# Patient Record
Sex: FEMALE | Race: WHITE | NOT HISPANIC OR LATINO | Employment: FULL TIME | URBAN - METROPOLITAN AREA
[De-identification: names, ages, dates, MRNs, and addresses within clinical notes are randomized per-mention and may not be internally consistent; named-entity substitution may affect disease eponyms.]

---

## 2018-03-14 ENCOUNTER — OFFICE VISIT (OUTPATIENT)
Dept: NEUROLOGY | Facility: CLINIC | Age: 48
End: 2018-03-14
Payer: COMMERCIAL

## 2018-03-14 VITALS — SYSTOLIC BLOOD PRESSURE: 128 MMHG | WEIGHT: 264 LBS | DIASTOLIC BLOOD PRESSURE: 90 MMHG | HEART RATE: 72 BPM

## 2018-03-14 DIAGNOSIS — G51.0 RIGHT-SIDED BELL'S PALSY: Primary | ICD-10-CM

## 2018-03-14 PROCEDURE — 99203 OFFICE O/P NEW LOW 30 MIN: CPT | Performed by: PSYCHIATRY & NEUROLOGY

## 2018-03-14 RX ORDER — VALACYCLOVIR HYDROCHLORIDE 1 G/1
1000 TABLET, FILM COATED ORAL DAILY
Refills: 0 | Status: ON HOLD | COMMUNITY
Start: 2018-01-06 | End: 2019-02-11 | Stop reason: SDUPTHER

## 2018-03-14 RX ORDER — PREDNISONE 20 MG/1
TABLET ORAL
Refills: 0 | Status: ON HOLD | COMMUNITY
Start: 2018-03-12 | End: 2018-03-22

## 2018-03-14 RX ORDER — ACYCLOVIR 400 MG/1
TABLET ORAL
Refills: 0 | Status: ON HOLD | COMMUNITY
Start: 2018-03-12 | End: 2019-02-11 | Stop reason: ALTCHOICE

## 2018-03-14 RX ORDER — BUTALBITAL, ACETAMINOPHEN AND CAFFEINE 50; 325; 40 MG/1; MG/1; MG/1
TABLET ORAL
Refills: 0 | COMMUNITY
Start: 2018-03-12

## 2018-03-14 NOTE — PROGRESS NOTES
Consultation - Neurology   Silke Mercado 49 y o  female MRN: [id number]  Unit/Bed#:  Encounter: [id number]      Physician Requesting Consult: No att  providers found  49year old left handed female patient was referred by Dr Nayla Donaldson for her history of Bell's palsy  HPI:  Patient is a 50-year-old right-handed very pleasant lady, who 8 days ago woke up with numbness of her tongue loss of taste on the right side and subsequently noticed during the course of the day weakness of the right side of the face and went to the emergency room where she had a CAT scan and blood work done and detected to have Bell's palsy and discharged home  She claims she had a Lyme titer done the results of which are not available  Patient has had persistent symptoms since then and was started on prednisone and acyclovir in the emergency room  Prior to the onset of the symptoms for the past 8 days she was experiencing dull bifrontal headaches but denies any viral syndrome  Patient's headaches have improved but occasionally still gets 1 and she denies any blurred vision diplopia perioral numbness vertigo or dizziness and denies any tinnitus and also denies any motor or sensory symptoms in the upper lower extremities or any speech or gait difficulty  No history of head trauma the numbness on her tongue is resolved with the facial weakness is persistent  She was therefore  referred by her primary physician for further evaluation  Review of Systems:  Review of Systems   Constitutional: Positive for appetite change  HENT: Positive for sore throat and trouble swallowing  Eyes: Negative  Respiratory: Negative  Cardiovascular: Negative  Gastrointestinal: Negative  Endocrine: Negative  Genitourinary: Negative  Musculoskeletal: Negative  Skin: Negative  Allergic/Immunologic: Positive for food allergies  MSG   Neurological: Positive for speech difficulty, numbness and headaches  Hematological: Negative  Psychiatric/Behavioral: The patient is nervous/anxious  Historical Information   Past Medical History:   Diagnosis Date    Bell palsy     Right facial numbness      Past Surgical History:   Procedure Laterality Date    BARIATRIC SURGERY      CHOLECYSTECTOMY      HYSTERECTOMY      KNEE CARTILAGE SURGERY      SHOULDER ARTHROSCOPY DISTAL CLAVICLE EXCISION AND OPEN ROTATOR CUFF REPAIR       Social History   History   Smoking Status    Never Smoker   Smokeless Tobacco    Never Used     History   Alcohol Use    Yes     Comment: social     History   Drug Use No       Family History:   Family History   Problem Relation Age of Onset    Heart attack Mother     Diabetes Mother     Heart attack Father     Stroke Father     Hypertension Brother        Allergies   Allergen Reactions    Penicillins        Meds:  All current active meds have been reviewed    Scheduled Meds:  PRN Meds:     Physical Exam:   Objective   Vitals:   Vitals:    03/14/18 1440   BP: 128/90   Pulse: 72   ,There is no height or weight on file to calculate BMI  General appearance: Cooperative in no acute distress  Head & neck head is atraumatic and normocephalic  Neck is supple with full range of motion  Cardiovascular: Carotid arteries-no carotid bruits  Neurologic:   Patient is alert awake oriented, high functions are intact, speech is fluent  No evidence of any aphasia or dysarthria  Cranial nerve examination reveals visual fields are full to threat, pupils equal and reactive, extraocular movements intact, fundi showed sharp disc margins, sensation in the V1 V2 V3 distribution is symmetric, patient has evidence of weakness of the right side of the face affecting the frontalis, orbicularis oculi, orbicularis auris, buccinator, tongue is deviated slightly to the right, and gag is adequate    Motor examination reveals normal tone and bulk, no evidence of any drift to the outstretched extremities, strength is 5/5 preserved bilaterally in both upper and lower extremities, deep tendon reflexes are intact, toes are downgoing  Sensory examination to pinprick light touch proprioception and vibration is preserved bilaterally, patient does not extinguish double simultaneous stimuli  Coordination no evidence of any finger-to-nose dysmetria  Gait is normal based Romberg sign is negative  Assessment:  Right-sided Bell's palsy most likely postviral etiology, CP angle mass lesion to be ruled out  Plan: At this time a lengthy discussion occurred with the patient regarding the nature of her Bell's palsy, she is advised to call us when she gets the Lyme titer results, MRI of the brain will be useful and physical therapy with facial nerve stimulation will also be useful  Patient was reassured that her symptoms should resolve and will return back to see me in 3-4 weeks  3/14/2018,2:49 PM    Dictation voice to text software has been used in the creation of this document

## 2018-03-15 ENCOUNTER — APPOINTMENT (EMERGENCY)
Dept: CT IMAGING | Facility: HOSPITAL | Age: 48
End: 2018-03-15
Payer: COMMERCIAL

## 2018-03-15 ENCOUNTER — HOSPITAL ENCOUNTER (EMERGENCY)
Facility: HOSPITAL | Age: 48
Discharge: HOME | End: 2018-03-16
Attending: EMERGENCY MEDICINE | Admitting: EMERGENCY MEDICINE
Payer: COMMERCIAL

## 2018-03-15 ENCOUNTER — TRANSCRIBE ORDERS (OUTPATIENT)
Dept: NEUROLOGY | Facility: CLINIC | Age: 48
End: 2018-03-15

## 2018-03-15 DIAGNOSIS — G51.0 BELL PALSY: Primary | ICD-10-CM

## 2018-03-15 DIAGNOSIS — K62.5 RECTAL BLEED: Primary | ICD-10-CM

## 2018-03-15 DIAGNOSIS — K92.1 MELENA: ICD-10-CM

## 2018-03-15 LAB
ALBUMIN SERPL BCP-MCNC: 3.3 G/DL (ref 3.5–5)
ALP SERPL-CCNC: 63 U/L (ref 46–116)
ALT SERPL W P-5'-P-CCNC: 24 U/L (ref 12–78)
ANION GAP SERPL CALCULATED.3IONS-SCNC: 9 MMOL/L (ref 4–13)
APTT PPP: 28 SECONDS (ref 23–35)
AST SERPL W P-5'-P-CCNC: 11 U/L (ref 5–45)
BASOPHILS # BLD AUTO: 0.02 THOUSANDS/ΜL (ref 0–0.1)
BASOPHILS NFR BLD AUTO: 0 % (ref 0–1)
BILIRUB SERPL-MCNC: 0.3 MG/DL (ref 0.2–1)
BUN SERPL-MCNC: 42 MG/DL (ref 5–25)
CALCIUM SERPL-MCNC: 8.8 MG/DL (ref 8.3–10.1)
CHLORIDE SERPL-SCNC: 104 MMOL/L (ref 100–108)
CO2 SERPL-SCNC: 25 MMOL/L (ref 21–32)
CREAT SERPL-MCNC: 0.77 MG/DL (ref 0.6–1.3)
EOSINOPHIL # BLD AUTO: 0 THOUSAND/ΜL (ref 0–0.61)
EOSINOPHIL NFR BLD AUTO: 0 % (ref 0–6)
ERYTHROCYTE [DISTWIDTH] IN BLOOD BY AUTOMATED COUNT: 14.5 % (ref 11.6–15.1)
GFR SERPL CREATININE-BSD FRML MDRD: 92 ML/MIN/1.73SQ M
GLUCOSE SERPL-MCNC: 128 MG/DL (ref 65–140)
HCT VFR BLD AUTO: 30.8 % (ref 34.8–46.1)
HGB BLD-MCNC: 10.3 G/DL (ref 11.5–15.4)
INR PPP: 1.14 (ref 0.86–1.16)
LIPASE SERPL-CCNC: 103 U/L (ref 73–393)
LYMPHOCYTES # BLD AUTO: 1.71 THOUSANDS/ΜL (ref 0.6–4.47)
LYMPHOCYTES NFR BLD AUTO: 14 % (ref 14–44)
MCH RBC QN AUTO: 28.5 PG (ref 26.8–34.3)
MCHC RBC AUTO-ENTMCNC: 33.4 G/DL (ref 31.4–37.4)
MCV RBC AUTO: 85 FL (ref 82–98)
MONOCYTES # BLD AUTO: 0.76 THOUSAND/ΜL (ref 0.17–1.22)
MONOCYTES NFR BLD AUTO: 6 % (ref 4–12)
NEUTROPHILS # BLD AUTO: 9.94 THOUSANDS/ΜL (ref 1.85–7.62)
NEUTS SEG NFR BLD AUTO: 79 % (ref 43–75)
NRBC BLD AUTO-RTO: 0 /100 WBCS
PLATELET # BLD AUTO: 258 THOUSANDS/UL (ref 149–390)
PMV BLD AUTO: 11.6 FL (ref 8.9–12.7)
POTASSIUM SERPL-SCNC: 4 MMOL/L (ref 3.5–5.3)
PROT SERPL-MCNC: 6.6 G/DL (ref 6.4–8.2)
PROTHROMBIN TIME: 14.8 SECONDS (ref 12.1–14.4)
RBC # BLD AUTO: 3.62 MILLION/UL (ref 3.81–5.12)
SODIUM SERPL-SCNC: 138 MMOL/L (ref 136–145)
WBC # BLD AUTO: 12.56 THOUSAND/UL (ref 4.31–10.16)

## 2018-03-15 PROCEDURE — 36415 COLL VENOUS BLD VENIPUNCTURE: CPT | Performed by: EMERGENCY MEDICINE

## 2018-03-15 PROCEDURE — 85730 THROMBOPLASTIN TIME PARTIAL: CPT | Performed by: EMERGENCY MEDICINE

## 2018-03-15 PROCEDURE — 96361 HYDRATE IV INFUSION ADD-ON: CPT

## 2018-03-15 PROCEDURE — C9113 INJ PANTOPRAZOLE SODIUM, VIA: HCPCS | Performed by: EMERGENCY MEDICINE

## 2018-03-15 PROCEDURE — 80053 COMPREHEN METABOLIC PANEL: CPT | Performed by: EMERGENCY MEDICINE

## 2018-03-15 PROCEDURE — 85025 COMPLETE CBC W/AUTO DIFF WBC: CPT | Performed by: EMERGENCY MEDICINE

## 2018-03-15 PROCEDURE — 96374 THER/PROPH/DIAG INJ IV PUSH: CPT

## 2018-03-15 PROCEDURE — 83690 ASSAY OF LIPASE: CPT | Performed by: EMERGENCY MEDICINE

## 2018-03-15 PROCEDURE — 74177 CT ABD & PELVIS W/CONTRAST: CPT

## 2018-03-15 PROCEDURE — 85610 PROTHROMBIN TIME: CPT | Performed by: EMERGENCY MEDICINE

## 2018-03-15 RX ORDER — PANTOPRAZOLE SODIUM 40 MG/1
40 INJECTION, POWDER, FOR SOLUTION INTRAVENOUS ONCE
Status: COMPLETED | OUTPATIENT
Start: 2018-03-15 | End: 2018-03-15

## 2018-03-15 RX ORDER — SODIUM CHLORIDE 9 MG/ML
125 INJECTION, SOLUTION INTRAVENOUS CONTINUOUS
Status: DISCONTINUED | OUTPATIENT
Start: 2018-03-15 | End: 2018-03-16 | Stop reason: HOSPADM

## 2018-03-15 RX ADMIN — SODIUM CHLORIDE 125 ML/HR: 0.9 INJECTION, SOLUTION INTRAVENOUS at 21:43

## 2018-03-15 RX ADMIN — PANTOPRAZOLE SODIUM 40 MG: 40 INJECTION, POWDER, FOR SOLUTION INTRAVENOUS at 21:42

## 2018-03-15 RX ADMIN — IOHEXOL 100 ML: 350 INJECTION, SOLUTION INTRAVENOUS at 22:33

## 2018-03-16 VITALS
DIASTOLIC BLOOD PRESSURE: 68 MMHG | WEIGHT: 256 LBS | HEART RATE: 94 BPM | RESPIRATION RATE: 20 BRPM | SYSTOLIC BLOOD PRESSURE: 120 MMHG | TEMPERATURE: 97.8 F | HEIGHT: 69 IN | OXYGEN SATURATION: 96 % | BODY MASS INDEX: 37.92 KG/M2

## 2018-03-16 PROCEDURE — 99285 EMERGENCY DEPT VISIT HI MDM: CPT

## 2018-03-16 RX ORDER — FERROUS SULFATE 325(65) MG
325 TABLET ORAL DAILY
Qty: 30 TABLET | Refills: 0 | Status: SHIPPED | OUTPATIENT
Start: 2018-03-16 | End: 2019-03-18 | Stop reason: HOSPADM

## 2018-03-16 RX ORDER — SUCRALFATE ORAL 1 G/10ML
1 SUSPENSION ORAL 4 TIMES DAILY
Qty: 420 ML | Refills: 0 | Status: SHIPPED | OUTPATIENT
Start: 2018-03-16 | End: 2018-04-12 | Stop reason: SDUPTHER

## 2018-03-16 RX ORDER — PANTOPRAZOLE SODIUM 20 MG/1
40 TABLET, DELAYED RELEASE ORAL 2 TIMES DAILY
Qty: 60 TABLET | Refills: 0 | Status: SHIPPED | OUTPATIENT
Start: 2018-03-16 | End: 2018-03-22

## 2018-03-16 NOTE — ED PROVIDER NOTES
History  Chief Complaint   Patient presents with    Rectal Bleeding     Patient c/o abdominal pain that started this morning  Patient states her stools this morning are black and tarry and have a foul smell  Patient she had states she bariatric surgery two years ago  54-year-old female coming in with complaint of dark red tar like stools for the past 1 day  Patient has history of having gastric bypass surgery done 2 years ago at [facility]   Starting a few days ago patient had facial droop and went to [facility] where she was diagnosed with Bell's palsy, she was started on acyclovir and prednisone and a Lyme test was sent  She has been taking the steroids for 2-3 days now  Starting today she had dark red bloody stools  She has not had any nausea or vomiting  She has no chest pain or shortness of breath  She has no weakness or fatigue  She has some epigastric pain that is like gnawing  She is not taking any other medications, including she is not taking any PPIs or and acid given her history of gastric bypass  Given her history of gastric bypass and her dark red melanotic stools that are starting after she started prednisone this would be consistent 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 in a person with history of gastric bypass  Will do a CT scan to evaluate for any complications of this including like gastric perforation  Will check a CBC for anemia  Patient does have history of chronic anemia which would be present with history of gastric bypass  Will give her a dose of IV Protonix and reassess  History provided by:  Patient  Rectal Bleeding - Minor   Quality:  Black and tarry  Amount:   Moderate  Duration:  1 day  Timing:  Constant  Chronicity:  New  Context: defecation    Similar prior episodes: no    Relieved by:  None tried  Worsened by:  Nothing  Ineffective treatments:  None tried  Associated symptoms: abdominal pain (epigastric pain, gnawing)    Associated symptoms: no dizziness, no epistaxis, no fever, no hematemesis, no light-headedness, no loss of consciousness, no recent illness and no vomiting    Risk factors: steroid use    Risk factors: no anticoagulant use and no hx of colorectal cancer    Risk factors comment:  H/o gastric bypass surgery      Prior to Admission Medications   Prescriptions Last Dose Informant Patient Reported? Taking?   acyclovir (ZOVIRAX) 400 MG tablet   Yes No   Sig: take 1 tablet by mouth five times a day for 10 days   butalbital-acetaminophen-caffeine (FIORICET,ESGIC) -40 mg per tablet   Yes No   Sig: take 1-2 tablets by mouth every 4 to 6 hours if needed maximum daily dose of 6   esterified estrogens (MENEST) 0 625 MG tablet  Self Yes No   Sig: Take 0 625 mg by mouth daily   predniSONE 20 mg tablet   Yes No   Sig: take 3 tablets by mouth daily for 3 days then 2 tablets daily for     (REFER TO PRESCRIPTION NOTES)  valACYclovir (VALTREX) 1,000 mg tablet   Yes No   Sig: Take 1,000 mg by mouth daily      Facility-Administered Medications: None       Past Medical History:   Diagnosis Date    Bell palsy     Right facial numbness        Past Surgical History:   Procedure Laterality Date    BARIATRIC SURGERY      CHOLECYSTECTOMY      HYSTERECTOMY      KNEE CARTILAGE SURGERY      SHOULDER ARTHROSCOPY DISTAL CLAVICLE EXCISION AND OPEN ROTATOR CUFF REPAIR         Family History   Problem Relation Age of Onset    Heart attack Mother     Diabetes Mother     Heart attack Father     Stroke Father     Hypertension Brother      I have reviewed and agree with the history as documented  Social History   Substance Use Topics    Smoking status: Never Smoker    Smokeless tobacco: Never Used    Alcohol use Yes      Comment: social        Review of Systems   Constitutional: Negative for fever  HENT: Negative for nosebleeds  Gastrointestinal: Positive for abdominal pain (epigastric pain, gnawing) and hematochezia   Negative for hematemesis and vomiting  Neurological: Negative for dizziness, loss of consciousness and light-headedness  All other systems reviewed and are negative  Physical Exam  ED Triage Vitals [03/15/18 1810]   Temperature Pulse Respirations Blood Pressure SpO2   97 8 °F (36 6 °C) 104 18 133/65 95 %      Temp src Heart Rate Source Patient Position - Orthostatic VS BP Location FiO2 (%)   -- Monitor Sitting Left arm --      Pain Score       6           Orthostatic Vital Signs  Vitals:    03/15/18 1810   BP: 133/65   Pulse: 104   Patient Position - Orthostatic VS: Sitting       Physical Exam   Constitutional: She is oriented to person, place, and time  She appears well-developed and well-nourished  No distress  HENT:   Head: Normocephalic and atraumatic  Nose: Nose normal    Mouth/Throat: Oropharynx is clear and moist    Eyes: Conjunctivae and EOM are normal  Pupils are equal, round, and reactive to light  Neck: Normal range of motion  Neck supple  Cardiovascular: Regular rhythm, normal heart sounds and intact distal pulses  Tachycardia present  Pulmonary/Chest: Effort normal and breath sounds normal  No respiratory distress  Abdominal: Soft  Bowel sounds are normal  She exhibits no distension  There is no tenderness  Genitourinary: Rectal exam shows guaiac positive stool  Rectal exam shows no external hemorrhoid, no fissure and no tenderness  Musculoskeletal: Normal range of motion  Neurological: She is alert and oriented to person, place, and time  Skin: Skin is warm and dry  No rash noted  She is not diaphoretic  No pallor  Psychiatric: She has a normal mood and affect  Nursing note and vitals reviewed        ED Medications  Medications   sodium chloride 0 9 % infusion (125 mL/hr Intravenous New Bag 3/15/18 2143)   pantoprazole (PROTONIX) injection 40 mg (40 mg Intravenous Given 3/15/18 2142)   iohexol (OMNIPAQUE) 350 MG/ML injection (MULTI-DOSE) 100 mL (100 mL Intravenous Given 3/15/18 2233) Diagnostic Studies  Results Reviewed     Procedure Component Value Units Date/Time    Comprehensive metabolic panel [[id number]]  (Abnormal) Collected:  03/15/18 2142    Lab Status:  Final result Specimen:  Blood from Arm, Right Updated:  03/15/18 2211     Sodium 138 mmol/L      Potassium 4 0 mmol/L      Chloride 104 mmol/L      CO2 25 mmol/L      Anion Gap 9 mmol/L      BUN 42 (H) mg/dL      Creatinine 0 77 mg/dL      Glucose 128 mg/dL      Calcium 8 8 mg/dL      AST 11 U/L      ALT 24 U/L      Alkaline Phosphatase 63 U/L      Total Protein 6 6 g/dL      Albumin 3 3 (L) g/dL      Total Bilirubin 0 30 mg/dL      eGFR 92 ml/min/1 73sq m     Narrative:         National Kidney Disease Education Program recommendations are as follows:  GFR calculation is accurate only with a steady state creatinine  Chronic Kidney disease less than 60 ml/min/1 73 sq  meters  Kidney failure less than 15 ml/min/1 73 sq  meters  Lipase [[id number]]  (Normal) Collected:  03/15/18 2142    Lab Status:  Final result Specimen:  Blood from Arm, Right Updated:  03/15/18 2211     Lipase 103 u/L     Protime-INR [[id number]]  (Abnormal) Collected:  03/15/18 2142    Lab Status:  Final result Specimen:  Blood from Arm, Right Updated:  03/15/18 2209     Protime 14 8 (H) seconds      INR 1 14    APTT [[id number]]  (Normal) Collected:  03/15/18 2142    Lab Status:  Final result Specimen:  Blood from Arm, Right Updated:  03/15/18 2209     PTT 28 seconds     Narrative:          Therapeutic Heparin Range = 60-90 seconds    CBC and differential [[id number]]  (Abnormal) Collected:  03/15/18 2142    Lab Status:  Final result Specimen:  Blood from Arm, Right Updated:  03/15/18 2157     WBC 12 56 (H) Thousand/uL      RBC 3 62 (L) Million/uL      Hemoglobin 10 3 (L) g/dL      Hematocrit 30 8 (L) %      MCV 85 fL      MCH 28 5 pg      MCHC 33 4 g/dL      RDW 14 5 %      MPV 11 6 fL      Platelets 662 Thousands/uL      nRBC 0 /100 WBCs      Neutrophils Relative 79 (H) %      Lymphocytes Relative 14 %      Monocytes Relative 6 %      Eosinophils Relative 0 %      Basophils Relative 0 %      Neutrophils Absolute 9 94 (H) Thousands/µL      Lymphocytes Absolute 1 71 Thousands/µL      Monocytes Absolute 0 76 Thousand/µL      Eosinophils Absolute 0 00 Thousand/µL      Basophils Absolute 0 02 Thousands/µL                  CT abdomen pelvis with contrast   Final Result by Dione Fregoso MD (03/15 2300)      1 6 cm hypodense lesion in the pancreatic neck which needs to be correlated with nonemergent outpatient contrast enhanced abdominal MRI/MRCP  Status post gastric bypass surgery  Fluid-filled mid abdominal and lower pelvis loops of small bowel with air-fluid levels could relate to enteritis  Workstation performed: [id number]                    Procedures  Procedures       Phone Contacts  ED Phone Contact    ED Course  ED Course as of Mar 16 0008   Thu Mar 15, 2018   233 Page placed to bariatric  MDM  Number of Diagnoses or Management Options  Melena: new and requires workup  Rectal bleed: new and requires workup     Amount and/or Complexity of Data Reviewed  Clinical lab tests: ordered and reviewed  Tests in the radiology section of CPT®: ordered and reviewed    Risk of Complications, Morbidity, and/or Mortality  Presenting problems: high    Patient Progress  Patient progress: improved (Discussed with the patient her labs and her CT scan results  Including discussed with her the lesion in her pancreas for which she needs follow-up MRI  Called and discussed with Dr Marvin Villa as well and could continue prednisone if need be but would start Protonix b i d  and Carafate  Could follow up with GI to get a scope  It is likely a presumed ulcerative bleed given her history of gastric bypass and not taking any medications  I discussed this with patient  Her hemoglobin is 10  She has an appointment with her family doctor on Saturday    Is not actively having any bleeding right now  Discussed with her worsening signs and symptoms to return for  May have some occasional still bloody stools until she gets treated with the PPI  Discussed where she feels lightheaded dizzy or has any worsening symptoms like abdominal pain to return emergency department  Discussed with her to call GI and bariatric surgery tomorrow for follow-up  Patient felt comfortable and improved with this plan )    CritCare Time    Disposition  Final diagnoses:   Rectal bleed   Melena     Time reflects when diagnosis was documented in both MDM as applicable and the Disposition within this note     Time User Action Codes Description Comment    3/16/2018 12:04 AM Delfino Sena Add [K62 5] Rectal bleed     3/16/2018 12:05 AM Emily PAUL Add [K92 1] Melena       ED Disposition     ED Disposition Condition Comment    Discharge  [address] discharge to home/self care      Condition at discharge: Good        Follow-up Information     Follow up With Specialties Details Why Jamie Phelps MD Emergency Medicine  Keep your Saturday appointment [address] [facility]      Barnie Gosselin, MD Gastroenterology Call in 1 day  [address] [facility], MD General Surgery, [address] [facility]  [phone]          Patient's Medications   Discharge Prescriptions    FERROUS SULFATE 325 (65 FE) MG TABLET    Take 1 tablet (325 mg total) by mouth daily       Start Date: 3/16/2018 End Date: --       Order Dose: 325 mg       Quantity: 30 tablet    Refills: 0    PANTOPRAZOLE (PROTONIX) 20 MG TABLET    Take 2 tablets (40 mg total) by mouth 2 (two) times a day for 30 days       Start Date: 3/16/2018 End Date: 4/15/2018       Order Dose: 40 mg       Quantity: 60 tablet    Refills: 0    SUCRALFATE (CARAFATE) 1 G/10 ML SUSPENSION    Take 10 mL (1 g total) by mouth 4 (four) times a day       Start Date: 3/16/2018 End Date: --       Order Dose: 1 g       Quantity: 420 mL    Refills: 0     No discharge procedures on file      ED Provider  Electronically Signed by           Cody Beebe MD  03/16/18 [address], MD  03/16/18 4921

## 2018-03-16 NOTE — DISCHARGE INSTRUCTIONS
Gastrointestinal Bleeding   WHAT YOU NEED TO KNOW:   Gastrointestinal (GI) bleeding may occur in any part of your digestive tract  This includes your esophagus, stomach, intestines, rectum, or anus  Bleeding may be mild to severe  Your bleeding may begin suddenly, or start slowly and last for a longer period of time  Bleeding that lasts for a longer period of time is called chronic GI bleeding  DISCHARGE INSTRUCTIONS:   Call 911 for any of the following:   · You have shortness of breath or trouble breathing  · You faint or lose consciousness  · You have chest pain  Return to the emergency department if:   · You feel dizzy or are too weak to stand  · Your heart is beating faster than usual      · You vomit blood, or your vomit looks like coffee grounds  · You have blood in your bowel movement  · You have abdominal pain or swelling  Contact your healthcare provider if:   · You have bowel movements that are tarry or black  · You have nausea or are vomiting  · You have heartburn  · You have questions or concerns about your condition or care  Activity:  Rest as directed  Ask when you can return to your usual activities, such as work  Slowly do more each day  Nutrition:  Ask if you need to be on a special diet  A special diet can help treat GI conditions and prevent problems such as GI bleeding  Eat small meals more often while your digestive system heals  Avoid or limit caffeine and spicy foods  Also avoid foods that cause heartburn, nausea, or diarrhea  Prevent GI bleeding:   · Manage GI conditions as directed  Examples of GI conditions include gastroesophageal reflux, peptic ulcer disease, and ulcerative colitis  Take all medicines for these conditions as directed  · Limit or do not take NSAIDs  Ask your healthcare provider if it is safe for you to take NSAIDs  NSAIDs can increase your risk for ulcers and GI bleeding  · Do not drink alcohol    Alcohol can cause ulcers and esophageal varices  Esophageal varices are swollen blood vessels in your esophagus  Over time the blood vessels become weak and may bleed  · Do not smoke  Nicotine and other chemicals in cigarettes and cigars can increase your risk for ulcers  Ask your healthcare provider for information if you currently smoke and need help to quit  E-cigarettes or smokeless tobacco still contain nicotine  Talk to your healthcare provider before you use these products  Follow up with your healthcare provider as directed: You may need to return for a colonoscopy, endoscopy, or other tests  These tests can make sure you do not have more bleeding  Write down your questions so you remember to ask them during your visits  © 2017 [facility] Information is for End User's use only and may not be sold, redistributed or otherwise used for commercial purposes  All illustrations and images included in CareNotes® are the copyrighted property of A D A M , Inc  or [employer]ma Cantu  The above information is an  only  It is not intended as medical advice for individual conditions or treatments  Talk to your doctor, nurse or pharmacist before following any medical regimen to see if it is safe and effective for you

## 2018-03-20 ENCOUNTER — OFFICE VISIT (OUTPATIENT)
Dept: GASTROENTEROLOGY | Facility: CLINIC | Age: 48
End: 2018-03-20
Payer: COMMERCIAL

## 2018-03-20 ENCOUNTER — LAB (OUTPATIENT)
Dept: LAB | Facility: CLINIC | Age: 48
End: 2018-03-20
Payer: COMMERCIAL

## 2018-03-20 VITALS
HEIGHT: 70 IN | WEIGHT: 262 LBS | BODY MASS INDEX: 37.51 KG/M2 | SYSTOLIC BLOOD PRESSURE: 142 MMHG | DIASTOLIC BLOOD PRESSURE: 80 MMHG | HEART RATE: 84 BPM

## 2018-03-20 DIAGNOSIS — K92.1 MELENA: ICD-10-CM

## 2018-03-20 DIAGNOSIS — D62 ACUTE BLOOD LOSS ANEMIA: ICD-10-CM

## 2018-03-20 DIAGNOSIS — K92.1 MELENA: Primary | ICD-10-CM

## 2018-03-20 PROBLEM — K92.2 UPPER GI BLEED: Status: ACTIVE | Noted: 2018-03-20

## 2018-03-20 LAB
BASOPHILS # BLD AUTO: 0.02 THOUSANDS/ΜL (ref 0–0.1)
BASOPHILS NFR BLD AUTO: 0 % (ref 0–1)
EOSINOPHIL # BLD AUTO: 0.21 THOUSAND/ΜL (ref 0–0.61)
EOSINOPHIL NFR BLD AUTO: 3 % (ref 0–6)
ERYTHROCYTE [DISTWIDTH] IN BLOOD BY AUTOMATED COUNT: 15.4 % (ref 11.6–15.1)
HCT VFR BLD AUTO: 29.4 % (ref 34.8–46.1)
HGB BLD-MCNC: 9.7 G/DL (ref 11.5–15.4)
LYMPHOCYTES # BLD AUTO: 1.84 THOUSANDS/ΜL (ref 0.6–4.47)
LYMPHOCYTES NFR BLD AUTO: 23 % (ref 14–44)
MCH RBC QN AUTO: 28.5 PG (ref 26.8–34.3)
MCHC RBC AUTO-ENTMCNC: 33 G/DL (ref 31.4–37.4)
MCV RBC AUTO: 87 FL (ref 82–98)
MONOCYTES # BLD AUTO: 0.5 THOUSAND/ΜL (ref 0.17–1.22)
MONOCYTES NFR BLD AUTO: 6 % (ref 4–12)
NEUTROPHILS # BLD AUTO: 5.29 THOUSANDS/ΜL (ref 1.85–7.62)
NEUTS SEG NFR BLD AUTO: 68 % (ref 43–75)
NRBC BLD AUTO-RTO: 0 /100 WBCS
PLATELET # BLD AUTO: 362 THOUSANDS/UL (ref 149–390)
PMV BLD AUTO: 10.6 FL (ref 8.9–12.7)
RBC # BLD AUTO: 3.4 MILLION/UL (ref 3.81–5.12)
WBC # BLD AUTO: 7.9 THOUSAND/UL (ref 4.31–10.16)

## 2018-03-20 PROCEDURE — 99243 OFF/OP CNSLTJ NEW/EST LOW 30: CPT | Performed by: INTERNAL MEDICINE

## 2018-03-20 PROCEDURE — 36415 COLL VENOUS BLD VENIPUNCTURE: CPT

## 2018-03-20 PROCEDURE — 85025 COMPLETE CBC W/AUTO DIFF WBC: CPT

## 2018-03-20 NOTE — LETTER
March 20, 2018     Ray Hoang MD  [address] [employer] [employer]    Patient: Bishop Hancock   YOB: 1970   Date of Visit: 3/20/2018       Dear Dr Alvin Coy: Thank you for referring Bishop Hancock to me for evaluation  Below are my notes for this consultation  If you have questions, please do not hesitate to call me  I look forward to following your patient along with you  Sincerely,        Victor Manuel Riley MD        CC: No Recipients  Graig SpurlingDilcia  3/20/2018  3:46 PM  Sign at close encounter  [facility]    Dear Dr Alvin Coy,    I had the pleasure of seeing your patient Bishop Hancock in the office today and I thank you for this kind referral        Chief Complaint: Gi bleeding       HPI:  Bishop Hancock is a 49 y o  female who presents here today for Hospital Follow up  She was recently treated for episode of Bell's palsy  A couple of days after beginning treatment, she noticed dark stools  and sought treatment once more at [address] [place] [address] ED  At that time was found to have bleeding ulcers on CT scan  Her hgb on 3/15 was 10 3  She was discharged home from the ED with Carafate and Pantoprazole with follow up information for GI to have EGD  Since her ED visit five days ago, she  has continued to experience dark and bloody stools  She has been having 1 BM per day  She has abdominal discomfort as well as non exertional chest discomfort  She has had fairly constant shortness of breath since onset of her symptoms  Review of Systems:   Constitutional: No fever or chills, feels well, no tiredness, no recent weight gain or weight loss  HENT: No complaints of earache, no hearing loss, no nosebleeds, no nasal discharge, no sore throat, no hoarseness  Eyes: No complaints of eye pain, no red eyes, no discharge from eyes, no itchy eyes    Cardiovascular: No complaints of slow heart rate, no fast heart rate, no palpitations, no leg claudication, no lower extremity edema  Non exertional chest pain  Respiratory: No complaints of wheezing, no cough, no SOB on exertion, no orthopnea  Dyspnea   Gastrointestinal: As noted in HPI  Genitourinary: No complaints of dysuria, no incontinence, no hesitancy, no nocturia  Musculoskeletal: No complaints of arthralgia, no myalgias, no joint swelling or stiffness, no limb pain or swelling  Neurological: No complaints of headache, no confusion, no convulsions, no numbness or tingling, no dizziness or fainting, no limb weakness, no difficulty walking  Skin: No complaints of skin rash or skin lesions, no itching, no skin wound, no dry skin  Hematological/Lymphatic: No complaints of swollen glands, does not bleed easy  Allergic/Immunologic: No immunocompromised state  Endocrine:  No complaints of polyuria, no polydipsia  Psychiatric/Behavioral: is not suicidal, no sleep disturbances, no anxiety or depression, no change in personality, no emotional problems  Historical Information   Past Medical History:   Diagnosis Date    Bell palsy     Right facial numbness      Past Surgical History:   Procedure Laterality Date    BARIATRIC SURGERY      CHOLECYSTECTOMY      HYSTERECTOMY      KNEE CARTILAGE SURGERY      SHOULDER ARTHROSCOPY DISTAL CLAVICLE EXCISION AND OPEN ROTATOR CUFF REPAIR       Social History   History   Alcohol Use    Yes     Comment: social     History   Drug Use No     History   Smoking Status    Never Smoker   Smokeless Tobacco    Never Used     Family History   Problem Relation Age of Onset    Heart attack Mother     Diabetes Mother     Heart attack Father     Stroke Father     Hypertension Brother          Current Medications: has a current medication list which includes the following prescription(s): acyclovir, butalbital-acetaminophen-caffeine, esterified estrogens, ferrous sulfate, pantoprazole, sucralfate, valacyclovir, and prednisone         Physical Exam: Constitutional  General Appearance: No acute distress, well appearing and well nourished  Head  Normocephalic  Eyes  Conjunctivae and lids: No swelling, erythema, or discharge  Pupils and irises: Equal, round and reactive to light  Ears, Nose, Mouth, and Throat  External inspection of ears and nose: Normal  Nasal mucosa, septum and turbinates: Normal without edema or erythema/   Oropharynx: Normal with no erythema, edema, exudate or lesions  Neck  Normal range of motion  Neck supple  Cardiovascular  Auscultation of the heart: Normal rate and rhythm, normal S1 and S2 without murmurs  Examination of the extremities for edema and/or varicosities: Normal  Pulmonary/Chest  Respiratory effort: No increased work of breathing or signs of respiratory distress  Auscultation of lungs: Clear to auscultation, equal breath sounds bilaterally, no wheezes, rales, no rhonchi  Abdomen  Abdomen: Non-tender, no masses  Liver and spleen: No hepatomegaly or splenomegaly  Musculoskeletal  Gait and station: normal   Digits and Nails: normal without clubbing or cyanosis  Inspection/palpation of joints, bones, and muscles: Normal  Neurological  No nystagmus or asterixis  Skin  Skin and subcutaneous tissue: Normal without rashes or lesions  Lymphatic  Palpation of the lymph nodes in neck: No lymphadenopathy  Psychiatric  Orientation to person, place and time: Normal   Mood and affect: Normal          Labs:   Results Reviewed     None          X-Rays & Procedures:   No orders to display         ______________________________________________________________________      Assessment & Plan:      Diagnoses and all orders for this visit:    Melena    Acute blood loss anemia        Plan for above problems  is to obtain repeat Hgb  Continue on pantoprazole and Carafate  Will schedule her for EGD       I advised her to seek immediate treatment if she develops worsening of chest pain or dyspnea, worsening of blood in her stools, development of hematemesis, and any other concerning s/s  With warmest regards,    Ava Santiago MD, [facility]           Attestation:   By signing my name below, I, Aj Andrade, attest that this documentation has been prepared under the direction and in the presence of Ava Santiago MD  Electronically Signed: Dilcia Arechiga  03/20/18       I, Ava Santiago, personally performed the services described in this documentation  All medical record entries made by the scribe were at my direction and in my presence  I have reviewed the chart and discharge instructions and agree that the record reflects my personal performance and is accurate and complete   Ava Santiago MD  03/20/18

## 2018-03-20 NOTE — PROGRESS NOTES
[facility]    Dear Dr Bryson Pelletier,    I had the pleasure of seeing your patient Constanza Joseph in the office today and I thank you for this kind referral        Chief Complaint: Gi bleeding       HPI:  Constanza Joseph is a 49 y o  female who presents here today for Hospital Follow up  She was recently treated for episode of Bell's palsy  A couple of days after beginning treatment, she noticed dark stools  and sought treatment once more at [facility] ED  At that time was found to have bleeding ulcers on CT scan  Her hgb on 3/15 was 10 3  She was discharged home from the ED with Carafate and Pantoprazole with follow up information for GI to have EGD  Since her ED visit five days ago, she  has continued to experience dark and bloody stools  She has been having 1 BM per day  She has abdominal discomfort as well as non exertional chest discomfort  She has had fairly constant shortness of breath since onset of her symptoms  Review of Systems:   Constitutional: No fever or chills, feels well, no tiredness, no recent weight gain or weight loss  HENT: No complaints of earache, no hearing loss, no nosebleeds, no nasal discharge, no sore throat, no hoarseness  Eyes: No complaints of eye pain, no red eyes, no discharge from eyes, no itchy eyes  Cardiovascular: No complaints of slow heart rate, no fast heart rate, no palpitations, no leg claudication, no lower extremity edema  Non exertional chest pain  Respiratory: No complaints of wheezing, no cough, no SOB on exertion, no orthopnea  Dyspnea   Gastrointestinal: As noted in HPI  Genitourinary: No complaints of dysuria, no incontinence, no hesitancy, no nocturia  Musculoskeletal: No complaints of arthralgia, no myalgias, no joint swelling or stiffness, no limb pain or swelling  Neurological: No complaints of headache, no confusion, no convulsions, no numbness or tingling, no dizziness or fainting, no limb weakness, no difficulty walking  Skin: No complaints of skin rash or skin lesions, no itching, no skin wound, no dry skin  Hematological/Lymphatic: No complaints of swollen glands, does not bleed easy  Allergic/Immunologic: No immunocompromised state  Endocrine:  No complaints of polyuria, no polydipsia  Psychiatric/Behavioral: is not suicidal, no sleep disturbances, no anxiety or depression, no change in personality, no emotional problems  Historical Information   Past Medical History:   Diagnosis Date    Bell palsy     Right facial numbness      Past Surgical History:   Procedure Laterality Date    BARIATRIC SURGERY      CHOLECYSTECTOMY      HYSTERECTOMY      KNEE CARTILAGE SURGERY      SHOULDER ARTHROSCOPY DISTAL CLAVICLE EXCISION AND OPEN ROTATOR CUFF REPAIR       Social History   History   Alcohol Use    Yes     Comment: social     History   Drug Use No     History   Smoking Status    Never Smoker   Smokeless Tobacco    Never Used     Family History   Problem Relation Age of Onset    Heart attack Mother     Diabetes Mother     Heart attack Father     Stroke Father     Hypertension Brother          Current Medications: has a current medication list which includes the following prescription(s): acyclovir, butalbital-acetaminophen-caffeine, esterified estrogens, ferrous sulfate, pantoprazole, sucralfate, valacyclovir, and prednisone  Physical Exam:   Constitutional  General Appearance: No acute distress, well appearing and well nourished  Head  Normocephalic  Eyes  Conjunctivae and lids: No swelling, erythema, or discharge  Pupils and irises: Equal, round and reactive to light  Ears, Nose, Mouth, and Throat  External inspection of ears and nose: Normal  Nasal mucosa, septum and turbinates: Normal without edema or erythema/   Oropharynx: Normal with no erythema, edema, exudate or lesions  Neck  Normal range of motion  Neck supple     Cardiovascular  Auscultation of the heart: Normal rate and rhythm, normal S1 and S2 without murmurs  Examination of the extremities for edema and/or varicosities: Normal  Pulmonary/Chest  Respiratory effort: No increased work of breathing or signs of respiratory distress  Auscultation of lungs: Clear to auscultation, equal breath sounds bilaterally, no wheezes, rales, no rhonchi  Abdomen  Abdomen: Non-tender, no masses  Liver and spleen: No hepatomegaly or splenomegaly  Musculoskeletal  Gait and station: normal   Digits and Nails: normal without clubbing or cyanosis  Inspection/palpation of joints, bones, and muscles: Normal  Neurological  No nystagmus or asterixis  Left facial droop  Skin  Skin and subcutaneous tissue: Normal without rashes or lesions  Lymphatic  Palpation of the lymph nodes in neck: No lymphadenopathy  Psychiatric  Orientation to person, place and time: Normal   Mood and affect: Normal          Labs:   Results Reviewed     None          X-Rays & Procedures:   No orders to display         ______________________________________________________________________      Assessment & Plan:      Diagnoses and all orders for this visit:    Melena    Acute blood loss anemia        Plan for above problems  is to obtain repeat Hgb  Continue on pantoprazole and Carafate  Will schedule her for EGD  I advised her to seek immediate treatment if she develops worsening of chest pain or dyspnea, worsening of blood in her stools, development of hematemesis, and any other concerning s/s  With warmest regards,    Anthony Mota MD, [facility]           Attestation:   By signing my name below, IFlorencio, attest that this documentation has been prepared under the direction and in the presence of Anthony Mota MD  Electronically Signed: Dilcia Contreras  03/20/18       Anthony WHIPPLE, personally performed the services described in this documentation   All medical record entries made by the changibbarney were at my direction and in my presence  I have reviewed the chart and discharge instructions and agree that the record reflects my personal performance and is accurate and complete   West Lu MD  03/20/18

## 2018-03-21 ENCOUNTER — ANESTHESIA EVENT (OUTPATIENT)
Dept: PERIOP | Facility: HOSPITAL | Age: 48
End: 2018-03-21
Payer: COMMERCIAL

## 2018-03-22 ENCOUNTER — HOSPITAL ENCOUNTER (OUTPATIENT)
Facility: HOSPITAL | Age: 48
Setting detail: OUTPATIENT SURGERY
Discharge: HOME | End: 2018-03-22
Attending: INTERNAL MEDICINE | Admitting: INTERNAL MEDICINE
Payer: COMMERCIAL

## 2018-03-22 ENCOUNTER — ANESTHESIA (OUTPATIENT)
Dept: PERIOP | Facility: HOSPITAL | Age: 48
End: 2018-03-22
Payer: COMMERCIAL

## 2018-03-22 ENCOUNTER — TELEPHONE (OUTPATIENT)
Dept: GASTROENTEROLOGY | Facility: CLINIC | Age: 48
End: 2018-03-22

## 2018-03-22 VITALS
DIASTOLIC BLOOD PRESSURE: 72 MMHG | OXYGEN SATURATION: 97 % | HEART RATE: 61 BPM | WEIGHT: 261.47 LBS | RESPIRATION RATE: 15 BRPM | SYSTOLIC BLOOD PRESSURE: 138 MMHG | BODY MASS INDEX: 37.43 KG/M2 | HEIGHT: 70 IN | TEMPERATURE: 98.6 F

## 2018-03-22 DIAGNOSIS — K92.2 UPPER GI BLEED: ICD-10-CM

## 2018-03-22 DIAGNOSIS — K29.71 GASTROINTESTINAL HEMORRHAGE ASSOCIATED WITH GASTRITIS, UNSPECIFIED GASTRITIS TYPE: Primary | ICD-10-CM

## 2018-03-22 PROCEDURE — 88342 IMHCHEM/IMCYTCHM 1ST ANTB: CPT | Performed by: PATHOLOGY

## 2018-03-22 PROCEDURE — 88313 SPECIAL STAINS GROUP 2: CPT | Performed by: PATHOLOGY

## 2018-03-22 PROCEDURE — 88305 TISSUE EXAM BY PATHOLOGIST: CPT | Performed by: PATHOLOGY

## 2018-03-22 PROCEDURE — 45380 COLONOSCOPY AND BIOPSY: CPT | Performed by: INTERNAL MEDICINE

## 2018-03-22 RX ORDER — SODIUM CHLORIDE, SODIUM LACTATE, POTASSIUM CHLORIDE, CALCIUM CHLORIDE 600; 310; 30; 20 MG/100ML; MG/100ML; MG/100ML; MG/100ML
125 INJECTION, SOLUTION INTRAVENOUS CONTINUOUS
Status: DISCONTINUED | OUTPATIENT
Start: 2018-03-22 | End: 2018-03-22 | Stop reason: HOSPADM

## 2018-03-22 RX ORDER — PROPOFOL 10 MG/ML
INJECTION, EMULSION INTRAVENOUS AS NEEDED
Status: DISCONTINUED | OUTPATIENT
Start: 2018-03-22 | End: 2018-03-22 | Stop reason: SURG

## 2018-03-22 RX ORDER — LIDOCAINE HYDROCHLORIDE 10 MG/ML
INJECTION, SOLUTION INFILTRATION; PERINEURAL AS NEEDED
Status: DISCONTINUED | OUTPATIENT
Start: 2018-03-22 | End: 2018-03-22 | Stop reason: SURG

## 2018-03-22 RX ORDER — PANTOPRAZOLE SODIUM 40 MG/1
40 TABLET, DELAYED RELEASE ORAL DAILY
Qty: 30 TABLET | Refills: 5 | Status: SHIPPED | OUTPATIENT
Start: 2018-03-22 | End: 2019-01-24 | Stop reason: SDUPTHER

## 2018-03-22 RX ADMIN — LIDOCAINE HYDROCHLORIDE 50 MG: 10 INJECTION, SOLUTION INFILTRATION; PERINEURAL at 09:42

## 2018-03-22 RX ADMIN — PROPOFOL 100 MG: 10 INJECTION, EMULSION INTRAVENOUS at 09:42

## 2018-03-22 RX ADMIN — PROPOFOL 50 MG: 10 INJECTION, EMULSION INTRAVENOUS at 09:47

## 2018-03-22 RX ADMIN — SODIUM CHLORIDE, SODIUM LACTATE, POTASSIUM CHLORIDE, AND CALCIUM CHLORIDE 125 ML/HR: .6; .31; .03; .02 INJECTION, SOLUTION INTRAVENOUS at 09:17

## 2018-03-22 RX ADMIN — PROPOFOL 40 MG: 10 INJECTION, EMULSION INTRAVENOUS at 09:49

## 2018-03-22 RX ADMIN — PROPOFOL 50 MG: 10 INJECTION, EMULSION INTRAVENOUS at 09:43

## 2018-03-22 NOTE — ANESTHESIA PREPROCEDURE EVALUATION
Review of Systems/Medical History  Patient summary reviewed        Cardiovascular  Negative cardio ROS    Pulmonary  Negative pulmonary ROS        GI/Hepatic    Bariatric surgery,        Negative  ROS        Endo/Other    Obesity    GYN  Negative gynecology ROS          Hematology  Negative hematology ROS      Musculoskeletal  Negative musculoskeletal ROS        Neurology    Neuromuscular disease ,    Psychology   Negative psychology ROS              Physical Exam    Airway    Mallampati score: II  TM Distance: >3 FB  Neck ROM: full     Dental       Cardiovascular  Comment: Negative ROS, Cardiovascular exam normal    Pulmonary  Pulmonary exam normal     Other Findings        Anesthesia Plan  ASA Score- 3     Anesthesia Type- IV sedation with anesthesia with ASA Monitors  Additional Monitors:   Airway Plan:         Plan Factors-    Induction- intravenous  Postoperative Plan-     Informed Consent- Anesthetic plan and risks discussed with patient  I personally reviewed this patient with the CRNA  Discussed and agreed on the Anesthesia Plan with the CRNA  Brielle Valadez

## 2018-03-22 NOTE — ANESTHESIA POSTPROCEDURE EVALUATION
Post-Op Assessment Note      CV Status:  Stable    Mental Status:  Lethargic    Hydration Status:  Stable and euvolemic    PONV Controlled:  None    Airway Patency:  Patent    Post Op Vitals Reviewed: Yes          Staff: CRNA       Comments: vss          BP   130/62   Temp      Pulse 84   Resp 16   SpO2 98

## 2018-03-22 NOTE — DISCHARGE INSTRUCTIONS
My impression:  Anastomotic ulceration status post biopsy     RECOMMENDATIONS:  Continue current medical management with proton pump inhibitors and Carafate  Follow up biopsy results in 1 week         Upper Endoscopy   WHAT YOU NEED TO KNOW:   An upper endoscopy is also called an upper gastrointestinal (GI) endoscopy, or an esophagogastroduodenoscopy (EGD)  You may feel bloated, gassy, or have some abdominal discomfort after your procedure  Your throat may be sore for 24 to 36 hours  You may burp or pass gas from air that is still inside your body  DISCHARGE INSTRUCTIONS:   Call 911 for any of the following:   · You have sudden chest pain or trouble breathing  Seek care immediately if:   · You feel dizzy or faint  · You have trouble swallowing  · Your bowel movements are very dark or black  · Your abdomen is hard and firm and you have severe pain  · You vomit blood  Contact your healthcare provider if:   · You feel full or bloated and cannot burp or pass gas  · You have not had a bowel movement for 3 days after your procedure  · You have neck pain  · You have a fever or chills  · You have nausea or are vomiting  · You have a rash or hives  · You have questions or concerns about your endoscopy  Relieve a sore throat:  Suck on throat lozenges or crushed ice  Gargle with a small amount of warm salt water  Mix 1 teaspoon of salt and 1 cup of warm water to make salt water  Relieve gas and discomfort from bloating:  Lie on your right side with a heating pad on your abdomen  Take short walks to help pass gas  Eat small meals until bloating is relieved  Rest after your procedure: You have been given medicine to relax you  Do not  drive or make important decisions until the day after your procedure  Return to your normal activity as directed  You can usually return to work the day after your procedure    Follow up with your healthcare provider as directed:  Write down your questions so you remember to ask them during your visits  © 2017 [address] [employer] is for End User's use only and may not be sold, redistributed or otherwise used for commercial purposes  All illustrations and images included in CareNotes® are the copyrighted property of A [employer] M [employer]  or [employer]  The above information is an  only  It is not intended as medical advice for individual conditions or treatments  Talk to your doctor, nurse or pharmacist before following any medical regimen to see if it is safe and effective for you

## 2018-03-23 PROBLEM — K25.4 GASTROINTESTINAL HEMORRHAGE ASSOCIATED WITH GASTRIC ULCER: Status: ACTIVE | Noted: 2018-03-23

## 2018-03-27 ENCOUNTER — ANESTHESIA EVENT (OUTPATIENT)
Dept: PERIOP | Facility: HOSPITAL | Age: 48
End: 2018-03-27
Payer: COMMERCIAL

## 2018-03-28 ENCOUNTER — HOSPITAL ENCOUNTER (OUTPATIENT)
Facility: HOSPITAL | Age: 48
Setting detail: OUTPATIENT SURGERY
Discharge: HOME | End: 2018-03-28
Attending: INTERNAL MEDICINE | Admitting: INTERNAL MEDICINE
Payer: COMMERCIAL

## 2018-03-28 ENCOUNTER — ANESTHESIA (OUTPATIENT)
Dept: PERIOP | Facility: HOSPITAL | Age: 48
End: 2018-03-28
Payer: COMMERCIAL

## 2018-03-28 VITALS
HEART RATE: 69 BPM | SYSTOLIC BLOOD PRESSURE: 122 MMHG | TEMPERATURE: 97.8 F | RESPIRATION RATE: 17 BRPM | OXYGEN SATURATION: 100 % | WEIGHT: 261.25 LBS | BODY MASS INDEX: 38.69 KG/M2 | HEIGHT: 69 IN | DIASTOLIC BLOOD PRESSURE: 58 MMHG

## 2018-03-28 PROCEDURE — 45378 DIAGNOSTIC COLONOSCOPY: CPT | Performed by: INTERNAL MEDICINE

## 2018-03-28 RX ORDER — DIPHENOXYLATE HYDROCHLORIDE AND ATROPINE SULFATE 2.5; .025 MG/1; MG/1
1 TABLET ORAL DAILY
COMMUNITY
End: 2019-03-18 | Stop reason: HOSPADM

## 2018-03-28 RX ORDER — BIOTIN 10000 MCG
CAPSULE ORAL
COMMUNITY
End: 2019-02-13 | Stop reason: HOSPADM

## 2018-03-28 RX ORDER — PROPOFOL 10 MG/ML
INJECTION, EMULSION INTRAVENOUS AS NEEDED
Status: DISCONTINUED | OUTPATIENT
Start: 2018-03-28 | End: 2018-03-28 | Stop reason: SURG

## 2018-03-28 RX ORDER — SODIUM CHLORIDE, SODIUM LACTATE, POTASSIUM CHLORIDE, CALCIUM CHLORIDE 600; 310; 30; 20 MG/100ML; MG/100ML; MG/100ML; MG/100ML
125 INJECTION, SOLUTION INTRAVENOUS CONTINUOUS
Status: DISCONTINUED | OUTPATIENT
Start: 2018-03-28 | End: 2018-03-28 | Stop reason: HOSPADM

## 2018-03-28 RX ORDER — CHLORAL HYDRATE 500 MG
1000 CAPSULE ORAL DAILY
COMMUNITY

## 2018-03-28 RX ADMIN — SODIUM CHLORIDE, SODIUM LACTATE, POTASSIUM CHLORIDE, AND CALCIUM CHLORIDE 125 ML/HR: .6; .31; .03; .02 INJECTION, SOLUTION INTRAVENOUS at 08:29

## 2018-03-28 RX ADMIN — SODIUM CHLORIDE, SODIUM LACTATE, POTASSIUM CHLORIDE, AND CALCIUM CHLORIDE: .6; .31; .03; .02 INJECTION, SOLUTION INTRAVENOUS at 09:25

## 2018-03-28 RX ADMIN — PROPOFOL 140 MG: 10 INJECTION, EMULSION INTRAVENOUS at 09:29

## 2018-03-28 NOTE — ANESTHESIA POSTPROCEDURE EVALUATION
Post-Op Assessment Note      CV Status:  Stable    Mental Status:  Alert and awake    Hydration Status:  Euvolemic    PONV Controlled:  Controlled    Airway Patency:  Patent    Post Op Vitals Reviewed: Yes          Staff: CRNA           BP   120/54   Temp      Pulse  54   Resp  16   SpO2   99%

## 2018-03-28 NOTE — OP NOTE
**** GI/ENDOSCOPY REPORT ****     PATIENT NAME: HENRY HODGE ------ VISIT ID:  Patient ID: [id number]   YOB: 1970     INTRODUCTION: Colonoscopy - A 49 female patient presents for an outpatient   Colon[facility] at [facility]  PREVIOUS COLONOSCOPY: This colonoscopy is being performed for diagnostic   indication     INDICATIONS: Recent bleeding  Melena  Low hematocrit anemia  CONSENT:  The benefits, risks, and alternatives to the procedure were   discussed and informed consent was obtained from the patient  PREPARATION: EKG, pulse, pulse oximetry and blood pressure were monitored   throughout the procedure  The patient was identified by myself both   verbally and by visual inspection of ID band  Airway Assessment   Classification: Airway class 2 - Visualization of the soft palate, fauces   and uvula  ASA Classification: Class 2 - Patient has mild to moderate   systemic disturbance that may or may not be related to the disorder   requiring surgery  MEDICATIONS: Anesthesia-check records     PROCEDURE:  The endoscope was passed with ease through the anus under   direct visualization and advanced to the sigmoid colon  The scope was   withdrawn and the mucosa was carefully examined  The quality of the   preparation was poor  Cecal Intubation Time: 2 minutes(s) Scope Withdrawal   Time: 0 minutes(s)     RECTAL EXAM: Normal rectal exam      FINDINGS:  A large quantity of complete solid stool was found in the   rectum, rectosigmoid junction, and sigmoid colon  As a result, an   inadequate view of the area was obtained  COMPLICATIONS: There were no complications  IMPRESSIONS: Stool found in the rectum, rectosigmoid junction, and sigmoid   colon  RECOMMENDATIONS: Colonoscopy recommended in 8 weeks  The recommended   follow-up interval was changed due to preparation for exam was   inadequate/incomplete  2 day mag citrate prep     ESTIMATED BLOOD LOSS: None  PATHOLOGY SPECIMENS: No     PROCEDURE CODES: Colonoscopy, reduced billable services (after starting)     ICD-9 Codes: 578 9 Hemorrhage of gastrointestinal tract, unspecified 578 1   Blood in stool     ICD-10 Codes: K92 2 Gastrointestinal hemorrhage, unspecified K92 1 Melena   D64 9 Anemia, unspecified     PERFORMED BY: BEVERLY Mosley Banner  on 03/28/2018  Version 1, electronically signed by BEVERLY Grover , D O  on   03/28/2018 at 09:36

## 2018-03-28 NOTE — ANESTHESIA PREPROCEDURE EVALUATION
Review of Systems/Medical History  Patient summary reviewed        Cardiovascular  Negative cardio ROS    Pulmonary  Negative pulmonary ROS        GI/Hepatic    Bariatric surgery,        Negative  ROS        Endo/Other    Obesity    GYN  Negative gynecology ROS          Hematology  Negative hematology ROS      Musculoskeletal  Negative musculoskeletal ROS        Neurology    Neuromuscular disease ,    Psychology   Negative psychology ROS              Physical Exam    Airway    Mallampati score: II  TM Distance: >3 FB  Neck ROM: full     Dental       Cardiovascular  Comment: Negative ROS, Cardiovascular exam normal    Pulmonary  Pulmonary exam normal     Other Findings        Anesthesia Plan  ASA Score- 3     Anesthesia Type- IV sedation with anesthesia with ASA Monitors  Additional Monitors:   Airway Plan:         Plan Factors-    Induction- intravenous  Postoperative Plan-     Informed Consent- Anesthetic plan and risks discussed with patient  I personally reviewed this patient with the CRNA  Discussed and agreed on the Anesthesia Plan with the CRNA  Ian Phelps

## 2018-04-12 DIAGNOSIS — K92.1 MELENA: ICD-10-CM

## 2018-04-12 RX ORDER — SUCRALFATE ORAL 1 G/10ML
1 SUSPENSION ORAL 4 TIMES DAILY
Qty: 420 ML | Refills: 2 | Status: SHIPPED | OUTPATIENT
Start: 2018-04-12 | End: 2019-02-01 | Stop reason: SDUPTHER

## 2018-04-13 ENCOUNTER — TELEPHONE (OUTPATIENT)
Dept: GASTROENTEROLOGY | Facility: CLINIC | Age: 48
End: 2018-04-13

## 2018-04-13 ENCOUNTER — TRANSCRIBE ORDERS (OUTPATIENT)
Dept: GASTROENTEROLOGY | Facility: CLINIC | Age: 48
End: 2018-04-13

## 2018-04-13 DIAGNOSIS — D64.9 ANEMIA, UNSPECIFIED TYPE: Primary | ICD-10-CM

## 2018-04-16 ENCOUNTER — LAB (OUTPATIENT)
Dept: LAB | Facility: CLINIC | Age: 48
End: 2018-04-16
Payer: COMMERCIAL

## 2018-04-16 ENCOUNTER — TELEPHONE (OUTPATIENT)
Dept: OTHER | Facility: HOSPITAL | Age: 48
End: 2018-04-16

## 2018-04-16 DIAGNOSIS — D64.9 ANEMIA, UNSPECIFIED TYPE: ICD-10-CM

## 2018-04-16 LAB
ERYTHROCYTE [DISTWIDTH] IN BLOOD BY AUTOMATED COUNT: 14.5 % (ref 11.6–15.1)
HCT VFR BLD AUTO: 32 % (ref 34.8–46.1)
HGB BLD-MCNC: 10.1 G/DL (ref 11.5–15.4)
MCH RBC QN AUTO: 25.8 PG (ref 26.8–34.3)
MCHC RBC AUTO-ENTMCNC: 31.6 G/DL (ref 31.4–37.4)
MCV RBC AUTO: 82 FL (ref 82–98)
PLATELET # BLD AUTO: 347 THOUSANDS/UL (ref 149–390)
PMV BLD AUTO: 11.3 FL (ref 8.9–12.7)
RBC # BLD AUTO: 3.92 MILLION/UL (ref 3.81–5.12)
WBC # BLD AUTO: 5.94 THOUSAND/UL (ref 4.31–10.16)

## 2018-04-16 PROCEDURE — 36415 COLL VENOUS BLD VENIPUNCTURE: CPT

## 2018-04-16 PROCEDURE — 85027 COMPLETE CBC AUTOMATED: CPT

## 2018-04-20 ENCOUNTER — TELEPHONE (OUTPATIENT)
Dept: NEUROLOGY | Facility: CLINIC | Age: 48
End: 2018-04-20

## 2018-04-20 NOTE — TELEPHONE ENCOUNTER
Pt called for results of MRI completed at [facility]  I do not see that we received this yet, I called [facility] to [facility] and they will refax this to [phone]  Pt states that she would like to know what was going on as she still has headaches and pulling sensation of the ears  Please advise

## 2018-04-23 NOTE — TELEPHONE ENCOUNTER
Pt calls back to check on MRI results; I did inform pt that Dr Junito Justin did not receive the report yet; pt states that [facility] did fax the report on Wednesday 4/18 after 3pm; I did [facility] [place] w/central fax, I do not see this report on H/I drives

## 2018-04-23 NOTE — TELEPHONE ENCOUNTER
Patient called back to check on MRI results  Have not received report yet  Spoke to Adal Oliver at [address] [facility] [address], she is refaxing to central fax  LMOM w/central fax to look for incoming fax  Updated patient on status

## 2018-04-25 ENCOUNTER — TELEPHONE (OUTPATIENT)
Dept: NEUROLOGY | Facility: CLINIC | Age: 48
End: 2018-04-25

## 2018-04-25 NOTE — TELEPHONE ENCOUNTER
FYI  Pt called back and upset regarding delay w/ fax  I offered to call [facility] again to check if report has located  Pt then stated she will be coming to office to drop of disc today and disconnected call  I LMOM for [facility] to locate doc

## 2018-04-25 NOTE — TELEPHONE ENCOUNTER
Pt stop by the office requesting Her MRI test results  Patient also gave us the CD of MRI  to look at   Please call patient

## 2018-05-18 ENCOUNTER — TRANSCRIBE ORDERS (OUTPATIENT)
Dept: LAB | Facility: CLINIC | Age: 48
End: 2018-05-18

## 2018-05-18 ENCOUNTER — APPOINTMENT (OUTPATIENT)
Dept: LAB | Facility: CLINIC | Age: 48
End: 2018-05-18
Payer: COMMERCIAL

## 2018-05-18 DIAGNOSIS — D64.9 ANEMIA, UNSPECIFIED TYPE: ICD-10-CM

## 2018-05-18 DIAGNOSIS — D64.9 ANEMIA, UNSPECIFIED TYPE: Primary | ICD-10-CM

## 2018-05-18 LAB
BASOPHILS # BLD AUTO: 0.03 THOUSANDS/ΜL (ref 0–0.1)
BASOPHILS NFR BLD AUTO: 1 % (ref 0–1)
EOSINOPHIL # BLD AUTO: 0.17 THOUSAND/ΜL (ref 0–0.61)
EOSINOPHIL NFR BLD AUTO: 3 % (ref 0–6)
ERYTHROCYTE [DISTWIDTH] IN BLOOD BY AUTOMATED COUNT: 15.1 % (ref 11.6–15.1)
FERRITIN SERPL-MCNC: 3 NG/ML (ref 8–388)
HCT VFR BLD AUTO: 33.4 % (ref 34.8–46.1)
HGB BLD-MCNC: 10.2 G/DL (ref 11.5–15.4)
IRON SERPL-MCNC: 20 UG/DL (ref 50–170)
LYMPHOCYTES # BLD AUTO: 1.48 THOUSANDS/ΜL (ref 0.6–4.47)
LYMPHOCYTES NFR BLD AUTO: 27 % (ref 14–44)
MCH RBC QN AUTO: 23.4 PG (ref 26.8–34.3)
MCHC RBC AUTO-ENTMCNC: 30.5 G/DL (ref 31.4–37.4)
MCV RBC AUTO: 77 FL (ref 82–98)
MONOCYTES # BLD AUTO: 0.37 THOUSAND/ΜL (ref 0.17–1.22)
MONOCYTES NFR BLD AUTO: 7 % (ref 4–12)
NEUTROPHILS # BLD AUTO: 3.4 THOUSANDS/ΜL (ref 1.85–7.62)
NEUTS SEG NFR BLD AUTO: 62 % (ref 43–75)
NRBC BLD AUTO-RTO: 0 /100 WBCS
PLATELET # BLD AUTO: 327 THOUSANDS/UL (ref 149–390)
PMV BLD AUTO: 11.3 FL (ref 8.9–12.7)
RBC # BLD AUTO: 4.35 MILLION/UL (ref 3.81–5.12)
VIT B12 SERPL-MCNC: 4344 PG/ML (ref 100–900)
WBC # BLD AUTO: 5.45 THOUSAND/UL (ref 4.31–10.16)

## 2018-05-18 PROCEDURE — 85025 COMPLETE CBC W/AUTO DIFF WBC: CPT

## 2018-05-18 PROCEDURE — 36415 COLL VENOUS BLD VENIPUNCTURE: CPT

## 2018-05-18 PROCEDURE — 82728 ASSAY OF FERRITIN: CPT

## 2018-05-18 PROCEDURE — 83540 ASSAY OF IRON: CPT

## 2018-05-18 PROCEDURE — 82607 VITAMIN B-12: CPT

## 2018-05-31 ENCOUNTER — TELEPHONE (OUTPATIENT)
Dept: NEUROLOGY | Facility: CLINIC | Age: 48
End: 2018-05-31

## 2018-05-31 NOTE — TELEPHONE ENCOUNTER
Patient stopped in requesting her MRI of the brain disc  Do you have this disc? Patient has an ENT appointment at 1:00 and will stop in before her appointment

## 2019-01-04 ENCOUNTER — TELEPHONE (OUTPATIENT)
Dept: GASTROENTEROLOGY | Facility: HOSPITAL | Age: 49
End: 2019-01-04

## 2019-01-14 ENCOUNTER — TRANSCRIBE ORDERS (OUTPATIENT)
Dept: LAB | Facility: CLINIC | Age: 49
End: 2019-01-14

## 2019-01-14 ENCOUNTER — APPOINTMENT (OUTPATIENT)
Dept: LAB | Facility: CLINIC | Age: 49
End: 2019-01-14
Payer: COMMERCIAL

## 2019-01-14 DIAGNOSIS — D64.9 ANEMIA, UNSPECIFIED TYPE: Primary | ICD-10-CM

## 2019-01-14 DIAGNOSIS — D64.9 ANEMIA, UNSPECIFIED TYPE: ICD-10-CM

## 2019-01-14 LAB
BASOPHILS # BLD AUTO: 0.05 THOUSANDS/ΜL (ref 0–0.1)
BASOPHILS NFR BLD AUTO: 1 % (ref 0–1)
EOSINOPHIL # BLD AUTO: 0.18 THOUSAND/ΜL (ref 0–0.61)
EOSINOPHIL NFR BLD AUTO: 3 % (ref 0–6)
ERYTHROCYTE [DISTWIDTH] IN BLOOD BY AUTOMATED COUNT: 15.5 % (ref 11.6–15.1)
HCT VFR BLD AUTO: 43.3 % (ref 34.8–46.1)
HGB BLD-MCNC: 13.6 G/DL (ref 11.5–15.4)
IMM GRANULOCYTES # BLD AUTO: 0.02 THOUSAND/UL (ref 0–0.2)
IMM GRANULOCYTES NFR BLD AUTO: 0 % (ref 0–2)
LYMPHOCYTES # BLD AUTO: 1.44 THOUSANDS/ΜL (ref 0.6–4.47)
LYMPHOCYTES NFR BLD AUTO: 20 % (ref 14–44)
MCH RBC QN AUTO: 27.1 PG (ref 26.8–34.3)
MCHC RBC AUTO-ENTMCNC: 31.4 G/DL (ref 31.4–37.4)
MCV RBC AUTO: 86 FL (ref 82–98)
MONOCYTES # BLD AUTO: 0.53 THOUSAND/ΜL (ref 0.17–1.22)
MONOCYTES NFR BLD AUTO: 7 % (ref 4–12)
NEUTROPHILS # BLD AUTO: 5.04 THOUSANDS/ΜL (ref 1.85–7.62)
NEUTS SEG NFR BLD AUTO: 69 % (ref 43–75)
NRBC BLD AUTO-RTO: 0 /100 WBCS
PLATELET # BLD AUTO: 292 THOUSANDS/UL (ref 149–390)
PMV BLD AUTO: 12.1 FL (ref 8.9–12.7)
RBC # BLD AUTO: 5.01 MILLION/UL (ref 3.81–5.12)
WBC # BLD AUTO: 7.26 THOUSAND/UL (ref 4.31–10.16)

## 2019-01-14 PROCEDURE — 36415 COLL VENOUS BLD VENIPUNCTURE: CPT

## 2019-01-14 PROCEDURE — 85025 COMPLETE CBC W/AUTO DIFF WBC: CPT

## 2019-01-24 ENCOUNTER — OFFICE VISIT (OUTPATIENT)
Dept: GASTROENTEROLOGY | Facility: CLINIC | Age: 49
End: 2019-01-24
Payer: COMMERCIAL

## 2019-01-24 ENCOUNTER — APPOINTMENT (OUTPATIENT)
Dept: LAB | Facility: CLINIC | Age: 49
End: 2019-01-24
Payer: COMMERCIAL

## 2019-01-24 VITALS
HEART RATE: 116 BPM | WEIGHT: 260 LBS | DIASTOLIC BLOOD PRESSURE: 80 MMHG | SYSTOLIC BLOOD PRESSURE: 122 MMHG | HEIGHT: 69 IN | BODY MASS INDEX: 38.51 KG/M2

## 2019-01-24 DIAGNOSIS — R10.13 EPIGASTRIC PAIN: Primary | ICD-10-CM

## 2019-01-24 DIAGNOSIS — R93.3 ABNORMAL CT SCAN, COLON: ICD-10-CM

## 2019-01-24 DIAGNOSIS — K29.71 GASTROINTESTINAL HEMORRHAGE ASSOCIATED WITH GASTRITIS, UNSPECIFIED GASTRITIS TYPE: ICD-10-CM

## 2019-01-24 DIAGNOSIS — K86.2 PANCREATIC CYST: ICD-10-CM

## 2019-01-24 DIAGNOSIS — R10.13 EPIGASTRIC PAIN: ICD-10-CM

## 2019-01-24 DIAGNOSIS — Z87.11 HISTORY OF GASTRIC ULCER: ICD-10-CM

## 2019-01-24 DIAGNOSIS — R11.14 BILIOUS VOMITING WITH NAUSEA: ICD-10-CM

## 2019-01-24 PROBLEM — K92.2 GASTROINTESTINAL HEMORRHAGE: Status: ACTIVE | Noted: 2019-01-24

## 2019-01-24 PROBLEM — R93.5 ABNORMAL CT OF THE ABDOMEN: Status: ACTIVE | Noted: 2019-01-24

## 2019-01-24 LAB
ANION GAP SERPL CALCULATED.3IONS-SCNC: 8 MMOL/L (ref 4–13)
BUN SERPL-MCNC: 17 MG/DL (ref 5–25)
CALCIUM SERPL-MCNC: 9.7 MG/DL (ref 8.3–10.1)
CHLORIDE SERPL-SCNC: 98 MMOL/L (ref 100–108)
CO2 SERPL-SCNC: 30 MMOL/L (ref 21–32)
CREAT SERPL-MCNC: 0.92 MG/DL (ref 0.6–1.3)
GFR SERPL CREATININE-BSD FRML MDRD: 73 ML/MIN/1.73SQ M
GLUCOSE SERPL-MCNC: 94 MG/DL (ref 65–140)
POTASSIUM SERPL-SCNC: 3.7 MMOL/L (ref 3.5–5.3)
SODIUM SERPL-SCNC: 136 MMOL/L (ref 136–145)

## 2019-01-24 PROCEDURE — 99214 OFFICE O/P EST MOD 30 MIN: CPT | Performed by: NURSE PRACTITIONER

## 2019-01-24 PROCEDURE — 36415 COLL VENOUS BLD VENIPUNCTURE: CPT

## 2019-01-24 PROCEDURE — 80048 BASIC METABOLIC PNL TOTAL CA: CPT

## 2019-01-24 RX ORDER — ONDANSETRON 4 MG/1
4 TABLET, FILM COATED ORAL EVERY 8 HOURS PRN
Qty: 20 TABLET | Refills: 0 | Status: SHIPPED | OUTPATIENT
Start: 2019-01-24

## 2019-01-24 RX ORDER — SUCRALFATE ORAL 1 G/10ML
1 SUSPENSION ORAL 4 TIMES DAILY
Qty: 420 ML | Refills: 0 | Status: SHIPPED | OUTPATIENT
Start: 2019-01-24 | End: 2019-02-22 | Stop reason: HOSPADM

## 2019-01-24 RX ORDER — PANTOPRAZOLE SODIUM 40 MG/1
40 TABLET, DELAYED RELEASE ORAL DAILY
Qty: 90 TABLET | Refills: 3 | Status: SHIPPED | OUTPATIENT
Start: 2019-01-24 | End: 2019-03-18 | Stop reason: HOSPADM

## 2019-01-24 NOTE — PROGRESS NOTES
Assessment/Plan:    ER follow up:  Epigastric pain in patient with history of lety en y- endoscopy in 2018 revealed ulcerations seen at anastomosis site  Patient completed her course of Carafate will pantoprazole a was feeling better until about three weeks ago when she started to develop epigastric pain that has become more frequent and severe  She was seen in the emergency room not given medications for this but had UTI and Macrobid was given  Her UGI symptoms have worsened  CT scan in the emergency room showed a pancreatic cyst and the thickened colon  In 2018 patient had incomplete colonoscopy so we will redo  She has lost 14 lb due to the inability eat  She has been having loose to liquid bowel movements 4 times a day that is green with bilateral lower quadrant cramping  She denies melena or hematochezia    CT scan-Cystic lesion in pancreatic head measuring 2 1 cm, previously measured 1 3 cm in 2016  In 3/2018 pancreatic cyst seen again and measured 1 6 cm-  No ductal dilation with any  She has never had an MRI/MRCP    Plan:  Carafate and pantoprazole  Fecal occult blood test and Clostridium difficile  Zofran for nausea  Endoscopy and colonoscopy  MRI/MRCP for pancreatic cyst  Follow-up 2-4 weeks after MRI MRCP and scopes- may need referral  Patient in quite a bit of discomfort so we will discuss after scopes  Diagnoses and all orders for this visit:    Epigastric pain  -     sucralfate (CARAFATE) 1 g/10 mL suspension; Take 10 mL (1 g total) by mouth 4 (four) times a day  -     MRI abdomen w wo contrast and mrcp; Future  -     Occult Blood, Fecal Immunochemical; Future  -     Clostridium difficile toxin by PCR; Future  -     Basic metabolic panel; Future  -     ondansetron (ZOFRAN) 4 mg tablet; Take 1 tablet (4 mg total) by mouth every 8 (eight) hours as needed for nausea or vomiting    History of gastric ulcer  -     sucralfate (CARAFATE) 1 g/10 mL suspension;  Take 10 mL (1 g total) by mouth 4 (four) times a day  -     MRI abdomen w wo contrast and mrcp; Future  -     Occult Blood, Fecal Immunochemical; Future  -     Clostridium difficile toxin by PCR; Future  -     Basic metabolic panel; Future  -     ondansetron (ZOFRAN) 4 mg tablet; Take 1 tablet (4 mg total) by mouth every 8 (eight) hours as needed for nausea or vomiting    Pancreatic cyst  -     MRI abdomen w wo contrast and mrcp; Future  -     Basic metabolic panel; Future    Abnormal CT scan, colon  -     sucralfate (CARAFATE) 1 g/10 mL suspension; Take 10 mL (1 g total) by mouth 4 (four) times a day  -     MRI abdomen w wo contrast and mrcp; Future  -     Occult Blood, Fecal Immunochemical; Future  -     Clostridium difficile toxin by PCR; Future  -     Basic metabolic panel; Future  -     ondansetron (ZOFRAN) 4 mg tablet; Take 1 tablet (4 mg total) by mouth every 8 (eight) hours as needed for nausea or vomiting    Bilious vomiting with nausea  -     sucralfate (CARAFATE) 1 g/10 mL suspension; Take 10 mL (1 g total) by mouth 4 (four) times a day  -     MRI abdomen w wo contrast and mrcp; Future  -     Occult Blood, Fecal Immunochemical; Future  -     Clostridium difficile toxin by PCR; Future  -     Basic metabolic panel; Future  -     ondansetron (ZOFRAN) 4 mg tablet; Take 1 tablet (4 mg total) by mouth every 8 (eight) hours as needed for nausea or vomiting    Gastrointestinal hemorrhage associated with gastritis, unspecified gastritis type  -     sucralfate (CARAFATE) 1 g/10 mL suspension; Take 10 mL (1 g total) by mouth 4 (four) times a day  -     pantoprazole (PROTONIX) 40 mg tablet; Take 1 tablet (40 mg total) by mouth daily  -     MRI abdomen w wo contrast and mrcp; Future  -     Occult Blood, Fecal Immunochemical; Future  -     Clostridium difficile toxin by PCR; Future  -     Basic metabolic panel; Future  -     ondansetron (ZOFRAN) 4 mg tablet;  Take 1 tablet (4 mg total) by mouth every 8 (eight) hours as needed for nausea or vomiting            Subjective:      Patient ID: Ariella Alamo is a 51 y o  female  ER follow up:  Epigastric pain in patient with history of lety en y- endoscopy in 2018 revealed ulcerations seen at anastomosis site  Patient completed her course of Carafate will pantoprazole a was feeling better until about three weeks ago when she started to develop epigastric pain that has become more frequent and severe  She was seen in the emergency room not given medications for this but had UTI and Macrobid was given  Her UGI symptoms have worsened  CT scan in the emergency room showed a pancreatic cyst and the thickened colon  In 2018 patient had incomplete colonoscopy so we will redo  She has lost 14 lb due to the inability eat  She has been having loose to liquid bowel movements 4 times a day that is green with bilateral lower quadrant cramping  She denies melena or hematochezia    CT scan-Cystic lesion in pancreatic head measuring 2 1 cm, previously measured 1 3 cm in 2016  In 3/2018 pancreatic cyst seen again and measured 1 6 cm-  No ductal dilation with any  She has never had an MRI/MRCP        Abdominal Pain   Associated symptoms include diarrhea, dysuria, nausea and vomiting  Pertinent negatives include no constipation  The following portions of the patient's history were reviewed and updated as appropriate: She  has a past medical history of Bell palsy; Melena; and Right facial numbness  She   Patient Active Problem List    Diagnosis Date Noted    History of gastric ulcer 01/24/2019    Epigastric pain 01/24/2019    Pancreatic cyst 01/24/2019    Abnormal CT scan, colon 01/24/2019    Bilious vomiting with nausea 01/24/2019    Gastrointestinal hemorrhage associated with gastric ulcer 03/23/2018    Upper GI bleed 03/20/2018     She  has a past surgical history that includes Hysterectomy; Bariatric Surgery; Cholecystectomy; Knee cartilage surgery;  Shoulder arthroscopy distal clavicle excision and open rotator cuff repair; Hernia repair; Reduction mammaplasty (Bilateral); pr esophagogastroduodenoscopy transoral diagnostic (N/A, 3/22/2018); Abdominoplasty; and pr colonoscopy flx dx w/collj spec when pfrmd (N/A, 3/28/2018)  Her family history includes Diabetes in her mother; Heart attack in her father and mother; Hypertension in her brother; Stroke in her father  She  reports that she has never smoked  She has never used smokeless tobacco  She reports that she drinks alcohol  She reports that she does not use drugs    Current Outpatient Prescriptions   Medication Sig Dispense Refill    acyclovir (ZOVIRAX) 400 MG tablet take 1 tablet by mouth five times a day for 10 days  0    Biotin 10 MG CAPS Take by mouth      butalbital-acetaminophen-caffeine (FIORICET,ESGIC) -40 mg per tablet take 1-2 tablets by mouth every 4 to 6 hours if needed maximum daily dose of 6  0    Cholecalciferol (VITAMIN D3) 05949 units CAPS Take 1 capsule by mouth once a week  0    Cyanocobalamin (VITAMIN B 12 PO) Take by mouth      doxepin (SINEquan) 10 mg capsule Take 10 mg by mouth daily at bedtime  0    esterified estrogens (MENEST) 0 625 MG tablet Take 0 625 mg by mouth daily      ferrous sulfate 325 (65 Fe) mg tablet Take 1 tablet (325 mg total) by mouth daily 30 tablet 0    hydrochlorothiazide (HYDRODIURIL) 12 5 mg tablet Take 12 5 mg by mouth daily  0    HYDROcodone-acetaminophen (NORCO) 5-325 mg per tablet Take 1 tablet by mouth every 4 to 6 hours if needed for pain  0    multivitamin (THERAGRAN) TABS Take 1 tablet by mouth daily      Omega-3 Fatty Acids (FISH OIL) 1,000 mg Take 1,000 mg by mouth daily      pantoprazole (PROTONIX) 40 mg tablet Take 1 tablet (40 mg total) by mouth daily 90 tablet 3    scopolamine (TRANSDERM-SCOP) 1 5 mg/3 days TD 72 hr patch apply 1 patch BEHIND EAR WHILE ON CRUISE every 3 days as directed  0    sucralfate (CARAFATE) 1 g/10 mL suspension Take 10 mL (1 g total) by mouth 4 (four) times a day 420 mL 2    valACYclovir (VALTREX) 1,000 mg tablet Take 1,000 mg by mouth daily  0    ondansetron (ZOFRAN) 4 mg tablet Take 1 tablet (4 mg total) by mouth every 8 (eight) hours as needed for nausea or vomiting 20 tablet 0    sucralfate (CARAFATE) 1 g/10 mL suspension Take 10 mL (1 g total) by mouth 4 (four) times a day 420 mL 0     No current facility-administered medications for this visit        Current Outpatient Prescriptions on File Prior to Visit   Medication Sig    acyclovir (ZOVIRAX) 400 MG tablet take 1 tablet by mouth five times a day for 10 days    Biotin 10 MG CAPS Take by mouth    butalbital-acetaminophen-caffeine (FIORICET,ESGIC) -40 mg per tablet take 1-2 tablets by mouth every 4 to 6 hours if needed maximum daily dose of 6    Cholecalciferol (VITAMIN D3) 31447 units CAPS Take 1 capsule by mouth once a week    Cyanocobalamin (VITAMIN B 12 PO) Take by mouth    doxepin (SINEquan) 10 mg capsule Take 10 mg by mouth daily at bedtime    esterified estrogens (MENEST) 0 625 MG tablet Take 0 625 mg by mouth daily    ferrous sulfate 325 (65 Fe) mg tablet Take 1 tablet (325 mg total) by mouth daily    hydrochlorothiazide (HYDRODIURIL) 12 5 mg tablet Take 12 5 mg by mouth daily    HYDROcodone-acetaminophen (NORCO) 5-325 mg per tablet Take 1 tablet by mouth every 4 to 6 hours if needed for pain    multivitamin (THERAGRAN) TABS Take 1 tablet by mouth daily    Omega-3 Fatty Acids (FISH OIL) 1,000 mg Take 1,000 mg by mouth daily    scopolamine (TRANSDERM-SCOP) 1 5 mg/3 days TD 72 hr patch apply 1 patch BEHIND EAR WHILE ON CRUISE every 3 days as directed    sucralfate (CARAFATE) 1 g/10 mL suspension Take 10 mL (1 g total) by mouth 4 (four) times a day    valACYclovir (VALTREX) 1,000 mg tablet Take 1,000 mg by mouth daily    [DISCONTINUED] pantoprazole (PROTONIX) 40 mg tablet Take 1 tablet (40 mg total) by mouth daily     No current facility-administered medications on file prior to visit  She is allergic to other and penicillins       Review of Systems   Constitutional: Negative for fatigue  HENT: Negative  Respiratory: Negative  Cardiovascular: Negative  Gastrointestinal: Positive for abdominal distention, abdominal pain, diarrhea, nausea and vomiting  Negative for anal bleeding, blood in stool, constipation and rectal pain  Genitourinary: Positive for dysuria  Skin: Negative  Psychiatric/Behavioral: Negative  Objective:      /80   Pulse (!) 116   Ht 5' 9" (1 753 m)   Wt 118 kg (260 lb)   BMI 38 40 kg/m²          Physical Exam   Constitutional: She appears well-developed and well-nourished  Cardiovascular: Normal rate and regular rhythm  Pulmonary/Chest: Effort normal and breath sounds normal    Abdominal: Soft  Bowel sounds are normal  There is tenderness

## 2019-01-25 ENCOUNTER — TELEPHONE (OUTPATIENT)
Dept: GASTROENTEROLOGY | Facility: CLINIC | Age: 49
End: 2019-01-25

## 2019-01-25 ENCOUNTER — APPOINTMENT (OUTPATIENT)
Dept: LAB | Facility: CLINIC | Age: 49
End: 2019-01-25
Payer: COMMERCIAL

## 2019-01-25 DIAGNOSIS — K29.71 GASTROINTESTINAL HEMORRHAGE ASSOCIATED WITH GASTRITIS, UNSPECIFIED GASTRITIS TYPE: ICD-10-CM

## 2019-01-25 DIAGNOSIS — R93.3 ABNORMAL CT SCAN, COLON: ICD-10-CM

## 2019-01-25 DIAGNOSIS — Z87.11 HISTORY OF GASTRIC ULCER: ICD-10-CM

## 2019-01-25 DIAGNOSIS — R10.13 EPIGASTRIC PAIN: ICD-10-CM

## 2019-01-25 DIAGNOSIS — R11.14 BILIOUS VOMITING WITH NAUSEA: ICD-10-CM

## 2019-01-25 PROCEDURE — 87493 C DIFF AMPLIFIED PROBE: CPT

## 2019-01-26 LAB — C DIFF TOX GENS STL QL NAA+PROBE: NORMAL

## 2019-01-29 ENCOUNTER — HOSPITAL ENCOUNTER (OUTPATIENT)
Dept: MRI IMAGING | Facility: CLINIC | Age: 49
Discharge: HOME | End: 2019-01-29
Payer: COMMERCIAL

## 2019-01-29 DIAGNOSIS — K29.71 GASTROINTESTINAL HEMORRHAGE ASSOCIATED WITH GASTRITIS, UNSPECIFIED GASTRITIS TYPE: ICD-10-CM

## 2019-01-29 DIAGNOSIS — R10.13 EPIGASTRIC PAIN: ICD-10-CM

## 2019-01-29 DIAGNOSIS — K86.2 PANCREATIC CYST: ICD-10-CM

## 2019-01-29 DIAGNOSIS — R11.14 BILIOUS VOMITING WITH NAUSEA: ICD-10-CM

## 2019-01-29 DIAGNOSIS — Z87.11 HISTORY OF GASTRIC ULCER: ICD-10-CM

## 2019-01-29 DIAGNOSIS — R93.3 ABNORMAL CT SCAN, COLON: ICD-10-CM

## 2019-01-29 PROCEDURE — 74183 MRI ABD W/O CNTR FLWD CNTR: CPT

## 2019-01-29 PROCEDURE — 76377 3D RENDER W/INTRP POSTPROCES: CPT

## 2019-01-29 PROCEDURE — A9585 GADOBUTROL INJECTION: HCPCS | Performed by: NURSE PRACTITIONER

## 2019-01-29 RX ADMIN — GADOBUTROL 12 ML: 604.72 INJECTION INTRAVENOUS at 09:00

## 2019-01-30 ENCOUNTER — ANESTHESIA EVENT (OUTPATIENT)
Dept: PERIOP | Facility: HOSPITAL | Age: 49
End: 2019-01-30
Payer: COMMERCIAL

## 2019-01-30 ENCOUNTER — TELEPHONE (OUTPATIENT)
Dept: GASTROENTEROLOGY | Facility: CLINIC | Age: 49
End: 2019-01-30

## 2019-01-30 DIAGNOSIS — K86.2 PANCREATIC CYST: Primary | ICD-10-CM

## 2019-01-30 NOTE — PROGRESS NOTES
Please place a referral to Dr Jeremy Estrada for evaluation of pancreatic cyst    In notes- place- please look at MRI at the seroma    Plese call pt on Friday with Dr Shamir Springer phone number so she can schedule  For scopes tomorrow

## 2019-01-30 NOTE — TELEPHONE ENCOUNTER
----- Message from [employer], [address] sent at 1/30/2019  3:47 PM EST -----  Please place a referral to Dr Fabian Alvarenga for evaluation of pancreatic cyst    In notes- place- please look at MRI at the seroma    Plese call pt on Friday with Dr Nestor Hunter phone number so she can schedule  For scopes tomorrow

## 2019-01-31 ENCOUNTER — TELEPHONE (OUTPATIENT)
Dept: GASTROENTEROLOGY | Facility: CLINIC | Age: 49
End: 2019-01-31

## 2019-01-31 ENCOUNTER — ANESTHESIA (OUTPATIENT)
Dept: PERIOP | Facility: HOSPITAL | Age: 49
End: 2019-01-31
Payer: COMMERCIAL

## 2019-01-31 ENCOUNTER — HOSPITAL ENCOUNTER (OUTPATIENT)
Facility: HOSPITAL | Age: 49
Setting detail: OUTPATIENT SURGERY
Discharge: HOME | End: 2019-01-31
Attending: INTERNAL MEDICINE | Admitting: INTERNAL MEDICINE
Payer: COMMERCIAL

## 2019-01-31 VITALS
RESPIRATION RATE: 16 BRPM | HEART RATE: 69 BPM | SYSTOLIC BLOOD PRESSURE: 127 MMHG | TEMPERATURE: 97.5 F | BODY MASS INDEX: 38.14 KG/M2 | DIASTOLIC BLOOD PRESSURE: 61 MMHG | HEIGHT: 69 IN | WEIGHT: 257.5 LBS | OXYGEN SATURATION: 96 %

## 2019-01-31 DIAGNOSIS — R11.14 BILIOUS VOMITING WITH NAUSEA: ICD-10-CM

## 2019-01-31 DIAGNOSIS — R10.9 ABDOMINAL PAIN, UNSPECIFIED ABDOMINAL LOCATION: Primary | ICD-10-CM

## 2019-01-31 DIAGNOSIS — K92.1 MELENA: ICD-10-CM

## 2019-01-31 DIAGNOSIS — R10.13 EPIGASTRIC PAIN: ICD-10-CM

## 2019-01-31 DIAGNOSIS — K29.61 GASTROINTESTINAL HEMORRHAGE ASSOCIATED WITH OTHER GASTRITIS: ICD-10-CM

## 2019-01-31 DIAGNOSIS — R93.5 ABNORMAL CT OF THE ABDOMEN: ICD-10-CM

## 2019-01-31 PROCEDURE — 88305 TISSUE EXAM BY PATHOLOGIST: CPT | Performed by: PATHOLOGY

## 2019-01-31 PROCEDURE — 45378 DIAGNOSTIC COLONOSCOPY: CPT | Performed by: INTERNAL MEDICINE

## 2019-01-31 PROCEDURE — 43239 EGD BIOPSY SINGLE/MULTIPLE: CPT | Performed by: INTERNAL MEDICINE

## 2019-01-31 RX ORDER — SODIUM CHLORIDE, SODIUM LACTATE, POTASSIUM CHLORIDE, CALCIUM CHLORIDE 600; 310; 30; 20 MG/100ML; MG/100ML; MG/100ML; MG/100ML
125 INJECTION, SOLUTION INTRAVENOUS CONTINUOUS
Status: DISCONTINUED | OUTPATIENT
Start: 2019-01-31 | End: 2019-01-31 | Stop reason: HOSPADM

## 2019-01-31 RX ORDER — PROPOFOL 10 MG/ML
INJECTION, EMULSION INTRAVENOUS AS NEEDED
Status: DISCONTINUED | OUTPATIENT
Start: 2019-01-31 | End: 2019-01-31 | Stop reason: SURG

## 2019-01-31 RX ORDER — SODIUM CHLORIDE, SODIUM LACTATE, POTASSIUM CHLORIDE, CALCIUM CHLORIDE 600; 310; 30; 20 MG/100ML; MG/100ML; MG/100ML; MG/100ML
INJECTION, SOLUTION INTRAVENOUS CONTINUOUS PRN
Status: DISCONTINUED | OUTPATIENT
Start: 2019-01-31 | End: 2019-01-31 | Stop reason: SURG

## 2019-01-31 RX ORDER — PROPOFOL 10 MG/ML
INJECTION, EMULSION INTRAVENOUS CONTINUOUS PRN
Status: DISCONTINUED | OUTPATIENT
Start: 2019-01-31 | End: 2019-01-31

## 2019-01-31 RX ORDER — LIDOCAINE HYDROCHLORIDE 10 MG/ML
INJECTION, SOLUTION INFILTRATION; PERINEURAL AS NEEDED
Status: DISCONTINUED | OUTPATIENT
Start: 2019-01-31 | End: 2019-01-31 | Stop reason: SURG

## 2019-01-31 RX ADMIN — PROPOFOL 20 MG: 10 INJECTION, EMULSION INTRAVENOUS at 10:04

## 2019-01-31 RX ADMIN — PROPOFOL 20 MG: 10 INJECTION, EMULSION INTRAVENOUS at 10:01

## 2019-01-31 RX ADMIN — SODIUM CHLORIDE, SODIUM LACTATE, POTASSIUM CHLORIDE, AND CALCIUM CHLORIDE: .6; .31; .03; .02 INJECTION, SOLUTION INTRAVENOUS at 09:43

## 2019-01-31 RX ADMIN — PROPOFOL 30 MG: 10 INJECTION, EMULSION INTRAVENOUS at 10:02

## 2019-01-31 RX ADMIN — PROPOFOL 50 MG: 10 INJECTION, EMULSION INTRAVENOUS at 09:56

## 2019-01-31 RX ADMIN — LIDOCAINE HYDROCHLORIDE 50 MG: 10 INJECTION, SOLUTION INFILTRATION; PERINEURAL at 09:51

## 2019-01-31 RX ADMIN — PROPOFOL 50 MG: 10 INJECTION, EMULSION INTRAVENOUS at 09:53

## 2019-01-31 RX ADMIN — PROPOFOL 150 MG: 10 INJECTION, EMULSION INTRAVENOUS at 09:51

## 2019-01-31 RX ADMIN — PROPOFOL 30 MG: 10 INJECTION, EMULSION INTRAVENOUS at 09:59

## 2019-01-31 RX ADMIN — PROPOFOL 50 MG: 10 INJECTION, EMULSION INTRAVENOUS at 09:52

## 2019-01-31 NOTE — DISCHARGE INSTR - AVS FIRST PAGE
Postoperative Note  PATIENT NAME: Leon Ralph  : 1970  MRN: [id number]  [facility]    Surgery Date: 2019    POST-OP DIAGNOSIS: See the impression below    SEDATION: Monitored anesthesia care, check anesthesia records    PHYSICAL EXAM:  Vitals:    19 0927   BP: 134/76   Pulse: 76   Resp: 18   Temp: 98 3 °F (36 8 °C)   SpO2: 99%     Body mass index is 38 03 kg/m²  General: NAD  Heart: S1 & S2 normal, RRR  Lungs: CTA, No rales or rhonchi  Abdomen: Soft, nontender, nondistended, good bowel sounds    CONSENT:  Informed consent was obtained for the procedure, including sedation after explaining the risks and benefits of the procedure  Risks including but not limited to bleeding, perforation, infection, aspiration were discussed in detail  Also explained about less than 100%$ sensitivity with the exam and other alternatives  DESCRIPTION:   Procedure:  Esophagogastroejunoscopy with biopsy    Indications:  69-year-old female with midepigastric pain, history of Randy-en-Y gastric bypass    ASA 2    Estimated blood loss: Insignificant    Premedicated with mac anesthesia    Patient is identified by me  She is examined prior to the procedure and found to be in stable condition  She is attached to cardiac monitor and pulse oximeter and placed in left lateral position  After adequate intravenous sedation the Olympus video gastroscope was inserted under direct vision into the esophagus  Esophageal mucosa is normal throughout its length with a normal Z-line at 36 cm  The gastric remnant is entered  The mucosa is essentially unremarkable  The anastomosis appears normal with a single visible staple  It is widely patent  Afferent and efferent loops are normal as is the crest   Retroversion reveals a normal cardia and fundus  Biopsies from the anastomosis with excellent hemostasis  She tolerated the procedure well and was stable throughout      My impression:  Normal postoperative anatomy, status post biopsy    RECOMMENDATIONS:  Continue current medical regimen  Follow up biopsy results in 1 week    COMPLICATIONS:  None; patient tolerated the procedure well  DISPOSITION: PACU  CONDITION: Stable    Gely Perez MD  2019,9:53 AM        Portions of the record may have been created with voice recognition software   Occasional wrong word or "sound a like" substitutions may have occurred due to the inherent limitations of voice recognition software   Read the chart carefully and recognize, using context, where substitutions have occurred  Postoperative Note  PATIENT NAME: Perla Vicente  : 1970  MRN: [id number]  [facility]    Surgery Date: 2019    POST-OP DIAGNOSIS: See the impression below    SEDATION: Monitored anesthesia care, check anesthesia records    PHYSICAL EXAM:  Vitals:    19 0927   BP: 134/76   Pulse: 76   Resp: 18   Temp: 98 3 °F (36 8 °C)   SpO2: 99%     Body mass index is 38 03 kg/m²  General: NAD  Heart: S1 & S2 normal, RRR  Lungs: CTA, No rales or rhonchi  Abdomen: Soft, nontender, nondistended, good bowel sounds    CONSENT:  Informed consent was obtained for the procedure, including sedation after explaining the risks and benefits of the procedure  Risks including but not limited to bleeding, perforation, infection, aspiration were discussed in detail  Also explained about less than 100%$ sensitivity with the exam and other alternatives  DESCRIPTION:   Procedure:  Fiberoptic colonoscopy and terminal ileoscopy    Indications:  50-year-old female with an abnormal CT of the colon showing thickening, upper abdominal pain  Colon 1 year ago showed poor preparation  ASA 2    Estimated blood loss:  None    Premedicated with mac anesthesia    Patient is identified by me  She is examined prior to the procedure found to be in stable condition  She is attached to the cardiac monitor and pulse oximeter and placed in left lateral position    After adequate intravenous sedation the Olympus video colonoscope was inserted and passed the cecum  The ileocecal valve and the appendiceal orifice are identified  There appears to be some very dark thick liquid which might either be dark green or black adherent to the cecal wall  The terminal ileum was cannulated and examined for approximately 5 cm and is unremarkable  The scope was then slowly withdrawn with good circumferential visualization of the mucosa  Preparation is adequate  The colon appears completely unremarkable  Retroversion is normal   She tolerated the procedure well and was stable throughout  My impression:  Normal colonoscopy and terminal ileoscopy with possible melena  RECOMMENDATIONS:  Small-bowel follow-through  Follow-up colonoscopy in 10 years  Yearly fit test with primary care physician  Every 3 year colo guard test with primary care physician    COMPLICATIONS:  None; patient tolerated the procedure well  DISPOSITION: PACU  CONDITION: Stable    Gely Perez MD  1/31/2019,10:08 AM        Portions of the record may have been created with voice recognition software   Occasional wrong word or "sound a like" substitutions may have occurred due to the inherent limitations of voice recognition software   Read the chart carefully and recognize, using context, where substitutions have occurred

## 2019-01-31 NOTE — OP NOTE
Postoperative Note  PATIENT NAME: Gray George  : 1970  MRN: [id number]  [facility]    Surgery Date: 2019    POST-OP DIAGNOSIS: See the impression below    SEDATION: Monitored anesthesia care, check anesthesia records    PHYSICAL EXAM:  Vitals:    19 0927   BP: 134/76   Pulse: 76   Resp: 18   Temp: 98 3 °F (36 8 °C)   SpO2: 99%     Body mass index is 38 03 kg/m²  General: NAD  Heart: S1 & S2 normal, RRR  Lungs: CTA, No rales or rhonchi  Abdomen: Soft, nontender, nondistended, good bowel sounds    CONSENT:  Informed consent was obtained for the procedure, including sedation after explaining the risks and benefits of the procedure  Risks including but not limited to bleeding, perforation, infection, aspiration were discussed in detail  Also explained about less than 100%$ sensitivity with the exam and other alternatives  DESCRIPTION:   Procedure:  Fiberoptic colonoscopy and terminal ileoscopy    Indications:  41-year-old female with an abnormal CT of the colon showing thickening, upper abdominal pain  Colon 1 year ago showed poor preparation  ASA 2    Estimated blood loss:  None    Premedicated with mac anesthesia    Patient is identified by me  She is examined prior to the procedure found to be in stable condition  She is attached to the cardiac monitor and pulse oximeter and placed in left lateral position  After adequate intravenous sedation the Olympus video colonoscope was inserted and passed the cecum  The ileocecal valve and the appendiceal orifice are identified  There appears to be some very dark thick liquid which might either be dark green or black adherent to the cecal wall  The terminal ileum was cannulated and examined for approximately 5 cm and is unremarkable  The scope was then slowly withdrawn with good circumferential visualization of the mucosa  Preparation is adequate  The colon appears completely unremarkable    Retroversion is normal   She tolerated the procedure well and was stable throughout  My impression:  Normal colonoscopy and terminal ileoscopy with possible melena  RECOMMENDATIONS:  Small-bowel follow-through  Follow-up colonoscopy in 10 years  Yearly fit test with primary care physician  Every 3 year colo guard test with primary care physician    COMPLICATIONS:  None; patient tolerated the procedure well  DISPOSITION: PACU  CONDITION: Stable    West Lu MD  1/31/2019,10:08 AM        Portions of the record may have been created with voice recognition software   Occasional wrong word or "sound a like" substitutions may have occurred due to the inherent limitations of voice recognition software   Read the chart carefully and recognize, using context, where substitutions have occurred

## 2019-01-31 NOTE — ANESTHESIA PREPROCEDURE EVALUATION
Review of Systems/Medical History  Patient summary reviewed    No history of anesthetic complications     Cardiovascular  Negative cardio ROS    Pulmonary  Negative pulmonary ROS        GI/Hepatic    Bariatric surgery,        Negative  ROS        Endo/Other    Obesity    GYN  Negative gynecology ROS          Hematology  Negative hematology ROS      Musculoskeletal  Negative musculoskeletal ROS        Neurology    Neuromuscular disease (Bell's Palsy) ,    Psychology   Negative psychology ROS              Physical Exam    Airway    Mallampati score: II  TM Distance: >3 FB  Neck ROM: full     Dental       Cardiovascular  Comment: Negative ROS,     Pulmonary      Other Findings        Anesthesia Plan  ASA Score- 2     Anesthesia Type- IV sedation with anesthesia with ASA Monitors  Additional Monitors:   Airway Plan:         Plan Factors-    Induction- intravenous  Postoperative Plan-     Informed Consent- Anesthetic plan and risks discussed with patient  I personally reviewed this patient with the CRNA  Discussed and agreed on the Anesthesia Plan with the CRNA  Melina Mcgarry

## 2019-01-31 NOTE — DISCHARGE INSTRUCTIONS
Colonoscopy   WHAT YOU NEED TO KNOW:   A colonoscopy is a procedure to examine the inside of your colon (intestine) with a scope  Polyps or tissue growths may have been removed during your colonoscopy  It is normal to feel bloated and to have some abdominal discomfort  You should be passing gas  If you have hemorrhoids or you had polyps removed, you may have a small amount of bleeding  DISCHARGE INSTRUCTIONS:   Seek care immediately if:   · You have a large amount of bright red blood in your bowel movements  · Your abdomen is hard and firm and you have severe pain  · You have sudden trouble breathing  Contact your healthcare provider if:   · You develop a rash or hives  · You have a fever within 24 hours of your procedure       · You have not had a bowel movement for 3 days after your procedure  · You have questions or concerns about your condition or care  Activity:   · Do not lift, strain, or run  for 3 days after your procedure  · Rest after your procedure  You have been given medicine to relax you  Do not  drive or make important decisions until the day after your procedure  Return to your normal activity as directed  · Relieve gas and discomfort from bloating  by lying on your right side with a heating pad on your abdomen  You may need to take short walks to help the gas move out  Eat small meals until bloating is relieved  If you had polyps removed: For 7 days after your procedure:  · Do not  take aspirin  · Do not  go on long car rides  Follow up with your healthcare provider as directed:  Write down your questions so you remember to ask them during your visits  © 2017 [employer] is for End User's use only and may not be sold, redistributed or otherwise used for commercial purposes  All illustrations and images included in CareNotes® are the copyrighted property of [employer]  or [employer]    The above information is an  only  It is not intended as medical advice for individual conditions or treatments  Talk to your doctor, nurse or pharmacist before following any medical regimen to see if it is safe and effective for you

## 2019-01-31 NOTE — OP NOTE
Postoperative Note  PATIENT NAME: Caryle Gray  : 1970  MRN: [id number]  [facility]    Surgery Date: 2019    POST-OP DIAGNOSIS: See the impression below    SEDATION: Monitored anesthesia care, check anesthesia records    PHYSICAL EXAM:  Vitals:    19 0927   BP: 134/76   Pulse: 76   Resp: 18   Temp: 98 3 °F (36 8 °C)   SpO2: 99%     Body mass index is 38 03 kg/m²  General: NAD  Heart: S1 & S2 normal, RRR  Lungs: CTA, No rales or rhonchi  Abdomen: Soft, nontender, nondistended, good bowel sounds    CONSENT:  Informed consent was obtained for the procedure, including sedation after explaining the risks and benefits of the procedure  Risks including but not limited to bleeding, perforation, infection, aspiration were discussed in detail  Also explained about less than 100%$ sensitivity with the exam and other alternatives  DESCRIPTION:   Procedure:  Esophagogastroejunoscopy with biopsy    Indications:  50-year-old female with midepigastric pain, history of Randy-en-Y gastric bypass    ASA 2    Estimated blood loss: Insignificant    Premedicated with mac anesthesia    Patient is identified by me  She is examined prior to the procedure and found to be in stable condition  She is attached to cardiac monitor and pulse oximeter and placed in left lateral position  After adequate intravenous sedation the Olympus video gastroscope was inserted under direct vision into the esophagus  Esophageal mucosa is normal throughout its length with a normal Z-line at 36 cm  The gastric remnant is entered  The mucosa is essentially unremarkable  The anastomosis appears normal with a single visible staple  It is widely patent  Afferent and efferent loops are normal as is the crest   Retroversion reveals a normal cardia and fundus  Biopsies from the anastomosis with excellent hemostasis  She tolerated the procedure well and was stable throughout      My impression:  Normal postoperative anatomy, status post biopsy    RECOMMENDATIONS:  Continue current medical regimen  Follow up biopsy results in 1 week    COMPLICATIONS:  None; patient tolerated the procedure well  DISPOSITION: PACU  CONDITION: Stable    Doroteo Childress MD  1/31/2019,9:53 AM        Portions of the record may have been created with voice recognition software   Occasional wrong word or "sound a like" substitutions may have occurred due to the inherent limitations of voice recognition software   Read the chart carefully and recognize, using context, where substitutions have occurred

## 2019-01-31 NOTE — ANESTHESIA POSTPROCEDURE EVALUATION
Post-Op Assessment Note      CV Status:  Stable    Mental Status:  Alert and awake    Hydration Status:  Euvolemic    PONV Controlled:  Controlled    Airway Patency:  Patent    Post Op Vitals Reviewed: Yes          Staff: CRNA           BP   154/73   Temp      Pulse  75   Resp   13   SpO2   99

## 2019-02-01 ENCOUNTER — CONSULT (OUTPATIENT)
Dept: SURGICAL ONCOLOGY | Facility: CLINIC | Age: 49
End: 2019-02-01
Payer: COMMERCIAL

## 2019-02-01 ENCOUNTER — HOSPITAL ENCOUNTER (OUTPATIENT)
Dept: RADIOLOGY | Facility: HOSPITAL | Age: 49
Discharge: HOME | End: 2019-02-01
Attending: INTERNAL MEDICINE
Payer: COMMERCIAL

## 2019-02-01 VITALS
HEART RATE: 100 BPM | DIASTOLIC BLOOD PRESSURE: 82 MMHG | RESPIRATION RATE: 18 BRPM | BODY MASS INDEX: 38.51 KG/M2 | SYSTOLIC BLOOD PRESSURE: 146 MMHG | WEIGHT: 260 LBS | TEMPERATURE: 98.1 F | HEIGHT: 69 IN

## 2019-02-01 DIAGNOSIS — K92.1 MELENA: ICD-10-CM

## 2019-02-01 DIAGNOSIS — R10.9 ABDOMINAL PAIN, UNSPECIFIED ABDOMINAL LOCATION: ICD-10-CM

## 2019-02-01 DIAGNOSIS — K86.2 PANCREATIC CYST: Primary | ICD-10-CM

## 2019-02-01 DIAGNOSIS — R10.12 ABDOMINAL PAIN, LUQ: ICD-10-CM

## 2019-02-01 PROCEDURE — 99245 OFF/OP CONSLTJ NEW/EST HI 55: CPT | Performed by: SURGERY

## 2019-02-01 PROCEDURE — 74249 HB CONTRST X-RAY UPPR GI TRACT (SMALL INTESTINE FOLLOW-THROUGH): CPT

## 2019-02-01 RX ORDER — VENLAFAXINE 37.5 MG/1
37.5 TABLET ORAL 2 TIMES DAILY
COMMUNITY

## 2019-02-01 NOTE — LETTER
February 1, 2019     Reyna Ramirez MD  [address]    Patient: Poncho Avendaño   YOB: 1970   Date of Visit: 2/1/2019       Dear Dr Chel Mccabe:    Thank you for referring Poncho Avendaño to me for evaluation  Below are my notes for this consultation  If you have questions, please do not hesitate to call me  I look forward to following your patient along with you  Sincerely,        Nadine Bryson MD        CC: MD Casi Myers MD Sharyl Cass, MD  2/1/2019  2:48 PM  Sign at close encounter               Surgical Oncology Consult       [address] [facility] ONCOLOGY [facility]  [address]  [address] [address] [phone]    Poncho Avendaño  1970  [id number]  [address] [facility] SURGICAL ONCOLOGY [facility]  [address]  [place] [address]  [phone]    Diagnoses and all orders for this visit:    Pancreatic cyst  -     BUN; Future  -     Creatinine, serum; Future  -     MRI abdomen w wo contrast and mrcp; Future  -     Ambulatory referral to Bariatric Surgery; Future    Abdominal pain, LUQ    Other orders  -     venlafaxine (EFFEXOR) 37 5 mg tablet; Take 37 5 mg by mouth 2 (two) times a day  -     Melatonin 5 MG CAPS; Take 5 mg by mouth        Chief Complaint   Patient presents with    Abdominal Pain     Pt here for consult for panc cyst and abd pain  S/p bypass 5 yrs ago by Dr Linsey Estevez  Pt reports worsening abd pain for last 5 weeks  Pt reports multiple episodes of green diarrhea and vomiting over past week  Denies fever or chills  Return in about 6 months (around 8/1/2019) for Office Visit, Imaging - See orders  No history exists  History of Present Illness:  58-year-old female with a history of a Randy-en-Y gastric bypass 5 years ago  He she had endoscopy in 2018 that revealed ulcerations at the anastomotic site    She initially in December of 2018 presented with some vague epigastric abdominal discomfort  Over the last week she started to have significant left upper quadrant abdominal discomfort with nausea and vomiting as well as some diarrhea  This pain became more frequent and severe and she went to the emergency room  She had a CT that revealed a pancreatic cyst   Follow-up MRI on January 29, 2019 revealed 1 6 cm cystic mass in the pancreatic head  There were no worrisome or suspicious features  This was felt to be consistent with a side branch IPM N  There was a large cystic collection along the margin of the excluded stomach in the left upper abdomen measuring 10 6 cm  I personally reviewed the films  She comes in now to discuss further therapy  Her main complaint is left-sided abdominal pain  Her appetite has only been fair  She had an upper GI earlier today with which I reviewed that shows a larger than usual gastric remnant  The proximal jejunum was displaced from the cystic fluid collection  This cyst was seen on a CT in March of 2018  Review of Systems  Complete ROS Surg Onc:   Constitutional: The patient denies new or recent history of general fatigue, no recent weight loss, no change in appetite  Eyes: No complaints of visual problems, no scleral icterus  ENT: no complaints of ear pain, no hoarseness, no difficulty swallowing,  no tinnitus and no new masses in head, oral cavity, or neck  Cardiovascular: No complaints of chest pain, no palpitations, no ankle edema  Respiratory: No complaints of shortness of breath, no cough  Gastrointestinal: No complaints of jaundice, no bloody stools, no pale stools  She has nausea, vomiting and some diarrhea  Genitourinary: No complaints of dysuria, no hematuria, no nocturia, no frequent urination, no urethral discharge  Musculoskeletal: No complaints of weakness, paralysis, joint stiffness or arthralgias    Integumentary: No complaints of rash, no new lesions  Neurological: No complaints of convulsions, no seizures, no dizziness  Hematologic/Lymphatic: No complaints of easy bruising  Endocrine:  No hot or cold intolerance  No polydipsia, polyphagia, or polyuria  Allergy/immunology:  No environmental allergies  No food allergies  Not immunocompromised  Skin:  No pallor or rash  No wound  Patient Active Problem List   Diagnosis    Upper GI bleed    Gastrointestinal hemorrhage associated with gastric ulcer    History of gastric ulcer    Epigastric pain    Pancreatic cyst    Abnormal CT scan, colon    Bilious vomiting with nausea    Gastrointestinal hemorrhage    Abnormal CT of the abdomen    Abdominal pain, LUQ     Past Medical History:   Diagnosis Date    Bell palsy     GERD (gastroesophageal reflux disease)     Melena     Right facial numbness      Past Surgical History:   Procedure Laterality Date    ABDOMINOPLASTY      BARIATRIC SURGERY      CHOLECYSTECTOMY      HERNIA REPAIR      HYSTERECTOMY      KNEE CARTILAGE SURGERY      ME COLONOSCOPY FLX DX W/COLLJ SPEC WHEN PFRMD N/A 3/28/2018    Procedure: COLONOSCOPY;  Surgeon: West Lu MD;  Location: [facility]; Service: Gastroenterology    ME COLONOSCOPY FLX DX W/COLLJ [facility] WHEN PFRMD N/A 1/31/2019    Procedure: COLONOSCOPY;  Surgeon: West Lu MD;  [facility]; Service: Gastroenterology    ME ESOPHAGOGASTRODUODENOSCOPY TRANSORAL DIAGNOSTIC N/A 3/22/2018    Procedure: ESOPHAGOGASTRODUODENOSCOPY (EGD); Surgeon: West Lu MD;  [facility]; Service: Gastroenterology    ME ESOPHAGOGASTRODUODENOSCOPY TRANSORAL DIAGNOSTIC N/A 1/31/2019    Procedure: ESOPHAGOGASTRODUODENOSCOPY (EGD); Surgeon: West Lu MD;  [facility];   Service: Gastroenterology    REDUCTION MAMMAPLASTY Bilateral     SHOULDER ARTHROSCOPY DISTAL CLAVICLE EXCISION AND OPEN ROTATOR CUFF REPAIR       Family History   Problem Relation Age of Onset    Heart attack Mother     Diabetes Mother     Heart attack Father     Stroke Father     Hypertension Brother     Leukemia Paternal Aunt      Social History     Social History    Marital status: /Civil Union     Spouse name: N/A    Number of children: N/A    Years of education: N/A     Occupational History    Not on file       Social History Main Topics    Smoking status: Never Smoker    Smokeless tobacco: Never Used    Alcohol use Yes      Comment: rare    Drug use: No    Sexual activity: Not on file     Other Topics Concern    Not on file     Social History Narrative    No narrative on file       Current Outpatient Prescriptions:     Biotin 10 MG CAPS, Take by mouth, Disp: , Rfl:     butalbital-acetaminophen-caffeine (FIORICET,ESGIC) -40 mg per tablet, take 1-2 tablets by mouth every 4 to 6 hours if needed maximum daily dose of 6, Disp: , Rfl: 0    Cyanocobalamin (VITAMIN B 12 PO), Take by mouth, Disp: , Rfl:     ferrous sulfate 325 (65 Fe) mg tablet, Take 1 tablet (325 mg total) by mouth daily, Disp: 30 tablet, Rfl: 0    hydrochlorothiazide (HYDRODIURIL) 12 5 mg tablet, Take 12 5 mg by mouth daily, Disp: , Rfl: 0    Melatonin 5 MG CAPS, Take 5 mg by mouth, Disp: , Rfl:     sucralfate (CARAFATE) 1 g/10 mL suspension, Take 10 mL (1 g total) by mouth 4 (four) times a day, Disp: 420 mL, Rfl: 0    venlafaxine (EFFEXOR) 37 5 mg tablet, Take 37 5 mg by mouth 2 (two) times a day, Disp: , Rfl:     acyclovir (ZOVIRAX) 400 MG tablet, take 1 tablet by mouth five times a day for 10 days, Disp: , Rfl: 0    Cholecalciferol (VITAMIN D3) 58852 units CAPS, Take 1 capsule by mouth once a week, Disp: , Rfl: 0    doxepin (SINEquan) 10 mg capsule, Take 10 mg by mouth daily at bedtime, Disp: , Rfl: 0    esterified estrogens (MENEST) 0 625 MG tablet, Take 0 625 mg by mouth daily, Disp: , Rfl:     HYDROcodone-acetaminophen (NORCO) 5-325 mg per tablet, Take 1 tablet by mouth every 4 to 6 hours if needed for pain, Disp: , Rfl: 0    multivitamin (THERAGRAN) TABS, Take 1 tablet by mouth daily, Disp: , Rfl:     Omega-3 Fatty Acids (FISH OIL) 1,000 mg, Take 1,000 mg by mouth daily, Disp: , Rfl:     ondansetron (ZOFRAN) 4 mg tablet, Take 1 tablet (4 mg total) by mouth every 8 (eight) hours as needed for nausea or vomiting (Patient not taking: Reported on 2/1/2019 ), Disp: 20 tablet, Rfl: 0    pantoprazole (PROTONIX) 40 mg tablet, Take 1 tablet (40 mg total) by mouth daily (Patient not taking: Reported on 2/1/2019 ), Disp: 90 tablet, Rfl: 3    scopolamine (TRANSDERM-SCOP) 1 5 mg/3 days TD 72 hr patch, apply 1 patch BEHIND EAR WHILE ON CRUISE every 3 days as directed, Disp: , Rfl: 0    valACYclovir (VALTREX) 1,000 mg tablet, Take 1,000 mg by mouth daily, Disp: , Rfl: 0  No current facility-administered medications for this visit  Allergies   Allergen Reactions    Other Headache     MSG    Penicillins Rash     Vitals:    02/01/19 1405   BP: 146/82   Pulse: 100   Resp: 18   Temp: 98 1 °F (36 7 °C)       Physical Exam   Constitutional: General appearance: The Patient is well-developed and well-nourished who appears the stated age in no acute distress  Patient is pleasant and talkative  HEENT:  Normocephalic  Sclerae are anicteric  Mucous membranes are moist  Neck is supple without adenopathy  No JVD  Chest: The lungs are clear to auscultation  Cardiac: Heart is regular rate  Abdomen: Abdomen is soft, tender in the left upper quadrant and epigastric region, non-distended and without masses  Extremities: There is no clubbing or cyanosis  There is no edema  Symmetric  Neuro: Grossly nonfocal  Gait is normal      Lymphatic: No evidence of cervical adenopathy bilaterally  No evidence of axillary adenopathy bilaterally  No evidence of inguinal adenopathy bilaterally  Skin: Warm, anicteric      Psych:  Patient is pleasant and talkative  Breasts:      Pathology:      Labs:      Imaging  Mri Abdomen W Wo Contrast And Mrcp    Result Date: 1/30/2019  Narrative: MRI OF THE ABDOMEN (PANCREAS) WITH AND WITHOUT CONTRAST WITH MRCP INDICATION:   R10 13: Epigastric pain Z87 19: Personal history of other diseases of the digestive system K86 2: Cyst of pancreas R93 3: Abnormal findings on diagnostic imaging of other parts of digestive tract R11 14: Bilious vomiting K29 71: Gastritis, unspecified, with bleeding  Lower abdominal pain and vomiting  Pancreatic cyst identified in the pancreatic neck on recent CT  COMPARISON: CT performed March 15, 2018  TECHNIQUE:  The following pulse sequences were obtained on a 1 5 T scanner:  Coronal and axial T2 with TE of 90 and 180 respectively, axial T2 with fat saturation, axial FIESTA fat-sat, axial  T1-weighted in-and-out-of phase, axial DWI/ADC, pre-contrast axial T1 with fat saturation, post-contrast dynamic axial T1 with fat saturation at 20, 70, and 180 seconds, followed by coronal T1 with fat saturation and 7-minute delayed axial T1 with fat saturation  3D MRCP images were obtained with radial thick slabs and projections  3D rendering was performed from the acquisition scanner  IV Contrast:  12 mL of Gadobutrol injection (SINGLE-DOSE) FINDINGS: PANCREAS:  General: Normal in morphology and signal intensity  Lesions: There is a lobulated 16 mm cystic mass in the pancreatic head without associated solid enhancement, polypoid nodularity, or associated pancreatic ductal enlargement  The lesion is most consistent with 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  This corresponds to the lesion identified on prior CT  Duct: Main pancreatic duct and side-branches are normal in appearance  BILARY DUCTS:  No intra-hepatic biliary ductal dilatation  Common bile duct is normal in caliber  No evidence of bile duct stricture or choledocholithiasis   Cystic lesion in the pancreatic head as described above   No abnormal pancreatic ductal enlargement  LIVER:  Normal   GALLBLADDER:  Normal  ADRENAL GLANDS:  Normal  SPLEEN: Normal  KIDNEYS:  Normal  ABDOMINAL CAVITY:  There is a reniform cystic collection in the subdiaphragmatic space along the margin of the excluded portion of the stomach in the left upper abdomen measuring 10 6 cm in greatest dimension demonstrating no associated solid enhancement  and demonstrating a thin rind of peripheral enhancement  The finding is most consistent with a large seroma or lymphocele  No lymphadenopathy or ascites is noted  BOWEL:  There are surgical changes of prior Randy-en-Y gastric bypass surgery  No bowel obstruction  OSSEOUS STRUCTURES:  No osseous destruction  VASCULAR STRUCTURES:  Visualized vasculature is normal  ABDOMINAL WALL:  Normal  LOWER CHEST:  Unremarkable MRI appearance  Impression: Cyst in the pancreatic head, 16 mm without associated solid soft tissue enhancement or pancreatic ductal enlargement  Initially, annual follow-up for 5 years is recommended, with next contrast enhanced MRI and MRCP of the abdomen to be performed in one year  Large left subhepatic seroma  Workstation performed: [id number]     Fl Upper Gi / Small Bowel With Air    Result Date: 2/1/2019  Narrative: UPPER GI AND SMALL BOWEL FOLLOW THROUGH SINGLE CONTRAST INDICATION:   R10 9: Unspecified abdominal pain K92 1: Melena  Chest pain and discomfort  Episodes of nausea, vomiting and diarrhea  Previous Randy-en-Y gastric bypass approximately 6 years ago COMPARISON:   CT 3/15/2018 IMAGES:  50 FLUOROSCOPY TIME:   4 2 mft TECHNIQUE:  view the abdomen demonstrates surgical clips in the right upper quadrant and suture material in the left abdomen  Asymmetric transitional vertebra noted at lumbosacral junction  Upper GI was performed utilizing barium orally to the patient  Subsequently,  small bowel follow through was performed including spot images of the terminal ileum   FINDINGS: The esophagus is normal in caliber  Esophageal motility is normal and emptying of contrast from the esophagus is prompt  There is no mucosal mass, ulceration or fold thickening identified  Patient is status post Randy-en-Y gastric bypass surgery  Somewhat larger than usually seen gastric remnant identified, without outlet obstruction  Ian Phelps Postsurgical deformity of the lumen identified  No obvious ulceration  Note is made that the anastomosed jejunum is displaced toward the right  This is felt to be related to the large cystic fluid collection present in the left upper quadrant on prior cross-sectional imaging studies  Intermittent gastroesophageal reflux did occur during the course of the examination Small bowel follow-through demonstrates a transit time of approximately 200 minutes  There is no small bowel distention, inflammatory change or mucosal thickening  Spot views of the terminal ileum demonstrated no specific abnormality        Impression: 1  Status post Randy-en-Y gastric bypass  Gastric remnant somewhat larger than is usually seen with postsurgical deformity noted  No gastric outlet obstruction 2  No obvious ulceration  Note made of unremarkable endoscopy report, from yesterday 3  Proximal jejunum is displaced toward the right, presumably from the large cystic fluid collection in the left upper quadrant seen on prior cross-sectional imaging studies 4  Small bowel transit time was approximately 200 minutes  No evidence of small bowel inflammatory change or dilatation is seen  No intraluminal filling defects are seen in the small bowel 5  Intermittent gastroesophageal reflux was seen Workstation performed: [id number]     I reviewed the above laboratory and imaging data  Discussion/Summary:  50-year-old female status post Randy-en-Y gastric bypass with a left upper quadrant fluid collection  She is tender over this mass  I have discussed this with bariatric surgery    I have recommended that she follow up with them as she may be symptomatic from this and it may need to be drained  In regard to the pancreatic cystic lesion  This is likely a side branch IPMN  On my review this does not appear to be main duct IPMN duct is prominent, but is still less than 5 mm in size  I have recommended short-term observation  I discussed with side branch IPMN, the risk of malignancy in her lifetime is 10-20%  With main duct IPMN this risk is 70-90%  We will plan on repeating the MRI in 6 months and I will see her again at that time for another clinical exam   She is agreeable to the above plan  All of her questions were answered

## 2019-02-01 NOTE — TELEPHONE ENCOUNTER
Please tell  Her the mri is not used to see the appendix but the CT showed it very well and it was normal

## 2019-02-06 ENCOUNTER — OFFICE VISIT (OUTPATIENT)
Dept: BARIATRICS | Facility: CLINIC | Age: 49
End: 2019-02-06
Payer: COMMERCIAL

## 2019-02-06 ENCOUNTER — TELEPHONE (OUTPATIENT)
Dept: BARIATRICS | Facility: CLINIC | Age: 49
End: 2019-02-06

## 2019-02-06 VITALS
BODY MASS INDEX: 39.74 KG/M2 | DIASTOLIC BLOOD PRESSURE: 30 MMHG | SYSTOLIC BLOOD PRESSURE: 128 MMHG | HEIGHT: 68 IN | WEIGHT: 262.2 LBS | TEMPERATURE: 97.9 F | HEART RATE: 77 BPM

## 2019-02-06 DIAGNOSIS — K86.2 PANCREATIC CYST: ICD-10-CM

## 2019-02-06 DIAGNOSIS — K91.89 AFFERENT LOOP SYNDROME: Primary | ICD-10-CM

## 2019-02-06 DIAGNOSIS — J98.6 DIAPHRAGMATIC CYST: ICD-10-CM

## 2019-02-06 PROBLEM — K92.2 ACUTE GASTROINTESTINAL HEMORRHAGE: Status: ACTIVE | Noted: 2019-02-06

## 2019-02-06 PROCEDURE — 99205 OFFICE O/P NEW HI 60 MIN: CPT | Performed by: SURGERY

## 2019-02-06 RX ORDER — CELECOXIB 200 MG/1
200 CAPSULE ORAL ONCE
Status: CANCELLED | OUTPATIENT
Start: 2019-02-06 | End: 2019-02-06

## 2019-02-06 RX ORDER — ACETAMINOPHEN 325 MG/1
975 TABLET ORAL ONCE
Status: CANCELLED | OUTPATIENT
Start: 2019-02-06 | End: 2019-02-06

## 2019-02-06 RX ORDER — SCOLOPAMINE TRANSDERMAL SYSTEM 1 MG/1
1 PATCH, EXTENDED RELEASE TRANSDERMAL ONCE
Status: CANCELLED | OUTPATIENT
Start: 2019-02-06 | End: 2019-02-06

## 2019-02-06 RX ORDER — LEVOFLOXACIN 5 MG/ML
750 INJECTION, SOLUTION INTRAVENOUS ONCE
Status: CANCELLED | OUTPATIENT
Start: 2019-02-06 | End: 2019-02-06

## 2019-02-06 RX ORDER — HEPARIN SODIUM 5000 [USP'U]/ML
5000 INJECTION, SOLUTION INTRAVENOUS; SUBCUTANEOUS
Status: CANCELLED | OUTPATIENT
Start: 2019-02-06 | End: 2019-02-07

## 2019-02-06 RX ORDER — GABAPENTIN 100 MG/1
300 CAPSULE ORAL ONCE
Status: CANCELLED | OUTPATIENT
Start: 2019-02-06 | End: 2019-02-06

## 2019-02-06 NOTE — H&P
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) causing epigastric abdominal pain, nausea, bloating and potentially diarrhea  P: Laparoscopic possible open small bowel resection, EGD    Diaphragmatic cyst  A: large left subdiaphragmatic cyst s/p LRYGB  P: laparoscopic possible open drainage of subdiaphragmatic cyst     Pancreatic cyst  A: Pancreatic side-branch IPMN  P: Continue with surgical oncology follow up       History of Present Illness    Patient is a very pleasant 48/F presenting with epigastric fullness/pain since [place] time  She now complains of pain and diarrhea  In the past two weeks she has had 5 episodes of this pain which she states is worse than the pain she had when she had a marginal ulcer in the past  She states she feels full faster after meals and has epigastric discomfort associated with meals  She also notes increased bloating/gas and can only get relief if she lays down/shifts positions  She denies a halitosis but states she has a bitter taste in her mouth  She denies ETOH/Tobaccoo  Intermittent reflux but no dysphagia  She occasionally takes NSAIDs  She is s/p Lap Band 2008 which was removed 6 months later for slippage  She weighed 350 prior to the procedure and lost 100 pounds  She regained to 295 and subsequently had a LRYGB in 2010 and lost approximately 70 pounds but has regained  She had a history of a bleeding marginal ulcer March 2018 secondary to taking a medication for Bell's palsy  Repeat endoscopy by Dr Dia Grandchild 1/31/19 demonstrated resolution of the ulcer  She was [facility] at [facility] two weeks ago for this epigastric pain and was referred to GI   MRI obtained by GI demonstrated a 1 6cm sidebranch IPMB and a large 10 cm subdiaphragmatic cyst along the margin of the gastric remnant  UGI performed by GI demonstrated displacement of the lety limb secondary to this large cyst  A note was made of an irregular gastric remnant but after review of all of her studies this is actually the blind end of her lety limb which demonstrates preferential filling, accounting for her symptoms  I personally reviewed all of her radiographic studies  Review of Systems:   Review of systems completed and is negative except noted above  Atypical chest pain (routine follow up with cardiology from : stress test, ECHO and holter monitor normal)   Intermittent SOB    Social History: No ETOH, drugs or tobacco    Family History: Not pertinent     Medications/allergies: Medications and allergy list reviewed     Blood pressure (!) 128/30, pulse 77, temperature 97 9 °F (36 6 °C), temperature source Tympanic, height 5' 7 5" (1 715 m), weight 119 kg (262 lb 3 2 oz), not currently breastfeeding  ,Body mass index is 40 46 kg/m²  Physical Exam: BP (!) 128/30 (BP Location: Right arm, Patient Position: Sitting, Cuff Size: Large)   Pulse 77   Temp 97 9 °F (36 6 °C) (Tympanic)   Ht 5' 7 5" (1 715 m)   Wt 119 kg (262 lb 3 2 oz)   BMI 40 46 kg/m²     General Appearance:    no distress,    Head:    Normocephalic,  atraumatic   Eyes:    PERRL, EOM's intact               Neck:   Supple   Back:     no CVA tenderness   Lungs:     Clear to auscultation bilaterally, respirations unlabored   Chest Wall:    No tenderness or deformity    Heart:    Regular rate and rhythm, S1 and S2 normal       Abdomen:     Soft, LUQ tender, fullness in LUQ/epigastrium            Extremities:    no cyanosis or edema               Neurologic:   CNII-XII  Grossly intact                 Lab, Imaging and other studies:I have personally reviewed pertinent lab results

## 2019-02-06 NOTE — ASSESSMENT & PLAN NOTE
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) causing epigastric abdominal pain, nausea, bloating and potentially diarrhea  P: Laparoscopic possible open small bowel resection, EGD    Addendum: After extensive discussion with radiologist he is unsure if what is visualized is truly a candy cane  The anatomy remains abnormal appearing on imaging  I called the patient and spoke to her at detail regarding this  I told her that I will drain the cyst and evaluate her anatomy at the time of surgery for any other abnormalities such as internal hernia or adhesions causing kinking of the pouch and/or anastomosis  If afferent loop is excessive I will still resect this  The patient understands well and wishes to proceed

## 2019-02-06 NOTE — ASSESSMENT & PLAN NOTE
A: large left subdiaphragmatic cyst s/p LRYGB  P: laparoscopic possible open drainage of subdiaphragmatic cyst

## 2019-02-06 NOTE — PROGRESS NOTES
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) causing epigastric abdominal pain, nausea, bloating and potentially diarrhea  P: Laparoscopic possible open small bowel resection, EGD    Diaphragmatic cyst  A: large left subdiaphragmatic cyst s/p LRYGB  P: laparoscopic possible open drainage of subdiaphragmatic cyst     Pancreatic cyst  A: Pancreatic side-branch IPMN  P: Continue with surgical oncology follow up       History of Present Illness    Patient is a very pleasant 48/F presenting with epigastric fullness/pain since [place] time  She now complains of pain and diarrhea  In the past two weeks she has had 5 episodes of this pain which she states is worse than the pain she had when she had a marginal ulcer in the past  She states she feels full faster after meals and has epigastric discomfort associated with meals  She also notes increased bloating/gas and can only get relief if she lays down/shifts positions  She denies a halitosis but states she has a bitter taste in her mouth  She denies ETOH/Tobaccoo  Intermittent reflux but no dysphagia  She occasionally takes NSAIDs  She is s/p Lap Band 2008 which was removed 6 months later for slippage  She weighed 350 prior to the procedure and lost 100 pounds  She regained to 295 and subsequently had a LRYGB in 2010 and lost approximately 70 pounds but has regained  She had a history of a bleeding marginal ulcer March 2018 secondary to taking a medication for Bell's palsy  Repeat endoscopy by Dr Fred De La Paz 1/31/19 demonstrated resolution of the ulcer  She was seen at [facility] two weeks ago for this epigastric pain and was referred to GI   MRI obtained by GI demonstrated a 1 6cm sidebranch IPMB and a large 10 cm subdiaphragmatic cyst along the margin of the gastric remnant  UGI performed by GI demonstrated displacement of the lety limb secondary to this large cyst  A note was made of an irregular gastric remnant but after review of all of her studies this is actually the blind end of her lety limb which demonstrates preferential filling, accounting for her symptoms  I personally reviewed all of her radiographic studies  Review of Systems:   Review of systems completed and is negative except noted above  Atypical chest pain (routine follow up with cardiology from : stress test, ECHO and holter monitor normal)   Intermittent SOB    Social History: No ETOH, drugs or tobacco    Family History: Not pertinent     Medications/allergies: Medications and allergy list reviewed     Blood pressure (!) 128/30, pulse 77, temperature 97 9 °F (36 6 °C), temperature source Tympanic, height 5' 7 5" (1 715 m), weight 119 kg (262 lb 3 2 oz), not currently breastfeeding  ,Body mass index is 40 46 kg/m²  Physical Exam: BP (!) 128/30 (BP Location: Right arm, Patient Position: Sitting, Cuff Size: Large)   Pulse 77   Temp 97 9 °F (36 6 °C) (Tympanic)   Ht 5' 7 5" (1 715 m)   Wt 119 kg (262 lb 3 2 oz)   BMI 40 46 kg/m²     General Appearance:    no distress,    Head:    Normocephalic,  atraumatic   Eyes:    PERRL, EOM's intact               Neck:   Supple   Back:     no CVA tenderness   Lungs:     Clear to auscultation bilaterally, respirations unlabored   Chest Wall:    No tenderness or deformity    Heart:    Regular rate and rhythm, S1 and S2 normal       Abdomen:     Soft, LUQ tender, fullness in LUQ/epigastrium            Extremities:    no cyanosis or edema               Neurologic:   CNII-XII  Grossly intact                 Lab, Imaging and other studies:I have personally reviewed pertinent lab results

## 2019-02-06 NOTE — TELEPHONE ENCOUNTER
Spoke with Neisha PAUL  at [address] [place] [address] and [employer] who started pending case # [id number] for CPT 78336 for date of surgery 2/11/2019  I was then transferred to [facility]'s dedicated authorization team   I had to leave a detailed message

## 2019-02-07 NOTE — TELEPHONE ENCOUNTER
Auth # [id number] rec'd via call to Marcio Spann at [phone] this morning at 9:42 am; Their fax # is [phone]

## 2019-02-07 NOTE — PRE-PROCEDURE INSTRUCTIONS
Pre-Surgery Instructions:   Medication Instructions    butalbital-acetaminophen-caffeine (FIORICET,ESGIC) -40 mg per tablet Patient was instructed by Physician and understands   doxepin (SINEquan) 10 mg capsule Patient was instructed by Physician and understands   ferrous sulfate 325 (65 Fe) mg tablet Patient was instructed by Physician and understands   hydrochlorothiazide (HYDRODIURIL) 12 5 mg tablet Patient was instructed by Physician and understands   multivitamin [facility]) TABS Patient was instructed by Physician and understands   Omega-3 Fatty Acids (FISH OIL) 1,000 mg Patient was instructed by Physician and understands   pantoprazole (PROTONIX) 40 mg tablet Patient was instructed by Physician and understands   scopolamine (TRANSDERM-SCOP) 1 5 mg/3 days TD 72 hr patch Patient was instructed by Physician and understands   sucralfate (CARAFATE) 1 g/10 mL suspension Patient was instructed by Physician and understands

## 2019-02-10 ENCOUNTER — ANESTHESIA EVENT (OUTPATIENT)
Dept: PERIOP | Facility: HOSPITAL | Age: 49
DRG: 330 | End: 2019-02-10
Payer: COMMERCIAL

## 2019-02-11 ENCOUNTER — HOSPITAL ENCOUNTER (INPATIENT)
Facility: HOSPITAL | Age: 49
LOS: 2 days | Discharge: HOME | DRG: 330 | End: 2019-02-13
Attending: SURGERY | Admitting: SURGERY
Payer: COMMERCIAL

## 2019-02-11 ENCOUNTER — ANESTHESIA (OUTPATIENT)
Dept: PERIOP | Facility: HOSPITAL | Age: 49
DRG: 330 | End: 2019-02-11
Payer: COMMERCIAL

## 2019-02-11 DIAGNOSIS — K92.2 ACUTE GASTROINTESTINAL HEMORRHAGE: ICD-10-CM

## 2019-02-11 DIAGNOSIS — J98.6: ICD-10-CM

## 2019-02-11 DIAGNOSIS — K86.2 CYST OF PANCREAS: ICD-10-CM

## 2019-02-11 DIAGNOSIS — K91.89 AFFERENT LOOP SYNDROME: ICD-10-CM

## 2019-02-11 DIAGNOSIS — R10.13 EPIGASTRIC PAIN: Primary | ICD-10-CM

## 2019-02-11 LAB
ABO GROUP BLD: NORMAL
ANION GAP SERPL CALCULATED.3IONS-SCNC: 10 MMOL/L (ref 4–13)
BLD GP AB SCN SERPL QL: NEGATIVE
BUN SERPL-MCNC: 15 MG/DL (ref 5–25)
CALCIUM SERPL-MCNC: 9.8 MG/DL (ref 8.3–10.1)
CHLORIDE SERPL-SCNC: 101 MMOL/L (ref 100–108)
CO2 SERPL-SCNC: 28 MMOL/L (ref 21–32)
CREAT SERPL-MCNC: 0.79 MG/DL (ref 0.6–1.3)
ERYTHROCYTE [DISTWIDTH] IN BLOOD BY AUTOMATED COUNT: 14.4 % (ref 11.6–15.1)
GFR SERPL CREATININE-BSD FRML MDRD: 88 ML/MIN/1.73SQ M
GLUCOSE P FAST SERPL-MCNC: 99 MG/DL (ref 65–99)
GLUCOSE SERPL-MCNC: 99 MG/DL (ref 65–140)
HCT VFR BLD AUTO: 44.4 % (ref 34.8–46.1)
HGB BLD-MCNC: 14.4 G/DL (ref 11.5–15.4)
MCH RBC QN AUTO: 27.3 PG (ref 26.8–34.3)
MCHC RBC AUTO-ENTMCNC: 32.4 G/DL (ref 31.4–37.4)
MCV RBC AUTO: 84 FL (ref 82–98)
PLATELET # BLD AUTO: 299 THOUSANDS/UL (ref 149–390)
PMV BLD AUTO: 11.4 FL (ref 8.9–12.7)
POTASSIUM SERPL-SCNC: 3.6 MMOL/L (ref 3.5–5.3)
RBC # BLD AUTO: 5.28 MILLION/UL (ref 3.81–5.12)
RH BLD: POSITIVE
SODIUM SERPL-SCNC: 139 MMOL/L (ref 136–145)
SPECIMEN EXPIRATION DATE: NORMAL
WBC # BLD AUTO: 6.39 THOUSAND/UL (ref 4.31–10.16)

## 2019-02-11 PROCEDURE — 87070 CULTURE OTHR SPECIMN AEROBIC: CPT | Performed by: SURGERY

## 2019-02-11 PROCEDURE — 86901 BLOOD TYPING SEROLOGIC RH(D): CPT | Performed by: ANESTHESIOLOGY

## 2019-02-11 PROCEDURE — 86900 BLOOD TYPING SEROLOGIC ABO: CPT | Performed by: ANESTHESIOLOGY

## 2019-02-11 PROCEDURE — 0DJ08ZZ INSPECTION OF UPPER INTESTINAL TRACT, VIA NATURAL OR ARTIFICIAL OPENING ENDOSCOPIC: ICD-10-PCS | Performed by: SURGERY

## 2019-02-11 PROCEDURE — 87205 SMEAR GRAM STAIN: CPT | Performed by: SURGERY

## 2019-02-11 PROCEDURE — 44120 REMOVAL OF SMALL INTESTINE: CPT | Performed by: SURGERY

## 2019-02-11 PROCEDURE — 83690 ASSAY OF LIPASE: CPT | Performed by: SURGERY

## 2019-02-11 PROCEDURE — 85027 COMPLETE CBC AUTOMATED: CPT | Performed by: SURGERY

## 2019-02-11 PROCEDURE — 80048 BASIC METABOLIC PNL TOTAL CA: CPT | Performed by: SURGERY

## 2019-02-11 PROCEDURE — 87206 SMEAR FLUORESCENT/ACID STAI: CPT | Performed by: SURGERY

## 2019-02-11 PROCEDURE — 82378 CARCINOEMBRYONIC ANTIGEN: CPT | Performed by: SURGERY

## 2019-02-11 PROCEDURE — 86301 IMMUNOASSAY TUMOR CA 19-9: CPT | Performed by: SURGERY

## 2019-02-11 PROCEDURE — 87075 CULTR BACTERIA EXCEPT BLOOD: CPT | Performed by: SURGERY

## 2019-02-11 PROCEDURE — 0DN84ZZ RELEASE SMALL INTESTINE, PERCUTANEOUS ENDOSCOPIC APPROACH: ICD-10-PCS | Performed by: SURGERY

## 2019-02-11 PROCEDURE — 88307 TISSUE EXAM BY PATHOLOGIST: CPT | Performed by: PATHOLOGY

## 2019-02-11 PROCEDURE — 87116 MYCOBACTERIA CULTURE: CPT | Performed by: SURGERY

## 2019-02-11 PROCEDURE — C9113 INJ PANTOPRAZOLE SODIUM, VIA: HCPCS | Performed by: SURGERY

## 2019-02-11 PROCEDURE — 86850 RBC ANTIBODY SCREEN: CPT | Performed by: ANESTHESIOLOGY

## 2019-02-11 PROCEDURE — 0F9G4ZZ DRAINAGE OF PANCREAS, PERCUTANEOUS ENDOSCOPIC APPROACH: ICD-10-PCS | Performed by: SURGERY

## 2019-02-11 PROCEDURE — 0DB84ZZ EXCISION OF SMALL INTESTINE, PERCUTANEOUS ENDOSCOPIC APPROACH: ICD-10-PCS | Performed by: SURGERY

## 2019-02-11 DEVICE — SEAMGUARD STPL REINF ENDO GIA ULTRA UNIV 60 PURPLE: Type: IMPLANTABLE DEVICE | Site: ABDOMEN | Status: FUNCTIONAL

## 2019-02-11 RX ORDER — LANOLIN ALCOHOL/MO/W.PET/CERES
3 CREAM (GRAM) TOPICAL
Status: DISCONTINUED | OUTPATIENT
Start: 2019-02-11 | End: 2019-02-13 | Stop reason: HOSPADM

## 2019-02-11 RX ORDER — METOCLOPRAMIDE HYDROCHLORIDE 5 MG/ML
10 INJECTION INTRAMUSCULAR; INTRAVENOUS EVERY 6 HOURS PRN
Status: DISCONTINUED | OUTPATIENT
Start: 2019-02-11 | End: 2019-02-13 | Stop reason: HOSPADM

## 2019-02-11 RX ORDER — SODIUM CHLORIDE 9 MG/ML
INJECTION, SOLUTION INTRAVENOUS CONTINUOUS PRN
Status: DISCONTINUED | OUTPATIENT
Start: 2019-02-11 | End: 2019-02-11 | Stop reason: SURG

## 2019-02-11 RX ORDER — SODIUM CHLORIDE, SODIUM LACTATE, POTASSIUM CHLORIDE, CALCIUM CHLORIDE 600; 310; 30; 20 MG/100ML; MG/100ML; MG/100ML; MG/100ML
75 INJECTION, SOLUTION INTRAVENOUS CONTINUOUS
Status: DISCONTINUED | OUTPATIENT
Start: 2019-02-11 | End: 2019-02-12

## 2019-02-11 RX ORDER — FENTANYL CITRATE/PF 50 MCG/ML
25 SYRINGE (ML) INJECTION
Status: DISCONTINUED | OUTPATIENT
Start: 2019-02-11 | End: 2019-02-11 | Stop reason: HOSPADM

## 2019-02-11 RX ORDER — HEPARIN SODIUM 5000 [USP'U]/ML
5000 INJECTION, SOLUTION INTRAVENOUS; SUBCUTANEOUS
Status: COMPLETED | OUTPATIENT
Start: 2019-02-11 | End: 2019-02-11

## 2019-02-11 RX ORDER — BUPIVACAINE HYDROCHLORIDE 5 MG/ML
INJECTION, SOLUTION PERINEURAL AS NEEDED
Status: DISCONTINUED | OUTPATIENT
Start: 2019-02-11 | End: 2019-02-11 | Stop reason: HOSPADM

## 2019-02-11 RX ORDER — HEPARIN SODIUM 5000 [USP'U]/ML
5000 INJECTION, SOLUTION INTRAVENOUS; SUBCUTANEOUS EVERY 8 HOURS SCHEDULED
Status: DISCONTINUED | OUTPATIENT
Start: 2019-02-12 | End: 2019-02-13 | Stop reason: HOSPADM

## 2019-02-11 RX ORDER — DEXAMETHASONE SODIUM PHOSPHATE 4 MG/ML
INJECTION, SOLUTION INTRA-ARTICULAR; INTRALESIONAL; INTRAMUSCULAR; INTRAVENOUS; SOFT TISSUE AS NEEDED
Status: DISCONTINUED | OUTPATIENT
Start: 2019-02-11 | End: 2019-02-11 | Stop reason: SURG

## 2019-02-11 RX ORDER — SODIUM CHLORIDE, SODIUM LACTATE, POTASSIUM CHLORIDE, CALCIUM CHLORIDE 600; 310; 30; 20 MG/100ML; MG/100ML; MG/100ML; MG/100ML
125 INJECTION, SOLUTION INTRAVENOUS CONTINUOUS
Status: DISCONTINUED | OUTPATIENT
Start: 2019-02-11 | End: 2019-02-12

## 2019-02-11 RX ORDER — ONDANSETRON 2 MG/ML
4 INJECTION INTRAMUSCULAR; INTRAVENOUS ONCE AS NEEDED
Status: DISCONTINUED | OUTPATIENT
Start: 2019-02-11 | End: 2019-02-11 | Stop reason: HOSPADM

## 2019-02-11 RX ORDER — SUCRALFATE ORAL 1 G/10ML
1000 SUSPENSION ORAL
Status: DISCONTINUED | OUTPATIENT
Start: 2019-02-11 | End: 2019-02-13 | Stop reason: HOSPADM

## 2019-02-11 RX ORDER — ROCURONIUM BROMIDE 10 MG/ML
INJECTION, SOLUTION INTRAVENOUS AS NEEDED
Status: DISCONTINUED | OUTPATIENT
Start: 2019-02-11 | End: 2019-02-11 | Stop reason: SURG

## 2019-02-11 RX ORDER — FENTANYL CITRATE 50 UG/ML
INJECTION, SOLUTION INTRAMUSCULAR; INTRAVENOUS AS NEEDED
Status: DISCONTINUED | OUTPATIENT
Start: 2019-02-11 | End: 2019-02-11 | Stop reason: SURG

## 2019-02-11 RX ORDER — MIDAZOLAM HYDROCHLORIDE 1 MG/ML
INJECTION INTRAMUSCULAR; INTRAVENOUS AS NEEDED
Status: DISCONTINUED | OUTPATIENT
Start: 2019-02-11 | End: 2019-02-11 | Stop reason: SURG

## 2019-02-11 RX ORDER — ONDANSETRON 2 MG/ML
4 INJECTION INTRAMUSCULAR; INTRAVENOUS EVERY 4 HOURS PRN
Status: DISCONTINUED | OUTPATIENT
Start: 2019-02-11 | End: 2019-02-13 | Stop reason: HOSPADM

## 2019-02-11 RX ORDER — PANTOPRAZOLE SODIUM 40 MG/1
40 INJECTION, POWDER, FOR SOLUTION INTRAVENOUS
Status: DISCONTINUED | OUTPATIENT
Start: 2019-02-11 | End: 2019-02-13 | Stop reason: HOSPADM

## 2019-02-11 RX ORDER — KETOROLAC TROMETHAMINE 30 MG/ML
30 INJECTION, SOLUTION INTRAMUSCULAR; INTRAVENOUS EVERY 6 HOURS SCHEDULED
Status: COMPLETED | OUTPATIENT
Start: 2019-02-11 | End: 2019-02-12

## 2019-02-11 RX ORDER — GLYCOPYRROLATE 0.2 MG/ML
INJECTION INTRAMUSCULAR; INTRAVENOUS AS NEEDED
Status: DISCONTINUED | OUTPATIENT
Start: 2019-02-11 | End: 2019-02-11 | Stop reason: SURG

## 2019-02-11 RX ORDER — SIMETHICONE 80 MG
80 TABLET,CHEWABLE ORAL EVERY 12 HOURS
Status: DISCONTINUED | OUTPATIENT
Start: 2019-02-11 | End: 2019-02-13 | Stop reason: HOSPADM

## 2019-02-11 RX ORDER — NEOSTIGMINE METHYLSULFATE 1 MG/ML
INJECTION INTRAVENOUS AS NEEDED
Status: DISCONTINUED | OUTPATIENT
Start: 2019-02-11 | End: 2019-02-11 | Stop reason: SURG

## 2019-02-11 RX ORDER — HYDROMORPHONE HCL/PF 1 MG/ML
1 SYRINGE (ML) INJECTION EVERY 4 HOURS PRN
Status: DISCONTINUED | OUTPATIENT
Start: 2019-02-11 | End: 2019-02-13 | Stop reason: HOSPADM

## 2019-02-11 RX ORDER — PROPOFOL 10 MG/ML
INJECTION, EMULSION INTRAVENOUS AS NEEDED
Status: DISCONTINUED | OUTPATIENT
Start: 2019-02-11 | End: 2019-02-11 | Stop reason: SURG

## 2019-02-11 RX ORDER — MAGNESIUM HYDROXIDE 1200 MG/15ML
LIQUID ORAL AS NEEDED
Status: DISCONTINUED | OUTPATIENT
Start: 2019-02-11 | End: 2019-02-11 | Stop reason: HOSPADM

## 2019-02-11 RX ORDER — HYDROMORPHONE HCL/PF 1 MG/ML
SYRINGE (ML) INJECTION AS NEEDED
Status: DISCONTINUED | OUTPATIENT
Start: 2019-02-11 | End: 2019-02-11 | Stop reason: SURG

## 2019-02-11 RX ORDER — ACETAMINOPHEN 325 MG/1
975 TABLET ORAL ONCE
Status: COMPLETED | OUTPATIENT
Start: 2019-02-11 | End: 2019-02-11

## 2019-02-11 RX ORDER — PROMETHAZINE HYDROCHLORIDE 25 MG/ML
25 INJECTION, SOLUTION INTRAMUSCULAR; INTRAVENOUS EVERY 6 HOURS PRN
Status: DISCONTINUED | OUTPATIENT
Start: 2019-02-11 | End: 2019-02-13 | Stop reason: HOSPADM

## 2019-02-11 RX ORDER — LEVOFLOXACIN 5 MG/ML
750 INJECTION, SOLUTION INTRAVENOUS ONCE
Status: COMPLETED | OUTPATIENT
Start: 2019-02-11 | End: 2019-02-11

## 2019-02-11 RX ORDER — SCOLOPAMINE TRANSDERMAL SYSTEM 1 MG/1
1 PATCH, EXTENDED RELEASE TRANSDERMAL ONCE
Status: DISCONTINUED | OUTPATIENT
Start: 2019-02-11 | End: 2019-02-13 | Stop reason: HOSPADM

## 2019-02-11 RX ORDER — GABAPENTIN 300 MG/1
300 CAPSULE ORAL ONCE
Status: COMPLETED | OUTPATIENT
Start: 2019-02-11 | End: 2019-02-11

## 2019-02-11 RX ORDER — SODIUM CHLORIDE, SODIUM LACTATE, POTASSIUM CHLORIDE, CALCIUM CHLORIDE 600; 310; 30; 20 MG/100ML; MG/100ML; MG/100ML; MG/100ML
INJECTION, SOLUTION INTRAVENOUS CONTINUOUS PRN
Status: DISCONTINUED | OUTPATIENT
Start: 2019-02-11 | End: 2019-02-11 | Stop reason: SURG

## 2019-02-11 RX ORDER — LABETALOL HYDROCHLORIDE 5 MG/ML
INJECTION, SOLUTION INTRAVENOUS AS NEEDED
Status: DISCONTINUED | OUTPATIENT
Start: 2019-02-11 | End: 2019-02-11 | Stop reason: SURG

## 2019-02-11 RX ORDER — ACETAMINOPHEN 325 MG/1
975 TABLET ORAL EVERY 6 HOURS SCHEDULED
Status: DISCONTINUED | OUTPATIENT
Start: 2019-02-11 | End: 2019-02-13 | Stop reason: HOSPADM

## 2019-02-11 RX ORDER — HYDROMORPHONE HCL/PF 1 MG/ML
0.5 SYRINGE (ML) INJECTION
Status: DISCONTINUED | OUTPATIENT
Start: 2019-02-11 | End: 2019-02-11 | Stop reason: HOSPADM

## 2019-02-11 RX ORDER — DOXEPIN HYDROCHLORIDE 10 MG/1
10 CAPSULE ORAL
Status: DISCONTINUED | OUTPATIENT
Start: 2019-02-11 | End: 2019-02-13 | Stop reason: HOSPADM

## 2019-02-11 RX ORDER — TRIAMCINOLONE ACETONIDE 40 MG/ML
INJECTION, SUSPENSION INTRA-ARTICULAR; INTRAMUSCULAR AS NEEDED
Status: DISCONTINUED | OUTPATIENT
Start: 2019-02-11 | End: 2019-02-11 | Stop reason: HOSPADM

## 2019-02-11 RX ORDER — PROMETHAZINE HYDROCHLORIDE 25 MG/ML
12.5 INJECTION, SOLUTION INTRAMUSCULAR; INTRAVENOUS ONCE AS NEEDED
Status: DISCONTINUED | OUTPATIENT
Start: 2019-02-11 | End: 2019-02-11 | Stop reason: HOSPADM

## 2019-02-11 RX ORDER — CELECOXIB 200 MG/1
200 CAPSULE ORAL ONCE
Status: COMPLETED | OUTPATIENT
Start: 2019-02-11 | End: 2019-02-11

## 2019-02-11 RX ORDER — ONDANSETRON 2 MG/ML
INJECTION INTRAMUSCULAR; INTRAVENOUS AS NEEDED
Status: DISCONTINUED | OUTPATIENT
Start: 2019-02-11 | End: 2019-02-11 | Stop reason: SURG

## 2019-02-11 RX ADMIN — SODIUM CHLORIDE, SODIUM LACTATE, POTASSIUM CHLORIDE, AND CALCIUM CHLORIDE 125 ML/HR: .6; .31; .03; .02 INJECTION, SOLUTION INTRAVENOUS at 21:07

## 2019-02-11 RX ADMIN — ACETAMINOPHEN 975 MG: 325 TABLET, FILM COATED ORAL at 23:07

## 2019-02-11 RX ADMIN — SCOPALAMINE 1 PATCH: 1 PATCH, EXTENDED RELEASE TRANSDERMAL at 12:48

## 2019-02-11 RX ADMIN — LABETALOL HYDROCHLORIDE 5 MG: 5 INJECTION, SOLUTION INTRAVENOUS at 16:27

## 2019-02-11 RX ADMIN — SIMETHICONE CHEW TAB 80 MG 80 MG: 80 TABLET ORAL at 23:08

## 2019-02-11 RX ADMIN — GLYCOPYRROLATE 0.6 MG: 0.2 INJECTION, SOLUTION INTRAMUSCULAR; INTRAVENOUS at 18:00

## 2019-02-11 RX ADMIN — PROPOFOL 200 MG: 10 INJECTION, EMULSION INTRAVENOUS at 15:56

## 2019-02-11 RX ADMIN — LEVOFLOXACIN 750 MG: 5 INJECTION, SOLUTION INTRAVENOUS at 15:11

## 2019-02-11 RX ADMIN — HEPARIN SODIUM 5000 UNITS: 5000 INJECTION, SOLUTION INTRAVENOUS; SUBCUTANEOUS at 12:51

## 2019-02-11 RX ADMIN — MELATONIN 3 MG: 3 TAB ORAL at 23:28

## 2019-02-11 RX ADMIN — ACETAMINOPHEN 975 MG: 325 TABLET, FILM COATED ORAL at 12:46

## 2019-02-11 RX ADMIN — KETOROLAC TROMETHAMINE 30 MG: 30 INJECTION, SOLUTION INTRAMUSCULAR at 21:04

## 2019-02-11 RX ADMIN — PANTOPRAZOLE SODIUM 40 MG: 40 INJECTION, POWDER, FOR SOLUTION INTRAVENOUS at 21:04

## 2019-02-11 RX ADMIN — SODIUM CHLORIDE, SODIUM LACTATE, POTASSIUM CHLORIDE, AND CALCIUM CHLORIDE: .6; .31; .03; .02 INJECTION, SOLUTION INTRAVENOUS at 17:45

## 2019-02-11 RX ADMIN — NEOSTIGMINE METHYLSULFATE 3 MG: 1 INJECTION, SOLUTION INTRAVENOUS at 18:00

## 2019-02-11 RX ADMIN — GABAPENTIN 300 MG: 300 CAPSULE ORAL at 12:46

## 2019-02-11 RX ADMIN — ROCURONIUM BROMIDE 50 MG: 10 INJECTION INTRAVENOUS at 15:56

## 2019-02-11 RX ADMIN — CELECOXIB 200 MG: 200 CAPSULE ORAL at 12:46

## 2019-02-11 RX ADMIN — FENTANYL CITRATE 100 MCG: 50 INJECTION INTRAMUSCULAR; INTRAVENOUS at 15:56

## 2019-02-11 RX ADMIN — HYDROMORPHONE HYDROCHLORIDE 0.4 MG: 1 INJECTION, SOLUTION INTRAMUSCULAR; INTRAVENOUS; SUBCUTANEOUS at 16:15

## 2019-02-11 RX ADMIN — SODIUM CHLORIDE: 0.9 INJECTION, SOLUTION INTRAVENOUS at 15:59

## 2019-02-11 RX ADMIN — ONDANSETRON 4 MG: 2 INJECTION INTRAMUSCULAR; INTRAVENOUS at 17:50

## 2019-02-11 RX ADMIN — MIDAZOLAM HYDROCHLORIDE 2 MG: 1 INJECTION, SOLUTION INTRAMUSCULAR; INTRAVENOUS at 15:54

## 2019-02-11 RX ADMIN — SODIUM CHLORIDE, SODIUM LACTATE, POTASSIUM CHLORIDE, AND CALCIUM CHLORIDE: .6; .31; .03; .02 INJECTION, SOLUTION INTRAVENOUS at 15:25

## 2019-02-11 RX ADMIN — SUCRALFATE 1000 MG: 1 SUSPENSION ORAL at 23:09

## 2019-02-11 RX ADMIN — DEXAMETHASONE SODIUM PHOSPHATE 4 MG: 4 INJECTION, SOLUTION INTRAMUSCULAR; INTRAVENOUS at 15:59

## 2019-02-11 RX ADMIN — METRONIDAZOLE 500 MG: 500 INJECTION, SOLUTION INTRAVENOUS at 15:54

## 2019-02-11 NOTE — H&P
Patient seen and examined by me  H&P updated  Original H&P on paper in chart      Americo Cortes MD  2/11/2019  12:35 PM

## 2019-02-11 NOTE — OP NOTE
OPERATIVE REPORT  PATIENT NAME: Shivani Bhandari    :  1970  MRN: [id number]  Pt Location: [facility] OR ROOM 04    SURGERY DATE: 2019    Surgeon(s) and Role:     * Beth Bess MD - Primary     * Sofi Hudson MD - Assisting     * Toi Denver, PA-C - Assisting    Preop Diagnosis:  Acute gastrointestinal hemorrhage [K92 2]  Afferent loop syndrome [K91 89]  Acute primary diaphragmitis [J98 6]      Post-Op Diagnosis Codes:     * Acute gastrointestinal hemorrhage [K92 2]     * Afferent loop syndrome [K91 89]     * Acute primary diaphragmitis [J98 6]     *     Procedure(s) (LRB):  LAPAROSCOPIC LYSIS OF ADHESIONS; RESECTION SMALL BOWEL; DECOMPRESSION OF GASTRIC REMNANT; EGD (N/A)    Specimen(s):  ID Type Source Tests Collected by Time Destination   1 : Cyst Fluid Body Fluid Cyst NON-GYNECOLOGIC CYTOLOGY, ANAEROBIC CULTURE AND GRAM STAIN, BODY FLUID CULTURE AND GRAM STAIN, CANCER ANTIGEN 19-9, AFB CULTURE WITH STAIN, LIPASE,FLUID, CEA,FLUID Beth Bess MD 2019 1627    2 : Portion of Small Bowel Tissue Small Bowel, NOS TISSUE EXAM Beth Bess MD 2019 1751        Estimated Blood Loss:   Minimal    Drains:  * No LDAs found *    Anesthesia Type:   General    Operative Indications:    Afferent loop syndrome [K91 89]        Operative Findings:  Small bowel twist just distal to the take off of the gastrojejunostomy secondary to adhesions; large gastric pouch; afferent limb 6 cm with ulcers     Complications:   None    Procedure and Technique:  The patient was identified by name, armband, and conversation  The patient was then brought to the operative theatre  After successful induction of general anesthesia the patient was prepped and draped in the usual sterile fashion  A timeout was performed and all we in agreement     A verres needle was used to insufflate the abdomen to 18 mmHg and optiview technique used to gain entrance into the abdomen in the left lower quadrant with a 12mm 0degree laparoscope  No injuries seen  A 12 mm port was placed at umbilicus, 2 five mm ports placed in the right and left upper quadrant and a 5 mm epigastric liver retractor was place  Adhesions were lysed from the gastrojejunostomy to the liver sharply with scissor and AEM cautery  Adhesions were also lysed between the afferent limb and the omentum and gastric remnant  EGD was then performed which demonstrated a large gastric pouch, an afferent limb with ulcers and twisting  We then lysed interloop small bowel adhesions just distal to the gastrojejunostomy to straighten out the kinking  The endoscope was then advanced into the lety limb and the mesentery of the afferent limb was divided with the harmonic scalpel  The redundant afferent limb was then divided with the [employer] powered stapler purple load with seamguard  Repeat endoscopy demonstrated a more stream line pouch/lety limb  Our attention was then turned to the supposed cyst in the left upper quadrant  This was punctured and drained  This appeared to be thick walled and actually appeared to be the remnant so instead of leaving this open this was sutured in a running fashion with 3-0 PDS  The specimen was then placed in a endocatch bag and retried from the abdomen  The 12mm sites were then closed with transfascial 0-Vicryl sutures  Ports were examined for bleeding as we exited the abdomen and the skin was then closed with monocryl and dermabond  All instrument, sponge and needle counts were correct     I was present for the entire procedure, A qualified resident physician was not available and an assistant was required during the procedure for retraction tissue handling,dissection and suturing    Patient Disposition:  PACU     SIGNATURE: Kim Breen MD  DATE: February 11, 2019  TIME: 6:08 PM

## 2019-02-11 NOTE — ANESTHESIA POSTPROCEDURE EVALUATION
Post-Op Assessment Note    CV Status:  Stable  Pain Score: 0    Pain management: adequate     Mental Status:  Alert and awake   Hydration Status:  Stable   PONV Controlled:  None   Airway Patency:  Patent and adequate   Post Op Vitals Reviewed: Yes      Staff: Anesthesiologist, CRNA           BP   139/72   Temp   97 0   Pulse 64   Resp   18   SpO2   100%

## 2019-02-11 NOTE — ANESTHESIA PREPROCEDURE EVALUATION
Review of Systems/Medical History  Patient summary reviewed  Chart reviewed      Cardiovascular  Negative cardio ROS    Pulmonary  Negative pulmonary ROS        GI/Hepatic    GERD well controlled,        Negative  ROS        Endo/Other    Obesity  morbid obesity   GYN       Hematology  Negative hematology ROS      Musculoskeletal  Negative musculoskeletal ROS        Neurology  Negative neurology ROS      Psychology   Negative psychology ROS              Physical Exam    Airway    Mallampati score: II  TM Distance: >3 FB  Neck ROM: full     Dental   No notable dental hx     Cardiovascular  Comment: Negative ROS, Rhythm: regular, Rate: normal, Cardiovascular exam normal    Pulmonary  Pulmonary exam normal Breath sounds clear to auscultation,     Other Findings        Anesthesia Plan  ASA Score- 2     Anesthesia Type- general with ASA Monitors  Additional Monitors:   Airway Plan: ETT  Plan Factors-    Induction- intravenous  Postoperative Plan- Plan for postoperative opioid use  Informed Consent- Anesthetic plan and risks discussed with daughter and patient  I personally reviewed this patient with the CRNA  Discussed and agreed on the Anesthesia Plan with the CINDI Jurado

## 2019-02-12 ENCOUNTER — APPOINTMENT (INPATIENT)
Dept: RADIOLOGY | Facility: HOSPITAL | Age: 49
DRG: 330 | End: 2019-02-12
Payer: COMMERCIAL

## 2019-02-12 LAB
ANION GAP SERPL CALCULATED.3IONS-SCNC: 8 MMOL/L (ref 4–13)
BUN SERPL-MCNC: 10 MG/DL (ref 5–25)
CALCIUM SERPL-MCNC: 9.9 MG/DL (ref 8.3–10.1)
CANCER AG19-9 SERPL-ACNC: NORMAL U/ML
CEA FLD-MCNC: 1.8 NG/ML
CHLORIDE SERPL-SCNC: 101 MMOL/L (ref 100–108)
CO2 SERPL-SCNC: 30 MMOL/L (ref 21–32)
CREAT SERPL-MCNC: 1 MG/DL (ref 0.6–1.3)
ERYTHROCYTE [DISTWIDTH] IN BLOOD BY AUTOMATED COUNT: 14.1 % (ref 11.6–15.1)
GFR SERPL CREATININE-BSD FRML MDRD: 66 ML/MIN/1.73SQ M
GLUCOSE SERPL-MCNC: 142 MG/DL (ref 65–140)
HCT VFR BLD AUTO: 43.5 % (ref 34.8–46.1)
HGB BLD-MCNC: 14.1 G/DL (ref 11.5–15.4)
LIPASE FLD-CCNC: 123 U/L
MAGNESIUM SERPL-MCNC: 2.1 MG/DL (ref 1.6–2.6)
MCH RBC QN AUTO: 27.2 PG (ref 26.8–34.3)
MCHC RBC AUTO-ENTMCNC: 32.4 G/DL (ref 31.4–37.4)
MCV RBC AUTO: 84 FL (ref 82–98)
PLATELET # BLD AUTO: 281 THOUSANDS/UL (ref 149–390)
PMV BLD AUTO: 11.6 FL (ref 8.9–12.7)
POTASSIUM SERPL-SCNC: 4.6 MMOL/L (ref 3.5–5.3)
RBC # BLD AUTO: 5.18 MILLION/UL (ref 3.81–5.12)
SODIUM SERPL-SCNC: 139 MMOL/L (ref 136–145)
WBC # BLD AUTO: 9.04 THOUSAND/UL (ref 4.31–10.16)

## 2019-02-12 PROCEDURE — C9113 INJ PANTOPRAZOLE SODIUM, VIA: HCPCS | Performed by: SURGERY

## 2019-02-12 PROCEDURE — 99024 POSTOP FOLLOW-UP VISIT: CPT | Performed by: SURGERY

## 2019-02-12 PROCEDURE — 83735 ASSAY OF MAGNESIUM: CPT | Performed by: SURGERY

## 2019-02-12 PROCEDURE — 74240 X-RAY XM UPR GI TRC 1CNTRST: CPT

## 2019-02-12 PROCEDURE — 80048 BASIC METABOLIC PNL TOTAL CA: CPT | Performed by: SURGERY

## 2019-02-12 PROCEDURE — 85027 COMPLETE CBC AUTOMATED: CPT | Performed by: SURGERY

## 2019-02-12 RX ADMIN — ACETAMINOPHEN 975 MG: 325 TABLET, FILM COATED ORAL at 06:19

## 2019-02-12 RX ADMIN — KETOROLAC TROMETHAMINE 30 MG: 30 INJECTION, SOLUTION INTRAMUSCULAR at 06:18

## 2019-02-12 RX ADMIN — PANTOPRAZOLE SODIUM 40 MG: 40 INJECTION, POWDER, FOR SOLUTION INTRAVENOUS at 08:33

## 2019-02-12 RX ADMIN — SIMETHICONE CHEW TAB 80 MG 80 MG: 80 TABLET ORAL at 19:45

## 2019-02-12 RX ADMIN — SODIUM CHLORIDE, SODIUM LACTATE, POTASSIUM CHLORIDE, AND CALCIUM CHLORIDE 125 ML/HR: .6; .31; .03; .02 INJECTION, SOLUTION INTRAVENOUS at 00:39

## 2019-02-12 RX ADMIN — MELATONIN 3 MG: 3 TAB ORAL at 19:45

## 2019-02-12 RX ADMIN — SUCRALFATE 1000 MG: 1 SUSPENSION ORAL at 11:02

## 2019-02-12 RX ADMIN — HEPARIN SODIUM 5000 UNITS: 5000 INJECTION, SOLUTION INTRAVENOUS; SUBCUTANEOUS at 19:47

## 2019-02-12 RX ADMIN — HEPARIN SODIUM 5000 UNITS: 5000 INJECTION, SOLUTION INTRAVENOUS; SUBCUTANEOUS at 06:19

## 2019-02-12 RX ADMIN — SUCRALFATE 1000 MG: 1 SUSPENSION ORAL at 19:46

## 2019-02-12 RX ADMIN — ACETAMINOPHEN 975 MG: 325 TABLET, FILM COATED ORAL at 11:02

## 2019-02-12 RX ADMIN — SIMETHICONE CHEW TAB 80 MG 80 MG: 80 TABLET ORAL at 08:33

## 2019-02-12 RX ADMIN — SUCRALFATE 1000 MG: 1 SUSPENSION ORAL at 06:18

## 2019-02-12 RX ADMIN — ACETAMINOPHEN 975 MG: 325 TABLET, FILM COATED ORAL at 19:44

## 2019-02-12 RX ADMIN — KETOROLAC TROMETHAMINE 30 MG: 30 INJECTION, SOLUTION INTRAMUSCULAR at 00:39

## 2019-02-12 RX ADMIN — DOXEPIN HYDROCHLORIDE 10 MG: 10 CAPSULE ORAL at 19:47

## 2019-02-12 RX ADMIN — KETOROLAC TROMETHAMINE 30 MG: 30 INJECTION, SOLUTION INTRAMUSCULAR at 11:03

## 2019-02-12 RX ADMIN — HEPARIN SODIUM 5000 UNITS: 5000 INJECTION, SOLUTION INTRAVENOUS; SUBCUTANEOUS at 14:46

## 2019-02-12 RX ADMIN — IOHEXOL 150 ML: 240 INJECTION, SOLUTION INTRATHECAL; INTRAVASCULAR; INTRAVENOUS; ORAL at 10:20

## 2019-02-12 RX ADMIN — SODIUM CHLORIDE, SODIUM LACTATE, POTASSIUM CHLORIDE, AND CALCIUM CHLORIDE 125 ML/HR: .6; .31; .03; .02 INJECTION, SOLUTION INTRAVENOUS at 08:34

## 2019-02-12 RX ADMIN — SUCRALFATE 1000 MG: 1 SUSPENSION ORAL at 16:57

## 2019-02-12 NOTE — UTILIZATION REVIEW
Notification of Inpatient Admission/Inpatient Authorization Request  This is a Notification of Inpatient Admission/Request for Inpatient Authorization for our facility [place]  Be advised that this patient was admitted to our facility under Inpatient Status  Please contact the Utilization Review Department where the patient is receiving care services for additional admission information  Place of Service Code: 24   Place of Service Name: Inpatient Hospital  Presentation Date & Time: 2/11/2019 11:57 AM  Inpatient Admission Date & Time: 2/11/19 2012  Discharge Date & Time: No discharge date for patient encounter  Discharge Disposition (if discharged): Home/Self Care  Attending Physician: Lisette Ellison Md [[id number]]  Admission Orders (From admission, onward)    Ordered        02/11/19 2012  Inpatient Admission  Once     Order ID Start Status   [id number] 02/11/19 2013 Completed                Facility: [facility]   Utilization Review Department  Phone: [phone]; Fax [phone]  [email or web address]  ATTENTION: Please call with any questions or concerns to [phone]  and carefully listen to the prompts so that you are directed to the right person  Send all requests for admission clinical reviews, approved or denied determinations and any other requests to fax [phone]   All voicemails are confidential

## 2019-02-12 NOTE — PROGRESS NOTES
Progress Note - Bariatric Surgery   Perla Vicente 51 y o  female MRN: [id number]  Unit/Bed#: -01 Encounter: [id number]      Subjective/Objective     Subjective: 49yo F with hx  Of RYGB s/p lap GAURANG, resection of small bowel, and decompression of gastric remnant POD#1  Tolerating liquid diet without nausea or vomiting, pain adequately controlled on oral pain medication, ambulating without assistance, voiding well, using incentive spirometer  Awaiting UGI scheduled for this morning  Denies fevers, chills, sweats, SOB, CP, calf pain  Objective:    /68 (BP Location: Left arm)   Pulse 75   Temp 98 4 °F (36 9 °C) (Oral)   Resp 18   Ht 5' 9" (1 753 m)   Wt 119 kg (262 lb 5 6 oz)   SpO2 97%   BMI 38 74 kg/m²       Intake/Output Summary (Last 24 hours) at 2/12/2019 0906  Last data filed at 2/12/2019 0847  Gross per 24 hour   Intake 3685 83 ml   Output 2475 ml   Net 1210 83 ml       Invasive Devices     Peripheral Intravenous Line            Peripheral IV 02/11/19 Right Antecubital less than 1 day    Peripheral IV 02/11/19 Right Wrist less than 1 day                ROS: 10-point system completed  All negative except see HPI  Physical Exam    General Appearance:    Alert, cooperative, no distress, appears stated age   Head:    Normocephalic, without obvious abnormality, atraumatic   Lungs:     Clear to auscultation bilaterally, respirations unlabored   Heart:    Regular rate and rhythm, S1 and S2 normal, no murmur, rub    or gallop   Abdomen:     Soft, appropriate tenderness, bowel sounds active all four quadrants, no masses, no organomegaly, non distended, incisions clean, dry, and intact   Extremities:   Extremities normal, atraumatic, no cyanosis or edema   Neurologic:   CNII-XII intact  Normal strength and sensation               Lab, Imaging and other studies:  I have personally reviewed pertinent lab results    , CBC:   Lab Results   Component Value Date    WBC 9 04 02/12/2019    HGB 14 1 02/12/2019    HCT 43 5 02/12/2019    MCV 84 02/12/2019     02/12/2019    MCH 27 2 02/12/2019    MCHC 32 4 02/12/2019    RDW 14 1 02/12/2019    MPV 11 6 02/12/2019   , CMP:   Lab Results   Component Value Date    SODIUM 139 02/12/2019    K 4 6 02/12/2019     02/12/2019    CO2 30 02/12/2019    BUN 10 02/12/2019    CREATININE 1 00 02/12/2019    CALCIUM 9 9 02/12/2019    EGFR 66 02/12/2019        VTE Mechanical Prophylaxis: sequential compression device, heparin    Assessment/Plan  1)  49yo F with hx  Of RYGB s/p lap GAURANG, resection of small bowel, and decompression of gastric remnant POD#1  UGI scheduled for this morning  If UGI looks good, we will D/C her fluids and resume bariatric clear liquid diet  Encouraged incentive spirometry, frequent ambulation        Plan of care was discussed with patient and patient's nurse  Care plan discussed with Dr Loren Puckett PA-C  2/12/2019  9:07 AM

## 2019-02-12 NOTE — UTILIZATION REVIEW
Initial Clinical Review    Age/Sex: 51 y o  female  Surgery Date: 2/11  Procedure: LAPAROSCOPIC LYSIS OF ADHESIONS; RESECTION SMALL BOWEL; DECOMPRESSION OF GASTRIC REMNANT; EGD (N/A)  Fozia Guzman #[id number] FOR IP CPT 81994    Anesthesia: general   Admission Orders: Date/Time/Statement: 2/11/19 @ 2012   Orders Placed This Encounter   Procedures    Inpatient Admission     Standing Status:   Standing     Number of Occurrences:   1     Order Specific Question:   Admitting Physician     Answer:   Celena Carranza     Order Specific Question:   Level of Care     Answer:   Med Surg [16]     Order Specific Question:   Estimated length of stay     Answer:   Inpatient Only Surgery     Vital Signs: /68 (BP Location: Left arm)   Pulse 75   Temp 98 4 °F (36 9 °C) (Oral)   Resp 18   Ht 5' 9" (1 753 m)   Wt 119 kg (262 lb 5 6 oz)   SpO2 97%   BMI 38 74 kg/m²   Diet:        Diet Orders   (From admission, onward)            Start     Ordered    02/11/19 2013  Diet Bariatric; Bariatric Clear Liquid  Diet effective now     Question Answer Comment   Diet Type Bariatric    Bariatric Bariatric Clear Liquid    RD to adjust diet per protocol?  Yes        02/11/19 2012        Mobility: amb pt   DVT Prophylaxis: SCD  Pain Control:   Pain Medications             butalbital-acetaminophen-caffeine (FIORICET,ESGIC) -40 mg per tablet take 1-2 tablets by mouth every 4 to 6 hours if needed maximum daily dose of 6    doxepin (SINEquan) 10 mg capsule Take 10 mg by mouth daily at bedtime    venlafaxine (EFFEXOR) 37 5 mg tablet Take 37 5 mg by mouth 2 (two) times a day        Daily weight   I&O   Enc deep breathing and coughing   2/12 mg, cbc , bmp

## 2019-02-13 VITALS
WEIGHT: 261.02 LBS | BODY MASS INDEX: 38.66 KG/M2 | DIASTOLIC BLOOD PRESSURE: 65 MMHG | RESPIRATION RATE: 20 BRPM | HEART RATE: 78 BPM | OXYGEN SATURATION: 96 % | HEIGHT: 69 IN | SYSTOLIC BLOOD PRESSURE: 137 MMHG | TEMPERATURE: 97.9 F

## 2019-02-13 PROCEDURE — 99024 POSTOP FOLLOW-UP VISIT: CPT | Performed by: SURGERY

## 2019-02-13 RX ORDER — OXYCODONE HYDROCHLORIDE AND ACETAMINOPHEN 5; 325 MG/1; MG/1
1 TABLET ORAL EVERY 4 HOURS PRN
Qty: 15 TABLET | Refills: 0 | Status: SHIPPED | OUTPATIENT
Start: 2019-02-13 | End: 2019-02-22 | Stop reason: HOSPADM

## 2019-02-13 RX ADMIN — ACETAMINOPHEN 975 MG: 325 TABLET, FILM COATED ORAL at 05:28

## 2019-02-13 RX ADMIN — SIMETHICONE CHEW TAB 80 MG 80 MG: 80 TABLET ORAL at 09:30

## 2019-02-13 RX ADMIN — HEPARIN SODIUM 5000 UNITS: 5000 INJECTION, SOLUTION INTRAVENOUS; SUBCUTANEOUS at 05:28

## 2019-02-13 RX ADMIN — SUCRALFATE 1000 MG: 1 SUSPENSION ORAL at 06:00

## 2019-02-13 NOTE — PROGRESS NOTES
Progress Note - Bariatric Surgery   Waqar Soriano 51 y o  female MRN: [id number]  Unit/Bed#: -01 Encounter: [id number]    Assessment:  48/F POD 2 s/p exploratory laparoscopy, lysis of adhesions, small bowel resection, drainage/decompression of gastric remnant with closure     UGI demonstrates prompt emptying from esophagus to lety limb and pre operative epigastric pain/discomfort has resolved for now    Plan:  Continue diet; will advance at home   OOB/ambulate   Pain control   DVT ppx   D/C home today    Subjective/Objective     Subjective: denies pain; tolerating diet     Objective:     Blood pressure 137/65, pulse 78, temperature 97 9 °F (36 6 °C), temperature source Oral, resp  rate 20, height 5' 9" (1 753 m), weight 118 kg (261 lb 0 4 oz), SpO2 96 %, not currently breastfeeding  ,Body mass index is 38 55 kg/m²        Intake/Output Summary (Last 24 hours) at 2/13/2019 0900  Last data filed at 2/13/2019 2099  Gross per 24 hour   Intake 350 ml   Output 1550 ml   Net -1200 ml       Invasive Devices     Peripheral Intravenous Line            Peripheral IV 02/11/19 Right Antecubital 1 day    Peripheral IV 02/11/19 Right Wrist 1 day                Physical Exam:    No distress  RRR  Breathing non labored   Abd soft, NT/ND; wounds C/D/I      Lab, Imaging and other studies:UGI 2/12 - prompt emptying into lety limb; mild reflux   VTE Pharmacologic Prophylaxis: Heparin  VTE Mechanical Prophylaxis: sequential compression device

## 2019-02-13 NOTE — DISCHARGE INSTRUCTIONS
Bariatric/Weight Loss Surgery  Hospital Discharge Instructions  1  ACTIVITY:  a  Progress as feels comfortable - a good rule is:  if you are doing something and it begins to hurt, stop doing the activity  Walk at least 3 times per day at home  b  Samira Barfield may walk stairs if you do so slowly  c  You may shower 48 hours after surgery  d  Use your incentive spirometer 10 times per hour while awake for 1 week  e  No driving until you are medically cleared by Dr Jake Bermudez at your first post operative appointment  Never drive while taking narcotic pain medication, such as Percocet, Darvocet, Oxycodone, Tylenol #3, and Tylenol with Codeine  Follow your pharmacists orders  2  DIET  a  Stay on a clear liquid diet for 7 days after your surgery date, sipping slowly  Refer to your manual for examples of choices  Remember to keep your liquids sugar free or low calorie  You may have protein drinks  Make sure to drink 48 to 64 ounces per day of fluids  b  Samira Barfield may start on full liquids on week 2  Do not start purees until you meet with your surgeon and are directed to do so  3  MEDICATIONS:  a  Start vitamins and minerals when you get home  b  Pain and Anti-acid Medication as per prescription  c  Other medications as indicated on the Physician Patient Discharge Instructions form given to you at the time of discharge  d  Make sure that you are splitting your pill or tablet medications in halves or fourths or even crushing them before you take them  Capsules should be opened and mixed with water or jello  You need to do this for at least 2 to 4 weeks after surgery  Eventually you will be able to take your medications the regular way as they were prescribed  Ask your nurse prior to discharge about your medications  4  INCISION CARE  a  You may shower and get incisions wet 2 days after surgery  Avoid picking at the surgical glue over your incisions  This glue will slough off over the next few weeks    b  If you have a drain, empty the drain as the nurses instructed  5  FOLLOW-UP APPOINTMENT should be made for one week after discharge  Call surgeons office at [phone] to schedule an appointment      6  CALL YOUR DOCTOR FOR:  pain not controlled by pain medications, a temperature greater than 101 5° F, any increase or change in drainage or redness from any incision, any vomiting or inability to keep liquids down, shortness of breath, shoulder pain, or bleeding

## 2019-02-13 NOTE — PLAN OF CARE
Problem: PAIN - ADULT  Goal: Verbalizes/displays adequate comfort level or baseline comfort level  Description  Interventions:  - Encourage patient to monitor pain and request assistance  - Assess pain using appropriate pain scale  - Administer analgesics based on type and severity of pain and evaluate response  - Implement non-pharmacological measures as appropriate and evaluate response  - Consider cultural and social influences on pain and pain management  - Notify physician/advanced practitioner if interventions unsuccessful or patient reports new pain  Outcome: Progressing     Problem: SAFETY ADULT  Goal: Patient will remain free of falls  Description  INTERVENTIONS:  - Assess patient frequently for physical needs  -  Identify cognitive and physical deficits and behaviors that affect risk of falls    -  [place] fall precautions as indicated by assessment   - Educate patient/family on patient safety including physical limitations  - Instruct patient to call for assistance with activity based on assessment  - Modify environment to reduce risk of injury  - Consider OT/PT consult to assist with strengthening/mobility  Outcome: Progressing  Goal: Maintain or return to baseline ADL function  Description  INTERVENTIONS:  -  Assess patient's ability to carry out ADLs; assess patient's baseline for ADL function and identify physical deficits which impact ability to perform ADLs (bathing, care of mouth/teeth, toileting, grooming, dressing, etc )  - Assess/evaluate cause of self-care deficits   - Assess range of motion  - Assess patient's mobility; develop plan if impaired  - Assess patient's need for assistive devices and provide as appropriate  - Encourage maximum independence but intervene and supervise when necessary  ¯ Involve family in performance of ADLs  ¯ Assess for home care needs following discharge   ¯ Request OT consult to assist with ADL evaluation and planning for discharge  ¯ Provide patient education as appropriate  Outcome: Progressing  Goal: Maintain or return mobility status to optimal level  Description  INTERVENTIONS:  - Assess patient's baseline mobility status (ambulation, transfers, stairs, etc )    - Identify cognitive and physical deficits and behaviors that affect mobility  - Identify mobility aids required to assist with transfers and/or ambulation (gait belt, sit-to-stand, lift, walker, cane, etc )  - [place] fall precautions as indicated by assessment  - Record patient progress and toleration of activity level on Mobility SBAR; progress patient to next Phase/Stage  - Instruct patient to call for assistance with activity based on assessment  - Request Rehabilitation consult to assist with strengthening/weightbearing, etc   Outcome: Progressing     Problem: DISCHARGE PLANNING  Goal: Discharge to home or other facility with appropriate resources  Description  INTERVENTIONS:  - Identify barriers to discharge w/patient and caregiver  - Arrange for needed discharge resources and transportation as appropriate  - Identify discharge learning needs (meds, wound care, etc )  - Arrange for interpretive services to assist at discharge as needed  - Refer to Case Management Department for coordinating discharge planning if the patient needs post-hospital services based on physician/advanced practitioner order or complex needs related to functional status, cognitive ability, or social support system  Outcome: Progressing     Problem: GASTROINTESTINAL - ADULT  Goal: Minimal or absence of nausea and/or vomiting  Description  INTERVENTIONS:  - Administer IV fluids as ordered to ensure adequate hydration  - Maintain NPO status until nausea and vomiting are resolved  - Nasogastric tube as ordered  - Administer ordered antiemetic medications as needed  - Provide nonpharmacologic comfort measures as appropriate  - Advance diet as tolerated, if ordered  - Nutrition services referral to assist patient with adequate nutrition and appropriate food choices  Outcome: Progressing  Goal: Maintains or returns to baseline bowel function  Description  INTERVENTIONS:  - Assess bowel function  - Encourage oral fluids to ensure adequate hydration  - Administer IV fluids as ordered to ensure adequate hydration  - Administer ordered medications as needed  - Encourage mobilization and activity  - Nutrition services referral to assist patient with appropriate food choices  Outcome: Progressing  Goal: Maintains adequate nutritional intake  Description  INTERVENTIONS:  - Monitor percentage of each meal consumed  - Identify factors contributing to decreased intake, treat as appropriate  - Assist with meals as needed  - Monitor I&O, WT and lab values  - Obtain nutrition services referral as needed  Outcome: Progressing  Goal: Establish and maintain optimal ostomy function  Description  INTERVENTIONS:  - Assess bowel function  - Encourage oral fluids to ensure adequate hydration  - Administer IV fluids as ordered to ensure adequate hydration  - Administer ordered medications as needed  - Encourage mobilization and activity  - Nutrition services referral to assist patient with appropriate food choices  - Assess stoma site  Outcome: Progressing     Problem: Potential for Falls  Goal: Patient will remain free of falls  Description  INTERVENTIONS:  - Assess patient frequently for physical needs  -  Identify cognitive and physical deficits and behaviors that affect risk of falls    -  [place] fall precautions as indicated by assessment   - Educate patient/family on patient safety including physical limitations  - Instruct patient to call for assistance with activity based on assessment  - Modify environment to reduce risk of injury  - Consider OT/PT consult to assist with strengthening/mobility  Outcome: Progressing

## 2019-02-13 NOTE — NURSING NOTE
Both iv removed  avs reviewed  Post brda education given  No question or complaint at discharge  Confirmed with team meds sent to personal pharmacy  Appropriate for safe discharge

## 2019-02-13 NOTE — UTILIZATION REVIEW
Notification of Discharge  This is a Notification of Discharge from our facility [address]  Please be advised that this patient has been discharge from our facility  Below you will find the admission and discharge date and time including the patients disposition  PRESENTATION DATE: 2/11/2019 11:57 AM  IP ADMISSION DATE: 2/11/19 2012  DISCHARGE DATE: 2/13/2019  9:51 AM  DISPOSITION: [address] Utilization Review Department  Phone: [phone]; Fax [phone]  [email or web address]  ATTENTION: Please call with any questions or concerns to [phone]  and carefully listen to the prompts so that you are directed to the right person  Send all requests for admission clinical reviews, approved or denied determinations and any other requests to fax [phone]   All voicemails are confidential

## 2019-02-13 NOTE — DISCHARGE SUMMARY
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]     * Acute primary diaphragmitis [J98 6]     * Cyst of pancreas [K86 2]    Procedures Performed:  Procedure(s):  LAPAROSCOPIC LYSIS OF ADHESIONS; RESECTION SMALL BOWEL; DECOMPRESSION OF GASTRIC REMNANT; EGD    Surgeon: Pete Goyal MD    See H & P for full details of admission and Operative Note for full details of operations performed  Patient was seen and examined prior to discharge  Provisions for Follow-Up Care:  See After Visit Summary/Discharge Instructions for information related to follow-up care and home orders  Disposition: Home, in stable condition  Planned Readmission: No    Discharge Medications:  See After Visit Summary/Discharge Instructions for reconciled discharge medications provided to patient and family  Post Operative instructions: Reviewed with patient and/or family  This text is generated with voice recognition software  There may be translation, syntax,  or grammatical errors  If you have any questions, please contact the dictating provider       Signature:   Yadiel Zapata PA-C  Date: 2/13/2019 Time: 8:44 AM

## 2019-02-13 NOTE — PLAN OF CARE
Problem: PAIN - ADULT  Goal: Verbalizes/displays adequate comfort level or baseline comfort level  Description  Interventions:  - Encourage patient to monitor pain and request assistance  - Assess pain using appropriate pain scale  - Administer analgesics based on type and severity of pain and evaluate response  - Implement non-pharmacological measures as appropriate and evaluate response  - Consider cultural and social influences on pain and pain management  - Notify physician/advanced practitioner if interventions unsuccessful or patient reports new pain  Outcome: Completed     Problem: SAFETY ADULT  Goal: Patient will remain free of falls  Description  INTERVENTIONS:  - Assess patient frequently for physical needs  -  Identify cognitive and physical deficits and behaviors that affect risk of falls    -  [place] fall precautions as indicated by assessment   - Educate patient/family on patient safety including physical limitations  - Instruct patient to call for assistance with activity based on assessment  - Modify environment to reduce risk of injury  - Consider OT/PT consult to assist with strengthening/mobility  Outcome: Completed  Goal: Maintain or return to baseline ADL function  Description  INTERVENTIONS:  -  Assess patient's ability to carry out ADLs; assess patient's baseline for ADL function and identify physical deficits which impact ability to perform ADLs (bathing, care of mouth/teeth, toileting, grooming, dressing, etc )  - Assess/evaluate cause of self-care deficits   - Assess range of motion  - Assess patient's mobility; develop plan if impaired  - Assess patient's need for assistive devices and provide as appropriate  - Encourage maximum independence but intervene and supervise when necessary  ¯ Involve family in performance of ADLs  ¯ Assess for home care needs following discharge   ¯ Request OT consult to assist with ADL evaluation and planning for discharge  ¯ Provide patient education as appropriate  Outcome: Completed  Goal: Maintain or return mobility status to optimal level  Description  INTERVENTIONS:  - Assess patient's baseline mobility status (ambulation, transfers, stairs, etc )    - Identify cognitive and physical deficits and behaviors that affect mobility  - Identify mobility aids required to assist with transfers and/or ambulation (gait belt, sit-to-stand, lift, walker, cane, etc )  - [place] fall precautions as indicated by assessment  - Record patient progress and toleration of activity level on Mobility SBAR; progress patient to next Phase/Stage  - Instruct patient to call for assistance with activity based on assessment  - Request Rehabilitation consult to assist with strengthening/weightbearing, etc   Outcome: Completed     Problem: DISCHARGE PLANNING  Goal: Discharge to home or other facility with appropriate resources  Description  INTERVENTIONS:  - Identify barriers to discharge w/patient and caregiver  - Arrange for needed discharge resources and transportation as appropriate  - Identify discharge learning needs (meds, wound care, etc )  - Arrange for interpretive services to assist at discharge as needed  - Refer to Case Management Department for coordinating discharge planning if the patient needs post-hospital services based on physician/advanced practitioner order or complex needs related to functional status, cognitive ability, or social support system  Outcome: Completed     Problem: GASTROINTESTINAL - ADULT  Goal: Minimal or absence of nausea and/or vomiting  Description  INTERVENTIONS:  - Administer IV fluids as ordered to ensure adequate hydration  - Maintain NPO status until nausea and vomiting are resolved  - Nasogastric tube as ordered  - Administer ordered antiemetic medications as needed  - Provide nonpharmacologic comfort measures as appropriate  - Advance diet as tolerated, if ordered  - Nutrition services referral to assist patient with adequate nutrition and appropriate food choices  Outcome: Completed  Goal: Maintains or returns to baseline bowel function  Description  INTERVENTIONS:  - Assess bowel function  - Encourage oral fluids to ensure adequate hydration  - Administer IV fluids as ordered to ensure adequate hydration  - Administer ordered medications as needed  - Encourage mobilization and activity  - Nutrition services referral to assist patient with appropriate food choices  Outcome: Completed  Goal: Maintains adequate nutritional intake  Description  INTERVENTIONS:  - Monitor percentage of each meal consumed  - Identify factors contributing to decreased intake, treat as appropriate  - Assist with meals as needed  - Monitor I&O, WT and lab values  - Obtain nutrition services referral as needed  Outcome: Completed  Goal: Establish and maintain optimal ostomy function  Description  INTERVENTIONS:  - Assess bowel function  - Encourage oral fluids to ensure adequate hydration  - Administer IV fluids as ordered to ensure adequate hydration  - Administer ordered medications as needed  - Encourage mobilization and activity  - Nutrition services referral to assist patient with appropriate food choices  - Assess stoma site  Outcome: Completed     Problem: Potential for Falls  Goal: Patient will remain free of falls  Description  INTERVENTIONS:  - Assess patient frequently for physical needs  -  Identify cognitive and physical deficits and behaviors that affect risk of falls    -  [place] fall precautions as indicated by assessment   - Educate patient/family on patient safety including physical limitations  - Instruct patient to call for assistance with activity based on assessment  - Modify environment to reduce risk of injury  - Consider OT/PT consult to assist with strengthening/mobility  Outcome: Completed

## 2019-02-14 ENCOUNTER — TELEPHONE (OUTPATIENT)
Dept: BARIATRICS | Facility: CLINIC | Age: 49
End: 2019-02-14

## 2019-02-14 LAB
BACTERIA SPEC ANAEROBE CULT: NO GROWTH
BACTERIA SPEC BFLD CULT: NO GROWTH
GRAM STN SPEC: NORMAL
GRAM STN SPEC: NORMAL

## 2019-02-14 NOTE — TELEPHONE ENCOUNTER
Spoke with pt to follow up after hospital d/c  She is doing well  Minimal pain, ambulating well and tolerating diet  She will call with concerns if any, before her follow up appt with Dr Denis Milder

## 2019-02-15 ENCOUNTER — OFFICE VISIT (OUTPATIENT)
Dept: DERMATOLOGY | Facility: CLINIC | Age: 49
End: 2019-02-15
Payer: COMMERCIAL

## 2019-02-15 DIAGNOSIS — Z13.89 SCREENING FOR SKIN CONDITION: ICD-10-CM

## 2019-02-15 DIAGNOSIS — L82.1 SEBORRHEIC KERATOSIS: Primary | ICD-10-CM

## 2019-02-15 PROCEDURE — 99203 OFFICE O/P NEW LOW 30 MIN: CPT | Performed by: DERMATOLOGY

## 2019-02-15 NOTE — PROGRESS NOTES
[facility]  [address]  [facility] [address]  [phone]  [phone]     MRN: [id number] : 1970  Encounter: [id number]  Patient Information: Susan Alexander  Chief complaint:  Facial lesions    History of present illness:  50-year-old female presents for concerns regarding growths on her face that been present for some time seem to be getting larger and now seem to have more substance to them  Past Medical History:   Diagnosis Date    Bell palsy     Gastric ulcer     GERD (gastroesophageal reflux disease)     History of transfusion     Melena     Pancreatic cyst     Right facial numbness     Upper GI bleed      Past Surgical History:   Procedure Laterality Date    ABDOMINOPLASTY      BARIATRIC SURGERY      BOWEL RESECTION LAPAROSCOPIC N/A 2019    Procedure: LAPAROSCOPIC LYSIS OF ADHESIONS; RESECTION SMALL BOWEL; DECOMPRESSION OF GASTRIC REMNANT; EGD;  Surgeon: Kim Breen MD;  Location: [facility];  Service: General    CHOLECYSTECTOMY      HERNIA REPAIR      HYSTERECTOMY      KNEE CARTILAGE SURGERY      NH COLONOSCOPY FLX DX W/COLLJ SPEC WHEN PFRMD N/A 3/28/2018    Procedure: COLONOSCOPY;  Surgeon: Eliana Mars MD;  Location: [facility]; Service: Gastroenterology    NH COLONOSCOPY FLX DX W/COLLJ [facility] WHEN PFRMD N/A 2019    Procedure: COLONOSCOPY;  Surgeon: Eliana Mars MD;  [facility]: [facility]; Service: Gastroenterology    NH ESOPHAGOGASTRODUODENOSCOPY TRANSORAL DIAGNOSTIC N/A 3/22/2018    Procedure: ESOPHAGOGASTRODUODENOSCOPY (EGD); Surgeon: Eliana Mars MD;  [facility]; Service: Gastroenterology    NH ESOPHAGOGASTRODUODENOSCOPY TRANSORAL DIAGNOSTIC N/A 2019    Procedure: ESOPHAGOGASTRODUODENOSCOPY (EGD); Surgeon: Eliana Mars MD;  [facility];   Service: Gastroenterology    REDUCTION MAMMAPLASTY Bilateral     SHOULDER ARTHROSCOPY DISTAL CLAVICLE EXCISION AND OPEN ROTATOR CUFF REPAIR       Social History   Social History     Substance and Sexual Activity   Alcohol Use Never    Frequency: Never    Binge frequency: Never     Social History     Substance and Sexual Activity   Drug Use No     Social History     Tobacco Use   Smoking Status Never Smoker   Smokeless Tobacco Never Used     Family History   Problem Relation Age of Onset    Heart attack Mother     Diabetes Mother     Heart attack Father     Stroke Father     Hypertension Brother     Leukemia Paternal Aunt      Meds/Allergies   Allergies   Allergen Reactions    Other Headache     MSG    Penicillins Rash       Meds:  Prior to Admission medications    Medication Sig Start Date End Date Taking?  Authorizing Provider   butalbital-acetaminophen-caffeine (FIORICET,ESGIC) -40 mg per tablet take 1-2 tablets by mouth every 4 to 6 hours if needed maximum daily dose of 6 3/12/18  Yes Historical Provider, MD   doxepin (SINEquan) 10 mg capsule Take 10 mg by mouth daily at bedtime 4/26/18  Yes Historical Provider, MD   esterified estrogens (MENEST) 0 625 MG tablet Take 0 625 mg by mouth daily   Yes Historical Provider, MD   ferrous sulfate 325 (65 Fe) mg tablet Take 1 tablet (325 mg total) by mouth daily 3/16/18  Yes Princess Eleno MD   hydrochlorothiazide (HYDRODIURIL) 12 5 mg tablet Take 12 5 mg by mouth daily 3/26/18  Yes Historical Provider, MD   Melatonin 5 MG CAPS Take 5 mg by mouth   Yes Historical Provider, MD   multivitamin (THERAGRAN) TABS Take 1 tablet by mouth daily   Yes Historical Provider, MD   Omega-3 Fatty Acids (FISH OIL) 1,000 mg Take 1,000 mg by mouth daily   Yes Historical Provider, MD   ondansetron (ZOFRAN) 4 mg tablet Take 1 tablet (4 mg total) by mouth every 8 (eight) hours as needed for nausea or vomiting 1/24/19  Yes GREGORY De La Cruz   oxyCODONE-acetaminophen (PERCOCET) 5-325 mg per tablet Take 1 tablet by mouth every 4 (four) hours as needed for severe pain for up to 10 daysMax Daily Amount: 6 tablets 2/13/19 2/23/19 Yes Toi Denver, PA-C   pantoprazole (PROTONIX) 40 mg tablet Take 1 tablet (40 mg total) by mouth daily 1/24/19  Yes GREGORY Trejo   sucralfate (CARAFATE) 1 g/10 mL suspension Take 10 mL (1 g total) by mouth 4 (four) times a day 1/24/19  Yes GREGORY Trejo   venlafaxine (EFFEXOR) 37 5 mg tablet Take 37 5 mg by mouth 2 (two) times a day   Yes Historical Provider, MD       Subjective:     Review of Systems:    General: negative for - chills, fatigue, fever,  weight gain or weight loss  Psychological: negative for - anxiety, behavioral disorder, concentration difficulties, decreased libido, depression, irritability, memory difficulties, mood swings, sleep disturbances or suicidal ideation  ENT: negative for - hearing difficulties , nasal congestion, nasal discharge, oral lesions, sinus pain, sneezing, sore throat  Allergy and Immunology: negative for - hives, insect bite sensitivity,  Hematological and Lymphatic: negative for - bleeding problems, blood clots,bruising, swollen lymph nodes  Endocrine: negative for - hair pattern changes, hot flashes, malaise/lethargy, mood swings, palpitations, polydipsia/polyuria, skin changes, temperature intolerance or unexpected weight change  Respiratory: negative for - cough, hemoptysis, orthopnea, shortness of breath, or wheezing  Cardiovascular: negative for - chest pain, dyspnea on exertion, edema,  Gastrointestinal: negative for - abdominal pain, nausea/vomiting  Genito-Urinary: negative for - dysuria, incontinence, irregular/heavy menses or urinary frequency/urgency  Musculoskeletal: negative for - gait disturbance, joint pain, joint stiffness, joint swelling, muscle pain, muscular weakness  Dermatological:  As in HPI  Neurological: negative for confusion, dizziness, headaches, impaired coordination/balance, memory loss, numbness/tingling, seizures, speech problems, tremors or weakness       Objective:    There were no vitals taken for this visit  Physical Exam:    General Appearance:    Alert, cooperative, no distress   Head:    Normocephalic, without obvious abnormality, atraumatic   Lymphatics:     Abdomen:    Skin:   A full skin exam was performed including scalp, head scalp, eyes, ears, nose, lips, neck, chest, axilla, abdomen, back, buttocks, bilateral upper extremities, bilateral lower extremities, hands, feet, fingers, toes, fingernails, and toenails normal keratotic papules with greasy stuck on appearance nothing else remarkable on exam     Assessment:     1  Seborrheic keratosis     2  Screening for skin condition           Plan:   Seborrheic Keratosis  Patient reasurred these are normal growths we acquire with age no treatment needed  Screening for Dermatologic Disorders: Nothing else of concern noted on complete exam follow up in 1 year       Cris Tolentino MD  2/15/2019,11:58 AM    Portions of the record may have been created with voice recognition software   Occasional wrong word or "sound a like" substitutions may have occurred due to the inherent limitations of voice recognition software   Read the chart carefully and recognize, using context, where substitutions have occurred

## 2019-02-18 ENCOUNTER — HOSPITAL ENCOUNTER (INPATIENT)
Facility: HOSPITAL | Age: 49
LOS: 4 days | Discharge: HOME | DRG: 372 | End: 2019-02-22
Attending: SURGERY | Admitting: SURGERY
Payer: COMMERCIAL

## 2019-02-18 ENCOUNTER — APPOINTMENT (EMERGENCY)
Dept: CT IMAGING | Facility: HOSPITAL | Age: 49
DRG: 372 | End: 2019-02-18
Payer: COMMERCIAL

## 2019-02-18 DIAGNOSIS — K52.9 ENTEROCOLITIS: Primary | ICD-10-CM

## 2019-02-18 PROBLEM — R10.84 GENERALIZED ABDOMINAL PAIN: Status: ACTIVE | Noted: 2019-02-18

## 2019-02-18 LAB
ALBUMIN SERPL BCP-MCNC: 3.3 G/DL (ref 3.5–5)
ALP SERPL-CCNC: 75 U/L (ref 46–116)
ALT SERPL W P-5'-P-CCNC: 23 U/L (ref 12–78)
ANION GAP SERPL CALCULATED.3IONS-SCNC: 12 MMOL/L (ref 4–13)
AST SERPL W P-5'-P-CCNC: 27 U/L (ref 5–45)
BASOPHILS # BLD AUTO: 0.04 THOUSANDS/ΜL (ref 0–0.1)
BASOPHILS NFR BLD AUTO: 0 % (ref 0–1)
BILIRUB SERPL-MCNC: 0.5 MG/DL (ref 0.2–1)
BUN SERPL-MCNC: 25 MG/DL (ref 5–25)
CALCIUM SERPL-MCNC: 8.9 MG/DL (ref 8.3–10.1)
CHLORIDE SERPL-SCNC: 105 MMOL/L (ref 100–108)
CO2 SERPL-SCNC: 22 MMOL/L (ref 21–32)
CREAT SERPL-MCNC: 0.83 MG/DL (ref 0.6–1.3)
EOSINOPHIL # BLD AUTO: 0.01 THOUSAND/ΜL (ref 0–0.61)
EOSINOPHIL NFR BLD AUTO: 0 % (ref 0–6)
ERYTHROCYTE [DISTWIDTH] IN BLOOD BY AUTOMATED COUNT: 14.7 % (ref 11.6–15.1)
GFR SERPL CREATININE-BSD FRML MDRD: 83 ML/MIN/1.73SQ M
GLUCOSE SERPL-MCNC: 129 MG/DL (ref 65–140)
HCT VFR BLD AUTO: 49 % (ref 34.8–46.1)
HGB BLD-MCNC: 15.8 G/DL (ref 11.5–15.4)
IMM GRANULOCYTES # BLD AUTO: 0.35 THOUSAND/UL (ref 0–0.2)
IMM GRANULOCYTES NFR BLD AUTO: 1 % (ref 0–2)
LYMPHOCYTES # BLD AUTO: 0.74 THOUSANDS/ΜL (ref 0.6–4.47)
LYMPHOCYTES NFR BLD AUTO: 3 % (ref 14–44)
MCH RBC QN AUTO: 27.7 PG (ref 26.8–34.3)
MCHC RBC AUTO-ENTMCNC: 32.2 G/DL (ref 31.4–37.4)
MCV RBC AUTO: 86 FL (ref 82–98)
MONOCYTES # BLD AUTO: 1.85 THOUSAND/ΜL (ref 0.17–1.22)
MONOCYTES NFR BLD AUTO: 7 % (ref 4–12)
NEUTROPHILS # BLD AUTO: 21.99 THOUSANDS/ΜL (ref 1.85–7.62)
NEUTS SEG NFR BLD AUTO: 89 % (ref 43–75)
NRBC BLD AUTO-RTO: 0 /100 WBCS
PLATELET # BLD AUTO: 347 THOUSANDS/UL (ref 149–390)
PMV BLD AUTO: 11.3 FL (ref 8.9–12.7)
POTASSIUM SERPL-SCNC: 4.4 MMOL/L (ref 3.5–5.3)
PROT SERPL-MCNC: 7.2 G/DL (ref 6.4–8.2)
RBC # BLD AUTO: 5.7 MILLION/UL (ref 3.81–5.12)
SODIUM SERPL-SCNC: 139 MMOL/L (ref 136–145)
WBC # BLD AUTO: 24.98 THOUSAND/UL (ref 4.31–10.16)

## 2019-02-18 PROCEDURE — 96367 TX/PROPH/DG ADDL SEQ IV INF: CPT

## 2019-02-18 PROCEDURE — 96375 TX/PRO/DX INJ NEW DRUG ADDON: CPT

## 2019-02-18 PROCEDURE — C9113 INJ PANTOPRAZOLE SODIUM, VIA: HCPCS | Performed by: SURGERY

## 2019-02-18 PROCEDURE — 85025 COMPLETE CBC W/AUTO DIFF WBC: CPT | Performed by: SURGERY

## 2019-02-18 PROCEDURE — 87040 BLOOD CULTURE FOR BACTERIA: CPT | Performed by: SURGERY

## 2019-02-18 PROCEDURE — 99024 POSTOP FOLLOW-UP VISIT: CPT | Performed by: SURGERY

## 2019-02-18 PROCEDURE — 74177 CT ABD & PELVIS W/CONTRAST: CPT

## 2019-02-18 PROCEDURE — 93005 ELECTROCARDIOGRAM TRACING: CPT

## 2019-02-18 PROCEDURE — 36415 COLL VENOUS BLD VENIPUNCTURE: CPT | Performed by: SURGERY

## 2019-02-18 PROCEDURE — 96365 THER/PROPH/DIAG IV INF INIT: CPT

## 2019-02-18 PROCEDURE — 99285 EMERGENCY DEPT VISIT HI MDM: CPT

## 2019-02-18 PROCEDURE — 80053 COMPREHEN METABOLIC PANEL: CPT | Performed by: SURGERY

## 2019-02-18 RX ORDER — KETOROLAC TROMETHAMINE 30 MG/ML
30 INJECTION, SOLUTION INTRAMUSCULAR; INTRAVENOUS EVERY 6 HOURS SCHEDULED
Status: COMPLETED | OUTPATIENT
Start: 2019-02-19 | End: 2019-02-20

## 2019-02-18 RX ORDER — ONDANSETRON 2 MG/ML
4 INJECTION INTRAMUSCULAR; INTRAVENOUS EVERY 4 HOURS PRN
Status: DISCONTINUED | OUTPATIENT
Start: 2019-02-18 | End: 2019-02-22 | Stop reason: HOSPADM

## 2019-02-18 RX ORDER — ONDANSETRON 2 MG/ML
4 INJECTION INTRAMUSCULAR; INTRAVENOUS ONCE
Status: COMPLETED | OUTPATIENT
Start: 2019-02-18 | End: 2019-02-18

## 2019-02-18 RX ORDER — PANTOPRAZOLE SODIUM 40 MG/1
40 INJECTION, POWDER, FOR SOLUTION INTRAVENOUS EVERY 12 HOURS SCHEDULED
Status: DISCONTINUED | OUTPATIENT
Start: 2019-02-18 | End: 2019-02-22 | Stop reason: HOSPADM

## 2019-02-18 RX ORDER — LEVOFLOXACIN 5 MG/ML
500 INJECTION, SOLUTION INTRAVENOUS EVERY 24 HOURS
Status: DISCONTINUED | OUTPATIENT
Start: 2019-02-18 | End: 2019-02-21

## 2019-02-18 RX ORDER — CYCLOBENZAPRINE HCL 10 MG
10 TABLET ORAL 3 TIMES DAILY PRN
Status: ON HOLD | COMMUNITY
End: 2019-02-22 | Stop reason: SDUPTHER

## 2019-02-18 RX ORDER — MAGNESIUM CARB/ALUMINUM HYDROX 105-160MG
296 TABLET,CHEWABLE ORAL ONCE
Status: COMPLETED | OUTPATIENT
Start: 2019-02-18 | End: 2019-02-18

## 2019-02-18 RX ORDER — SODIUM CHLORIDE, SODIUM LACTATE, POTASSIUM CHLORIDE, CALCIUM CHLORIDE 600; 310; 30; 20 MG/100ML; MG/100ML; MG/100ML; MG/100ML
50 INJECTION, SOLUTION INTRAVENOUS CONTINUOUS
Status: DISCONTINUED | OUTPATIENT
Start: 2019-02-18 | End: 2019-02-22 | Stop reason: HOSPADM

## 2019-02-18 RX ORDER — HEPARIN SODIUM 5000 [USP'U]/ML
5000 INJECTION, SOLUTION INTRAVENOUS; SUBCUTANEOUS EVERY 8 HOURS SCHEDULED
Status: DISCONTINUED | OUTPATIENT
Start: 2019-02-18 | End: 2019-02-22 | Stop reason: HOSPADM

## 2019-02-18 RX ORDER — KETOROLAC TROMETHAMINE 30 MG/ML
15 INJECTION, SOLUTION INTRAMUSCULAR; INTRAVENOUS ONCE
Status: COMPLETED | OUTPATIENT
Start: 2019-02-18 | End: 2019-02-18

## 2019-02-18 RX ORDER — HYDROMORPHONE HCL/PF 1 MG/ML
1 SYRINGE (ML) INJECTION EVERY 4 HOURS PRN
Status: DISCONTINUED | OUTPATIENT
Start: 2019-02-18 | End: 2019-02-21

## 2019-02-18 RX ORDER — HYDROMORPHONE HCL/PF 1 MG/ML
0.5 SYRINGE (ML) INJECTION EVERY 4 HOURS PRN
Status: DISCONTINUED | OUTPATIENT
Start: 2019-02-18 | End: 2019-02-21

## 2019-02-18 RX ORDER — HYDROMORPHONE HCL/PF 1 MG/ML
1 SYRINGE (ML) INJECTION ONCE
Status: COMPLETED | OUTPATIENT
Start: 2019-02-18 | End: 2019-02-18

## 2019-02-18 RX ADMIN — IOHEXOL 50 ML: 240 INJECTION, SOLUTION INTRATHECAL; INTRAVASCULAR; INTRAVENOUS; ORAL at 22:34

## 2019-02-18 RX ADMIN — IODIXANOL 100 ML: 320 INJECTION, SOLUTION INTRAVASCULAR at 22:34

## 2019-02-18 RX ADMIN — PANTOPRAZOLE SODIUM 40 MG: 40 INJECTION, POWDER, FOR SOLUTION INTRAVENOUS at 22:08

## 2019-02-18 RX ADMIN — KETOROLAC TROMETHAMINE 15 MG: 30 INJECTION, SOLUTION INTRAMUSCULAR at 21:29

## 2019-02-18 RX ADMIN — LEVOFLOXACIN 500 MG: 5 INJECTION, SOLUTION INTRAVENOUS at 21:43

## 2019-02-18 RX ADMIN — SODIUM CHLORIDE 2000 ML: 0.9 INJECTION, SOLUTION INTRAVENOUS at 21:00

## 2019-02-18 RX ADMIN — METRONIDAZOLE 500 MG: 500 INJECTION, SOLUTION INTRAVENOUS at 21:04

## 2019-02-18 RX ADMIN — HYDROMORPHONE HYDROCHLORIDE 1 MG: 1 INJECTION, SOLUTION INTRAMUSCULAR; INTRAVENOUS; SUBCUTANEOUS at 21:28

## 2019-02-18 RX ADMIN — MAGNESIUM CITRATE 296 ML: 1.75 LIQUID ORAL at 22:55

## 2019-02-18 RX ADMIN — ONDANSETRON 4 MG: 2 INJECTION INTRAMUSCULAR; INTRAVENOUS at 21:47

## 2019-02-19 LAB
ANION GAP SERPL CALCULATED.3IONS-SCNC: 11 MMOL/L (ref 4–13)
BUN SERPL-MCNC: 19 MG/DL (ref 5–25)
CALCIUM SERPL-MCNC: 8.4 MG/DL (ref 8.3–10.1)
CHLORIDE SERPL-SCNC: 107 MMOL/L (ref 100–108)
CO2 SERPL-SCNC: 20 MMOL/L (ref 21–32)
CREAT SERPL-MCNC: 0.72 MG/DL (ref 0.6–1.3)
ERYTHROCYTE [DISTWIDTH] IN BLOOD BY AUTOMATED COUNT: 14.8 % (ref 11.6–15.1)
GFR SERPL CREATININE-BSD FRML MDRD: 99 ML/MIN/1.73SQ M
GLUCOSE SERPL-MCNC: 107 MG/DL (ref 65–140)
HCT VFR BLD AUTO: 43.3 % (ref 34.8–46.1)
HGB BLD-MCNC: 14 G/DL (ref 11.5–15.4)
MCH RBC QN AUTO: 27.5 PG (ref 26.8–34.3)
MCHC RBC AUTO-ENTMCNC: 32.3 G/DL (ref 31.4–37.4)
MCV RBC AUTO: 85 FL (ref 82–98)
PLATELET # BLD AUTO: 291 THOUSANDS/UL (ref 149–390)
PMV BLD AUTO: 11.2 FL (ref 8.9–12.7)
POTASSIUM SERPL-SCNC: 4.2 MMOL/L (ref 3.5–5.3)
RBC # BLD AUTO: 5.09 MILLION/UL (ref 3.81–5.12)
SODIUM SERPL-SCNC: 138 MMOL/L (ref 136–145)
WBC # BLD AUTO: 13.96 THOUSAND/UL (ref 4.31–10.16)

## 2019-02-19 PROCEDURE — 99024 POSTOP FOLLOW-UP VISIT: CPT | Performed by: SURGERY

## 2019-02-19 PROCEDURE — C9113 INJ PANTOPRAZOLE SODIUM, VIA: HCPCS | Performed by: SURGERY

## 2019-02-19 PROCEDURE — 85027 COMPLETE CBC AUTOMATED: CPT | Performed by: SURGERY

## 2019-02-19 PROCEDURE — 80048 BASIC METABOLIC PNL TOTAL CA: CPT | Performed by: SURGERY

## 2019-02-19 RX ORDER — MINERAL OIL 100 G/100G
1 OIL RECTAL ONCE
Status: COMPLETED | OUTPATIENT
Start: 2019-02-19 | End: 2019-02-19

## 2019-02-19 RX ORDER — SIMETHICONE 80 MG
80 TABLET,CHEWABLE ORAL EVERY 12 HOURS
Status: DISCONTINUED | OUTPATIENT
Start: 2019-02-19 | End: 2019-02-22 | Stop reason: HOSPADM

## 2019-02-19 RX ORDER — MAGNESIUM CARB/ALUMINUM HYDROX 105-160MG
296 TABLET,CHEWABLE ORAL ONCE
Status: COMPLETED | OUTPATIENT
Start: 2019-02-19 | End: 2019-02-19

## 2019-02-19 RX ORDER — SIMETHICONE 80 MG
80 TABLET,CHEWABLE ORAL EVERY 6 HOURS PRN
Status: DISCONTINUED | OUTPATIENT
Start: 2019-02-19 | End: 2019-02-19

## 2019-02-19 RX ADMIN — SODIUM CHLORIDE, SODIUM LACTATE, POTASSIUM CHLORIDE, AND CALCIUM CHLORIDE 125 ML/HR: .6; .31; .03; .02 INJECTION, SOLUTION INTRAVENOUS at 08:32

## 2019-02-19 RX ADMIN — SIMETHICONE CHEW TAB 80 MG 80 MG: 80 TABLET ORAL at 16:47

## 2019-02-19 RX ADMIN — LEVOFLOXACIN 500 MG: 5 INJECTION, SOLUTION INTRAVENOUS at 21:04

## 2019-02-19 RX ADMIN — HYDROMORPHONE HYDROCHLORIDE 0.5 MG: 1 INJECTION, SOLUTION INTRAMUSCULAR; INTRAVENOUS; SUBCUTANEOUS at 10:49

## 2019-02-19 RX ADMIN — METRONIDAZOLE 500 MG: 500 INJECTION, SOLUTION INTRAVENOUS at 13:31

## 2019-02-19 RX ADMIN — HYDROMORPHONE HYDROCHLORIDE 0.5 MG: 1 INJECTION, SOLUTION INTRAMUSCULAR; INTRAVENOUS; SUBCUTANEOUS at 06:52

## 2019-02-19 RX ADMIN — SODIUM CHLORIDE, SODIUM LACTATE, POTASSIUM CHLORIDE, AND CALCIUM CHLORIDE 125 ML/HR: .6; .31; .03; .02 INJECTION, SOLUTION INTRAVENOUS at 00:38

## 2019-02-19 RX ADMIN — PANTOPRAZOLE SODIUM 40 MG: 40 INJECTION, POWDER, FOR SOLUTION INTRAVENOUS at 21:03

## 2019-02-19 RX ADMIN — KETOROLAC TROMETHAMINE 30 MG: 30 INJECTION, SOLUTION INTRAMUSCULAR at 19:10

## 2019-02-19 RX ADMIN — SODIUM CHLORIDE, SODIUM LACTATE, POTASSIUM CHLORIDE, AND CALCIUM CHLORIDE 125 ML/HR: .6; .31; .03; .02 INJECTION, SOLUTION INTRAVENOUS at 18:24

## 2019-02-19 RX ADMIN — KETOROLAC TROMETHAMINE 30 MG: 30 INJECTION, SOLUTION INTRAMUSCULAR at 05:45

## 2019-02-19 RX ADMIN — MAGESIUM CITRATE 296 ML: 1.75 LIQUID ORAL at 10:48

## 2019-02-19 RX ADMIN — HEPARIN SODIUM 5000 UNITS: 5000 INJECTION, SOLUTION INTRAVENOUS; SUBCUTANEOUS at 00:21

## 2019-02-19 RX ADMIN — METRONIDAZOLE 500 MG: 500 INJECTION, SOLUTION INTRAVENOUS at 05:45

## 2019-02-19 RX ADMIN — HEPARIN SODIUM 5000 UNITS: 5000 INJECTION, SOLUTION INTRAVENOUS; SUBCUTANEOUS at 21:03

## 2019-02-19 RX ADMIN — PANTOPRAZOLE SODIUM 40 MG: 40 INJECTION, POWDER, FOR SOLUTION INTRAVENOUS at 08:30

## 2019-02-19 RX ADMIN — HYDROMORPHONE HYDROCHLORIDE 0.5 MG: 1 INJECTION, SOLUTION INTRAMUSCULAR; INTRAVENOUS; SUBCUTANEOUS at 22:10

## 2019-02-19 RX ADMIN — BISACODYL 10 MG: 5 TABLET, COATED ORAL at 08:30

## 2019-02-19 RX ADMIN — HEPARIN SODIUM 5000 UNITS: 5000 INJECTION, SOLUTION INTRAVENOUS; SUBCUTANEOUS at 13:30

## 2019-02-19 RX ADMIN — HEPARIN SODIUM 5000 UNITS: 5000 INJECTION, SOLUTION INTRAVENOUS; SUBCUTANEOUS at 05:45

## 2019-02-19 RX ADMIN — SIMETHICONE 80 MG: 80 TABLET, CHEWABLE ORAL at 04:06

## 2019-02-19 RX ADMIN — METRONIDAZOLE 500 MG: 500 INJECTION, SOLUTION INTRAVENOUS at 21:04

## 2019-02-19 RX ADMIN — KETOROLAC TROMETHAMINE 30 MG: 30 INJECTION, SOLUTION INTRAMUSCULAR at 00:21

## 2019-02-19 RX ADMIN — MINERAL OIL 1 ENEMA: 100 ENEMA RECTAL at 10:49

## 2019-02-19 RX ADMIN — KETOROLAC TROMETHAMINE 30 MG: 30 INJECTION, SOLUTION INTRAMUSCULAR at 13:22

## 2019-02-19 NOTE — UTILIZATION REVIEW
Notification of Inpatient Admission/Inpatient Authorization Request  This is a Notification of Inpatient Admission/Request for Inpatient Authorization for our facility [address]  Be advised that this patient was admitted to our facility under Inpatient Status  Please contact the Utilization Review Department where the patient is receiving care services for additional admission information  Place of Service Code: 24   Place of Service Name: Inpatient Hospital  Presentation Date & Time: 2/18/2019  8:43 PM  Inpatient Admission Date & Time: 2/18/19 2219  Discharge Date & Time: No discharge date for patient encounter  Discharge Disposition (if discharged): Home/Self Care  Attending Physician: Julian Mckeon Md [[id number]]  Admission Orders (From admission, onward)    Ordered        02/18/19 2219  Inpatient Admission  Once     Order ID Start Status   [id number] 02/18/19 2220 Completed                Facility: [facility]   Utilization Review Department  Phone: [phone]; Fax [phone]  [email or web address]  ATTENTION: Please call with any questions or concerns to [phone]  and carefully listen to the prompts so that you are directed to the right person  Send all requests for admission clinical reviews, approved or denied determinations and any other requests to fax [phone]   All voicemails are confidential

## 2019-02-19 NOTE — PLAN OF CARE
Problem: Potential for Falls  Goal: Patient will remain free of falls  Description  INTERVENTIONS:  - Assess patient frequently for physical needs  -  Identify cognitive and physical deficits and behaviors that affect risk of falls    -  [place] fall precautions as indicated by assessment   - Educate patient/family on patient safety including physical limitations  - Instruct patient to call for assistance with activity based on assessment  - Modify environment to reduce risk of injury  - Consider OT/PT consult to assist with strengthening/mobility  Outcome: Progressing     Problem: PAIN - ADULT  Goal: Verbalizes/displays adequate comfort level or baseline comfort level  Description  Interventions:  - Encourage patient to monitor pain and request assistance  - Assess pain using appropriate pain scale  - Administer analgesics based on type and severity of pain and evaluate response  - Implement non-pharmacological measures as appropriate and evaluate response  - Consider cultural and social influences on pain and pain management  - Notify physician/advanced practitioner if interventions unsuccessful or patient reports new pain  Outcome: Progressing     Problem: INFECTION - ADULT  Goal: Absence or prevention of progression during hospitalization  Description  INTERVENTIONS:  - Assess and monitor for signs and symptoms of infection  - Monitor lab/diagnostic results  - Monitor all insertion sites, i e  indwelling lines, tubes, and drains  - Monitor endotracheal (as able) and nasal secretions for changes in amount and color  - [place] appropriate cooling/warming therapies per order  - Administer medications as ordered  - Instruct and encourage patient and family to use good hand hygiene technique  - Identify and instruct in appropriate isolation precautions for identified infection/condition  Outcome: Progressing  Goal: Absence of fever/infection during neutropenic period  Description  INTERVENTIONS:  - Monitor WBC  - Implement neutropenic guidelines  Outcome: Progressing     Problem: SAFETY ADULT  Goal: Maintain or return to baseline ADL function  Description  INTERVENTIONS:  -  Assess patient's ability to carry out ADLs; assess patient's baseline for ADL function and identify physical deficits which impact ability to perform ADLs (bathing, care of mouth/teeth, toileting, grooming, dressing, etc )  - Assess/evaluate cause of self-care deficits   - Assess range of motion  - Assess patient's mobility; develop plan if impaired  - Assess patient's need for assistive devices and provide as appropriate  - Encourage maximum independence but intervene and supervise when necessary  ¯ Involve family in performance of ADLs  ¯ Assess for home care needs following discharge   ¯ Request OT consult to assist with ADL evaluation and planning for discharge  ¯ Provide patient education as appropriate  Outcome: Progressing  Goal: Maintain or return mobility status to optimal level  Description  INTERVENTIONS:  - Assess patient's baseline mobility status (ambulation, transfers, stairs, etc )    - Identify cognitive and physical deficits and behaviors that affect mobility  - Identify mobility aids required to assist with transfers and/or ambulation (gait belt, sit-to-stand, lift, walker, cane, etc )  - [place] fall precautions as indicated by assessment  - Record patient progress and toleration of activity level on Mobility SBAR; progress patient to next Phase/Stage  - Instruct patient to call for assistance with activity based on assessment  - Request Rehabilitation consult to assist with strengthening/weightbearing, etc   Outcome: Progressing     Problem: DISCHARGE PLANNING  Goal: Discharge to home or other facility with appropriate resources  Description  INTERVENTIONS:  - Identify barriers to discharge w/patient and caregiver  - Arrange for needed discharge resources and transportation as appropriate  - Identify discharge learning needs (meds, wound care, etc )  - Arrange for interpretive services to assist at discharge as needed  - Refer to Case Management Department for coordinating discharge planning if the patient needs post-hospital services based on physician/advanced practitioner order or complex needs related to functional status, cognitive ability, or social support system  Outcome: Progressing     Problem: Knowledge Deficit  Goal: Patient/family/caregiver demonstrates understanding of disease process, treatment plan, medications, and discharge instructions  Description  Complete learning assessment and assess knowledge base    Interventions:  - Provide teaching at level of understanding  - Provide teaching via preferred learning methods  Outcome: Progressing

## 2019-02-19 NOTE — PLAN OF CARE
Problem: Potential for Falls  Goal: Patient will remain free of falls  Description  INTERVENTIONS:  - Assess patient frequently for physical needs  -  Identify cognitive and physical deficits and behaviors that affect risk of falls    -  [place] fall precautions as indicated by assessment   - Educate patient/family on patient safety including physical limitations  - Instruct patient to call for assistance with activity based on assessment  - Modify environment to reduce risk of injury  - Consider OT/PT consult to assist with strengthening/mobility  Outcome: Progressing

## 2019-02-19 NOTE — PROGRESS NOTES
Progress Note - Bariatric Surgery   Barrett Goyal 51 y o  female MRN: [id number]  Unit/Bed#: -01 Encounter: [id number]      Subjective/Objective     Subjective:   52 yo F POD#7 s/p Ex  Lap, GAURANG, SBR, drainage and decompression of gastric remnant with sudden onset of abdominal pain yesterday afternoon  Feeling much better this morning; pain now very mild  Has been passing a lot of gas but no BM's despite magnesium citrate  Urinating without issues but feels abdominal fullness in lower pelvis when micturating  Denies fevers, chills, sweats, SOB, CP, calf pain, nausea or vomiting, burning with urination, increased frequency or urgency  Objective:    /72 (BP Location: Left arm)   Pulse 98   Temp 98 3 °F (36 8 °C) (Oral)   Resp 18   Ht 5' 9" (1 753 m)   Wt (!) 137 kg (301 lb 13 oz)   SpO2 99%   BMI 44 57 kg/m²       Intake/Output Summary (Last 24 hours) at 2/19/2019 0819  Last data filed at 2/19/2019 0214  Gross per 24 hour   Intake 2000 ml   Output 825 ml   Net 1175 ml       Invasive Devices     Peripheral Intravenous Line            Peripheral IV 02/19/19 Right Forearm less than 1 day                ROS: 10-point system completed  All negative except see HPI  Physical Exam    General Appearance:    Alert, cooperative, no distress, appears stated age   Head:    Normocephalic, without obvious abnormality, atraumatic   Lungs:     Clear to auscultation bilaterally, respirations unlabored   Heart:    Regular rate and rhythm, S1 and S2 normal, no murmur, rub    or gallop   Abdomen:     Soft, mildly diffuse tenderness, no guarding, bowel sounds active all four quadrants, mildly distended, incisions clean, dry, and intact   Extremities:   Extremities normal, atraumatic, no cyanosis or edema   Neurologic:   CNII-XII intact  Normal strength and sensation               Lab, Imaging and other studies:    Abdominal CT 02/18/19:   IMPRESSION:     There is a small to moderate amount of ascites/free fluid, as described above  Given the clinical and laboratory history provided, peritonitis should be excluded  Please see discussion      A few loops of small bowel in the left upper quadrant demonstrate mild bowel wall thickening  This could be due to adjacent ascites/free fluid versus enteritis  Clinical correlation and follow-up is suggested  There is no evidence of bowel   obstruction      There is an approximately 1 7 x 1 1 cm cystic area in the region of the pancreatic head  Follow-up MRI with MRCP in January 2020 is recommended  Please see discussion      There is a small left pleural effusion and there is trace pleural fluid on the right  There is mild bibasilar atelectasis at the lung bases  I have personally reviewed pertinent lab results  , CBC:   Lab Results   Component Value Date    WBC 13 96 (H) 02/19/2019    HGB 14 0 02/19/2019    HCT 43 3 02/19/2019    MCV 85 02/19/2019     02/19/2019    MCH 27 5 02/19/2019    MCHC 32 3 02/19/2019    RDW 14 8 02/19/2019    MPV 11 2 02/19/2019    NRBC 0 02/18/2019   , CMP:   Lab Results   Component Value Date    SODIUM 138 02/19/2019    K 4 2 02/19/2019     02/19/2019    CO2 20 (L) 02/19/2019    BUN 19 02/19/2019    CREATININE 0 72 02/19/2019    CALCIUM 8 4 02/19/2019    AST 27 02/18/2019    ALT 23 02/18/2019    ALKPHOS 75 02/18/2019    EGFR 99 02/19/2019        VTE Mechanical Prophylaxis: sequential compression device, heparin    Assessment/Plan  50 yo F POD#7 s/p Ex  Lap, GAURANG, SBR, drainage and decompression of gastric remnant with sudden onset of abdominal pain yesterday afternoon  Pain is now improved but she still feels bloated and constipated  Passing gas but no BM's yet despite magnesium citrate  Will check UA if this was not completed at [facility] despite patient denial of urinary urgency or burning with urination  Will focus on resolution of constipation - enema and dulcolax this morning and will continue to monitor      -NPO   -IVF   -IV abx  -IV pain control  -Zofran, protonix   -Mineral oil enema and 10mg Dulcolax PO     Case discussed with Dr Kenan Jc PA-C  2/19/2019  8:37 AM

## 2019-02-19 NOTE — PROGRESS NOTES
Spoke with radiologist  Ascites present  No free extravasation of contrast  No free air  Patient feels better  Will observe over night  Continue IV abx, IVF, PPI, pain control     Repeat labs am

## 2019-02-19 NOTE — H&P
H&P Exam - Bariatric Surgery   Caryle Gray 51 y o  female MRN: [id number]  Unit/Bed#: ED 07 Encounter: [id number]    Assessment/Plan     Assessment:  48/F POD 7 s/p exploratory laparoscopy, GAURANG, SBR, drainage/decompression/closure of gastric remnant presents with sudden onset diffuse abdominal pain     Plan:  NPO  IVF  IV abx   Pain control  CT abd/pel   Further plan to follow pending study results     History of Present Illness     HPI:  Caryle Gray is a 51 y o  female who presents with diffuse abdominal pain POD 7 s/p exploratory laparoscopy, GAURANG, SBR, drainage/decompression/closure of gastric remnant  She states she has been tolerating liquid and solid food post surgery  She states the only food she was not able to tolerate was corn  She has been ambulate and micturating without difficulty  Her daughter and  feel that she has been too active with [place] and picking up/carrying grandkids  She states she had sudden onset pain at 1530 today  Pain was in entire abdomen and radiated to her chest  She states the pain was worse than what she had prior to surgery  She states she feels a fullness in her abdomen and any movement causes sharp pain  She denies fevers   states she was sweating earlier in the day  She is passing gas and last BM was this morning  She is currently nauseated and states she has been dry heaving  Review of Systems is negative except as noted above       Historical Information   Past Medical History:   Diagnosis Date    Bell palsy     Gastric ulcer     GERD (gastroesophageal reflux disease)     History of transfusion     Melena     Pancreatic cyst     Right facial numbness     Upper GI bleed      Past Surgical History:   Procedure Laterality Date    ABDOMINOPLASTY      BARIATRIC SURGERY      BOWEL RESECTION LAPAROSCOPIC N/A 2/11/2019    Procedure: LAPAROSCOPIC LYSIS OF ADHESIONS; RESECTION SMALL BOWEL; DECOMPRESSION OF GASTRIC REMNANT; EGD;  Surgeon: Shilpa Tillman Mary Zambrano MD;  Location: [facility];  Service: General    CHOLECYSTECTOMY      HERNIA REPAIR      HYSTERECTOMY      KNEE CARTILAGE SURGERY      MS COLONOSCOPY FLX DX W/COLLJ SPEC WHEN PFRMD N/A 3/28/2018    Procedure: COLONOSCOPY;  Surgeon: Ava Santiago MD;  Location: [facility]; Service: Gastroenterology    MS COLONOSCOPY FLX DX W/COLLJ [facility] WHEN PFRMD N/A 1/31/2019    Procedure: COLONOSCOPY;  Surgeon: Ava Santiago MD;  Location: [facility]; Service: Gastroenterology    MS ESOPHAGOGASTRODUODENOSCOPY TRANSORAL DIAGNOSTIC N/A 3/22/2018    Procedure: ESOPHAGOGASTRODUODENOSCOPY (EGD); Surgeon: Ava Santiago MD;  [facility]; Service: Gastroenterology    MS ESOPHAGOGASTRODUODENOSCOPY TRANSORAL DIAGNOSTIC N/A 1/31/2019    Procedure: ESOPHAGOGASTRODUODENOSCOPY (EGD); Surgeon: Ava Santiago MD;  Location: [facility];   Service: Gastroenterology    REDUCTION MAMMAPLASTY Bilateral     SHOULDER ARTHROSCOPY DISTAL CLAVICLE EXCISION AND OPEN ROTATOR CUFF REPAIR       Social History   Social History     Substance and Sexual Activity   Alcohol Use Never    Frequency: Never    Binge frequency: Never     Social History     Substance and Sexual Activity   Drug Use No     Social History     Tobacco Use   Smoking Status Never Smoker   Smokeless Tobacco Never Used     Family History: non-contributory    Meds/Allergies   all medications and allergies reviewed  Allergies   Allergen Reactions    Other Headache     MSG    Penicillins Rash       Objective   First Vitals:   Blood Pressure: 163/90 (02/18/19 2046)  Pulse: 98 (02/18/19 2046)  Temperature: 97 7 °F (36 5 °C) (02/18/19 2046)  Temp Source: Oral (02/18/19 2046)  Respirations: 18 (02/18/19 2046)  Height: 5' 9" (175 3 cm) (02/18/19 2046)  Weight - Scale: (!) 137 kg (301 lb 13 oz) (02/18/19 2046)  SpO2: 99 % (02/18/19 2046)    Current Vitals:   Blood Pressure: 163/90 (02/18/19 2046)  Pulse: 98 (02/18/19 2046)  Temperature: 97 7 °F (36 5 °C) (02/18/19 2046)  Temp Source: Oral (02/18/19 2046)  Respirations: 18 (02/18/19 2046)  Height: 5' 9" (175 3 cm) (02/18/19 2046)  Weight - Scale: (!) 137 kg (301 lb 13 oz) (02/18/19 2046)  SpO2: 99 % (02/18/19 2046)    No intake or output data in the 24 hours ending 02/18/19 2150    Physical Exam   Constitutional: She is oriented to person, place, and time  She appears well-nourished  Eyes: EOM are normal    Neck: Neck supple  Cardiovascular: Regular rhythm  Sinus tachy     Pulmonary/Chest: Effort normal and breath sounds normal    Abdominal: Soft  She exhibits no distension  There is tenderness (diffusely tender; wounds C/D/I)  There is guarding  There is no rebound  Musculoskeletal: Normal range of motion  Neurological: She is alert and oriented to person, place, and time  Skin: Skin is warm and dry  Psychiatric: She has a normal mood and affect  Her behavior is normal    Vitals reviewed  Lab Results:   CBC:   Lab Results   Component Value Date    WBC 24 98 (H) 02/18/2019    HGB 15 8 (H) 02/18/2019    HCT 49 0 (H) 02/18/2019    MCV 86 02/18/2019     02/18/2019    MCH 27 7 02/18/2019    MCHC 32 2 02/18/2019    RDW 14 7 02/18/2019    MPV 11 3 02/18/2019    NRBC 0 02/18/2019   , CMP:   Lab Results   Component Value Date    SODIUM 139 02/18/2019    K 4 4 02/18/2019     02/18/2019    CO2 22 02/18/2019    BUN 25 02/18/2019    CREATININE 0 83 02/18/2019    CALCIUM 8 9 02/18/2019    AST 27 02/18/2019    ALT 23 02/18/2019    ALKPHOS 75 02/18/2019    EGFR 83 02/18/2019     Imaging: OSH CT scan: acute inflammatory changes in the way of edema of fat thoughout the gastric bypass surgical site with associated wall thickening of the isolated stomach  Wall thickening is contiguous with proximal to mid small bowel wall thickening also associated with fluid and edema indicative of enteritis   Moderate to severe wall thickening of the left colon which contains a large amount of contrast from previous study  Mild colitis  Proteinaceous fluid in the pelvis/paracolic gutters  No free air  No abscess  EKG, Pathology, and Other Studies: I have personally reviewed pertinent reports  Code Status: No Order      Counseling / Coordination of Care  Total floor / unit time spent today 45 minutes  Greater than 50% of total time was spent with the patient and / or family counseling and / or coordination of care

## 2019-02-19 NOTE — UTILIZATION REVIEW
Initial Clinical Review    Admission: Date/Time/Statement: 2/18/19 @ 2219   Orders Placed This Encounter   Procedures    Inpatient Admission     Standing Status:   Standing     Number of Occurrences:   1     Order Specific Question:   Admitting Physician     Answer:   Henri Wolff     Order Specific Question:   Level of Care     Answer:   Med Surg [16]     Order Specific Question:   Estimated length of stay     Answer:   Inpatient Only Surgery     ED: Date/Time/Mode of Arrival:   ED Arrival Information     Expected Arrival Acuity Means of Arrival Escorted By Service Admission Type    2/18/2019 2/18/2019 20:43 Urgent Ambulance Family Member Bariatrics Urgent    Arrival Complaint    abdominal pain        Chief Complaint:   Chief Complaint   Patient presents with    Abdominal Pain     pt arrived by ems from [facility] er for abd pain, had gastric resection done on 2/11/2019 @[facility] and has had generalized abd pain in all quads that radiates to the chest and left shoulder       History of Illness: 50 yo female to ED w/ diffuse abd pain POD 7 s/p exp laparoscopy , GAURANG , SBR , drainage /decompression / closure of gastric remnant   + nausea and dry heaving     PE : abd tenderness , guarding , wounds CDI     ED Vital Signs:   ED Triage Vitals   Temperature Pulse Respirations Blood Pressure SpO2   02/18/19 2046 02/18/19 2046 02/18/19 2046 02/18/19 2046 02/18/19 2046   97 7 °F (36 5 °C) 98 18 163/90 99 %      Temp Source Heart Rate Source Patient Position - Orthostatic VS BP Location FiO2 (%)   02/18/19 2046 02/18/19 2046 02/18/19 2320 02/18/19 2046 --   Oral Monitor Lying Right arm       Pain Score       02/18/19 2046       Worst Possible Pain        Wt Readings from Last 1 Encounters:   02/18/19 (!) 137 kg (301 lb 13 oz)     Vital Signs (abnormal):  wnl   Pertinent Labs/Diagnostic Test Results:  Wbc  24 98  H&H   15 8  49 0   Alb 3 3  CT abd- There is a small to moderate amount of ascites/free fluid, as described above  Loanne Counts the clinical and laboratory history provided, peritonitis should be excluded   A few loops of small bowel in the left upper quadrant demonstrate mild bowel wall thickening   This could be due to adjacent ascites/free fluid versus enteritis   Clinical correlation and follow-up is suggested  Barbara Brock is no evidence of bowel   obstruction  There is an approximately 1 7 x 1 1 cm cystic area in the region of the pancreatic head   Follow-up MRI with MRCP in January 2020 is recommended   Please see discussion  There is a small left pleural effusion and there is trace pleural fluid on the right   There is mild bibasilar atelectasis at the lung bases    ED Treatment:   Medication Administration from 02/18/2019 2023 to 02/18/2019 2320       Date/Time Order Dose Route Action Action by Comments     02/18/2019 2143 levofloxacin (LEVAQUIN) IVPB (premix) 500 mg 500 mg Intravenous New Bag Jude Kumari RN      02/18/2019 2134 metroNIDAZOLE (FLAGYL) IVPB (premix) 500 mg 0 mg Intravenous Stopped Jude Kumari RN      02/18/2019 2104 metroNIDAZOLE (FLAGYL) IVPB (premix) 500 mg 500 mg Intravenous New Bag Jude Kumari, [facility]      02/18/2019 2100 sodium chloride 0 9 % bolus 2,000 mL 2,000 mL Intravenous New Bag Jude Kumari, [facility]      02/18/2019 2129 ketorolac (TORADOL) injection 15 mg 15 mg Intravenous Given Jude Kumari RN      02/18/2019 2128 HYDROmorphone (DILAUDID) injection 1 mg 1 mg Intravenous Given Jude Kumari RN      02/18/2019 2147 ondansetron (ZOFRAN) injection 4 mg 4 mg Intravenous Given Jude Kumari RN      02/18/2019 2208 pantoprazole (PROTONIX) injection 40 mg 40 mg Intravenous Given Jude Kumari RN      02/18/2019 2255 magnesium citrate (CITROMA) oral solution 296 mL 296 mL Oral Given Jude Kumari RN      02/18/2019 2234 iodixanol (VISIPAQUE) 320 MG/ML injection 100 mL 100 mL Intravenous Given Marella Handsome      02/18/2019 2234 iohexol (OMNIPAQUE) 240 MG/ML solution 50 mL 50 mL Oral Given Jovita Durham         Past Medical/Surgical History:    Active Ambulatory Problems     Diagnosis Date Noted    Upper GI bleed 03/20/2018    Gastrointestinal hemorrhage associated with gastric ulcer 03/23/2018    History of gastric ulcer 01/24/2019    Epigastric pain 01/24/2019    Pancreatic cyst 01/24/2019    Abnormal CT scan, colon 01/24/2019    Bilious vomiting with nausea 01/24/2019    Gastrointestinal hemorrhage 01/24/2019    Abnormal CT of the abdomen 01/24/2019    Abdominal pain, LUQ 02/01/2019    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    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    Acute gastrointestinal hemorrhage 02/06/2019    Acute primary diaphragmitis 02/06/2019    Cyst of pancreas 02/06/2019     Past Medical History:   Diagnosis Date    Bell palsy     Gastric ulcer     GERD (gastroesophageal reflux disease)     History of transfusion     Melena     Pancreatic cyst     Right facial numbness     Upper GI bleed      Admitting Diagnosis: Abdominal pain [R10 9]  Age/Sex: 51 y o  female  Assessment/Plan: Assessment:  48/F POD 7 s/p exploratory laparoscopy, GAURANG, SBR, drainage/decompression/closure of gastric remnant presents with sudden onset diffuse abdominal pain    Plan:  NPO  IVF  IV abx   Pain control  CT abd/pel   Further plan to follow pending study results     Admission Orders:  Scheduled Meds:   Current Facility-Administered Medications:  heparin (porcine) 5,000 Units Subcutaneous Q8H [address] I    HYDROmorphone 0 5 mg Intravenous Q4H PRN x2    HYDROmorphone 1 mg Intravenous Q4H PRN     ketorolac 30 mg Intravenous Q6H [address]     lactated ringers 125 mL/hr Intravenous Continuous  Last Rate: 125 mL/hr (02/19/19 0832)   levofloxacin 500 mg Intravenous Q24H  Last Rate: 500 mg (02/18/19 0543)   metroNIDAZOLE 500 mg Intravenous Q8H  Last Rate: 500 mg (02/19/19 3600)   ondansetron 4 mg Intravenous Q4H PRN     pantoprazole 40 mg Intravenous Q12H EDA     simethicone 80 mg Oral Q6H PRN       NPO   SCD  amb pt   SCD  2/19 cbc , bmp   co2  20  Wbc  13 96

## 2019-02-20 LAB
ANION GAP SERPL CALCULATED.3IONS-SCNC: 7 MMOL/L (ref 4–13)
ATRIAL RATE: 98 BPM
BACTERIA UR QL AUTO: ABNORMAL /HPF
BILIRUB UR QL STRIP: NEGATIVE
BUN SERPL-MCNC: 19 MG/DL (ref 5–25)
CALCIUM SERPL-MCNC: 8.6 MG/DL (ref 8.3–10.1)
CHLORIDE SERPL-SCNC: 105 MMOL/L (ref 100–108)
CLARITY UR: CLEAR
CO2 SERPL-SCNC: 26 MMOL/L (ref 21–32)
COLOR UR: YELLOW
CREAT SERPL-MCNC: 0.82 MG/DL (ref 0.6–1.3)
ERYTHROCYTE [DISTWIDTH] IN BLOOD BY AUTOMATED COUNT: 15 % (ref 11.6–15.1)
GFR SERPL CREATININE-BSD FRML MDRD: 84 ML/MIN/1.73SQ M
GLUCOSE SERPL-MCNC: 103 MG/DL (ref 65–140)
GLUCOSE UR STRIP-MCNC: NEGATIVE MG/DL
HCT VFR BLD AUTO: 37.9 % (ref 34.8–46.1)
HGB BLD-MCNC: 12.5 G/DL (ref 11.5–15.4)
HGB UR QL STRIP.AUTO: ABNORMAL
KETONES UR STRIP-MCNC: ABNORMAL MG/DL
LEUKOCYTE ESTERASE UR QL STRIP: NEGATIVE
MAGNESIUM SERPL-MCNC: 2.3 MG/DL (ref 1.6–2.6)
MCH RBC QN AUTO: 28.2 PG (ref 26.8–34.3)
MCHC RBC AUTO-ENTMCNC: 33 G/DL (ref 31.4–37.4)
MCV RBC AUTO: 85 FL (ref 82–98)
MUCOUS THREADS UR QL AUTO: ABNORMAL
NITRITE UR QL STRIP: NEGATIVE
NON-SQ EPI CELLS URNS QL MICRO: ABNORMAL /HPF
P AXIS: 49 DEGREES
PH UR STRIP.AUTO: 5.5 [PH] (ref 4.5–8)
PLATELET # BLD AUTO: 246 THOUSANDS/UL (ref 149–390)
PMV BLD AUTO: 11.1 FL (ref 8.9–12.7)
POTASSIUM SERPL-SCNC: 4.1 MMOL/L (ref 3.5–5.3)
PR INTERVAL: 144 MS
PROT UR STRIP-MCNC: NEGATIVE MG/DL
QRS AXIS: 39 DEGREES
QRSD INTERVAL: 80 MS
QT INTERVAL: 328 MS
QTC INTERVAL: 418 MS
RBC # BLD AUTO: 4.44 MILLION/UL (ref 3.81–5.12)
RBC #/AREA URNS AUTO: ABNORMAL /HPF
SODIUM SERPL-SCNC: 138 MMOL/L (ref 136–145)
SP GR UR STRIP.AUTO: 1.02 (ref 1–1.03)
T WAVE AXIS: 27 DEGREES
UROBILINOGEN UR QL STRIP.AUTO: 0.2 E.U./DL
VENTRICULAR RATE: 98 BPM
WBC # BLD AUTO: 13.09 THOUSAND/UL (ref 4.31–10.16)
WBC #/AREA URNS AUTO: ABNORMAL /HPF

## 2019-02-20 PROCEDURE — 80048 BASIC METABOLIC PNL TOTAL CA: CPT | Performed by: SURGERY

## 2019-02-20 PROCEDURE — 81001 URINALYSIS AUTO W/SCOPE: CPT | Performed by: SURGERY

## 2019-02-20 PROCEDURE — 85027 COMPLETE CBC AUTOMATED: CPT | Performed by: SURGERY

## 2019-02-20 PROCEDURE — 83735 ASSAY OF MAGNESIUM: CPT | Performed by: SURGERY

## 2019-02-20 PROCEDURE — C9113 INJ PANTOPRAZOLE SODIUM, VIA: HCPCS | Performed by: SURGERY

## 2019-02-20 PROCEDURE — 93010 ELECTROCARDIOGRAM REPORT: CPT | Performed by: INTERNAL MEDICINE

## 2019-02-20 PROCEDURE — 99024 POSTOP FOLLOW-UP VISIT: CPT | Performed by: SURGERY

## 2019-02-20 RX ORDER — ACETAMINOPHEN 325 MG/1
975 TABLET ORAL EVERY 6 HOURS SCHEDULED
Status: DISCONTINUED | OUTPATIENT
Start: 2019-02-20 | End: 2019-02-22 | Stop reason: HOSPADM

## 2019-02-20 RX ADMIN — HYDROMORPHONE HYDROCHLORIDE 0.5 MG: 1 INJECTION, SOLUTION INTRAMUSCULAR; INTRAVENOUS; SUBCUTANEOUS at 12:24

## 2019-02-20 RX ADMIN — SIMETHICONE CHEW TAB 80 MG 80 MG: 80 TABLET ORAL at 17:56

## 2019-02-20 RX ADMIN — SIMETHICONE CHEW TAB 80 MG 80 MG: 80 TABLET ORAL at 04:42

## 2019-02-20 RX ADMIN — METRONIDAZOLE 500 MG: 500 INJECTION, SOLUTION INTRAVENOUS at 12:30

## 2019-02-20 RX ADMIN — METRONIDAZOLE 500 MG: 500 INJECTION, SOLUTION INTRAVENOUS at 05:01

## 2019-02-20 RX ADMIN — SODIUM CHLORIDE, SODIUM LACTATE, POTASSIUM CHLORIDE, AND CALCIUM CHLORIDE 125 ML/HR: .6; .31; .03; .02 INJECTION, SOLUTION INTRAVENOUS at 04:47

## 2019-02-20 RX ADMIN — ACETAMINOPHEN 975 MG: 325 TABLET, FILM COATED ORAL at 12:29

## 2019-02-20 RX ADMIN — KETOROLAC TROMETHAMINE 30 MG: 30 INJECTION, SOLUTION INTRAMUSCULAR at 00:11

## 2019-02-20 RX ADMIN — HYDROMORPHONE HYDROCHLORIDE 0.5 MG: 1 INJECTION, SOLUTION INTRAMUSCULAR; INTRAVENOUS; SUBCUTANEOUS at 19:35

## 2019-02-20 RX ADMIN — LEVOFLOXACIN 500 MG: 5 INJECTION, SOLUTION INTRAVENOUS at 22:05

## 2019-02-20 RX ADMIN — HEPARIN SODIUM 5000 UNITS: 5000 INJECTION, SOLUTION INTRAVENOUS; SUBCUTANEOUS at 22:04

## 2019-02-20 RX ADMIN — HEPARIN SODIUM 5000 UNITS: 5000 INJECTION, SOLUTION INTRAVENOUS; SUBCUTANEOUS at 06:08

## 2019-02-20 RX ADMIN — SODIUM CHLORIDE, SODIUM LACTATE, POTASSIUM CHLORIDE, AND CALCIUM CHLORIDE 125 ML/HR: .6; .31; .03; .02 INJECTION, SOLUTION INTRAVENOUS at 14:55

## 2019-02-20 RX ADMIN — HEPARIN SODIUM 5000 UNITS: 5000 INJECTION, SOLUTION INTRAVENOUS; SUBCUTANEOUS at 14:45

## 2019-02-20 RX ADMIN — PANTOPRAZOLE SODIUM 40 MG: 40 INJECTION, POWDER, FOR SOLUTION INTRAVENOUS at 21:00

## 2019-02-20 RX ADMIN — ACETAMINOPHEN 975 MG: 325 TABLET, FILM COATED ORAL at 17:56

## 2019-02-20 RX ADMIN — KETOROLAC TROMETHAMINE 30 MG: 30 INJECTION, SOLUTION INTRAMUSCULAR at 06:08

## 2019-02-20 RX ADMIN — PANTOPRAZOLE SODIUM 40 MG: 40 INJECTION, POWDER, FOR SOLUTION INTRAVENOUS at 08:52

## 2019-02-20 RX ADMIN — HYDROMORPHONE HYDROCHLORIDE 0.5 MG: 1 INJECTION, SOLUTION INTRAMUSCULAR; INTRAVENOUS; SUBCUTANEOUS at 04:56

## 2019-02-20 RX ADMIN — METRONIDAZOLE 500 MG: 500 INJECTION, SOLUTION INTRAVENOUS at 21:00

## 2019-02-20 NOTE — PROGRESS NOTES
Progress Note - Bariatric Surgery   Perla Vicente 51 y o  female MRN: [id number]  Unit/Bed#: -01 Encounter: [id number]    Assessment:  48/F POD 9 s/p exploratory laparoscopy, GAURANG, SBR, decompression of gastric remnant  Now with abd pain  CT scan with inflammation in proximal small bowel and colon  Pain has improved with supportive measures/IV abx  Complaining now of dark urine and epigastric pressure/gas  Plan:  Clear liquids/protein supplement   UA/CNS/micro  Cont IVF  Cont IV abx   Pain control   OOB/ambulate   DVT ppx     Subjective/Objective     Subjective: Feels better but complains of epigastric fullness/gas and dark urine     Objective:     Blood pressure 137/73, pulse 83, temperature 97 6 °F (36 4 °C), temperature source Oral, resp  rate 16, height 5' 9" (1 753 m), weight (!) 137 kg (301 lb 13 oz), SpO2 96 %, not currently breastfeeding  ,Body mass index is 44 57 kg/m²        Intake/Output Summary (Last 24 hours) at 2/20/2019 0833  Last data filed at 2/20/2019 0453  Gross per 24 hour   Intake 950 ml   Output 700 ml   Net 250 ml       Invasive Devices     Peripheral Intravenous Line            Peripheral IV 02/19/19 Right Forearm 1 day                Physical Exam:     No distress   RRR  Breathing non labored   Mild diffuse tenderness abd; incisions c/d/i without erythema     Lab, Imaging and other studies:  CBC:   Lab Results   Component Value Date    WBC 13 09 (H) 02/20/2019    HGB 12 5 02/20/2019    HCT 37 9 02/20/2019    MCV 85 02/20/2019     02/20/2019    MCH 28 2 02/20/2019    MCHC 33 0 02/20/2019    RDW 15 0 02/20/2019    MPV 11 1 02/20/2019   , CMP:   Lab Results   Component Value Date    SODIUM 138 02/20/2019    K 4 1 02/20/2019     02/20/2019    CO2 26 02/20/2019    BUN 19 02/20/2019    CREATININE 0 82 02/20/2019    CALCIUM 8 6 02/20/2019    EGFR 84 02/20/2019     VTE Pharmacologic Prophylaxis: Heparin  VTE Mechanical Prophylaxis: sequential compression device

## 2019-02-20 NOTE — PLAN OF CARE
Problem: Potential for Falls  Goal: Patient will remain free of falls  Description  INTERVENTIONS:  - Assess patient frequently for physical needs  -  Identify cognitive and physical deficits and behaviors that affect risk of falls    -  [place] fall precautions as indicated by assessment   - Educate patient/family on patient safety including physical limitations  - Instruct patient to call for assistance with activity based on assessment  - Modify environment to reduce risk of injury  - Consider OT/PT consult to assist with strengthening/mobility  Outcome: Progressing

## 2019-02-20 NOTE — PLAN OF CARE
Problem: Potential for Falls  Goal: Patient will remain free of falls  Description  INTERVENTIONS:  - Assess patient frequently for physical needs  -  Identify cognitive and physical deficits and behaviors that affect risk of falls    -  [place] fall precautions as indicated by assessment   - Educate patient/family on patient safety including physical limitations  - Instruct patient to call for assistance with activity based on assessment  - Modify environment to reduce risk of injury  - Consider OT/PT consult to assist with strengthening/mobility  Outcome: Progressing     Problem: PAIN - ADULT  Goal: Verbalizes/displays adequate comfort level or baseline comfort level  Description  Interventions:  - Encourage patient to monitor pain and request assistance  - Assess pain using appropriate pain scale  - Administer analgesics based on type and severity of pain and evaluate response  - Implement non-pharmacological measures as appropriate and evaluate response  - Consider cultural and social influences on pain and pain management  - Notify physician/advanced practitioner if interventions unsuccessful or patient reports new pain  Outcome: Progressing     Problem: INFECTION - ADULT  Goal: Absence or prevention of progression during hospitalization  Description  INTERVENTIONS:  - Assess and monitor for signs and symptoms of infection  - Monitor lab/diagnostic results  - Monitor all insertion sites, i e  indwelling lines, tubes, and drains  - Monitor endotracheal (as able) and nasal secretions for changes in amount and color  - [place] appropriate cooling/warming therapies per order  - Administer medications as ordered  - Instruct and encourage patient and family to use good hand hygiene technique  - Identify and instruct in appropriate isolation precautions for identified infection/condition  Outcome: Progressing  Goal: Absence of fever/infection during neutropenic period  Description  INTERVENTIONS:  - Monitor WBC  - Implement neutropenic guidelines  Outcome: Progressing     Problem: SAFETY ADULT  Goal: Maintain or return to baseline ADL function  Description  INTERVENTIONS:  -  Assess patient's ability to carry out ADLs; assess patient's baseline for ADL function and identify physical deficits which impact ability to perform ADLs (bathing, care of mouth/teeth, toileting, grooming, dressing, etc )  - Assess/evaluate cause of self-care deficits   - Assess range of motion  - Assess patient's mobility; develop plan if impaired  - Assess patient's need for assistive devices and provide as appropriate  - Encourage maximum independence but intervene and supervise when necessary  ¯ Involve family in performance of ADLs  ¯ Assess for home care needs following discharge   ¯ Request OT consult to assist with ADL evaluation and planning for discharge  ¯ Provide patient education as appropriate  Outcome: Progressing  Goal: Maintain or return mobility status to optimal level  Description  INTERVENTIONS:  - Assess patient's baseline mobility status (ambulation, transfers, stairs, etc )    - Identify cognitive and physical deficits and behaviors that affect mobility  - Identify mobility aids required to assist with transfers and/or ambulation (gait belt, sit-to-stand, lift, walker, cane, etc )  - [place] fall precautions as indicated by assessment  - Record patient progress and toleration of activity level on Mobility SBAR; progress patient to next Phase/Stage  - Instruct patient to call for assistance with activity based on assessment  - Request Rehabilitation consult to assist with strengthening/weightbearing, etc   Outcome: Progressing     Problem: DISCHARGE PLANNING  Goal: Discharge to home or other facility with appropriate resources  Description  INTERVENTIONS:  - Identify barriers to discharge w/patient and caregiver  - Arrange for needed discharge resources and transportation as appropriate  - Identify discharge learning needs (meds, wound care, etc )  - Arrange for interpretive services to assist at discharge as needed  - Refer to Case Management Department for coordinating discharge planning if the patient needs post-hospital services based on physician/advanced practitioner order or complex needs related to functional status, cognitive ability, or social support system  Outcome: Progressing     Problem: Knowledge Deficit  Goal: Patient/family/caregiver demonstrates understanding of disease process, treatment plan, medications, and discharge instructions  Description  Complete learning assessment and assess knowledge base  Interventions:  - Provide teaching at level of understanding  - Provide teaching via preferred learning methods  Outcome: Progressing     Problem: Nutrition/Hydration-ADULT  Goal: Nutrient/Hydration intake appropriate for improving, restoring or maintaining nutritional needs  Description  Monitor and assess patient's nutrition/hydration status for malnutrition (ex- brittle hair, bruises, dry skin, pale skin and conjunctiva, muscle wasting, smooth red tongue, and disorientation)  Collaborate with interdisciplinary team and initiate plan and interventions as ordered  Monitor patient's weight and dietary intake as ordered or per policy  Utilize nutrition screening tool and intervene per policy  Determine patient's food preferences and provide high-protein, foods as appropriate       INTERVENTIONS:  - Monitor oral intake, urinary output, labs, and treatment plans  - Assess nutrition and hydration status and recommend course of action  - Evaluate amount of meals eaten  - Assist patient with eating if necessary   - Allow adequate time for meals  - Recommend/ encourage appropriate diets, oral nutritional supplements, and vitamin/mineral supplements  - Order, calculate, and assess calorie counts as needed  - Recommend, monitor, and adjust tube feedings and TPN/PPN based on assessed needs  - Assess need for intravenous fluids  - Provide nutrition/hydration education as appropriate  - Include patient/family/caregiver in decisions related to nutrition   Outcome: Progressing

## 2019-02-21 LAB
ANION GAP SERPL CALCULATED.3IONS-SCNC: 7 MMOL/L (ref 4–13)
BUN SERPL-MCNC: 10 MG/DL (ref 5–25)
CALCIUM SERPL-MCNC: 9.3 MG/DL (ref 8.3–10.1)
CHLORIDE SERPL-SCNC: 106 MMOL/L (ref 100–108)
CO2 SERPL-SCNC: 28 MMOL/L (ref 21–32)
CREAT SERPL-MCNC: 0.77 MG/DL (ref 0.6–1.3)
ERYTHROCYTE [DISTWIDTH] IN BLOOD BY AUTOMATED COUNT: 14.7 % (ref 11.6–15.1)
GFR SERPL CREATININE-BSD FRML MDRD: 91 ML/MIN/1.73SQ M
GLUCOSE SERPL-MCNC: 112 MG/DL (ref 65–140)
HCT VFR BLD AUTO: 37.3 % (ref 34.8–46.1)
HGB BLD-MCNC: 11.9 G/DL (ref 11.5–15.4)
MCH RBC QN AUTO: 27.3 PG (ref 26.8–34.3)
MCHC RBC AUTO-ENTMCNC: 31.9 G/DL (ref 31.4–37.4)
MCV RBC AUTO: 86 FL (ref 82–98)
PLATELET # BLD AUTO: 267 THOUSANDS/UL (ref 149–390)
PMV BLD AUTO: 11.1 FL (ref 8.9–12.7)
POTASSIUM SERPL-SCNC: 4.6 MMOL/L (ref 3.5–5.3)
RBC # BLD AUTO: 4.36 MILLION/UL (ref 3.81–5.12)
SODIUM SERPL-SCNC: 141 MMOL/L (ref 136–145)
WBC # BLD AUTO: 11.6 THOUSAND/UL (ref 4.31–10.16)

## 2019-02-21 PROCEDURE — C9113 INJ PANTOPRAZOLE SODIUM, VIA: HCPCS | Performed by: SURGERY

## 2019-02-21 PROCEDURE — 85027 COMPLETE CBC AUTOMATED: CPT | Performed by: SURGERY

## 2019-02-21 PROCEDURE — 99024 POSTOP FOLLOW-UP VISIT: CPT | Performed by: SURGERY

## 2019-02-21 PROCEDURE — 80048 BASIC METABOLIC PNL TOTAL CA: CPT | Performed by: SURGERY

## 2019-02-21 RX ORDER — OXYCODONE HYDROCHLORIDE 10 MG/1
10 TABLET ORAL EVERY 4 HOURS PRN
Status: DISCONTINUED | OUTPATIENT
Start: 2019-02-21 | End: 2019-02-22 | Stop reason: HOSPADM

## 2019-02-21 RX ORDER — OXYCODONE HYDROCHLORIDE 5 MG/1
5 TABLET ORAL EVERY 4 HOURS PRN
Status: DISCONTINUED | OUTPATIENT
Start: 2019-02-21 | End: 2019-02-22 | Stop reason: HOSPADM

## 2019-02-21 RX ADMIN — HEPARIN SODIUM 5000 UNITS: 5000 INJECTION, SOLUTION INTRAVENOUS; SUBCUTANEOUS at 05:05

## 2019-02-21 RX ADMIN — ACETAMINOPHEN 975 MG: 325 TABLET, FILM COATED ORAL at 05:05

## 2019-02-21 RX ADMIN — SODIUM CHLORIDE, SODIUM LACTATE, POTASSIUM CHLORIDE, AND CALCIUM CHLORIDE 125 ML/HR: .6; .31; .03; .02 INJECTION, SOLUTION INTRAVENOUS at 00:50

## 2019-02-21 RX ADMIN — HYOSCYAMINE SULFATE 0.12 MG: 0.12 TABLET, ORALLY DISINTEGRATING ORAL at 17:31

## 2019-02-21 RX ADMIN — PANTOPRAZOLE SODIUM 40 MG: 40 INJECTION, POWDER, FOR SOLUTION INTRAVENOUS at 21:25

## 2019-02-21 RX ADMIN — HYOSCYAMINE SULFATE 0.12 MG: 0.12 TABLET, ORALLY DISINTEGRATING ORAL at 09:11

## 2019-02-21 RX ADMIN — SIMETHICONE CHEW TAB 80 MG 80 MG: 80 TABLET ORAL at 17:31

## 2019-02-21 RX ADMIN — OXYCODONE HYDROCHLORIDE 10 MG: 10 TABLET ORAL at 21:25

## 2019-02-21 RX ADMIN — HYDROMORPHONE HYDROCHLORIDE 0.5 MG: 1 INJECTION, SOLUTION INTRAMUSCULAR; INTRAVENOUS; SUBCUTANEOUS at 01:22

## 2019-02-21 RX ADMIN — HEPARIN SODIUM 5000 UNITS: 5000 INJECTION, SOLUTION INTRAVENOUS; SUBCUTANEOUS at 13:53

## 2019-02-21 RX ADMIN — OXYCODONE HYDROCHLORIDE 5 MG: 5 TABLET ORAL at 19:26

## 2019-02-21 RX ADMIN — OXYCODONE HYDROCHLORIDE 5 MG: 5 TABLET ORAL at 10:29

## 2019-02-21 RX ADMIN — HYOSCYAMINE SULFATE 0.12 MG: 0.12 TABLET, ORALLY DISINTEGRATING ORAL at 13:53

## 2019-02-21 RX ADMIN — PANTOPRAZOLE SODIUM 40 MG: 40 INJECTION, POWDER, FOR SOLUTION INTRAVENOUS at 09:10

## 2019-02-21 RX ADMIN — HYOSCYAMINE SULFATE 0.12 MG: 0.12 TABLET, ORALLY DISINTEGRATING ORAL at 21:25

## 2019-02-21 RX ADMIN — ACETAMINOPHEN 975 MG: 325 TABLET, FILM COATED ORAL at 00:23

## 2019-02-21 RX ADMIN — HEPARIN SODIUM 5000 UNITS: 5000 INJECTION, SOLUTION INTRAVENOUS; SUBCUTANEOUS at 21:25

## 2019-02-21 RX ADMIN — METRONIDAZOLE 500 MG: 500 INJECTION, SOLUTION INTRAVENOUS at 04:55

## 2019-02-21 RX ADMIN — SODIUM CHLORIDE, SODIUM LACTATE, POTASSIUM CHLORIDE, AND CALCIUM CHLORIDE 50 ML/HR: .6; .31; .03; .02 INJECTION, SOLUTION INTRAVENOUS at 10:09

## 2019-02-21 RX ADMIN — SIMETHICONE CHEW TAB 80 MG 80 MG: 80 TABLET ORAL at 04:31

## 2019-02-21 RX ADMIN — ACETAMINOPHEN 975 MG: 325 TABLET, FILM COATED ORAL at 13:53

## 2019-02-21 RX ADMIN — ACETAMINOPHEN 975 MG: 325 TABLET, FILM COATED ORAL at 19:26

## 2019-02-21 NOTE — PLAN OF CARE
Problem: Potential for Falls  Goal: Patient will remain free of falls  Description  INTERVENTIONS:  - Assess patient frequently for physical needs  -  Identify cognitive and physical deficits and behaviors that affect risk of falls    -  [place] fall precautions as indicated by assessment   - Educate patient/family on patient safety including physical limitations  - Instruct patient to call for assistance with activity based on assessment  - Modify environment to reduce risk of injury  - Consider OT/PT consult to assist with strengthening/mobility  Outcome: Progressing

## 2019-02-21 NOTE — PROGRESS NOTES
Progress Note - Bariatric Surgery   Efren Pickens 51 y o  female MRN: [id number]  Unit/Bed#: -01 Encounter: [id number]    Assessment:  48/F POD 10 s/p exploratory laparoscopy, GAURANG, SBR, decompression of gastric remnant  Now with abd pain  CT scan with inflammation in proximal small bowel and colon  Pain has improved with supportive measures/IV abx  Dark urine resolved   Epigastric pressure still present but improved    Plan:  Clear liquids/protein supplement   Decrease IV  D/C IV abx   Start levsin   Pain control   OOB/ambulate   DVT ppx     Subjective/Objective     Subjective: Continues to feel better but complains of epigastric gas/pressure with liquids     Objective:     Blood pressure 142/74, pulse 78, temperature 97 6 °F (36 4 °C), temperature source Oral, resp  rate 18, height 5' 9" (1 753 m), weight (!) 137 kg (301 lb 13 oz), SpO2 97 %, not currently breastfeeding  ,Body mass index is 44 57 kg/m²        Intake/Output Summary (Last 24 hours) at 2/21/2019 8280  Last data filed at 2/21/2019 0735  Gross per 24 hour   Intake 3804 17 ml   Output 3225 ml   Net 579 17 ml       Invasive Devices     Peripheral Intravenous Line            Peripheral IV 02/19/19 Right Forearm 2 days                Physical Exam:     No distress   RRR  Breathing non labored   Mild diffuse tenderness abd; incisions c/d/i without erythema     Lab, Imaging and other studies:  CBC:   Lab Results   Component Value Date    WBC 11 60 (H) 02/21/2019    HGB 11 9 02/21/2019    HCT 37 3 02/21/2019    MCV 86 02/21/2019     02/21/2019    MCH 27 3 02/21/2019    MCHC 31 9 02/21/2019    RDW 14 7 02/21/2019    MPV 11 1 02/21/2019   , CMP:   Lab Results   Component Value Date    SODIUM 141 02/21/2019    K 4 6 02/21/2019     02/21/2019    CO2 28 02/21/2019    BUN 10 02/21/2019    CREATININE 0 77 02/21/2019    CALCIUM 9 3 02/21/2019    EGFR 91 02/21/2019     VTE Pharmacologic Prophylaxis: Heparin  VTE Mechanical Prophylaxis: sequential compression device

## 2019-02-22 VITALS
TEMPERATURE: 97.6 F | BODY MASS INDEX: 43.4 KG/M2 | RESPIRATION RATE: 18 BRPM | OXYGEN SATURATION: 96 % | SYSTOLIC BLOOD PRESSURE: 155 MMHG | DIASTOLIC BLOOD PRESSURE: 77 MMHG | WEIGHT: 293 LBS | HEART RATE: 79 BPM | HEIGHT: 69 IN

## 2019-02-22 DIAGNOSIS — R10.84 GENERALIZED ABDOMINAL PAIN: Primary | ICD-10-CM

## 2019-02-22 LAB
ANION GAP SERPL CALCULATED.3IONS-SCNC: 7 MMOL/L (ref 4–13)
BUN SERPL-MCNC: 10 MG/DL (ref 5–25)
CALCIUM SERPL-MCNC: 9.2 MG/DL (ref 8.3–10.1)
CHLORIDE SERPL-SCNC: 104 MMOL/L (ref 100–108)
CO2 SERPL-SCNC: 29 MMOL/L (ref 21–32)
CREAT SERPL-MCNC: 0.77 MG/DL (ref 0.6–1.3)
ERYTHROCYTE [DISTWIDTH] IN BLOOD BY AUTOMATED COUNT: 14.9 % (ref 11.6–15.1)
GFR SERPL CREATININE-BSD FRML MDRD: 91 ML/MIN/1.73SQ M
GLUCOSE SERPL-MCNC: 105 MG/DL (ref 65–140)
HCT VFR BLD AUTO: 35.8 % (ref 34.8–46.1)
HGB BLD-MCNC: 11.5 G/DL (ref 11.5–15.4)
MCH RBC QN AUTO: 27.3 PG (ref 26.8–34.3)
MCHC RBC AUTO-ENTMCNC: 32.1 G/DL (ref 31.4–37.4)
MCV RBC AUTO: 85 FL (ref 82–98)
PLATELET # BLD AUTO: 293 THOUSANDS/UL (ref 149–390)
PMV BLD AUTO: 11.4 FL (ref 8.9–12.7)
POTASSIUM SERPL-SCNC: 3.7 MMOL/L (ref 3.5–5.3)
RBC # BLD AUTO: 4.22 MILLION/UL (ref 3.81–5.12)
SODIUM SERPL-SCNC: 140 MMOL/L (ref 136–145)
WBC # BLD AUTO: 7.37 THOUSAND/UL (ref 4.31–10.16)

## 2019-02-22 PROCEDURE — 85027 COMPLETE CBC AUTOMATED: CPT | Performed by: SURGERY

## 2019-02-22 PROCEDURE — 80048 BASIC METABOLIC PNL TOTAL CA: CPT | Performed by: SURGERY

## 2019-02-22 PROCEDURE — 99024 POSTOP FOLLOW-UP VISIT: CPT | Performed by: SURGERY

## 2019-02-22 RX ORDER — DICYCLOMINE HCL 20 MG
20 TABLET ORAL EVERY 6 HOURS
Qty: 12 TABLET | Refills: 0 | Status: SHIPPED | OUTPATIENT
Start: 2019-02-22 | End: 2019-03-18 | Stop reason: HOSPADM

## 2019-02-22 RX ORDER — CYCLOBENZAPRINE HCL 10 MG
10 TABLET ORAL 3 TIMES DAILY PRN
Qty: 30 TABLET | Refills: 0 | Status: SHIPPED | OUTPATIENT
Start: 2019-02-22

## 2019-02-22 RX ORDER — OXYCODONE HYDROCHLORIDE 5 MG/1
5 TABLET ORAL EVERY 4 HOURS PRN
Qty: 30 TABLET | Refills: 0 | Status: SHIPPED | OUTPATIENT
Start: 2019-02-22 | End: 2019-03-04

## 2019-02-22 RX ORDER — SIMETHICONE 80 MG
80 TABLET,CHEWABLE ORAL EVERY 12 HOURS
Qty: 30 TABLET | Refills: 0 | Status: SHIPPED | OUTPATIENT
Start: 2019-02-22

## 2019-02-22 RX ADMIN — SIMETHICONE CHEW TAB 80 MG 80 MG: 80 TABLET ORAL at 04:28

## 2019-02-22 RX ADMIN — HYOSCYAMINE SULFATE 0.12 MG: 0.12 TABLET, ORALLY DISINTEGRATING ORAL at 06:26

## 2019-02-22 RX ADMIN — ACETAMINOPHEN 975 MG: 325 TABLET, FILM COATED ORAL at 06:26

## 2019-02-22 RX ADMIN — ACETAMINOPHEN 975 MG: 325 TABLET, FILM COATED ORAL at 00:18

## 2019-02-22 RX ADMIN — SODIUM CHLORIDE, SODIUM LACTATE, POTASSIUM CHLORIDE, AND CALCIUM CHLORIDE 50 ML/HR: .6; .31; .03; .02 INJECTION, SOLUTION INTRAVENOUS at 04:28

## 2019-02-22 NOTE — PROGRESS NOTES
Patient called to report her pharmacy is unable to fill Levsin Rx until Monday  Rx for Bentyl for 3 days  Case discussed with Dr Verona Tim

## 2019-02-22 NOTE — PROGRESS NOTES
Progress Note - Bariatric Surgery   Lazaro Peres 51 y o  female MRN: [id number]  Unit/Bed#: -01 Encounter: [id number]    Assessment:  51 morbidly obese female (Class 3) POD 11 s/p exploratory laparoscopy, GAURANG, SBR, decompression of gastric remnant readmission secondary to enterocolitis, dehydration  Markedly improved  Plan:  Clear liquids/protein supplement for 1 week  Saline lock  D/C IV abx   Cont levsin   Pain control   OOB/ambulate   DVT ppx   Cont further mgt of obesity to continue as outpatient    Subjective/Objective     Subjective: epigastric pressure/gas greatly improved with levsin    Objective:     Blood pressure 133/73, pulse 67, temperature 97 7 °F (36 5 °C), temperature source Oral, resp  rate 17, height 5' 9" (1 753 m), weight (!) 137 kg (301 lb 13 oz), SpO2 98 %, not currently breastfeeding  ,Body mass index is 44 57 kg/m²        Intake/Output Summary (Last 24 hours) at 2/22/2019 0616  Last data filed at 2/22/2019 0428  Gross per 24 hour   Intake 2557 92 ml   Output 3850 ml   Net -1292 08 ml       Invasive Devices     Peripheral Intravenous Line            Peripheral IV 02/19/19 Right Forearm 2 days                Physical Exam:     No distress   RRR  Breathing non labored   Minimal diffuse tenderness; wounds C/D/I    Lab, Imaging and other studies:  CBC:   Lab Results   Component Value Date    WBC 7 37 02/22/2019    HGB 11 5 02/22/2019    HCT 35 8 02/22/2019    MCV 85 02/22/2019     02/22/2019    MCH 27 3 02/22/2019    MCHC 32 1 02/22/2019    RDW 14 9 02/22/2019    MPV 11 4 02/22/2019   , CMP:   Lab Results   Component Value Date    SODIUM 140 02/22/2019    K 3 7 02/22/2019     02/22/2019    CO2 29 02/22/2019    BUN 10 02/22/2019    CREATININE 0 77 02/22/2019    CALCIUM 9 2 02/22/2019    EGFR 91 02/22/2019     VTE Pharmacologic Prophylaxis: Heparin  VTE Mechanical Prophylaxis: sequential compression device

## 2019-02-22 NOTE — PLAN OF CARE
Problem: Potential for Falls  Goal: Patient will remain free of falls  Description  INTERVENTIONS:  - Assess patient frequently for physical needs  -  Identify cognitive and physical deficits and behaviors that affect risk of falls    -  [place] fall precautions as indicated by assessment   - Educate patient/family on patient safety including physical limitations  - Instruct patient to call for assistance with activity based on assessment  - Modify environment to reduce risk of injury  - Consider OT/PT consult to assist with strengthening/mobility  Outcome: Progressing     Problem: PAIN - ADULT  Goal: Verbalizes/displays adequate comfort level or baseline comfort level  Description  Interventions:  - Encourage patient to monitor pain and request assistance  - Assess pain using appropriate pain scale  - Administer analgesics based on type and severity of pain and evaluate response  - Implement non-pharmacological measures as appropriate and evaluate response  - Consider cultural and social influences on pain and pain management  - Notify physician/advanced practitioner if interventions unsuccessful or patient reports new pain  Outcome: Progressing     Problem: INFECTION - ADULT  Goal: Absence or prevention of progression during hospitalization  Description  INTERVENTIONS:  - Assess and monitor for signs and symptoms of infection  - Monitor lab/diagnostic results  - Monitor all insertion sites, i e  indwelling lines, tubes, and drains  - Monitor endotracheal (as able) and nasal secretions for changes in amount and color  - [place] appropriate cooling/warming therapies per order  - Administer medications as ordered  - Instruct and encourage patient and family to use good hand hygiene technique  - Identify and instruct in appropriate isolation precautions for identified infection/condition  Outcome: Progressing  Goal: Absence of fever/infection during neutropenic period  Description  INTERVENTIONS:  - Monitor WBC  - Implement neutropenic guidelines  Outcome: Progressing     Problem: SAFETY ADULT  Goal: Maintain or return to baseline ADL function  Description  INTERVENTIONS:  -  Assess patient's ability to carry out ADLs; assess patient's baseline for ADL function and identify physical deficits which impact ability to perform ADLs (bathing, care of mouth/teeth, toileting, grooming, dressing, etc )  - Assess/evaluate cause of self-care deficits   - Assess range of motion  - Assess patient's mobility; develop plan if impaired  - Assess patient's need for assistive devices and provide as appropriate  - Encourage maximum independence but intervene and supervise when necessary  ¯ Involve family in performance of ADLs  ¯ Assess for home care needs following discharge   ¯ Request OT consult to assist with ADL evaluation and planning for discharge  ¯ Provide patient education as appropriate  Outcome: Progressing  Goal: Maintain or return mobility status to optimal level  Description  INTERVENTIONS:  - Assess patient's baseline mobility status (ambulation, transfers, stairs, etc )    - Identify cognitive and physical deficits and behaviors that affect mobility  - Identify mobility aids required to assist with transfers and/or ambulation (gait belt, sit-to-stand, lift, walker, cane, etc )  - [place] fall precautions as indicated by assessment  - Record patient progress and toleration of activity level on Mobility SBAR; progress patient to next Phase/Stage  - Instruct patient to call for assistance with activity based on assessment  - Request Rehabilitation consult to assist with strengthening/weightbearing, etc   Outcome: Progressing     Problem: DISCHARGE PLANNING  Goal: Discharge to home or other facility with appropriate resources  Description  INTERVENTIONS:  - Identify barriers to discharge w/patient and caregiver  - Arrange for needed discharge resources and transportation as appropriate  - Identify discharge learning needs (meds, wound care, etc )  - Arrange for interpretive services to assist at discharge as needed  - Refer to Case Management Department for coordinating discharge planning if the patient needs post-hospital services based on physician/advanced practitioner order or complex needs related to functional status, cognitive ability, or social support system  Outcome: Progressing     Problem: Knowledge Deficit  Goal: Patient/family/caregiver demonstrates understanding of disease process, treatment plan, medications, and discharge instructions  Description  Complete learning assessment and assess knowledge base  Interventions:  - Provide teaching at level of understanding  - Provide teaching via preferred learning methods  Outcome: Progressing     Problem: Nutrition/Hydration-ADULT  Goal: Nutrient/Hydration intake appropriate for improving, restoring or maintaining nutritional needs  Description  Monitor and assess patient's nutrition/hydration status for malnutrition (ex- brittle hair, bruises, dry skin, pale skin and conjunctiva, muscle wasting, smooth red tongue, and disorientation)  Collaborate with interdisciplinary team and initiate plan and interventions as ordered  Monitor patient's weight and dietary intake as ordered or per policy  Utilize nutrition screening tool and intervene per policy  Determine patient's food preferences and provide high-protein, foods as appropriate       INTERVENTIONS:  - Monitor oral intake, urinary output, labs, and treatment plans  - Assess nutrition and hydration status and recommend course of action  - Evaluate amount of meals eaten  - Assist patient with eating if necessary   - Allow adequate time for meals  - Recommend/ encourage appropriate diets, oral nutritional supplements, and vitamin/mineral supplements  - Order, calculate, and assess calorie counts as needed  - Recommend, monitor, and adjust tube feedings and TPN/PPN based on assessed needs  - Assess need for intravenous fluids  - Provide nutrition/hydration education as appropriate  - Include patient/family/caregiver in decisions related to nutrition   Outcome: Progressing

## 2019-02-22 NOTE — TELEPHONE ENCOUNTER
Pt called and rescheduled her post-op appointment with the   Meanwhile, she tried to get her Levsin RX filled at [facility], which is the only pharmacy her insurance will cover  They called around to several area [facility] and none have this medication  They ordered it for her and should have it by Monday  I spoke with LISA Barnett who called in another similar acting medication for the patient to use until this medication becomes available Monday  Patient was satisfied and will await word from [facility]

## 2019-02-22 NOTE — DISCHARGE INSTRUCTIONS
Bariatric/Weight Loss Surgery  Hospital Discharge Instructions  1  ACTIVITY:  a  Progress as feels comfortable - a good rule is:  if you are doing something and it begins to hurt, stop doing the activity  Walk at least 3 times per day at home  b  Use your incentive spirometer 10 times per hour while awake for 2 weeks  c  No driving if you are still taking certain prescription pain medication  Examples of such medication are Percocet, Darvocet, Oxycodone, Tylenol #3, and Tylenol with Codeine  Follow your pharmacists orders  AND no driving until cleared in office by your surgeon      2  DIET  a  Clear liquid diet with protein supplements  3  MEDICATIONS:  a  Start vitamins and minerals when you get home  b  Pain and Anti-acid Medication as per prescription  c  Other medications as indicated on the Physician Patient Discharge Instructions form given to you at the time of discharge  4  INCISION CARE  a  You may shower and get incisions  5  FOLLOW-UP APPOINTMENT should be made for one week after discharge  Call surgeons office at [phone] to schedule an appointment  6  CALL YOUR DOCTOR FOR:  pain not controlled by pain medications, a temperature greater than 101 5° F, any increase or change in drainage or redness from any incision, any vomiting or inability to keep liquids down, shortness of breath, shoulder pain, or bleeding    ********Letter and Information for Patient's Primary Health Care Provider************      Dear Javier Burdick Provider,       Your patient had bariatric surgery on this admission to [address] [place] [address]  Due to the restrictive and/or malabsorptive nature of their procedure our surgeons recommend routine lab studies  Your patients surgeon will provide orders initially and ask that they continue to follow-up with us for life even if they see you  As time moves on some patients prefer to follow-up only with their family doctor   We ask that you discuss this regular work-up with them when they make an appointment to see you  *The lab studies recommended to best identify deficiencies are: CBC, CMP, Lipid Profile, Fe, TIBC, %Sat, Vitamin D, Folate, B12, Whole Blood Thiamine, Vitamin A, PTH, Zinc and Ferritin  We recommend HgbA1C for diabetics  *These studies should be done minimally at 6 months and a year post-operatively and then yearly thereafter  *Recommended Daily Supplements: Please see the attached Vitamin Sheet    If your patient has any dietary or psycho-social concerns, they can follow-up with our Team Dietitian and Licensed Clinical   They can reach these team members by calling the [address] [place] [address] [facility]  We also encourage them to follow up at our regularly scheduled support groups or join our [facility] at [phone] Patient Forum  For your convenience we have also included a list of medicines that should be avoided after weight loss surgery and a list of the vitamin and minerals they should be taking for the rest of their lives  The patients are provided this information as well  The [facility] Bariatric Team would like to thank you for the opportunity to assist in the care of your patient  Feel free to contact us with any questions by calling the [address] [place] [address] Weight Management office at [phone]    Sincerely,         [address] [place] [address] [facility] Team      ****************Additional Information for Providers and Patients*****************   Vitamins After Randy en Y Gastric Bypass or Sleeve Gastrectomy Surgery    Due to the decreased absorption of nutrients and the decreased amount of food eaten it is difficult to obtain all the nutrients needed consuming food  We recommend a bariatric formulated vitamin for the rest of your life    If you wish to use an over the counter vitamins please understand you may not get all the recommended daily requirements  Use the following guidelines for over the counter vitamins  Multivitamins    We recommend 2 chewable multivitamins with iron (do not take gummy chewable as they do not contain thiamine or iron)     You can continue with the chewable or take any well formulated, high potency multivitamin containing 22 nutrients including zinc and copper   If you decide to take a bariatric vitamin the number of vitamins that you need to take will vary  Refer to the chart provided at team meeting    Calcium - Calcium is absorbed in the part of the small bowel that is bypassed in gastric bypass patients  In addition, as you lose weight, you are more at risk for loss of bone density leading to osteoporosis   The best form of calcium is Calcium Citrate  This form of calcium is better absorbed after your surgery    Recommended daily dose is 1500 mg  Take 500 mg in (3) divided doses  You can only absorb about 500 mg at a time   We recommend 2000 IU of vitamin D3 per day, in addition to what is in your calcium supplement  If you were instructed to take a higher dose based on a deficiency, then continue to take the higher vitamin D dose  Iron - Iron is absorbed in the part of the small bowel that is bypassed   You will need to take extra iron in addition to what is already in the multivitamin if you are a menstruating woman (25-45 mg of additional elemental iron) or have been diagnosed with an iron deficiency   We recommend iron in the form of Ferrous Fumarate with Vitamin C    Follow the instructions on the package or bottle unless your physician has given other dosage amounts  B12 (Cyanocobalamin) may also be decreased   Additional Vitamin B12 is recommended if you are not taking a bariatric vitamin   Take 350 to 500 micrograms (mcg) per day of B12 (Cyanocobalamin) in a sublingual form (for under the tongue)      Note:  Calcium interferes with the absorption of iron, so it is recommended that you take the calcium at least 2 hours apart from iron  The tannins in tea also interfere with the absorption of iron   Note: Anti-ulcer medications interfere with the absorption of calcium iron and B12  Space your anti-ulcer medication 2 hours apart from your vitamins  * Based on the recommendations of the ASMBS and the National Osteoporosis foundation    Non-steroidal anti-inflammatory drugs or medications containing them  You should take caution or avoid these medications as they could harm your pouch or sleeve  **This is a sample list and is not all inclusive  Please read labels carefully  **      Non Steroidal anti-inflammatory drugs  Advil (ibuprofen)  Aleve (naproxen)  Anaprox (naproxen)  Ansaid (flurbiprofen)  Azolid (phenylbutazone)  Bextra (valdecoxib)  Butazolidin (phenylbutazone)  Celebrex (celecoxib)  Clinoril (sulindac)  Dolobid (diflunisal)  Excedrin IB (ibuprofen)  Feldene (piroxicam)  Ibuprin (ibuprofen)  Indocin (indomethacin)  Lodine (etodolac)  Meclomen (meclofenamate)  Midol IB (ibuprofen)  Motrin IB (ibuprofen)  Nalfon (fenoprofen)  Naprosyn (naproxen)  Nuprin (ibuprofen)  Orudis (ketoprofen)  Oruvail (ketoprofen)  Pamprin - IB (ibuprofen)  Ponstel (mefenamic acid)  Rexolate (sodium thiosalicylate)  Tandearil (oxyphenbutazone)  Tolectin (tolmetin)  Voltaren (diclofenac)      Barbiturate  Fiorinal (butalbital/aspirin/caffeine)    Salicylates  Amigesic (salsalate)  Anacin (aspirin)  Arthropan (choline salicylate)  Ascriptin (buffered aspirin)  Aspirin (aspirin)  Aspirtab (aspirin)  Bufferin (buffered aspirin)  Disalcid (salsalate)  Ecotrin (aspirin)  Uracel (sodium salicylate)    Analgesics  Equagesic (meprobamate/aspirin)  Micrainin (meprobamate/aspirin)  Percodan (oxycodone/aspirin)    OTC  Pepto-Bismol®  Alma-Livingston®  Excedrin®    For Gastric Bypass Patients  Extended Release Medications  Sustained Release Medications  Time Released Medications

## 2019-02-22 NOTE — DISCHARGE SUMMARY
Discharge Summary - Taylor Canales 51 y o  female MRN: [id number]    Unit/Bed#: -01 Encounter: [id number]      Pre-Operative Diagnosis: Enterocolitis     Post-Operative Diagnosis: Enterocolitis     Procedures Performed:  None    Surgeon: Dr Chris Mitchell for full details of admission  Patient is a 79-year-old morbidly obese female postop day 11 status post exploratory laparoscopy, lysis of adhesions, small-bowel resection, drainage/decompression/closure of gastric remnant  She was discharged from the hospital postop day 1 and she was doing well up until postop day 7 when she had sudden onset abdominal pain  She was evaluated at [facility] where she was found to have enterocolitis (bacterial vs viral) and ascites with a significant leukocytosis  She was transferred to [facility] where she was seen and evaluated  IV fluids and IV antibiotics were initiated  She progressed well during hospitalization  Her symptoms resolved  Her leukocytosis resolved  Her pain is not controlled with oral analgesics  Her epigastric cramping discomfort/bloating is controlled with levsin  She is ambulating and maturing without difficulty  She now desires to be discharged home  Patient was seen and examined prior to discharge  Provisions for Follow-Up Care:  See After Visit Summary for information related to follow-up care and home orders  Disposition: Home, in stable condition  Planned Readmission: No    Discharge Medications:  See After Visit Summary for reconciled discharge medications provided to patient and family  Post Operative instructions: Reviewed with patient and/or family  This text is generated with voice recognition software  There may be translation, syntax,  or grammatical errors  If you have any questions, please contact the dictating provider       Signature:   Jordan Bennett MD  Date: 2/22/2019 Time: 6:25 AM

## 2019-02-23 LAB — BACTERIA BLD CULT: NORMAL

## 2019-02-24 LAB — BACTERIA BLD CULT: NORMAL

## 2019-02-25 ENCOUNTER — APPOINTMENT (INPATIENT)
Dept: CT IMAGING | Facility: HOSPITAL | Age: 49
DRG: 920 | End: 2019-02-25
Payer: COMMERCIAL

## 2019-02-25 ENCOUNTER — HOSPITAL ENCOUNTER (INPATIENT)
Facility: HOSPITAL | Age: 49
LOS: 2 days | Discharge: HOME | DRG: 920 | End: 2019-02-27
Attending: EMERGENCY MEDICINE | Admitting: SURGERY
Payer: COMMERCIAL

## 2019-02-25 ENCOUNTER — APPOINTMENT (EMERGENCY)
Dept: RADIOLOGY | Facility: HOSPITAL | Age: 49
DRG: 920 | End: 2019-02-25
Payer: COMMERCIAL

## 2019-02-25 ENCOUNTER — APPOINTMENT (EMERGENCY)
Dept: CT IMAGING | Facility: HOSPITAL | Age: 49
DRG: 920 | End: 2019-02-25
Payer: COMMERCIAL

## 2019-02-25 ENCOUNTER — TELEPHONE (OUTPATIENT)
Dept: BARIATRICS | Facility: CLINIC | Age: 49
End: 2019-02-25

## 2019-02-25 DIAGNOSIS — J90 PLEURAL EFFUSION: Primary | ICD-10-CM

## 2019-02-25 DIAGNOSIS — R10.84 GENERALIZED ABDOMINAL PAIN: ICD-10-CM

## 2019-02-25 DIAGNOSIS — D73.89: ICD-10-CM

## 2019-02-25 LAB
ALBUMIN SERPL BCP-MCNC: 3.3 G/DL (ref 3.5–5)
ALP SERPL-CCNC: 84 U/L (ref 46–116)
ALT SERPL W P-5'-P-CCNC: 32 U/L (ref 12–78)
ANION GAP SERPL CALCULATED.3IONS-SCNC: 14 MMOL/L (ref 4–13)
APTT PPP: 30 SECONDS (ref 26–38)
AST SERPL W P-5'-P-CCNC: 19 U/L (ref 5–45)
BASOPHILS # BLD AUTO: 0.04 THOUSANDS/ΜL (ref 0–0.1)
BASOPHILS NFR BLD AUTO: 0 % (ref 0–1)
BILIRUB SERPL-MCNC: 0.6 MG/DL (ref 0.2–1)
BUN SERPL-MCNC: 17 MG/DL (ref 5–25)
CALCIUM SERPL-MCNC: 9.8 MG/DL (ref 8.3–10.1)
CHLORIDE SERPL-SCNC: 100 MMOL/L (ref 100–108)
CO2 SERPL-SCNC: 24 MMOL/L (ref 21–32)
CREAT SERPL-MCNC: 0.78 MG/DL (ref 0.6–1.3)
EOSINOPHIL # BLD AUTO: 0.11 THOUSAND/ΜL (ref 0–0.61)
EOSINOPHIL NFR BLD AUTO: 1 % (ref 0–6)
ERYTHROCYTE [DISTWIDTH] IN BLOOD BY AUTOMATED COUNT: 14.8 % (ref 11.6–15.1)
GFR SERPL CREATININE-BSD FRML MDRD: 90 ML/MIN/1.73SQ M
GLUCOSE SERPL-MCNC: 109 MG/DL (ref 65–140)
HCT VFR BLD AUTO: 44.4 % (ref 34.8–46.1)
HGB BLD-MCNC: 14.9 G/DL (ref 11.5–15.4)
IMM GRANULOCYTES # BLD AUTO: 0.16 THOUSAND/UL (ref 0–0.2)
IMM GRANULOCYTES NFR BLD AUTO: 1 % (ref 0–2)
INR PPP: 1.04 (ref 0.86–1.17)
LACTATE SERPL-SCNC: 1.4 MMOL/L (ref 0.5–2)
LYMPHOCYTES # BLD AUTO: 1.36 THOUSANDS/ΜL (ref 0.6–4.47)
LYMPHOCYTES NFR BLD AUTO: 8 % (ref 14–44)
MCH RBC QN AUTO: 27.6 PG (ref 26.8–34.3)
MCHC RBC AUTO-ENTMCNC: 33.6 G/DL (ref 31.4–37.4)
MCV RBC AUTO: 82 FL (ref 82–98)
MONOCYTES # BLD AUTO: 1.19 THOUSAND/ΜL (ref 0.17–1.22)
MONOCYTES NFR BLD AUTO: 7 % (ref 4–12)
NEUTROPHILS # BLD AUTO: 15.14 THOUSANDS/ΜL (ref 1.85–7.62)
NEUTS SEG NFR BLD AUTO: 83 % (ref 43–75)
NRBC BLD AUTO-RTO: 0 /100 WBCS
PLATELET # BLD AUTO: 361 THOUSANDS/UL (ref 149–390)
PMV BLD AUTO: 10.6 FL (ref 8.9–12.7)
POTASSIUM SERPL-SCNC: 3.3 MMOL/L (ref 3.5–5.3)
PROT SERPL-MCNC: 8 G/DL (ref 6.4–8.2)
PROTHROMBIN TIME: 13.5 SECONDS (ref 11.8–14.2)
RBC # BLD AUTO: 5.4 MILLION/UL (ref 3.81–5.12)
SODIUM SERPL-SCNC: 138 MMOL/L (ref 136–145)
TROPONIN I SERPL-MCNC: <0.02 NG/ML
WBC # BLD AUTO: 18 THOUSAND/UL (ref 4.31–10.16)

## 2019-02-25 PROCEDURE — 88112 CYTOPATH CELL ENHANCE TECH: CPT | Performed by: PATHOLOGY

## 2019-02-25 PROCEDURE — 85610 PROTHROMBIN TIME: CPT

## 2019-02-25 PROCEDURE — 83690 ASSAY OF LIPASE: CPT | Performed by: SURGERY

## 2019-02-25 PROCEDURE — 99153 MOD SED SAME PHYS/QHP EA: CPT

## 2019-02-25 PROCEDURE — 87070 CULTURE OTHR SPECIMN AEROBIC: CPT | Performed by: RADIOLOGY

## 2019-02-25 PROCEDURE — 87205 SMEAR GRAM STAIN: CPT | Performed by: RADIOLOGY

## 2019-02-25 PROCEDURE — 74177 CT ABD & PELVIS W/CONTRAST: CPT

## 2019-02-25 PROCEDURE — 96375 TX/PRO/DX INJ NEW DRUG ADDON: CPT

## 2019-02-25 PROCEDURE — 0W9G30Z DRAINAGE OF PERITONEAL CAVITY WITH DRAINAGE DEVICE, PERCUTANEOUS APPROACH: ICD-10-PCS | Performed by: RADIOLOGY

## 2019-02-25 PROCEDURE — 71046 X-RAY EXAM CHEST 2 VIEWS: CPT

## 2019-02-25 PROCEDURE — 36415 COLL VENOUS BLD VENIPUNCTURE: CPT

## 2019-02-25 PROCEDURE — 85730 THROMBOPLASTIN TIME PARTIAL: CPT

## 2019-02-25 PROCEDURE — 99152 MOD SED SAME PHYS/QHP 5/>YRS: CPT | Performed by: RADIOLOGY

## 2019-02-25 PROCEDURE — 49406 IMAGE CATH FLUID PERI/RETRO: CPT

## 2019-02-25 PROCEDURE — 49406 IMAGE CATH FLUID PERI/RETRO: CPT | Performed by: RADIOLOGY

## 2019-02-25 PROCEDURE — 99285 EMERGENCY DEPT VISIT HI MDM: CPT

## 2019-02-25 PROCEDURE — 85025 COMPLETE CBC W/AUTO DIFF WBC: CPT | Performed by: EMERGENCY MEDICINE

## 2019-02-25 PROCEDURE — 87040 BLOOD CULTURE FOR BACTERIA: CPT

## 2019-02-25 PROCEDURE — 99152 MOD SED SAME PHYS/QHP 5/>YRS: CPT

## 2019-02-25 PROCEDURE — 84484 ASSAY OF TROPONIN QUANT: CPT | Performed by: EMERGENCY MEDICINE

## 2019-02-25 PROCEDURE — 71275 CT ANGIOGRAPHY CHEST: CPT

## 2019-02-25 PROCEDURE — 96374 THER/PROPH/DIAG INJ IV PUSH: CPT

## 2019-02-25 PROCEDURE — C1729 CATH, DRAINAGE: HCPCS

## 2019-02-25 PROCEDURE — 93005 ELECTROCARDIOGRAM TRACING: CPT

## 2019-02-25 PROCEDURE — C1769 GUIDE WIRE: HCPCS

## 2019-02-25 PROCEDURE — 83605 ASSAY OF LACTIC ACID: CPT

## 2019-02-25 PROCEDURE — 80053 COMPREHEN METABOLIC PANEL: CPT | Performed by: EMERGENCY MEDICINE

## 2019-02-25 RX ORDER — LIDOCAINE HYDROCHLORIDE 10 MG/ML
INJECTION, SOLUTION INFILTRATION; PERINEURAL CODE/TRAUMA/SEDATION MEDICATION
Status: COMPLETED | OUTPATIENT
Start: 2019-02-25 | End: 2019-02-25

## 2019-02-25 RX ORDER — MIDAZOLAM HYDROCHLORIDE 1 MG/ML
INJECTION INTRAMUSCULAR; INTRAVENOUS CODE/TRAUMA/SEDATION MEDICATION
Status: COMPLETED | OUTPATIENT
Start: 2019-02-25 | End: 2019-02-25

## 2019-02-25 RX ORDER — KETOROLAC TROMETHAMINE 30 MG/ML
30 INJECTION, SOLUTION INTRAMUSCULAR; INTRAVENOUS EVERY 6 HOURS SCHEDULED
Status: DISCONTINUED | OUTPATIENT
Start: 2019-02-25 | End: 2019-02-25

## 2019-02-25 RX ORDER — OXYCODONE HCL 5 MG/5 ML
5 SOLUTION, ORAL ORAL EVERY 4 HOURS PRN
Status: DISCONTINUED | OUTPATIENT
Start: 2019-02-25 | End: 2019-02-27 | Stop reason: HOSPADM

## 2019-02-25 RX ORDER — OXYCODONE HCL 5 MG/5 ML
10 SOLUTION, ORAL ORAL EVERY 4 HOURS PRN
Status: DISCONTINUED | OUTPATIENT
Start: 2019-02-25 | End: 2019-02-27 | Stop reason: HOSPADM

## 2019-02-25 RX ORDER — SODIUM CHLORIDE, SODIUM LACTATE, POTASSIUM CHLORIDE, CALCIUM CHLORIDE 600; 310; 30; 20 MG/100ML; MG/100ML; MG/100ML; MG/100ML
125 INJECTION, SOLUTION INTRAVENOUS CONTINUOUS
Status: DISCONTINUED | OUTPATIENT
Start: 2019-02-25 | End: 2019-02-27 | Stop reason: HOSPADM

## 2019-02-25 RX ORDER — DIPHENHYDRAMINE HYDROCHLORIDE 50 MG/ML
INJECTION INTRAMUSCULAR; INTRAVENOUS CODE/TRAUMA/SEDATION MEDICATION
Status: COMPLETED | OUTPATIENT
Start: 2019-02-25 | End: 2019-02-25

## 2019-02-25 RX ORDER — LEVOFLOXACIN 5 MG/ML
750 INJECTION, SOLUTION INTRAVENOUS EVERY 24 HOURS
Status: DISCONTINUED | OUTPATIENT
Start: 2019-02-25 | End: 2019-02-25

## 2019-02-25 RX ORDER — FENTANYL CITRATE 50 UG/ML
INJECTION, SOLUTION INTRAMUSCULAR; INTRAVENOUS CODE/TRAUMA/SEDATION MEDICATION
Status: COMPLETED | OUTPATIENT
Start: 2019-02-25 | End: 2019-02-25

## 2019-02-25 RX ORDER — HYDROMORPHONE HCL/PF 1 MG/ML
1 SYRINGE (ML) INJECTION ONCE
Status: COMPLETED | OUTPATIENT
Start: 2019-02-25 | End: 2019-02-25

## 2019-02-25 RX ORDER — KETOROLAC TROMETHAMINE 30 MG/ML
30 INJECTION, SOLUTION INTRAMUSCULAR; INTRAVENOUS EVERY 6 HOURS SCHEDULED
Status: COMPLETED | OUTPATIENT
Start: 2019-02-25 | End: 2019-02-27

## 2019-02-25 RX ORDER — LEVOFLOXACIN 5 MG/ML
750 INJECTION, SOLUTION INTRAVENOUS EVERY 24 HOURS
Status: DISCONTINUED | OUTPATIENT
Start: 2019-02-26 | End: 2019-02-25

## 2019-02-25 RX ORDER — POTASSIUM CHLORIDE 14.9 MG/ML
20 INJECTION INTRAVENOUS
Status: DISCONTINUED | OUTPATIENT
Start: 2019-02-25 | End: 2019-02-26

## 2019-02-25 RX ORDER — POTASSIUM CHLORIDE 20MEQ/15ML
40 LIQUID (ML) ORAL ONCE
Status: COMPLETED | OUTPATIENT
Start: 2019-02-25 | End: 2019-02-25

## 2019-02-25 RX ORDER — LEVOFLOXACIN 5 MG/ML
750 INJECTION, SOLUTION INTRAVENOUS EVERY 24 HOURS
Status: DISCONTINUED | OUTPATIENT
Start: 2019-02-25 | End: 2019-02-27 | Stop reason: HOSPADM

## 2019-02-25 RX ORDER — ACETAMINOPHEN 325 MG/1
975 TABLET ORAL EVERY 6 HOURS SCHEDULED
Status: DISCONTINUED | OUTPATIENT
Start: 2019-02-25 | End: 2019-02-27 | Stop reason: HOSPADM

## 2019-02-25 RX ADMIN — METRONIDAZOLE 500 MG: 500 INJECTION, SOLUTION INTRAVENOUS at 15:22

## 2019-02-25 RX ADMIN — ACETAMINOPHEN 975 MG: 325 TABLET, FILM COATED ORAL at 20:09

## 2019-02-25 RX ADMIN — SODIUM CHLORIDE 1000 ML: 0.9 INJECTION, SOLUTION INTRAVENOUS at 17:41

## 2019-02-25 RX ADMIN — DIPHENHYDRAMINE HYDROCHLORIDE 25 MG: 50 INJECTION, SOLUTION INTRAMUSCULAR; INTRAVENOUS at 16:56

## 2019-02-25 RX ADMIN — HYDROMORPHONE HYDROCHLORIDE 1 MG: 1 INJECTION, SOLUTION INTRAMUSCULAR; INTRAVENOUS; SUBCUTANEOUS at 20:08

## 2019-02-25 RX ADMIN — FENTANYL CITRATE 50 MCG: 50 INJECTION, SOLUTION INTRAMUSCULAR; INTRAVENOUS at 16:50

## 2019-02-25 RX ADMIN — DIPHENHYDRAMINE HYDROCHLORIDE 25 MG: 50 INJECTION, SOLUTION INTRAMUSCULAR; INTRAVENOUS at 16:51

## 2019-02-25 RX ADMIN — POTASSIUM CHLORIDE 40 MEQ: 20 SOLUTION ORAL at 22:13

## 2019-02-25 RX ADMIN — SODIUM CHLORIDE 1000 ML: 0.9 INJECTION, SOLUTION INTRAVENOUS at 15:20

## 2019-02-25 RX ADMIN — POTASSIUM CHLORIDE 20 MEQ: 200 INJECTION, SOLUTION INTRAVENOUS at 22:13

## 2019-02-25 RX ADMIN — SODIUM CHLORIDE, SODIUM LACTATE, POTASSIUM CHLORIDE, AND CALCIUM CHLORIDE 125 ML/HR: .6; .31; .03; .02 INJECTION, SOLUTION INTRAVENOUS at 20:11

## 2019-02-25 RX ADMIN — MIDAZOLAM HYDROCHLORIDE 1 MG: 1 INJECTION, SOLUTION INTRAMUSCULAR; INTRAVENOUS at 16:50

## 2019-02-25 RX ADMIN — IOHEXOL 100 ML: 350 INJECTION, SOLUTION INTRAVENOUS at 13:47

## 2019-02-25 RX ADMIN — KETOROLAC TROMETHAMINE 30 MG: 30 INJECTION, SOLUTION INTRAMUSCULAR at 15:16

## 2019-02-25 RX ADMIN — FENTANYL CITRATE 50 MCG: 50 INJECTION, SOLUTION INTRAMUSCULAR; INTRAVENOUS at 16:39

## 2019-02-25 RX ADMIN — POTASSIUM CHLORIDE 20 MEQ: 200 INJECTION, SOLUTION INTRAVENOUS at 17:53

## 2019-02-25 RX ADMIN — LIDOCAINE HYDROCHLORIDE 5 ML: 10 INJECTION, SOLUTION INFILTRATION; PERINEURAL at 17:00

## 2019-02-25 RX ADMIN — MIDAZOLAM HYDROCHLORIDE 1 MG: 1 INJECTION, SOLUTION INTRAMUSCULAR; INTRAVENOUS at 16:39

## 2019-02-25 RX ADMIN — LEVOFLOXACIN 750 MG: 5 INJECTION, SOLUTION INTRAVENOUS at 20:09

## 2019-02-25 RX ADMIN — HYDROMORPHONE HYDROCHLORIDE 1 MG: 1 INJECTION, SOLUTION INTRAMUSCULAR; INTRAVENOUS; SUBCUTANEOUS at 13:25

## 2019-02-25 RX ADMIN — FENTANYL CITRATE 25 MCG: 50 INJECTION, SOLUTION INTRAMUSCULAR; INTRAVENOUS at 16:59

## 2019-02-25 NOTE — ED PROVIDER NOTES
History  Chief Complaint   Patient presents with    Abdominal Pain     pt states to have been discharged from hospital last wednesday for abdominal surgery  pt c/o abdominal pain that is radiating towards left     Chest Pain     pt states to have new onset of chest pain that started this morning, also c/o SOB  50-year-old female patient presents here with a chief complaint of left-sided flank pain upper abdomen pain  She has had this pain postsurgically but she reports that the flank component of it is new  She also has a generalized chest pain sense of shortness of breath and generalized abdominal pain  The symptoms have been exacerbating for the last 2 days  No reported fevers  No reported nausea or vomiting  Prior to Admission Medications   Prescriptions Last Dose Informant Patient Reported? Taking?    Biotin 1000 MCG tablet   Yes No   Sig: Take 1,000 mcg by mouth   Melatonin 5 MG CAPS   Yes No   Sig: Take 5 mg by mouth   Omega-3 Fatty Acids (FISH OIL) 1,000 mg  Self Yes No   Sig: Take 1,000 mg by mouth daily   butalbital-acetaminophen-caffeine (FIORICET,ESGIC) -40 mg per tablet  Self Yes No   Sig: take 1-2 tablets by mouth every 4 to 6 hours if needed maximum daily dose of 6   cyclobenzaprine (FLEXERIL) 10 mg tablet   No No   Sig: Take 1 tablet (10 mg total) by mouth 3 (three) times a day as needed for muscle spasms   dicyclomine (BENTYL) 20 mg tablet   No No   Sig: Take 1 tablet (20 mg total) by mouth every 6 (six) hours   doxepin (SINEquan) 10 mg capsule  Self Yes No   Sig: Take 10 mg by mouth daily at bedtime   esterified estrogens (MENEST) 0 625 MG tablet  Self Yes No   Sig: Take 0 625 mg by mouth daily   estradiol (VIVELLE-DOT) 0 075 MG/24HR   Yes No   Sig: Place 1 patch on the skin   ferrous sulfate 325 (65 Fe) mg tablet  Self No No   Sig: Take 1 tablet (325 mg total) by mouth daily   hydrochlorothiazide (HYDRODIURIL) 12 5 mg tablet  Self Yes No   Sig: Take 12 5 mg by mouth daily hyoscyamine (LEVSIN/SL) 0 125 mg SL tablet   No No   Sig: Take 1 tablet (0 125 mg total) by mouth 4 (four) times a day (before meals and at bedtime) for 30 days   multivitamin (THERAGRAN) TABS  Self Yes No   Sig: Take 1 tablet by mouth daily   ondansetron (ZOFRAN) 4 mg tablet   No No   Sig: Take 1 tablet (4 mg total) by mouth every 8 (eight) hours as needed for nausea or vomiting   Patient not taking: Reported on 2/18/2019   oxyCODONE (ROXICODONE) 5 mg immediate release tablet   No No   Sig: Take 1 tablet (5 mg total) by mouth every 4 (four) hours as needed for severe pain for up to 10 daysMax Daily Amount: 30 mg   pantoprazole (PROTONIX) 40 mg tablet   No No   Sig: Take 1 tablet (40 mg total) by mouth daily   simethicone (MYLICON) 80 mg chewable tablet   No No   Sig: Chew 1 tablet (80 mg total) every 12 (twelve) hours   venlafaxine (EFFEXOR) 37 5 mg tablet   Yes No   Sig: Take 37 5 mg by mouth 2 (two) times a day      Facility-Administered Medications: None       Past Medical History:   Diagnosis Date    Bell palsy     Gastric ulcer     GERD (gastroesophageal reflux disease)     History of transfusion     Melena     Pancreatic cyst     Right facial numbness     Upper GI bleed        Past Surgical History:   Procedure Laterality Date    ABDOMINOPLASTY      BARIATRIC SURGERY      BOWEL RESECTION LAPAROSCOPIC N/A 2/11/2019    Procedure: LAPAROSCOPIC LYSIS OF ADHESIONS; RESECTION SMALL BOWEL; DECOMPRESSION OF GASTRIC REMNANT; EGD;  Surgeon: Leona Wagner MD;  Location: [facility];  Service: General    CHOLECYSTECTOMY      HERNIA REPAIR      HYSTERECTOMY      KNEE CARTILAGE SURGERY      IN COLONOSCOPY FLX DX W/COLLJ SPEC WHEN PFRMD N/A 3/28/2018    Procedure: COLONOSCOPY;  Surgeon: Mario Kaminski MD;  [facility];   Service: Gastroenterology    IN COLONOSCOPY FLX DX W/COLLJ [facility] WHEN PFRMD N/A 1/31/2019    Procedure: COLONOSCOPY;  Surgeon: Mario Kaminski MD;  [facility]: [facility]; Service: Gastroenterology    MI ESOPHAGOGASTRODUODENOSCOPY TRANSORAL DIAGNOSTIC N/A 3/22/2018    Procedure: ESOPHAGOGASTRODUODENOSCOPY (EGD); Surgeon: Juliet Reynolds MD;  [facility]; Service: Gastroenterology    MI ESOPHAGOGASTRODUODENOSCOPY TRANSORAL DIAGNOSTIC N/A 1/31/2019    Procedure: ESOPHAGOGASTRODUODENOSCOPY (EGD); Surgeon: Juliet Reynolds MD;  [facility]; Service: Gastroenterology    REDUCTION MAMMAPLASTY Bilateral     SHOULDER ARTHROSCOPY DISTAL CLAVICLE EXCISION AND OPEN ROTATOR CUFF REPAIR         Family History   Problem Relation Age of Onset    Heart attack Mother     Diabetes Mother     Heart attack Father     Stroke Father     Hypertension Brother     Leukemia Paternal Aunt      I have reviewed and agree with the history as documented  Social History     Tobacco Use    Smoking status: Never Smoker    Smokeless tobacco: Never Used   Substance Use Topics    Alcohol use: Not Currently     Frequency: Never     Binge frequency: Less than monthly    Drug use: No        Review of Systems   Constitutional: Negative for activity change, fatigue and fever  HENT: Negative for congestion, ear pain, rhinorrhea and sore throat  Eyes: Negative  Respiratory: Positive for shortness of breath  Negative for cough and wheezing  Cardiovascular: Positive for chest pain  Gastrointestinal: Positive for abdominal pain  Negative for diarrhea, nausea and vomiting  Endocrine: Negative  Genitourinary: Negative for difficulty urinating, dyspareunia, dysuria, flank pain, frequency, menstrual problem, pelvic pain, urgency, vaginal bleeding, vaginal discharge and vaginal pain  Musculoskeletal: Negative for arthralgias and myalgias  Skin: Negative for color change and pallor  Neurological: Negative for dizziness, speech difficulty, weakness and headaches  Hematological: Negative for adenopathy  Psychiatric/Behavioral: Negative for confusion         Physical Exam  Physical Exam   Constitutional: She is oriented to person, place, and time  She appears well-developed and well-nourished  She is cooperative  Non-toxic appearance  She does not have a sickly appearance  She does not appear ill  No distress  HENT:   Head: Normocephalic and atraumatic  Right Ear: Tympanic membrane and external ear normal    Left Ear: Tympanic membrane and external ear normal    Nose: No rhinorrhea, sinus tenderness or nasal deformity  No epistaxis  Right sinus exhibits no maxillary sinus tenderness and no frontal sinus tenderness  Left sinus exhibits no maxillary sinus tenderness and no frontal sinus tenderness  Mouth/Throat: Oropharynx is clear and moist and mucous membranes are normal  Normal dentition  Eyes: Pupils are equal, round, and reactive to light  EOM are normal    Neck: Normal range of motion  Neck supple  Cardiovascular: Normal rate, regular rhythm and normal heart sounds  No murmur heard  Pulmonary/Chest: Effort normal and breath sounds normal  No accessory muscle usage  No respiratory distress  She has no wheezes  She has no rales  She exhibits no tenderness  Abdominal: Soft  She exhibits no distension  There is generalized tenderness and tenderness in the left upper quadrant  There is guarding and CVA tenderness (left)  There is no rigidity and no rebound  Musculoskeletal: Normal range of motion  She exhibits no edema or tenderness  Lymphadenopathy:     She has no cervical adenopathy  Neurological: She is alert and oriented to person, place, and time  She exhibits normal muscle tone  Skin: Skin is warm and dry  No rash noted  No erythema  Psychiatric: She has a normal mood and affect  Nursing note and vitals reviewed        Vital Signs  ED Triage Vitals   Temperature Pulse Respirations Blood Pressure SpO2   02/25/19 1149 02/25/19 1147 02/25/19 1147 02/25/19 1147 02/25/19 1147   (!) 97 4 °F (36 3 °C) (!) 106 18 (!) 174/79 99 %      Temp Source Heart Rate Source Patient Position - Orthostatic VS BP Location FiO2 (%)   02/25/19 1149 02/25/19 1147 02/25/19 1147 02/25/19 1147 --   Axillary Monitor Lying Right arm       Pain Score       02/25/19 1147       Worst Possible Pain           Vitals:    02/25/19 1147 02/25/19 1329   BP: (!) 174/79 141/78   Pulse: (!) 106 95   Patient Position - Orthostatic VS: Lying Lying       Visual Acuity      ED Medications  Medications   metroNIDAZOLE (FLAGYL) IVPB (premix) 500 mg (500 mg Intravenous New Bag 2/25/19 1522)   levofloxacin (LEVAQUIN) IVPB (premix) 750 mg (has no administration in time range)   lactated ringers infusion (has no administration in time range)   ketorolac (TORADOL) injection 30 mg (30 mg Intravenous Given 2/25/19 1516)   sodium chloride 0 9 % bolus 1,000 mL (has no administration in time range)   HYDROmorphone (DILAUDID) injection 1 mg (1 mg Intravenous Given 2/25/19 1325)   iohexol (OMNIPAQUE) 350 MG/ML injection (MULTI-DOSE) 100 mL (100 mL Intravenous Given 2/25/19 1347)       Diagnostic Studies  Results Reviewed     Procedure Component Value Units Date/Time    Comprehensive metabolic panel [[id number]]  (Abnormal) Collected:  02/25/19 1309    Lab Status:  Final result Specimen:  Blood from Hand, Right Updated:  02/25/19 1334     Sodium 138 mmol/L      Potassium 3 3 mmol/L      Chloride 100 mmol/L      CO2 24 mmol/L      ANION GAP 14 mmol/L      BUN 17 mg/dL      Creatinine 0 78 mg/dL      Glucose 109 mg/dL      Calcium 9 8 mg/dL      AST 19 U/L      ALT 32 U/L      Alkaline Phosphatase 84 U/L      Total Protein 8 0 g/dL      Albumin 3 3 g/dL      Total Bilirubin 0 60 mg/dL      eGFR 90 ml/min/1 73sq m     Narrative:       National Kidney Disease Education Program recommendations are as follows:  GFR calculation is accurate only with a steady state creatinine  Chronic Kidney disease less than 60 ml/min/1 73 sq  meters  Kidney failure less than 15 ml/min/1 73 sq  meters      APTT [[id number]]  (Normal) Collected:  02/25/19 1204    Lab Status:  Final result Specimen:  Blood from Arm, Right Updated:  02/25/19 1316     PTT 30 seconds     Protime-INR [[id number]]  (Normal) Collected:  02/25/19 1204    Lab Status:  Final result Specimen:  Blood from Arm, Right Updated:  02/25/19 1316     Protime 13 5 seconds      INR 1 04    Lactic Acid x2 [[id number]]  (Normal) Collected:  02/25/19 1246    Lab Status:  Final result Specimen:  Blood from Arm, Left Updated:  02/25/19 1313     LACTIC ACID 1 4 mmol/L     Narrative:       Result may be elevated if tourniquet was used during collection  Blood culture #2 [[id number]] Collected:  02/25/19 1309    Lab Status: In process Specimen:  Blood from Hand, Right Updated:  02/25/19 1313    Blood culture #1 [[id number]] Collected:  02/25/19 1246    Lab Status:   In process Specimen:  Blood from Arm, Left Updated:  02/25/19 1250    Lactic Acid x2 [[id number]]     Lab Status:  No result Specimen:  Blood     UA w Reflex to Microscopic w Reflex to Culture [[id number]]     Lab Status:  No result Specimen:  Urine     Troponin I [[id number]]  (Normal) Collected:  02/25/19 1204    Lab Status:  Final result Specimen:  Blood from Arm, Right Updated:  02/25/19 1232     Troponin I <0 02 ng/mL     CBC and differential [[id number]]  (Abnormal) Collected:  02/25/19 1204    Lab Status:  Final result Specimen:  Blood from Arm, Right Updated:  02/25/19 1214     WBC 18 00 Thousand/uL      RBC 5 40 Million/uL      Hemoglobin 14 9 g/dL      Hematocrit 44 4 %      MCV 82 fL      MCH 27 6 pg      MCHC 33 6 g/dL      RDW 14 8 %      MPV 10 6 fL      Platelets 595 Thousands/uL      nRBC 0 /100 WBCs      Neutrophils Relative 83 %      Immat GRANS % 1 %      Lymphocytes Relative 8 %      Monocytes Relative 7 %      Eosinophils Relative 1 %      Basophils Relative 0 %      Neutrophils Absolute 15 14 Thousands/µL      Immature Grans Absolute 0 16 Thousand/uL      Lymphocytes Absolute 1 36 Thousands/µL      Monocytes Absolute 1 19 Thousand/µL      Eosinophils Absolute 0 11 Thousand/µL      Basophils Absolute 0 04 Thousands/µL                  PE Study with CT Abdomen and Pelvis with contrast   Final Result by  (02/25 1546)   Addendum 1 of 1 by Marie Noonan MD (02/25 1500)   ADDENDUM:   Regarding the left upper quadrant fluid, given its focal location and    potential developing thin rim of reactive tissue, for example image    605/90, a developing subphrenic abscess should be excluded  Results of    this interpretation of the examination were    conveyed to the surgical team by my colleague on 2/25/2019 at 3:00 PM         Final      X-ray chest 2 views   Final Result by [address], DO (02/25 1421)      Left basilar opacity suggesting effusion and posterior lower lobe opacity              Workstation performed: [id number]         CT guided perc drainage catheter placeme    (Results Pending)              Procedures  Procedures       Phone Contacts  ED Phone Contact    ED Course                         Wells' Criteria for PE      Most Recent Value   Wells' Criteria for PE   Clinical signs and symptoms of DVT  0 Filed at: 02/25/2019 1322   PE is primary diagnosis or equally likely  3 Filed at: 02/25/2019 1322   HR >100  1 5 Filed at: 02/25/2019 1322   Immobilization at least 3 days or Surgery in the previous 4 weeks  1 5 Filed at: 02/25/2019 1322   Previous, objectively diagnosed PE or DVT  0 Filed at: 02/25/2019 1322   Hemoptysis  0 Filed at: 02/25/2019 1322   Malignancy with treatment within 6 months or palliative  0 Filed at: 02/25/2019 1322   Wells' Criteria Total  6 Filed at: 02/25/2019 1322            MDM  Number of Diagnoses or Management Options  Generalized abdominal pain: new and requires workup  Pleural effusion: new and requires workup     Amount and/or Complexity of Data Reviewed  Clinical lab tests: reviewed and ordered  Tests in the radiology section of CPT®: reviewed and ordered  Review and summarize past medical records: yes  Discuss the patient with other providers: yes (Bariatrics- Jarad)  Independent visualization of images, tracings, or specimens: yes    Patient Progress  Patient progress: stable      Disposition  Final diagnoses:   Pleural effusion   Generalized abdominal pain     Time reflects when diagnosis was documented in both MDM as applicable and the Disposition within this note     Time User Action Codes Description Comment    2/25/2019  3:14 PM Joelle Partida Add [J90] Pleural effusion     2/25/2019  3:14 PM Joelle Partida Add [R10 84] Generalized abdominal pain       ED Disposition     ED Disposition Condition Date/Time Comment    Admit Stable Mon Feb 25, 2019  3:14 PM Case was discussed with Oswaldo Bush and the patient's admission status was agreed to be Admission Status: inpatient status to the service of Dr Oswaldo Bush   Follow-up Information    None         Patient's Medications   Discharge Prescriptions    No medications on file     No discharge procedures on file      ED Provider  Electronically Signed by           GREGORY Nolan  02/25/19 9792

## 2019-02-25 NOTE — UTILIZATION REVIEW
Notification of Discharge  This is a Notification of Discharge from our facility [address]  Please be advised that this patient has been discharge from our facility  Below you will find the admission and discharge date and time including the patients disposition  PRESENTATION DATE: 2/18/2019  8:43 PM  IP ADMISSION DATE: 2/18/19 2219  DISCHARGE DATE: 2/22/2019  8:53 AM  DISPOSITION: [facility] Utilization Review Department  Phone: [phone]; Fax [phone]  [email or web address]  ATTENTION: Please call with any questions or concerns to [phone]  and carefully listen to the prompts so that you are directed to the right person  Send all requests for admission clinical reviews, approved or denied determinations and any other requests to fax [phone]   All voicemails are confidential

## 2019-02-25 NOTE — BRIEF OP NOTE (RAD/CATH)
10 Nigerian maricruz splenic drainage catheter placement:  Procedure Note    PATIENT NAME: Arabella Smith  : 1970  MRN: [id number]     Pre-op Diagnosis:   1  Pleural effusion    2  Generalized abdominal pain      Post-op Diagnosis:   1  Pleural effusion    2  Generalized abdominal pain        Surgeon:   Jay Blake MD  Assistants:     No qualified resident was available, Resident is only observing    Estimated Blood Loss: minimal   Findings:     10 Nigerian APD placed with CT guidance into left abdominal/perisplenic collection  Brown serous fluid aspirated, 150ml approximate volume       Specimens: 727 mL     Complications:  none    Anesthesia: Conscious sedation and Sukhjinder Burgos MD     Date: 2019  Time: 5:10 PM

## 2019-02-25 NOTE — TELEPHONE ENCOUNTER
Lucille called and stated that she was having cold sweats ,weak and her stomach hurt  She was on her way back to the hospital

## 2019-02-25 NOTE — H&P
H&P - Bariatric Surgery   Sandra Crowder 51 y o  female MRN: [id number]  Unit/Bed#: ED 24 Encounter: [id number]    Assessment/Plan     Assessment:  49yo F s/p RYGB 2010 and s/p ex  lap GAURANG, resection of small bowel, and decompression of gastric remnant on 02/11/19 with persistent abdominal pain, weakness, SOB, chills, persistent elevated WBC  CT shows "increased intraperitoneal fluid identified in the left upper quadrant surrounding the spleen " Possible developing pneumonia  Plan:  -Admit to bariatric surgery   -2L bolus and IVF  -IV pain control  -Zofran   -IV Protonix   -IV flagyl and levaquin  -Blood cultures and trend WBC  -Replete potassium   -IR consult for drain placement for perisplenic intraperotoneal fluid    History of Present Illness     HPI:  Sandra Crowder is a 51 y o  female Body mass index is 37 28 kg/m²  s/p RYGB 2010 and s/p lap GAURANG, resection of small bowel, and decompression of gastric remnant on 02/11/19 with recent readmission on 02/18/19 POD#11 for persistent abdominal pain and constipation  She was D/C'd on 02/22/19 and felt much better until she began developing fatigue, chills, and sweats on Saturday night  She then developed diffuse abdominal pain that radiates to her lower back, as well as SOB and CP on Sunday morning  She denies vomiting but is starting to feel nausea now  Consults    Review of Systems   Constitutional: Positive for chills, diaphoresis and fatigue  Negative for fever and unexpected weight change  HENT: Negative for trouble swallowing  Respiratory: Positive for chest tightness and shortness of breath  Negative for cough  Cardiovascular: Negative for chest pain and palpitations  Gastrointestinal: Positive for abdominal pain and nausea  Negative for constipation, diarrhea and vomiting  Neurological: Positive for dizziness, weakness and light-headedness     Psychiatric/Behavioral:        Denies anxiety and depression       Historical Information   Past Medical History:   Diagnosis Date    Bell palsy     Gastric ulcer     GERD (gastroesophageal reflux disease)     History of transfusion     Melena     Pancreatic cyst     Right facial numbness     Upper GI bleed      Past Surgical History:   Procedure Laterality Date    ABDOMINOPLASTY      BARIATRIC SURGERY      BOWEL RESECTION LAPAROSCOPIC N/A 2/11/2019    Procedure: LAPAROSCOPIC LYSIS OF ADHESIONS; RESECTION SMALL BOWEL; DECOMPRESSION OF GASTRIC REMNANT; EGD;  Surgeon: Levy Mauro MD;  Location: [facility];  Service: General    CHOLECYSTECTOMY      HERNIA REPAIR      HYSTERECTOMY      KNEE CARTILAGE SURGERY      DE COLONOSCOPY FLX DX W/COLLJ SPEC WHEN PFRMD N/A 3/28/2018    Procedure: COLONOSCOPY;  Surgeon: Dede Whyte MD;  Location: [facility]; Service: Gastroenterology    DE COLONOSCOPY FLX DX W/COLLJ [facility] WHEN PFRMD N/A 1/31/2019    Procedure: COLONOSCOPY;  Surgeon: Dede Whyte MD;  [facility]; Service: Gastroenterology    DE ESOPHAGOGASTRODUODENOSCOPY TRANSORAL DIAGNOSTIC N/A 3/22/2018    Procedure: ESOPHAGOGASTRODUODENOSCOPY (EGD); Surgeon: Dede Whyte MD;  [facility]; Service: Gastroenterology    DE ESOPHAGOGASTRODUODENOSCOPY TRANSORAL DIAGNOSTIC N/A 1/31/2019    Procedure: ESOPHAGOGASTRODUODENOSCOPY (EGD); Surgeon: Dede Whyte MD;  [facility];   Service: Gastroenterology    REDUCTION MAMMAPLASTY Bilateral     SHOULDER ARTHROSCOPY DISTAL CLAVICLE EXCISION AND OPEN ROTATOR CUFF REPAIR       Social History   Social History     Substance and Sexual Activity   Alcohol Use Not Currently    Frequency: Never    Binge frequency: Less than monthly     Social History     Substance and Sexual Activity   Drug Use No     Social History     Tobacco Use   Smoking Status Never Smoker   Smokeless Tobacco Never Used     Family History: non-contributory    Meds/Allergies   all current active meds have been reviewed and current meds:   No current facility-administered medications for this encounter  Allergies   Allergen Reactions    Other Headache     MSG    Penicillins Rash       Objective   First Vitals:   Blood Pressure: (!) 174/79 (02/25/19 1147)  Pulse: (!) 106 (02/25/19 1147)  Temperature: (!) 97 4 °F (36 3 °C) (02/25/19 1149)  Temp Source: Axillary (02/25/19 1149)  Respirations: 18 (02/25/19 1147)  Height: 5' 9" (175 3 cm) (02/25/19 1147)  Weight - Scale: 115 kg (252 lb 6 8 oz) (02/25/19 1147)  SpO2: 99 % (02/25/19 1147)    Current Vitals:   Blood Pressure: 141/78 (02/25/19 1329)  Pulse: 95 (02/25/19 1329)  Temperature: (!) 97 4 °F (36 3 °C) (02/25/19 1149)  Temp Source: Axillary (02/25/19 1149)  Respirations: 18 (02/25/19 1329)  Height: 5' 9" (175 3 cm) (02/25/19 1147)  Weight - Scale: 115 kg (252 lb 6 8 oz) (02/25/19 1147)  SpO2: 97 % (02/25/19 1329)    No intake or output data in the 24 hours ending 02/25/19 1427    Invasive Devices          None          Physical Exam   Constitutional: She is oriented to person, place, and time  She appears well-developed and well-nourished  She appears distressed  HENT:   Head: Normocephalic and atraumatic  Eyes: Pupils are equal, round, and reactive to light  No scleral icterus  Cardiovascular: Normal rate, regular rhythm and normal heart sounds  Pulmonary/Chest: Effort normal and breath sounds normal  No respiratory distress  She exhibits no tenderness  Taking shallow breathes   Abdominal: Soft  She exhibits no distension  There is tenderness  There is guarding  There is no rebound  No hernia  No incisional hernias appreciated  Well healing lap incisions  Multiple ecchymotic sites around abdomen  Diffusely tender  Hypoactive BS  Musculoskeletal: She exhibits no edema  Neurological: She is alert and oriented to person, place, and time  Skin: Skin is warm and dry  Psychiatric: She has a normal mood and affect  Nursing note and vitals reviewed        Lab Results:   I have personally reviewed pertinent lab results  , CBC:   Lab Results   Component Value Date    WBC 18 00 (H) 02/25/2019    HGB 14 9 02/25/2019    HCT 44 4 02/25/2019    MCV 82 02/25/2019     02/25/2019    MCH 27 6 02/25/2019    MCHC 33 6 02/25/2019    RDW 14 8 02/25/2019    MPV 10 6 02/25/2019    NRBC 0 02/25/2019   , CMP:   Lab Results   Component Value Date    SODIUM 138 02/25/2019    K 3 3 (L) 02/25/2019     02/25/2019    CO2 24 02/25/2019    BUN 17 02/25/2019    CREATININE 0 78 02/25/2019    CALCIUM 9 8 02/25/2019    AST 19 02/25/2019    ALT 32 02/25/2019    ALKPHOS 84 02/25/2019    EGFR 90 02/25/2019   , Coagulation:   Lab Results   Component Value Date    INR 1 04 02/25/2019     Imaging:   PE Study with CT Abdomen and Pelvis (02/25/19): Impression:       1  Small to moderate left-sided pleural effusion with adjacent left basilar compressive atelectasis  2  No evidence of pulmonary embolism  3  Increased intraperitoneal fluid identified in the left upper quadrant surrounding the spleen   No extraluminal air   Diminished ascites elsewhere in the abdomen  4  Postoperative changes identified   No evidence of bowel obstruction  5  Pancreatic neck/head cyst unchanged from 2018  6   Incidental left thyroid nodules identified on this CT  According to guidelines published in the February 2015 white paper on incidental thyroid nodules in the Journal of the [employer] of [employer]iology [place]), because the nodule(s) are less than   1 5 cm in size and without suspicious feature, no further evaluation is recommended  Chest X-Ray (02/25/19): Impression:       Left basilar opacity suggesting effusion and posterior lower lobe opacity  EKG, Pathology, and Other Studies: I have personally reviewed pertinent reports  Counseling / Coordination of Care  Total floor / unit time spent today 45 minutes    Greater than 50% of total time was spent with the patient and / or family counseling and / or coordination of care

## 2019-02-26 LAB
ANION GAP SERPL CALCULATED.3IONS-SCNC: 10 MMOL/L (ref 4–13)
ATRIAL RATE: 101 BPM
BACTERIA UR QL AUTO: ABNORMAL /HPF
BILIRUB UR QL STRIP: ABNORMAL
BUN SERPL-MCNC: 19 MG/DL (ref 5–25)
CALCIUM SERPL-MCNC: 9.3 MG/DL (ref 8.3–10.1)
CHLORIDE SERPL-SCNC: 104 MMOL/L (ref 100–108)
CLARITY UR: CLEAR
CO2 SERPL-SCNC: 24 MMOL/L (ref 21–32)
COLOR UR: YELLOW
CREAT SERPL-MCNC: 0.75 MG/DL (ref 0.6–1.3)
ERYTHROCYTE [DISTWIDTH] IN BLOOD BY AUTOMATED COUNT: 15.1 % (ref 11.6–15.1)
GFR SERPL CREATININE-BSD FRML MDRD: 94 ML/MIN/1.73SQ M
GLUCOSE SERPL-MCNC: 118 MG/DL (ref 65–140)
GLUCOSE UR STRIP-MCNC: NEGATIVE MG/DL
HCT VFR BLD AUTO: 40.4 % (ref 34.8–46.1)
HGB BLD-MCNC: 13 G/DL (ref 11.5–15.4)
HGB UR QL STRIP.AUTO: ABNORMAL
KETONES UR STRIP-MCNC: ABNORMAL MG/DL
LEUKOCYTE ESTERASE UR QL STRIP: NEGATIVE
LIPASE FLD-CCNC: 248 U/L
LIPASE FLD-CCNC: 83 U/L
MAGNESIUM SERPL-MCNC: 1.9 MG/DL (ref 1.6–2.6)
MCH RBC QN AUTO: 27.7 PG (ref 26.8–34.3)
MCHC RBC AUTO-ENTMCNC: 32.2 G/DL (ref 31.4–37.4)
MCV RBC AUTO: 86 FL (ref 82–98)
MUCOUS THREADS UR QL AUTO: ABNORMAL
NITRITE UR QL STRIP: POSITIVE
NON-SQ EPI CELLS URNS QL MICRO: ABNORMAL /HPF
P AXIS: 56 DEGREES
PH UR STRIP.AUTO: 6 [PH] (ref 4.5–8)
PLATELET # BLD AUTO: 296 THOUSANDS/UL (ref 149–390)
PMV BLD AUTO: 10.4 FL (ref 8.9–12.7)
POTASSIUM SERPL-SCNC: 4.1 MMOL/L (ref 3.5–5.3)
PR INTERVAL: 134 MS
PROT UR STRIP-MCNC: ABNORMAL MG/DL
QRS AXIS: 55 DEGREES
QRSD INTERVAL: 84 MS
QT INTERVAL: 334 MS
QTC INTERVAL: 433 MS
RBC # BLD AUTO: 4.7 MILLION/UL (ref 3.81–5.12)
RBC #/AREA URNS AUTO: ABNORMAL /HPF
SODIUM SERPL-SCNC: 138 MMOL/L (ref 136–145)
SP GR UR STRIP.AUTO: >=1.03 (ref 1–1.03)
T WAVE AXIS: 11 DEGREES
UROBILINOGEN UR QL STRIP.AUTO: 0.2 E.U./DL
VENTRICULAR RATE: 101 BPM
WBC # BLD AUTO: 12.23 THOUSAND/UL (ref 4.31–10.16)
WBC #/AREA URNS AUTO: ABNORMAL /HPF

## 2019-02-26 PROCEDURE — 85027 COMPLETE CBC AUTOMATED: CPT | Performed by: SURGERY

## 2019-02-26 PROCEDURE — 81001 URINALYSIS AUTO W/SCOPE: CPT

## 2019-02-26 PROCEDURE — 99024 POSTOP FOLLOW-UP VISIT: CPT | Performed by: SURGERY

## 2019-02-26 PROCEDURE — 83735 ASSAY OF MAGNESIUM: CPT | Performed by: SURGERY

## 2019-02-26 PROCEDURE — 80048 BASIC METABOLIC PNL TOTAL CA: CPT | Performed by: SURGERY

## 2019-02-26 PROCEDURE — 83690 ASSAY OF LIPASE: CPT | Performed by: PHYSICIAN ASSISTANT

## 2019-02-26 PROCEDURE — 93010 ELECTROCARDIOGRAM REPORT: CPT | Performed by: INTERNAL MEDICINE

## 2019-02-26 RX ADMIN — OXYCODONE HYDROCHLORIDE 10 MG: 5 SOLUTION ORAL at 01:58

## 2019-02-26 RX ADMIN — SODIUM CHLORIDE, SODIUM LACTATE, POTASSIUM CHLORIDE, AND CALCIUM CHLORIDE 125 ML/HR: .6; .31; .03; .02 INJECTION, SOLUTION INTRAVENOUS at 23:28

## 2019-02-26 RX ADMIN — ACETAMINOPHEN 975 MG: 325 TABLET, FILM COATED ORAL at 00:29

## 2019-02-26 RX ADMIN — METRONIDAZOLE 500 MG: 500 INJECTION, SOLUTION INTRAVENOUS at 17:57

## 2019-02-26 RX ADMIN — SODIUM CHLORIDE, SODIUM LACTATE, POTASSIUM CHLORIDE, AND CALCIUM CHLORIDE 125 ML/HR: .6; .31; .03; .02 INJECTION, SOLUTION INTRAVENOUS at 06:14

## 2019-02-26 RX ADMIN — KETOROLAC TROMETHAMINE 30 MG: 30 INJECTION, SOLUTION INTRAMUSCULAR at 06:13

## 2019-02-26 RX ADMIN — LEVOFLOXACIN 750 MG: 5 INJECTION, SOLUTION INTRAVENOUS at 19:37

## 2019-02-26 RX ADMIN — METRONIDAZOLE 500 MG: 500 INJECTION, SOLUTION INTRAVENOUS at 01:35

## 2019-02-26 RX ADMIN — ACETAMINOPHEN 975 MG: 325 TABLET, FILM COATED ORAL at 11:42

## 2019-02-26 RX ADMIN — KETOROLAC TROMETHAMINE 30 MG: 30 INJECTION, SOLUTION INTRAMUSCULAR at 00:28

## 2019-02-26 RX ADMIN — ACETAMINOPHEN 975 MG: 325 TABLET, FILM COATED ORAL at 23:31

## 2019-02-26 RX ADMIN — ACETAMINOPHEN 975 MG: 325 TABLET, FILM COATED ORAL at 06:13

## 2019-02-26 RX ADMIN — KETOROLAC TROMETHAMINE 30 MG: 30 INJECTION, SOLUTION INTRAMUSCULAR at 11:42

## 2019-02-26 RX ADMIN — ENOXAPARIN SODIUM 40 MG: 40 INJECTION SUBCUTANEOUS at 09:39

## 2019-02-26 RX ADMIN — KETOROLAC TROMETHAMINE 30 MG: 30 INJECTION, SOLUTION INTRAMUSCULAR at 23:30

## 2019-02-26 RX ADMIN — KETOROLAC TROMETHAMINE 30 MG: 30 INJECTION, SOLUTION INTRAMUSCULAR at 17:57

## 2019-02-26 RX ADMIN — SODIUM CHLORIDE, SODIUM LACTATE, POTASSIUM CHLORIDE, AND CALCIUM CHLORIDE 125 ML/HR: .6; .31; .03; .02 INJECTION, SOLUTION INTRAVENOUS at 11:37

## 2019-02-26 RX ADMIN — METRONIDAZOLE 500 MG: 500 INJECTION, SOLUTION INTRAVENOUS at 09:39

## 2019-02-26 RX ADMIN — OXYCODONE HYDROCHLORIDE 5 MG: 5 SOLUTION ORAL at 19:40

## 2019-02-26 NOTE — PLAN OF CARE
Problem: Potential for Falls  Goal: Patient will remain free of falls  Description  INTERVENTIONS:  - Assess patient frequently for physical needs  -  Identify cognitive and physical deficits and behaviors that affect risk of falls    -  [place] fall precautions as indicated by assessment   - Educate patient/family on patient safety including physical limitations  - Instruct patient to call for assistance with activity based on assessment  - Modify environment to reduce risk of injury  - Consider OT/PT consult to assist with strengthening/mobility  Outcome: Progressing     Problem: PAIN - ADULT  Goal: Verbalizes/displays adequate comfort level or baseline comfort level  Description  Interventions:  - Encourage patient to monitor pain and request assistance  - Assess pain using appropriate pain scale  - Administer analgesics based on type and severity of pain and evaluate response  - Implement non-pharmacological measures as appropriate and evaluate response  - Consider cultural and social influences on pain and pain management  - Notify physician/advanced practitioner if interventions unsuccessful or patient reports new pain  Outcome: Progressing     Problem: INFECTION - ADULT  Goal: Absence or prevention of progression during hospitalization  Description  INTERVENTIONS:  - Assess and monitor for signs and symptoms of infection  - Monitor lab/diagnostic results  - Monitor all insertion sites, i e  indwelling lines, tubes, and drains  - Monitor endotracheal (as able) and nasal secretions for changes in amount and color  - [place] appropriate cooling/warming therapies per order  - Administer medications as ordered  - Instruct and encourage patient and family to use good hand hygiene technique  - Identify and instruct in appropriate isolation precautions for identified infection/condition  Outcome: Progressing  Goal: Absence of fever/infection during neutropenic period  Description  INTERVENTIONS:  - Monitor WBC  - Implement neutropenic guidelines  Outcome: Progressing     Problem: SAFETY ADULT  Goal: Maintain or return to baseline ADL function  Description  INTERVENTIONS:  -  Assess patient's ability to carry out ADLs; assess patient's baseline for ADL function and identify physical deficits which impact ability to perform ADLs (bathing, care of mouth/teeth, toileting, grooming, dressing, etc )  - Assess/evaluate cause of self-care deficits   - Assess range of motion  - Assess patient's mobility; develop plan if impaired  - Assess patient's need for assistive devices and provide as appropriate  - Encourage maximum independence but intervene and supervise when necessary  ¯ Involve family in performance of ADLs  ¯ Assess for home care needs following discharge   ¯ Request OT consult to assist with ADL evaluation and planning for discharge  ¯ Provide patient education as appropriate  Outcome: Progressing  Goal: Maintain or return mobility status to optimal level  Description  INTERVENTIONS:  - Assess patient's baseline mobility status (ambulation, transfers, stairs, etc )    - Identify cognitive and physical deficits and behaviors that affect mobility  - Identify mobility aids required to assist with transfers and/or ambulation (gait belt, sit-to-stand, lift, walker, cane, etc )  - [place] fall precautions as indicated by assessment  - Record patient progress and toleration of activity level on Mobility SBAR; progress patient to next Phase/Stage  - Instruct patient to call for assistance with activity based on assessment  - Request Rehabilitation consult to assist with strengthening/weightbearing, etc   Outcome: Progressing     Problem: DISCHARGE PLANNING  Goal: Discharge to home or other facility with appropriate resources  Description  INTERVENTIONS:  - Identify barriers to discharge w/patient and caregiver  - Arrange for needed discharge resources and transportation as appropriate  - Identify discharge learning needs (meds, wound care, etc )  - Arrange for interpretive services to assist at discharge as needed  - Refer to Case Management Department for coordinating discharge planning if the patient needs post-hospital services based on physician/advanced practitioner order or complex needs related to functional status, cognitive ability, or social support system  Outcome: Progressing     Problem: Knowledge Deficit  Goal: Patient/family/caregiver demonstrates understanding of disease process, treatment plan, medications, and discharge instructions  Description  Complete learning assessment and assess knowledge base    Interventions:  - Provide teaching at level of understanding  - Provide teaching via preferred learning methods  Outcome: Progressing

## 2019-02-26 NOTE — PLAN OF CARE
Problem: Potential for Falls  Goal: Patient will remain free of falls  Description  INTERVENTIONS:  - Assess patient frequently for physical needs  -  Identify cognitive and physical deficits and behaviors that affect risk of falls    -  [place] fall precautions as indicated by assessment   - Educate patient/family on patient safety including physical limitations  - Instruct patient to call for assistance with activity based on assessment  - Modify environment to reduce risk of injury  - Consider OT/PT consult to assist with strengthening/mobility  Outcome: Progressing     Problem: PAIN - ADULT  Goal: Verbalizes/displays adequate comfort level or baseline comfort level  Description  Interventions:  - Encourage patient to monitor pain and request assistance  - Assess pain using appropriate pain scale  - Administer analgesics based on type and severity of pain and evaluate response  - Implement non-pharmacological measures as appropriate and evaluate response  - Consider cultural and social influences on pain and pain management  - Notify physician/advanced practitioner if interventions unsuccessful or patient reports new pain  Outcome: Progressing     Problem: INFECTION - ADULT  Goal: Absence or prevention of progression during hospitalization  Description  INTERVENTIONS:  - Assess and monitor for signs and symptoms of infection  - Monitor lab/diagnostic results  - Monitor all insertion sites, i e  indwelling lines, tubes, and drains  - Monitor endotracheal (as able) and nasal secretions for changes in amount and color  - [place] appropriate cooling/warming therapies per order  - Administer medications as ordered  - Instruct and encourage patient and family to use good hand hygiene technique  - Identify and instruct in appropriate isolation precautions for identified infection/condition  Outcome: Progressing  Goal: Absence of fever/infection during neutropenic period  Description  INTERVENTIONS:  - Monitor WBC  - Implement neutropenic guidelines  Outcome: Progressing     Problem: SAFETY ADULT  Goal: Maintain or return to baseline ADL function  Description  INTERVENTIONS:  -  Assess patient's ability to carry out ADLs; assess patient's baseline for ADL function and identify physical deficits which impact ability to perform ADLs (bathing, care of mouth/teeth, toileting, grooming, dressing, etc )  - Assess/evaluate cause of self-care deficits   - Assess range of motion  - Assess patient's mobility; develop plan if impaired  - Assess patient's need for assistive devices and provide as appropriate  - Encourage maximum independence but intervene and supervise when necessary  ¯ Involve family in performance of ADLs  ¯ Assess for home care needs following discharge   ¯ Request OT consult to assist with ADL evaluation and planning for discharge  ¯ Provide patient education as appropriate  Outcome: Progressing  Goal: Maintain or return mobility status to optimal level  Description  INTERVENTIONS:  - Assess patient's baseline mobility status (ambulation, transfers, stairs, etc )    - Identify cognitive and physical deficits and behaviors that affect mobility  - Identify mobility aids required to assist with transfers and/or ambulation (gait belt, sit-to-stand, lift, walker, cane, etc )  - [place] fall precautions as indicated by assessment  - Record patient progress and toleration of activity level on Mobility SBAR; progress patient to next Phase/Stage  - Instruct patient to call for assistance with activity based on assessment  - Request Rehabilitation consult to assist with strengthening/weightbearing, etc   Outcome: Progressing     Problem: DISCHARGE PLANNING  Goal: Discharge to home or other facility with appropriate resources  Description  INTERVENTIONS:  - Identify barriers to discharge w/patient and caregiver  - Arrange for needed discharge resources and transportation as appropriate  - Identify discharge learning needs (meds, wound care, etc )  - Arrange for interpretive services to assist at discharge as needed  - Refer to Case Management Department for coordinating discharge planning if the patient needs post-hospital services based on physician/advanced practitioner order or complex needs related to functional status, cognitive ability, or social support system  Outcome: Progressing     Problem: Knowledge Deficit  Goal: Patient/family/caregiver demonstrates understanding of disease process, treatment plan, medications, and discharge instructions  Description  Complete learning assessment and assess knowledge base    Interventions:  - Provide teaching at level of understanding  - Provide teaching via preferred learning methods  Outcome: Progressing

## 2019-02-26 NOTE — UTILIZATION REVIEW
Notification of Inpatient Admission/Inpatient Authorization Request  This is a Notification of Inpatient Admission/Request for Inpatient Authorization for our facility [address]  Be advised that this patient was admitted to our facility under Inpatient Status  Please contact the Utilization Review Department where the patient is receiving care services for additional admission information  Place of Service Code: 24   Place of Service Name: Inpatient Hospital  Presentation Date & Time: 2/25/2019 11:41 AM  Inpatient Admission Date & Time: 2/25/19 1525  Discharge Date & Time: No discharge date for patient encounter  Discharge Disposition (if discharged): Home/Self Care  Attending Physician: Fareed Perez Md [[id number]]  Admission Orders (From admission, onward)    Ordered        02/25/19 1525  Inpatient Admission  Once     Order ID Start Status   [id number] 02/25/19 1525 Completed                [facility]ility: [facility]   Utilization Review Department  Phone: [phone]; Fax [phone]  [email or web address]  ATTENTION: Please call with any questions or concerns to [phone]  and carefully listen to the prompts so that you are directed to the right person  Send all requests for admission clinical reviews, approved or denied determinations and any other requests to fax [phone]   All voicemails are confidential

## 2019-02-26 NOTE — PROGRESS NOTES
Progress Note - Bariatric Surgery   Lazaro Peres 51 y o  female MRN: [id number]  Unit/Bed#: -01 Encounter: [id number]      Subjective/Objective     Subjective:   50 yo F s/p RYGB 2010 and s/p ex  Lap, GAURANG, resection of small bowel, and decompression of gastric remnant on 02/11/19 and s/p CT guided drainage of left abdominal/perisplenic collection yesterday  Reports abdominal pain resolved - mild soreness  Fearful to take deep breathes, but SOB and CP resolved  Urinating well, but reports urine slightly dark  DARREN drain with minimal output  Objective:    /83 (BP Location: Right arm)   Pulse 78   Temp 98 2 °F (36 8 °C) (Oral)   Resp 18   Ht 5' 9" (1 753 m)   Wt 115 kg (253 lb 8 5 oz)   SpO2 96%   BMI 37 44 kg/m²       Intake/Output Summary (Last 24 hours) at 2/26/2019 0850  Last data filed at 2/26/2019 8310  Gross per 24 hour   Intake 1266 25 ml   Output 440 ml   Net 826 25 ml       Invasive Devices     Peripheral Intravenous Line            Peripheral IV Left Antecubital -- days          Drain            Closed/Suction Drain Left LUQ Bulb 10 2 Fr  less than 1 day                ROS: 10-point system completed  All negative except see HPI  Physical Exam    General Appearance:    Alert, cooperative, no distress, appears stated age   Head:    Normocephalic, without obvious abnormality, atraumatic   Lungs:     Clear to auscultation bilaterally, respirations unlabored   Heart:    Regular rate and rhythm, S1 and S2 normal, no murmur, rub    or gallop   Abdomen:     Soft, appropriate tenderness, BS, left sided subcostal DARREN drain with scant serous fluid  Multiple ecchymotic sites around abdomen, incision sites C/D/I, no signs of infection   Extremities:   Extremities normal, atraumatic, no cyanosis or edema   Neurologic:   CNII-XII intact  Normal strength and sensation               Lab, Imaging and other studies:  I have personally reviewed pertinent lab results    , CBC:   Lab Results   Component Value Date    WBC 12 23 (H) 02/26/2019    HGB 13 0 02/26/2019    HCT 40 4 02/26/2019    MCV 86 02/26/2019     02/26/2019    MCH 27 7 02/26/2019    MCHC 32 2 02/26/2019    RDW 15 1 02/26/2019    MPV 10 4 02/26/2019    NRBC 0 02/25/2019   , CMP:   Lab Results   Component Value Date    SODIUM 138 02/26/2019    K 4 1 02/26/2019     02/26/2019    CO2 24 02/26/2019    BUN 19 02/26/2019    CREATININE 0 75 02/26/2019    CALCIUM 9 3 02/26/2019    AST 19 02/25/2019    ALT 32 02/25/2019    ALKPHOS 84 02/25/2019    EGFR 94 02/26/2019        VTE Mechanical Prophylaxis: sequential compression device, lovenox    Assessment/Plan  50 yo F s/p RYGB 2010 and s/p ex  Lap, GAURANG, resection of small bowel, and decompression of gastric remnant on 02/11/19 and s/p CT guided drainage of left abdominal/perisplenic collection yesterday  Abdominal pain resolved  WBC trending down  Potassium normalized  Intraabdominal fluid cultures negative  Blood cultures pending      Plan:   -Continue with incentive spirometry and encourage deep breathing  -Monitor DARREN drain  -Clear liquid diet and protein supplement  -IV fluids  -PO pain control and IV Toradol   -Continue with IV antibiotics and trend WBC, blood cultures pending  -Will repeat lipase on perisplenic fluid  -If she continues to progress well will likely plan on D/C tomorrow    Rani Lanza PA-C  2/26/2019  9:04 AM

## 2019-02-26 NOTE — PLAN OF CARE
Problem: Potential for Falls  Goal: Patient will remain free of falls  Description  INTERVENTIONS:  - Assess patient frequently for physical needs  -  Identify cognitive and physical deficits and behaviors that affect risk of falls    -  [place] fall precautions as indicated by assessment   - Educate patient/family on patient safety including physical limitations  - Instruct patient to call for assistance with activity based on assessment  - Modify environment to reduce risk of injury  - Consider OT/PT consult to assist with strengthening/mobility  Outcome: Progressing     Problem: PAIN - ADULT  Goal: Verbalizes/displays adequate comfort level or baseline comfort level  Description  Interventions:  - Encourage patient to monitor pain and request assistance  - Assess pain using appropriate pain scale  - Administer analgesics based on type and severity of pain and evaluate response  - Implement non-pharmacological measures as appropriate and evaluate response  - Consider cultural and social influences on pain and pain management  - Notify physician/advanced practitioner if interventions unsuccessful or patient reports new pain  Outcome: Progressing     Problem: INFECTION - ADULT  Goal: Absence or prevention of progression during hospitalization  Description  INTERVENTIONS:  - Assess and monitor for signs and symptoms of infection  - Monitor lab/diagnostic results  - Monitor all insertion sites, i e  indwelling lines, tubes, and drains  - Monitor endotracheal (as able) and nasal secretions for changes in amount and color  - [place] appropriate cooling/warming therapies per order  - Administer medications as ordered  - Instruct and encourage patient and family to use good hand hygiene technique  - Identify and instruct in appropriate isolation precautions for identified infection/condition  Outcome: Progressing  Goal: Absence of fever/infection during neutropenic period  Description  INTERVENTIONS:  - Monitor WBC  - Implement neutropenic guidelines  Outcome: Progressing     Problem: SAFETY ADULT  Goal: Maintain or return to baseline ADL function  Description  INTERVENTIONS:  -  Assess patient's ability to carry out ADLs; assess patient's baseline for ADL function and identify physical deficits which impact ability to perform ADLs (bathing, care of mouth/teeth, toileting, grooming, dressing, etc )  - Assess/evaluate cause of self-care deficits   - Assess range of motion  - Assess patient's mobility; develop plan if impaired  - Assess patient's need for assistive devices and provide as appropriate  - Encourage maximum independence but intervene and supervise when necessary  ¯ Involve family in performance of ADLs  ¯ Assess for home care needs following discharge   ¯ Request OT consult to assist with ADL evaluation and planning for discharge  ¯ Provide patient education as appropriate  Outcome: Progressing  Goal: Maintain or return mobility status to optimal level  Description  INTERVENTIONS:  - Assess patient's baseline mobility status (ambulation, transfers, stairs, etc )    - Identify cognitive and physical deficits and behaviors that affect mobility  - Identify mobility aids required to assist with transfers and/or ambulation (gait belt, sit-to-stand, lift, walker, cane, etc )  - [place] fall precautions as indicated by assessment  - Record patient progress and toleration of activity level on Mobility SBAR; progress patient to next Phase/Stage  - Instruct patient to call for assistance with activity based on assessment  - Request Rehabilitation consult to assist with strengthening/weightbearing, etc   Outcome: Progressing     Problem: DISCHARGE PLANNING  Goal: Discharge to home or other facility with appropriate resources  Description  INTERVENTIONS:  - Identify barriers to discharge w/patient and caregiver  - Arrange for needed discharge resources and transportation as appropriate  - Identify discharge learning needs (meds, wound care, etc )  - Arrange for interpretive services to assist at discharge as needed  - Refer to Case Management Department for coordinating discharge planning if the patient needs post-hospital services based on physician/advanced practitioner order or complex needs related to functional status, cognitive ability, or social support system  Outcome: Progressing     Problem: Knowledge Deficit  Goal: Patient/family/caregiver demonstrates understanding of disease process, treatment plan, medications, and discharge instructions  Description  Complete learning assessment and assess knowledge base    Interventions:  - Provide teaching at level of understanding  - Provide teaching via preferred learning methods  Outcome: Progressing

## 2019-02-26 NOTE — UTILIZATION REVIEW
Initial Clinical Review    Admission: Date/Time/Statement: 2/25/19 @ 1525   Orders Placed This Encounter   Procedures    Inpatient Admission     Standing Status:   Standing     Number of Occurrences:   1     Order Specific Question:   Admitting Physician     Answer:   Medhat Gunter     Order Specific Question:   Level of Care     Answer:   Med Surg [16]     Order Specific Question:   Estimated length of stay     Answer:   More than 2 Midnights     Order Specific Question:   Certification     Answer:   I certify that inpatient services are medically necessary for this patient for a duration of greater than two midnights  See H&P and MD Progress Notes for additional information about the patient's course of treatment  ED: Date/Time/Mode of Arrival:   ED Arrival Information     Expected Arrival Acuity Means of Arrival Escorted By Service Admission Type    - 2/25/2019 11:38 Emergent Wheelchair Self Bariatrics Emergency    Arrival Complaint    chest pain         Chief Complaint:   Chief Complaint   Patient presents with    Abdominal Pain     pt states to have been discharged from hospital last wednesday for abdominal surgery  pt c/o abdominal pain that is radiating towards left     Chest Pain     pt states to have new onset of chest pain that started this morning, also c/o SOB  History of Illness: 50 yo female to ED s/p lap GAURANG, resection of small bowel, and decompression of gastric remnant on 02/11/19 with recent readmission on 02/18/19 POD#11 for persistent abdominal pain and constipation  Was DC on 2/22 then began to develop fatigue, chills and sweats on Saturday night        PE : abd tenderness and guarding , distressed     ED Vital Signs:   ED Triage Vitals   Temperature Pulse Respirations Blood Pressure SpO2   02/25/19 1149 02/25/19 1147 02/25/19 1147 02/25/19 1147 02/25/19 1147   (!) 97 4 °F (36 3 °C) (!) 106 18 (!) 174/79 99 %      Temp Source Heart Rate Source Patient Position - Orthostatic VS BP Location FiO2 (%)   02/25/19 1149 02/25/19 1147 02/25/19 1147 02/25/19 1147 --   Axillary Monitor Lying Right arm       Pain Score       02/25/19 1147       Worst Possible Pain        Wt Readings from Last 1 Encounters:   02/25/19 115 kg (253 lb 8 5 oz)     Vital Signs (abnormal): wnl   Pertinent Labs/Diagnostic Test Results: K   3 3, an gap 14, alb  3 3, wbc 18 00  CT guided perc drainage catheter placement -    Impression: Successful placement of a 10 [place]ien all-purpose drainage catheter into the left upper quadrant perisplenic collection  150 cc of brown fluid was aspirated and a specimen sent to the laboratory as described  CT abd-    1  Small to moderate left-sided pleural effusion with adjacent left basilar compressive atelectasis  2  No evidence of pulmonary embolism  3  Increased intraperitoneal fluid identified in the left upper quadrant surrounding the spleen   No extraluminal air   Diminished ascites elsewhere in the abdomen  4  Postoperative changes identified   No evidence of bowel obstruction  5  Pancreatic neck/head cyst unchanged from 2018  6   Incidental left thyroid nodules identified on this CT      Regarding the left upper quadrant fluid, given its focal location and potential developing thin rim of reactive tissue, for example image 605/90, a developing subphrenic abscess should be excluded  CXR -    eft basilar opacity suggesting effusion and posterior lower lobe opacity          ED Treatment:   Medication Administration from 02/25/2019 1138 to 02/25/2019 1732       Date/Time Order Dose Route Action Action by Comments     02/25/2019 1325 HYDROmorphone (DILAUDID) injection 1 mg 1 mg Intravenous Given Devin Briggs RN      02/25/2019 1347 iohexol (OMNIPAQUE) 350 MG/ML injection (MULTI-DOSE) 100 mL 100 mL Intravenous Given Thi Marieman      02/25/2019 1522 metroNIDAZOLE (FLAGYL) IVPB (premix) 500 mg 500 mg Intravenous New Bag Devin Briggs RN      02/25/2019 1616 sodium chloride 0 9 % bolus 1,000 mL 0 mL Intravenous Stopped Alok Esposito RN      02/25/2019 1520 sodium chloride 0 9 % bolus 1,000 mL 1,000 mL Intravenous New Bag Alok Esposito RN verbal stat     02/25/2019 1516 ketorolac (TORADOL) injection 30 mg 30 mg Intravenous Given Alok Esposito RN      02/25/2019 1650 midazolam (VERSED) injection 1 mg Intravenous Given Husam Zaman RN      02/25/2019 1639 midazolam (VERSED) injection 1 mg Intravenous Given Husam Zaman RN      02/25/2019 1659 fentanyl citrate (PF) 100 MCG/2ML 25 mcg Intravenous Given Lela Paz RN      02/25/2019 1650 fentanyl citrate (PF) 100 MCG/2ML 50 mcg Intravenous Given Husam Zaman RN      02/25/2019 1639 fentanyl citrate (PF) 100 MCG/2ML 50 mcg Intravenous Given Husam Zaman RN      02/25/2019 1656 diphenhydrAMINE (BENADRYL) injection 25 mg Intravenous Given Husam Zaman RN      02/25/2019 1651 diphenhydrAMINE (BENADRYL) injection 25 mg Intravenous Given Husam Zaman RN      02/25/2019 1700 lidocaine (XYLOCAINE) 1 % injection 5 mL Infiltration Given Marlee Radford MD Left side of abd        Past Medical/Surgical History:    Active Ambulatory Problems     Diagnosis Date Noted    Upper GI bleed 03/20/2018    Gastrointestinal hemorrhage associated with gastric ulcer 03/23/2018    History of gastric ulcer 01/24/2019    Epigastric pain 01/24/2019    Pancreatic cyst 01/24/2019    Abnormal CT scan, colon 01/24/2019    Bilious vomiting with nausea 01/24/2019    Gastrointestinal hemorrhage 01/24/2019    Abnormal CT of the abdomen 01/24/2019    Abdominal pain, LUQ 02/01/2019    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    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    Acute gastrointestinal hemorrhage 02/06/2019    Acute primary diaphragmitis 02/06/2019    Cyst of pancreas 02/06/2019    Generalized abdominal pain 02/18/2019     Past Medical History:   Diagnosis Date    Bell palsy     Gastric ulcer     GERD (gastroesophageal reflux disease)     History of transfusion     Melena     Pancreatic cyst     Right facial numbness     Upper GI bleed      Admitting Diagnosis: Chest pain [R07 9]  Abdominal pain [R10 9]  Pleural effusion [J90]  Generalized abdominal pain [R10 84]  Age/Sex: 51 y o  female  Assessment/Plan: 52 yo female admitted with persistent abdominal pain, weakness, SOB, chills, persistent elevated WBC  CT shows "increased intraperitoneal fluid identified in the left upper quadrant surrounding the spleen " Possible developing pneumonia  Will admit to bariatric sx give 2L bolus IVF , iv pain control , zofran , iv protonix , iv flagyl and levaquin , f/u bld cx and trend wbc, replete K , IR consult for drain placement for perisplenic intraperitoneal fluid  Admission Orders:  Scheduled Meds:   Current Facility-Administered Medications:  acetaminophen 975 mg Oral Q6H [facility]     enoxaparin 40 mg Subcutaneous Q24H EDA     ketorolac 30 mg Intravenous Q6H [facility]     lactated ringers 125 mL/hr Intravenous Continuous  Last Rate: 125 mL/hr (02/26/19 1482)   levofloxacin 750 mg Intravenous Q24H  Last Rate: 750 mg (02/25/19 2009)   metroNIDAZOLE 500 mg Intravenous Q8H  Last Rate: 500 mg (02/26/19 0135)   oxyCODONE 10 mg Oral Q4H PRN     oxyCODONE 5 mg Oral Q4H PRN       Tele   I&O   Change tube dressing QD prn   Bedrest   Flush tube w/ 10 ml NS qd  Clear liq diet   2/26  Bmp, cbc , mg   CT guided per drainage catheter 2/25  Impression: Successful placement of a 10 Urdu all-purpose drainage catheter into the left upper quadrant perisplenic collection   150 cc of brown fluid was aspirated and a specimen sent to the laboratory as described

## 2019-02-27 VITALS
TEMPERATURE: 98.1 F | DIASTOLIC BLOOD PRESSURE: 77 MMHG | HEIGHT: 69 IN | WEIGHT: 253.53 LBS | OXYGEN SATURATION: 98 % | RESPIRATION RATE: 18 BRPM | SYSTOLIC BLOOD PRESSURE: 152 MMHG | HEART RATE: 78 BPM | BODY MASS INDEX: 37.55 KG/M2

## 2019-02-27 LAB
ANION GAP SERPL CALCULATED.3IONS-SCNC: 10 MMOL/L (ref 4–13)
BASOPHILS # BLD AUTO: 0.03 THOUSANDS/ΜL (ref 0–0.1)
BASOPHILS NFR BLD AUTO: 0 % (ref 0–1)
BUN SERPL-MCNC: 11 MG/DL (ref 5–25)
CALCIUM SERPL-MCNC: 9.3 MG/DL (ref 8.3–10.1)
CHLORIDE SERPL-SCNC: 104 MMOL/L (ref 100–108)
CO2 SERPL-SCNC: 24 MMOL/L (ref 21–32)
CREAT SERPL-MCNC: 0.71 MG/DL (ref 0.6–1.3)
EOSINOPHIL # BLD AUTO: 0.09 THOUSAND/ΜL (ref 0–0.61)
EOSINOPHIL NFR BLD AUTO: 1 % (ref 0–6)
ERYTHROCYTE [DISTWIDTH] IN BLOOD BY AUTOMATED COUNT: 15.1 % (ref 11.6–15.1)
GFR SERPL CREATININE-BSD FRML MDRD: 100 ML/MIN/1.73SQ M
GLUCOSE SERPL-MCNC: 108 MG/DL (ref 65–140)
HCT VFR BLD AUTO: 36.2 % (ref 34.8–46.1)
HGB BLD-MCNC: 11.6 G/DL (ref 11.5–15.4)
IMM GRANULOCYTES # BLD AUTO: 0.09 THOUSAND/UL (ref 0–0.2)
IMM GRANULOCYTES NFR BLD AUTO: 1 % (ref 0–2)
LYMPHOCYTES # BLD AUTO: 0.82 THOUSANDS/ΜL (ref 0.6–4.47)
LYMPHOCYTES NFR BLD AUTO: 8 % (ref 14–44)
MCH RBC QN AUTO: 27.6 PG (ref 26.8–34.3)
MCHC RBC AUTO-ENTMCNC: 32 G/DL (ref 31.4–37.4)
MCV RBC AUTO: 86 FL (ref 82–98)
MONOCYTES # BLD AUTO: 0.85 THOUSAND/ΜL (ref 0.17–1.22)
MONOCYTES NFR BLD AUTO: 8 % (ref 4–12)
NEUTROPHILS # BLD AUTO: 8.47 THOUSANDS/ΜL (ref 1.85–7.62)
NEUTS SEG NFR BLD AUTO: 82 % (ref 43–75)
NRBC BLD AUTO-RTO: 0 /100 WBCS
PLATELET # BLD AUTO: 285 THOUSANDS/UL (ref 149–390)
PMV BLD AUTO: 10.5 FL (ref 8.9–12.7)
POTASSIUM SERPL-SCNC: 3.9 MMOL/L (ref 3.5–5.3)
RBC # BLD AUTO: 4.21 MILLION/UL (ref 3.81–5.12)
SODIUM SERPL-SCNC: 138 MMOL/L (ref 136–145)
WBC # BLD AUTO: 10.35 THOUSAND/UL (ref 4.31–10.16)

## 2019-02-27 PROCEDURE — 80048 BASIC METABOLIC PNL TOTAL CA: CPT | Performed by: PHYSICIAN ASSISTANT

## 2019-02-27 PROCEDURE — 99024 POSTOP FOLLOW-UP VISIT: CPT | Performed by: SURGERY

## 2019-02-27 PROCEDURE — 85025 COMPLETE CBC W/AUTO DIFF WBC: CPT | Performed by: PHYSICIAN ASSISTANT

## 2019-02-27 RX ORDER — LEVOFLOXACIN 750 MG/1
750 TABLET ORAL EVERY 24 HOURS
Qty: 4 TABLET | Refills: 0 | Status: SHIPPED | OUTPATIENT
Start: 2019-02-27 | End: 2019-03-03

## 2019-02-27 RX ORDER — METRONIDAZOLE 500 MG/1
500 TABLET ORAL EVERY 8 HOURS SCHEDULED
Qty: 12 TABLET | Refills: 0 | Status: SHIPPED | OUTPATIENT
Start: 2019-02-27 | End: 2019-03-03

## 2019-02-27 RX ADMIN — OXYCODONE HYDROCHLORIDE 10 MG: 5 SOLUTION ORAL at 09:57

## 2019-02-27 RX ADMIN — ACETAMINOPHEN 975 MG: 325 TABLET, FILM COATED ORAL at 14:01

## 2019-02-27 RX ADMIN — KETOROLAC TROMETHAMINE 30 MG: 30 INJECTION, SOLUTION INTRAMUSCULAR at 05:20

## 2019-02-27 RX ADMIN — METRONIDAZOLE 500 MG: 500 INJECTION, SOLUTION INTRAVENOUS at 00:30

## 2019-02-27 RX ADMIN — METRONIDAZOLE 500 MG: 500 INJECTION, SOLUTION INTRAVENOUS at 10:03

## 2019-02-27 RX ADMIN — ACETAMINOPHEN 975 MG: 325 TABLET, FILM COATED ORAL at 05:20

## 2019-02-27 RX ADMIN — ENOXAPARIN SODIUM 40 MG: 40 INJECTION SUBCUTANEOUS at 09:58

## 2019-02-27 RX ADMIN — HYOSCYAMINE SULFATE 0.12 MG: 0.12 TABLET, ORALLY DISINTEGRATING ORAL at 14:01

## 2019-02-27 RX ADMIN — KETOROLAC TROMETHAMINE 30 MG: 30 INJECTION, SOLUTION INTRAMUSCULAR at 14:01

## 2019-02-27 RX ADMIN — HYOSCYAMINE SULFATE 0.12 MG: 0.12 TABLET, ORALLY DISINTEGRATING ORAL at 09:58

## 2019-02-27 NOTE — PLAN OF CARE
Problem: Potential for Falls  Goal: Patient will remain free of falls  Description  INTERVENTIONS:  - Assess patient frequently for physical needs  -  Identify cognitive and physical deficits and behaviors that affect risk of falls    -  [place] fall precautions as indicated by assessment   - Educate patient/family on patient safety including physical limitations  - Instruct patient to call for assistance with activity based on assessment  - Modify environment to reduce risk of injury  - Consider OT/PT consult to assist with strengthening/mobility  Outcome: Adequate for Discharge

## 2019-02-27 NOTE — DISCHARGE SUMMARY
Discharge Summary - Naomy De Paz 51 y o  female MRN: [id number]    Unit/Bed#: -01 Encounter: [id number]      Pre-Operative Diagnosis:  Perisplenic collection    Post-Operative Diagnosis: Same    Procedures Performed:  Percutaneous drainage perisplenic collection    Interventional Radiologist:  Dr Christie Israel for full details of admission and Operative Note for full details of operations performed  Patient was seen and examined prior to discharge  Patient was seen in the hospital, was evaluated and found to have a perisplenic collection with reactive atelectasis and pleural effusion  This was likely the result from contamination at the original procedure  This collection was drained by interventional radiology and was found to be culture negative  Initial the color was brown and then became serous which most likely represented old blood/contamination at the time of the original procedure  Since drainage her pain is greatly improved, shortness of breath resolved and she is tolerating a diet  She now  desires to be discharged home  Though cultures were negative, will continued antibiotics for 4 days as having been on antibiotics previously may have influenced the result  Will continue to treat Class two obesity in the office with nutritional counseling, lifestyle/dietary modifications  Provisions for Follow-Up Care:  See After Visit Summary for information related to follow-up care and home orders  Disposition: Home, in stable condition  Planned Readmission: No    Discharge Medications:  See After Visit Summary for reconciled discharge medications provided to patient and family  Post Operative instructions: Reviewed with patient and/or family  This text is generated with voice recognition software  There may be translation, syntax,  or grammatical errors  If you have any questions, please contact the dictating provider       Signature:   Levy Mauro MD  Date: 2/27/2019 Time: 7:24 AM

## 2019-02-27 NOTE — PROGRESS NOTES
Progress Note - Bariatric Surgery   Kedar Shepherd 51 y o  female MRN: [id number]  Unit/Bed#: -01 Encounter: [id number]    Assessment:  51year old morbidly obese female s/p exploratory laparoscopy, GAURANG, SBR, gastrotomy with drainage/decompression/closure represented with perisplenic fluid collection as a result from contamination from original procedure  Cultures were negative but symptoms resolved after drainage by interventional radiology and IV abx  Plan:  [place] soft diet  Saline lock  OOB/ambulate   IS   Pain control  Levsin  Home today    Subjective/Objective     Subjective: feels better; complains of pain at drain site     Objective: Looks well    Blood pressure 141/68, pulse 81, temperature 98 2 °F (36 8 °C), temperature source Oral, resp  rate 18, height 5' 9" (1 753 m), weight 115 kg (253 lb 8 5 oz), SpO2 96 %, not currently breastfeeding  ,Body mass index is 37 44 kg/m²        Intake/Output Summary (Last 24 hours) at 2/27/2019 0711  Last data filed at 2/27/2019 0516  Gross per 24 hour   Intake 870 ml   Output 1690 ml   Net -820 ml       Invasive Devices     Peripheral Intravenous Line            Peripheral IV 02/26/19 Right Antecubital less than 1 day          Drain            Closed/Suction Drain Left LUQ Bulb 10 2 Fr  1 day                Physical Exam:     No distress   RRR  Breathing non labored   Abd soft, minimal diffuse tenderness, ND, IR drain serous     Lab, Imaging and other studies:  CBC:   Lab Results   Component Value Date    WBC 10 35 (H) 02/27/2019    HGB 11 6 02/27/2019    HCT 36 2 02/27/2019    MCV 86 02/27/2019     02/27/2019    MCH 27 6 02/27/2019    MCHC 32 0 02/27/2019    RDW 15 1 02/27/2019    MPV 10 5 02/27/2019    NRBC 0 02/27/2019   , CMP:   Lab Results   Component Value Date    SODIUM 138 02/27/2019    K 3 9 02/27/2019     02/27/2019    CO2 24 02/27/2019    BUN 11 02/27/2019    CREATININE 0 71 02/27/2019    CALCIUM 9 3 02/27/2019    EGFR 100 02/27/2019     VTE Pharmacologic Prophylaxis: Enoxaparin (Lovenox)  VTE Mechanical Prophylaxis: sequential compression device

## 2019-02-27 NOTE — UTILIZATION REVIEW
Continued Stay Review    Date: 2/22  Vital Signs: Blood pressure 133/73, pulse 67, temperature 97 7 °F (36 5 °C), temperature source Oral, resp  rate 17, height 5' 9" (1 753 m), weight (!) 137 kg (301 lb 13 oz), SpO2 98 %, not currently breastfeeding  ,Body mass index is 44 57 kg/m²  Assessment/Plan: 51 morbidly obese female (Class 3) POD 11 s/p exploratory laparoscopy, GAURANG, SBR, decompression of gastric remnant readmission secondary to enterocolitis, dehydration  Markedly improved     Plan:  Clear liquids/protein supplement for 1 week  Saline lock  D/C IV abx   Cont levsin   Pain control   OOB/ambulate   DVT ppx   Cont further mgt of obesity to continue as outpatient    Medications:   Scheduled Meds:   heparin (porcine) 5,000 Units Subcutaneous Q8H [address] I     HYDROmorphone 0 5 mg Intravenous Q4H PRN x2     HYDROmorphone 1 mg Intravenous Q4H PRN       ketorolac 30 mg Intravenous Q6H [address]       lactated ringers 125 mL/hr Intravenous Continuous   Last Rate: 125 mL/hr (02/19/19 0942)   levofloxacin 500 mg Intravenous Q24H   Last Rate: 500 mg (02/18/19 5883)   metroNIDAZOLE 500 mg Intravenous Q8H   Last Rate: 500 mg (02/19/19 0801)   ondansetron 4 mg Intravenous Q4H PRN       pantoprazole 40 mg Intravenous Q12H EDA       simethicone 80 mg Oral Q6H PRN          Pertinent Labs/Diagnostic Results: none   Age/Sex: 51 y o  female   Discharge Plan:  DC to home  2/22      Continued Stay Review    Date: 2/21  Vital Signs: Blood pressure 142/74, pulse 78, temperature 97 6 °F (36 4 °C), temperature source Oral, resp  rate 18, height 5' 9" (1 753 m), weight (!) 137 kg (301 lb 13 oz), SpO2 97 %, not currently breastfeeding  ,Body mass index is 44 57 kg/m²      Assessment/Plan: 48/F POD 10 s/p exploratory laparoscopy, GAURANG, SBR, decompression of gastric remnant  Now with abd pain  CT scan with inflammation in proximal small bowel and colon  Pain has improved with supportive measures/IV abx     Dark urine resolved   Epigastric pressure still present but improved   Plan:  Clear liquids/protein supplement   Decrease IV  D/C IV abx   Start levsin   Pain control   OOB/ambulate   DVT ppx     Medications:   Scheduled Meds:   heparin (porcine) 5,000 Units Subcutaneous Q8H [address] I     HYDROmorphone 0 5 mg Intravenous Q4H PRN x2     HYDROmorphone 1 mg Intravenous Q4H PRN       ketorolac 30 mg Intravenous Q6H [address]       lactated ringers 125 mL/hr Intravenous Continuous   Last Rate: 125 mL/hr (02/19/19 0832)   levofloxacin 500 mg Intravenous Q24H   Last Rate: 500 mg (02/18/19 2143)   metroNIDAZOLE 500 mg Intravenous Q8H   Last Rate: 500 mg (02/19/19 0545)   ondansetron 4 mg Intravenous Q4H PRN       pantoprazole 40 mg Intravenous Q12H EDA       simethicone 80 mg Oral Q6H PRN          Pertinent Labs/Diagnostic Results:  Wbc  11 60  Age/Sex: 51 y o  female   Discharge Plan: TBD       Continued Stay Review    Date: 2/20  Vital Signs:Blood pressure 137/73, pulse 83, temperature 97 6 °F (36 4 °C), temperature source Oral, resp  rate 16, height 5' 9" (1 753 m), weight (!) 137 kg (301 lb 13 oz), SpO2 96 %, not currently breastfeeding  ,Body mass index is 44 57 kg/m²  Assessment/Plan: 48/F POD 9 s/p exploratory laparoscopy, GAURANG, SBR, decompression of gastric remnant  Now with abd pain  CT scan with inflammation in proximal small bowel and colon  Pain has improved with supportive measures/IV abx   Complaining now of dark urine and epigastric pressure/gas    Plan:  Clear liquids/protein supplement   UA/CNS/micro  Cont IVF  Cont IV abx   Pain control   OOB/ambulate   DVT ppx     Medications:   Scheduled Meds:   heparin (porcine) 5,000 Units Subcutaneous Q8H [address] I     HYDROmorphone 0 5 mg Intravenous Q4H PRN x2     HYDROmorphone 1 mg Intravenous Q4H PRN       ketorolac 30 mg Intravenous Q6H [address]       lactated ringers 125 mL/hr Intravenous Continuous   Last Rate: 125 mL/hr (02/19/19 0832)   levofloxacin 500 mg Intravenous Q24H   Last Rate: 500 mg (02/18/19 1992) metroNIDAZOLE 500 mg Intravenous Q8H   Last Rate: 500 mg (02/19/19 0545)   ondansetron 4 mg Intravenous Q4H PRN       pantoprazole 40 mg Intravenous Q12H EDA       simethicone 80 mg Oral Q6H PRN          Pertinent Labs/Diagnostic Results: wbc  13 09  Age/Sex: 51 y o  female   Discharge Plan: TBD     Continued Stay Review    Date: 2/19  Vital Signs: /72 (BP Location: Left arm)   Pulse 98   Temp 98 3 °F (36 8 °C) (Oral)   Resp 18   Ht 5' 9" (1 753 m)   Wt (!) 137 kg (301 lb 13 oz)   SpO2 99%   BMI 44 57 kg/m²     Assessment/Plan: 52 yo F POD#7 s/p Ex  Lap, GAURANG, SBR, drainage and decompression of gastric remnant with sudden onset of abdominal pain yesterday afternoon  Pain is now improved but she still feels bloated and constipated  Passing gas but no BM's yet despite magnesium citrate  Will check UA if this was not completed at [facility] despite patient denial of urinary urgency or burning with urination   Will focus on resolution of constipation - enema and dulcolax this morning and will continue to monitor      -NPO   -IVF   -IV abx  -IV pain control  -Zofran, protonix   -Mineral oil enema and 10mg Dulcolax PO     Medications:   Scheduled Meds:   heparin (porcine) 5,000 Units Subcutaneous Q8H [facility]      HYDROmorphone 0 5 mg Intravenous Q4H PRN      HYDROmorphone 1 mg Intravenous Q4H PRN       ketorolac 30 mg Intravenous Q6H [facility]       lactated ringers 125 mL/hr Intravenous Continuous   Last Rate: 125 mL/hr (02/19/19 0832)   levofloxacin 500 mg Intravenous Q24H   Last Rate: 500 mg (02/18/19 2143)   metroNIDAZOLE 500 mg Intravenous Q8H   Last Rate: 500 mg (02/19/19 0545)   ondansetron 4 mg Intravenous Q4H PRN       pantoprazole 40 mg Intravenous Q12H EDA       simethicone 80 mg Oral Q6H PRN          Pertinent Labs/Diagnostic Results: wbc 13 96, co2   20  Age/Sex: 51 y o  female   Discharge Plan: TBD

## 2019-02-27 NOTE — NURSING NOTE
Iv removed  avs reviewed  Flushing of DARREN drain reviewed and supplies given  Tape, dressing, flushes, alcohol wipes  Belongings packed  Pt appropriate and safe for discharge  DARREN emptied prior to leaving unit

## 2019-02-27 NOTE — PLAN OF CARE
Problem: Potential for Falls  Goal: Patient will remain free of falls  Description  INTERVENTIONS:  - Assess patient frequently for physical needs  -  Identify cognitive and physical deficits and behaviors that affect risk of falls    -  [place] fall precautions as indicated by assessment   - Educate patient/family on patient safety including physical limitations  - Instruct patient to call for assistance with activity based on assessment  - Modify environment to reduce risk of injury  - Consider OT/PT consult to assist with strengthening/mobility  Outcome: Progressing     Problem: PAIN - ADULT  Goal: Verbalizes/displays adequate comfort level or baseline comfort level  Description  Interventions:  - Encourage patient to monitor pain and request assistance  - Assess pain using appropriate pain scale  - Administer analgesics based on type and severity of pain and evaluate response  - Implement non-pharmacological measures as appropriate and evaluate response  - Consider cultural and social influences on pain and pain management  - Notify physician/advanced practitioner if interventions unsuccessful or patient reports new pain  Outcome: Progressing     Problem: INFECTION - ADULT  Goal: Absence or prevention of progression during hospitalization  Description  INTERVENTIONS:  - Assess and monitor for signs and symptoms of infection  - Monitor lab/diagnostic results  - Monitor all insertion sites, i e  indwelling lines, tubes, and drains  - Monitor endotracheal (as able) and nasal secretions for changes in amount and color  - [place] appropriate cooling/warming therapies per order  - Administer medications as ordered  - Instruct and encourage patient and family to use good hand hygiene technique  - Identify and instruct in appropriate isolation precautions for identified infection/condition  Outcome: Progressing  Goal: Absence of fever/infection during neutropenic period  Description  INTERVENTIONS:  - Monitor WBC  - Implement neutropenic guidelines  Outcome: Progressing     Problem: SAFETY ADULT  Goal: Maintain or return to baseline ADL function  Description  INTERVENTIONS:  -  Assess patient's ability to carry out ADLs; assess patient's baseline for ADL function and identify physical deficits which impact ability to perform ADLs (bathing, care of mouth/teeth, toileting, grooming, dressing, etc )  - Assess/evaluate cause of self-care deficits   - Assess range of motion  - Assess patient's mobility; develop plan if impaired  - Assess patient's need for assistive devices and provide as appropriate  - Encourage maximum independence but intervene and supervise when necessary  ¯ Involve family in performance of ADLs  ¯ Assess for home care needs following discharge   ¯ Request OT consult to assist with ADL evaluation and planning for discharge  ¯ Provide patient education as appropriate  Outcome: Progressing  Goal: Maintain or return mobility status to optimal level  Description  INTERVENTIONS:  - Assess patient's baseline mobility status (ambulation, transfers, stairs, etc )    - Identify cognitive and physical deficits and behaviors that affect mobility  - Identify mobility aids required to assist with transfers and/or ambulation (gait belt, sit-to-stand, lift, walker, cane, etc )  - [place] fall precautions as indicated by assessment  - Record patient progress and toleration of activity level on Mobility SBAR; progress patient to next Phase/Stage  - Instruct patient to call for assistance with activity based on assessment  - Request Rehabilitation consult to assist with strengthening/weightbearing, etc   Outcome: Progressing     Problem: DISCHARGE PLANNING  Goal: Discharge to home or other facility with appropriate resources  Description  INTERVENTIONS:  - Identify barriers to discharge w/patient and caregiver  - Arrange for needed discharge resources and transportation as appropriate  - Identify discharge learning needs (meds, wound care, etc )  - Arrange for interpretive services to assist at discharge as needed  - Refer to Case Management Department for coordinating discharge planning if the patient needs post-hospital services based on physician/advanced practitioner order or complex needs related to functional status, cognitive ability, or social support system  Outcome: Progressing     Problem: Knowledge Deficit  Goal: Patient/family/caregiver demonstrates understanding of disease process, treatment plan, medications, and discharge instructions  Description  Complete learning assessment and assess knowledge base    Interventions:  - Provide teaching at level of understanding  - Provide teaching via preferred learning methods  Outcome: Progressing

## 2019-02-27 NOTE — PLAN OF CARE
Problem: Potential for Falls  Goal: Patient will remain free of falls  Description  INTERVENTIONS:  - Assess patient frequently for physical needs  -  Identify cognitive and physical deficits and behaviors that affect risk of falls    -  [place] fall precautions as indicated by assessment   - Educate patient/family on patient safety including physical limitations  - Instruct patient to call for assistance with activity based on assessment  - Modify environment to reduce risk of injury  - Consider OT/PT consult to assist with strengthening/mobility  2/27/2019 1447 by Colby Mock RN  Outcome: Completed  2/27/2019 1016 by Colby Mock RN  Outcome: Adequate for Discharge

## 2019-02-28 ENCOUNTER — TELEPHONE (OUTPATIENT)
Dept: BARIATRICS | Facility: CLINIC | Age: 49
End: 2019-02-28

## 2019-02-28 LAB
BACTERIA SPEC BFLD CULT: NO GROWTH
GRAM STN SPEC: NORMAL
GRAM STN SPEC: NORMAL

## 2019-02-28 NOTE — UTILIZATION REVIEW
Notification of Discharge  This is a Notification of Discharge from our facility [address]  Please be advised that this patient has been discharge from our facility  Below you will find the admission and discharge date and time including the patients disposition  PRESENTATION DATE: 2/25/2019 11:41 AM  IP ADMISSION DATE: 2/25/19 1525  DISCHARGE DATE: 2/27/2019  2:51 PM  DISPOSITION: [address] Utilization Review Department  Phone: [phone]; Fax [phone]  [email or web address]  ATTENTION: Please call with any questions or concerns to [phone]  and carefully listen to the prompts so that you are directed to the right person  Send all requests for admission clinical reviews, approved or denied determinations and any other requests to fax [phone]   All voicemails are confidential

## 2019-02-28 NOTE — TELEPHONE ENCOUNTER
Spoke with patient to check on her after discharge  She's doing well  Used pain med in the night and regular tylenol today  Tolerating diet and sufficient amount of fluid intake  Will call with concerns before her follow up in office if needed

## 2019-03-02 ENCOUNTER — HOSPITAL ENCOUNTER (EMERGENCY)
Facility: HOSPITAL | Age: 49
Discharge: HOME | End: 2019-03-02
Attending: EMERGENCY MEDICINE | Admitting: EMERGENCY MEDICINE
Payer: COMMERCIAL

## 2019-03-02 ENCOUNTER — APPOINTMENT (EMERGENCY)
Dept: CT IMAGING | Facility: HOSPITAL | Age: 49
End: 2019-03-02
Payer: COMMERCIAL

## 2019-03-02 VITALS
RESPIRATION RATE: 20 BRPM | HEART RATE: 98 BPM | OXYGEN SATURATION: 100 % | TEMPERATURE: 98.1 F | SYSTOLIC BLOOD PRESSURE: 144 MMHG | DIASTOLIC BLOOD PRESSURE: 96 MMHG

## 2019-03-02 DIAGNOSIS — J90 PLEURAL EFFUSION: ICD-10-CM

## 2019-03-02 DIAGNOSIS — R06.00 DYSPNEA: Primary | ICD-10-CM

## 2019-03-02 LAB
ALBUMIN SERPL BCP-MCNC: 2.9 G/DL (ref 3.5–5)
ALP SERPL-CCNC: 71 U/L (ref 46–116)
ALT SERPL W P-5'-P-CCNC: 21 U/L (ref 12–78)
ANION GAP SERPL CALCULATED.3IONS-SCNC: 9 MMOL/L (ref 4–13)
APTT PPP: 37 SECONDS (ref 26–38)
AST SERPL W P-5'-P-CCNC: 15 U/L (ref 5–45)
ATRIAL RATE: 98 BPM
BACTERIA BLD CULT: NORMAL
BACTERIA BLD CULT: NORMAL
BASOPHILS # BLD AUTO: 0.03 THOUSANDS/ΜL (ref 0–0.1)
BASOPHILS NFR BLD AUTO: 0 % (ref 0–1)
BILIRUB DIRECT SERPL-MCNC: 0.07 MG/DL (ref 0–0.2)
BILIRUB SERPL-MCNC: 0.2 MG/DL (ref 0.2–1)
BUN SERPL-MCNC: 18 MG/DL (ref 5–25)
CALCIUM SERPL-MCNC: 9.7 MG/DL (ref 8.3–10.1)
CHLORIDE SERPL-SCNC: 103 MMOL/L (ref 100–108)
CO2 SERPL-SCNC: 26 MMOL/L (ref 21–32)
CREAT SERPL-MCNC: 0.75 MG/DL (ref 0.6–1.3)
EOSINOPHIL # BLD AUTO: 0.09 THOUSAND/ΜL (ref 0–0.61)
EOSINOPHIL NFR BLD AUTO: 1 % (ref 0–6)
ERYTHROCYTE [DISTWIDTH] IN BLOOD BY AUTOMATED COUNT: 14.8 % (ref 11.6–15.1)
GFR SERPL CREATININE-BSD FRML MDRD: 94 ML/MIN/1.73SQ M
GLUCOSE SERPL-MCNC: 117 MG/DL (ref 65–140)
HCT VFR BLD AUTO: 40 % (ref 34.8–46.1)
HGB BLD-MCNC: 12.9 G/DL (ref 11.5–15.4)
IMM GRANULOCYTES # BLD AUTO: 0.03 THOUSAND/UL (ref 0–0.2)
IMM GRANULOCYTES NFR BLD AUTO: 0 % (ref 0–2)
INR PPP: 1.1 (ref 0.86–1.17)
LACTATE SERPL-SCNC: 0.8 MMOL/L (ref 0.5–2)
LIPASE SERPL-CCNC: 294 U/L (ref 73–393)
LYMPHOCYTES # BLD AUTO: 1.33 THOUSANDS/ΜL (ref 0.6–4.47)
LYMPHOCYTES NFR BLD AUTO: 14 % (ref 14–44)
MCH RBC QN AUTO: 27.6 PG (ref 26.8–34.3)
MCHC RBC AUTO-ENTMCNC: 32.3 G/DL (ref 31.4–37.4)
MCV RBC AUTO: 86 FL (ref 82–98)
MONOCYTES # BLD AUTO: 0.6 THOUSAND/ΜL (ref 0.17–1.22)
MONOCYTES NFR BLD AUTO: 7 % (ref 4–12)
NEUTROPHILS # BLD AUTO: 7.16 THOUSANDS/ΜL (ref 1.85–7.62)
NEUTS SEG NFR BLD AUTO: 78 % (ref 43–75)
NRBC BLD AUTO-RTO: 0 /100 WBCS
P AXIS: 57 DEGREES
PLATELET # BLD AUTO: 363 THOUSANDS/UL (ref 149–390)
PMV BLD AUTO: 10.2 FL (ref 8.9–12.7)
POTASSIUM SERPL-SCNC: 3.9 MMOL/L (ref 3.5–5.3)
PR INTERVAL: 138 MS
PROT SERPL-MCNC: 7.6 G/DL (ref 6.4–8.2)
PROTHROMBIN TIME: 14.1 SECONDS (ref 11.8–14.2)
QRS AXIS: 40 DEGREES
QRSD INTERVAL: 80 MS
QT INTERVAL: 334 MS
QTC INTERVAL: 426 MS
RBC # BLD AUTO: 4.68 MILLION/UL (ref 3.81–5.12)
SODIUM SERPL-SCNC: 138 MMOL/L (ref 136–145)
T WAVE AXIS: 38 DEGREES
TROPONIN I SERPL-MCNC: <0.02 NG/ML
VENTRICULAR RATE: 98 BPM
WBC # BLD AUTO: 9.24 THOUSAND/UL (ref 4.31–10.16)

## 2019-03-02 PROCEDURE — 83605 ASSAY OF LACTIC ACID: CPT | Performed by: EMERGENCY MEDICINE

## 2019-03-02 PROCEDURE — 93005 ELECTROCARDIOGRAM TRACING: CPT

## 2019-03-02 PROCEDURE — 99285 EMERGENCY DEPT VISIT HI MDM: CPT

## 2019-03-02 PROCEDURE — 96374 THER/PROPH/DIAG INJ IV PUSH: CPT

## 2019-03-02 PROCEDURE — 96361 HYDRATE IV INFUSION ADD-ON: CPT

## 2019-03-02 PROCEDURE — 71275 CT ANGIOGRAPHY CHEST: CPT

## 2019-03-02 PROCEDURE — 93010 ELECTROCARDIOGRAM REPORT: CPT | Performed by: INTERNAL MEDICINE

## 2019-03-02 PROCEDURE — 85610 PROTHROMBIN TIME: CPT | Performed by: EMERGENCY MEDICINE

## 2019-03-02 PROCEDURE — 83690 ASSAY OF LIPASE: CPT | Performed by: EMERGENCY MEDICINE

## 2019-03-02 PROCEDURE — 85025 COMPLETE CBC W/AUTO DIFF WBC: CPT | Performed by: EMERGENCY MEDICINE

## 2019-03-02 PROCEDURE — 74177 CT ABD & PELVIS W/CONTRAST: CPT

## 2019-03-02 PROCEDURE — 84484 ASSAY OF TROPONIN QUANT: CPT | Performed by: EMERGENCY MEDICINE

## 2019-03-02 PROCEDURE — 36415 COLL VENOUS BLD VENIPUNCTURE: CPT | Performed by: EMERGENCY MEDICINE

## 2019-03-02 PROCEDURE — 85730 THROMBOPLASTIN TIME PARTIAL: CPT | Performed by: EMERGENCY MEDICINE

## 2019-03-02 PROCEDURE — 96375 TX/PRO/DX INJ NEW DRUG ADDON: CPT

## 2019-03-02 PROCEDURE — 80048 BASIC METABOLIC PNL TOTAL CA: CPT | Performed by: EMERGENCY MEDICINE

## 2019-03-02 PROCEDURE — 80076 HEPATIC FUNCTION PANEL: CPT | Performed by: EMERGENCY MEDICINE

## 2019-03-02 RX ORDER — ONDANSETRON 2 MG/ML
4 INJECTION INTRAMUSCULAR; INTRAVENOUS ONCE
Status: COMPLETED | OUTPATIENT
Start: 2019-03-02 | End: 2019-03-02

## 2019-03-02 RX ORDER — MORPHINE SULFATE 10 MG/ML
6 INJECTION, SOLUTION INTRAMUSCULAR; INTRAVENOUS ONCE
Status: COMPLETED | OUTPATIENT
Start: 2019-03-02 | End: 2019-03-02

## 2019-03-02 RX ADMIN — SODIUM CHLORIDE 1000 ML: 0.9 INJECTION, SOLUTION INTRAVENOUS at 19:12

## 2019-03-02 RX ADMIN — IOHEXOL 100 ML: 350 INJECTION, SOLUTION INTRAVENOUS at 19:59

## 2019-03-02 RX ADMIN — ONDANSETRON 4 MG: 2 INJECTION INTRAMUSCULAR; INTRAVENOUS at 19:37

## 2019-03-02 RX ADMIN — MORPHINE SULFATE 6 MG: 10 INJECTION INTRAVENOUS at 19:38

## 2019-03-02 NOTE — ED PROVIDER NOTES
History  Chief Complaint   Patient presents with    Shortness of Breath     pt presents with sob that started last night, pt had sx on Feb 11th and had drain placed for twisted intestines     Comes to ED with several days of sensation of inability to catch her breath and mild dyspnea which is worse w lying flat and w exertion and is not associated w cough, fever or chest pain  sxs in context of recent surgery as well as perisplenic fluid collection s/p placement of LUQ drain  Pt reports that drain output has decreased in last 1-2 days  She denies abd pain and distension  She denies vomiting  She denies a h/o VTE and CHF  She has no leg pain or swelling but is recently post op  Prior to Admission Medications   Prescriptions Last Dose Informant Patient Reported? Taking?    Biotin 1000 MCG tablet   Yes No   Sig: Take 1,000 mcg by mouth   Melatonin 5 MG CAPS   Yes No   Sig: Take 5 mg by mouth   Omega-3 Fatty Acids (FISH OIL) 1,000 mg  Self Yes No   Sig: Take 1,000 mg by mouth daily   butalbital-acetaminophen-caffeine (FIORICET,ESGIC) -40 mg per tablet  Self Yes No   Sig: take 1-2 tablets by mouth every 4 to 6 hours if needed maximum daily dose of 6   cyclobenzaprine (FLEXERIL) 10 mg tablet   No No   Sig: Take 1 tablet (10 mg total) by mouth 3 (three) times a day as needed for muscle spasms   dicyclomine (BENTYL) 20 mg tablet   No No   Sig: Take 1 tablet (20 mg total) by mouth every 6 (six) hours   doxepin (SINEquan) 10 mg capsule  Self Yes No   Sig: Take 10 mg by mouth daily at bedtime   esterified estrogens (MENEST) 0 625 MG tablet  Self Yes No   Sig: Take 0 625 mg by mouth daily   estradiol (VIVELLE-DOT) 0 075 MG/24HR   Yes No   Sig: Place 1 patch on the skin   ferrous sulfate 325 (65 Fe) mg tablet  Self No No   Sig: Take 1 tablet (325 mg total) by mouth daily   hydrochlorothiazide (HYDRODIURIL) 12 5 mg tablet  Self Yes No   Sig: Take 12 5 mg by mouth daily   hyoscyamine (LEVSIN/SL) 0 125 mg SL tablet No No   Sig: Take 1 tablet (0 125 mg total) by mouth 4 (four) times a day (before meals and at bedtime) for 30 days   levofloxacin (LEVAQUIN) 750 mg tablet   No No   Sig: Take 1 tablet (750 mg total) by mouth every 24 hours for 4 days   metroNIDAZOLE (FLAGYL) 500 mg tablet   No No   Sig: Take 1 tablet (500 mg total) by mouth every 8 (eight) hours for 4 days   multivitamin (THERAGRAN) TABS  Self Yes No   Sig: Take 1 tablet by mouth daily   ondansetron (ZOFRAN) 4 mg tablet   No No   Sig: Take 1 tablet (4 mg total) by mouth every 8 (eight) hours as needed for nausea or vomiting   Patient not taking: Reported on 2/18/2019   oxyCODONE (ROXICODONE) 5 mg immediate release tablet   No No   Sig: Take 1 tablet (5 mg total) by mouth every 4 (four) hours as needed for severe pain for up to 10 daysMax Daily Amount: 30 mg   pantoprazole (PROTONIX) 40 mg tablet   No No   Sig: Take 1 tablet (40 mg total) by mouth daily   simethicone (MYLICON) 80 mg chewable tablet   No No   Sig: Chew 1 tablet (80 mg total) every 12 (twelve) hours   venlafaxine (EFFEXOR) 37 5 mg tablet   Yes No   Sig: Take 37 5 mg by mouth 2 (two) times a day      Facility-Administered Medications: None       Past Medical History:   Diagnosis Date    Bell palsy     Gastric ulcer     GERD (gastroesophageal reflux disease)     History of transfusion     Melena     Pancreatic cyst     Right facial numbness     Upper GI bleed        Past Surgical History:   Procedure Laterality Date    ABDOMINOPLASTY      BARIATRIC SURGERY      BOWEL RESECTION LAPAROSCOPIC N/A 2/11/2019    Procedure: LAPAROSCOPIC LYSIS OF ADHESIONS; RESECTION SMALL BOWEL; DECOMPRESSION OF GASTRIC REMNANT; EGD;  Surgeon: Darroll Mcardle, MD;  Location: [facility];  Service: General    CHOLECYSTECTOMY      CT GUIDED PERC DRAINAGE CATHETER PLACEMENT  2/25/2019    HERNIA REPAIR      HYSTERECTOMY      KNEE CARTILAGE SURGERY      TX COLONOSCOPY FLX DX W/COLLJ SPEC WHEN PFRMD N/A 3/28/2018 Procedure: COLONOSCOPY;  Surgeon: Zhanna Childs MD;  Location: [facility]; Service: Gastroenterology    TX COLONOSCOPY FLX DX W/COLLJ [facility] WHEN PFRMD N/A 1/31/2019    Procedure: COLONOSCOPY;  Surgeon: Zhanna Childs MD;  [facility]; Service: Gastroenterology    TX ESOPHAGOGASTRODUODENOSCOPY TRANSORAL DIAGNOSTIC N/A 3/22/2018    Procedure: ESOPHAGOGASTRODUODENOSCOPY (EGD); Surgeon: Zhanna Childs MD;  [facility]; Service: Gastroenterology    TX ESOPHAGOGASTRODUODENOSCOPY TRANSORAL DIAGNOSTIC N/A 1/31/2019    Procedure: ESOPHAGOGASTRODUODENOSCOPY (EGD); Surgeon: Zhanna Childs MD;  [facility]; Service: Gastroenterology    REDUCTION MAMMAPLASTY Bilateral     SHOULDER ARTHROSCOPY DISTAL CLAVICLE EXCISION AND OPEN ROTATOR CUFF REPAIR         Family History   Problem Relation Age of Onset    Heart attack Mother     Diabetes Mother     Heart attack Father     Stroke Father     Hypertension Brother     Leukemia Paternal Aunt      I have reviewed and agree with the history as documented      Social History     Tobacco Use    Smoking status: Never Smoker    Smokeless tobacco: Never Used   Substance Use Topics    Alcohol use: Not Currently     Frequency: Never     Binge frequency: Less than monthly    Drug use: No        Review of Systems    Physical Exam  Physical Exam    Vital Signs  ED Triage Vitals   Temperature Pulse Respirations Blood Pressure SpO2   03/02/19 1831 03/02/19 1831 03/02/19 1831 03/02/19 1833 03/02/19 1831   98 1 °F (36 7 °C) (!) 120 20 144/96 100 %      Temp Source Heart Rate Source Patient Position - Orthostatic VS BP Location FiO2 (%)   03/02/19 1831 03/02/19 1831 03/02/19 1833 03/02/19 1833 --   Oral Monitor Sitting Right arm       Pain Score       03/02/19 1938       6           Vitals:    03/02/19 1831 03/02/19 1833 03/02/19 2200   BP:  144/96 144/96   Pulse: (!) 120  98   Patient Position - Orthostatic VS:  Sitting Sitting       Visual Acuity      ED Medications  Medications   sodium chloride 0 9 % bolus 1,000 mL (0 mL Intravenous Stopped 3/2/19 2100)   morphine (PF) 10 mg/mL injection 6 mg (6 mg Intravenous Given 3/2/19 1938)   ondansetron (ZOFRAN) injection 4 mg (4 mg Intravenous Given 3/2/19 1937)   iohexol (OMNIPAQUE) 350 MG/ML injection (MULTI-DOSE) 100 mL (100 mL Intravenous Given 3/2/19 1959)       Diagnostic Studies  Results Reviewed     Procedure Component Value Units Date/Time    Lactic acid, plasma [[id number]]  (Normal) Collected:  03/02/19 1910    Lab Status:  Final result Specimen:  Blood from Arm, Right Updated:  03/02/19 1941     LACTIC ACID 0 8 mmol/L     Narrative:       Result may be elevated if tourniquet was used during collection  Basic metabolic panel [[id number]] Collected:  03/02/19 1905    Lab Status:  Final result Specimen:  Blood from Arm, Right Updated:  03/02/19 1933     Sodium 138 mmol/L      Potassium 3 9 mmol/L      Chloride 103 mmol/L      CO2 26 mmol/L      ANION GAP 9 mmol/L      BUN 18 mg/dL      Creatinine 0 75 mg/dL      Glucose 117 mg/dL      Calcium 9 7 mg/dL      eGFR 94 ml/min/1 73sq m     Narrative:       National Kidney Disease Education Program recommendations are as follows:  GFR calculation is accurate only with a steady state creatinine  Chronic Kidney disease less than 60 ml/min/1 73 sq  meters  Kidney failure less than 15 ml/min/1 73 sq  meters      Hepatic function panel [[id number]]  (Abnormal) Collected:  03/02/19 1905    Lab Status:  Final result Specimen:  Blood from Arm, Right Updated:  03/02/19 1933     Total Bilirubin 0 20 mg/dL      Bilirubin, Direct 0 07 mg/dL      Alkaline Phosphatase 71 U/L      AST 15 U/L      ALT 21 U/L      Total Protein 7 6 g/dL      Albumin 2 9 g/dL     Lipase [[id number]]  (Normal) Collected:  03/02/19 1905    Lab Status:  Final result Specimen:  Blood from Arm, Right Updated:  03/02/19 1933     Lipase 294 u/L     Troponin I [[id number]]  (Normal) Collected: 03/02/19 1905    Lab Status:  Final result Specimen:  Blood from Arm, Right Updated:  03/02/19 1931     Troponin I <0 02 ng/mL     Protime-INR [[id number]]  (Normal) Collected:  03/02/19 1905    Lab Status:  Final result Specimen:  Blood from Arm, Right Updated:  03/02/19 1925     Protime 14 1 seconds      INR 1 10    APTT [[id number]]  (Normal) Collected:  03/02/19 1905    Lab Status:  Final result Specimen:  Blood from Arm, Right Updated:  03/02/19 1925     PTT 37 seconds     CBC and differential [[id number]]  (Abnormal) Collected:  03/02/19 1905    Lab Status:  Final result Specimen:  Blood from Arm, Right Updated:  03/02/19 1915     WBC 9 24 Thousand/uL      RBC 4 68 Million/uL      Hemoglobin 12 9 g/dL      Hematocrit 40 0 %      MCV 86 fL      MCH 27 6 pg      MCHC 32 3 g/dL      RDW 14 8 %      MPV 10 2 fL      Platelets 663 Thousands/uL      nRBC 0 /100 WBCs      Neutrophils Relative 78 %      Immat GRANS % 0 %      Lymphocytes Relative 14 %      Monocytes Relative 7 %      Eosinophils Relative 1 %      Basophils Relative 0 %      Neutrophils Absolute 7 16 Thousands/µL      Immature Grans Absolute 0 03 Thousand/uL      Lymphocytes Absolute 1 33 Thousands/µL      Monocytes Absolute 0 60 Thousand/µL      Eosinophils Absolute 0 09 Thousand/µL      Basophils Absolute 0 03 Thousands/µL                  CTA chest ct abdomen pelvis w contrast   Final Result by iLna Arcos MD (03/02 2014)      No evidence of pulmonary embolus  Moderate left pleural effusion and dependent lower lobe consolidation likely resolving atelectasis  No acute intra-abdominal abnormality  Interval placement of perisplenic drainage catheter with interval resolution of perisplenic fluid noted                       Workstation performed: [id number]                    Procedures  Procedures       Phone Contacts  ED Phone Contact    ED Course                               MDM  Number of Diagnoses or Management Options  Dyspnea:   Pleural effusion:   Diagnosis management comments: Dyspneic pt with [place] PE score of 4 5  CT PE with re-demonstrated L pleural effusion but no PE    LUQ fluid collection decreased in size  Labs and ekg and rest of ED evaluation reassuring  Pt seems appropriate for d/c home at this time  Amount and/or Complexity of Data Reviewed  Clinical lab tests: ordered and reviewed  Tests in the radiology section of CPT®: ordered and reviewed  Tests in the medicine section of CPT®: ordered and reviewed  Discussion of test results with the performing providers: yes  Obtain history from someone other than the patient: yes  Review and summarize past medical records: yes  Independent visualization of images, tracings, or specimens: yes        Disposition  Final diagnoses:   Dyspnea   Pleural effusion     Time reflects when diagnosis was documented in both MDM as applicable and the Disposition within this note     Time User Action Codes Description Comment    3/2/2019  9:02 PM Eder Anchors Add [R06 00] Dyspnea     3/2/2019  9:02 PM Eder Anchors Add [J90] Pleural effusion       ED Disposition     ED Disposition Condition Date/Time Comment    Discharge Stable Sat Mar 2, 2019  9:03 PM Timothy Smaller discharge to home/self care              Follow-up Information    None         Discharge Medication List as of 3/2/2019  9:03 PM      CONTINUE these medications which have NOT CHANGED    Details   Biotin 1000 MCG tablet Take 1,000 mcg by mouth, Historical Med      butalbital-acetaminophen-caffeine (FIORICET,ESGIC) -40 mg per tablet take 1-2 tablets by mouth every 4 to 6 hours if needed maximum daily dose of 6, Historical Med      cyclobenzaprine (FLEXERIL) 10 mg tablet Take 1 tablet (10 mg total) by mouth 3 (three) times a day as needed for muscle spasms, Starting Fri 2/22/2019, Normal      dicyclomine (BENTYL) 20 mg tablet Take 1 tablet (20 mg total) by mouth every 6 (six) hours, Starting Fri 2/22/2019, Normal      doxepin (SINEquan) 10 mg capsule Take 10 mg by mouth daily at bedtime, Starting Thu 4/26/2018, Historical Med      esterified estrogens (MENEST) 0 625 MG tablet Take 0 625 mg by mouth daily, Historical Med      estradiol (VIVELLE-DOT) 0 075 MG/24HR Place 1 patch on the skin, Starting Thu 8/9/2018, Until Fri 8/9/2019, Historical Med      ferrous sulfate 325 (65 Fe) mg tablet Take 1 tablet (325 mg total) by mouth daily, Starting Fri 3/16/2018, Print      hydrochlorothiazide (HYDRODIURIL) 12 5 mg tablet Take 12 5 mg by mouth daily, Starting Mon 3/26/2018, Historical Med      hyoscyamine (LEVSIN/SL) 0 125 mg SL tablet Take 1 tablet (0 125 mg total) by mouth 4 (four) times a day (before meals and at bedtime) for 30 days, Starting Fri 2/22/2019, Until Sun 3/24/2019, Normal      levofloxacin (LEVAQUIN) 750 mg tablet Take 1 tablet (750 mg total) by mouth every 24 hours for 4 days, Starting Wed 2/27/2019, Until Sun 3/3/2019, Normal      Melatonin 5 MG CAPS Take 5 mg by mouth, Historical Med      metroNIDAZOLE (FLAGYL) 500 mg tablet Take 1 tablet (500 mg total) by mouth every 8 (eight) hours for 4 days, Starting Wed 2/27/2019, Until Sun 3/3/2019, Normal      multivitamin (THERAGRAN) TABS Take 1 tablet by mouth daily, Historical Med      Omega-3 Fatty Acids (FISH OIL) 1,000 mg Take 1,000 mg by mouth daily, Historical Med      ondansetron (ZOFRAN) 4 mg tablet Take 1 tablet (4 mg total) by mouth every 8 (eight) hours as needed for nausea or vomiting, Starting Thu 1/24/2019, Normal      oxyCODONE (ROXICODONE) 5 mg immediate release tablet Take 1 tablet (5 mg total) by mouth every 4 (four) hours as needed for severe pain for up to 10 daysMax Daily Amount: 30 mg, Starting Fri 2/22/2019, Until Mon 3/4/2019, Normal      pantoprazole (PROTONIX) 40 mg tablet Take 1 tablet (40 mg total) by mouth daily, Starting Thu 1/24/2019, Normal      simethicone (MYLICON) 80 mg chewable tablet Chew 1 tablet (80 mg total) every 12 (twelve) hours, Starting Fri 2/22/2019, Normal      venlafaxine (EFFEXOR) 37 5 mg tablet Take 37 5 mg by mouth 2 (two) times a day, Historical Med           No discharge procedures on file      ED Provider  Electronically Signed by           Alisa Denise MD  03/03/19 7074

## 2019-03-03 NOTE — ED NOTES
Patient spoke w Dr Lana Osorio and was instructed to flush tube for jpeg twice a day until her next office visit in 6 days   I provided the patient with saline flushes     Jennifer Dunbar RN  03/02/19 0527

## 2019-03-03 NOTE — DISCHARGE INSTRUCTIONS
Call your family doctor Monday morning and ask them to see you this week for further evaluation of your pleural effusion or come back to the ER immediately if your symptoms worsen or change or if you have any other concerns

## 2019-03-04 DIAGNOSIS — K91.2 POSTSURGICAL MALABSORPTION: Primary | ICD-10-CM

## 2019-03-04 RX ORDER — CYANOCOBALAMIN (VITAMIN B-12) 500 MCG
1000 TABLET ORAL DAILY
Qty: 240 TABLET | Refills: 0 | Status: SHIPPED | OUTPATIENT
Start: 2019-03-04 | End: 2022-06-23

## 2019-03-04 RX ORDER — MULTIVITAMIN
1 TABLET ORAL 2 TIMES DAILY
Qty: 60 TABLET | Refills: 3 | Status: SHIPPED | OUTPATIENT
Start: 2019-03-04

## 2019-03-04 RX ORDER — LANOLIN ALCOHOL/MO/W.PET/CERES
2 CREAM (GRAM) TOPICAL 2 TIMES DAILY
Qty: 60 TABLET | Refills: 6 | Status: SHIPPED | OUTPATIENT
Start: 2019-03-04

## 2019-03-05 ENCOUNTER — TELEPHONE (OUTPATIENT)
Dept: BARIATRICS | Facility: CLINIC | Age: 49
End: 2019-03-05

## 2019-03-06 ENCOUNTER — ANESTHESIA (EMERGENCY)
Dept: PERIOP | Facility: HOSPITAL | Age: 49
DRG: 908 | End: 2019-03-06
Payer: COMMERCIAL

## 2019-03-06 ENCOUNTER — ANESTHESIA EVENT (EMERGENCY)
Dept: PERIOP | Facility: HOSPITAL | Age: 49
DRG: 908 | End: 2019-03-06
Payer: COMMERCIAL

## 2019-03-06 ENCOUNTER — HOSPITAL ENCOUNTER (INPATIENT)
Facility: HOSPITAL | Age: 49
LOS: 12 days | Discharge: HOME | DRG: 908 | End: 2019-03-18
Attending: SURGERY | Admitting: SURGERY
Payer: COMMERCIAL

## 2019-03-06 DIAGNOSIS — R00.0 TACHYCARDIA: ICD-10-CM

## 2019-03-06 DIAGNOSIS — K31.1 GASTRIC OUTFLOW OBSTRUCTION: Primary | ICD-10-CM

## 2019-03-06 LAB
ABO GROUP BLD: NORMAL
ANION GAP SERPL CALCULATED.3IONS-SCNC: 10 MMOL/L (ref 4–13)
BLD GP AB SCN SERPL QL: NEGATIVE
BUN SERPL-MCNC: 25 MG/DL (ref 5–25)
CALCIUM SERPL-MCNC: 9.7 MG/DL (ref 8.3–10.1)
CHLORIDE SERPL-SCNC: 102 MMOL/L (ref 100–108)
CO2 SERPL-SCNC: 26 MMOL/L (ref 21–32)
CREAT SERPL-MCNC: 0.81 MG/DL (ref 0.6–1.3)
ERYTHROCYTE [DISTWIDTH] IN BLOOD BY AUTOMATED COUNT: 14.8 % (ref 11.6–15.1)
GFR SERPL CREATININE-BSD FRML MDRD: 86 ML/MIN/1.73SQ M
GLUCOSE SERPL-MCNC: 117 MG/DL (ref 65–140)
HCT VFR BLD AUTO: 40.9 % (ref 34.8–46.1)
HGB BLD-MCNC: 13.1 G/DL (ref 11.5–15.4)
MCH RBC QN AUTO: 27.3 PG (ref 26.8–34.3)
MCHC RBC AUTO-ENTMCNC: 32 G/DL (ref 31.4–37.4)
MCV RBC AUTO: 85 FL (ref 82–98)
PLATELET # BLD AUTO: 447 THOUSANDS/UL (ref 149–390)
PMV BLD AUTO: 10.1 FL (ref 8.9–12.7)
POTASSIUM SERPL-SCNC: 4.1 MMOL/L (ref 3.5–5.3)
RBC # BLD AUTO: 4.79 MILLION/UL (ref 3.81–5.12)
RH BLD: POSITIVE
SODIUM SERPL-SCNC: 138 MMOL/L (ref 136–145)
SPECIMEN EXPIRATION DATE: NORMAL
WBC # BLD AUTO: 8.29 THOUSAND/UL (ref 4.31–10.16)

## 2019-03-06 PROCEDURE — 86850 RBC ANTIBODY SCREEN: CPT | Performed by: SURGERY

## 2019-03-06 PROCEDURE — 86901 BLOOD TYPING SEROLOGIC RH(D): CPT | Performed by: SURGERY

## 2019-03-06 PROCEDURE — 0DB60ZZ EXCISION OF STOMACH, OPEN APPROACH: ICD-10-PCS | Performed by: SURGERY

## 2019-03-06 PROCEDURE — 99024 POSTOP FOLLOW-UP VISIT: CPT | Performed by: SURGERY

## 2019-03-06 PROCEDURE — 0DTL0ZZ RESECTION OF TRANSVERSE COLON, OPEN APPROACH: ICD-10-PCS | Performed by: SURGERY

## 2019-03-06 PROCEDURE — 85027 COMPLETE CBC AUTOMATED: CPT | Performed by: SURGERY

## 2019-03-06 PROCEDURE — 43631 REMOVAL OF STOMACH PARTIAL: CPT | Performed by: SURGERY

## 2019-03-06 PROCEDURE — 0DNL0ZZ RELEASE TRANSVERSE COLON, OPEN APPROACH: ICD-10-PCS | Performed by: SURGERY

## 2019-03-06 PROCEDURE — 99284 EMERGENCY DEPT VISIT MOD MDM: CPT

## 2019-03-06 PROCEDURE — 44140 PARTIAL REMOVAL OF COLON: CPT | Performed by: SURGERY

## 2019-03-06 PROCEDURE — 86900 BLOOD TYPING SEROLOGIC ABO: CPT | Performed by: SURGERY

## 2019-03-06 PROCEDURE — C9290 INJ, BUPIVACAINE LIPOSOME: HCPCS | Performed by: SURGERY

## 2019-03-06 PROCEDURE — 36415 COLL VENOUS BLD VENIPUNCTURE: CPT | Performed by: SURGERY

## 2019-03-06 PROCEDURE — 80048 BASIC METABOLIC PNL TOTAL CA: CPT | Performed by: SURGERY

## 2019-03-06 DEVICE — SEAMGUARD STPL REINF ENDO GIA ULTRA UNV 60 BLACK: Type: IMPLANTABLE DEVICE | Site: STOMACH | Status: FUNCTIONAL

## 2019-03-06 RX ORDER — ROCURONIUM BROMIDE 10 MG/ML
INJECTION, SOLUTION INTRAVENOUS AS NEEDED
Status: DISCONTINUED | OUTPATIENT
Start: 2019-03-06 | End: 2019-03-07 | Stop reason: SURG

## 2019-03-06 RX ORDER — SODIUM CHLORIDE, SODIUM LACTATE, POTASSIUM CHLORIDE, CALCIUM CHLORIDE 600; 310; 30; 20 MG/100ML; MG/100ML; MG/100ML; MG/100ML
75 INJECTION, SOLUTION INTRAVENOUS CONTINUOUS
Status: DISCONTINUED | OUTPATIENT
Start: 2019-03-06 | End: 2019-03-08

## 2019-03-06 RX ORDER — MAGNESIUM HYDROXIDE 1200 MG/15ML
LIQUID ORAL AS NEEDED
Status: DISCONTINUED | OUTPATIENT
Start: 2019-03-06 | End: 2019-03-07 | Stop reason: HOSPADM

## 2019-03-06 RX ORDER — HYDROMORPHONE HYDROCHLORIDE 2 MG/ML
INJECTION, SOLUTION INTRAMUSCULAR; INTRAVENOUS; SUBCUTANEOUS AS NEEDED
Status: DISCONTINUED | OUTPATIENT
Start: 2019-03-06 | End: 2019-03-07 | Stop reason: SURG

## 2019-03-06 RX ORDER — LABETALOL 20 MG/4 ML (5 MG/ML) INTRAVENOUS SYRINGE
AS NEEDED
Status: DISCONTINUED | OUTPATIENT
Start: 2019-03-06 | End: 2019-03-07 | Stop reason: SURG

## 2019-03-06 RX ORDER — FENTANYL CITRATE 50 UG/ML
INJECTION, SOLUTION INTRAMUSCULAR; INTRAVENOUS AS NEEDED
Status: DISCONTINUED | OUTPATIENT
Start: 2019-03-06 | End: 2019-03-07 | Stop reason: SURG

## 2019-03-06 RX ORDER — DEXMEDETOMIDINE HYDROCHLORIDE 100 UG/ML
INJECTION, SOLUTION INTRAVENOUS AS NEEDED
Status: DISCONTINUED | OUTPATIENT
Start: 2019-03-06 | End: 2019-03-07 | Stop reason: SURG

## 2019-03-06 RX ORDER — SUCCINYLCHOLINE/SOD CL,ISO/PF 100 MG/5ML
SYRINGE (ML) INTRAVENOUS AS NEEDED
Status: DISCONTINUED | OUTPATIENT
Start: 2019-03-06 | End: 2019-03-07 | Stop reason: SURG

## 2019-03-06 RX ORDER — LEVOFLOXACIN 5 MG/ML
750 INJECTION, SOLUTION INTRAVENOUS EVERY 24 HOURS
Status: DISCONTINUED | OUTPATIENT
Start: 2019-03-06 | End: 2019-03-08

## 2019-03-06 RX ORDER — HYDROMORPHONE HCL/PF 1 MG/ML
1 SYRINGE (ML) INJECTION EVERY 4 HOURS PRN
Status: DISCONTINUED | OUTPATIENT
Start: 2019-03-06 | End: 2019-03-07

## 2019-03-06 RX ORDER — PROPOFOL 10 MG/ML
INJECTION, EMULSION INTRAVENOUS AS NEEDED
Status: DISCONTINUED | OUTPATIENT
Start: 2019-03-06 | End: 2019-03-07 | Stop reason: SURG

## 2019-03-06 RX ORDER — MIDAZOLAM HYDROCHLORIDE 1 MG/ML
INJECTION INTRAMUSCULAR; INTRAVENOUS AS NEEDED
Status: DISCONTINUED | OUTPATIENT
Start: 2019-03-06 | End: 2019-03-07 | Stop reason: SURG

## 2019-03-06 RX ORDER — BUPIVACAINE HYDROCHLORIDE 5 MG/ML
INJECTION, SOLUTION PERINEURAL AS NEEDED
Status: DISCONTINUED | OUTPATIENT
Start: 2019-03-06 | End: 2019-03-07 | Stop reason: HOSPADM

## 2019-03-06 RX ORDER — HEPARIN SODIUM 5000 [USP'U]/ML
5000 INJECTION, SOLUTION INTRAVENOUS; SUBCUTANEOUS EVERY 8 HOURS SCHEDULED
Status: DISCONTINUED | OUTPATIENT
Start: 2019-03-06 | End: 2019-03-10

## 2019-03-06 RX ADMIN — DEXMEDETOMIDINE HCL 8 MCG: 100 INJECTION INTRAVENOUS at 21:55

## 2019-03-06 RX ADMIN — ROCURONIUM BROMIDE 20 MG: 10 INJECTION INTRAVENOUS at 22:24

## 2019-03-06 RX ADMIN — DEXMEDETOMIDINE HCL 8 MCG: 100 INJECTION INTRAVENOUS at 22:05

## 2019-03-06 RX ADMIN — HYDROMORPHONE HYDROCHLORIDE 1 MG: 1 INJECTION, SOLUTION INTRAMUSCULAR; INTRAVENOUS; SUBCUTANEOUS at 20:37

## 2019-03-06 RX ADMIN — DEXMEDETOMIDINE HCL 8 MCG: 100 INJECTION INTRAVENOUS at 22:08

## 2019-03-06 RX ADMIN — LABETALOL 20 MG/4 ML (5 MG/ML) INTRAVENOUS SYRINGE 10 MG: at 22:22

## 2019-03-06 RX ADMIN — DEXMEDETOMIDINE HCL 8 MCG: 100 INJECTION INTRAVENOUS at 22:17

## 2019-03-06 RX ADMIN — FENTANYL CITRATE 100 MCG: 50 INJECTION, SOLUTION INTRAMUSCULAR; INTRAVENOUS at 21:38

## 2019-03-06 RX ADMIN — MIDAZOLAM HYDROCHLORIDE 2 MG: 1 INJECTION, SOLUTION INTRAMUSCULAR; INTRAVENOUS at 21:32

## 2019-03-06 RX ADMIN — SODIUM CHLORIDE, SODIUM LACTATE, POTASSIUM CHLORIDE, AND CALCIUM CHLORIDE: .6; .31; .03; .02 INJECTION, SOLUTION INTRAVENOUS at 21:32

## 2019-03-06 RX ADMIN — ROCURONIUM BROMIDE 30 MG: 10 INJECTION INTRAVENOUS at 21:45

## 2019-03-06 RX ADMIN — SODIUM CHLORIDE, SODIUM LACTATE, POTASSIUM CHLORIDE, AND CALCIUM CHLORIDE 2000 ML: .6; .31; .03; .02 INJECTION, SOLUTION INTRAVENOUS at 20:25

## 2019-03-06 RX ADMIN — HYDROMORPHONE HYDROCHLORIDE 0.5 MG: 2 INJECTION, SOLUTION INTRAMUSCULAR; INTRAVENOUS; SUBCUTANEOUS at 22:00

## 2019-03-06 RX ADMIN — HEPARIN SODIUM 5000 UNITS: 5000 INJECTION INTRAVENOUS; SUBCUTANEOUS at 22:00

## 2019-03-06 RX ADMIN — DEXMEDETOMIDINE HCL 8 MCG: 100 INJECTION INTRAVENOUS at 22:00

## 2019-03-06 RX ADMIN — LEVOFLOXACIN: 5 INJECTION, SOLUTION INTRAVENOUS at 21:22

## 2019-03-06 RX ADMIN — METRONIDAZOLE 500 MG: 500 INJECTION, SOLUTION INTRAVENOUS at 21:22

## 2019-03-06 RX ADMIN — HYDROMORPHONE HYDROCHLORIDE 0.5 MG: 2 INJECTION, SOLUTION INTRAMUSCULAR; INTRAVENOUS; SUBCUTANEOUS at 23:23

## 2019-03-06 RX ADMIN — PROPOFOL 200 MG: 10 INJECTION, EMULSION INTRAVENOUS at 21:38

## 2019-03-06 RX ADMIN — HYDROMORPHONE HYDROCHLORIDE 0.5 MG: 2 INJECTION, SOLUTION INTRAMUSCULAR; INTRAVENOUS; SUBCUTANEOUS at 22:06

## 2019-03-06 RX ADMIN — DEXAMETHASONE SODIUM PHOSPHATE 4 MG: 10 INJECTION INTRAMUSCULAR; INTRAVENOUS at 21:43

## 2019-03-06 RX ADMIN — Medication 200 MG: at 21:38

## 2019-03-06 NOTE — LETTER
[address] [facility] [facility]  [facility] [address]  Dept: [phone]    March 18, 2019     Patient: Leon Ralph   YOB: 1970   Date of Visit: 3/6/2019       To Whom it May Concern:    Leon Ralph has been under my professional care since 2/6/18  She was seen in the hospital from 3/6/2019   to 03/18/19  She may not return to work until further notice       If you have any questions or concerns, please don't hesitate to call           Sincerely,          Americo Cortes MD

## 2019-03-07 PROBLEM — R10.9 ABDOMINAL PAIN: Status: ACTIVE | Noted: 2019-02-18

## 2019-03-07 PROBLEM — I10 ESSENTIAL HYPERTENSION: Status: ACTIVE | Noted: 2019-03-07

## 2019-03-07 PROBLEM — R00.0 TACHYCARDIA: Status: ACTIVE | Noted: 2019-03-07

## 2019-03-07 LAB
ALBUMIN SERPL BCP-MCNC: 2.3 G/DL (ref 3.5–5)
ALP SERPL-CCNC: 51 U/L (ref 46–116)
ALT SERPL W P-5'-P-CCNC: 12 U/L (ref 12–78)
ANION GAP SERPL CALCULATED.3IONS-SCNC: 11 MMOL/L (ref 4–13)
AST SERPL W P-5'-P-CCNC: 15 U/L (ref 5–45)
BASOPHILS # BLD AUTO: 0.02 THOUSANDS/ΜL (ref 0–0.1)
BASOPHILS NFR BLD AUTO: 0 % (ref 0–1)
BILIRUB SERPL-MCNC: 0.5 MG/DL (ref 0.2–1)
BUN SERPL-MCNC: 20 MG/DL (ref 5–25)
CALCIUM SERPL-MCNC: 8.7 MG/DL (ref 8.3–10.1)
CHLORIDE SERPL-SCNC: 100 MMOL/L (ref 100–108)
CO2 SERPL-SCNC: 24 MMOL/L (ref 21–32)
CREAT SERPL-MCNC: 0.81 MG/DL (ref 0.6–1.3)
CRP SERPL QL: 47.5 MG/L
EOSINOPHIL # BLD AUTO: 0 THOUSAND/ΜL (ref 0–0.61)
EOSINOPHIL NFR BLD AUTO: 0 % (ref 0–6)
ERYTHROCYTE [DISTWIDTH] IN BLOOD BY AUTOMATED COUNT: 14.9 % (ref 11.6–15.1)
GFR SERPL CREATININE-BSD FRML MDRD: 86 ML/MIN/1.73SQ M
GLUCOSE P FAST SERPL-MCNC: 213 MG/DL (ref 65–99)
GLUCOSE SERPL-MCNC: 140 MG/DL (ref 65–140)
GLUCOSE SERPL-MCNC: 213 MG/DL (ref 65–140)
HCT VFR BLD AUTO: 41.5 % (ref 34.8–46.1)
HGB BLD-MCNC: 13.3 G/DL (ref 11.5–15.4)
IMM GRANULOCYTES # BLD AUTO: 0.03 THOUSAND/UL (ref 0–0.2)
IMM GRANULOCYTES NFR BLD AUTO: 0 % (ref 0–2)
LACTATE SERPL-SCNC: 1.9 MMOL/L (ref 0.5–2)
LACTATE SERPL-SCNC: 2.2 MMOL/L (ref 0.5–2)
LYMPHOCYTES # BLD AUTO: 0.4 THOUSANDS/ΜL (ref 0.6–4.47)
LYMPHOCYTES NFR BLD AUTO: 5 % (ref 14–44)
MCH RBC QN AUTO: 27.7 PG (ref 26.8–34.3)
MCHC RBC AUTO-ENTMCNC: 32 G/DL (ref 31.4–37.4)
MCV RBC AUTO: 86 FL (ref 82–98)
MONOCYTES # BLD AUTO: 0.55 THOUSAND/ΜL (ref 0.17–1.22)
MONOCYTES NFR BLD AUTO: 7 % (ref 4–12)
NEUTROPHILS # BLD AUTO: 7.51 THOUSANDS/ΜL (ref 1.85–7.62)
NEUTS SEG NFR BLD AUTO: 88 % (ref 43–75)
NRBC BLD AUTO-RTO: 0 /100 WBCS
PLATELET # BLD AUTO: 413 THOUSANDS/UL (ref 149–390)
PMV BLD AUTO: 10.2 FL (ref 8.9–12.7)
POTASSIUM SERPL-SCNC: 4.3 MMOL/L (ref 3.5–5.3)
PROT SERPL-MCNC: 6.1 G/DL (ref 6.4–8.2)
RBC # BLD AUTO: 4.81 MILLION/UL (ref 3.81–5.12)
SODIUM SERPL-SCNC: 135 MMOL/L (ref 136–145)
WBC # BLD AUTO: 8.51 THOUSAND/UL (ref 4.31–10.16)

## 2019-03-07 PROCEDURE — 88307 TISSUE EXAM BY PATHOLOGIST: CPT | Performed by: PATHOLOGY

## 2019-03-07 PROCEDURE — 99024 POSTOP FOLLOW-UP VISIT: CPT | Performed by: SURGERY

## 2019-03-07 PROCEDURE — 94002 VENT MGMT INPAT INIT DAY: CPT

## 2019-03-07 PROCEDURE — 86140 C-REACTIVE PROTEIN: CPT | Performed by: SURGERY

## 2019-03-07 PROCEDURE — 99232 SBSQ HOSP IP/OBS MODERATE 35: CPT | Performed by: ANESTHESIOLOGY

## 2019-03-07 PROCEDURE — C9113 INJ PANTOPRAZOLE SODIUM, VIA: HCPCS | Performed by: SURGERY

## 2019-03-07 PROCEDURE — 85025 COMPLETE CBC W/AUTO DIFF WBC: CPT | Performed by: PHYSICIAN ASSISTANT

## 2019-03-07 PROCEDURE — 82948 REAGENT STRIP/BLOOD GLUCOSE: CPT

## 2019-03-07 PROCEDURE — 80053 COMPREHEN METABOLIC PANEL: CPT | Performed by: PHYSICIAN ASSISTANT

## 2019-03-07 PROCEDURE — 94760 N-INVAS EAR/PLS OXIMETRY 1: CPT

## 2019-03-07 PROCEDURE — 83605 ASSAY OF LACTIC ACID: CPT | Performed by: PHYSICIAN ASSISTANT

## 2019-03-07 DEVICE — SEAMGUARD STPL REINF ENDO GIA ULTRA UNIV 60 PURPLE: Type: IMPLANTABLE DEVICE | Site: STOMACH | Status: FUNCTIONAL

## 2019-03-07 RX ORDER — CHLORHEXIDINE GLUCONATE 0.12 MG/ML
15 RINSE ORAL EVERY 12 HOURS SCHEDULED
Status: DISCONTINUED | OUTPATIENT
Start: 2019-03-07 | End: 2019-03-07 | Stop reason: SDUPTHER

## 2019-03-07 RX ORDER — LORAZEPAM 2 MG/ML
0.5 INJECTION INTRAMUSCULAR EVERY 6 HOURS PRN
Status: DISCONTINUED | OUTPATIENT
Start: 2019-03-07 | End: 2019-03-18 | Stop reason: HOSPADM

## 2019-03-07 RX ORDER — ONDANSETRON 2 MG/ML
4 INJECTION INTRAMUSCULAR; INTRAVENOUS EVERY 4 HOURS PRN
Status: DISCONTINUED | OUTPATIENT
Start: 2019-03-07 | End: 2019-03-18 | Stop reason: HOSPADM

## 2019-03-07 RX ORDER — HYDRALAZINE HYDROCHLORIDE 20 MG/ML
10 INJECTION INTRAMUSCULAR; INTRAVENOUS ONCE
Status: COMPLETED | OUTPATIENT
Start: 2019-03-07 | End: 2019-03-07

## 2019-03-07 RX ORDER — HYDROMORPHONE HCL/PF 1 MG/ML
0.5 SYRINGE (ML) INJECTION ONCE
Status: COMPLETED | OUTPATIENT
Start: 2019-03-07 | End: 2019-03-07

## 2019-03-07 RX ORDER — THIAMINE MONONITRATE (VIT B1) 100 MG
100 TABLET ORAL DAILY
Status: DISCONTINUED | OUTPATIENT
Start: 2019-03-07 | End: 2019-03-13

## 2019-03-07 RX ORDER — HEPARIN SODIUM 5000 [USP'U]/ML
5000 INJECTION, SOLUTION INTRAVENOUS; SUBCUTANEOUS EVERY 8 HOURS SCHEDULED
Status: DISCONTINUED | OUTPATIENT
Start: 2019-03-07 | End: 2019-03-07 | Stop reason: SDUPTHER

## 2019-03-07 RX ORDER — GLYCOPYRROLATE 0.2 MG/ML
INJECTION INTRAMUSCULAR; INTRAVENOUS AS NEEDED
Status: DISCONTINUED | OUTPATIENT
Start: 2019-03-07 | End: 2019-03-07 | Stop reason: SURG

## 2019-03-07 RX ORDER — SIMETHICONE 80 MG
80 TABLET,CHEWABLE ORAL EVERY 6 HOURS PRN
Status: DISCONTINUED | OUTPATIENT
Start: 2019-03-07 | End: 2019-03-18 | Stop reason: HOSPADM

## 2019-03-07 RX ORDER — CHLORHEXIDINE GLUCONATE 0.12 MG/ML
15 RINSE ORAL EVERY 12 HOURS SCHEDULED
Status: DISCONTINUED | OUTPATIENT
Start: 2019-03-07 | End: 2019-03-07

## 2019-03-07 RX ORDER — HYDROMORPHONE HCL/PF 1 MG/ML
1 SYRINGE (ML) INJECTION
Status: DISCONTINUED | OUTPATIENT
Start: 2019-03-07 | End: 2019-03-07

## 2019-03-07 RX ORDER — ONDANSETRON 2 MG/ML
INJECTION INTRAMUSCULAR; INTRAVENOUS AS NEEDED
Status: DISCONTINUED | OUTPATIENT
Start: 2019-03-07 | End: 2019-03-07 | Stop reason: SURG

## 2019-03-07 RX ORDER — KETAMINE HCL IN NACL, ISO-OSM 100MG/10ML
SYRINGE (ML) INJECTION AS NEEDED
Status: DISCONTINUED | OUTPATIENT
Start: 2019-03-07 | End: 2019-03-07 | Stop reason: SURG

## 2019-03-07 RX ORDER — PROMETHAZINE HYDROCHLORIDE 25 MG/ML
25 INJECTION, SOLUTION INTRAMUSCULAR; INTRAVENOUS EVERY 6 HOURS PRN
Status: DISCONTINUED | OUTPATIENT
Start: 2019-03-07 | End: 2019-03-18 | Stop reason: HOSPADM

## 2019-03-07 RX ORDER — HYDROMORPHONE HCL/PF 1 MG/ML
1 SYRINGE (ML) INJECTION EVERY 2 HOUR PRN
Status: DISCONTINUED | OUTPATIENT
Start: 2019-03-07 | End: 2019-03-08

## 2019-03-07 RX ORDER — NEOSTIGMINE METHYLSULFATE 1 MG/ML
INJECTION INTRAVENOUS AS NEEDED
Status: DISCONTINUED | OUTPATIENT
Start: 2019-03-07 | End: 2019-03-07 | Stop reason: SURG

## 2019-03-07 RX ORDER — ASCORBIC ACID, VITAMIN A PALMITATE, CHOLECALCIFEROL, THIAMINE HYDROCHLORIDE, RIBOFLAVIN-5 PHOSPHATE SODIUM, PYRIDOXINE HYDROCHLORIDE, NIACINAMIDE, DEXPANTHENOL, ALPHA-TOCOPHEROL ACETATE, VITAMIN K1, FOLIC ACID, BIOTIN, CYANOCOBALAMIN 200; 3300; 200; 6; 3.6; 6; 40; 15; 10; 150; 600; 60; 5 MG/10ML; [IU]/10ML; [IU]/10ML; MG/10ML; MG/10ML; MG/10ML; MG/10ML; MG/10ML; [IU]/10ML; UG/10ML; UG/10ML; UG/10ML; UG/10ML
10 INJECTION, SOLUTION INTRAVENOUS DAILY
Status: DISCONTINUED | OUTPATIENT
Start: 2019-03-07 | End: 2019-03-07

## 2019-03-07 RX ORDER — METOPROLOL TARTRATE 5 MG/5ML
2.5 INJECTION INTRAVENOUS ONCE
Status: COMPLETED | OUTPATIENT
Start: 2019-03-07 | End: 2019-03-07

## 2019-03-07 RX ORDER — FENTANYL CITRATE 50 UG/ML
50 INJECTION, SOLUTION INTRAMUSCULAR; INTRAVENOUS EVERY 2 HOUR PRN
Status: DISCONTINUED | OUTPATIENT
Start: 2019-03-07 | End: 2019-03-08

## 2019-03-07 RX ORDER — PANTOPRAZOLE SODIUM 40 MG/1
40 INJECTION, POWDER, FOR SOLUTION INTRAVENOUS EVERY 12 HOURS SCHEDULED
Status: DISCONTINUED | OUTPATIENT
Start: 2019-03-07 | End: 2019-03-15

## 2019-03-07 RX ADMIN — Medication 30 MG: at 03:04

## 2019-03-07 RX ADMIN — PROPOFOL 50 MG: 10 INJECTION, EMULSION INTRAVENOUS at 05:11

## 2019-03-07 RX ADMIN — PANTOPRAZOLE SODIUM 40 MG: 40 INJECTION, POWDER, FOR SOLUTION INTRAVENOUS at 20:45

## 2019-03-07 RX ADMIN — ASCORBIC ACID, VITAMIN A PALMITATE, CHOLECALCIFEROL, THIAMINE HYDROCHLORIDE, RIBOFLAVIN-5 PHOSPHATE SODIUM, PYRIDOXINE HYDROCHLORIDE, NIACINAMIDE, DEXPANTHENOL, ALPHA-TOCOPHEROL ACETATE, VITAMIN K1, FOLIC ACID, BIOTIN, CYANOCOBALAMIN: 200; 3300; 200; 6; 3.6; 6; 40; 15; 10; 150; 600; 60; 5 INJECTION, SOLUTION INTRAVENOUS at 08:33

## 2019-03-07 RX ADMIN — NEOSTIGMINE METHYLSULFATE 4 MG: 1 INJECTION, SOLUTION INTRAVENOUS at 05:40

## 2019-03-07 RX ADMIN — Medication 20 MG: at 02:48

## 2019-03-07 RX ADMIN — SODIUM CHLORIDE, SODIUM LACTATE, POTASSIUM CHLORIDE, AND CALCIUM CHLORIDE: .6; .31; .03; .02 INJECTION, SOLUTION INTRAVENOUS at 04:34

## 2019-03-07 RX ADMIN — HYDROMORPHONE HYDROCHLORIDE 1 MG: 1 INJECTION, SOLUTION INTRAMUSCULAR; INTRAVENOUS; SUBCUTANEOUS at 17:46

## 2019-03-07 RX ADMIN — GLYCOPYRROLATE 0.8 MG: 0.2 INJECTION, SOLUTION INTRAMUSCULAR; INTRAVENOUS at 05:40

## 2019-03-07 RX ADMIN — FENTANYL CITRATE 50 MCG: 50 INJECTION INTRAMUSCULAR; INTRAVENOUS at 16:10

## 2019-03-07 RX ADMIN — ROCURONIUM BROMIDE 20 MG: 10 INJECTION INTRAVENOUS at 00:48

## 2019-03-07 RX ADMIN — HEPARIN SODIUM 5000 UNITS: 5000 INJECTION INTRAVENOUS; SUBCUTANEOUS at 21:50

## 2019-03-07 RX ADMIN — SODIUM CHLORIDE, SODIUM LACTATE, POTASSIUM CHLORIDE, AND CALCIUM CHLORIDE 125 ML/HR: .6; .31; .03; .02 INJECTION, SOLUTION INTRAVENOUS at 11:01

## 2019-03-07 RX ADMIN — HYDROMORPHONE HYDROCHLORIDE 1 MG: 1 INJECTION, SOLUTION INTRAMUSCULAR; INTRAVENOUS; SUBCUTANEOUS at 13:30

## 2019-03-07 RX ADMIN — METRONIDAZOLE 500 MG: 500 INJECTION, SOLUTION INTRAVENOUS at 03:22

## 2019-03-07 RX ADMIN — ONDANSETRON 4 MG: 2 INJECTION INTRAMUSCULAR; INTRAVENOUS at 17:35

## 2019-03-07 RX ADMIN — ROCURONIUM BROMIDE 10 MG: 10 INJECTION INTRAVENOUS at 03:15

## 2019-03-07 RX ADMIN — METRONIDAZOLE 500 MG: 500 INJECTION, SOLUTION INTRAVENOUS at 12:00

## 2019-03-07 RX ADMIN — METOPROLOL TARTRATE 2.5 MG: 5 INJECTION, SOLUTION INTRAVENOUS at 21:50

## 2019-03-07 RX ADMIN — ROCURONIUM BROMIDE 20 MG: 10 INJECTION INTRAVENOUS at 04:04

## 2019-03-07 RX ADMIN — HYDROMORPHONE HYDROCHLORIDE 0.4 MG: 2 INJECTION, SOLUTION INTRAMUSCULAR; INTRAVENOUS; SUBCUTANEOUS at 05:09

## 2019-03-07 RX ADMIN — SODIUM CHLORIDE, SODIUM LACTATE, POTASSIUM CHLORIDE, AND CALCIUM CHLORIDE 1000 ML: .6; .31; .03; .02 INJECTION, SOLUTION INTRAVENOUS at 17:36

## 2019-03-07 RX ADMIN — LEVOFLOXACIN 750 MG: 5 INJECTION, SOLUTION INTRAVENOUS at 20:30

## 2019-03-07 RX ADMIN — HYDRALAZINE HYDROCHLORIDE 10 MG: 20 INJECTION INTRAMUSCULAR; INTRAVENOUS at 20:42

## 2019-03-07 RX ADMIN — HYDROMORPHONE HYDROCHLORIDE 0.5 MG: 1 INJECTION, SOLUTION INTRAMUSCULAR; INTRAVENOUS; SUBCUTANEOUS at 11:00

## 2019-03-07 RX ADMIN — PANTOPRAZOLE SODIUM 40 MG: 40 INJECTION, POWDER, FOR SOLUTION INTRAVENOUS at 08:32

## 2019-03-07 RX ADMIN — HYDROMORPHONE HYDROCHLORIDE 1 MG: 1 INJECTION, SOLUTION INTRAMUSCULAR; INTRAVENOUS; SUBCUTANEOUS at 20:01

## 2019-03-07 RX ADMIN — ROCURONIUM BROMIDE 20 MG: 10 INJECTION INTRAVENOUS at 03:44

## 2019-03-07 RX ADMIN — SIMETHICONE CHEW TAB 80 MG 80 MG: 80 TABLET ORAL at 20:42

## 2019-03-07 RX ADMIN — HYDROMORPHONE HYDROCHLORIDE 0.5 MG: 2 INJECTION, SOLUTION INTRAMUSCULAR; INTRAVENOUS; SUBCUTANEOUS at 04:34

## 2019-03-07 RX ADMIN — HYDROMORPHONE HYDROCHLORIDE 0.25 MG: 2 INJECTION, SOLUTION INTRAMUSCULAR; INTRAVENOUS; SUBCUTANEOUS at 00:52

## 2019-03-07 RX ADMIN — HYDROMORPHONE HYDROCHLORIDE 0.5 MG: 2 INJECTION, SOLUTION INTRAMUSCULAR; INTRAVENOUS; SUBCUTANEOUS at 04:09

## 2019-03-07 RX ADMIN — CHLORHEXIDINE GLUCONATE 15 ML: 1.2 RINSE ORAL at 08:32

## 2019-03-07 RX ADMIN — DEXMEDETOMIDINE HCL 8 MCG: 100 INJECTION INTRAVENOUS at 03:33

## 2019-03-07 RX ADMIN — ROCURONIUM BROMIDE 10 MG: 10 INJECTION INTRAVENOUS at 02:41

## 2019-03-07 RX ADMIN — HEPARIN SODIUM 5000 UNITS: 5000 INJECTION INTRAVENOUS; SUBCUTANEOUS at 14:22

## 2019-03-07 RX ADMIN — FENTANYL CITRATE 50 MCG: 50 INJECTION INTRAMUSCULAR; INTRAVENOUS at 19:28

## 2019-03-07 RX ADMIN — HYDROMORPHONE HYDROCHLORIDE 0.25 MG: 2 INJECTION, SOLUTION INTRAMUSCULAR; INTRAVENOUS; SUBCUTANEOUS at 00:30

## 2019-03-07 RX ADMIN — ROCURONIUM BROMIDE 20 MG: 10 INJECTION INTRAVENOUS at 04:14

## 2019-03-07 RX ADMIN — SIMETHICONE CHEW TAB 80 MG 80 MG: 80 TABLET ORAL at 14:34

## 2019-03-07 RX ADMIN — LABETALOL 20 MG/4 ML (5 MG/ML) INTRAVENOUS SYRINGE 10 MG: at 01:20

## 2019-03-07 RX ADMIN — METRONIDAZOLE 500 MG: 500 INJECTION, SOLUTION INTRAVENOUS at 19:36

## 2019-03-07 RX ADMIN — HYDROMORPHONE HYDROCHLORIDE 0.6 MG: 2 INJECTION, SOLUTION INTRAMUSCULAR; INTRAVENOUS; SUBCUTANEOUS at 05:29

## 2019-03-07 RX ADMIN — DEXMEDETOMIDINE HCL 8 MCG: 100 INJECTION INTRAVENOUS at 04:12

## 2019-03-07 RX ADMIN — SODIUM CHLORIDE, SODIUM LACTATE, POTASSIUM CHLORIDE, AND CALCIUM CHLORIDE 500 ML: .6; .31; .03; .02 INJECTION, SOLUTION INTRAVENOUS at 13:44

## 2019-03-07 RX ADMIN — HYDROMORPHONE HYDROCHLORIDE 1 MG: 1 INJECTION, SOLUTION INTRAMUSCULAR; INTRAVENOUS; SUBCUTANEOUS at 23:42

## 2019-03-07 RX ADMIN — HYDROMORPHONE HYDROCHLORIDE 1 MG: 1 INJECTION, SOLUTION INTRAMUSCULAR; INTRAVENOUS; SUBCUTANEOUS at 08:33

## 2019-03-07 RX ADMIN — DEXMEDETOMIDINE HCL 8 MCG: 100 INJECTION INTRAVENOUS at 03:29

## 2019-03-07 RX ADMIN — ONDANSETRON 4 MG: 2 INJECTION INTRAMUSCULAR; INTRAVENOUS at 05:40

## 2019-03-07 RX ADMIN — ROCURONIUM BROMIDE 10 MG: 10 INJECTION INTRAVENOUS at 02:33

## 2019-03-07 RX ADMIN — ROCURONIUM BROMIDE 10 MG: 10 INJECTION INTRAVENOUS at 03:04

## 2019-03-07 RX ADMIN — THIAMINE HCL TAB 100 MG 100 MG: 100 TAB at 08:32

## 2019-03-07 RX ADMIN — FENTANYL CITRATE 50 MCG: 50 INJECTION INTRAMUSCULAR; INTRAVENOUS at 22:20

## 2019-03-07 NOTE — ANESTHESIA PREPROCEDURE EVALUATION
Review of Systems/Medical History  Patient summary reviewed  Chart reviewed  No history of anesthetic complications     Cardiovascular  EKG reviewed, Exercise tolerance (METS): >4,     Pulmonary  Not a smoker , No recent URI ,   Comment: Left sided pleural effusion with resolving atelectasis on CT scan chest 3/2/19     GI/Hepatic    GERD , Bariatric surgery,   Comment: Complex surgical history involving gastric banding 2008, conversion to gastric bypass shortly after and subsequent development of pain, found to have distended gastric remnant 2/2 obstruction  Tube placed by IR for decompression but now with malfunction of tube and gastric contents leaking into peritoneal space  Ate soup at ~14:00     Negative  ROS        Endo/Other    Obesity    GYN    Hysterectomy,        Hematology  Negative hematology ROS      Musculoskeletal  Negative musculoskeletal ROS        Neurology  Negative neurology ROS      Psychology   Negative psychology ROS              Physical Exam    Airway    Mallampati score: II  TM Distance: >3 FB  Neck ROM: full     Dental   No notable dental hx     Cardiovascular  Rhythm: regular,     Pulmonary  Pulmonary exam normal Breath sounds clear to auscultation,     Other Findings        Anesthesia Plan  ASA Score- 3 Emergent    Anesthesia Type- general with ASA Monitors  Additional Monitors:   Airway Plan: ETT  Comment: Discussed possibility of arterial line as well as remote possibility of requiring post-procedure mechanical ventilation with ICU admission  Patient provided an opportunity to ask questions, which were answered        Plan Factors-    Induction- intravenous and rapid sequence induction  Postoperative Plan- Plan for postoperative opioid use  Planned trial extubation    Informed Consent- Anesthetic plan and risks discussed with patient  I personally reviewed this patient with the CRNA  Discussed and agreed on the Anesthesia Plan with the CRNA             Lab Results Component Value Date    WBC 8 29 03/06/2019    HGB 13 1 03/06/2019    HCT 40 9 03/06/2019    MCV 85 03/06/2019     (H) 03/06/2019     Lab Results   Component Value Date    CALCIUM 9 7 03/06/2019    K 4 1 03/06/2019    CO2 26 03/06/2019     03/06/2019    BUN 25 03/06/2019    CREATININE 0 81 03/06/2019     Lab Results   Component Value Date    ALT 21 03/02/2019    AST 15 03/02/2019    ALKPHOS 71 03/02/2019     Lab Results   Component Value Date    INR 1 10 03/02/2019    INR 1 04 02/25/2019    INR 1 14 03/15/2018    PROTIME 14 1 03/02/2019    PROTIME 13 5 02/25/2019    PROTIME 14 8 (H) 03/15/2018     No results found for: HGBA1C

## 2019-03-07 NOTE — SOCIAL WORK
CM met with pt, daughters-Calista and Surekha, and sister in law-Jennifer  Pt lives with her  Gaston Moyer in a 2 story house with 3 TORI-w railing and 6 steps-no railing to reach the main living area  Pt is able to navigate steps and is independent with ADL's  She uses a cane prn to ambulate  She has used OP/PT through [address]  for knee issues  She has never used [facility] services  Denies substance abuse, tobacco abuse, or mental health issues  Dr Nena Alvarez is her PCP  She uses [employer] on [address] and has no problem with co-pays  She does not have a POA or Advanced Directive and does not want info at this time  She works and drives  Her daughters will transport home when pt is medically cleared  CM discussed discharge needs including [facility] services, but daughters are CNAs and feel that they can provide any needed assistance  CM will continue to follow through hospitalization  CM reviewed discharge planning process including the following: identifying help at home, patient preference for discharge planning needs, pharmacy preference, and availability of treatment team to discuss questions or concerns patient and/or family may have regarding understanding medications and recognizing signs and symptoms once discharged  CM also encouraged patient to follow up with all recommended appointments after discharge  Patient advised of importance for patient and family to participate in managing patients medical well being  CM name and role reviewed  Discharge Checklist reviewed and CM will continue to monitor for progress toward discharge goals in nursing and provider rounds

## 2019-03-07 NOTE — PROGRESS NOTES
48/F POD 0 s/p diagnostic laparoscopy, partial remnant gastrectomy, transverse/colon resection with mobilization of splenic flexure, conversion to exploratory laparotomy for further mobilization and completion of anastomosis     Patient complains of epigastric pain/pressure   Abd soft, appropriately tender, drain X2 serous    Will continue supportive care in ICU  Appreciate critical care support

## 2019-03-07 NOTE — UTILIZATION REVIEW
Initial Clinical Review    Admission: Date/Time/Statement: inpatient  3/6/19 @1916    Critical care   Taken urgently to the OR, admitted to ICU on vent overnight  Orders Placed This Encounter   Procedures    Inpatient Admission     Standing Status:   Standing     Number of Occurrences:   1     Order Specific Question:   Admitting Physician     Answer:   Jerry Mccarty     Order Specific Question:   Level of Care     Answer:   Med Surg [16]     Order Specific Question:   Estimated length of stay     Answer:   Inpatient Only Surgery     ED: Date/Time/Mode of Arrival:   ED Arrival Information     Expected Arrival [address] of Arrival Escorted By Service Admission Type    3/6/2019  3/6/2019 19:40 Emergent Walk-In Family Member Bariatrics Emergency    Arrival Complaint    abdominal pain        Chief Complaint:   Chief Complaint   Patient presents with    Back Pain     patient reports DARREN tube is blocked     Assessment/Plan: 50 yo female from home to ED as private exam for bariatric surgeon, admitted as INPATIENT to critical care due gastric outflow obstruction  Likely due to gastric remnant obstruction causing gastric remnant blowout, leading to ongoing leakage and nonfunctional drain  Had exploratory laparoscopy, lysis of adhesions, small bowel resection and gastrotomy with decompression/drainage/closure 2/11/19    admitted x 2 after surgery due to diffuse abd pain/ascites which resolved, and splenic collection requiring drain  Now presents to ED due to brown drainage and suspected blocked drain, with stabbing LUQ back pain, 10/10  +LUQ/L CVA tenderness  Taken to OR 3/6 @2154:  Procedure(s) (LRB):LAPAROSCOPY DIAGNOSTIC   RESECTION GASTRIC PARTIAL/GASTRECTOMY PARTIAL LAPAROSCOPIC decided to convert to an open exploratory laparotomy to complete the colocolostomy     INTRAOPERATIVE EGD    TRANSVERSE/LEFT COLON RESECTION, TAKE DOWN OF SPLENIC FLEXURE    EBL 400ml  General anesthesia  Operative Findings:Dense adhesions and inflammatory reaction from recent surgical procedure     Transferred to ICU intubated postop to allow sedation to wear off   Switched to CPAP 3/7 @0613  Extubated 3/7 @0839    ED Vital Signs:   ED Triage Vitals   Temperature Pulse Respirations Blood Pressure SpO2   03/06/19 1947 03/06/19 1947 03/06/19 1947 03/06/19 1947 03/06/19 1947   97 5 °F (36 4 °C) 103 14 124/78 96 %      Temp Source Heart Rate Source Patient Position - Orthostatic VS BP Location FiO2 (%)   03/06/19 1947 03/06/19 1947 03/07/19 1245 03/07/19 1245 --   Oral Monitor Lying Right arm       Pain Score       03/06/19 1947       Worst Possible Pain        Wt Readings from Last 1 Encounters:   03/06/19 109 kg (240 lb)     Vital Signs (abnormal):   Date/Time  Temp Pulse  Resp  BP  MAP (mmHg)  SpO2  O2 Device    03/07/19 1245  97 8 °F (36 6 °C) 124  19  150/72  102  95 %  Nasal cannula    03/07/19 0700  -- 100  12  143/76  103  97 %  --    03/07/19 0645  -- 100  12  138/74  99  97 %  --    03/07/19 0630  -- 100  13  140/76  102  96 %  --    03/07/19 0625  -- 101  12  129/76  96  97 %  --    03/07/19 0601  -- 112  14  --  --  98 %  --    03/07/19 0556  -- --  --  --  --  98 %  --    03/07/19 0555  99 °F (37 2 °C) 129  17  138/75  --  97 %  --         Pertinent Labs/Diagnostic Test Results:   3/7: na 135   Gluc 213   t prot 6 1   Alb 2 3   Lactic acid 2 2    Wbc 8 51  hgb 13 3   hct 41 5      crp 47 5  ED Treatment:   Medication Administration from 03/06/2019 1841 to 03/06/2019 2118       Date/Time Order Dose Route Action     03/06/2019 2118 metroNIDAZOLE (FLAGYL) IVPB (premix) 500 mg   Intravenous      03/06/2019 2118 levofloxacin (LEVAQUIN) IVPB (premix) 750 mg   Intravenous      03/06/2019 2118 heparin (porcine) subcutaneous injection 5,000 Units   Subcutaneous      03/06/2019 2025 lactated ringers bolus 2,000 mL 2,000 mL Intravenous New Bag     03/06/2019 2118 HYDROmorphone (DILAUDID) injection 1 mg   Intravenous 03/06/2019 2037 HYDROmorphone (DILAUDID) injection 1 mg 1 mg Intravenous Given     03/06/2019 2118 bupivacaine liposomal (EXPAREL) 1 3 % injection 0 1 mL   Injection         Past Medical/Surgical History:    Active Ambulatory Problems     Diagnosis Date Noted    Upper GI bleed 03/20/2018    Gastrointestinal hemorrhage associated with gastric ulcer 03/23/2018    History of gastric ulcer 01/24/2019    Epigastric pain 01/24/2019    Pancreatic cyst 01/24/2019    Abnormal CT scan, colon 01/24/2019    Bilious vomiting with nausea 01/24/2019    Gastrointestinal hemorrhage 01/24/2019    Abnormal CT of the abdomen 01/24/2019    Abdominal pain, LUQ 02/01/2019    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    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    Acute gastrointestinal hemorrhage 02/06/2019    Acute primary diaphragmitis 02/06/2019    Cyst of pancreas 02/06/2019    Abdominal pain 02/18/2019    Perisplenitis 02/25/2019       Past Medical History:   Diagnosis Date    Bell palsy     Gastric ulcer     GERD (gastroesophageal reflux disease)     History of transfusion     Melena     Pancreatic cyst     Right facial numbness     Upper GI bleed      Admitting Diagnosis: Back pain [M54 9]  Gastric outflow obstruction [K31 1]  Age/Sex: 51 y o  female  Admission Orders:  Scheduled Meds:     Current Facility-Administered Medications:  bupivacaine liposomal 1 3 mg Injection Once   chlorhexidine 15 mL Swish [employer] Spit Q12H [address]   heparin (porcine) 5,000 Units Subcutaneous Q8H [address]   HYDROmorphone 1 mg Intravenous Q3H PRN x 12   lactated ringers 125 mL/hr Intravenous Continuous   levofloxacin 750 mg Intravenous Q24H   LORazepam 0 5 mg Intravenous Q6H PRN   metroNIDAZOLE 500 mg Intravenous Q8H   multivitamin IVPB  Intravenous Daily   ondansetron 4 mg Intravenous Q4H PRN   pantoprazole 40 mg Intravenous Q12H EDA   promethazine 25 mg Intramuscular Q6H PRN   thiamine 100 mg Oral Daily   precedex gtt  IV fentanyl x 2  IV robinul x 1  IV ketamine x 2  IV labetalol x 2  IV diprivan x 2  IV rocuronium x 10  Intubated, mechanical ventilation  Extubate on 3/7  Nonviolent restraints postop  Ambulate tid  I/O q2h  Deep suction bid  Neuro checks q4h  Drain to bulb suction  Daily wt, daily ICU assessment  Daily awakening trial  scd's  Dysphagia eval  Fall prec  Turn q2h  Elevate hob >30 degr  Oral care  scd's  Incentive spirom hourly wa  Cmp, mg, phos, cbc, crp in am  NPO        Network Utilization Review Department  Phone: [phone]; Fax [phone]  [email or web address]  ATTENTION: Please call with any questions or concerns to [phone]  and carefully listen to the prompts so that you are directed to the right person  Send all requests for admission clinical reviews, approved or denied determinations and any other requests to fax [phone]   All voicemails are confidential

## 2019-03-07 NOTE — ANESTHESIA POSTPROCEDURE EVALUATION
Post-Op Assessment Note    CV Status:  Stable    Pain management: adequate     Mental Status:  Alert and awake   Hydration Status:  Euvolemic   PONV Controlled:  Controlled   Airway Patency:  Patent   Post Op Vitals Reviewed: Yes      Staff: CRNA, Anesthesiologist   Comments: patient transferred to ICU on monitored bed; report to ICU RN; VSS, patient awake, follows comands           /75 (03/07/19 0555)    Temp 99 °F (37 2 °C) (03/07/19 0555)    Pulse (!) 129 (03/07/19 0555)   Resp 17 (03/07/19 0555)    SpO2 97 % (03/07/19 0555)

## 2019-03-07 NOTE — PLAN OF CARE
Problem: DISCHARGE PLANNING - CARE MANAGEMENT  Goal: Discharge to post-acute care or home with appropriate resources  Description  INTERVENTIONS:  - Conduct assessment to determine patient/family and health care team treatment goals, and need for post-acute services based on payer coverage, community resources, and patient preferences, and barriers to discharge  - Address psychosocial, clinical, and financial barriers to discharge as identified in assessment in conjunction with the patient/family and health care team  - Arrange appropriate level of post-acute services according to patient?s   needs and preference and payer coverage in collaboration with the physician and health care team  - Communicate with and update the patient/family, physician, and health care team regarding progress on the discharge plan  - Arrange appropriate transportation to post-acute venues  Outcome: Progressing     Problem: Prexisting or High Potential for Compromised Skin Integrity  Goal: Skin integrity is maintained or improved  Description  INTERVENTIONS:  - Identify patients at risk for skin breakdown  - Assess and monitor skin integrity  - Assess and monitor nutrition and hydration status  - Monitor labs (i e  albumin)  - Assess for incontinence   - Turn and reposition patient  - Assist with mobility/ambulation  - Relieve pressure over bony prominences  - Avoid friction and shearing  - Provide appropriate hygiene as needed including keeping skin clean and dry  - Evaluate need for skin moisturizer/barrier cream  - Collaborate with interdisciplinary team (i e  Nutrition, Rehabilitation, etc )   - Patient/family teaching  Outcome: Progressing     Problem: Potential for Falls  Goal: Patient will remain free of falls  Description  INTERVENTIONS:  - Assess patient frequently for physical needs  -  Identify cognitive and physical deficits and behaviors that affect risk of falls    -  [place] fall precautions as indicated by assessment   - Educate patient/family on patient safety including physical limitations  - Instruct patient to call for assistance with activity based on assessment  - Modify environment to reduce risk of injury  - Consider OT/PT consult to assist with strengthening/mobility  Outcome: Progressing

## 2019-03-07 NOTE — OP NOTE
OPERATIVE REPORT  PATIENT NAME: Sherrill Arrington    :  1970  MRN: [id number]  Pt Location: [facility] OR ROOM 03    SURGERY DATE: 3/6/2019    Surgeon(s) and Role:     * Kedar Valles MD - Primary     * Mitchel Blackwell PA-C - Assisting     * Neelima Wick MD - Keven Rick MD - Co-surgeon    Preop Diagnosis:  Gastric outflow obstruction [K31 1]    Post-Op Diagnosis Codes:     * Gastric outflow obstruction [K31 1]    Procedure(s) (LRB):  LAPAROSCOPY DIAGNOSTIC (N/A)  RESECTION GASTRIC PARTIAL/GASTRECTOMY PARTIAL LAPAROSCOPIC (N/A)  INTRAOPERATIVE EGD (N/A)    Specimen(s):  ID Type Source Tests Collected by Time Destination   1 : Portion of stomach--Test for H pylori Tissue Stomach TISSUE EXAM Kedar Valles MD 3/7/2019 0306    2 : Transverse colon, splenic flexture Tissue Large Intestine, Transverse Colon TISSUE EXAM Kedar Valles MD 3/7/2019 0342        Estimated Blood Loss:   400 mL    Drains:  Closed/Suction Drain Left LUQ Bulb 10 2 Fr  (Active)   Site Description Edema/swelling;Leaking at site 3/6/2019 10:53 PM   Dressing Status Removed 3/6/2019 10:53 PM   Drainage Appearance Bile 3/6/2019 10:53 PM   Number of days: 10       Urethral Catheter Non-latex 16 Fr  (Active)   Site Assessment Clean;Skin intact 3/7/2019  1:49 AM   Collection Container Standard drainage bag 3/7/2019  1:49 AM   Securement Method Securing device (Describe) 3/7/2019  1:49 AM   Number of days: 0       Anesthesia Type:   General    Operative Indications:  Gastric outflow obstruction [K31 1]  I was called by Dr Clem Lopez to help him with colon resection that he had to deal with during the operation because of the prior procedures  Upon arrival the procedure was being done laparoscopically, I advised for open exploration to better identify the anatomy and proceed with end-to-end colon-colon anastomosis  Operative Findings:  Transverse colon and descending colon were transected previously    At this time we elected to proceed with end-to-end anastomosis by releasing some of the tension of the transverse colon and descending colon  Once that was accomplished end-to-end hand-sewn anastomosis was performed without any tension  Complications:   None    Procedure and Technique:  Midline incision was made with a scalpel, taken down through the subcutaneous tissue with cautery  The fascia peritoneum were opened with cautery  Upon entry abdominal cavity the anatomy was identified  At this point we noted that the Randy limb was antecolic there for all the adhesions of the transverse colon in the Randy limb were carefully divided using cautery and Metzenbaum scissors  Descending colon was also dissected the white line of Toldt, by releasing both size I was able to approximate 2 ends without tension  At this point the proximal and distal and of the anastomosis were carefully repair by dividing the previous anastomosis using YASSINE staple device  The posterior layer was performed with a 3 0 Nurolon in an interrupted fashion  The staple line was opened in the proximal distal end using cautery  Second layer with 3 0 Vicryl in a continuous interlocking fashion for the posterior layer and then the anterior layer with Lembert stitch  Second layer for the anterior layer was interrupted 3 0 Nurolon  This point the entire abdominal cavity was irrigated with no evidence of bleeding from the dissection sites  The case was handed back to Dr Stacey Duke for closure  I was present for the entire procedure, A qualified resident physician was not available, A co-surgeon was required because of skills and techniques relevant to speciality and A physician assistant was required during the procedure for retraction tissue handling,dissection and suturing    Patient Disposition:  Critical Care Unit and intubated and hemodynamically stable      SIGNATURE: Russ Wolff MD  DATE: March 7, 2019  TIME: 5:18 AM

## 2019-03-07 NOTE — RESPIRATORY THERAPY NOTE
RT Ventilator Management Note  Sunitha Echavarria 51 y o  female MRN: [id number]  Unit/Bed#:  Encounter: [id number]      Daily Screen       3/7/2019 0601             Patient safety screen outcome[de-identified]  Passed    Spont breathing trial % for 30 min:  Yes            Physical Exam:   Assessment Type: Assess only  General Appearance: Drowsy  Respiratory Pattern: Normal  Chest Assessment: Chest expansion symmetrical  Bilateral Breath Sounds: Clear  O2 Device: vent      Resp Comments: pt was transfered from the OR to the ICU still intubated to allow for sedation to wear off  Pt began to awaken is appropriate and responsive  Pt was switched to CPAP/PS in an attempt to extubate shortly  Will continue to monitor the patient for readiness to extubate

## 2019-03-07 NOTE — PROGRESS NOTES
Patient is POD #0 s/p laparoscopic converted to open partial gastrectomy with colon resection and primary anastomosis  Patient is doing well this morning  Pain is controlled  Patient denies any significant nausea or vomiting  Exam:  Awake alert no acute distress  Abdomen soft tender to palpation appropriately midline incision    Mild leukocytosis receiving hydration per ICU  Hemoglobin stable, no significant leukocytosis    Defer patient's further surgical management to bariatric service  Completely available if they require any assistance whatsoever    Mack Harper MD

## 2019-03-07 NOTE — RESPIRATORY THERAPY NOTE
03/07/19 0839   Respiratory Assessment   Resp Comments Pt extubated at this time and placed on 4L nasal cannula  Pt tolerating nasal cannula well; will continue to monitor pt  Ventilator on stand by at bedside

## 2019-03-07 NOTE — H&P
H&P Exam - Bariatric Surgery   Tori Alvarez 51 y o  female MRN: [id number]  Unit/Bed#: FT 02 Encounter: [id number]    Assessment/Plan     Assessment:  51year old morbidly obese female s/p exploratory laparoscopy, lysis of adhesions, small bowel resection and gastrotomy with decompression/drainage/closure 2/11/19  The gastrotomy was performed as imaging initially identified the gastric remnant as a large 10 cm cyst  After drainage I determined this was remnant and it was closed  The reason that portion of the defunctionalized remnant was extremely dilated was due to the original construct and configuration of the RYGB created in 2010  She represented to the hospital 7 days after surgery with diffuse abdominal pain and ascites  She was treated with abx and bowel rest and the pain resolved  She then represented to the hospital POD 14 this time with a localized collection by the spleen which was drained by IR  Initially the drainage was brown but then became serous leading me to believe that the collection was from contamination at the original surgery  However her drainage became brown/serous again  This is likely secondary to the obstruction at the gastric remnant which caused a gastric remnant blowout proximally at the gastrotomy site leading to ongoing leakage which was controlled by the drain  The drain now is not functioning and the gastric remnant obstruction persist      Plan:  OR for diagnostic laparoscopy, lysis of adhesions, partial gastrectomy  Risk, benefits and alternatives have been explained to the patient  She understands them well and wishes to proceed  NPO  IVF  IV abx   DVT ppx     History of Present Illness     HPI:  Tori Alvarez is a 51 y o  female who presents with gastric remnant obstruction leading to blowout of previous gastrostomy site with ongoing leakage  She was originally taken to the operating room 2/11/19 for afferent look syndrome and a small bowel resection was performed   What was identified as a cyst on MRI and CT scan was drained at the time of the procedure  I realized after drainage that this was gastric remnant and closed the gastrotomy  She was had 2 readmissions secondary to ascites and a perisplenic collection which was drained by IR  The drainage continues to be brown/serosang  Her drain stopped working today and she had increase in LUQ/back pain  Stabbing in nature  10/10  Denies fevers, sweats or chills  No diarrhea/constipation  Review of Systems is negative except as noted above  Historical Information   Past Medical History:   Diagnosis Date    Bell palsy     Gastric ulcer     GERD (gastroesophageal reflux disease)     History of transfusion     Melena     Pancreatic cyst     Right facial numbness     Upper GI bleed      Past Surgical History:   Procedure Laterality Date    ABDOMINOPLASTY      BARIATRIC SURGERY      BOWEL RESECTION LAPAROSCOPIC N/A 2/11/2019    Procedure: LAPAROSCOPIC LYSIS OF ADHESIONS; RESECTION SMALL BOWEL; DECOMPRESSION OF GASTRIC REMNANT; EGD;  Surgeon: Jordan Bennett MD;  Location: [facility];  Service: General    CHOLECYSTECTOMY      CT GUIDED PERC DRAINAGE CATHETER PLACEMENT  2/25/2019    HERNIA REPAIR      HYSTERECTOMY      KNEE CARTILAGE SURGERY      MS COLONOSCOPY FLX DX W/COLLJ SPEC WHEN PFRMD N/A 3/28/2018    Procedure: COLONOSCOPY;  Surgeon: Hortencia Santos MD;  Location: [facility]; Service: Gastroenterology    MS COLONOSCOPY FLX DX W/COLLJ [facility] WHEN PFRMD N/A 1/31/2019    Procedure: COLONOSCOPY;  Surgeon: Hortencia Santos MD;  [facility]; Service: Gastroenterology    MS ESOPHAGOGASTRODUODENOSCOPY TRANSORAL DIAGNOSTIC N/A 3/22/2018    Procedure: ESOPHAGOGASTRODUODENOSCOPY (EGD); Surgeon: Hortencia Santos MD;  [facility]; Service: Gastroenterology    MS ESOPHAGOGASTRODUODENOSCOPY TRANSORAL DIAGNOSTIC N/A 1/31/2019    Procedure: ESOPHAGOGASTRODUODENOSCOPY (EGD);   Surgeon: Hortencia Santos MD;  [facility]; Service: Gastroenterology    REDUCTION MAMMAPLASTY Bilateral     SHOULDER ARTHROSCOPY DISTAL CLAVICLE EXCISION AND OPEN ROTATOR CUFF REPAIR       Social History   Social History     Substance and Sexual Activity   Alcohol Use Not Currently    Frequency: Never    Binge frequency: Less than monthly     Social History     Substance and Sexual Activity   Drug Use No     Social History     Tobacco Use   Smoking Status Never Smoker   Smokeless Tobacco Never Used     Family History: non-contributory    Meds/Allergies   all medications and allergies reviewed  Allergies   Allergen Reactions    Other Headache     MSG    Penicillins Rash       Objective   First Vitals:   Blood Pressure: 124/78 (03/06/19 1947)  Pulse: 103 (03/06/19 1947)  Temperature: 97 5 °F (36 4 °C) (03/06/19 1947)  Temp Source: Oral (03/06/19 1947)  Respirations: 14 (03/06/19 1947)  Height: 5' 9" (175 3 cm) (03/06/19 1947)  Weight - Scale: 109 kg (240 lb) (03/06/19 1947)  SpO2: 96 % (03/06/19 1947)    Current Vitals:   Blood Pressure: 124/78 (03/06/19 1947)  Pulse: 103 (03/06/19 1947)  Temperature: 97 5 °F (36 4 °C) (03/06/19 1947)  Temp Source: Oral (03/06/19 1947)  Respirations: 14 (03/06/19 1947)  Height: 5' 9" (175 3 cm) (03/06/19 1947)  Weight - Scale: 109 kg (240 lb) (03/06/19 1947)  SpO2: 96 % (03/06/19 1947)    No intake or output data in the 24 hours ending 03/06/19 2009    Physical Exam   Constitutional: She is oriented to person, place, and time  She appears well-nourished  HENT:   Head: Atraumatic  Eyes: EOM are normal    Neck: Neck supple  Cardiovascular:   Sinus tachycardia   Pulmonary/Chest: Effort normal and breath sounds normal    Abdominal: Soft  Bowel sounds are normal  She exhibits no distension  There is tenderness (LUQ/L CVA tenderness)  There is no rebound and no guarding  Musculoskeletal: Normal range of motion  Neurological: She is alert and oriented to person, place, and time     Skin: Skin is warm and dry  Psychiatric: She has a normal mood and affect  Her behavior is normal        Lab Results: I have personally reviewed pertinent lab results  Imaging: I have personally reviewed pertinent reports  EKG, Pathology, and Other Studies: I have personally reviewed pertinent reports  Code Status: Level 1 - Full Code      Counseling / Coordination of Care  Total floor / unit time spent today 30 minutes  Greater than 50% of total time was spent with the patient and / or family counseling and / or coordination of care

## 2019-03-07 NOTE — UTILIZATION REVIEW
Notification of Inpatient Admission/Inpatient Authorization Request  This is a Notification of Inpatient Admission/Request for Inpatient Authorization for our facility [address]  Be advised that this patient was admitted to our facility under Inpatient Status  Please contact the Utilization Review Department where the patient is receiving care services for additional admission information  Place of Service Code: 24   Place of Service Name: Inpatient Hospital  Presentation Date & Time: 3/6/2019  7:43 PM  Inpatient Admission Date & Time: 3/7/19 0555  Discharge Date & Time: No discharge date for patient encounter  Discharge Disposition (if discharged): Home/Self Care  Attending Physician: Becca Hughes Md [[id number]]  Admission Orders (From admission, onward)    Ordered        03/06/19 1916  Inpatient Admission  Once     Order ID Start Status   [id number] 03/06/19 1915 Completed                [facility]ility: [facility]   Utilization Review Department  Phone: [phone]; Fax [phone]  [email or web address]  ATTENTION: Please call with any questions or concerns to [phone]  and carefully listen to the prompts so that you are directed to the right person  Send all requests for admission clinical reviews, approved or denied determinations and any other requests to fax [phone]   All voicemails are confidential

## 2019-03-07 NOTE — PLAN OF CARE
Problem: DISCHARGE PLANNING - CARE MANAGEMENT  Goal: Discharge to post-acute care or home with appropriate resources  Description  INTERVENTIONS:  - Conduct assessment to determine patient/family and health care team treatment goals, and need for post-acute services based on payer coverage, community resources, and patient preferences, and barriers to discharge  - Address psychosocial, clinical, and financial barriers to discharge as identified in assessment in conjunction with the patient/family and health care team  - Arrange appropriate level of post-acute services according to patient?s   needs and preference and payer coverage in collaboration with the physician and health care team  - Communicate with and update the patient/family, physician, and health care team regarding progress on the discharge plan  - Arrange appropriate transportation to post-acute venues  Outcome: Progressing  Note:   CM met with pt, daughters-Calista and Surekha, and sister in law-Jennifer  Pt lives with her  Arnoldo Hannah in a 2 story house with 3 TROI-w railing and 6 steps-no railing to reach the main living area  Pt is able to navigate steps and is independent with ADL's  She uses a cane prn to ambulate  She has used OP/PT through [address]  for knee issues  She has never used [facility] services  Denies substance abuse, tobacco abuse, or mental health issues  Dr Darnell Dukes is her PCP  She uses [employer] on [address] and has no problem with co-pays  She does not have a POA or Advanced Directive and does not want info at this time  She works and drives  Her daughters will transport home when pt is medically cleared  CM discussed discharge needs including [facility] services, but daughters are CNAs and feel that they can provide any needed assistance  CM will continue to follow through hospitalization      CM reviewed discharge planning process including the following: identifying help at home, patient preference for discharge planning needs, pharmacy preference, and availability of treatment team to discuss questions or concerns patient and/or family may have regarding understanding medications and recognizing signs and symptoms once discharged  CM also encouraged patient to follow up with all recommended appointments after discharge  Patient advised of importance for patient and family to participate in managing patient?s medical well being  CM name and role reviewed  Discharge Checklist reviewed and CM will continue to monitor for progress toward discharge goals in nursing and provider rounds

## 2019-03-07 NOTE — OP NOTE
OPERATIVE REPORT  PATIENT NAME: Bishop Hancock    :  1970  MRN: [id number]  Pt Location: [facility] 03    SURGERY DATE: 3/6/2019    Surgeon(s) and Role:     * Tra Lobato MD - Primary     * LISA Ibanez-MICHAEL - Assisting     * Isac Jj MD - Adrienne Thornton MD - Co-surgeon    Preop Diagnosis:  Gastric outflow obstruction [K31 1]    Post-Op Diagnosis Codes:     * Gastric outflow obstruction [K31 1]     Procedure(s) (LRB):  LAPAROSCOPY DIAGNOSTIC (N/A)  RESECTION GASTRIC PARTIAL/GASTRECTOMY PARTIAL LAPAROSCOPIC (N/A)  INTRAOPERATIVE EGD (N/A)  TRANSVERSE/LEFT COLON RESECTION, TAKE DOWN OF SPLENIC FLEXURE    CONVERSION TO EXPLORATORY LAPAROTOMY FOR COLOCOLOSTOMY    Specimen(s):  ID Type Source Tests Collected by Time Destination   1 : Portion of stomach--Test for H pylori Tissue Stomach TISSUE EXAM Tra Lobato MD 3/7/2019 0306    2 : Transverse colon, splenic flexture Tissue Large Intestine, Transverse Colon TISSUE EXAM Tra Lobato MD 3/7/2019 0342        Estimated Blood Loss:   400 mL    Drains:  Closed/Suction Drain Left LUQ Bulb 10 2 Fr  (Active)   Site Description Edema/swelling;Leaking at site 3/6/2019 10:53 PM   Dressing Status Removed 3/6/2019 10:53 PM   Drainage Appearance Bile 3/6/2019 10:53 PM   Number of days: 10       Closed/Suction Drain Right Abdomen Bulb 19 Fr  (Active)   Site Description Unable to view 3/7/2019  6:00 AM   Dressing Status Clean;Dry; Intact 3/7/2019  6:00 AM   Drainage Appearance Serosanguineous 3/7/2019  6:00 AM   Status To bulb suction 3/7/2019  6:00 AM   Intake (mL) 0 mL 3/7/2019  6:00 AM   Output (mL) 20 mL 3/7/2019  6:00 AM   Number of days: 0       Closed/Suction Drain Left LLQ Bulb 19 Fr  (Active)   Site Description Unable to view 3/7/2019  6:00 AM   Dressing Status Clean;Dry; Intact 3/7/2019  6:00 AM   Drainage Appearance Serosanguineous 3/7/2019  6:00 AM   Status To bulb suction 3/7/2019  6:00 AM   Intake (mL) 0 mL 3/7/2019  6:00 AM   Output (mL) 45 mL 3/7/2019  6:00 AM   Number of days: 0       Urethral Catheter Non-latex 16 Fr  (Active)   Site Assessment Clean;Skin intact 3/7/2019  1:49 AM   Collection Container Standard drainage bag 3/7/2019  1:49 AM   Securement Method Securing device (Describe) 3/7/2019  1:49 AM   Output (mL) 60 mL 3/7/2019  6:00 AM   Number of days: 0       Anesthesia Type:   General    Operative Indications:  Gastric outflow obstruction [K31 1]  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     Operative Findings:  Dense adhesions and inflammatory reaction from recent surgical procedure     Complications:   Transverse/left colon devascularization/colotomies    Procedure and Technique:  The patient was identified by name armband and conversation  The patient was then brought to the operative theatre  After successful induction of general anesthesia the patient was prepped and draped in the usual sterile fashion  Timeout was performed and all were in agreement  Optiview technique was used to gain entrance into the abdomen with a 10mm 0 degree laparoscope  A 5 mm port was placed in the left upper quadrant, 12 mm port placed above the umbilicus and a 15 mm port placed in the left lower quadrant  A four quadrant exparal/marcaine tap block was then performed  Adhesions from the small bowel and colon were taken down from the anterior abdominal wall bluntly  This came down easily as it was from the recent operation  The omentum was taken down from the left upper quadrant bluntly  A dense inflammatory reaction was noted  After searching for quite some time found what I believed was the antrum of the bypassed stomach  The short gastrics were ligated and then this was divided with the covidien powered stapler, black load with reinforcement  I think proceeded to ligate the vessels with the harmonic  This was difficult as the inflammation distorted normal surgical planes and the structure was not mobile   The gastric remnant was then approved from the diaphragm and the remnant was dissected away from the diaphragm after much difficulty with the harmonic  The remnant was also dissected free from the gastric pouch along the previous staple line  Following the remnant distally I noted that the remnant was indeed not divided and what I had divided initially was the transverse colon  After completing the mobilization of the gastric remnant the stomach was divided at the antrum with a covidien powered stapler black load with reinforcement, just distal to the obstruction that was innate to the construct of the original gastric bypass  The specimen was placed in bag and then passed off  I then completed the ligation of the vessels of the transverse colon/left colon along with mobilization of the splenic flexure, mobilizing the white line of told and dividing the descending colon with the powered covidie stapler, purple load with reinforcement  At this time I had a discussion with Dr Seth Moore and we decided to convert to an exploratory laparotomy to complete the colocolostomy  This portion of the procedure will be dictated by Dr Seth Moore  After copiously irrigating the abdomen two 19 [place] maximus drains (coming out of the right upper quadrant and left lower quadrant) were left near the gastrojejunostomy and spleen  The fascia was then closed with running #1 PDS suture and the skin was closed with staples  Eight telfa strips were placed in the wound to facilitate drainage  Port sites were also closed with staples  Wounds dressed with 4x4 gauze, ABD pad and tape  All instrument sponge and needle counts were correct  I was present for the entire procedure, A qualified resident physician was not available, A co-surgeon was required because of skills and techniques relevant to speciality and A physician assistant was required during the procedure for retraction tissue handling,dissection and suturing      Patient Disposition:  intubated and hemodynamically stable      SIGNATURE: Becca Hughes MD  DATE: March 7, 2019  TIME: 6:15 AM

## 2019-03-07 NOTE — PROGRESS NOTES
Progress Note - Critical Care   Sandra Crowder 51 y o  female MRN: [id number]  Unit/Bed#:  Encounter: [id number]    Assessment:   Principal Problem:    Gastric outflow obstruction  Active Problems:    Abdominal pain    Essential hypertension    Tachycardia  Resolved Problems:    * No resolved hospital problems   *      Plan  Neuro: No current issues   · Pain controlled with: PRN dilaudid, patient denies pain or discomfort at this time   · No sedation at this time, is calm and cooperative, follows commands   · Delirium Precautions  · CAM ICU per protocol  · Regulate sleep/wake cycle+  · Trend neuro exam  · Will restart home Effexor once patient is extubated     CV: Tachycardia   · Rhythm: Sinus Tachycardia  · Follow rhythm on telemetry  · Patient with prolonged OR time - 8 hrs   · Start IVF hydration as tachycardia may be related to dehydration    Lung: Intubation for procedure, no hx or ongoing issues   · Daily SBT assessment in coordination with SAT per protocol: Patient is on CPAP mode with minimal settings 5/5, 40%  · Plan for extubation this morning   ·  Chlorhexidine/HOB>30degrees ordered: yes  · Pulmonary toileting    GI: Hx of home regimen with PPI, s/p exploratory laparoscopy, lysis of adhesions, small bowel resection and gastrotomy with decompression/drainage/closure 2/11/19, gastric remnant obstruction - s/p open lap, lysis of adhesions, partial gastrectomy POD # 0  · Stress ulcer prophylaxis: Continue IV protonix BID, switch to PO once extubated   · Bowel regimen: None currently  · DARREN x 2 with serosanguinous fluid, midline abdominal incision   · Trend abdominal exam, patient denies pain at this time   · Bariatric surgery is following   · Continue IVF at this time     FEN: No electrolyte abnormalities   · Goal 24 hour fluid balance: Euvolemia   Fluid/Diuretic plan:  ml/hr   · Nutrition/diet plan: NPO at this time   · Replete electrolytes with goals: K >4 0, Mag >2 0, and Phos >3 0  · Net positive 5 Liters     : No known issues   · Indwelling Dominguez present: yes, will d/c once extubated   · Trend UOP and BUN/creat  · Strict I and O  · Cr - 0 81, pending repeat with AM labs     ID: No current issues   · Abx ordered: Levaquin and metronidazole as per surgical team  · Will discuss appropriate end times for abx treatment   · Trend temps and WBC count  · Afebrile, WBC WNL last evening 8 29, pending this morning     Heme: No ongoing issues   · Trend hgb and plts  · Transfuse as needed for goal hgb > 7 0  · Hgb - 13 1, Plts - 447  · Pending repeat labs     Endo: No known hx   · BMP glucose is WNL, will continue to trend     MSK/Skin: Midline abdominal incision, DARREN drains x 2  · Mobility goal: OOB once extubated as tolerated    · PT consult: yes  · Frequent turning and pressure off-loading    Family:  · Family updated within 24 hours: yes     Lines:  · PIV     VTE Prophylaxis:   · Pharmacologic Prophylaxis:Heparin  · Mechanical Prophylaxis: sequential compression device    Disposition: Continue ICU care, may be able to downgrade post extubation     ______________________________________________________________________  Chief Complaint: None       HPI/24hr events: Patient was taken to the OR overnight for gastric remnant obstruction, laparoscopic procedure was turned into an open laparotomy , lysis of adhesions, partial gastrectomy POD # 0  Patient was in the OR for about 8 hrs, remained hemodynamically stable, intubated for airway protection, brought to the ICU for further management       Review of Systems   Unable to perform ROS: Intubated     ______________________________________________________________________  Temperature:   Temp (24hrs), Av 3 °F (36 8 °C), Min:97 5 °F (36 4 °C), Max:99 °F (37 2 °C)    Current Temperature: 99 °F (37 2 °C)    Vitals:    19 0601 19 0625 19 0630 19 0645   BP:  129/76 140/76 138/74   Pulse: (!) 112 101 100 100   Resp: 14  13 12   Temp:       TempSrc: SpO2: 98% 97% 96% 97%   Weight:       Height:                  Weights:   IBW: 66 2 kg    Body mass index is 35 44 kg/m²  Weight (last 2 days)     Date/Time   Weight    03/06/19 1947   109 (240)            Height: 5' 9" (175 3 cm)  Hemodynamic Monitoring:  N/A     Noninvasive/Ventilator Settings:  Ventilator Settings:    Settings   Mode: CPAP  FiO2: 40%  Pressure support: 5  Inspiratory pressure (cmH20): 5    No results found for: PHART, MSR6JOF, PO2ART, FCH9GRF, L8ENVGHT, BEART, SOURCE  SpO2: SpO2: 97 %  ______________________________________________________________________  Physical Exam:     Physical Exam   Constitutional: She appears well-developed and well-nourished  No distress  She is intubated  HENT:   Head: Normocephalic and atraumatic  Mouth/Throat: Oropharynx is clear and moist    Eyes: Pupils are equal, round, and reactive to light  EOM are normal    Neck: Normal range of motion  No tracheal deviation present  Cardiovascular: Regular rhythm and normal heart sounds  Tachycardia present  Pulses:       Radial pulses are 2+ on the right side, and 2+ on the left side  Dorsalis pedis pulses are 2+ on the right side, and 2+ on the left side  Pulmonary/Chest: Breath sounds normal  She is intubated  No respiratory distress  Abdominal: Soft  Normal appearance  Bowel sounds are absent  There is generalized tenderness  Musculoskeletal: Normal range of motion  Lymphadenopathy:     She has no cervical adenopathy  Neurological: She is alert  She has normal strength  GCS eye subscore is 4  GCS verbal subscore is 1  GCS motor subscore is 6  Skin: Skin is warm and dry  She is not diaphoretic  No cyanosis   Nails show no clubbing      ______________________________________________________________________  Intake and Outputs:  I/O       03/05 0701 - 03/06 0700 03/06 0701 - 03/07 0700    I V  (mL/kg)  5500 (50 5)    NG/GT  0    IV Piggyback  150    Total Intake(mL/kg)  5650 (51 8)    Urine (mL/kg/hr)  480    Drains  65    Blood  400    Total Output  945    Net  +4705              Nutrition:        Diet Orders   (From admission, onward)            Start     Ordered    03/07/19 0626  Diet NPO  Diet effective now     Question Answer Comment   Diet Type NPO    RD to adjust diet per protocol? No        03/07/19 0630        Labs:   Results from last 7 days   Lab Units 03/07/19  0646 03/06/19 2010 03/02/19  1905   WBC Thousand/uL 8 51 8 29 9 24   HEMOGLOBIN g/dL 13 3 13 1 12 9   HEMATOCRIT % 41 5 40 9 40 0   PLATELETS Thousands/uL 413* 447* 363   NEUTROS PCT % 88*  --  78*   MONOS PCT % 7  --  7      Results from last 7 days   Lab Units 03/06/19 2010 03/02/19  1905   SODIUM mmol/L 138 138   POTASSIUM mmol/L 4 1 3 9   CHLORIDE mmol/L 102 103   CO2 mmol/L 26 26   BUN mg/dL 25 18   CREATININE mg/dL 0 81 0 75   CALCIUM mg/dL 9 7 9 7   ALK PHOS U/L  --  71   ALT U/L  --  21   AST U/L  --  15              Results from last 7 days   Lab Units 03/02/19  1905   INR  1 10   PTT seconds 37     Results from last 7 days   Lab Units 03/02/19  1910   LACTIC ACID mmol/L 0 8     0   Lab Value Date/Time    TROPONINI <0 02 03/02/2019 1905    TROPONINI <0 02 02/25/2019 1204     Imaging: No new imaging today    EKG: Sinus tachycardia  Micro:  Lab Results   Component Value Date    BLOODCX No Growth After 5 Days  02/25/2019    BLOODCX No Growth After 5 Days  02/25/2019    BLOODCX No Growth After 5 Days  02/18/2019     Allergies:    Allergies   Allergen Reactions    Other Headache     MSG    Penicillins Rash     Medications:   Scheduled Meds:    Current Facility-Administered Medications:  bupivacaine liposomal 1 3 mg Injection Once Jordan Bennett MD    chlorhexidine 15 mL Swish [employer] Spit Q12H [address] Jordan Bennett MD    heparin (porcine) 5,000 Units Subcutaneous [facility] Jordan Bennett MD    HYDROmorphone 1 mg Intravenous Q3H PRN Jordan Bennett MD    lactated ringers 125 mL/hr Intravenous Continuous Jordan Bennett MD    levofloxacin 750 mg Intravenous Q24H Audrey Rodriguez MD Last Rate: Stopped (03/06/19 2137)   LORazepam 0 5 mg Intravenous Q6H PRN Audrey Rodriguez MD    metroNIDAZOLE 500 mg Intravenous Q8H Audrey Rodriguez MD    multivitamin IVPB  Intravenous Daily Audrey Rodriguez MD    ondansetron 4 mg Intravenous Q4H PRN Audrey Rodriguez MD    pantoprazole 40 mg Intravenous Q12H [facility] Audrey Rodriguez MD    promethazine 25 mg Intramuscular Q6H PRN Audrey Rodriguez MD    thiamine 100 mg Oral Daily Audrey Rodriguez MD      Continuous Infusions:    lactated ringers 125 mL/hr     PRN Meds:    HYDROmorphone 1 mg Q3H PRN   LORazepam 0 5 mg Q6H PRN   ondansetron 4 mg Q4H PRN   promethazine 25 mg Q6H PRN       Invasive lines and devices: Invasive Devices     Peripheral Intravenous Line            Peripheral IV 03/06/19 Left Antecubital less than 1 day    Peripheral IV 03/06/19 Right Wrist less than 1 day          Drain            Closed/Suction Drain Left LUQ Bulb 10 2 Fr  9 days    Closed/Suction Drain Left LLQ Bulb 19 Fr  less than 1 day    Closed/Suction Drain Right Abdomen Bulb 19 Fr  less than 1 day    Urethral Catheter Non-latex 16 Fr  less than 1 day          Airway            ETT  Cuffed;Oral 7 mm less than 1 day                   Counseling / Coordination of Care  Total Critical Care time spent 32 minutes excluding procedures, teaching and family updates  Code Status: Level 1 - Full Code    Portions of the record may have been created with voice recognition software  Occasional wrong word or "sound a like" substitutions may have occurred due to the inherent limitations of voice recognition software  Read the chart carefully and recognize, using context, where substitutions have occurred      GREGORY Smyth

## 2019-03-07 NOTE — QUICK NOTE
Progress Note - ICU Transfer to SD/MS tele/MS   Taylor Canales 51 y o  female MRN: [id number]  [address]   Unit/Bed#:  Encounter: [id number]    Code Status: Level 1 - Full Code  POA:    Referring Physician: Dr Bret Galvez  Accepting Physician: EMILI  _____________________________________________________________________    Reason for ICU/SD admission:   Vent management     History of Present Illness: As per Dr Jordan Bennett "51year old morbidly obese female s/p exploratory laparoscopy, lysis of adhesions, small bowel resection and gastrotomy with decompression/drainage/closure 2/11/19  The gastrotomy was performed as imaging initially identified the gastric remnant as a large 10 cm cyst  After drainage I determined this was remnant and it was closed  The reason that portion of the defunctionalized remnant was extremely dilated was due to the original construct and configuration of the RYGB created in 2010  She represented to the hospital 7 days after surgery with diffuse abdominal pain and ascites  She was treated with abx and bowel rest and the pain resolved  She then represented to the hospital POD 14 this time with a localized collection by the spleen which was drained by IR  Initially the drainage was brown but then became serous leading me to believe that the collection was from contamination at the original surgery  However her drainage became brown/serous again  This is likely secondary to the obstruction at the gastric remnant which caused a gastric remnant blowout proximally at the gastrotomy site leading to ongoing leakage which was controlled by the drain  The drain now is not functioning and the gastric remnant obstruction persist  "    Summary of clinical course:   Patient was taken to the OR overnight for gastric remnant obstruction, laparoscopic procedure was turned into an open laparotomy , lysis of adhesions, partial gastrectomy POD # 1   Patient was in the OR for about 8 hrs, remained hemodynamically stable, intubated for airway protection, brought to the ICU for further management  The patient was following commands, was on no sedation, minimal vent settings, extubated yesterday morning to nasal canula at 2 Liters  The patient was persistently tachycardic overnight, received IVF without resolution  She also had some HTN which may all be related to pain  She was started on PRN lopressor for HR >130 and SBP > 160  For pain control the patient was started on a PCA pump without a basal rate and PRN toradol  HR has mildly improved, remained afebrile  Consultants:   Bariatric surgery   _____________________________________________________________________    Diagnostic Data:  CBC:  Results from last 7 days   Lab Units 03/07/19  0646   WBC Thousand/uL 8 51   HEMOGLOBIN g/dL 13 3   HEMATOCRIT % 41 5   PLATELETS Thousands/uL 413*      CMP:   Lab Results   Component Value Date    SODIUM 135 (L) 03/07/2019    K 4 3 03/07/2019     03/07/2019    CO2 24 03/07/2019    BUN 20 03/07/2019    CREATININE 0 81 03/07/2019    CALCIUM 8 7 03/07/2019    AST 15 03/07/2019    ALT 12 03/07/2019    ALKPHOS 51 03/07/2019    EGFR 86 03/07/2019   ,   PT/INR: No results found for: PT, INR,   Magnesium: No components found for: MAG,  Phosphorous: No results found for: PHOS    ABG: No results found for: PHART, FJK7NIH, PO2ART, QFZ4JIZ, P9PMWWKR, BEART, SOURCE,     Microbiology:        Imaging: I have personally reviewed the pertinent imaging studies on the PACS system  No new imaging this admission      Cardiac/EKG/telemetry/Echo:   Results from last 7 days   Lab Units 03/02/19  1905   TROPONIN I ng/mL <0 02     _____________________________________________________________________    Recent or scheduled procedures:   s/p open lap, lysis of adhesions, partial gastrectomy POD # 1    Outstanding/pending diagnostics:   None      Mobilization Plan:   OOB as tolerated     Home medications that are not reordered and reason why: Clear liquids, restart PO medications as per surgical team recommendations  Spoke with [facility] regarding transfer  Please call [phone] with any questions or concerns  Portions of the record may have been created with voice recognition software  Occasional wrong word or "sound a like" substitutions may have occurred due to the inherent limitations of voice recognition software  Read the chart carefully and recognize, using context, where substitutions have occurred      GREGORY Herrera

## 2019-03-08 ENCOUNTER — APPOINTMENT (INPATIENT)
Dept: RADIOLOGY | Facility: HOSPITAL | Age: 49
DRG: 908 | End: 2019-03-08
Payer: COMMERCIAL

## 2019-03-08 ENCOUNTER — APPOINTMENT (OUTPATIENT)
Dept: RADIOLOGY | Facility: HOSPITAL | Age: 49
DRG: 908 | End: 2019-03-08
Payer: COMMERCIAL

## 2019-03-08 LAB
ALBUMIN SERPL BCP-MCNC: 2.1 G/DL (ref 3.5–5)
ALP SERPL-CCNC: 49 U/L (ref 46–116)
ALT SERPL W P-5'-P-CCNC: 10 U/L (ref 12–78)
ANION GAP SERPL CALCULATED.3IONS-SCNC: 7 MMOL/L (ref 4–13)
AST SERPL W P-5'-P-CCNC: 12 U/L (ref 5–45)
BASOPHILS # BLD AUTO: 0.04 THOUSANDS/ΜL (ref 0–0.1)
BASOPHILS NFR BLD AUTO: 0 % (ref 0–1)
BILIRUB SERPL-MCNC: 0.4 MG/DL (ref 0.2–1)
BUN SERPL-MCNC: 12 MG/DL (ref 5–25)
CALCIUM SERPL-MCNC: 9.2 MG/DL (ref 8.3–10.1)
CHLORIDE SERPL-SCNC: 101 MMOL/L (ref 100–108)
CO2 SERPL-SCNC: 27 MMOL/L (ref 21–32)
CREAT SERPL-MCNC: 0.81 MG/DL (ref 0.6–1.3)
CRP SERPL QL: >90 MG/L
EOSINOPHIL # BLD AUTO: 0.02 THOUSAND/ΜL (ref 0–0.61)
EOSINOPHIL NFR BLD AUTO: 0 % (ref 0–6)
ERYTHROCYTE [DISTWIDTH] IN BLOOD BY AUTOMATED COUNT: 15.1 % (ref 11.6–15.1)
GFR SERPL CREATININE-BSD FRML MDRD: 86 ML/MIN/1.73SQ M
GLUCOSE SERPL-MCNC: 137 MG/DL (ref 65–140)
HCT VFR BLD AUTO: 39.7 % (ref 34.8–46.1)
HGB BLD-MCNC: 12.7 G/DL (ref 11.5–15.4)
IMM GRANULOCYTES # BLD AUTO: 0.06 THOUSAND/UL (ref 0–0.2)
IMM GRANULOCYTES NFR BLD AUTO: 0 % (ref 0–2)
LYMPHOCYTES # BLD AUTO: 0.8 THOUSANDS/ΜL (ref 0.6–4.47)
LYMPHOCYTES NFR BLD AUTO: 6 % (ref 14–44)
MAGNESIUM SERPL-MCNC: 1.8 MG/DL (ref 1.6–2.6)
MCH RBC QN AUTO: 27.4 PG (ref 26.8–34.3)
MCHC RBC AUTO-ENTMCNC: 32 G/DL (ref 31.4–37.4)
MCV RBC AUTO: 86 FL (ref 82–98)
MONOCYTES # BLD AUTO: 0.99 THOUSAND/ΜL (ref 0.17–1.22)
MONOCYTES NFR BLD AUTO: 7 % (ref 4–12)
NEUTROPHILS # BLD AUTO: 11.81 THOUSANDS/ΜL (ref 1.85–7.62)
NEUTS SEG NFR BLD AUTO: 87 % (ref 43–75)
NRBC BLD AUTO-RTO: 0 /100 WBCS
PHOSPHATE SERPL-MCNC: 3.7 MG/DL (ref 2.7–4.5)
PLATELET # BLD AUTO: 444 THOUSANDS/UL (ref 149–390)
PMV BLD AUTO: 10.1 FL (ref 8.9–12.7)
POTASSIUM SERPL-SCNC: 4.2 MMOL/L (ref 3.5–5.3)
PROCALCITONIN SERPL-MCNC: 0.53 NG/ML
PROT SERPL-MCNC: 6.2 G/DL (ref 6.4–8.2)
RBC # BLD AUTO: 4.64 MILLION/UL (ref 3.81–5.12)
SODIUM SERPL-SCNC: 135 MMOL/L (ref 136–145)
TSH SERPL DL<=0.05 MIU/L-ACNC: 2.83 UIU/ML (ref 0.36–3.74)
WBC # BLD AUTO: 13.72 THOUSAND/UL (ref 4.31–10.16)

## 2019-03-08 PROCEDURE — 99024 POSTOP FOLLOW-UP VISIT: CPT | Performed by: SURGERY

## 2019-03-08 PROCEDURE — 85025 COMPLETE CBC W/AUTO DIFF WBC: CPT | Performed by: SURGERY

## 2019-03-08 PROCEDURE — 99233 SBSQ HOSP IP/OBS HIGH 50: CPT | Performed by: ANESTHESIOLOGY

## 2019-03-08 PROCEDURE — 83735 ASSAY OF MAGNESIUM: CPT | Performed by: SURGERY

## 2019-03-08 PROCEDURE — 84100 ASSAY OF PHOSPHORUS: CPT | Performed by: SURGERY

## 2019-03-08 PROCEDURE — 80053 COMPREHEN METABOLIC PANEL: CPT | Performed by: SURGERY

## 2019-03-08 PROCEDURE — C9113 INJ PANTOPRAZOLE SODIUM, VIA: HCPCS | Performed by: NURSE PRACTITIONER

## 2019-03-08 PROCEDURE — 74240 X-RAY XM UPR GI TRC 1CNTRST: CPT

## 2019-03-08 PROCEDURE — 86140 C-REACTIVE PROTEIN: CPT | Performed by: SURGERY

## 2019-03-08 PROCEDURE — 84145 PROCALCITONIN (PCT): CPT | Performed by: PHYSICIAN ASSISTANT

## 2019-03-08 PROCEDURE — C9113 INJ PANTOPRAZOLE SODIUM, VIA: HCPCS | Performed by: SURGERY

## 2019-03-08 PROCEDURE — 84443 ASSAY THYROID STIM HORMONE: CPT | Performed by: NURSE PRACTITIONER

## 2019-03-08 RX ORDER — FENTANYL CITRATE 50 UG/ML
INJECTION, SOLUTION INTRAMUSCULAR; INTRAVENOUS
Status: DISPENSED
Start: 2019-03-08 | End: 2019-03-08

## 2019-03-08 RX ORDER — FENTANYL CITRATE 50 UG/ML
50 INJECTION, SOLUTION INTRAMUSCULAR; INTRAVENOUS ONCE AS NEEDED
Status: COMPLETED | OUTPATIENT
Start: 2019-03-08 | End: 2019-03-08

## 2019-03-08 RX ORDER — DEXTROSE, SODIUM CHLORIDE, AND POTASSIUM CHLORIDE 5; .45; .15 G/100ML; G/100ML; G/100ML
125 INJECTION INTRAVENOUS CONTINUOUS
Status: DISCONTINUED | OUTPATIENT
Start: 2019-03-08 | End: 2019-03-09

## 2019-03-08 RX ORDER — KETOROLAC TROMETHAMINE 30 MG/ML
30 INJECTION, SOLUTION INTRAMUSCULAR; INTRAVENOUS EVERY 6 HOURS SCHEDULED
Status: DISCONTINUED | OUTPATIENT
Start: 2019-03-08 | End: 2019-03-09

## 2019-03-08 RX ORDER — LORAZEPAM 2 MG/ML
1 INJECTION INTRAMUSCULAR ONCE
Status: COMPLETED | OUTPATIENT
Start: 2019-03-08 | End: 2019-03-08

## 2019-03-08 RX ORDER — ACETAMINOPHEN 160 MG/5ML
975 SUSPENSION, ORAL (FINAL DOSE FORM) ORAL EVERY 6 HOURS
Status: DISCONTINUED | OUTPATIENT
Start: 2019-03-08 | End: 2019-03-09

## 2019-03-08 RX ORDER — HYDROMORPHONE HCL/PF 1 MG/ML
0.5 SYRINGE (ML) INJECTION ONCE
Status: COMPLETED | OUTPATIENT
Start: 2019-03-08 | End: 2019-03-08

## 2019-03-08 RX ORDER — OXYCODONE HCL 5 MG/5 ML
5 SOLUTION, ORAL ORAL EVERY 4 HOURS PRN
Status: DISCONTINUED | OUTPATIENT
Start: 2019-03-08 | End: 2019-03-13

## 2019-03-08 RX ORDER — METOPROLOL TARTRATE 5 MG/5ML
2.5 INJECTION INTRAVENOUS ONCE
Status: COMPLETED | OUTPATIENT
Start: 2019-03-08 | End: 2019-03-08

## 2019-03-08 RX ORDER — KETOROLAC TROMETHAMINE 30 MG/ML
30 INJECTION, SOLUTION INTRAMUSCULAR; INTRAVENOUS ONCE
Status: COMPLETED | OUTPATIENT
Start: 2019-03-08 | End: 2019-03-08

## 2019-03-08 RX ORDER — OXYCODONE HCL 5 MG/5 ML
10 SOLUTION, ORAL ORAL EVERY 4 HOURS PRN
Status: DISCONTINUED | OUTPATIENT
Start: 2019-03-08 | End: 2019-03-13

## 2019-03-08 RX ORDER — METOPROLOL TARTRATE 5 MG/5ML
5 INJECTION INTRAVENOUS EVERY 8 HOURS
Status: DISCONTINUED | OUTPATIENT
Start: 2019-03-08 | End: 2019-03-13

## 2019-03-08 RX ORDER — GABAPENTIN 250 MG/5ML
300 SOLUTION ORAL EVERY 6 HOURS
Status: DISCONTINUED | OUTPATIENT
Start: 2019-03-08 | End: 2019-03-09

## 2019-03-08 RX ADMIN — KETOROLAC TROMETHAMINE 30 MG: 30 INJECTION, SOLUTION INTRAMUSCULAR at 14:11

## 2019-03-08 RX ADMIN — GABAPENTIN 300 MG: 250 SUSPENSION ORAL at 22:08

## 2019-03-08 RX ADMIN — FENTANYL CITRATE 50 MCG: 50 INJECTION, SOLUTION INTRAMUSCULAR; INTRAVENOUS at 10:02

## 2019-03-08 RX ADMIN — METRONIDAZOLE 500 MG: 500 INJECTION, SOLUTION INTRAVENOUS at 03:02

## 2019-03-08 RX ADMIN — KETOROLAC TROMETHAMINE 30 MG: 30 INJECTION, SOLUTION INTRAMUSCULAR at 17:28

## 2019-03-08 RX ADMIN — METRONIDAZOLE 500 MG: 500 INJECTION, SOLUTION INTRAVENOUS at 19:49

## 2019-03-08 RX ADMIN — IOHEXOL 150 ML: 240 INJECTION, SOLUTION INTRATHECAL; INTRAVASCULAR; INTRAVENOUS; ORAL at 10:40

## 2019-03-08 RX ADMIN — KETOROLAC TROMETHAMINE 30 MG: 30 INJECTION, SOLUTION INTRAMUSCULAR at 23:04

## 2019-03-08 RX ADMIN — ACETAMINOPHEN 975 MG: 160 SUSPENSION ORAL at 11:47

## 2019-03-08 RX ADMIN — FENTANYL CITRATE 50 MCG: 50 INJECTION, SOLUTION INTRAMUSCULAR; INTRAVENOUS at 19:49

## 2019-03-08 RX ADMIN — DEXTROSE, SODIUM CHLORIDE, AND POTASSIUM CHLORIDE 125 ML/HR: 5; .45; .15 INJECTION INTRAVENOUS at 11:46

## 2019-03-08 RX ADMIN — SIMETHICONE CHEW TAB 80 MG 80 MG: 80 TABLET ORAL at 09:26

## 2019-03-08 RX ADMIN — FENTANYL CITRATE 50 MCG: 50 INJECTION INTRAMUSCULAR; INTRAVENOUS at 00:25

## 2019-03-08 RX ADMIN — FENTANYL CITRATE 50 MCG: 50 INJECTION INTRAMUSCULAR; INTRAVENOUS at 05:27

## 2019-03-08 RX ADMIN — HYDROMORPHONE HYDROCHLORIDE: 10 INJECTION, SOLUTION INTRAMUSCULAR; INTRAVENOUS; SUBCUTANEOUS at 11:05

## 2019-03-08 RX ADMIN — SIMETHICONE CHEW TAB 80 MG 80 MG: 80 TABLET ORAL at 02:46

## 2019-03-08 RX ADMIN — HEPARIN SODIUM 5000 UNITS: 5000 INJECTION INTRAVENOUS; SUBCUTANEOUS at 14:09

## 2019-03-08 RX ADMIN — FENTANYL CITRATE 50 MCG: 50 INJECTION INTRAMUSCULAR; INTRAVENOUS at 07:51

## 2019-03-08 RX ADMIN — SIMETHICONE CHEW TAB 80 MG 80 MG: 80 TABLET ORAL at 17:35

## 2019-03-08 RX ADMIN — ONDANSETRON 4 MG: 2 INJECTION INTRAMUSCULAR; INTRAVENOUS at 19:40

## 2019-03-08 RX ADMIN — Medication: at 11:47

## 2019-03-08 RX ADMIN — DEXTROSE, SODIUM CHLORIDE, AND POTASSIUM CHLORIDE 125 ML/HR: 5; .45; .15 INJECTION INTRAVENOUS at 22:14

## 2019-03-08 RX ADMIN — METOPROLOL TARTRATE 2.5 MG: 5 INJECTION, SOLUTION INTRAVENOUS at 03:24

## 2019-03-08 RX ADMIN — LORAZEPAM 0.5 MG: 2 INJECTION INTRAMUSCULAR; INTRAVENOUS at 22:08

## 2019-03-08 RX ADMIN — METOPROLOL TARTRATE 5 MG: 5 INJECTION, SOLUTION INTRAVENOUS at 07:51

## 2019-03-08 RX ADMIN — GABAPENTIN 300 MG: 250 SUSPENSION ORAL at 11:47

## 2019-03-08 RX ADMIN — METOPROLOL TARTRATE 5 MG: 5 INJECTION, SOLUTION INTRAVENOUS at 21:59

## 2019-03-08 RX ADMIN — HEPARIN SODIUM 5000 UNITS: 5000 INJECTION INTRAVENOUS; SUBCUTANEOUS at 06:12

## 2019-03-08 RX ADMIN — KETOROLAC TROMETHAMINE 30 MG: 30 INJECTION, SOLUTION INTRAMUSCULAR at 09:25

## 2019-03-08 RX ADMIN — ACETAMINOPHEN 975 MG: 160 SUSPENSION ORAL at 22:00

## 2019-03-08 RX ADMIN — PANTOPRAZOLE SODIUM 40 MG: 40 INJECTION, POWDER, FOR SOLUTION INTRAVENOUS at 09:26

## 2019-03-08 RX ADMIN — ASCORBIC ACID, VITAMIN A PALMITATE, CHOLECALCIFEROL, THIAMINE HYDROCHLORIDE, RIBOFLAVIN-5 PHOSPHATE SODIUM, PYRIDOXINE HYDROCHLORIDE, NIACINAMIDE, DEXPANTHENOL, ALPHA-TOCOPHEROL ACETATE, VITAMIN K1, FOLIC ACID, BIOTIN, CYANOCOBALAMIN: 200; 3300; 200; 6; 3.6; 6; 40; 15; 10; 150; 600; 60; 5 INJECTION, SOLUTION INTRAVENOUS at 09:28

## 2019-03-08 RX ADMIN — PANTOPRAZOLE SODIUM 40 MG: 40 INJECTION, POWDER, FOR SOLUTION INTRAVENOUS at 21:11

## 2019-03-08 RX ADMIN — GABAPENTIN 300 MG: 250 SUSPENSION ORAL at 17:35

## 2019-03-08 RX ADMIN — METRONIDAZOLE 500 MG: 500 INJECTION, SOLUTION INTRAVENOUS at 11:48

## 2019-03-08 RX ADMIN — HYDROMORPHONE HYDROCHLORIDE 1 MG: 1 INJECTION, SOLUTION INTRAMUSCULAR; INTRAVENOUS; SUBCUTANEOUS at 06:15

## 2019-03-08 RX ADMIN — ONDANSETRON 4 MG: 2 INJECTION INTRAMUSCULAR; INTRAVENOUS at 07:51

## 2019-03-08 RX ADMIN — METOPROLOL TARTRATE 5 MG: 5 INJECTION, SOLUTION INTRAVENOUS at 14:12

## 2019-03-08 RX ADMIN — HYDROMORPHONE HYDROCHLORIDE 0.5 MG: 1 INJECTION, SOLUTION INTRAMUSCULAR; INTRAVENOUS; SUBCUTANEOUS at 09:59

## 2019-03-08 RX ADMIN — ACETAMINOPHEN 975 MG: 160 SUSPENSION ORAL at 17:28

## 2019-03-08 RX ADMIN — HYDROMORPHONE HYDROCHLORIDE 1 MG: 1 INJECTION, SOLUTION INTRAMUSCULAR; INTRAVENOUS; SUBCUTANEOUS at 02:38

## 2019-03-08 RX ADMIN — LORAZEPAM 1 MG: 2 INJECTION, SOLUTION INTRAMUSCULAR; INTRAVENOUS at 09:26

## 2019-03-08 NOTE — PROGRESS NOTES
Progress Note - Critical Care   [place] 51 y o  female MRN: [id number]  Unit/Bed#:  Encounter: [id number]    Attending Physician: Marilyn White MD      ______________________________________________________________________  Assessment and Plan:   Principal Problem:    Gastric outflow obstruction  Active Problems:    Abdominal pain    Essential hypertension    Tachycardia  Resolved Problems:    * No resolved hospital problems  *        Neuro:   Continue pain management with p r n  Dilaudid and fentanyl  Daily CAM ICU  Regulates sleep/awake cycles  Continue monitor neurologic status    CV:   Tachycardia/hypertension:  Tachycardia not responsive to volume replacement, hypertension persistent, patient given metoprolol 2 5 mg IV, this did decrease blood pressure and heart rate  Continue monitor hemodynamics    Pulm:   Extubated on 3/7  Encourage patient to be out of bed to chair  Continue have patient's head of bed elevated 30° or more    GI:   S/P LAPAROSCOPY DIAGNOSTIC, RESECTION GASTRIC PARTIAL/GASTRECTOMY PARTIAL LAPAROSCOPIC,INTRAOPERATIVE EGD   Continue Protonix for PUD prophylaxis  GP drains x2 with serosanguineous fluid  Abdominal dressings continued to have serous drainage  Continue monitor operative site    :   Continue monitor I&O closely  Monitor trend renal indices    F/E/N:   Monitor replete electrolytes as indicated  Maintain potassium greater than 4 0 magnesium greater than 2 0    ID:   Continue antibiotics as ordered  Trend temperature curve and white blood cell count    Heme:   Monitor and trend H&H and platelets    Endo:   Monitor blood glucose as per daily labs     Msk/Skin:   Encourage patient out of bed to chair and ambulating  Reposition q 2 hours while in bed    Disposition:   Remains in ICU at this time    Code Status: Level 1 - Full Code    Counseling / Coordination of Care  Total Critical Care time spent 30 minutes excluding procedures, teaching and family updates  ______________________________________________________________________    Chief Complaint:  Generalized postoperative abdominal pain    24 Hour Events:  Postop day 1 S/P LAPAROSCOPY DIAGNOSTIC, RESECTION GASTRIC PARTIAL/GASTRECTOMY PARTIAL LAPAROSCOPIC,INTRAOPERATIVE EGD   Patient extubated      ______________________________________________________________________    Physical Exam:   Constitutional:  Extubated  HEENT:  Normocephalic, pupils equal reactive, sclera anicteric, EOM intact, airway patent, negative JVD, neck supple  Pulm:  Equal and clear  CV: HRRR, S1-S2, negative G/M/R  ABD:  Postoperative, dressings intact with this serous drainage  :  Deferred  M/S:  Atraumatic, good range of motion x4  Skin:  Warm/dry  Neuro:  No focal deficits  Mental Status:  Conscious alert oriented, in proper sensorium    ______________________________________________________________________  Vitals:    19 2300 19 2342 19 0100 19 0200   BP: 144/74 134/64 159/74 169/84   BP Location: Right arm Right arm     Pulse: (!) 132 (!) 130 (!) 133 (!) 133   Resp: (!) 23 (!) 26 17 22   Temp:  (!) 97 2 °F (36 2 °C)     TempSrc:  Oral     SpO2: 93% 94% 95% 96%   Weight:       Height:                  Temperature:   Temp (24hrs), Av 9 °F (36 6 °C), Min:97 2 °F (36 2 °C), Max:99 °F (37 2 °C)    Current Temperature: (!) 97 2 °F (36 2 °C)  Weights:   IBW: 66 2 kg    Body mass index is 35 44 kg/m²    Weight (last 2 days)     Date/Time   Weight    19 1947   109 (240)            Hemodynamic Monitoring:  N/A     Non-Invasive/Invasive Ventilation Settings:  Respiratory    Lab Data (Last 4 hours)    None         O2/Vent Data (Last 4 hours)    None              No results found for: PHART, YOS1SFN, PO2ART, XKT5JIM, Y3PXIVPT, BEART, SOURCE  SpO2: SpO2: 96 %  Intake and Outputs:  I/O        07 -  0700  07 -  0700    I V  (mL/kg) 5500 (50 5) 1375 (12 6)    NG/GT 0 0    IV Piggyback 150 1750 Total Intake(mL/kg) 5650 (51 8) 3125 (28 7)    Urine (mL/kg/hr) 480 850 (0 3)    Drains 65 210    Blood 400     Total Output 945 1060    Net +4705 +2065              UOP: qs/hour   Nutrition:        Diet Orders   (From admission, onward)            Start     Ordered    03/07/19 1050  Diet NPO  Diet effective now     Comments:  Sponge  for mouth comfort   Question Answer Comment   Diet Type NPO    RD to adjust diet per protocol? No        03/07/19 1050          Labs:   Results from last 7 days   Lab Units 03/07/19  0646 03/06/19 2010 03/02/19  1905   WBC Thousand/uL 8 51 8 29 9 24   HEMOGLOBIN g/dL 13 3 13 1 12 9   HEMATOCRIT % 41 5 40 9 40 0   PLATELETS Thousands/uL 413* 447* 363   NEUTROS PCT % 88*  --  78*   MONOS PCT % 7  --  7     Results from last 7 days   Lab Units 03/07/19  0646 03/06/19 2010 03/02/19  1905   POTASSIUM mmol/L 4 3 4 1 3 9   CHLORIDE mmol/L 100 102 103   CO2 mmol/L 24 26 26   BUN mg/dL 20 25 18   CREATININE mg/dL 0 81 0 81 0 75   CALCIUM mg/dL 8 7 9 7 9 7   ALK PHOS U/L 51  --  71   ALT U/L 12  --  21   AST U/L 15  --  15         No results found for: PHOS   Results from last 7 days   Lab Units 03/02/19  1905   INR  1 10   PTT seconds 37     0   Lab Value Date/Time    TROPONINI <0 02 03/02/2019 1905    TROPONINI <0 02 02/25/2019 1204     Results from last 7 days   Lab Units 03/07/19  1756 03/07/19  0646 03/02/19  1910   LACTIC ACID mmol/L 1 9 2 2* 0 8     ABG:No results found for: PHART, EXQ8PTI, PO2ART, ZBO8EVH, D3KGUIEB, BEART, SOURCE  Imaging:  I have personally reviewed pertinent reports  EKG: SR  Micro:  Lab Results   Component Value Date    BLOODCX No Growth After 5 Days  02/25/2019    BLOODCX No Growth After 5 Days  02/25/2019    BLOODCX No Growth After 5 Days  02/18/2019     Allergies:    Allergies   Allergen Reactions    Other Headache     MSG    Penicillins Rash     Medications:   Scheduled Meds:  Current Facility-Administered Medications:  bupivacaine liposomal 1 3 mg Injection Once Karena Dutta MD    fentanyl citrate (PF) 50 mcg Intravenous Q2H PRN GREGORY Benito    heparin (porcine) 5,000 Units Subcutaneous [facility] Karena Dutta MD    HYDROmorphone 1 mg Intravenous Q2H PRN Rober Head PA-C    lactated ringers 125 mL/hr Intravenous Continuous Karena Dutta MD Last Rate: 125 mL/hr (03/07/19 1101)   levofloxacin 750 mg Intravenous Q24H Karena Dutta MD Last Rate: 750 mg (03/07/19 2030)   LORazepam 0 5 mg Intravenous Q6H PRN Karena Dutta MD    metroNIDAZOLE 500 mg Intravenous Q8H Karena Dutta MD Last Rate: 500 mg (03/08/19 0302)   multivitamin IVPB  Intravenous Daily Karena Dutta MD Last Rate: 250 mL/hr at 03/07/19 0833   ondansetron 4 mg Intravenous Q4H PRN Karena Dutta MD    pantoprazole 40 mg Intravenous Q12H [address] Karena Dutta MD    promethazine 25 mg Intramuscular Q6H PRN Karena Dutta MD    simethicone 80 mg Oral Q6H PRN Karena Dutta MD    thiamine 100 mg Oral Daily Karena Dutta MD      Continuous Infusions:  lactated ringers 125 mL/hr Last Rate: 125 mL/hr (03/07/19 1101)     PRN Meds:    fentanyl citrate (PF) 50 mcg Q2H PRN   HYDROmorphone 1 mg Q2H PRN   LORazepam 0 5 mg Q6H PRN   ondansetron 4 mg Q4H PRN   promethazine 25 mg Q6H PRN   simethicone 80 mg Q6H PRN     VTE Pharmacologic Prophylaxis: Heparin  VTE Mechanical Prophylaxis: sequential compression device  Invasive lines and devices: Invasive Devices     Peripheral Intravenous Line            Peripheral IV 03/06/19 Left Antecubital 1 day    Peripheral IV 03/06/19 Right Wrist 1 day          Drain            Closed/Suction Drain Left LLQ Bulb 19 Fr  less than 1 day    Closed/Suction Drain Right Abdomen Bulb 19 Fr  less than 1 day                     Portions of the record may have been created with voice recognition software    Occasional wrong word or "sound a like" substitutions may have occurred due to the inherent limitations of voice recognition software  Read the chart carefully and recognize, using context, where substitutions have occurred      Rosey Shea PA-C

## 2019-03-08 NOTE — PROGRESS NOTES
Progress Note - Bariatric Surgery   Tori Alvarez 51 y o  female MRN: [id number]  Unit/Bed#:  Encounter: [id number]      Subjective/Objective     Subjective:   48/F POD1 s/p diagnostic laparoscopy, partial remnant gastrectomy, transverse/colon resection with mobilization of splenic flexure, conversion to exploratory laparotomy for further mobilization and completion of anastomosis  Remains in ICU  Extubated yesterday  Patient is very sleepy sitting upright in the chair  C/o pain and does not tolerate physical exam very well  Daughter Pura Bennett present in room  Patient does not want to do UGI test         Objective:    /74   Pulse (!) 134   Temp (!) 97 4 °F (36 3 °C) (Oral)   Resp 19   Ht 5' 9" (1 753 m)   Wt 109 kg (240 lb 4 8 oz)   LMP  (LMP Unknown)   SpO2 95%   BMI 35 49 kg/m²       Intake/Output Summary (Last 24 hours) at 3/8/2019 0806  Last data filed at 3/8/2019 0746  Gross per 24 hour   Intake 3972 92 ml   Output 1335 ml   Net 2637 92 ml       Invasive Devices     Peripheral Intravenous Line            Peripheral IV 03/06/19 Right Wrist 1 day          Drain            Closed/Suction Drain Left LLQ Bulb 19 Fr  1 day    Closed/Suction Drain Right Abdomen Bulb 19 Fr  1 day                ROS: 10-point system completed  All negative except see HPI  Physical Exam    General Appearance:    Very sleepy   Head:    Normocephalic, without obvious abnormality, atraumatic   Lungs:     Clear to auscultation bilaterally, respirations unlabored   Heart:    Regular rate and rhythm, S1 and S2 normal, no murmur, rub    or gallop   Abdomen:     Soft, appropriate tenderness, bowel sounds active all four quadrants, dressings C/D/I (did not remove d/t pain), 2x DARREN drains with serous drainage - output of drains 210 last 24 hours   Extremities:   R hand and lower forearm edematous, able to make weak fist                   Lab, Imaging and other studies:  I have personally reviewed pertinent lab results    , CBC:   Lab Results   Component Value Date    WBC 13 72 (H) 03/08/2019    HGB 12 7 03/08/2019    HCT 39 7 03/08/2019    MCV 86 03/08/2019     (H) 03/08/2019    MCH 27 4 03/08/2019    MCHC 32 0 03/08/2019    RDW 15 1 03/08/2019    MPV 10 1 03/08/2019    NRBC 0 03/08/2019   , CMP:   Lab Results   Component Value Date    SODIUM 135 (L) 03/08/2019    K 4 2 03/08/2019     03/08/2019    CO2 27 03/08/2019    BUN 12 03/08/2019    CREATININE 0 81 03/08/2019    CALCIUM 9 2 03/08/2019    AST 12 03/08/2019    ALT 10 (L) 03/08/2019    ALKPHOS 49 03/08/2019    EGFR 86 03/08/2019   , Coagulation: No results found for: PT, INR, APTT     VTE Mechanical Prophylaxis: sequential compression device, heparin    Assessment/Plan  48/F POD1 s/p diagnostic laparoscopy, partial remnant gastrectomy, transverse/colon resection with mobilization of splenic flexure, conversion to exploratory laparotomy for further mobilization and completion of anastomosis  C/o poor pain control  Persistent Tachycardia and HTN - metoprolol helped with BP   Afraid to do UGI test     -Continue NPO  -Monitor hemodynamics and continue with metoprolol   -UGI to r/o leak - one time dose of Ativan  -Will start clears if UGI WNL  -Tight pain control - toradol around the clock and will start PO Tylenol ATC if UGI WNL  -IV Antibiotics and trend WBC  -Continue IV protonix   -Monitor I/O and DARREN drains, dressing changes  -Trend H/H and monitor platelets  -Encourage ambulation as tolerated and OOB to chair    Case discussed with Dr Karissa Miller, patient, patient's daughter Evelin, and patient's RN Pedrito Linares PA-C  3/8/2019  8:27 AM

## 2019-03-08 NOTE — PROGRESS NOTES
49yo F POD 1 s/p laparoscopic remnant gastrectomy, transverse colon resection, conversion to ex lap for completion colon anastomosis  Good affect at this time  Pain appropriately controlled POD1 with current analgesic regimen  UGI negative for a leak at this time  Daughter and son at bedside with the patient and Dr Marty Shukla  I evaluated the abdomen and drains with Dr Marty Shukla  I answered the patient and daughter's questions in regards to her possible postop course and outcomes to the best of my ability in this very acute period  I reassured them that her current status is appropriate for a postoperative course from her surgery  The patient is improving clinically from a nursing point of view and from discussions of the care with Dr Marty Shukla at this time  Appreciate CC recs and care  Will follow the patient's clinical course closely and continue to discuss with BEVERLY Morgan   3/8/2019  4:11 PM

## 2019-03-09 LAB
ANION GAP SERPL CALCULATED.3IONS-SCNC: 10 MMOL/L (ref 4–13)
ANION GAP SERPL CALCULATED.3IONS-SCNC: 8 MMOL/L (ref 4–13)
BASOPHILS # BLD AUTO: 0.03 THOUSANDS/ΜL (ref 0–0.1)
BASOPHILS NFR BLD AUTO: 0 % (ref 0–1)
BUN SERPL-MCNC: 21 MG/DL (ref 5–25)
BUN SERPL-MCNC: 22 MG/DL (ref 5–25)
CALCIUM SERPL-MCNC: 9 MG/DL (ref 8.3–10.1)
CALCIUM SERPL-MCNC: 9.1 MG/DL (ref 8.3–10.1)
CHLORIDE SERPL-SCNC: 100 MMOL/L (ref 100–108)
CHLORIDE SERPL-SCNC: 103 MMOL/L (ref 100–108)
CO2 SERPL-SCNC: 24 MMOL/L (ref 21–32)
CO2 SERPL-SCNC: 26 MMOL/L (ref 21–32)
CREAT SERPL-MCNC: 1.03 MG/DL (ref 0.6–1.3)
CREAT SERPL-MCNC: 1.27 MG/DL (ref 0.6–1.3)
CRP SERPL QL: >90 MG/L
EOSINOPHIL # BLD AUTO: 0.4 THOUSAND/ΜL (ref 0–0.61)
EOSINOPHIL NFR BLD AUTO: 3 % (ref 0–6)
ERYTHROCYTE [DISTWIDTH] IN BLOOD BY AUTOMATED COUNT: 15.3 % (ref 11.6–15.1)
GFR SERPL CREATININE-BSD FRML MDRD: 50 ML/MIN/1.73SQ M
GFR SERPL CREATININE-BSD FRML MDRD: 64 ML/MIN/1.73SQ M
GLUCOSE SERPL-MCNC: 113 MG/DL (ref 65–140)
GLUCOSE SERPL-MCNC: 144 MG/DL (ref 65–140)
HCT VFR BLD AUTO: 35.6 % (ref 34.8–46.1)
HGB BLD-MCNC: 11.3 G/DL (ref 11.5–15.4)
IMM GRANULOCYTES # BLD AUTO: 0.09 THOUSAND/UL (ref 0–0.2)
IMM GRANULOCYTES NFR BLD AUTO: 1 % (ref 0–2)
LYMPHOCYTES # BLD AUTO: 0.64 THOUSANDS/ΜL (ref 0.6–4.47)
LYMPHOCYTES NFR BLD AUTO: 5 % (ref 14–44)
MAGNESIUM SERPL-MCNC: 2.1 MG/DL (ref 1.6–2.6)
MCH RBC QN AUTO: 27.6 PG (ref 26.8–34.3)
MCHC RBC AUTO-ENTMCNC: 31.7 G/DL (ref 31.4–37.4)
MCV RBC AUTO: 87 FL (ref 82–98)
MONOCYTES # BLD AUTO: 0.72 THOUSAND/ΜL (ref 0.17–1.22)
MONOCYTES NFR BLD AUTO: 6 % (ref 4–12)
NEUTROPHILS # BLD AUTO: 11.29 THOUSANDS/ΜL (ref 1.85–7.62)
NEUTS SEG NFR BLD AUTO: 85 % (ref 43–75)
NRBC BLD AUTO-RTO: 0 /100 WBCS
PLATELET # BLD AUTO: 390 THOUSANDS/UL (ref 149–390)
PMV BLD AUTO: 10.3 FL (ref 8.9–12.7)
POTASSIUM SERPL-SCNC: 4.1 MMOL/L (ref 3.5–5.3)
POTASSIUM SERPL-SCNC: 4.1 MMOL/L (ref 3.5–5.3)
RBC # BLD AUTO: 4.09 MILLION/UL (ref 3.81–5.12)
SODIUM SERPL-SCNC: 134 MMOL/L (ref 136–145)
SODIUM SERPL-SCNC: 137 MMOL/L (ref 136–145)
WBC # BLD AUTO: 13.17 THOUSAND/UL (ref 4.31–10.16)

## 2019-03-09 PROCEDURE — 83735 ASSAY OF MAGNESIUM: CPT | Performed by: SURGERY

## 2019-03-09 PROCEDURE — 99024 POSTOP FOLLOW-UP VISIT: CPT | Performed by: SURGERY

## 2019-03-09 PROCEDURE — 86140 C-REACTIVE PROTEIN: CPT | Performed by: NURSE PRACTITIONER

## 2019-03-09 PROCEDURE — 99252 IP/OBS CONSLTJ NEW/EST SF 35: CPT | Performed by: INTERNAL MEDICINE

## 2019-03-09 PROCEDURE — 85025 COMPLETE CBC W/AUTO DIFF WBC: CPT | Performed by: SURGERY

## 2019-03-09 PROCEDURE — C9113 INJ PANTOPRAZOLE SODIUM, VIA: HCPCS | Performed by: NURSE PRACTITIONER

## 2019-03-09 PROCEDURE — 80048 BASIC METABOLIC PNL TOTAL CA: CPT | Performed by: SURGERY

## 2019-03-09 RX ORDER — SODIUM CHLORIDE 9 MG/ML
125 INJECTION, SOLUTION INTRAVENOUS CONTINUOUS
Status: DISCONTINUED | OUTPATIENT
Start: 2019-03-09 | End: 2019-03-10

## 2019-03-09 RX ORDER — METOCLOPRAMIDE HYDROCHLORIDE 5 MG/ML
5 INJECTION INTRAMUSCULAR; INTRAVENOUS EVERY 6 HOURS SCHEDULED
Status: DISCONTINUED | OUTPATIENT
Start: 2019-03-09 | End: 2019-03-10

## 2019-03-09 RX ORDER — GABAPENTIN 250 MG/5ML
100 SOLUTION ORAL EVERY 6 HOURS
Status: DISCONTINUED | OUTPATIENT
Start: 2019-03-09 | End: 2019-03-11

## 2019-03-09 RX ORDER — ACETAMINOPHEN 325 MG/1
975 TABLET ORAL EVERY 6 HOURS SCHEDULED
Status: DISCONTINUED | OUTPATIENT
Start: 2019-03-09 | End: 2019-03-11

## 2019-03-09 RX ADMIN — ASCORBIC ACID, VITAMIN A PALMITATE, CHOLECALCIFEROL, THIAMINE HYDROCHLORIDE, RIBOFLAVIN-5 PHOSPHATE SODIUM, PYRIDOXINE HYDROCHLORIDE, NIACINAMIDE, DEXPANTHENOL, ALPHA-TOCOPHEROL ACETATE, VITAMIN K1, FOLIC ACID, BIOTIN, CYANOCOBALAMIN: 200; 3300; 200; 6; 3.6; 6; 40; 15; 10; 150; 600; 60; 5 INJECTION, SOLUTION INTRAVENOUS at 11:33

## 2019-03-09 RX ADMIN — METOPROLOL TARTRATE 5 MG: 5 INJECTION, SOLUTION INTRAVENOUS at 22:10

## 2019-03-09 RX ADMIN — ONDANSETRON 4 MG: 2 INJECTION INTRAMUSCULAR; INTRAVENOUS at 08:27

## 2019-03-09 RX ADMIN — GABAPENTIN 100 MG: 250 SUSPENSION ORAL at 11:34

## 2019-03-09 RX ADMIN — PANTOPRAZOLE SODIUM 40 MG: 40 INJECTION, POWDER, FOR SOLUTION INTRAVENOUS at 08:26

## 2019-03-09 RX ADMIN — OXYCODONE HYDROCHLORIDE 10 MG: 5 SOLUTION ORAL at 03:38

## 2019-03-09 RX ADMIN — OXYCODONE HYDROCHLORIDE 10 MG: 5 SOLUTION ORAL at 00:03

## 2019-03-09 RX ADMIN — GABAPENTIN 100 MG: 250 SUSPENSION ORAL at 23:06

## 2019-03-09 RX ADMIN — ACETAMINOPHEN 975 MG: 325 TABLET ORAL at 16:48

## 2019-03-09 RX ADMIN — THIAMINE HCL TAB 100 MG 100 MG: 100 TAB at 09:27

## 2019-03-09 RX ADMIN — METOPROLOL TARTRATE 5 MG: 5 INJECTION, SOLUTION INTRAVENOUS at 05:45

## 2019-03-09 RX ADMIN — ACETAMINOPHEN 975 MG: 325 TABLET ORAL at 11:33

## 2019-03-09 RX ADMIN — METRONIDAZOLE 500 MG: 500 INJECTION, SOLUTION INTRAVENOUS at 19:46

## 2019-03-09 RX ADMIN — HEPARIN SODIUM 5000 UNITS: 5000 INJECTION INTRAVENOUS; SUBCUTANEOUS at 22:10

## 2019-03-09 RX ADMIN — ONDANSETRON 4 MG: 2 INJECTION INTRAMUSCULAR; INTRAVENOUS at 23:15

## 2019-03-09 RX ADMIN — GABAPENTIN 300 MG: 250 SUSPENSION ORAL at 05:36

## 2019-03-09 RX ADMIN — SODIUM CHLORIDE 125 ML/HR: 0.9 INJECTION, SOLUTION INTRAVENOUS at 08:27

## 2019-03-09 RX ADMIN — METOCLOPRAMIDE 5 MG: 5 INJECTION, SOLUTION INTRAMUSCULAR; INTRAVENOUS at 16:45

## 2019-03-09 RX ADMIN — OXYCODONE HYDROCHLORIDE 10 MG: 5 SOLUTION ORAL at 23:06

## 2019-03-09 RX ADMIN — KETOROLAC TROMETHAMINE 30 MG: 30 INJECTION, SOLUTION INTRAMUSCULAR at 05:33

## 2019-03-09 RX ADMIN — LORAZEPAM 0.5 MG: 2 INJECTION INTRAMUSCULAR; INTRAVENOUS at 17:15

## 2019-03-09 RX ADMIN — PANTOPRAZOLE SODIUM 40 MG: 40 INJECTION, POWDER, FOR SOLUTION INTRAVENOUS at 20:39

## 2019-03-09 RX ADMIN — SODIUM CHLORIDE 125 ML/HR: 0.9 INJECTION, SOLUTION INTRAVENOUS at 22:21

## 2019-03-09 RX ADMIN — METRONIDAZOLE 500 MG: 500 INJECTION, SOLUTION INTRAVENOUS at 02:21

## 2019-03-09 RX ADMIN — GABAPENTIN 100 MG: 250 SUSPENSION ORAL at 16:49

## 2019-03-09 RX ADMIN — ONDANSETRON 4 MG: 2 INJECTION INTRAMUSCULAR; INTRAVENOUS at 12:08

## 2019-03-09 RX ADMIN — METRONIDAZOLE 500 MG: 500 INJECTION, SOLUTION INTRAVENOUS at 11:34

## 2019-03-09 RX ADMIN — ONDANSETRON 4 MG: 2 INJECTION INTRAMUSCULAR; INTRAVENOUS at 03:38

## 2019-03-09 RX ADMIN — LORAZEPAM 0.5 MG: 2 INJECTION INTRAMUSCULAR; INTRAVENOUS at 08:26

## 2019-03-09 RX ADMIN — METOCLOPRAMIDE 5 MG: 5 INJECTION, SOLUTION INTRAMUSCULAR; INTRAVENOUS at 12:19

## 2019-03-09 RX ADMIN — METOPROLOL TARTRATE 5 MG: 5 INJECTION, SOLUTION INTRAVENOUS at 14:18

## 2019-03-09 RX ADMIN — METOCLOPRAMIDE 5 MG: 5 INJECTION, SOLUTION INTRAMUSCULAR; INTRAVENOUS at 23:06

## 2019-03-09 RX ADMIN — SIMETHICONE CHEW TAB 80 MG 80 MG: 80 TABLET ORAL at 11:33

## 2019-03-09 RX ADMIN — OXYCODONE HYDROCHLORIDE 10 MG: 5 SOLUTION ORAL at 08:26

## 2019-03-09 RX ADMIN — ACETAMINOPHEN 975 MG: 325 TABLET ORAL at 23:06

## 2019-03-09 RX ADMIN — SODIUM CHLORIDE 2000 ML: 0.9 INJECTION, SOLUTION INTRAVENOUS at 09:27

## 2019-03-09 RX ADMIN — HEPARIN SODIUM 5000 UNITS: 5000 INJECTION INTRAVENOUS; SUBCUTANEOUS at 14:18

## 2019-03-09 NOTE — CONSULTS
Inpatient Medical Consultation - [facility] Internal Medicine    Patient Information: Caryle Gray 51 y o  female MRN: [id number]  Unit/Bed#:  Encounter: [id number]  PCP: Abebe Khanna  Date of Admission:  3/6/2019  Date of Consultation: 03/09/19  Requesting Physician: Cody Jiménez MD    Reason For Consultation:       Assessment/Plan:    Postop day 1 status post laparoscopic prominent gastrectomy, transverse/descending colon dissection, conversion to ex lap for completion of colocolostomy  Management as per bariatric surgery team  Patient was started on clear liquid diet  Continue fluid hydration  Continue IV antibiotics as per bariatric surgery  Encourage spirometry    Acute hypoxic respiratory failure  Status post intubation and extubated to room air  Currently satting greater than 92% on room air  Encourage incentive spirometry    Hypertension  Continue IV metoprolol at this time  When she is able to take p o  Intake will resume home dose of BP medications     Anxiety and depression  Resume oral medications when able to take p o  Intake    VTE Prophylaxis: Heparin  / sequential compression device     Recommendations for Discharge:  · Mgt per bariatric surgery    Counseling / Coordination of Care Time: 30 minutes  Greater than 50% of total time spent on patient counseling and coordination of care  Collaboration of Care: Were Recommendations Directly Discussed with Primary Treatment Team? - No     History of Present Illness:    Caryle Gray is a 51 y o  female patient with morbid obesity, complex surgical history, including status post exploratory laparoscopy, lysis of adhesions, small-bowel resection, gastrostomy with decompression/closure and February, had complications since thenwho is originally admitted to the bariatric surgery service on 3/6/2019 due to abdominal pain    She was taken to OR for gastric remnant obstruction, laparoscopic procedure turning to an open laparotomy, underwent lysis of adhesions and partial gastrectomy  Currently she is postop day 2  She is nauseous at this time otherwise no complaints  Her abdominal pain is slightly better  We are consulted for medical management    Review of Systems:    Review of Systems   Constitutional: Negative for chills and fever  Respiratory: Positive for chest tightness  Negative for shortness of breath  Gastrointestinal: Positive for abdominal pain and nausea  Not passing flatus  Did not have any bowel movement yet   Genitourinary: Negative for dysuria  Neurological: Positive for dizziness  Past Medical and Surgical History:     Past Medical History:   Diagnosis Date    Bell palsy     Gastric ulcer     GERD (gastroesophageal reflux disease)     History of transfusion     Melena     Pancreatic cyst     Right facial numbness     Upper GI bleed        Past Surgical History:   Procedure Laterality Date    ABDOMINOPLASTY      BARIATRIC SURGERY      BOWEL RESECTION LAPAROSCOPIC N/A 2/11/2019    Procedure: LAPAROSCOPIC LYSIS OF ADHESIONS; RESECTION SMALL BOWEL; DECOMPRESSION OF GASTRIC REMNANT; EGD;  Surgeon: Froy Gómez MD;  [facility];  Service: General    CHOLECYSTECTOMY      CT GUIDED PERC DRAINAGE CATHETER PLACEMENT  2/25/2019    ESOPHAGOGASTRODUODENOSCOPY N/A 3/6/2019    Procedure: INTRAOPERATIVE EGD;  Surgeon: Froy Gómez MD;  [facility];  Service: Bariatrics    HERNIA REPAIR      HYSTERECTOMY      KNEE CARTILAGE SURGERY      PARTIAL GASTRECTOMY N/A 3/6/2019    Procedure: RESECTION GASTRIC PARTIAL/GASTRECTOMY PARTIAL LAPAROSCOPIC;  Surgeon: Froy Gómez MD;  [facility];  Service: Bariatrics    WY COLONOSCOPY FLX DX W/COLLJ SPEC WHEN PFRMD N/A 3/28/2018    Procedure: COLONOSCOPY;  Surgeon: Jayshree Ross MD;  [facility];   Service: Gastroenterology    WY COLONOSCOPY FLX DX W/COLLJ [facility] WHEN PFRMD N/A 1/31/2019    Procedure: COLONOSCOPY;  Surgeon: Roxie Bhatti MD;  [facility]; Service: Gastroenterology    [place] ESOPHAGOGASTRODUODENOSCOPY TRANSORAL DIAGNOSTIC N/A 3/22/2018    Procedure: ESOPHAGOGASTRODUODENOSCOPY (EGD); Surgeon: Roxie Bhatti MD;  [facility]; Service: Gastroenterology    [place] ESOPHAGOGASTRODUODENOSCOPY TRANSORAL DIAGNOSTIC N/A 1/31/2019    Procedure: ESOPHAGOGASTRODUODENOSCOPY (EGD); Surgeon: Roxie Bhatti MD;  [facility]; Service: Gastroenterology    [place] LAP,DIAGNOSTIC ABDOMEN N/A 3/6/2019    Procedure: LAPAROSCOPY DIAGNOSTIC;  Surgeon: Becca Hughes MD;  [facility];  Service: Bariatrics    [place] PART REMOVAL COLON W ANASTOMOSIS N/A 3/6/2019    Procedure: OPEN TRANSVERSE COLON RESECTION;  Surgeon: Becca Hughes MD;  [facility];  Service: Bariatrics    REDUCTION MAMMAPLASTY Bilateral     SHOULDER ARTHROSCOPY DISTAL CLAVICLE EXCISION AND OPEN ROTATOR CUFF REPAIR         Meds/Allergies:    all medications and allergies reviewed    Allergies:    Allergies   Allergen Reactions    Other Headache     MSG    Penicillins Rash       Social History:     Marital Status: /Civil Union    Substance Use History:   Social History     Substance and Sexual Activity   Alcohol Use Not Currently    Frequency: Never    Binge frequency: Less than monthly     Social History     Tobacco Use   Smoking Status Never Smoker   Smokeless Tobacco Never Used     Social History     Substance and Sexual Activity   Drug Use No       Family History:    Family History   Problem Relation Age of Onset    Heart attack Mother     Diabetes Mother     Heart attack Father     Stroke Father     Hypertension Brother     Leukemia Paternal Aunt        Physical Exam:     Vitals:   Blood Pressure: 168/87 (03/09/19 1200)  Pulse: (!) 120 (03/09/19 1200)  Temperature: 98 3 °F (36 8 °C) (03/09/19 0716)  Temp Source: Oral (03/09/19 0716)  Respirations: 17 (03/09/19 1200)  Height: 5' 9" (175 3 cm) (03/06/19 1947)  Weight - Scale: 110 kg (242 lb 8 1 oz) (03/09/19 0551)  SpO2: 98 % (03/09/19 1200)    Physical Exam   Constitutional: She is oriented to person, place, and time  She appears well-developed and well-nourished  She appears distressed  Eyes: Pupils are equal, round, and reactive to light  EOM are normal    Neck: Normal range of motion  Cardiovascular: Normal rate and regular rhythm  Pulmonary/Chest: Effort normal and breath sounds normal    Abdominal: Soft  Soft, mildly tender to touch  Wounds intact below surgical bandage   Musculoskeletal: Normal range of motion  Neurological: She is alert and oriented to person, place, and time  Skin: Skin is warm  Additional Data:     Lab Results: I have personally reviewed pertinent reports  Results from last 7 days   Lab Units 03/09/19  0539   WBC Thousand/uL 13 17*   HEMOGLOBIN g/dL 11 3*   HEMATOCRIT % 35 6   PLATELETS Thousands/uL 390   NEUTROS PCT % 85*   LYMPHS PCT % 5*   MONOS PCT % 6   EOS PCT % 3     Results from last 7 days   Lab Units 03/09/19  0539 03/08/19  0510   POTASSIUM mmol/L 4 1 4 2   CHLORIDE mmol/L 100 101   CO2 mmol/L 26 27   BUN mg/dL 22 12   CREATININE mg/dL 1 27 0 81   CALCIUM mg/dL 9 1 9 2   ALK PHOS U/L  --  49   ALT U/L  --  10*   AST U/L  --  12     Results from last 7 days   Lab Units 03/02/19  1905   INR  1 10       Imaging: I have personally reviewed pertinent reports  and I have personally reviewed pertinent films in PACS    X-ray Chest 2 Views    Result Date: 2/25/2019  Narrative: CHEST INDICATION:   chest pain  COMPARISON:  None EXAM PERFORMED/VIEWS:  XR CHEST PA & LATERAL Images: 3 FINDINGS: Cardiomediastinal silhouette appears unremarkable  The right lung is clear  Normal left apex  Left basilar opacity obscuring the heart border and left hemidiaphragm  Osseous structures appear within normal limits for patient age  Impression: Left basilar opacity suggesting effusion and posterior lower lobe opacity  Workstation performed: [id number]     Ct Guided Perc Drainage Catheter Placeme    Result Date: 2/26/2019  Narrative: CT scan guided abscess drainage This examination, like all CT scans performed in the [facility], was performed utilizing techniques to minimize radiation dose exposure, including the use of iterative reconstruction and automated exposure control  History: Perisplenic intra-abdominal fluid collection with pain Images: 40 Sedation: Moderate conscious sedation was utilized under my direct supervision administered by trained independent provider  A total of 30 minutes sedation time was utilized  I was present at the initial dose of sedation medication  Technique: The patient was brought to the CT scanner and placed supine on the table  After axial images were obtained through the left upper quadrant , an area of the skin was then marked, prepped, and draped in usual sterile fashion  All elements of maximal sterile barrier technique were followed (cap, mask, sterile gown, sterile gloves, large sterile sheet, hand hygiene, and 2% chlorhexidine for cutaneous antisepsis)  Lidocaine was administered to the skin and a small skin incision was made  A 18-gauge Chiba needle was advanced into the collection under CT-scan guidance  60 mL's brown fluid was aspirated  A short Amplatz wire was coiled within the lesion, and after tract dilatation, a 10 [employer] Twila all-purpose drainage catheter was advanced and the loop formed within the collection  Total of 150 cc of brown fluid was aspirated  The catheter was sutured in place, sterilely dressed, and connected to bulb suction   A specimen was sent to the laboratory for culture and evaluation  The patient tolerated the procedure well and suffered no complications  Impression: Impression: Successful placement of a 10 [place] all-purpose drainage catheter into the left upper quadrant perisplenic collection   150 cc of brown fluid was aspirated and a specimen sent to the laboratory as described  Workstation performed: [id number]     Cta Chest Ct Abdomen Pelvis W Contrast    Result Date: 3/2/2019  Narrative: CT PULMONARY ANGIOGRAM OF THE CHEST AND CT ABDOMEN AND PELVIS WITH INTRAVENOUS CONTRAST INDICATION:   Chest pain  Shortness of breath  COMPARISON:  2/25/2019  TECHNIQUE:  CT examination of the chest, abdomen and pelvis was performed  Thin section CT angiographic technique was used in the chest in order to evaluate for pulmonary embolus and coronal 3D MIP postprocessing was performed on the acquisition scanner  Axial, sagittal, and coronal 2D reformatted images were created from the source data and submitted for interpretation  Radiation dose length product (DLP) for this visit:  1941 mGy-cm   This examination, like all CT scans performed in the [facility], was performed utilizing techniques to minimize radiation dose exposure, including the use of iterative reconstruction and automated exposure control  IV Contrast:  100 mL of iohexol (OMNIPAQUE) Enteric Contrast:  Enteric contrast was not administered  FINDINGS: CHEST PULMONARY ARTERIAL TREE:  No pulmonary embolus is seen  LUNGS:  Dependent left lower lobe consolidation is seen likely representing atelectasis  PLEURA:  Moderate left pleural effusion is again present  HEART/AORTA:  Unremarkable for patient's age  MEDIASTINUM AND JOSEE:  Unremarkable  CHEST WALL AND LOWER NECK:   Unremarkable  ABDOMEN LIVER/BILIARY TREE:  Unremarkable  GALLBLADDER:  No calcified gallstones  No pericholecystic inflammatory change  SPLEEN:  Unremarkable  PANCREAS:  Unremarkable  ADRENAL GLANDS:  Unremarkable  KIDNEYS/URETERS:  Unremarkable  No hydronephrosis  STOMACH AND BOWEL:  Patient is status post gastric bypass  APPENDIX:  No findings to suggest appendicitis  ABDOMINOPELVIC CAVITY:  Interval placement of perisplenic drainage catheter is identified    Previously noted perisplenic fluid is no longer identified  VESSELS:  Unremarkable for patient's age  PELVIS REPRODUCTIVE ORGANS:  Patient is status post hysterectomy  URINARY BLADDER:  Unremarkable  ABDOMINAL WALL/INGUINAL REGIONS:  Unremarkable  OSSEOUS STRUCTURES:  No acute fracture or destructive osseous lesion  Impression: No evidence of pulmonary embolus  Moderate left pleural effusion and dependent lower lobe consolidation likely resolving atelectasis  No acute intra-abdominal abnormality  Interval placement of perisplenic drainage catheter with interval resolution of perisplenic fluid noted  Workstation performed: [id number]     Pe Study With Ct Abdomen And Pelvis With Contrast    Addendum Date: 2/25/2019 Addendum:   ADDENDUM: Regarding the left upper quadrant fluid, given its focal location and potential developing thin rim of reactive tissue, for example image 605/90, a developing subphrenic abscess should be excluded  Results of this interpretation of the examination were conveyed to the surgical team by my colleague on 2/25/2019 at 3:00 PM     Result Date: 2/25/2019  Narrative: CT PULMONARY ANGIOGRAM OF THE CHEST AND CT ABDOMEN AND PELVIS WITH INTRAVENOUS CONTRAST INDICATION:   Abdominal pain  Abdominal surgery last week  Chest pain    COMPARISON:  2/18/2019 TECHNIQUE:  CT examination of the chest, abdomen and pelvis was performed  Thin section CT angiographic technique was used in the chest in order to evaluate for pulmonary embolus and coronal 3D MIP postprocessing was performed on the acquisition scanner  Axial, sagittal, and coronal 2D reformatted images were created from the source data and submitted for interpretation  Radiation dose length product (DLP) for this visit:  1522 mGy-cm   This examination, like all CT scans performed in the [facility], was performed utilizing techniques to minimize radiation dose exposure, including the use of iterative reconstruction and automated exposure control   IV Contrast:  100 mL of iohexol (OMNIPAQUE) Enteric Contrast:  Enteric contrast was not administered  FINDINGS: CHEST PULMONARY ARTERIAL TREE:  No pulmonary embolus is seen  LUNGS:  There is compressive atelectasis at the left lung base  PLEURA:  Small to moderate sized left effusion  No effusion on the right  No pneumothorax  HEART/AORTA:  Unremarkable for patient's age  MEDIASTINUM AND JOSEE:  Unremarkable  CHEST WALL AND LOWER NECK:   Left-sided incidental thyroid nodules, the larger measuring 1 3, a smaller nodule contains coarse calcifications, approximate size 8 mm  ABDOMEN LIVER/BILIARY TREE:  Unremarkable  GALLBLADDER:  Gallbladder is surgically absent  SPLEEN:  Unremarkable  PANCREAS:  As seen on the prior imaging studies, pancreatic head/neck cyst again identified  ADRENAL GLANDS:  Unremarkable  KIDNEYS/URETERS:  Unchanged  No hydronephrosis  STOMACH AND BOWEL:  Status post gastrojejunostomy  Currently without evidence of bowel obstruction or distention  Previously seen distended portion of bowel in the left upper quadrant has since been resected  APPENDIX:  No findings to suggest appendicitis  ABDOMINOPELVIC CAVITY:  Persistent intraperitoneal fluid identified within the left upper quadrant which has increased in amount  Elsewhere, there is diminished intraperitoneal fluid  There is no free air  VESSELS:  Unremarkable for patient's age  PELVIS REPRODUCTIVE ORGANS:  Patient is status post hysterectomy  URINARY BLADDER:  Unremarkable  ABDOMINAL WALL/INGUINAL REGIONS:  Unremarkable  OSSEOUS STRUCTURES:  No acute fracture or destructive osseous lesion  Impression: 1  Small to moderate left-sided pleural effusion with adjacent left basilar compressive atelectasis 2  No evidence of pulmonary embolism 3  Increased intraperitoneal fluid identified in the left upper quadrant surrounding the spleen  No extraluminal air  Diminished ascites elsewhere in the abdomen  4  Postoperative changes identified    No evidence of bowel obstruction  5  Pancreatic neck/head cyst unchanged from 2018 6  Incidental left thyroid nodules identified on this CT  According to guidelines published in the February 2015 white paper on incidental thyroid nodules in the Journal of the [employer] of Radiology [facility]), because the nodule(s) are less than 1 5 cm in size and without suspicious feature, no further evaluation is recommended  Workstation performed: [id number]     Fl Upper Gi Ugi    Result Date: 3/8/2019  Narrative: LIMITED UPPER GI SERIES INDICATION:  History of gastric bypass surgery  Recent diagnostic laparoscopy, partial remnant gastrectomy and bowel resection  Recently trained perisplenic collection  Evaluate for leak  COMPARISON:  Upper GI series with water-soluble contrast from February 12, 2019  IMAGES:  42 FLUOROSCOPY TIME:  0 7 MINUTES FINDINGS: A limited single contrast upper GI study was performed with approximately 150 mL Omnipaque 240 contrast  There is a normal postoperative appearance of the gastric pouch  There is no evidence of leak  Contrast passes freely from the stomach through the gastrojejunostomy, opacifying the proximal portion of the Randy limb, without evidence of obstruction  The visualized distal esophagus is unremarkable  Impression: No evidence of leak from the gastric remnant  No obstruction  Workstation performed: [id number]     Fl Upper Gi Ugi    Result Date: 2/12/2019  Narrative: UPPER GI SERIES  SINGLE CONTRAST INDICATION:  Post surgery COMPARISON:  None IMAGES:  47 FLUOROSCOPY TIME:   3 1 MINUTES TECHNIQUE:  The patient was given water-soluble contrast, Omnipaque 240 by mouth and images of the esophagus, stomach, and small bowel were obtained  FINDINGS: The esophagus is normal in caliber  Esophageal motility is normal and emptying of contrast from the esophagus is prompt  There is no obstructing or constricting lesion Expected postoperative changes from the gastric bypass are noted    There is free passage of contrast from the esophagus into the Randy limb  There is no extravasation seen Mild gastroesophageal reflux was noted There is no hiatal hernia  Impression: No evidence of extravasation Postoperative changes from gastric bypass Workstation performed: [id number]     Ct Abdomen Pelvis W Contrast    Result Date: 2/18/2019  Narrative: CT ABDOMEN AND PELVIS WITH IV CONTRAST INDICATION:   Abdominal pain, unspecified  Postop day 7 status post exploratory laparoscopy, lysis of adhesions, small bowel resection of afferent limb, drainage/decompression/closure of gastric remnant  Patient presents with sudden onset diffuse abdominal pain  Elevated white blood cell count (24 98)  Initial dynamic gastric bypass surgery performed in 2010  COMPARISON:  March 15, 2018  TECHNIQUE:  CT examination of the abdomen and pelvis was performed  Axial, sagittal, and coronal 2D reformatted images were created from the source data and submitted for interpretation  Radiation dose length product (DLP) for this visit:  1077 mGy-cm   This examination, like all CT scans performed in the [facility], was performed utilizing techniques to minimize radiation dose exposure, including the use of iterative reconstruction and automated exposure control  IV Contrast:  100 mL of iodixanol (VISIPAQUE) Enteric Contrast:  Enteric contrast was administered  FINDINGS: ABDOMEN LOWER CHEST:  There is a small left pleural effusion and there is trace pleural fluid on the right  There is minimal dependent atelectasis at the lung bases, left greater than right  LIVER/BILIARY TREE:  Unremarkable  GALLBLADDER:  Gallbladder is surgically absent  SPLEEN:  Unremarkable  PANCREAS:  There is an approximately 1 7 x 1 1 cm cystic area in the region of the pancreatic head  This appears unchanged compared with March 15, 2018  This was evaluated with an MRI on January 29, 2019    Please refer to the report of the January 29, 2019 MRI, which recommended annual MRI with MRCP follow-up for at least 5 years  ADRENAL GLANDS:  Unremarkable  KIDNEYS/URETERS:  There is 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, unchanged  There is no hydronephrosis bilaterally  STOMACH AND BOWEL:  There are postoperative changes in the left upper quadrant consistent with the surgical history provided  There is no definite evidence of contrast extravasation  There is some oral contrast material in the distal transverse colon and left colon, likely related to an upper GI study performed on February 12, 2019  There is no evidence of bowel obstruction  A few loops of small bowel in the left upper quadrant demonstrate mild bowel wall thickening  This could be due to adjacent ascites/free fluid versus enteritis  Clinical correlation and follow-up is suggested  APPENDIX:  No findings to suggest appendicitis  ABDOMINOPELVIC CAVITY:  There is a small to moderate amount of ascites/free fluid, most pronounced in the upper abdomen adjacent to the liver and spleen as well as in the right hemipelvis  Adjacent to the liver, the fluid measures 28 Hounsfield units and 27 Hounsfield units whereas in the right hemipelvis it measures 48 Hounsfield units  The fluid within the gastric remnant measures 22 Hounsfield units  Although no definite peritoneal enhancement is identified, peritonitis must be considered  Consider sampling of the fluid for analysis and culture  VESSELS:  Unremarkable for patient's age  PELVIS REPRODUCTIVE ORGANS:  Prior hysterectomy  URINARY BLADDER:  Unremarkable  ABDOMINAL WALL/INGUINAL REGIONS:  Unremarkable  OSSEOUS STRUCTURES:  No acute fracture or destructive osseous lesion  Impression: There is a small to moderate amount of ascites/free fluid, as described above  Given the clinical and laboratory history provided, peritonitis should be excluded  Please see discussion   A few loops of small bowel in the left upper quadrant demonstrate mild bowel wall thickening  This could be due to adjacent ascites/free fluid versus enteritis  Clinical correlation and follow-up is suggested  There is no evidence of bowel obstruction  There is an approximately 1 7 x 1 1 cm cystic area in the region of the pancreatic head  Follow-up MRI with MRCP in January 2020 is recommended  Please see discussion  There is a small left pleural effusion and there is trace pleural fluid on the right  There is mild bibasilar atelectasis at the lung bases  Other findings as described above, please see discussion  I personally discussed this study with Meredith Pena on 2/18/2019 at 10:45 PM  Workstation performed: [id number]       EKG, Pathology, and Other Studies Reviewed on Admission:   · EKG:     ** Please Note: This note has been constructed using a voice recognition system   **

## 2019-03-09 NOTE — PROGRESS NOTES
Progress Note - Bariatric Surgery   Shasta Rivera 51 y o  female MRN: [id number]  Unit/Bed#:  Encounter: [id number]    Assessment:  48/F POD 1 s/p laparoscopic remnant gastrectomy, transverse/descending colon resection and conversion to ex lap for completion of colocolostomy  WBC# stable   LEANA     Plan:  Clears as tolerated   Pain control (Toradol discontinued/gabapentin dose reduced - LEANA)  OOB/ambulating halls   IVF changed to NS; will bolus 2 L NS  Cont IV ABX for now   IS (improving)  DVT ppx (SQH/SCD)    Subjective/Objective     Subjective: Feels better today; pain control is improved; burping; complains of nausea; no flatus/BM    Objective: Looks well    Blood pressure 151/73, pulse 101, temperature 98 3 °F (36 8 °C), temperature source Oral, resp  rate 13, height 5' 9" (1 753 m), weight 110 kg (242 lb 8 1 oz), SpO2 95 %, not currently breastfeeding  ,Body mass index is 35 81 kg/m²        Intake/Output Summary (Last 24 hours) at 3/9/2019 0855  Last data filed at 3/9/2019 0800  Gross per 24 hour   Intake 3680 42 ml   Output 795 ml   Net 2885 42 ml       Invasive Devices     Peripheral Intravenous Line            Peripheral IV 03/08/19 Left Forearm less than 1 day          Drain            Closed/Suction Drain Left LLQ Bulb 19 Fr  2 days    Closed/Suction Drain Right Abdomen Bulb 19 Fr  2 days                Physical Exam:     No distress   RRR - Sinus tachy (flutates)  Breathing non labored   Abdomen appropriately tender; soft, ND, wounds C/D/I (8 ashely removed), RUQ DARREN serous; LLQ DARREN serosang  No LE edema     Lab, Imaging and other studies:  CBC:   Lab Results   Component Value Date    WBC 13 17 (H) 03/09/2019    HGB 11 3 (L) 03/09/2019    HCT 35 6 03/09/2019    MCV 87 03/09/2019     03/09/2019    MCH 27 6 03/09/2019    MCHC 31 7 03/09/2019    RDW 15 3 (H) 03/09/2019    MPV 10 3 03/09/2019    NRBC 0 03/09/2019   , CMP:   Lab Results   Component Value Date    SODIUM 134 (L) 03/09/2019    K 4 1 03/09/2019     03/09/2019    CO2 26 03/09/2019    BUN 22 03/09/2019    CREATININE 1 27 03/09/2019    CALCIUM 9 1 03/09/2019    EGFR 50 03/09/2019     VTE Pharmacologic Prophylaxis: Heparin  VTE Mechanical Prophylaxis: sequential compression device

## 2019-03-10 LAB
ANION GAP SERPL CALCULATED.3IONS-SCNC: 8 MMOL/L (ref 4–13)
BASOPHILS # BLD AUTO: 0.03 THOUSANDS/ΜL (ref 0–0.1)
BASOPHILS NFR BLD AUTO: 0 % (ref 0–1)
BUN SERPL-MCNC: 15 MG/DL (ref 5–25)
CALCIUM SERPL-MCNC: 8.3 MG/DL (ref 8.3–10.1)
CHLORIDE SERPL-SCNC: 104 MMOL/L (ref 100–108)
CO2 SERPL-SCNC: 24 MMOL/L (ref 21–32)
CREAT SERPL-MCNC: 0.72 MG/DL (ref 0.6–1.3)
CRP SERPL QL: >90 MG/L
EOSINOPHIL # BLD AUTO: 0.55 THOUSAND/ΜL (ref 0–0.61)
EOSINOPHIL NFR BLD AUTO: 7 % (ref 0–6)
ERYTHROCYTE [DISTWIDTH] IN BLOOD BY AUTOMATED COUNT: 15.1 % (ref 11.6–15.1)
GFR SERPL CREATININE-BSD FRML MDRD: 99 ML/MIN/1.73SQ M
GLUCOSE SERPL-MCNC: 104 MG/DL (ref 65–140)
HCT VFR BLD AUTO: 30.7 % (ref 34.8–46.1)
HGB BLD-MCNC: 9.5 G/DL (ref 11.5–15.4)
HGB BLD-MCNC: 9.6 G/DL (ref 11.5–15.4)
IMM GRANULOCYTES # BLD AUTO: 0.06 THOUSAND/UL (ref 0–0.2)
IMM GRANULOCYTES NFR BLD AUTO: 1 % (ref 0–2)
LYMPHOCYTES # BLD AUTO: 0.57 THOUSANDS/ΜL (ref 0.6–4.47)
LYMPHOCYTES NFR BLD AUTO: 7 % (ref 14–44)
MAGNESIUM SERPL-MCNC: 2.1 MG/DL (ref 1.6–2.6)
MCH RBC QN AUTO: 27.5 PG (ref 26.8–34.3)
MCHC RBC AUTO-ENTMCNC: 31.3 G/DL (ref 31.4–37.4)
MCV RBC AUTO: 88 FL (ref 82–98)
MONOCYTES # BLD AUTO: 0.56 THOUSAND/ΜL (ref 0.17–1.22)
MONOCYTES NFR BLD AUTO: 7 % (ref 4–12)
NEUTROPHILS # BLD AUTO: 6.46 THOUSANDS/ΜL (ref 1.85–7.62)
NEUTS SEG NFR BLD AUTO: 78 % (ref 43–75)
NRBC BLD AUTO-RTO: 0 /100 WBCS
PLATELET # BLD AUTO: 337 THOUSANDS/UL (ref 149–390)
PMV BLD AUTO: 10.3 FL (ref 8.9–12.7)
POTASSIUM SERPL-SCNC: 3.8 MMOL/L (ref 3.5–5.3)
RBC # BLD AUTO: 3.49 MILLION/UL (ref 3.81–5.12)
SODIUM SERPL-SCNC: 136 MMOL/L (ref 136–145)
WBC # BLD AUTO: 8.23 THOUSAND/UL (ref 4.31–10.16)

## 2019-03-10 PROCEDURE — 99232 SBSQ HOSP IP/OBS MODERATE 35: CPT | Performed by: INTERNAL MEDICINE

## 2019-03-10 PROCEDURE — 85018 HEMOGLOBIN: CPT | Performed by: SURGERY

## 2019-03-10 PROCEDURE — 83735 ASSAY OF MAGNESIUM: CPT | Performed by: SURGERY

## 2019-03-10 PROCEDURE — C9113 INJ PANTOPRAZOLE SODIUM, VIA: HCPCS | Performed by: NURSE PRACTITIONER

## 2019-03-10 PROCEDURE — 80048 BASIC METABOLIC PNL TOTAL CA: CPT | Performed by: SURGERY

## 2019-03-10 PROCEDURE — 85025 COMPLETE CBC W/AUTO DIFF WBC: CPT | Performed by: SURGERY

## 2019-03-10 PROCEDURE — 99024 POSTOP FOLLOW-UP VISIT: CPT | Performed by: SURGERY

## 2019-03-10 PROCEDURE — 86140 C-REACTIVE PROTEIN: CPT | Performed by: NURSE PRACTITIONER

## 2019-03-10 RX ORDER — DEXTROSE, SODIUM CHLORIDE, AND POTASSIUM CHLORIDE 5; .9; .15 G/100ML; G/100ML; G/100ML
50 INJECTION INTRAVENOUS CONTINUOUS
Status: DISCONTINUED | OUTPATIENT
Start: 2019-03-10 | End: 2019-03-14

## 2019-03-10 RX ORDER — HYDROCHLOROTHIAZIDE 12.5 MG/1
12.5 TABLET ORAL DAILY
Status: DISCONTINUED | OUTPATIENT
Start: 2019-03-10 | End: 2019-03-11

## 2019-03-10 RX ORDER — HYDROMORPHONE HCL/PF 1 MG/ML
1 SYRINGE (ML) INJECTION
Status: DISCONTINUED | OUTPATIENT
Start: 2019-03-10 | End: 2019-03-10

## 2019-03-10 RX ORDER — VENLAFAXINE 37.5 MG/1
37.5 TABLET ORAL 2 TIMES DAILY
Status: DISCONTINUED | OUTPATIENT
Start: 2019-03-10 | End: 2019-03-11

## 2019-03-10 RX ORDER — HYDROMORPHONE HCL/PF 1 MG/ML
1 SYRINGE (ML) INJECTION
Status: DISCONTINUED | OUTPATIENT
Start: 2019-03-10 | End: 2019-03-11

## 2019-03-10 RX ORDER — METOCLOPRAMIDE HYDROCHLORIDE 5 MG/ML
5 INJECTION INTRAMUSCULAR; INTRAVENOUS EVERY 4 HOURS PRN
Status: DISCONTINUED | OUTPATIENT
Start: 2019-03-10 | End: 2019-03-11

## 2019-03-10 RX ADMIN — VENLAFAXINE 37.5 MG: 37.5 TABLET ORAL at 17:08

## 2019-03-10 RX ADMIN — HYDROMORPHONE HYDROCHLORIDE 1 MG: 1 INJECTION, SOLUTION INTRAMUSCULAR; INTRAVENOUS; SUBCUTANEOUS at 19:48

## 2019-03-10 RX ADMIN — METOPROLOL TARTRATE 5 MG: 5 INJECTION, SOLUTION INTRAVENOUS at 14:34

## 2019-03-10 RX ADMIN — ACETAMINOPHEN 975 MG: 325 TABLET ORAL at 06:13

## 2019-03-10 RX ADMIN — DEXTROSE, SODIUM CHLORIDE, AND POTASSIUM CHLORIDE 100 ML/HR: 5; .9; .15 INJECTION INTRAVENOUS at 22:37

## 2019-03-10 RX ADMIN — METOCLOPRAMIDE 5 MG: 5 INJECTION, SOLUTION INTRAMUSCULAR; INTRAVENOUS at 14:35

## 2019-03-10 RX ADMIN — METRONIDAZOLE 500 MG: 500 INJECTION, SOLUTION INTRAVENOUS at 03:50

## 2019-03-10 RX ADMIN — HEPARIN SODIUM 5000 UNITS: 5000 INJECTION INTRAVENOUS; SUBCUTANEOUS at 06:14

## 2019-03-10 RX ADMIN — VENLAFAXINE 37.5 MG: 37.5 TABLET ORAL at 11:36

## 2019-03-10 RX ADMIN — ONDANSETRON 4 MG: 2 INJECTION INTRAMUSCULAR; INTRAVENOUS at 16:50

## 2019-03-10 RX ADMIN — LORAZEPAM 0.5 MG: 2 INJECTION INTRAMUSCULAR; INTRAVENOUS at 12:24

## 2019-03-10 RX ADMIN — ACETAMINOPHEN 975 MG: 325 TABLET ORAL at 23:09

## 2019-03-10 RX ADMIN — GABAPENTIN 100 MG: 250 SUSPENSION ORAL at 17:07

## 2019-03-10 RX ADMIN — SIMETHICONE CHEW TAB 80 MG 80 MG: 80 TABLET ORAL at 12:29

## 2019-03-10 RX ADMIN — LORAZEPAM 0.5 MG: 2 INJECTION INTRAMUSCULAR; INTRAVENOUS at 04:26

## 2019-03-10 RX ADMIN — OXYCODONE HYDROCHLORIDE 5 MG: 5 SOLUTION ORAL at 08:33

## 2019-03-10 RX ADMIN — GABAPENTIN 100 MG: 250 SUSPENSION ORAL at 12:05

## 2019-03-10 RX ADMIN — PANTOPRAZOLE SODIUM 40 MG: 40 INJECTION, POWDER, FOR SOLUTION INTRAVENOUS at 08:29

## 2019-03-10 RX ADMIN — OXYCODONE HYDROCHLORIDE 10 MG: 5 SOLUTION ORAL at 16:51

## 2019-03-10 RX ADMIN — LORAZEPAM 0.5 MG: 2 INJECTION INTRAMUSCULAR; INTRAVENOUS at 22:42

## 2019-03-10 RX ADMIN — PANTOPRAZOLE SODIUM 40 MG: 40 INJECTION, POWDER, FOR SOLUTION INTRAVENOUS at 22:42

## 2019-03-10 RX ADMIN — METOCLOPRAMIDE 5 MG: 5 INJECTION, SOLUTION INTRAMUSCULAR; INTRAVENOUS at 06:14

## 2019-03-10 RX ADMIN — ACETAMINOPHEN 975 MG: 325 TABLET ORAL at 11:35

## 2019-03-10 RX ADMIN — THIAMINE HCL TAB 100 MG 100 MG: 100 TAB at 08:31

## 2019-03-10 RX ADMIN — ASCORBIC ACID, VITAMIN A PALMITATE, CHOLECALCIFEROL, THIAMINE HYDROCHLORIDE, RIBOFLAVIN-5 PHOSPHATE SODIUM, PYRIDOXINE HYDROCHLORIDE, NIACINAMIDE, DEXPANTHENOL, ALPHA-TOCOPHEROL ACETATE, VITAMIN K1, FOLIC ACID, BIOTIN, CYANOCOBALAMIN: 200; 3300; 200; 6; 3.6; 6; 40; 15; 10; 150; 600; 60; 5 INJECTION, SOLUTION INTRAVENOUS at 10:37

## 2019-03-10 RX ADMIN — HYDROCHLOROTHIAZIDE 12.5 MG: 12.5 TABLET ORAL at 06:14

## 2019-03-10 RX ADMIN — METOPROLOL TARTRATE 5 MG: 5 INJECTION, SOLUTION INTRAVENOUS at 22:42

## 2019-03-10 RX ADMIN — ACETAMINOPHEN 975 MG: 325 TABLET ORAL at 17:07

## 2019-03-10 RX ADMIN — OXYCODONE HYDROCHLORIDE 5 MG: 5 SOLUTION ORAL at 12:01

## 2019-03-10 RX ADMIN — ONDANSETRON 4 MG: 2 INJECTION INTRAMUSCULAR; INTRAVENOUS at 08:26

## 2019-03-10 RX ADMIN — METOPROLOL TARTRATE 5 MG: 5 INJECTION, SOLUTION INTRAVENOUS at 06:14

## 2019-03-10 RX ADMIN — GABAPENTIN 100 MG: 250 SUSPENSION ORAL at 04:25

## 2019-03-10 RX ADMIN — DEXTROSE, SODIUM CHLORIDE, AND POTASSIUM CHLORIDE 100 ML/HR: 5; .9; .15 INJECTION INTRAVENOUS at 12:31

## 2019-03-10 NOTE — PROGRESS NOTES
Progress Note - Bariatric Surgery   Sayra Carias 51 y o  female MRN: [id number]  Unit/Bed#:  Encounter: [id number]    Assessment:  48/F POD 3 s/p laparoscopic remnant gastrectomy, transverse/descending colon resection and conversion to ex lap for completion of colocolostomy  WBC has normalized   LEANA Resolved  Has return of bowel function    Plan:  Fulls with protein supplement   Pain control   OOB/ambulating halls   Maintenance IVF  D/C IV abx  IS (improving)  DVT ppx (SQH - held; Cont SCDs)    Subjective/Objective     Subjective: Has been passing flatus and has had two bowel movements; pain is controlled; currently no nausea    Objective: Looks well    Blood pressure 154/79, pulse 83, temperature 98 5 °F (36 9 °C), temperature source Oral, resp  rate 19, height 5' 9" (1 753 m), weight 124 kg (273 lb 5 9 oz), SpO2 98 %, not currently breastfeeding  ,Body mass index is 40 37 kg/m²        Intake/Output Summary (Last 24 hours) at 3/10/2019 1314  Last data filed at 3/10/2019 1232  Gross per 24 hour   Intake 4070 41 ml   Output 1225 ml   Net 2845 41 ml       Invasive Devices     Peripheral Intravenous Line            Peripheral IV 03/08/19 Left Forearm 1 day          Drain            Closed/Suction Drain Left LLQ Bulb 19 Fr  3 days    Closed/Suction Drain Right Abdomen Bulb 19 Fr  3 days                Physical Exam:     No distress   RRR  Breathing non labored (1250 IS)  Abdomen appropriately tender; soft, ND, wounds C/D/I, RUQ DARREN ascitic; LLQ DARREN serous      Lab, Imaging and other studies:  CBC:   Lab Results   Component Value Date    WBC 8 23 03/10/2019    HGB 9 5 (L) 03/10/2019    HCT 30 7 (L) 03/10/2019    MCV 88 03/10/2019     03/10/2019    MCH 27 5 03/10/2019    MCHC 31 3 (L) 03/10/2019    RDW 15 1 03/10/2019    MPV 10 3 03/10/2019    NRBC 0 03/10/2019   , CMP:   Lab Results   Component Value Date    SODIUM 136 03/10/2019    K 3 8 03/10/2019     03/10/2019    CO2 24 03/10/2019    BUN 15 03/10/2019    CREATININE 0 72 03/10/2019    CALCIUM 8 3 03/10/2019    EGFR 99 03/10/2019     VTE Pharmacologic Prophylaxis: Reason for no pharmacologic prophylaxis (H/H trending down - will monitor)  VTE Mechanical Prophylaxis: sequential compression device

## 2019-03-10 NOTE — PLAN OF CARE
Problem: DISCHARGE PLANNING - CARE MANAGEMENT  Goal: Discharge to post-acute care or home with appropriate resources  Description  INTERVENTIONS:  - Conduct assessment to determine patient/family and health care team treatment goals, and need for post-acute services based on payer coverage, community resources, and patient preferences, and barriers to discharge  - Address psychosocial, clinical, and financial barriers to discharge as identified in assessment in conjunction with the patient/family and health care team  - Arrange appropriate level of post-acute services according to patient?s   needs and preference and payer coverage in collaboration with the physician and health care team  - Communicate with and update the patient/family, physician, and health care team regarding progress on the discharge plan  - Arrange appropriate transportation to post-acute venues  Outcome: Progressing     Problem: Prexisting or High Potential for Compromised Skin Integrity  Goal: Skin integrity is maintained or improved  Description  INTERVENTIONS:  - Identify patients at risk for skin breakdown  - Assess and monitor skin integrity  - Assess and monitor nutrition and hydration status  - Monitor labs (i e  albumin)  - Assess for incontinence   - Turn and reposition patient  - Assist with mobility/ambulation  - Relieve pressure over bony prominences  - Avoid friction and shearing  - Provide appropriate hygiene as needed including keeping skin clean and dry  - Evaluate need for skin moisturizer/barrier cream  - Collaborate with interdisciplinary team (i e  Nutrition, Rehabilitation, etc )   - Patient/family teaching  Outcome: Progressing     Problem: Potential for Falls  Goal: Patient will remain free of falls  Description  INTERVENTIONS:  - Assess patient frequently for physical needs  -  Identify cognitive and physical deficits and behaviors that affect risk of falls    -  [place] fall precautions as indicated by assessment   - Educate patient/family on patient safety including physical limitations  - Instruct patient to call for assistance with activity based on assessment  - Modify environment to reduce risk of injury  - Consider OT/PT consult to assist with strengthening/mobility  Outcome: Progressing

## 2019-03-10 NOTE — PROGRESS NOTES
[facility] Progress Note  Patient: Mavis Unger 51 y o  female   MRN: [id number]  PCP: Annmarie Joseph  Unit/Bed#:  Encounter: [id number]  Date Of Visit: 03/10/19    Assessment:    Principal Problem:    Gastric outflow obstruction  Active Problems:    Abdominal pain    Essential hypertension    Tachycardia      Plan:    · Postop day 3 laparoscopic agreement gastrectomy converted to ex lap for completion of colocolostomy  Bariatric surgery following  Clinically improving    Acute on chronic anemia likely postop related  Continue to monitor hemoglobin closely      VTE Pharmacologic Prophylaxis:   Pharmacologic: Heparin  Mechanical VTE Prophylaxis in Place: Yes    Patient Centered Rounds: I have performed bedside rounds with nursing staff today  Discussions with Specialists or Other Care Team Provider:  Reviewed bariatric surgery note    Education and Discussions with Family / Patient:  Patient's     Time Spent for Care: 20 minutes  More than 50% of total time spent on counseling and coordination of care as described above  Current Length of Stay: 3 day(s)    Current Patient Status: Inpatient   Certification Statement: The patient will continue to require additional inpatient hospital stay due to Surgical care    Discharge Plan:  Continue hospitalization as per primary team    Code Status: Level 1 - Full Code      Subjective:   Patient seen and examined  Passing flatus  No complaints at this time    Objective:     Vitals:   Temp (24hrs), Av 3 °F (36 8 °C), Min:98 °F (36 7 °C), Max:98 6 °F (37 °C)    Temp:  [98 °F (36 7 °C)-98 6 °F (37 °C)] 98 6 °F (37 °C)  HR:  [72-94] 72  Resp:  [12-19] 15  BP: (151-191)/() 172/95  SpO2:  [95 %-100 %] 95 %  Body mass index is 40 37 kg/m²  Input and Output Summary (last 24 hours):        Intake/Output Summary (Last 24 hours) at 3/10/2019 1549  Last data filed at 3/10/2019 1501  Gross per 24 hour   Intake 5070 41 ml   Output 1325 ml Net 3745 41 ml       Physical Exam:     And oriented x3  Mucous membranes are moist  Abdomen soft nontender, incision site intact  Lungs are clear to auscultation  Heart sounds are regular S1-S2  Additional Data:     Labs:    Results from last 7 days   Lab Units 03/10/19  1230 03/10/19  0525   WBC Thousand/uL  --  8 23   HEMOGLOBIN g/dL 9 5* 9 6*   HEMATOCRIT %  --  30 7*   PLATELETS Thousands/uL  --  337   NEUTROS PCT %  --  78*   LYMPHS PCT %  --  7*   MONOS PCT %  --  7   EOS PCT %  --  7*     Results from last 7 days   Lab Units 03/10/19  0525  03/08/19  0510   POTASSIUM mmol/L 3 8   < > 4 2   CHLORIDE mmol/L 104   < > 101   CO2 mmol/L 24   < > 27   BUN mg/dL 15   < > 12   CREATININE mg/dL 0 72   < > 0 81   CALCIUM mg/dL 8 3   < > 9 2   ALK PHOS U/L  --   --  49   ALT U/L  --   --  10*   AST U/L  --   --  12    < > = values in this interval not displayed  * I Have Reviewed All Lab Data Listed Above  * Additional Pertinent Lab Tests Reviewed:  Nuzhat Wolfe Admission Reviewed    Imaging:    Imaging Reports Reviewed Today Include:   Imaging Personally Reviewed by Myself Includes:      Recent Cultures (last 7 days):           Last 24 Hours Medication List:     Current Facility-Administered Medications:  acetaminophen 975 mg Oral Q6H [facility] Dk Monge MD    dextrose 5 % and sodium chloride 0 9 % with KCl 20 mEq/L 100 mL/hr Intravenous Continuous Dk Monge MD Last Rate: 100 mL/hr (03/10/19 1231)   gabapentin 100 mg Oral Q6H Dk Monge MD    hydrochlorothiazide 12 5 mg Oral Daily Dk Monge MD    iohexol 50 mL Intravenous Once in imaging GREGORY Beatty    LORazepam 0 5 mg Intravenous Q6H PRN GREGORY Beatty    metoclopramide 5 mg Intravenous Q4H PRN Dk Monge MD    metoprolol 5 mg Intravenous Q8H GREGORY Reyes    naloxone 0 04 mg Intravenous Q3 min PRN GREGORY Beatty    ondansetron 4 mg Intravenous Q4H PRN GREGORY Torres    oxyCODONE 10 mg Oral Q4H PRN Levy Mauro MD    oxyCODONE 5 mg Oral Q4H PRN Levy Mauro MD    pantoprazole 40 mg Intravenous Q12H [facility] GREGORY Reyes    promethazine 25 mg Intramuscular Q6H PRN GREGORY Torres    simethicone 80 mg Oral Q6H PRN GREGORY Torres    thiamine 100 mg Oral Daily GREGORY Reyes    venlafaxine 37 5 mg Oral BID Levy Mauro MD         Today, Patient Was Seen By: Rene Arcos MD    ** Please Note: Dragon 360 Dictation voice to text software may have been used in the creation of this document   **

## 2019-03-11 LAB
ANION GAP SERPL CALCULATED.3IONS-SCNC: 9 MMOL/L (ref 4–13)
BASOPHILS # BLD AUTO: 0.02 THOUSANDS/ΜL (ref 0–0.1)
BASOPHILS NFR BLD AUTO: 0 % (ref 0–1)
BUN SERPL-MCNC: 7 MG/DL (ref 5–25)
CALCIUM SERPL-MCNC: 8.6 MG/DL (ref 8.3–10.1)
CHLORIDE SERPL-SCNC: 104 MMOL/L (ref 100–108)
CO2 SERPL-SCNC: 25 MMOL/L (ref 21–32)
CREAT SERPL-MCNC: 0.66 MG/DL (ref 0.6–1.3)
CRP SERPL QL: >90 MG/L
EOSINOPHIL # BLD AUTO: 0.63 THOUSAND/ΜL (ref 0–0.61)
EOSINOPHIL NFR BLD AUTO: 9 % (ref 0–6)
ERYTHROCYTE [DISTWIDTH] IN BLOOD BY AUTOMATED COUNT: 14.8 % (ref 11.6–15.1)
GFR SERPL CREATININE-BSD FRML MDRD: 104 ML/MIN/1.73SQ M
GLUCOSE SERPL-MCNC: 99 MG/DL (ref 65–140)
HCT VFR BLD AUTO: 30.6 % (ref 34.8–46.1)
HGB BLD-MCNC: 9.9 G/DL (ref 11.5–15.4)
IMM GRANULOCYTES # BLD AUTO: 0.04 THOUSAND/UL (ref 0–0.2)
IMM GRANULOCYTES NFR BLD AUTO: 1 % (ref 0–2)
LYMPHOCYTES # BLD AUTO: 0.57 THOUSANDS/ΜL (ref 0.6–4.47)
LYMPHOCYTES NFR BLD AUTO: 8 % (ref 14–44)
MAGNESIUM SERPL-MCNC: 1.7 MG/DL (ref 1.6–2.6)
MCH RBC QN AUTO: 27.7 PG (ref 26.8–34.3)
MCHC RBC AUTO-ENTMCNC: 32.4 G/DL (ref 31.4–37.4)
MCV RBC AUTO: 86 FL (ref 82–98)
MONOCYTES # BLD AUTO: 0.46 THOUSAND/ΜL (ref 0.17–1.22)
MONOCYTES NFR BLD AUTO: 6 % (ref 4–12)
NEUTROPHILS # BLD AUTO: 5.56 THOUSANDS/ΜL (ref 1.85–7.62)
NEUTS SEG NFR BLD AUTO: 76 % (ref 43–75)
NRBC BLD AUTO-RTO: 0 /100 WBCS
PLATELET # BLD AUTO: 417 THOUSANDS/UL (ref 149–390)
PMV BLD AUTO: 10 FL (ref 8.9–12.7)
POTASSIUM SERPL-SCNC: 3.6 MMOL/L (ref 3.5–5.3)
RBC # BLD AUTO: 3.58 MILLION/UL (ref 3.81–5.12)
SODIUM SERPL-SCNC: 138 MMOL/L (ref 136–145)
WBC # BLD AUTO: 7.28 THOUSAND/UL (ref 4.31–10.16)

## 2019-03-11 PROCEDURE — 99024 POSTOP FOLLOW-UP VISIT: CPT | Performed by: SURGERY

## 2019-03-11 PROCEDURE — 86140 C-REACTIVE PROTEIN: CPT | Performed by: NURSE PRACTITIONER

## 2019-03-11 PROCEDURE — C9113 INJ PANTOPRAZOLE SODIUM, VIA: HCPCS | Performed by: NURSE PRACTITIONER

## 2019-03-11 PROCEDURE — 83735 ASSAY OF MAGNESIUM: CPT | Performed by: SURGERY

## 2019-03-11 PROCEDURE — 85025 COMPLETE CBC W/AUTO DIFF WBC: CPT | Performed by: SURGERY

## 2019-03-11 PROCEDURE — 99232 SBSQ HOSP IP/OBS MODERATE 35: CPT | Performed by: INTERNAL MEDICINE

## 2019-03-11 PROCEDURE — 80048 BASIC METABOLIC PNL TOTAL CA: CPT | Performed by: SURGERY

## 2019-03-11 RX ORDER — POTASSIUM CHLORIDE 20MEQ/15ML
40 LIQUID (ML) ORAL ONCE
Status: COMPLETED | OUTPATIENT
Start: 2019-03-11 | End: 2019-03-11

## 2019-03-11 RX ORDER — POTASSIUM CHLORIDE 14.9 MG/ML
20 INJECTION INTRAVENOUS ONCE
Status: COMPLETED | OUTPATIENT
Start: 2019-03-11 | End: 2019-03-11

## 2019-03-11 RX ORDER — HYDROMORPHONE HCL/PF 1 MG/ML
1 SYRINGE (ML) INJECTION
Status: DISCONTINUED | OUTPATIENT
Start: 2019-03-11 | End: 2019-03-12

## 2019-03-11 RX ORDER — VENLAFAXINE HYDROCHLORIDE 37.5 MG/1
37.5 CAPSULE, EXTENDED RELEASE ORAL DAILY
Status: DISCONTINUED | OUTPATIENT
Start: 2019-03-12 | End: 2019-03-18 | Stop reason: HOSPADM

## 2019-03-11 RX ORDER — MAGNESIUM SULFATE HEPTAHYDRATE 40 MG/ML
2 INJECTION, SOLUTION INTRAVENOUS ONCE
Status: COMPLETED | OUTPATIENT
Start: 2019-03-11 | End: 2019-03-11

## 2019-03-11 RX ORDER — METOCLOPRAMIDE HYDROCHLORIDE 5 MG/ML
10 INJECTION INTRAMUSCULAR; INTRAVENOUS EVERY 6 HOURS PRN
Status: DISCONTINUED | OUTPATIENT
Start: 2019-03-11 | End: 2019-03-13

## 2019-03-11 RX ADMIN — ACETAMINOPHEN 975 MG: 325 TABLET ORAL at 12:22

## 2019-03-11 RX ADMIN — POTASSIUM CHLORIDE 40 MEQ: 20 SOLUTION ORAL at 09:12

## 2019-03-11 RX ADMIN — HYDROMORPHONE HYDROCHLORIDE 1 MG: 1 INJECTION, SOLUTION INTRAMUSCULAR; INTRAVENOUS; SUBCUTANEOUS at 14:21

## 2019-03-11 RX ADMIN — ONDANSETRON 4 MG: 2 INJECTION INTRAMUSCULAR; INTRAVENOUS at 03:18

## 2019-03-11 RX ADMIN — POTASSIUM CHLORIDE 20 MEQ: 200 INJECTION, SOLUTION INTRAVENOUS at 10:18

## 2019-03-11 RX ADMIN — METOPROLOL TARTRATE 5 MG: 5 INJECTION, SOLUTION INTRAVENOUS at 06:41

## 2019-03-11 RX ADMIN — ACETAMINOPHEN 975 MG: 325 TABLET ORAL at 05:08

## 2019-03-11 RX ADMIN — ACETAMINOPHEN 975 MG: 325 TABLET ORAL at 17:46

## 2019-03-11 RX ADMIN — ONDANSETRON 4 MG: 2 INJECTION INTRAMUSCULAR; INTRAVENOUS at 19:47

## 2019-03-11 RX ADMIN — METOPROLOL TARTRATE 5 MG: 5 INJECTION, SOLUTION INTRAVENOUS at 22:57

## 2019-03-11 RX ADMIN — OXYCODONE HYDROCHLORIDE 5 MG: 5 SOLUTION ORAL at 21:57

## 2019-03-11 RX ADMIN — METOCLOPRAMIDE 5 MG: 5 INJECTION, SOLUTION INTRAMUSCULAR; INTRAVENOUS at 16:05

## 2019-03-11 RX ADMIN — HYDROMORPHONE HYDROCHLORIDE 1 MG: 1 INJECTION, SOLUTION INTRAMUSCULAR; INTRAVENOUS; SUBCUTANEOUS at 05:07

## 2019-03-11 RX ADMIN — LORAZEPAM 0.5 MG: 2 INJECTION INTRAMUSCULAR; INTRAVENOUS at 16:10

## 2019-03-11 RX ADMIN — VENLAFAXINE 37.5 MG: 37.5 TABLET ORAL at 17:46

## 2019-03-11 RX ADMIN — SIMETHICONE CHEW TAB 80 MG 80 MG: 80 TABLET ORAL at 19:46

## 2019-03-11 RX ADMIN — MAGNESIUM SULFATE HEPTAHYDRATE 2 G: 40 INJECTION, SOLUTION INTRAVENOUS at 08:35

## 2019-03-11 RX ADMIN — OXYCODONE HYDROCHLORIDE 10 MG: 5 SOLUTION ORAL at 03:14

## 2019-03-11 RX ADMIN — PANTOPRAZOLE SODIUM 40 MG: 40 INJECTION, POWDER, FOR SOLUTION INTRAVENOUS at 08:34

## 2019-03-11 RX ADMIN — OXYCODONE HYDROCHLORIDE 5 MG: 5 SOLUTION ORAL at 11:15

## 2019-03-11 RX ADMIN — OXYCODONE HYDROCHLORIDE 5 MG: 5 SOLUTION ORAL at 16:05

## 2019-03-11 RX ADMIN — HYDROCHLOROTHIAZIDE 12.5 MG: 12.5 TABLET ORAL at 08:34

## 2019-03-11 RX ADMIN — VENLAFAXINE 37.5 MG: 37.5 TABLET ORAL at 08:36

## 2019-03-11 RX ADMIN — METOCLOPRAMIDE 5 MG: 5 INJECTION, SOLUTION INTRAMUSCULAR; INTRAVENOUS at 09:39

## 2019-03-11 RX ADMIN — METOPROLOL TARTRATE 5 MG: 5 INJECTION, SOLUTION INTRAVENOUS at 14:29

## 2019-03-11 RX ADMIN — LORAZEPAM 0.5 MG: 2 INJECTION INTRAMUSCULAR; INTRAVENOUS at 07:15

## 2019-03-11 RX ADMIN — SIMETHICONE CHEW TAB 80 MG 80 MG: 80 TABLET ORAL at 13:51

## 2019-03-11 RX ADMIN — PANTOPRAZOLE SODIUM 40 MG: 40 INJECTION, POWDER, FOR SOLUTION INTRAVENOUS at 21:58

## 2019-03-11 RX ADMIN — HYDROMORPHONE HYDROCHLORIDE 1 MG: 1 INJECTION, SOLUTION INTRAMUSCULAR; INTRAVENOUS; SUBCUTANEOUS at 20:25

## 2019-03-11 RX ADMIN — LORAZEPAM 0.5 MG: 2 INJECTION INTRAMUSCULAR; INTRAVENOUS at 22:57

## 2019-03-11 RX ADMIN — THIAMINE HCL TAB 100 MG 100 MG: 100 TAB at 08:34

## 2019-03-11 RX ADMIN — HYDROMORPHONE HYDROCHLORIDE 1 MG: 1 INJECTION, SOLUTION INTRAMUSCULAR; INTRAVENOUS; SUBCUTANEOUS at 07:10

## 2019-03-11 NOTE — PROGRESS NOTES
[facility] Progress Note  Patient: Arabella Smith 51 y o  female   MRN: [id number]  PCP: Marcelo Flynn  Unit/Bed#:  Encounter: [id number]  Date Of Visit: 19    Assessment:    Principal Problem:    Gastric outflow obstruction  Active Problems:    Abdominal pain    Essential hypertension    Tachycardia      Plan:  Postop day 4 laparoscopic gastrectomy converted to ex lap for completion of colocolostomy  Clinically stable and improving  Bariatric surgery following  Able to tolerate diet well  Drains removed by bariatric surgery today  Acute anemia likely postop related  Hemoglobin stable no signs of active bleeding  Consider starting iron supplements when she is able to take normal diet well  Outpatient follow-up with PCP after discharge    Acute kidney injury  Resolved      VTE Pharmacologic Prophylaxis:   Pharmacologic: Heparin  Mechanical VTE Prophylaxis in Place: Yes    Patient Centered Rounds: I have performed bedside rounds with nursing staff today  Discussions with Specialists or Other Care Team Provider:  Bariatric surgery    Education and Discussions with Family / Patient:  Patient    Time Spent for Care: 20 minutes  More than 50% of total time spent on counseling and coordination of care as described above  Current Length of Stay: 4 day(s)    Current Patient Status: Inpatient   Certification Statement: The patient will continue to require additional inpatient hospital stay due to As per bariatric surgery    Discharge Plan:  Management per bariatric surgery  Code Status: Level 1 - Full Code      Subjective:   Patient seen and examined  No complaints at this time    Objective:     Vitals:   Temp (24hrs), Av 9 °F (36 6 °C), Min:97 4 °F (36 3 °C), Max:98 6 °F (37 °C)    Temp:  [97 4 °F (36 3 °C)-98 6 °F (37 °C)] 97 7 °F (36 5 °C)  HR:  [68-90] 81  Resp:  [15-20] 16  BP: (147-170)/(70-94) 147/70  SpO2:  [93 %-95 %] 95 %  Body mass index is 40 37 kg/m²       Input and Output Summary (last 24 hours): Intake/Output Summary (Last 24 hours) at 3/11/2019 1322  Last data filed at 3/11/2019 0901  Gross per 24 hour   Intake 1500 01 ml   Output 2745 ml   Net -1244 99 ml       Physical Exam:     Alert and oriented x3  Mucous membranes are moist  Lungs are clear to auscultation  Heart sounds are regular S1-S2  Abdomen sutures intact no evidence of drainage or bleeding seen  Drains removed  Extremities no edema    Additional Data:     Labs:    Results from last 7 days   Lab Units 03/11/19  0500   WBC Thousand/uL 7 28   HEMOGLOBIN g/dL 9 9*   HEMATOCRIT % 30 6*   PLATELETS Thousands/uL 417*   NEUTROS PCT % 76*   LYMPHS PCT % 8*   MONOS PCT % 6   EOS PCT % 9*     Results from last 7 days   Lab Units 03/11/19  0500  03/08/19  0510   POTASSIUM mmol/L 3 6   < > 4 2   CHLORIDE mmol/L 104   < > 101   CO2 mmol/L 25   < > 27   BUN mg/dL 7   < > 12   CREATININE mg/dL 0 66   < > 0 81   CALCIUM mg/dL 8 6   < > 9 2   ALK PHOS U/L  --   --  49   ALT U/L  --   --  10*   AST U/L  --   --  12    < > = values in this interval not displayed  * I Have Reviewed All Lab Data Listed Above  * Additional Pertinent Lab Tests Reviewed:  Nuzhat 66 Admission Reviewed    Imaging:    Imaging Reports Reviewed Today Include:   Imaging Personally Reviewed by Myself Includes:      Recent Cultures (last 7 days):           Last 24 Hours Medication List:     Current Facility-Administered Medications:  acetaminophen 975 mg Oral Q6H [address] Kedar Valles MD    dextrose 5 % and sodium chloride 0 9 % with KCl 20 mEq/L 50 mL/hr Intravenous Continuous Kedar Valles MD Last Rate: 50 mL/hr (03/11/19 0830)   hydrochlorothiazide 12 5 mg Oral Daily Kedar Valles MD    HYDROmorphone 1 mg Intravenous Q3H PRN Kedar Valles MD    iohexol 50 mL Intravenous Once in imaging GREGORY Brown    LORazepam 0 5 mg Intravenous Q6H PRN GREGORY Brown    metoclopramide 5 mg Intravenous Q4H PRN Gopi Turner MD    metoprolol 5 mg Intravenous Q8H Verl Beam, CRNP    naloxone 0 04 mg Intravenous Q3 min PRN Verl Beam, CRNP    ondansetron 4 mg Intravenous Q4H PRN Verl Beam, CRNP    oxyCODONE 10 mg Oral Q4H PRN Gopi Turner MD    oxyCODONE 5 mg Oral Q4H PRN Gopi Turner MD    pantoprazole 40 mg Intravenous Q12H [address] GREGORY Reyes    promethazine 25 mg Intramuscular Q6H PRN Verl Beam, CRNP    simethicone 80 mg Oral Q6H PRN Verl Beam, CRNP    thiamine 100 mg Oral Daily GREGORY Reyes    venlafaxine 37 5 mg Oral BID Gopi Turner MD         Today, Patient Was Seen By: Anthony Boggs MD    ** Please Note: Dragon 360 Dictation voice to text software may have been used in the creation of this document   **

## 2019-03-11 NOTE — UTILIZATION REVIEW
Continued Stay Review    Date:3/11/2019  Vital Signs: /83   Pulse 70   Temp 97 9 °F (36 6 °C) (Oral)   Resp 18   Ht 5' 9" (1 753 m)   Wt 124 kg (273 lb 5 9 oz)   LMP  (LMP Unknown)   SpO2 94%   BMI 40 37 kg/m²   Assessment/Plan: pod #4 s/p remnant gastrectomy  , transverse/descending colon resection and conversion to ex lap for completion of colocolostomy  lucio resolved + bowel function  Kyaw drains ascitic----plan continue fulls with protein supplements, pain control, ambulating , decrease iv fluids, reemoved kyaw drains  dvt ppx  Continues to pass flatus but no bm's          Medications:   Scheduled Meds:   Current Facility-Administered Medications:  acetaminophen 975 mg Oral Q6H [facility] Tra Lobato MD    dextrose 5 % and sodium chloride 0 9 % with KCl 20 mEq/L 50 mL/hr Intravenous Continuous Tra Lobato MD Last Rate: 50 mL/hr (03/11/19 0830)   hydrochlorothiazide 12 5 mg Oral Daily Tra Lobato MD    HYDROmorphone 1 mg Intravenous Q3H PRN Tra Lobato MD    iohexol 50 mL Intravenous Once in imaging Alice Bounds, CRNP    LORazepam 0 5 mg Intravenous Q6H PRN Alice Bounds, CRNP    metoclopramide 5 mg Intravenous Q4H PRN Tra Lobato MD    metoprolol 5 mg Intravenous Q8H GREGORY Reyes    naloxone 0 04 mg Intravenous Q3 min PRN Alice Bounds, CRNP    ondansetron 4 mg Intravenous Q4H PRN Alice Bounds, CRNP    oxyCODONE 10 mg Oral Q4H PRN Tra Lobato MD    oxyCODONE 5 mg Oral Q4H PRN Tra Lobato MD    pantoprazole 40 mg Intravenous Q12H [facility] GREGORY Reyes    potassium chloride 20 mEq Intravenous Once Tra Lobato MD Last Rate: 20 mEq (03/11/19 1018)   promethazine 25 mg Intramuscular Q6H PRN Alice Bounds, CRNP    simethicone 80 mg Oral Q6H PRN Alice Bounds, CRNP    thiamine 100 mg Oral Daily GREGORY Reyes    venlafaxine 37 5 mg Oral BID Tra Lobato MD      Continuous Infusions: dextrose 5 % and sodium chloride 0 9 % with KCl 20 mEq/L 50 mL/hr Last Rate: 50 mL/hr (03/11/19 0830)     PRN Meds: HYDROmorphone    iohexol    LORazepam    metoclopramide    naloxone    ondansetron    oxyCODONE    oxyCODONE    promethazine    simethicone  Pertinent Labs/Diagnostic Results: hgb 9 9/30 6--platlet 417  Age/Sex: 51 y o  female   Discharge Plan: home with

## 2019-03-11 NOTE — PROGRESS NOTES
Progress Note - Bariatric Surgery   Shivani Bhandari 51 y o  female MRN: [id number]  Unit/Bed#:  Encounter: [id number]    Assessment:  48/F POD 4 s/p laparoscopic remnant gastrectomy, transverse/descending colon resection and conversion to ex lap for completion of colocolostomy  WBC normalized slight left shift   LEANA Resolved  +ve Bowel function   Ky[place] Drains ascitic     Plan:  Cont Fulls with protein supplements   Pain control   OOB/ambulating halls   Decrease maintenance IVF   Kyaw drains removed - patient tolerated well   IS (improving)  DVT ppx (SQH - held; Cont SCDs)    Subjective/Objective     Subjective: Pain is controlled with oral/IV analgesics; continues to pass flatus but no BMs yesterday; is tolerating full liquids diet and protein supplements with minimal eructation    Objective: Looks well    Blood pressure 170/94, pulse 78, temperature 97 8 °F (36 6 °C), temperature source Oral, resp  rate 20, height 5' 9" (1 753 m), weight 124 kg (273 lb 5 9 oz), SpO2 95 %, not currently breastfeeding  ,Body mass index is 40 37 kg/m²        Intake/Output Summary (Last 24 hours) at 3/11/2019 0733  Last data filed at 3/11/2019 0501  Gross per 24 hour   Intake 3002 09 ml   Output 2395 ml   Net 607 09 ml       Invasive Devices     Peripheral Intravenous Line            Peripheral IV 03/10/19 Distal;Left Forearm less than 1 day          Drain            Closed/Suction Drain Left LLQ Bulb 19 Fr  4 days    Closed/Suction Drain Right Abdomen Bulb 19 Fr  4 days                Physical Exam:     No distress   RRR  Breathing non labored (1100 - 1200 IS)  Abdomen appropriately tender; soft, ND, wounds C/D/I without erythema, B/L Kyaw drains ascitic      Lab, Imaging and other studies:  CBC:   Lab Results   Component Value Date    WBC 7 28 03/11/2019    HGB 9 9 (L) 03/11/2019    HCT 30 6 (L) 03/11/2019    MCV 86 03/11/2019     (H) 03/11/2019    MCH 27 7 03/11/2019    MCHC 32 4 03/11/2019    RDW 14 8 03/11/2019 MPV 10 0 03/11/2019    NRBC 0 03/11/2019   , CMP:   Lab Results   Component Value Date    SODIUM 138 03/11/2019    K 3 6 03/11/2019     03/11/2019    CO2 25 03/11/2019    BUN 7 03/11/2019    CREATININE 0 66 03/11/2019    CALCIUM 8 6 03/11/2019    EGFR 104 03/11/2019     VTE Pharmacologic Prophylaxis: Reason for no pharmacologic prophylaxis (H/H was trending down but now stable; will continue to monitor off of SQH)  VTE Mechanical Prophylaxis: sequential compression device

## 2019-03-12 LAB
ANION GAP SERPL CALCULATED.3IONS-SCNC: 7 MMOL/L (ref 4–13)
BASOPHILS # BLD AUTO: 0.02 THOUSANDS/ΜL (ref 0–0.1)
BASOPHILS NFR BLD AUTO: 0 % (ref 0–1)
BUN SERPL-MCNC: 4 MG/DL (ref 5–25)
CALCIUM SERPL-MCNC: 8.9 MG/DL (ref 8.3–10.1)
CHLORIDE SERPL-SCNC: 100 MMOL/L (ref 100–108)
CO2 SERPL-SCNC: 30 MMOL/L (ref 21–32)
CREAT SERPL-MCNC: 0.6 MG/DL (ref 0.6–1.3)
CRP SERPL HS-MCNC: 89.35 MG/L
CRP SERPL QL: 89.4 MG/L
EOSINOPHIL # BLD AUTO: 0.54 THOUSAND/ΜL (ref 0–0.61)
EOSINOPHIL NFR BLD AUTO: 7 % (ref 0–6)
ERYTHROCYTE [DISTWIDTH] IN BLOOD BY AUTOMATED COUNT: 14.7 % (ref 11.6–15.1)
GFR SERPL CREATININE-BSD FRML MDRD: 107 ML/MIN/1.73SQ M
GLUCOSE SERPL-MCNC: 106 MG/DL (ref 65–140)
HCT VFR BLD AUTO: 31.7 % (ref 34.8–46.1)
HGB BLD-MCNC: 10.2 G/DL (ref 11.5–15.4)
IMM GRANULOCYTES # BLD AUTO: 0.12 THOUSAND/UL (ref 0–0.2)
IMM GRANULOCYTES NFR BLD AUTO: 2 % (ref 0–2)
LYMPHOCYTES # BLD AUTO: 0.79 THOUSANDS/ΜL (ref 0.6–4.47)
LYMPHOCYTES NFR BLD AUTO: 10 % (ref 14–44)
MAGNESIUM SERPL-MCNC: 2 MG/DL (ref 1.6–2.6)
MCH RBC QN AUTO: 27.3 PG (ref 26.8–34.3)
MCHC RBC AUTO-ENTMCNC: 32.2 G/DL (ref 31.4–37.4)
MCV RBC AUTO: 85 FL (ref 82–98)
MONOCYTES # BLD AUTO: 0.65 THOUSAND/ΜL (ref 0.17–1.22)
MONOCYTES NFR BLD AUTO: 8 % (ref 4–12)
NEUTROPHILS # BLD AUTO: 5.65 THOUSANDS/ΜL (ref 1.85–7.62)
NEUTS SEG NFR BLD AUTO: 73 % (ref 43–75)
NRBC BLD AUTO-RTO: 0 /100 WBCS
PLATELET # BLD AUTO: 440 THOUSANDS/UL (ref 149–390)
PMV BLD AUTO: 10 FL (ref 8.9–12.7)
POTASSIUM SERPL-SCNC: 3.6 MMOL/L (ref 3.5–5.3)
RBC # BLD AUTO: 3.74 MILLION/UL (ref 3.81–5.12)
SODIUM SERPL-SCNC: 137 MMOL/L (ref 136–145)
WBC # BLD AUTO: 7.77 THOUSAND/UL (ref 4.31–10.16)

## 2019-03-12 PROCEDURE — 83735 ASSAY OF MAGNESIUM: CPT | Performed by: SURGERY

## 2019-03-12 PROCEDURE — 86141 C-REACTIVE PROTEIN HS: CPT | Performed by: SURGERY

## 2019-03-12 PROCEDURE — 86140 C-REACTIVE PROTEIN: CPT | Performed by: SURGERY

## 2019-03-12 PROCEDURE — 99024 POSTOP FOLLOW-UP VISIT: CPT | Performed by: SURGERY

## 2019-03-12 PROCEDURE — 85025 COMPLETE CBC W/AUTO DIFF WBC: CPT | Performed by: SURGERY

## 2019-03-12 PROCEDURE — C9113 INJ PANTOPRAZOLE SODIUM, VIA: HCPCS | Performed by: NURSE PRACTITIONER

## 2019-03-12 PROCEDURE — 80048 BASIC METABOLIC PNL TOTAL CA: CPT | Performed by: SURGERY

## 2019-03-12 PROCEDURE — 99232 SBSQ HOSP IP/OBS MODERATE 35: CPT | Performed by: INTERNAL MEDICINE

## 2019-03-12 RX ORDER — POTASSIUM CHLORIDE 29.8 MG/ML
40 INJECTION INTRAVENOUS ONCE
Status: DISCONTINUED | OUTPATIENT
Start: 2019-03-12 | End: 2019-03-12 | Stop reason: CLARIF

## 2019-03-12 RX ORDER — HYDROMORPHONE HCL/PF 1 MG/ML
0.5 SYRINGE (ML) INJECTION EVERY 4 HOURS PRN
Status: DISCONTINUED | OUTPATIENT
Start: 2019-03-12 | End: 2019-03-16

## 2019-03-12 RX ORDER — HYDROMORPHONE HCL/PF 1 MG/ML
0.5 SYRINGE (ML) INJECTION
Status: DISCONTINUED | OUTPATIENT
Start: 2019-03-12 | End: 2019-03-12

## 2019-03-12 RX ORDER — POTASSIUM CHLORIDE 14.9 MG/ML
20 INJECTION INTRAVENOUS
Status: COMPLETED | OUTPATIENT
Start: 2019-03-12 | End: 2019-03-12

## 2019-03-12 RX ADMIN — OXYCODONE HYDROCHLORIDE 5 MG: 5 SOLUTION ORAL at 17:51

## 2019-03-12 RX ADMIN — ONDANSETRON 4 MG: 2 INJECTION INTRAMUSCULAR; INTRAVENOUS at 05:54

## 2019-03-12 RX ADMIN — THIAMINE HCL TAB 100 MG 100 MG: 100 TAB at 08:55

## 2019-03-12 RX ADMIN — DEXTROSE, SODIUM CHLORIDE, AND POTASSIUM CHLORIDE 50 ML/HR: 5; .9; .15 INJECTION INTRAVENOUS at 07:58

## 2019-03-12 RX ADMIN — HYDROMORPHONE HYDROCHLORIDE 1 MG: 1 INJECTION, SOLUTION INTRAMUSCULAR; INTRAVENOUS; SUBCUTANEOUS at 05:55

## 2019-03-12 RX ADMIN — POTASSIUM CHLORIDE 20 MEQ: 200 INJECTION, SOLUTION INTRAVENOUS at 11:58

## 2019-03-12 RX ADMIN — ENOXAPARIN SODIUM 40 MG: 40 INJECTION SUBCUTANEOUS at 10:19

## 2019-03-12 RX ADMIN — ONDANSETRON 4 MG: 2 INJECTION INTRAMUSCULAR; INTRAVENOUS at 00:30

## 2019-03-12 RX ADMIN — VENLAFAXINE HYDROCHLORIDE 37.5 MG: 37.5 CAPSULE, EXTENDED RELEASE ORAL at 08:55

## 2019-03-12 RX ADMIN — PANTOPRAZOLE SODIUM 40 MG: 40 INJECTION, POWDER, FOR SOLUTION INTRAVENOUS at 08:55

## 2019-03-12 RX ADMIN — HYDROMORPHONE HYDROCHLORIDE 0.5 MG: 1 INJECTION, SOLUTION INTRAMUSCULAR; INTRAVENOUS; SUBCUTANEOUS at 13:06

## 2019-03-12 RX ADMIN — METOPROLOL TARTRATE 5 MG: 5 INJECTION, SOLUTION INTRAVENOUS at 14:16

## 2019-03-12 RX ADMIN — METOCLOPRAMIDE 10 MG: 5 INJECTION, SOLUTION INTRAMUSCULAR; INTRAVENOUS at 23:23

## 2019-03-12 RX ADMIN — OXYCODONE HYDROCHLORIDE 5 MG: 5 SOLUTION ORAL at 09:21

## 2019-03-12 RX ADMIN — POTASSIUM CHLORIDE 20 MEQ: 200 INJECTION, SOLUTION INTRAVENOUS at 08:59

## 2019-03-12 RX ADMIN — SIMETHICONE CHEW TAB 80 MG 80 MG: 80 TABLET ORAL at 17:53

## 2019-03-12 RX ADMIN — HYDROMORPHONE HYDROCHLORIDE 0.5 MG: 1 INJECTION, SOLUTION INTRAMUSCULAR; INTRAVENOUS; SUBCUTANEOUS at 20:59

## 2019-03-12 RX ADMIN — LORAZEPAM 0.5 MG: 2 INJECTION INTRAMUSCULAR; INTRAVENOUS at 17:57

## 2019-03-12 RX ADMIN — ONDANSETRON 4 MG: 2 INJECTION INTRAMUSCULAR; INTRAVENOUS at 20:57

## 2019-03-12 RX ADMIN — OXYCODONE HYDROCHLORIDE 5 MG: 5 SOLUTION ORAL at 03:07

## 2019-03-12 RX ADMIN — PANTOPRAZOLE SODIUM 40 MG: 40 INJECTION, POWDER, FOR SOLUTION INTRAVENOUS at 20:57

## 2019-03-12 RX ADMIN — METOPROLOL TARTRATE 5 MG: 5 INJECTION, SOLUTION INTRAVENOUS at 05:53

## 2019-03-12 RX ADMIN — METOPROLOL TARTRATE 5 MG: 5 INJECTION, SOLUTION INTRAVENOUS at 22:50

## 2019-03-12 RX ADMIN — LORAZEPAM 0.5 MG: 2 INJECTION INTRAMUSCULAR; INTRAVENOUS at 05:55

## 2019-03-12 RX ADMIN — HYDROMORPHONE HYDROCHLORIDE 1 MG: 1 INJECTION, SOLUTION INTRAMUSCULAR; INTRAVENOUS; SUBCUTANEOUS at 00:30

## 2019-03-12 NOTE — PLAN OF CARE
Problem: DISCHARGE PLANNING - CARE MANAGEMENT  Goal: Discharge to post-acute care or home with appropriate resources  Description  INTERVENTIONS:  - Conduct assessment to determine patient/family and health care team treatment goals, and need for post-acute services based on payer coverage, community resources, and patient preferences, and barriers to discharge  - Address psychosocial, clinical, and financial barriers to discharge as identified in assessment in conjunction with the patient/family and health care team  - Arrange appropriate level of post-acute services according to patient?s   needs and preference and payer coverage in collaboration with the physician and health care team  - Communicate with and update the patient/family, physician, and health care team regarding progress on the discharge plan  - Arrange appropriate transportation to post-acute venues  Outcome: Progressing     Problem: Prexisting or High Potential for Compromised Skin Integrity  Goal: Skin integrity is maintained or improved  Description  INTERVENTIONS:  - Identify patients at risk for skin breakdown  - Assess and monitor skin integrity  - Assess and monitor nutrition and hydration status  - Monitor labs (i e  albumin)  - Assess for incontinence   - Turn and reposition patient  - Assist with mobility/ambulation  - Relieve pressure over bony prominences  - Avoid friction and shearing  - Provide appropriate hygiene as needed including keeping skin clean and dry  - Evaluate need for skin moisturizer/barrier cream  - Collaborate with interdisciplinary team (i e  Nutrition, Rehabilitation, etc )   - Patient/family teaching  Outcome: Progressing     Problem: Potential for Falls  Goal: Patient will remain free of falls  Description  INTERVENTIONS:  - Assess patient frequently for physical needs  -  Identify cognitive and physical deficits and behaviors that affect risk of falls    -  [place] fall precautions as indicated by assessment   - Educate patient/family on patient safety including physical limitations  - Instruct patient to call for assistance with activity based on assessment  - Modify environment to reduce risk of injury  - Consider OT/PT consult to assist with strengthening/mobility  Outcome: Progressing     Problem: PAIN - ADULT  Goal: Verbalizes/displays adequate comfort level or baseline comfort level  Description  Interventions:  - Encourage patient to monitor pain and request assistance  - Assess pain using appropriate pain scale  - Administer analgesics based on type and severity of pain and evaluate response  - Implement non-pharmacological measures as appropriate and evaluate response  - Consider cultural and social influences on pain and pain management  - Notify physician/advanced practitioner if interventions unsuccessful or patient reports new pain  Outcome: Progressing     Problem: INFECTION - ADULT  Goal: Absence or prevention of progression during hospitalization  Description  INTERVENTIONS:  - Assess and monitor for signs and symptoms of infection  - Monitor lab/diagnostic results  - Monitor all insertion sites, i e  indwelling lines, tubes, and drains  - Monitor endotracheal (as able) and nasal secretions for changes in amount and color  - [place] appropriate cooling/warming therapies per order  - Administer medications as ordered  - Instruct and encourage patient and family to use good hand hygiene technique  - Identify and instruct in appropriate isolation precautions for identified infection/condition  Outcome: Progressing  Goal: Absence of fever/infection during neutropenic period  Description  INTERVENTIONS:  - Monitor WBC  - Implement neutropenic guidelines  Outcome: Progressing     Problem: SAFETY ADULT  Goal: Patient will remain free of falls  Description  INTERVENTIONS:  - Assess patient frequently for physical needs  -  Identify cognitive and physical deficits and behaviors that affect risk of falls   -  [place] fall precautions as indicated by assessment   - Educate patient/family on patient safety including physical limitations  - Instruct patient to call for assistance with activity based on assessment  - Modify environment to reduce risk of injury  - Consider OT/PT consult to assist with strengthening/mobility  Outcome: Progressing  Goal: Maintain or return to baseline ADL function  Description  INTERVENTIONS:  -  Assess patient's ability to carry out ADLs; assess patient's baseline for ADL function and identify physical deficits which impact ability to perform ADLs (bathing, care of mouth/teeth, toileting, grooming, dressing, etc )  - Assess/evaluate cause of self-care deficits   - Assess range of motion  - Assess patient's mobility; develop plan if impaired  - Assess patient's need for assistive devices and provide as appropriate  - Encourage maximum independence but intervene and supervise when necessary  ¯ Involve family in performance of ADLs  ¯ Assess for home care needs following discharge   ¯ Request OT consult to assist with ADL evaluation and planning for discharge  ¯ Provide patient education as appropriate  Outcome: Progressing  Goal: Maintain or return mobility status to optimal level  Description  INTERVENTIONS:  - Assess patient's baseline mobility status (ambulation, transfers, stairs, etc )    - Identify cognitive and physical deficits and behaviors that affect mobility  - Identify mobility aids required to assist with transfers and/or ambulation (gait belt, sit-to-stand, lift, walker, cane, etc )  - [place] fall precautions as indicated by assessment  - Record patient progress and toleration of activity level on Mobility SBAR; progress patient to next Phase/Stage  - Instruct patient to call for assistance with activity based on assessment  - Request Rehabilitation consult to assist with strengthening/weightbearing, etc   Outcome: Progressing     Problem: DISCHARGE PLANNING  Goal: Discharge to home or other facility with appropriate resources  Description  INTERVENTIONS:  - Identify barriers to discharge w/patient and caregiver  - Arrange for needed discharge resources and transportation as appropriate  - Identify discharge learning needs (meds, wound care, etc )  - Arrange for interpretive services to assist at discharge as needed  - Refer to Case Management Department for coordinating discharge planning if the patient needs post-hospital services based on physician/advanced practitioner order or complex needs related to functional status, cognitive ability, or social support system  Outcome: Progressing     Problem: Knowledge Deficit  Goal: Patient/family/caregiver demonstrates understanding of disease process, treatment plan, medications, and discharge instructions  Description  Complete learning assessment and assess knowledge base    Interventions:  - Provide teaching at level of understanding  - Provide teaching via preferred learning methods  Outcome: Progressing

## 2019-03-12 NOTE — UTILIZATION REVIEW
Fortino Estrada RN   Registered Nurse   Utilization Review   Utilization Review   Signed   Date of Service:  2/26/2019  9:22 AM               Signed                 Show:Clear all  [x]Manual[x]Template[x]Copied    Added by:  [x]Clarissa Martinez RN      [][place] for details      Initial Clinical Review     Admission: Date/Time/Statement: 2/25/19 @ 1525         Orders Placed This Encounter   Procedures    Inpatient Admission       Standing Status:   Standing       Number of Occurrences:   1       Order Specific Question:   Admitting Physician       Answer:   Haim Durham       Order Specific Question:   Level of Care       Answer:   Med Surg [16]       Order Specific Question:   Estimated length of stay       Answer:   More than 2 Midnights       Order Specific Question:   Certification       Answer:   I certify that inpatient services are medically necessary for this patient for a duration of greater than two midnights  See H&P and MD Progress Notes for additional information about the patient's course of treatment       ED: Date/Time/Mode of Arrival:             ED Arrival Information      Expected Arrival Acuity Means of Arrival Escorted By Service Admission Type     - 2/25/2019 11:38 Emergent Wheelchair Self Bariatrics Emergency     Arrival Complaint     chest pain           Chief Complaint:        Chief Complaint   Patient presents with    Abdominal Pain       pt states to have been discharged from hospital last wednesday for abdominal surgery  pt c/o abdominal pain that is radiating towards left     Chest Pain       pt states to have new onset of chest pain that started this morning, also c/o SOB        History of Illness: 52 yo female to ED s/p lap GAURANG, resection of small bowel, and decompression of gastric remnant on 02/11/19 with recent readmission on 02/18/19 POD#11 for persistent abdominal pain and constipation  Was DC on 2/22 then began to develop fatigue, chills and sweats on Saturday night      PE : abd tenderness and guarding , distressed      ED Vital Signs:            ED Triage Vitals   Temperature Pulse Respirations Blood Pressure SpO2   02/25/19 1149 02/25/19 1147 02/25/19 1147 02/25/19 1147 02/25/19 1147   (!) 97 4 °F (36 3 °C) (!) 106 18 (!) 174/79 99 %       Temp Source Heart Rate Source Patient Position - Orthostatic VS BP Location FiO2 (%)   02/25/19 1149 02/25/19 1147 02/25/19 1147 02/25/19 1147 --   Axillary Monitor Lying Right arm         Pain Score           02/25/19 1147           Worst Possible Pain                Wt Readings from Last 1 Encounters:   02/25/19 115 kg (253 lb 8 5 oz)      Vital Signs (abnormal): wnl   Pertinent Labs/Diagnostic Test Results: K   3 3, an gap 14, alb  3 3, wbc 18 00  CT guided perc drainage catheter placement -     Impression: Successful placement of a 10 [place] all-purpose drainage catheter into the left upper quadrant perisplenic collection  150 cc of brown fluid was aspirated and a specimen sent to the laboratory as described  CT abd-     1  Small to moderate left-sided pleural effusion with adjacent left basilar compressive atelectasis  2  No evidence of pulmonary embolism  3  Increased intraperitoneal fluid identified in the left upper quadrant surrounding the spleen   No extraluminal air   Diminished ascites elsewhere in the abdomen  4  Postoperative changes identified   No evidence of bowel obstruction    5  Pancreatic neck/head cyst unchanged from 2018  6   Incidental left thyroid nodules identified on this CT      Regarding the left upper quadrant fluid, given its focal location and potential developing thin rim of reactive tissue, for example image 605/90, a developing subphrenic abscess should be excluded  CXR -     eft basilar opacity suggesting effusion and posterior lower lobe opacity          ED Treatment:               Medication Administration from 02/25/2019 1138 to 02/25/2019 1732        Date/Time Order Dose Route Action Action by Comments       02/25/2019 1325 HYDROmorphone (DILAUDID) injection 1 mg 1 mg Intravenous Given Javad Cowden, RN         02/25/2019 1347 iohexol (OMNIPAQUE) 350 MG/ML injection (MULTI-DOSE) 100 mL 100 mL Intravenous Given Sebastian Hale         02/25/2019 1522 metroNIDAZOLE (FLAGYL) IVPB (premix) 500 mg 500 mg Intravenous New Bag Terrance Cowden, RN         02/25/2019 1616 sodium chloride 0 9 % bolus 1,000 mL 0 mL Intravenous Stopped Javad Cowden, RN         02/25/2019 1520 sodium chloride 0 9 % bolus 1,000 mL 1,000 mL Intravenous New Bag Javad Cowden, RN verbal stat       02/25/2019 1516 ketorolac (TORADOL) injection 30 mg 30 mg Intravenous Given Terrance Cowden, RN         02/25/2019 1650 midazolam (VERSED) injection 1 mg Intravenous Given Belgica Day, RN         02/25/2019 1639 midazolam (VERSED) injection 1 mg Intravenous Given [place] Day, RN         02/25/2019 1659 fentanyl citrate (PF) 100 MCG/2ML 25 mcg Intravenous Given Stormy Mis, RN         02/25/2019 1650 fentanyl citrate (PF) 100 MCG/2ML 50 mcg Intravenous Given Belg[place] Day, RN         02/25/2019 1639 fentanyl citrate (PF) 100 MCG/2ML 50 mcg Intravenous Given Belgica Day, RN         02/25/2019 1656 diphenhydrAMINE (BENADRYL) injection 25 mg Intravenous Given Belgica Day, RN         02/25/2019 1651 diphenhydrAMINE (BENADRYL) injection 25 mg Intravenous Given Belgica Day, RN         02/25/2019 1700 lidocaine (XYLOCAINE) 1 % injection 5 mL Infiltration Given Nanda Huitron MD Left side of abd          Past Medical/Surgical History:    Active Ambulatory Problems     Diagnosis Date Noted    Upper GI bleed 03/20/2018    Gastrointestinal hemorrhage associated with gastric ulcer 03/23/2018    History of gastric ulcer 01/24/2019    Epigastric pain 01/24/2019    Pancreatic cyst 01/24/2019    Abnormal CT scan, colon 01/24/2019    Bilious vomiting with nausea 01/24/2019    Gastrointestinal hemorrhage 01/24/2019    Abnormal CT of the abdomen 01/24/2019    Abdominal pain, LUQ 02/01/2019    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    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    Acute gastrointestinal hemorrhage 02/06/2019    Acute primary diaphragmitis 02/06/2019    Cyst of pancreas 02/06/2019    Generalized abdominal pain 02/18/2019           Past Medical History:   Diagnosis Date    Bell palsy      Gastric ulcer      GERD (gastroesophageal reflux disease)      History of transfusion      Melena      Pancreatic cyst      Right facial numbness      Upper GI bleed        Admitting Diagnosis: Chest pain [R07 9]  Abdominal pain [R10 9]  Pleural effusion [J90]  Generalized abdominal pain [R10 84]  Age/Sex: 51 y o  female  Assessment/Plan: 52 yo female admitted with persistent abdominal pain, weakness, SOB, chills, persistent elevated WBC   CT shows "increased intraperitoneal fluid identified in the left upper quadrant surrounding the spleen " Possible developing pneumonia    Will admit to bariatric sx give 2L bolus IVF , iv pain control , zofran , iv protonix , iv flagyl and levaquin , f/u bld cx and trend wbc, replete K , IR consult for drain placement for perisplenic intraperitoneal fluid       Admission Orders:  Scheduled Meds:   Current Facility-Administered Medications:  acetaminophen 975 mg Oral Q6H [address]       enoxaparin 40 mg Subcutaneous Q24H [address]       ketorolac 30 mg Intravenous Q6H [address]       lactated ringers 125 mL/hr Intravenous Continuous   Last Rate: 125 mL/hr (02/26/19 9387)   levofloxacin 750 mg Intravenous Q24H   Last Rate: 750 mg (02/25/19 2009)   metroNIDAZOLE 500 mg Intravenous Q8H   Last Rate: 500 mg (02/26/19 0135)   oxyCODONE 10 mg Oral Q4H PRN       oxyCODONE 5 mg Oral Q4H PRN          Tele   I&O   Change tube dressing QD prn   Bedrest   Flush tube w/ 10 ml NS qd  Clear liq diet   2/26  Bmp, cbc , mg   CT guided per drainage catheter 2/25  Impression: Successful placement of a 10 [place] all-purpose drainage catheter into the left upper quadrant perisplenic collection   150 cc of brown fluid was aspirated and a specimen sent to the laboratory as described

## 2019-03-12 NOTE — PROGRESS NOTES
[facility] Progress Note  Patient: Poncho Avendaño 51 y o  female   MRN: [id number]  PCP: Reyna Ramirez  Unit/Bed#: -01 Encounter: [id number]  Date Of Visit: 19    Assessment:    Principal Problem:    Gastric outflow obstruction  Active Problems:    Abdominal pain    Essential hypertension    Tachycardia      Plan:    · 1  S/p laprascopic remnant gastrectomy, transverse/descending colon resection and conversion to ex lap for completion of colocolostomy- POD#5  Bariatric surgery following  Continue pain control  Patient diet advanced  · 2  Anemia- stable  Mostlikely postop related  · 3  LEANA- resolved       VTE Pharmacologic Prophylaxis:   Pharmacologic: Enoxaparin (Lovenox)  Mechanical VTE Prophylaxis in Place: Yes    Patient Centered Rounds: I have performed bedside rounds with nursing staff today  Discussions with Specialists or Other Care Team Provider:     Education and Discussions with Family / Patient:     Time Spent for Care: 20 minutes  More than 50% of total time spent on counseling and coordination of care as described above  Current Length of Stay: 5 day(s)    Current Patient Status: Inpatient   Certification Statement: The patient will continue to require additional inpatient hospital stay due to abdominal pain    Discharge Plan / Estimated Discharge Date: once surgery clears    Code Status: Level 1 - Full Code      Subjective:   Patient seen and examined at bedside  Patient has no new complaints  Objective:     Vitals:   Temp (24hrs), Av °F (36 7 °C), Min:97 6 °F (36 4 °C), Max:98 1 °F (36 7 °C)    Temp:  [97 6 °F (36 4 °C)-98 1 °F (36 7 °C)] 97 9 °F (36 6 °C)  HR:  [73-86] 76  Resp:  [16-19] 19  BP: (151-170)/(78-88) 161/83  SpO2:  [93 %-97 %] 97 %  Body mass index is 40 3 kg/m²  Input and Output Summary (last 24 hours):        Intake/Output Summary (Last 24 hours) at 3/12/2019 1642  Last data filed at 3/12/2019 1349  Gross per 24 hour   Intake 1258 33 ml   Output 2850 ml   Net -1591 67 ml       Physical Exam:     Physical Exam   Constitutional: She is oriented to person, place, and time  She appears well-developed and well-nourished  HENT:   Head: Normocephalic and atraumatic  Eyes: Pupils are equal, round, and reactive to light  EOM are normal    Neck: Normal range of motion  Neck supple  No JVD present  No tracheal deviation present  No thyromegaly present  Cardiovascular: Normal rate, regular rhythm and normal heart sounds  Exam reveals no gallop and no friction rub  No murmur heard  Pulmonary/Chest: Effort normal and breath sounds normal  No stridor  No respiratory distress  She has no wheezes  Abdominal: She exhibits distension  There is tenderness  Musculoskeletal: Normal range of motion  She exhibits no edema  Neurological: She is alert and oriented to person, place, and time  Skin: Skin is warm and dry  Vitals reviewed  Additional Data:     Labs:    Results from last 7 days   Lab Units 03/12/19  0437   WBC Thousand/uL 7 77   HEMOGLOBIN g/dL 10 2*   HEMATOCRIT % 31 7*   PLATELETS Thousands/uL 440*   NEUTROS PCT % 73   LYMPHS PCT % 10*   MONOS PCT % 8   EOS PCT % 7*     Results from last 7 days   Lab Units 03/12/19  0437  03/08/19  0510   POTASSIUM mmol/L 3 6   < > 4 2   CHLORIDE mmol/L 100   < > 101   CO2 mmol/L 30   < > 27   BUN mg/dL 4*   < > 12   CREATININE mg/dL 0 60   < > 0 81   CALCIUM mg/dL 8 9   < > 9 2   ALK PHOS U/L  --   --  49   ALT U/L  --   --  10*   AST U/L  --   --  12    < > = values in this interval not displayed  * I Have Reviewed All Lab Data Listed Above  * Additional Pertinent Lab Tests Reviewed:  [facility] Admission Reviewed    Imaging:    Imaging Reports Reviewed Today Include:   Imaging Personally Reviewed by Myself Includes:      Recent Cultures (last 7 days):           Last 24 Hours Medication List:     Current Facility-Administered Medications:  dextrose 5 % and sodium chloride 0 9 % with KCl 20 mEq/L 50 mL/hr Intravenous Continuous Kim Breen MD Last Rate: 50 mL/hr (03/12/19 0758)   enoxaparin 40 mg Subcutaneous Q24H [address] Kim Breen MD    HYDROmorphone 0 5 mg Intravenous Q4H PRN Kim Breen MD    iohexol 50 mL Intravenous Once in imaging Kay Mack, GREGORY    LORazepam 0 5 mg Intravenous Q6H PRN Kay Mack, FÁTIMANP    metoclopramide 10 mg Intravenous Q6H PRN Tiffanie Green MD    metoprolol 5 mg Intravenous Q8H Jigna Clayton, GREGORY    naloxone 0 04 mg Intravenous Q3 min PRN Kay Mack, GREGORY    ondansetron 4 mg Intravenous Q4H PRN Kay Mack, GREGORY    oxyCODONE 10 mg Oral Q4H PRN Torrie MayaLISA dominique-MICHAEL    oxyCODONE 5 mg Oral Q4H PRN Torrie Maya, PA-C    pantoprazole 40 mg Intravenous Q12H [address] Jigna Clayton, GREGORY    promethazine 25 mg Intramuscular Q6H PRN Kay Mack, GREGORY    simethicone 80 mg Oral Q6H PRN Kay Mack, GREGORY    thiamine 100 mg Oral Daily Jigna Clayton, GREGORY    venlafaxine 37 5 mg Oral Daily Tiffanie Green MD         Today, Patient Was Seen By: Dania Darling MD    ** Please Note: This note has been constructed using a voice recognition system   **

## 2019-03-12 NOTE — PROGRESS NOTES
Progress Note - Bariatric Surgery   Perla Vicente 51 y o  female MRN: [id number]  Unit/Bed#: -01 Encounter: [id number]    Assessment:  48/F POD 5 s/p laparoscopic remnant gastrectomy, transverse/descending colon resection and conversion to ex lap for completion of colocolostomy  Continues to have bowel function  Pain controlled with oral and IV analgesics     Plan:  Advance diet to surg soft/lite meal, low residue/low fiber   Pain control   OOB/ambulating halls   Cont maintenance IVF   IS (improving)  DVT ppx (H/H stabilized will start lovenox; Cont SCDs)  Replete K  Dispo planning     Subjective/Objective     Subjective: Pain is controlled with oral/IV analgesics; Complains of gas with protein supplement and believes some of her abdominal pain is the result of hunger; continues to pass flatus and had BMs yesterday; is tolerating full liquids diet    Objective: Looks well    Blood pressure 151/78, pulse 85, temperature 98 °F (36 7 °C), temperature source Oral, resp  rate 18, height 5' 9" (1 753 m), weight 124 kg (272 lb 14 9 oz), SpO2 93 %, not currently breastfeeding  ,Body mass index is 40 3 kg/m²        Intake/Output Summary (Last 24 hours) at 3/12/2019 0916  Last data filed at 3/12/2019 0758  Gross per 24 hour   Intake 2537 49 ml   Output 3050 ml   Net -512 51 ml       Invasive Devices     Peripheral Intravenous Line            Peripheral IV 03/10/19 Distal;Left Forearm 1 day          Drain            Closed/Suction Drain Left LLQ Bulb 19 Fr  5 days    Closed/Suction Drain Right Abdomen Bulb 19 Fr  5 days                Physical Exam:     No distress   RRR  Breathing non labored (around 1250 IS)  Abdomen appropriately tender; soft, ND, wounds C/D/I without erythema      Lab, Imaging and other studies:  CBC:   Lab Results   Component Value Date    WBC 7 77 03/12/2019    HGB 10 2 (L) 03/12/2019    HCT 31 7 (L) 03/12/2019    MCV 85 03/12/2019     (H) 03/12/2019    MCH 27 3 03/12/2019    MCHC 32 2 03/12/2019    RDW 14 7 03/12/2019    MPV 10 0 03/12/2019    NRBC 0 03/12/2019   , CMP:   Lab Results   Component Value Date    SODIUM 137 03/12/2019    K 3 6 03/12/2019     03/12/2019    CO2 30 03/12/2019    BUN 4 (L) 03/12/2019    CREATININE 0 60 03/12/2019    CALCIUM 8 9 03/12/2019    EGFR 107 03/12/2019       VTE Pharmacologic Prophylaxis: Enoxaparin (Lovenox)  VTE Mechanical Prophylaxis: sequential compression device

## 2019-03-12 NOTE — PLAN OF CARE
Problem: DISCHARGE PLANNING - CARE MANAGEMENT  Goal: Discharge to post-acute care or home with appropriate resources  Description  INTERVENTIONS:  - Conduct assessment to determine patient/family and health care team treatment goals, and need for post-acute services based on payer coverage, community resources, and patient preferences, and barriers to discharge  - Address psychosocial, clinical, and financial barriers to discharge as identified in assessment in conjunction with the patient/family and health care team  - Arrange appropriate level of post-acute services according to patient?s   needs and preference and payer coverage in collaboration with the physician and health care team  - Communicate with and update the patient/family, physician, and health care team regarding progress on the discharge plan  - Arrange appropriate transportation to post-acute venues  Outcome: Progressing     Problem: Prexisting or High Potential for Compromised Skin Integrity  Goal: Skin integrity is maintained or improved  Description  INTERVENTIONS:  - Identify patients at risk for skin breakdown  - Assess and monitor skin integrity  - Assess and monitor nutrition and hydration status  - Monitor labs (i e  albumin)  - Assess for incontinence   - Turn and reposition patient  - Assist with mobility/ambulation  - Relieve pressure over bony prominences  - Avoid friction and shearing  - Provide appropriate hygiene as needed including keeping skin clean and dry  - Evaluate need for skin moisturizer/barrier cream  - Collaborate with interdisciplinary team (i e  Nutrition, Rehabilitation, etc )   - Patient/family teaching  Outcome: Progressing     Problem: Potential for Falls  Goal: Patient will remain free of falls  Description  INTERVENTIONS:  - Assess patient frequently for physical needs  -  Identify cognitive and physical deficits and behaviors that affect risk of falls    -  [place] fall precautions as indicated by assessment   - Educate patient/family on patient safety including physical limitations  - Instruct patient to call for assistance with activity based on assessment  - Modify environment to reduce risk of injury  - Consider OT/PT consult to assist with strengthening/mobility  Outcome: Progressing     Problem: PAIN - ADULT  Goal: Verbalizes/displays adequate comfort level or baseline comfort level  Description  Interventions:  - Encourage patient to monitor pain and request assistance  - Assess pain using appropriate pain scale  - Administer analgesics based on type and severity of pain and evaluate response  - Implement non-pharmacological measures as appropriate and evaluate response  - Consider cultural and social influences on pain and pain management  - Notify physician/advanced practitioner if interventions unsuccessful or patient reports new pain  Outcome: Progressing     Problem: INFECTION - ADULT  Goal: Absence or prevention of progression during hospitalization  Description  INTERVENTIONS:  - Assess and monitor for signs and symptoms of infection  - Monitor lab/diagnostic results  - Monitor all insertion sites, i e  indwelling lines, tubes, and drains  - Monitor endotracheal (as able) and nasal secretions for changes in amount and color  - [place] appropriate cooling/warming therapies per order  - Administer medications as ordered  - Instruct and encourage patient and family to use good hand hygiene technique  - Identify and instruct in appropriate isolation precautions for identified infection/condition  Outcome: Progressing  Goal: Absence of fever/infection during neutropenic period  Description  INTERVENTIONS:  - Monitor WBC  - Implement neutropenic guidelines  Outcome: Progressing     Problem: SAFETY ADULT  Goal: Patient will remain free of falls  Description  INTERVENTIONS:  - Assess patient frequently for physical needs  -  Identify cognitive and physical deficits and behaviors that affect risk of falls   -  [place] fall precautions as indicated by assessment   - Educate patient/family on patient safety including physical limitations  - Instruct patient to call for assistance with activity based on assessment  - Modify environment to reduce risk of injury  - Consider OT/PT consult to assist with strengthening/mobility  Outcome: Progressing  Goal: Maintain or return to baseline ADL function  Description  INTERVENTIONS:  -  Assess patient's ability to carry out ADLs; assess patient's baseline for ADL function and identify physical deficits which impact ability to perform ADLs (bathing, care of mouth/teeth, toileting, grooming, dressing, etc )  - Assess/evaluate cause of self-care deficits   - Assess range of motion  - Assess patient's mobility; develop plan if impaired  - Assess patient's need for assistive devices and provide as appropriate  - Encourage maximum independence but intervene and supervise when necessary  ¯ Involve family in performance of ADLs  ¯ Assess for home care needs following discharge   ¯ Request OT consult to assist with ADL evaluation and planning for discharge  ¯ Provide patient education as appropriate  Outcome: Progressing  Goal: Maintain or return mobility status to optimal level  Description  INTERVENTIONS:  - Assess patient's baseline mobility status (ambulation, transfers, stairs, etc )    - Identify cognitive and physical deficits and behaviors that affect mobility  - Identify mobility aids required to assist with transfers and/or ambulation (gait belt, sit-to-stand, lift, walker, cane, etc )  - [place] fall precautions as indicated by assessment  - Record patient progress and toleration of activity level on Mobility SBAR; progress patient to next Phase/Stage  - Instruct patient to call for assistance with activity based on assessment  - Request Rehabilitation consult to assist with strengthening/weightbearing, etc   Outcome: Progressing     Problem: DISCHARGE PLANNING  Goal: Discharge to home or other facility with appropriate resources  Description  INTERVENTIONS:  - Identify barriers to discharge w/patient and caregiver  - Arrange for needed discharge resources and transportation as appropriate  - Identify discharge learning needs (meds, wound care, etc )  - Arrange for interpretive services to assist at discharge as needed  - Refer to Case Management Department for coordinating discharge planning if the patient needs post-hospital services based on physician/advanced practitioner order or complex needs related to functional status, cognitive ability, or social support system  Outcome: Progressing     Problem: Knowledge Deficit  Goal: Patient/family/caregiver demonstrates understanding of disease process, treatment plan, medications, and discharge instructions  Description  Complete learning assessment and assess knowledge base  Interventions:  - Provide teaching at level of understanding  - Provide teaching via preferred learning methods  Outcome: Progressing     Problem: Nutrition/Hydration-ADULT  Goal: Nutrient/Hydration intake appropriate for improving, restoring or maintaining nutritional needs  Description  Monitor and assess patient's nutrition/hydration status for malnutrition (ex- brittle hair, bruises, dry skin, pale skin and conjunctiva, muscle wasting, smooth red tongue, and disorientation)  Collaborate with interdisciplinary team and initiate plan and interventions as ordered  Monitor patient's weight and dietary intake as ordered or per policy  Utilize nutrition screening tool and intervene per policy  Determine patient's food preferences and provide high-protein, high-caloric foods as appropriate       INTERVENTIONS:  - Monitor oral intake, urinary output, labs, and treatment plans  - Assess nutrition and hydration status and recommend course of action  - Evaluate amount of meals eaten  - Assist patient with eating if necessary   - Allow adequate time for meals  - Recommend/ encourage appropriate diets, oral nutritional supplements, and vitamin/mineral supplements  - Order, calculate, and assess calorie counts as needed  - Assess need for intravenous fluids  - Provide nutrition/hydration education as appropriate  - Include patient/family/caregiver in decisions related to nutrition   Outcome: Progressing

## 2019-03-13 LAB
ANION GAP SERPL CALCULATED.3IONS-SCNC: 8 MMOL/L (ref 4–13)
BASOPHILS # BLD MANUAL: 0 THOUSAND/UL (ref 0–0.1)
BASOPHILS NFR MAR MANUAL: 0 % (ref 0–1)
BUN SERPL-MCNC: 5 MG/DL (ref 5–25)
CALCIUM SERPL-MCNC: 8.9 MG/DL (ref 8.3–10.1)
CHLORIDE SERPL-SCNC: 100 MMOL/L (ref 100–108)
CO2 SERPL-SCNC: 31 MMOL/L (ref 21–32)
CREAT SERPL-MCNC: 0.69 MG/DL (ref 0.6–1.3)
CRP SERPL QL: 70.6 MG/L
CRP SERPL QL: 71.6 MG/L
EOSINOPHIL # BLD MANUAL: 0.63 THOUSAND/UL (ref 0–0.4)
EOSINOPHIL NFR BLD MANUAL: 8 % (ref 0–6)
ERYTHROCYTE [DISTWIDTH] IN BLOOD BY AUTOMATED COUNT: 14.7 % (ref 11.6–15.1)
GFR SERPL CREATININE-BSD FRML MDRD: 103 ML/MIN/1.73SQ M
GLUCOSE SERPL-MCNC: 115 MG/DL (ref 65–140)
HCT VFR BLD AUTO: 32 % (ref 34.8–46.1)
HGB BLD-MCNC: 10.4 G/DL (ref 11.5–15.4)
LYMPHOCYTES # BLD AUTO: 1.02 THOUSAND/UL (ref 0.6–4.47)
LYMPHOCYTES # BLD AUTO: 13 % (ref 14–44)
MCH RBC QN AUTO: 27.5 PG (ref 26.8–34.3)
MCHC RBC AUTO-ENTMCNC: 32.5 G/DL (ref 31.4–37.4)
MCV RBC AUTO: 85 FL (ref 82–98)
MONOCYTES # BLD AUTO: 0.63 THOUSAND/UL (ref 0–1.22)
MONOCYTES NFR BLD: 8 % (ref 4–12)
MYELOCYTES NFR BLD MANUAL: 1 % (ref 0–1)
NEUTROPHILS # BLD MANUAL: 5.47 THOUSAND/UL (ref 1.85–7.62)
NEUTS BAND NFR BLD MANUAL: 1 % (ref 0–8)
NEUTS SEG NFR BLD AUTO: 69 % (ref 43–75)
NRBC BLD AUTO-RTO: 0 /100 WBCS
PLATELET # BLD AUTO: 457 THOUSANDS/UL (ref 149–390)
PLATELET BLD QL SMEAR: ABNORMAL
PMV BLD AUTO: 9.6 FL (ref 8.9–12.7)
POTASSIUM SERPL-SCNC: 3.9 MMOL/L (ref 3.5–5.3)
RBC # BLD AUTO: 3.78 MILLION/UL (ref 3.81–5.12)
RBC MORPH BLD: NORMAL
SODIUM SERPL-SCNC: 139 MMOL/L (ref 136–145)
TOTAL CELLS COUNTED SPEC: 100
WBC # BLD AUTO: 7.82 THOUSAND/UL (ref 4.31–10.16)

## 2019-03-13 PROCEDURE — 85027 COMPLETE CBC AUTOMATED: CPT | Performed by: SURGERY

## 2019-03-13 PROCEDURE — 99232 SBSQ HOSP IP/OBS MODERATE 35: CPT | Performed by: INTERNAL MEDICINE

## 2019-03-13 PROCEDURE — 86140 C-REACTIVE PROTEIN: CPT | Performed by: SURGERY

## 2019-03-13 PROCEDURE — 85007 BL SMEAR W/DIFF WBC COUNT: CPT | Performed by: SURGERY

## 2019-03-13 PROCEDURE — 80048 BASIC METABOLIC PNL TOTAL CA: CPT | Performed by: SURGERY

## 2019-03-13 PROCEDURE — 86140 C-REACTIVE PROTEIN: CPT | Performed by: NURSE PRACTITIONER

## 2019-03-13 PROCEDURE — 99024 POSTOP FOLLOW-UP VISIT: CPT | Performed by: SURGERY

## 2019-03-13 PROCEDURE — C9113 INJ PANTOPRAZOLE SODIUM, VIA: HCPCS | Performed by: NURSE PRACTITIONER

## 2019-03-13 PROCEDURE — 87493 C DIFF AMPLIFIED PROBE: CPT | Performed by: SURGERY

## 2019-03-13 RX ORDER — METOPROLOL TARTRATE 50 MG/1
50 TABLET, FILM COATED ORAL EVERY 12 HOURS SCHEDULED
Status: DISCONTINUED | OUTPATIENT
Start: 2019-03-13 | End: 2019-03-18 | Stop reason: HOSPADM

## 2019-03-13 RX ORDER — ACETAMINOPHEN 325 MG/1
975 TABLET ORAL EVERY 6 HOURS SCHEDULED
Status: DISCONTINUED | OUTPATIENT
Start: 2019-03-13 | End: 2019-03-16

## 2019-03-13 RX ORDER — METOCLOPRAMIDE HYDROCHLORIDE 5 MG/ML
5 INJECTION INTRAMUSCULAR; INTRAVENOUS EVERY 6 HOURS PRN
Status: DISCONTINUED | OUTPATIENT
Start: 2019-03-13 | End: 2019-03-18 | Stop reason: HOSPADM

## 2019-03-13 RX ORDER — LORAZEPAM 2 MG/ML
0.5 INJECTION INTRAMUSCULAR ONCE
Status: COMPLETED | OUTPATIENT
Start: 2019-03-13 | End: 2019-03-13

## 2019-03-13 RX ORDER — LANOLIN ALCOHOL/MO/W.PET/CERES
5 CREAM (GRAM) TOPICAL
Status: DISCONTINUED | OUTPATIENT
Start: 2019-03-13 | End: 2019-03-13

## 2019-03-13 RX ORDER — HYDROCHLOROTHIAZIDE 12.5 MG/1
12.5 TABLET ORAL DAILY
Status: DISCONTINUED | OUTPATIENT
Start: 2019-03-13 | End: 2019-03-18 | Stop reason: HOSPADM

## 2019-03-13 RX ORDER — SACCHAROMYCES BOULARDII 250 MG
250 CAPSULE ORAL 2 TIMES DAILY
Status: DISCONTINUED | OUTPATIENT
Start: 2019-03-13 | End: 2019-03-18 | Stop reason: HOSPADM

## 2019-03-13 RX ORDER — OXYCODONE HYDROCHLORIDE 10 MG/1
10 TABLET ORAL EVERY 4 HOURS PRN
Status: DISCONTINUED | OUTPATIENT
Start: 2019-03-13 | End: 2019-03-18 | Stop reason: HOSPADM

## 2019-03-13 RX ORDER — LANOLIN ALCOHOL/MO/W.PET/CERES
6 CREAM (GRAM) TOPICAL
Status: DISCONTINUED | OUTPATIENT
Start: 2019-03-13 | End: 2019-03-18 | Stop reason: HOSPADM

## 2019-03-13 RX ORDER — HYDRALAZINE HYDROCHLORIDE 20 MG/ML
10 INJECTION INTRAMUSCULAR; INTRAVENOUS EVERY 6 HOURS PRN
Status: DISCONTINUED | OUTPATIENT
Start: 2019-03-13 | End: 2019-03-18 | Stop reason: HOSPADM

## 2019-03-13 RX ORDER — OXYCODONE HYDROCHLORIDE 5 MG/1
5 TABLET ORAL EVERY 4 HOURS PRN
Status: DISCONTINUED | OUTPATIENT
Start: 2019-03-13 | End: 2019-03-18 | Stop reason: HOSPADM

## 2019-03-13 RX ADMIN — HYDROCHLOROTHIAZIDE 12.5 MG: 12.5 TABLET ORAL at 09:23

## 2019-03-13 RX ADMIN — DEXTROSE, SODIUM CHLORIDE, AND POTASSIUM CHLORIDE 50 ML/HR: 5; .9; .15 INJECTION INTRAVENOUS at 01:51

## 2019-03-13 RX ADMIN — Medication 250 MG: at 19:16

## 2019-03-13 RX ADMIN — LORAZEPAM 0.5 MG: 2 INJECTION INTRAMUSCULAR; INTRAVENOUS at 09:36

## 2019-03-13 RX ADMIN — ONDANSETRON 4 MG: 2 INJECTION INTRAMUSCULAR; INTRAVENOUS at 13:52

## 2019-03-13 RX ADMIN — LORAZEPAM 0.5 MG: 2 INJECTION INTRAMUSCULAR; INTRAVENOUS at 01:51

## 2019-03-13 RX ADMIN — VENLAFAXINE HYDROCHLORIDE 37.5 MG: 37.5 CAPSULE, EXTENDED RELEASE ORAL at 09:22

## 2019-03-13 RX ADMIN — METOCLOPRAMIDE 10 MG: 5 INJECTION, SOLUTION INTRAMUSCULAR; INTRAVENOUS at 06:52

## 2019-03-13 RX ADMIN — METOPROLOL TARTRATE 50 MG: 50 TABLET, FILM COATED ORAL at 20:34

## 2019-03-13 RX ADMIN — PANTOPRAZOLE SODIUM 40 MG: 40 INJECTION, POWDER, FOR SOLUTION INTRAVENOUS at 20:33

## 2019-03-13 RX ADMIN — DEXTROSE, SODIUM CHLORIDE, AND POTASSIUM CHLORIDE 50 ML/HR: 5; .9; .15 INJECTION INTRAVENOUS at 20:35

## 2019-03-13 RX ADMIN — METOPROLOL TARTRATE 50 MG: 50 TABLET, FILM COATED ORAL at 09:22

## 2019-03-13 RX ADMIN — MELATONIN 6 MG: 3 TAB ORAL at 22:11

## 2019-03-13 RX ADMIN — OXYCODONE HYDROCHLORIDE 5 MG: 5 SOLUTION ORAL at 06:37

## 2019-03-13 RX ADMIN — HYDROMORPHONE HYDROCHLORIDE 0.5 MG: 1 INJECTION, SOLUTION INTRAMUSCULAR; INTRAVENOUS; SUBCUTANEOUS at 20:45

## 2019-03-13 RX ADMIN — HYDROMORPHONE HYDROCHLORIDE 0.5 MG: 1 INJECTION, SOLUTION INTRAMUSCULAR; INTRAVENOUS; SUBCUTANEOUS at 01:51

## 2019-03-13 RX ADMIN — LORAZEPAM 0.5 MG: 2 INJECTION INTRAMUSCULAR; INTRAVENOUS at 13:52

## 2019-03-13 RX ADMIN — ENOXAPARIN SODIUM 40 MG: 40 INJECTION SUBCUTANEOUS at 09:23

## 2019-03-13 RX ADMIN — ACETAMINOPHEN 975 MG: 325 TABLET, FILM COATED ORAL at 09:22

## 2019-03-13 RX ADMIN — PANTOPRAZOLE SODIUM 40 MG: 40 INJECTION, POWDER, FOR SOLUTION INTRAVENOUS at 09:23

## 2019-03-13 RX ADMIN — Medication 250 MG: at 09:22

## 2019-03-13 RX ADMIN — ONDANSETRON 4 MG: 2 INJECTION INTRAMUSCULAR; INTRAVENOUS at 19:16

## 2019-03-13 RX ADMIN — ONDANSETRON 4 MG: 2 INJECTION INTRAMUSCULAR; INTRAVENOUS at 01:51

## 2019-03-13 RX ADMIN — ACETAMINOPHEN 975 MG: 325 TABLET, FILM COATED ORAL at 13:52

## 2019-03-13 RX ADMIN — ONDANSETRON 4 MG: 2 INJECTION INTRAMUSCULAR; INTRAVENOUS at 09:23

## 2019-03-13 RX ADMIN — OXYCODONE HYDROCHLORIDE 10 MG: 10 TABLET ORAL at 19:16

## 2019-03-13 NOTE — PROGRESS NOTES
[facility] Progress Note  Patient: Barrett Goyal 51 y o  female   MRN: [id number]  PCP: Matthias Gambino  Unit/Bed#: -01 Encounter: [id number]  Date Of Visit: 19    Assessment:    Principal Problem:    Gastric outflow obstruction  Active Problems:    Abdominal pain    Essential hypertension    Tachycardia      Plan:    · 1  S/p laprascopic remnant gastrectomy, transverse/descending colon resection and conversion to ex lap for completion of colocolostomy- POD#6  Bariatric surgery following  Continue pain control  Patient diet advanced  · 2  HTN- patient HCTZ restarted  Metoprolol 50mg PO BID also added  BP better controlled  · 3  Anemia- stable  Mostlikely postop related  · 4  LEANA- resolved       VTE Pharmacologic Prophylaxis:   Pharmacologic: Enoxaparin (Lovenox)  Mechanical VTE Prophylaxis in Place: Yes    Patient Centered Rounds: I have performed bedside rounds with nursing staff today  Discussions with Specialists or Other Care Team Provider:     Education and Discussions with Family / Patient:     Time Spent for Care: 20 minutes  More than 50% of total time spent on counseling and coordination of care as described above  Current Length of Stay: 6 day(s)    Current Patient Status: Inpatient   Certification Statement: The patient will continue to require additional inpatient hospital stay due to abdominal pain    Discharge Plan / Estimated Discharge Date: once surgery clears    Code Status: Level 1 - Full Code      Subjective:   Patient seen and examined at bedside  Patient has no new complaints  Objective:     Vitals:   Temp (24hrs), Av 9 °F (36 6 °C), Min:97 6 °F (36 4 °C), Max:98 2 °F (36 8 °C)    Temp:  [97 6 °F (36 4 °C)-98 2 °F (36 8 °C)] 97 6 °F (36 4 °C)  HR:  [76-86] 78  Resp:  [18-19] 18  BP: (128-173)/(80-89) 128/86  SpO2:  [95 %-97 %] 97 %  Body mass index is 40 3 kg/m²  Input and Output Summary (last 24 hours):        Intake/Output Summary (Last 24 hours) at 3/13/2019 1323  Last data filed at 3/13/2019 0342  Gross per 24 hour   Intake 1074 17 ml   Output 3100 ml   Net -2025 83 ml       Physical Exam:     Physical Exam   Constitutional: She is oriented to person, place, and time  She appears well-developed and well-nourished  HENT:   Head: Normocephalic and atraumatic  Eyes: Pupils are equal, round, and reactive to light  EOM are normal    Neck: Normal range of motion  Neck supple  No JVD present  No tracheal deviation present  No thyromegaly present  Cardiovascular: Normal rate, regular rhythm and normal heart sounds  Exam reveals no gallop and no friction rub  No murmur heard  Pulmonary/Chest: Effort normal and breath sounds normal  No stridor  No respiratory distress  She has no wheezes  Abdominal: She exhibits distension  There is tenderness  Musculoskeletal: Normal range of motion  She exhibits no edema  Neurological: She is alert and oriented to person, place, and time  Skin: Skin is warm and dry  Vitals reviewed  Additional Data:     Labs:    Results from last 7 days   Lab Units 03/13/19  0500 03/12/19  0437   WBC Thousand/uL 7 82 7 77   HEMOGLOBIN g/dL 10 4* 10 2*   HEMATOCRIT % 32 0* 31 7*   PLATELETS Thousands/uL 457* 440*   NEUTROS PCT %  --  73   LYMPHS PCT %  --  10*   LYMPHO PCT % 13*  --    MONOS PCT %  --  8   MONO PCT % 8  --    EOS PCT % 8* 7*     Results from last 7 days   Lab Units 03/13/19  0500  03/08/19  0510   POTASSIUM mmol/L 3 9   < > 4 2   CHLORIDE mmol/L 100   < > 101   CO2 mmol/L 31   < > 27   BUN mg/dL 5   < > 12   CREATININE mg/dL 0 69   < > 0 81   CALCIUM mg/dL 8 9   < > 9 2   ALK PHOS U/L  --   --  49   ALT U/L  --   --  10*   AST U/L  --   --  12    < > = values in this interval not displayed  * I Have Reviewed All Lab Data Listed Above  * Additional Pertinent Lab Tests Reviewed:  [facility] Admission Reviewed    Imaging:    Imaging Reports Reviewed Today Include:   Imaging Personally Reviewed by Myself Includes:      Recent Cultures (last 7 days):           Last 24 Hours Medication List:     Current Facility-Administered Medications:  acetaminophen 975 mg Oral Q6H [address] Beth Bess MD    dextrose 5 % and sodium chloride 0 9 % with KCl 20 mEq/L 50 mL/hr Intravenous Continuous Beth Bess MD Last Rate: 50 mL/hr (03/13/19 0151)   enoxaparin 40 mg Subcutaneous Q24H [address] Beth Bess MD    hydrALAZINE 10 mg Intravenous Q6H PRN Nickolas Spears MD    hydrochlorothiazide 12 5 mg Oral Daily Beth Bess MD    HYDROmorphone 0 5 mg Intravenous Q4H PRN Beth Bess MD    iohexol 50 mL Intravenous Once in imaging GREGORY Franklin    LORazepam 0 5 mg Intravenous Q6H PRN GREGORY Franklin    metoclopramide 5 mg Intravenous Q6H PRN Beth Bess MD    metoprolol tartrate 50 mg Oral Q12H [address] Nickolas Spears MD    multivitamin-minerals 1 tablet Oral BID Beth Bess MD    naloxone 0 04 mg Intravenous Q3 min PRN GREGORY Franklin    ondansetron 4 mg Intravenous Q4H PRN GREGORY Franklin    oxyCODONE 10 mg Oral Q4H PRN Beth Bess MD    oxyCODONE 5 mg Oral Q4H PRN Beth Bess MD    pantoprazole 40 mg Intravenous Q12H [address] GREGORY Reyes    promethazine 25 mg Intramuscular Q6H PRN GREGORY Reyes    saccharomyces boulardii 250 mg Oral BID Beth Bess MD    simethicone 80 mg Oral Q6H PRN GREGORY [place]    venlafaxine 37 5 mg Oral Daily Vivian Laureano MD         Today, Patient Was Seen By: Nickolas Spears MD    ** Please Note: This note has been constructed using a voice recognition system   **

## 2019-03-13 NOTE — QUICK NOTE
Stool sent for C  Diff as is having frequent loose stools  This is most likely due to mobilization of extracellular fluid  Early satiety  Discussed with family potential need for PICC/TPN if unable to intake 60 grams of protein daily  Pt and family expressed understanding  She is tolerating ensure max so we will continue that supplement  PT ordered to evaluate and treat

## 2019-03-13 NOTE — PROGRESS NOTES
Progress Note - Bariatric Surgery   Mavis Unger 51 y o  female MRN: [id number]  Unit/Bed#: -01 Encounter: [id number]    Assessment:  48/F POD 6 s/p laparoscopic remnant gastrectomy, transverse/descending colon resection and conversion to ex lap for completion of colocolostomy  Continues to have bowel function but is now having diarrhea (not foul smelling)  Pain controlled with oral and IV analgesics   HTN    Plan:  Regular diet as tolerated (low residue/fiber; no carbonation)  Probiotics BID  Melatonin QHS  HTN - HCTZ and metoprolol per SLIM   Pain control   OOB/ambulating halls   Cont maintenance IVF until adequate oral intake   IS   DVT ppx (lovenox; SCDs)  Dispo planning     Subjective/Objective     Subjective: Feels down today; complains that she is not getting sleep due to frequent urination; complains of diarrhea with liquids and solids; complains of dysphagia; pain is controlled with oral/IV analgesics    Objective:     Blood pressure (!) 173/84, pulse 78, temperature 97 6 °F (36 4 °C), temperature source Oral, resp  rate 18, height 5' 9" (1 753 m), weight 124 kg (272 lb 14 9 oz), SpO2 97 %, not currently breastfeeding  ,Body mass index is 40 3 kg/m²        Intake/Output Summary (Last 24 hours) at 3/13/2019 0857  Last data filed at 3/13/2019 0342  Gross per 24 hour   Intake 1074 17 ml   Output 3100 ml   Net -2025 83 ml       Invasive Devices     Peripheral Intravenous Line            Peripheral IV 03/10/19 Distal;Left Forearm 2 days          Drain            Closed/Suction Drain Left LLQ Bulb 19 Fr  6 days    Closed/Suction Drain Right Abdomen Bulb 19 Fr  6 days                Physical Exam:     No distress   RRR  Breathing non labored   Abdomen appropriately tender; soft, ND, wounds C/D/I without erythema      Lab, Imaging and other studies:  CBC:   Lab Results   Component Value Date    WBC 7 82 03/13/2019    HGB 10 4 (L) 03/13/2019    HCT 32 0 (L) 03/13/2019    MCV 85 03/13/2019     (H) 03/13/2019    MCH 27 5 03/13/2019    MCHC 32 5 03/13/2019    RDW 14 7 03/13/2019    MPV 9 6 03/13/2019    NRBC 0 03/13/2019   , CMP:   Lab Results   Component Value Date    SODIUM 139 03/13/2019    K 3 9 03/13/2019     03/13/2019    CO2 31 03/13/2019    BUN 5 03/13/2019    CREATININE 0 69 03/13/2019    CALCIUM 8 9 03/13/2019    EGFR 103 03/13/2019       VTE Pharmacologic Prophylaxis: Enoxaparin (Lovenox)  VTE Mechanical Prophylaxis: sequential compression device

## 2019-03-13 NOTE — PLAN OF CARE
Problem: DISCHARGE PLANNING - CARE MANAGEMENT  Goal: Discharge to post-acute care or home with appropriate resources  Description  INTERVENTIONS:  - Conduct assessment to determine patient/family and health care team treatment goals, and need for post-acute services based on payer coverage, community resources, and patient preferences, and barriers to discharge  - Address psychosocial, clinical, and financial barriers to discharge as identified in assessment in conjunction with the patient/family and health care team  - Arrange appropriate level of post-acute services according to patient?s   needs and preference and payer coverage in collaboration with the physician and health care team  - Communicate with and update the patient/family, physician, and health care team regarding progress on the discharge plan  - Arrange appropriate transportation to post-acute venues  Outcome: Progressing     Problem: Prexisting or High Potential for Compromised Skin Integrity  Goal: Skin integrity is maintained or improved  Description  INTERVENTIONS:  - Identify patients at risk for skin breakdown  - Assess and monitor skin integrity  - Assess and monitor nutrition and hydration status  - Monitor labs (i e  albumin)  - Assess for incontinence   - Turn and reposition patient  - Assist with mobility/ambulation  - Relieve pressure over bony prominences  - Avoid friction and shearing  - Provide appropriate hygiene as needed including keeping skin clean and dry  - Evaluate need for skin moisturizer/barrier cream  - Collaborate with interdisciplinary team (i e  Nutrition, Rehabilitation, etc )   - Patient/family teaching  Outcome: Progressing     Problem: Potential for Falls  Goal: Patient will remain free of falls  Description  INTERVENTIONS:  - Assess patient frequently for physical needs  -  Identify cognitive and physical deficits and behaviors that affect risk of falls    -  [place] fall precautions as indicated by assessment   - Educate patient/family on patient safety including physical limitations  - Instruct patient to call for assistance with activity based on assessment  - Modify environment to reduce risk of injury  - Consider OT/PT consult to assist with strengthening/mobility  Outcome: Progressing     Problem: PAIN - ADULT  Goal: Verbalizes/displays adequate comfort level or baseline comfort level  Description  Interventions:  - Encourage patient to monitor pain and request assistance  - Assess pain using appropriate pain scale  - Administer analgesics based on type and severity of pain and evaluate response  - Implement non-pharmacological measures as appropriate and evaluate response  - Consider cultural and social influences on pain and pain management  - Notify physician/advanced practitioner if interventions unsuccessful or patient reports new pain  Outcome: Progressing     Problem: INFECTION - ADULT  Goal: Absence or prevention of progression during hospitalization  Description  INTERVENTIONS:  - Assess and monitor for signs and symptoms of infection  - Monitor lab/diagnostic results  - Monitor all insertion sites, i e  indwelling lines, tubes, and drains  - Monitor endotracheal (as able) and nasal secretions for changes in amount and color  - Martha appropriate cooling/warming therapies per order  - Administer medications as ordered  - Instruct and encourage patient and family to use good hand hygiene technique  - Identify and instruct in appropriate isolation precautions for identified infection/condition  Outcome: Progressing  Goal: Absence of fever/infection during neutropenic period  Description  INTERVENTIONS:  - Monitor WBC  - Implement neutropenic guidelines  Outcome: Progressing     Problem: SAFETY ADULT  Goal: Patient will remain free of falls  Description  INTERVENTIONS:  - Assess patient frequently for physical needs  -  Identify cognitive and physical deficits and behaviors that affect risk of falls   -  [place] fall precautions as indicated by assessment   - Educate patient/family on patient safety including physical limitations  - Instruct patient to call for assistance with activity based on assessment  - Modify environment to reduce risk of injury  - Consider OT/PT consult to assist with strengthening/mobility  Outcome: Progressing  Goal: Maintain or return to baseline ADL function  Description  INTERVENTIONS:  -  Assess patient's ability to carry out ADLs; assess patient's baseline for ADL function and identify physical deficits which impact ability to perform ADLs (bathing, care of mouth/teeth, toileting, grooming, dressing, etc )  - Assess/evaluate cause of self-care deficits   - Assess range of motion  - Assess patient's mobility; develop plan if impaired  - Assess patient's need for assistive devices and provide as appropriate  - Encourage maximum independence but intervene and supervise when necessary  ¯ Involve family in performance of ADLs  ¯ Assess for home care needs following discharge   ¯ Request OT consult to assist with ADL evaluation and planning for discharge  ¯ Provide patient education as appropriate  Outcome: Progressing  Goal: Maintain or return mobility status to optimal level  Description  INTERVENTIONS:  - Assess patient's baseline mobility status (ambulation, transfers, stairs, etc )    - Identify cognitive and physical deficits and behaviors that affect mobility  - Identify mobility aids required to assist with transfers and/or ambulation (gait belt, sit-to-stand, lift, walker, cane, etc )  - [place] fall precautions as indicated by assessment  - Record patient progress and toleration of activity level on Mobility SBAR; progress patient to next Phase/Stage  - Instruct patient to call for assistance with activity based on assessment  - Request Rehabilitation consult to assist with strengthening/weightbearing, etc   Outcome: Progressing     Problem: DISCHARGE PLANNING  Goal: Discharge to home or other facility with appropriate resources  Description  INTERVENTIONS:  - Identify barriers to discharge w/patient and caregiver  - Arrange for needed discharge resources and transportation as appropriate  - Identify discharge learning needs (meds, wound care, etc )  - Arrange for interpretive services to assist at discharge as needed  - Refer to Case Management Department for coordinating discharge planning if the patient needs post-hospital services based on physician/advanced practitioner order or complex needs related to functional status, cognitive ability, or social support system  Outcome: Progressing     Problem: Knowledge Deficit  Goal: Patient/family/caregiver demonstrates understanding of disease process, treatment plan, medications, and discharge instructions  Description  Complete learning assessment and assess knowledge base  Interventions:  - Provide teaching at level of understanding  - Provide teaching via preferred learning methods  Outcome: Progressing     Problem: Nutrition/Hydration-ADULT  Goal: Nutrient/Hydration intake appropriate for improving, restoring or maintaining nutritional needs  Description  Monitor and assess patient's nutrition/hydration status for malnutrition (ex- brittle hair, bruises, dry skin, pale skin and conjunctiva, muscle wasting, smooth red tongue, and disorientation)  Collaborate with interdisciplinary team and initiate plan and interventions as ordered  Monitor patient's weight and dietary intake as ordered or per policy  Utilize nutrition screening tool and intervene per policy  Determine patient's food preferences and provide high-protein, high-caloric foods as appropriate       INTERVENTIONS:  - Monitor oral intake, urinary output, labs, and treatment plans  - Assess nutrition and hydration status and recommend course of action  - Evaluate amount of meals eaten  - Assist patient with eating if necessary   - Allow adequate time for meals  - Recommend/ encourage appropriate diets, oral nutritional supplements, and vitamin/mineral supplements  - Order, calculate, and assess calorie counts as needed  - Assess need for intravenous fluids  - Provide nutrition/hydration education as appropriate  - Include patient/family/caregiver in decisions related to nutrition   Outcome: Progressing

## 2019-03-14 LAB
ALBUMIN SERPL BCP-MCNC: 2.2 G/DL (ref 3.5–5)
ANION GAP SERPL CALCULATED.3IONS-SCNC: 8 MMOL/L (ref 4–13)
BUN SERPL-MCNC: 5 MG/DL (ref 5–25)
C DIFF TOX GENS STL QL NAA+PROBE: NORMAL
CALCIUM SERPL-MCNC: 9.1 MG/DL (ref 8.3–10.1)
CHLORIDE SERPL-SCNC: 100 MMOL/L (ref 100–108)
CO2 SERPL-SCNC: 29 MMOL/L (ref 21–32)
CREAT SERPL-MCNC: 0.72 MG/DL (ref 0.6–1.3)
CRP SERPL QL: 48.9 MG/L
ERYTHROCYTE [DISTWIDTH] IN BLOOD BY AUTOMATED COUNT: 14.9 % (ref 11.6–15.1)
GFR SERPL CREATININE-BSD FRML MDRD: 99 ML/MIN/1.73SQ M
GLUCOSE SERPL-MCNC: 114 MG/DL (ref 65–140)
HCT VFR BLD AUTO: 31.8 % (ref 34.8–46.1)
HGB BLD-MCNC: 10.3 G/DL (ref 11.5–15.4)
MCH RBC QN AUTO: 27.3 PG (ref 26.8–34.3)
MCHC RBC AUTO-ENTMCNC: 32.4 G/DL (ref 31.4–37.4)
MCV RBC AUTO: 84 FL (ref 82–98)
PLATELET # BLD AUTO: 476 THOUSANDS/UL (ref 149–390)
PMV BLD AUTO: 9.6 FL (ref 8.9–12.7)
POTASSIUM SERPL-SCNC: 3.7 MMOL/L (ref 3.5–5.3)
PREALB SERPL-MCNC: 19.2 MG/DL (ref 18–40)
RBC # BLD AUTO: 3.77 MILLION/UL (ref 3.81–5.12)
SODIUM SERPL-SCNC: 137 MMOL/L (ref 136–145)
WBC # BLD AUTO: 7.69 THOUSAND/UL (ref 4.31–10.16)

## 2019-03-14 PROCEDURE — 86140 C-REACTIVE PROTEIN: CPT | Performed by: SURGERY

## 2019-03-14 PROCEDURE — G8979 MOBILITY GOAL STATUS: HCPCS

## 2019-03-14 PROCEDURE — 99232 SBSQ HOSP IP/OBS MODERATE 35: CPT | Performed by: INTERNAL MEDICINE

## 2019-03-14 PROCEDURE — 84134 ASSAY OF PREALBUMIN: CPT | Performed by: SURGERY

## 2019-03-14 PROCEDURE — 99024 POSTOP FOLLOW-UP VISIT: CPT | Performed by: SURGERY

## 2019-03-14 PROCEDURE — 97163 PT EVAL HIGH COMPLEX 45 MIN: CPT

## 2019-03-14 PROCEDURE — 82040 ASSAY OF SERUM ALBUMIN: CPT | Performed by: SURGERY

## 2019-03-14 PROCEDURE — G8978 MOBILITY CURRENT STATUS: HCPCS

## 2019-03-14 PROCEDURE — C9113 INJ PANTOPRAZOLE SODIUM, VIA: HCPCS | Performed by: NURSE PRACTITIONER

## 2019-03-14 PROCEDURE — 85027 COMPLETE CBC AUTOMATED: CPT | Performed by: SURGERY

## 2019-03-14 PROCEDURE — 80048 BASIC METABOLIC PNL TOTAL CA: CPT | Performed by: SURGERY

## 2019-03-14 RX ADMIN — HYDROMORPHONE HYDROCHLORIDE 0.5 MG: 1 INJECTION, SOLUTION INTRAMUSCULAR; INTRAVENOUS; SUBCUTANEOUS at 22:50

## 2019-03-14 RX ADMIN — VENLAFAXINE HYDROCHLORIDE 37.5 MG: 37.5 CAPSULE, EXTENDED RELEASE ORAL at 10:34

## 2019-03-14 RX ADMIN — MELATONIN 6 MG: 3 TAB ORAL at 22:24

## 2019-03-14 RX ADMIN — LORAZEPAM 0.5 MG: 2 INJECTION INTRAMUSCULAR; INTRAVENOUS at 18:24

## 2019-03-14 RX ADMIN — OXYCODONE HYDROCHLORIDE 10 MG: 10 TABLET ORAL at 10:34

## 2019-03-14 RX ADMIN — HYDROCHLOROTHIAZIDE 12.5 MG: 12.5 TABLET ORAL at 10:35

## 2019-03-14 RX ADMIN — METOPROLOL TARTRATE 50 MG: 50 TABLET, FILM COATED ORAL at 10:35

## 2019-03-14 RX ADMIN — METOPROLOL TARTRATE 50 MG: 50 TABLET, FILM COATED ORAL at 20:44

## 2019-03-14 RX ADMIN — PANTOPRAZOLE SODIUM 40 MG: 40 INJECTION, POWDER, FOR SOLUTION INTRAVENOUS at 10:35

## 2019-03-14 RX ADMIN — OXYCODONE HYDROCHLORIDE 5 MG: 5 TABLET ORAL at 20:45

## 2019-03-14 RX ADMIN — OXYCODONE HYDROCHLORIDE 10 MG: 10 TABLET ORAL at 03:49

## 2019-03-14 RX ADMIN — ACETAMINOPHEN 975 MG: 325 TABLET, FILM COATED ORAL at 13:30

## 2019-03-14 RX ADMIN — Medication 250 MG: at 18:22

## 2019-03-14 RX ADMIN — ENOXAPARIN SODIUM 40 MG: 40 INJECTION SUBCUTANEOUS at 10:35

## 2019-03-14 RX ADMIN — PANTOPRAZOLE SODIUM 40 MG: 40 INJECTION, POWDER, FOR SOLUTION INTRAVENOUS at 20:44

## 2019-03-14 RX ADMIN — Medication 250 MG: at 10:35

## 2019-03-14 RX ADMIN — ACETAMINOPHEN 975 MG: 325 TABLET, FILM COATED ORAL at 18:22

## 2019-03-14 RX ADMIN — SIMETHICONE CHEW TAB 80 MG 80 MG: 80 TABLET ORAL at 16:47

## 2019-03-14 NOTE — PLAN OF CARE
Problem: PHYSICAL THERAPY ADULT  Goal: Performs mobility at highest level of function for planned discharge setting  See evaluation for individualized goals  Description  Treatment/Interventions: Functional transfer training, LE strengthening/ROM, Patient/family training, Equipment eval/education, Bed mobility, Gait training, Spoke to nursing  Equipment Recommended: (currently using RW but needs further assessment  )       See flowsheet documentation for full assessment, interventions and recommendations  Note:   Prognosis: Good  Problem List: Decreased endurance, Impaired balance, Decreased mobility, Decreased skin integrity, Pain  Assessment: Pt is 51 y o  female seen for PT evaluation s/p admit to [facility] on 3/6/2019 w/ Gastric outflow obstruction  Pt is POD # 6 for laproscopic remnant gastrectomy and colon resection  PT consulted to assess pt's functional mobility and d/c needs  Order placed for PT eval and tx, w/ up w/ A order  Comorbidities affecting pt's physical performance at time of assessment include: pt with hx of gastric ulcer, pancreatic cyst, abnormal abdomenal CT, epigastric pain, and HTN    PTA, pt was independent w/ all functional mobility w/ no need for an assistive device for ambulation , ambulates unrestricted distances and all terrain, has 10 steps at her sister-in-laws ranch home where pt plans to stay post d/c TORI, works full time and 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  Personal factors affecting pt at time of IE include: ambulating w/ assistive device, stairs to enter home, inability to navigate community distances, inability to perform IADLs and is currently functioning at a below baseline level of mobility at this time     Please find objective findings from PT assessment regarding body systems outlined above with impairments and limitations including impaired balance, decreased endurance, gait deviations, pain, decreased activity tolerance, decreased functional mobility tolerance, SOB upon exertion and decreased skin integrity  The following objective measures performed on IE also reveal limitations: Barthel Index: 55/100  Pt's clinical presentation is currently unstable/unpredictable seen in pt's presentation of pt with post-surgical pain negatively impacting mobility, pt with the need for neuro checks, pt with below baseline level of mobility and currently requiring use of RW for ambulation  Pt to benefit from continued PT tx to address deficits as defined above and maximize level of functional independent mobility and consistency  From PT/mobility standpoint, recommendation at time of d/c would be home with family assist as needed pending progress in order to facilitate return to PLOF  Note that pt will require trial of ambulation on 10 steps prior to d/c  Pt had insufficient endurance and too high of a pain level for ambulation on steps today  Barriers to Discharge Comments: 10 TORI into sister-in-laws home where pt plans to stay post d/c  Recommendation: Home with family support     PT - OK to Discharge: No    See flowsheet documentation for full assessment

## 2019-03-14 NOTE — PROGRESS NOTES
[facility] Progress Note  Patient: Ariella Alamo 51 y o  female   MRN: [id number]  PCP: Lb Ayala  Unit/Bed#: -01 Encounter: [id number]  Date Of Visit: 19    Assessment:    Principal Problem:    Gastric outflow obstruction  Active Problems:    Abdominal pain    Essential hypertension    Tachycardia      Plan:    · 1  S/p laprascopic remnant gastrectomy, transverse/descending colon resection and conversion to ex lap for completion of colocolostomy- POD#7  Bariatric surgery following  Continue pain control  Patient diet advanced  · 2  HTN- patient HCTZ restarted  Metoprolol 50mg PO BID also added  BP better controlled  149/79 this morning  · 3  Anemia- stable  Mostlikely postop related  · 4  LEANA- resolved  · 5  Diarrhea- Stopped  C diff negative       VTE Pharmacologic Prophylaxis:   Pharmacologic: Enoxaparin (Lovenox)  Mechanical VTE Prophylaxis in Place: Yes    Patient Centered Rounds: I have performed bedside rounds with nursing staff today  Discussions with Specialists or Other Care Team Provider:     Education and Discussions with Family / Patient:     Time Spent for Care: 20 minutes  More than 50% of total time spent on counseling and coordination of care as described above  Current Length of Stay: 7 day(s)    Current Patient Status: Inpatient   Certification Statement: The patient will continue to require additional inpatient hospital stay due to abdominal pain    Discharge Plan / Estimated Discharge Date: once surgery clears    Code Status: Level 1 - Full Code      Subjective:   Patient seen and examined at bedside  Patient has no new complaints  Objective:     Vitals:   Temp (24hrs), Av 3 °F (36 8 °C), Min:97 8 °F (36 6 °C), Max:98 8 °F (37 1 °C)    Temp:  [97 8 °F (36 6 °C)-98 8 °F (37 1 °C)] 97 8 °F (36 6 °C)  HR:  [66-80] 72  Resp:  [17-18] 18  BP: (137-149)/(68-79) 149/79  SpO2:  [93 %-98 %] 93 %  Body mass index is 35 52 kg/m²       Input and Output Summary (last 24 hours): Intake/Output Summary (Last 24 hours) at 3/14/2019 1436  Last data filed at 3/14/2019 1101  Gross per 24 hour   Intake 700 ml   Output 3050 ml   Net -2350 ml       Physical Exam:     Physical Exam   Constitutional: She is oriented to person, place, and time  She appears well-developed and well-nourished  HENT:   Head: Normocephalic and atraumatic  Eyes: Pupils are equal, round, and reactive to light  EOM are normal    Neck: Normal range of motion  Neck supple  No JVD present  No tracheal deviation present  No thyromegaly present  Cardiovascular: Normal rate, regular rhythm and normal heart sounds  Exam reveals no gallop and no friction rub  No murmur heard  Pulmonary/Chest: Effort normal and breath sounds normal  No stridor  No respiratory distress  She has no wheezes  Abdominal: She exhibits distension  There is tenderness  Musculoskeletal: Normal range of motion  She exhibits no edema  Neurological: She is alert and oriented to person, place, and time  Skin: Skin is warm and dry  Vitals reviewed  Additional Data:     Labs:    Results from last 7 days   Lab Units 03/14/19  0453 03/13/19  0500 03/12/19  0437   WBC Thousand/uL 7 69 7 82 7 77   HEMOGLOBIN g/dL 10 3* 10 4* 10 2*   HEMATOCRIT % 31 8* 32 0* 31 7*   PLATELETS Thousands/uL 476* 457* 440*   NEUTROS PCT %  --   --  73   LYMPHS PCT %  --   --  10*   LYMPHO PCT %  --  13*  --    MONOS PCT %  --   --  8   MONO PCT %  --  8  --    EOS PCT %  --  8* 7*     Results from last 7 days   Lab Units 03/14/19  0453  03/08/19  0510   POTASSIUM mmol/L 3 7   < > 4 2   CHLORIDE mmol/L 100   < > 101   CO2 mmol/L 29   < > 27   BUN mg/dL 5   < > 12   CREATININE mg/dL 0 72   < > 0 81   CALCIUM mg/dL 9 1   < > 9 2   ALK PHOS U/L  --   --  49   ALT U/L  --   --  10*   AST U/L  --   --  12    < > = values in this interval not displayed  * I Have Reviewed All Lab Data Listed Above    * Additional Pertinent Lab Tests Reviewed: [facility] Admission Reviewed    Imaging:    Imaging Reports Reviewed Today Include:   Imaging Personally Reviewed by Myself Includes:      Recent Cultures (last 7 days):     Results from last 7 days   Lab Units 03/13/19  1614   C DIFF TOXIN B  NEGATIVE for C difficle toxin by PCR  Last 24 Hours Medication List:     Current Facility-Administered Medications:  acetaminophen 975 mg Oral Q6H [address] Beth Bess MD   enoxaparin 40 mg Subcutaneous Q24H [address] Beth Bess MD   hydrALAZINE 10 mg Intravenous Q6H PRN Nickolas Spears MD   hydrochlorothiazide 12 5 mg Oral Daily Beth Bess MD   HYDROmorphone 0 5 mg Intravenous Q4H PRN Beth Bess MD   LORazepam 0 5 mg Intravenous Q6H PRN GREGORY Franklin   melatonin 6 mg Oral HS Beth Bess MD   metoclopramide 5 mg Intravenous Q6H PRN Beth Bess MD   metoprolol tartrate 50 mg Oral Q12H [address] Nickolas Spears MD   multivitamin-minerals 1 tablet Oral BID Beth Bess MD   naloxone 0 04 mg Intravenous Q3 min PRN GREGORY Franklin   ondansetron 4 mg Intravenous Q4H PRN GREGORY Franklin   oxyCODONE 10 mg Oral Q4H PRN Beth Bess MD   oxyCODONE 5 mg Oral Q4H PRN Beth Bess MD   pantoprazole 40 mg Intravenous Q12H [address] GREGORY Reyes   promethazine 25 mg Intramuscular Q6H PRN GREGORY [place]   saccharomyces boulardii 250 mg Oral BID Beth Bess MD   simethicone 80 mg Oral Q6H PRN GREGORY [place]   venlafaxine 37 5 mg Oral Daily Vivian Laureano MD        Today, Patient Was Seen By: Nickolas Spears MD    ** Please Note: This note has been constructed using a voice recognition system   **

## 2019-03-14 NOTE — PLAN OF CARE
Problem: DISCHARGE PLANNING - CARE MANAGEMENT  Goal: Discharge to post-acute care or home with appropriate resources  Description  INTERVENTIONS:  - Conduct assessment to determine patient/family and health care team treatment goals, and need for post-acute services based on payer coverage, community resources, and patient preferences, and barriers to discharge  - Address psychosocial, clinical, and financial barriers to discharge as identified in assessment in conjunction with the patient/family and health care team  - Arrange appropriate level of post-acute services according to patient?s   needs and preference and payer coverage in collaboration with the physician and health care team  - Communicate with and update the patient/family, physician, and health care team regarding progress on the discharge plan  - Arrange appropriate transportation to post-acute venues  Outcome: Progressing     Problem: Prexisting or High Potential for Compromised Skin Integrity  Goal: Skin integrity is maintained or improved  Description  INTERVENTIONS:  - Identify patients at risk for skin breakdown  - Assess and monitor skin integrity  - Assess and monitor nutrition and hydration status  - Monitor labs (i e  albumin)  - Assess for incontinence   - Turn and reposition patient  - Assist with mobility/ambulation  - Relieve pressure over bony prominences  - Avoid friction and shearing  - Provide appropriate hygiene as needed including keeping skin clean and dry  - Evaluate need for skin moisturizer/barrier cream  - Collaborate with interdisciplinary team (i e  Nutrition, Rehabilitation, etc )   - Patient/family teaching  Outcome: Progressing     Problem: Potential for Falls  Goal: Patient will remain free of falls  Description  INTERVENTIONS:  - Assess patient frequently for physical needs  -  Identify cognitive and physical deficits and behaviors that affect risk of falls    -  Walker fall precautions as indicated by assessment   - Educate patient/family on patient safety including physical limitations  - Instruct patient to call for assistance with activity based on assessment  - Modify environment to reduce risk of injury  - Consider OT/PT consult to assist with strengthening/mobility  Outcome: Progressing     Problem: PAIN - ADULT  Goal: Verbalizes/displays adequate comfort level or baseline comfort level  Description  Interventions:  - Encourage patient to monitor pain and request assistance  - Assess pain using appropriate pain scale  - Administer analgesics based on type and severity of pain and evaluate response  - Implement non-pharmacological measures as appropriate and evaluate response  - Consider cultural and social influences on pain and pain management  - Notify physician/advanced practitioner if interventions unsuccessful or patient reports new pain  Outcome: Progressing     Problem: INFECTION - ADULT  Goal: Absence or prevention of progression during hospitalization  Description  INTERVENTIONS:  - Assess and monitor for signs and symptoms of infection  - Monitor lab/diagnostic results  - Monitor all insertion sites, i e  indwelling lines, tubes, and drains  - Monitor endotracheal (as able) and nasal secretions for changes in amount and color  - Topping appropriate cooling/warming therapies per order  - Administer medications as ordered  - Instruct and encourage patient and family to use good hand hygiene technique  - Identify and instruct in appropriate isolation precautions for identified infection/condition  Outcome: Progressing  Goal: Absence of fever/infection during neutropenic period  Description  INTERVENTIONS:  - Monitor WBC  - Implement neutropenic guidelines  Outcome: Progressing     Problem: SAFETY ADULT  Goal: Patient will remain free of falls  Description  INTERVENTIONS:  - Assess patient frequently for physical needs  -  Identify cognitive and physical deficits and behaviors that affect risk of falls   -  Pierson fall precautions as indicated by assessment   - Educate patient/family on patient safety including physical limitations  - Instruct patient to call for assistance with activity based on assessment  - Modify environment to reduce risk of injury  - Consider OT/PT consult to assist with strengthening/mobility  Outcome: Progressing  Goal: Maintain or return to baseline ADL function  Description  INTERVENTIONS:  -  Assess patient's ability to carry out ADLs; assess patient's baseline for ADL function and identify physical deficits which impact ability to perform ADLs (bathing, care of mouth/teeth, toileting, grooming, dressing, etc )  - Assess/evaluate cause of self-care deficits   - Assess range of motion  - Assess patient's mobility; develop plan if impaired  - Assess patient's need for assistive devices and provide as appropriate  - Encourage maximum independence but intervene and supervise when necessary  ¯ Involve family in performance of ADLs  ¯ Assess for home care needs following discharge   ¯ Request OT consult to assist with ADL evaluation and planning for discharge  ¯ Provide patient education as appropriate  Outcome: Progressing  Goal: Maintain or return mobility status to optimal level  Description  INTERVENTIONS:  - Assess patient's baseline mobility status (ambulation, transfers, stairs, etc )    - Identify cognitive and physical deficits and behaviors that affect mobility  - Identify mobility aids required to assist with transfers and/or ambulation (gait belt, sit-to-stand, lift, walker, cane, etc )  - Pierson fall precautions as indicated by assessment  - Record patient progress and toleration of activity level on Mobility SBAR; progress patient to next Phase/Stage  - Instruct patient to call for assistance with activity based on assessment  - Request Rehabilitation consult to assist with strengthening/weightbearing, etc   Outcome: Progressing     Problem: DISCHARGE PLANNING  Goal: Discharge to home or other facility with appropriate resources  Description  INTERVENTIONS:  - Identify barriers to discharge w/patient and caregiver  - Arrange for needed discharge resources and transportation as appropriate  - Identify discharge learning needs (meds, wound care, etc )  - Arrange for interpretive services to assist at discharge as needed  - Refer to Case Management Department for coordinating discharge planning if the patient needs post-hospital services based on physician/advanced practitioner order or complex needs related to functional status, cognitive ability, or social support system  Outcome: Progressing     Problem: Knowledge Deficit  Goal: Patient/family/caregiver demonstrates understanding of disease process, treatment plan, medications, and discharge instructions  Description  Complete learning assessment and assess knowledge base  Interventions:  - Provide teaching at level of understanding  - Provide teaching via preferred learning methods  Outcome: Progressing     Problem: Nutrition/Hydration-ADULT  Goal: Nutrient/Hydration intake appropriate for improving, restoring or maintaining nutritional needs  Description  Monitor and assess patient's nutrition/hydration status for malnutrition (ex- brittle hair, bruises, dry skin, pale skin and conjunctiva, muscle wasting, smooth red tongue, and disorientation)  Collaborate with interdisciplinary team and initiate plan and interventions as ordered  Monitor patient's weight and dietary intake as ordered or per policy  Utilize nutrition screening tool and intervene per policy  Determine patient's food preferences and provide high-protein, high-caloric foods as appropriate       INTERVENTIONS:  - Monitor oral intake, urinary output, labs, and treatment plans  - Assess nutrition and hydration status and recommend course of action  - Evaluate amount of meals eaten  - Assist patient with eating if necessary   - Allow adequate time for meals  - Recommend/ encourage appropriate diets, oral nutritional supplements, and vitamin/mineral supplements  - Order, calculate, and assess calorie counts as needed  - Assess need for intravenous fluids  - Provide nutrition/hydration education as appropriate  - Include patient/family/caregiver in decisions related to nutrition   Outcome: Progressing

## 2019-03-14 NOTE — SOCIAL WORK
Per bariatric surgeon, pt may be able to d/c tomorrow if able to tolerate PO supplements and work with PT  PT notified to prioritize pt for today  Per CM notes pt previously refused VNA as her daughters are CNAs   CM department will continue to follow for needs at discharge

## 2019-03-14 NOTE — UTILIZATION REVIEW
Continued Stay Review    Date:  3/14  Vital Signs: /79 (BP Location: Right arm)   Pulse 72   Temp 97 8 °F (36 6 °C) (Oral)   Resp 18   Ht 5' 9" (1 753 m)   Wt 109 kg (240 lb 8 4 oz)   LMP  (LMP Unknown)   SpO2 93%   BMI 35 52 kg/m²   Assessment/Plan: 52 yo female admitted  POD #7 s/p lap remnant gastrectomy , transverse /descending colon resection and conversion to ex lap for completion of colonoscopy   Cont to have bowel function , diarrhea ceased over night , c-diff neg   Cont with pain management , diet as zelda        Medications:   Scheduled Meds:   Current Facility-Administered Medications:  acetaminophen 975 mg Oral Q6H EDA    enoxaparin 40 mg Subcutaneous Q24H EDA    hydrALAZINE 10 mg Intravenous Q6H PRN    hydrochlorothiazide 12 5 mg Oral Daily    HYDROmorphone 0 5 mg Intravenous Q4H PRN    LORazepam 0 5 mg Intravenous Q6H PRN    melatonin 6 mg Oral HS    metoclopramide 5 mg Intravenous Q6H PRN    metoprolol tartrate 50 mg Oral Q12H EDA    multivitamin-minerals 1 tablet Oral BID    naloxone 0 04 mg Intravenous Q3 min PRN    ondansetron 4 mg Intravenous Q4H PRN    oxyCODONE 10 mg Oral Q4H PRN    oxyCODONE 5 mg Oral Q4H PRN    pantoprazole 40 mg Intravenous Q12H EDA    promethazine 25 mg Intramuscular Q6H PRN    saccharomyces boulardii 250 mg Oral BID    simethicone 80 mg Oral Q6H PRN    venlafaxine 37 5 mg Oral Daily      Pertinent Labs/Diagnostic Results: H&H  10 3  31 8 plt  476  Age/Sex: 51 y o  female   Discharge Plan: TBD

## 2019-03-14 NOTE — PHYSICAL THERAPY NOTE
Pt refusing participation in PT rx until later this am after she has been given her pain med's and has had time to rest as per pt request  Plan to return to room later this am    Lisa Poe, PT

## 2019-03-14 NOTE — PROGRESS NOTES
Progress Note - Bariatric Surgery   Efren Pickens 51 y o  female MRN: [id number]  Unit/Bed#: -01 Encounter: [id number]    Assessment:  48/F POD 7 s/p laparoscopic remnant gastrectomy, transverse/descending colon resection and conversion to ex lap for completion of colocolostomy  Continues to have bowel function; diarrhea ceased over night; C  Diff negative   Required one dose of IV analgesic overnight otherwise pain is controlled with oral analgesics    HTN    Plan:  Diet as tolerated (Goal of 48 ounces of fluid with 60 grams of protein at minimum prior to discharge)  Probiotics BID  MVI QD  Melatonin QHS  HTN - HCTZ and metoprolol per SLIM   Pain control   Staples removed; benzoin and steristrips applied (Pt tolerated well)   OOB/ambulating halls   PT to eval/treat today  Saline lock    IS   DVT ppx (lovenox; SCDs)  Dispo planning     Subjective/Objective     Subjective: In better spirits today; diarrhea ceased; felt that she had sweats last night but no fever; she is still passing flatus; she feels hungry but complains of dysgeusia  She is tolerating the ensure max and other liquids  Objective: Looks well    Blood pressure 149/79, pulse 72, temperature 97 8 °F (36 6 °C), temperature source Oral, resp  rate 18, height 5' 9" (1 753 m), weight 109 kg (240 lb 8 4 oz), SpO2 93 %, not currently breastfeeding  ,Body mass index is 35 52 kg/m²        Intake/Output Summary (Last 24 hours) at 3/14/2019 0904  Last data filed at 3/14/2019 0703  Gross per 24 hour   Intake 360 ml   Output 2450 ml   Net -2090 ml         Physical Exam:     No distress   RRR  Breathing non labored   Abdomen appropriately tender; soft, ND, wounds C/D/I without erythema      Lab, Imaging and other studies:  CBC:   Lab Results   Component Value Date    WBC 7 69 03/14/2019    HGB 10 3 (L) 03/14/2019    HCT 31 8 (L) 03/14/2019    MCV 84 03/14/2019     (H) 03/14/2019    MCH 27 3 03/14/2019    MCHC 32 4 03/14/2019    RDW 14 9 03/14/2019 MPV 9 6 03/14/2019   , CMP:   Lab Results   Component Value Date    SODIUM 137 03/14/2019    K 3 7 03/14/2019     03/14/2019    CO2 29 03/14/2019    BUN 5 03/14/2019    CREATININE 0 72 03/14/2019    CALCIUM 9 1 03/14/2019    EGFR 99 03/14/2019       VTE Pharmacologic Prophylaxis: Enoxaparin (Lovenox)  VTE Mechanical Prophylaxis: sequential compression device

## 2019-03-14 NOTE — PLAN OF CARE
Patient keeps asking for her temperature to be checked by either the RN, or I the (PCA)  For some reason she thinks she's getting a fever, she stated while I was ambulating her to the bathroom, I feel sweaty I hope i'm not running a temperature  She asked me to re- check it 15 minutes after the nurse just checked it  Nurse notified about this

## 2019-03-14 NOTE — PHYSICAL THERAPY NOTE
Physical Therapy Initial Evaluation  Nena Lewis, PT     03/14/19 1150   Note Type   Note type Eval only   Pain Assessment   Pain Assessment 0-10   Pain Score 5   Pain Type Surgical pain;Acute pain   Pain Location Abdomen   Hospital Pain Intervention(s) Repositioned; Ambulation/increased activity; Emotional support   Response to Interventions tolerated  Home Living   Type of [address] Multi-level; Able to live on main level with bedroom/bathroom; Performs ADLs on one level;Stairs to enter with rails  (bilevel  3outside TORI, 7 to main level   Rails on both )   Bathroom Shower/Tub Tub/shower unit   Bathroom Toilet Standard   Bathroom Accessibility Accessible   Additional Comments Independent PTA   Prior Function   Level of [place] Independent with ADLs and functional mobility; Needs assistance with IADLs   Lives With Spouse  (not home days)   Receives Help From Family   ADL Assistance Independent   IADLs Independent   Falls in the last 6 months 0   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  Restrictions/Precautions   Weight Bearing Precautions Per Order No   Braces or Orthoses Other (Comment)  (no )   Other Precautions Telemetry; Fall Risk;Pain   General   Additional Pertinent History dx: gastric outflow obstruction, GI hemorrage associated with ulcer, frequent stools  POD#6 for laproscopic remnant gastrectomy with colon resection  PMH: gastic ulcer, pancreatic cyst, abnormal abdomenal CT, epigastric pain, HTN  Family/Caregiver Present Yes   Cognition   Overall Cognitive Status WFL   Arousal/Participation Alert   Orientation Level Oriented X4   Memory Within functional limits   Following Commands Follows all commands and directions without difficulty   Comments pleasant, cooperative, willing to be mobile      RLE Assessment   RLE Assessment   (4/5)   LLE Assessment   LLE Assessment   (4/5)   Coordination   Movements are Fluid and Coordinated 1   Sensation [facility] Mobility Rolling R   (Pt seated in recliner upon PT's arrival in pt's room   )   Additional Comments Pt returned to sitting in recliner upon completion of PT eval   All needs withing reach  Transfers   Sit to Stand 4  Minimal assistance   Additional items Assist x 1; Increased time required;Verbal cues   Stand to Sit 4  Minimal assistance   Additional items Assist x 1; Increased time required;Verbal cues   Stand pivot 5  Supervision   Additional items Assist x 1; Increased time required;Verbal cues   Additional Comments RW used  ,    Ambulation/Elevation   Gait pattern Excessively slow; Short stride;Decreased foot clearance   Gait Assistance 4  Minimal assist   Additional items Assist x 1  (CG assist  )   Assistive Device Rolling walker   Distance 60'  (distance limited by pain  )   Stair Management Assistance   (insufficient endurance/pain today  )   Balance   Static Sitting Good   Dynamic Sitting Fair +   Static Standing Fair -   Dynamic Standing Fair -   Ambulatory Fair -   Endurance Deficit   Endurance Deficit Yes   Endurance Deficit Description pain, fatigue  Activity Tolerance   Activity Tolerance Patient limited by fatigue;Patient limited by pain   Medical Staff Made Aware RN consented to allow pt to participate in PT eval      Nurse Made Aware yes   Assessment   Prognosis Good   Problem List Decreased endurance; Impaired balance;Decreased mobility; Decreased skin integrity;Pain   Assessment Pt is 51 y o  female seen for PT evaluation s/p admit to [facility] on 3/6/2019 w/ Gastric outflow obstruction  Pt is POD # 6 for laproscopic remnant gastrectomy and colon resection  PT consulted to assess pt's functional mobility and d/c needs  Order placed for PT eval and tx, w/ up w/ A order  Comorbidities affecting pt's physical performance at time of assessment include: pt with hx of gastric ulcer, pancreatic cyst, abnormal abdomenal CT, epigastric pain, and HTN      PTA, pt was independent w/ all functional mobility w/ no need for an assistive device for ambulation , ambulates unrestricted distances and all terrain, has 10 steps at her sister-in-laws ranch home where pt plans to stay post d/c TORI, works full time and 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  Personal factors affecting pt at time of IE include: ambulating w/ assistive device, stairs to enter home, inability to navigate community distances, inability to perform IADLs and is currently functioning at a below baseline level of mobility at this time    Please find objective findings from PT assessment regarding body systems outlined above with impairments and limitations including impaired balance, decreased endurance, gait deviations, pain, decreased activity tolerance, decreased functional mobility tolerance, SOB upon exertion and decreased skin integrity  The following objective measures performed on IE also reveal limitations: Barthel Index: 55/100  Pt's clinical presentation is currently unstable/unpredictable seen in pt's presentation of pt with post-surgical pain negatively impacting mobility, pt with the need for neuro checks, pt with below baseline level of mobility and currently requiring use of RW for ambulation  Pt to benefit from continued PT tx to address deficits as defined above and maximize level of functional independent mobility and consistency  From PT/mobility standpoint, recommendation at time of d/c would be home with family assist as needed pending progress in order to facilitate return to PLOF  Note that pt will require trial of ambulation on 10 steps prior to d/c  Pt had insufficient endurance and too high of a pain level for ambulation on steps today  Barriers to Discharge Comments 10 TORI into sister-in-laws home where pt plans to stay post d/c  Goals   Patient Goals To have less pain      STG Expiration Date 03/24/19   Short Term Goal #1 Pt is independent with completion of bed mobility activiities  Pt completes transfers independently with or without an assistive device with good safety and balance noted  Pt ambulates 200-250' independently with or without AD with good safety /balance  Pt ambulates up/down 10 steps with close supervision with good safety and balance   Pt independent in performance of LE ex's for DVT prevention  Treatment Day 0   Plan   Treatment/Interventions Functional transfer training;LE strengthening/ROM; Patient/family training;Equipment eval/education; Bed mobility;Gait training;Spoke to nursing   PT Frequency Other (Comment)  (3-5x/wk)   Recommendation   Recommendation Home with family support   Equipment Recommended   (currently using RW but needs further assessment  )   PT - OK to Discharge No   Barthel Index   Feeding 10   Bathing 0   Grooming Score 5   Dressing Score 5   Bladder Score 10   Bowels Score 10   Toilet Use Score 5   Transfers (Bed/Chair) Score 10   Mobility (Level Surface) Score 0   Stairs Score 0   Barthel Index Score 55

## 2019-03-15 PROCEDURE — 99232 SBSQ HOSP IP/OBS MODERATE 35: CPT | Performed by: INTERNAL MEDICINE

## 2019-03-15 PROCEDURE — 99024 POSTOP FOLLOW-UP VISIT: CPT | Performed by: SURGERY

## 2019-03-15 RX ADMIN — SIMETHICONE CHEW TAB 80 MG 80 MG: 80 TABLET ORAL at 11:35

## 2019-03-15 RX ADMIN — HYDROMORPHONE HYDROCHLORIDE 0.5 MG: 1 INJECTION, SOLUTION INTRAMUSCULAR; INTRAVENOUS; SUBCUTANEOUS at 17:56

## 2019-03-15 RX ADMIN — LORAZEPAM 0.5 MG: 2 INJECTION INTRAMUSCULAR; INTRAVENOUS at 05:49

## 2019-03-15 RX ADMIN — MELATONIN 6 MG: 3 TAB ORAL at 21:03

## 2019-03-15 RX ADMIN — LORAZEPAM 0.5 MG: 2 INJECTION INTRAMUSCULAR; INTRAVENOUS at 21:03

## 2019-03-15 RX ADMIN — Medication 1 TABLET: at 17:56

## 2019-03-15 RX ADMIN — METOPROLOL TARTRATE 50 MG: 50 TABLET, FILM COATED ORAL at 10:05

## 2019-03-15 RX ADMIN — ENOXAPARIN SODIUM 40 MG: 40 INJECTION SUBCUTANEOUS at 10:07

## 2019-03-15 RX ADMIN — ACETAMINOPHEN 975 MG: 325 TABLET, FILM COATED ORAL at 05:37

## 2019-03-15 RX ADMIN — Medication 250 MG: at 10:05

## 2019-03-15 RX ADMIN — OXYCODONE HYDROCHLORIDE 10 MG: 10 TABLET ORAL at 21:03

## 2019-03-15 RX ADMIN — SIMETHICONE CHEW TAB 80 MG 80 MG: 80 TABLET ORAL at 16:25

## 2019-03-15 RX ADMIN — OXYCODONE HYDROCHLORIDE 5 MG: 5 TABLET ORAL at 16:25

## 2019-03-15 RX ADMIN — Medication 250 MG: at 17:56

## 2019-03-15 RX ADMIN — METOPROLOL TARTRATE 50 MG: 50 TABLET, FILM COATED ORAL at 21:04

## 2019-03-15 RX ADMIN — VENLAFAXINE HYDROCHLORIDE 37.5 MG: 37.5 CAPSULE, EXTENDED RELEASE ORAL at 10:06

## 2019-03-15 RX ADMIN — SIMETHICONE CHEW TAB 80 MG 80 MG: 80 TABLET ORAL at 06:01

## 2019-03-15 RX ADMIN — HYDROCHLOROTHIAZIDE 12.5 MG: 12.5 TABLET ORAL at 10:06

## 2019-03-15 RX ADMIN — LORAZEPAM 0.5 MG: 2 INJECTION INTRAMUSCULAR; INTRAVENOUS at 14:23

## 2019-03-15 RX ADMIN — OXYCODONE HYDROCHLORIDE 5 MG: 5 TABLET ORAL at 11:35

## 2019-03-15 NOTE — PROGRESS NOTES
Progress Note - Bariatric Surgery   Calos Mcdonald 51 y o  female MRN: [id number]  Unit/Bed#: -01 Encounter: [id number]    Assessment:  48/F POD 8 s/p laparoscopic remnant gastrectomy, transverse/descending colon resection and conversion to ex lap for completion of colocolostomy  +ve bowel function  PO intake improving  analgesics    HTN    Plan:  Diet as tolerated (Goal of 48 ounces of fluid with 60 grams of protein at minimum prior to discharge)  Probiotics BID  MVI QD  Melatonin QHS  HTN - HCTZ and metoprolol per SLIM (BP improved)  Pain control   OOB/ambulating halls   Cont PT   Saline lock    IS   DVT ppx (lovenox; SCDs)  Lab holiday  Dispo planning     Subjective/Objective     Subjective: Panic attack after stating that she overdid it with ambulating yesterday; she is still passing flatus; no BM yesterday; oral intake is improving; she was able to have some cottage cheese, homemade soup and 2 ensure max protein shakes yesterday; pain is controlled with oral analgesics     Objective: Looks well    Blood pressure 132/77, pulse 65, temperature 97 5 °F (36 4 °C), temperature source Oral, resp  rate 18, height 5' 9" (1 753 m), weight 107 kg (235 lb 14 3 oz), SpO2 95 %, not currently breastfeeding  ,Body mass index is 34 84 kg/m²        Intake/Output Summary (Last 24 hours) at 3/15/2019 1039  Last data filed at 3/15/2019 0903  Gross per 24 hour   Intake 200 ml   Output 2400 ml   Net -2200 ml         Physical Exam:     No distress   RRR  Breathing non labored   Abdomen appropriately tender; soft, ND, wounds C/D/I without erythema (steristrips in place)       Lab, Imaging and other studies:  CBC:   No results found for: WBC, HGB, HCT, MCV, PLT, ADJUSTEDWBC, MCH, MCHC, RDW, MPV, NRBC, CMP:   No results found for: SODIUM, K, CL, CO2, ANIONGAP, BUN, CREATININE, GLUCOSE, CALCIUM, AST, ALT, ALKPHOS, PROT, BILITOT, EGFR    VTE Pharmacologic Prophylaxis: Enoxaparin (Lovenox)  VTE Mechanical Prophylaxis: sequential compression device

## 2019-03-15 NOTE — PROGRESS NOTES
[facility] Progress Note  Patient: Poncho Avendaño 51 y o  female   MRN: [id number]  PCP: Reyna Ramirez  Unit/Bed#: -01 Encounter: [id number]  Date Of Visit: 03/15/19    Assessment:    Principal Problem:    Gastric outflow obstruction  Active Problems:    Abdominal pain    Essential hypertension    Tachycardia      Plan:    · 1  S/p laprascopic remnant gastrectomy, transverse/descending colon resection and conversion to ex lap for completion of colocolostomy- POD#8  Bariatric surgery following  Continue pain control  Patient diet advanced  · 2  HTN- patient HCTZ restarted  Metoprolol 50mg PO BID also added  BP better controlled  · 3  Anemia- stable  Mostlikely postop related  · 4  LEANA- resolved  · 5  Diarrhea- Stopped  C diff negative       VTE Pharmacologic Prophylaxis:   Pharmacologic: Enoxaparin (Lovenox)  Mechanical VTE Prophylaxis in Place: Yes    Patient Centered Rounds: I have performed bedside rounds with nursing staff today  Discussions with Specialists or Other Care Team Provider:     Education and Discussions with Family / Patient:     Time Spent for Care: 20 minutes  More than 50% of total time spent on counseling and coordination of care as described above  Current Length of Stay: 8 day(s)    Current Patient Status: Inpatient   Certification Statement: The patient will continue to require additional inpatient hospital stay due to abdominal pain    Discharge Plan / Estimated Discharge Date: once surgery clears    Code Status: Level 1 - Full Code      Subjective:   Patient seen and examined at bedside  Patient has no new complaints  Objective:     Vitals:   Temp (24hrs), Av 8 °F (36 6 °C), Min:97 5 °F (36 4 °C), Max:98 1 °F (36 7 °C)    Temp:  [97 5 °F (36 4 °C)-98 1 °F (36 7 °C)] 97 5 °F (36 4 °C)  HR:  [59-68] 65  Resp:  [17-20] 18  BP: (126-148)/(60-78) 132/77  SpO2:  [92 %-97 %] 95 %  Body mass index is 34 84 kg/m²       Input and Output Summary (last 24 hours): Intake/Output Summary (Last 24 hours) at 3/15/2019 1250  Last data filed at 3/15/2019 1372  Gross per 24 hour   Intake 440 ml   Output 1800 ml   Net -1360 ml       Physical Exam:     Physical Exam   Constitutional: She is oriented to person, place, and time  She appears well-developed and well-nourished  HENT:   Head: Normocephalic and atraumatic  Eyes: Pupils are equal, round, and reactive to light  EOM are normal    Neck: Normal range of motion  Neck supple  No JVD present  No tracheal deviation present  No thyromegaly present  Cardiovascular: Normal rate, regular rhythm and normal heart sounds  Exam reveals no gallop and no friction rub  No murmur heard  Pulmonary/Chest: Effort normal and breath sounds normal  No stridor  No respiratory distress  She has no wheezes  Abdominal: She exhibits distension  There is tenderness  Musculoskeletal: Normal range of motion  She exhibits no edema  Neurological: She is alert and oriented to person, place, and time  Skin: Skin is warm and dry  Vitals reviewed  Additional Data:     Labs:    Results from last 7 days   Lab Units 03/14/19  0453 03/13/19  0500 03/12/19  0437   WBC Thousand/uL 7 69 7 82 7 77   HEMOGLOBIN g/dL 10 3* 10 4* 10 2*   HEMATOCRIT % 31 8* 32 0* 31 7*   PLATELETS Thousands/uL 476* 457* 440*   NEUTROS PCT %  --   --  73   LYMPHS PCT %  --   --  10*   LYMPHO PCT %  --  13*  --    MONOS PCT %  --   --  8   MONO PCT %  --  8  --    EOS PCT %  --  8* 7*     Results from last 7 days   Lab Units 03/14/19  0453   POTASSIUM mmol/L 3 7   CHLORIDE mmol/L 100   CO2 mmol/L 29   BUN mg/dL 5   CREATININE mg/dL 0 72   CALCIUM mg/dL 9 1           * I Have Reviewed All Lab Data Listed Above  * Additional Pertinent Lab Tests Reviewed:  All [place] Admission Reviewed    Imaging:    Imaging Reports Reviewed Today Include:   Imaging Personally Reviewed by Myself Includes:      Recent Cultures (last 7 days):     Results from last 7 days   Lab Units 03/13/19  1614   C DIFF TOXIN B  NEGATIVE for C difficle toxin by PCR  Last 24 Hours Medication List:     Current Facility-Administered Medications:  acetaminophen 975 mg Oral Q6H [address] Samantha Mary MD   enoxaparin 40 mg Subcutaneous Q24H [address] Samantha Mary MD   hydrALAZINE 10 mg Intravenous Q6H PRN Flash Olson MD   hydrochlorothiazide 12 5 mg Oral Daily Samantha Mary MD   HYDROmorphone 0 5 mg Intravenous Q4H PRN Samantha Mary MD   LORazepam 0 5 mg Intravenous Q6H PRN Christelle Rust, CRNP   melatonin 6 mg Oral HS Samantha Mary MD   metoclopramide 5 mg Intravenous Q6H PRN Samantha Mary MD   metoprolol tartrate 50 mg Oral Q12H [address] Flash Olson MD   multivitamin-minerals 1 tablet Oral BID Samantha Mary MD   naloxone 0 04 mg Intravenous Q3 min PRN Christelle Rust, CRNP   ondansetron 4 mg Intravenous Q4H PRN Christelle Rust, CRNP   oxyCODONE 10 mg Oral Q4H PRN Samantha Mary MD   oxyCODONE 5 mg Oral Q4H PRN Samantha Mary MD   promethazine 25 mg Intramuscular Q6H PRN Christelle Rust, CRNP   saccharomyces boulardii 250 mg Oral BID Samantha Mary MD   simethicone 80 mg Oral Q6H PRN Christelle Rust, CRNP   venlafaxine 37 5 mg Oral Daily Kika Burnham MD        Today, Patient Was Seen By: Flash Olson MD    ** Please Note: This note has been constructed using a voice recognition system   **

## 2019-03-15 NOTE — PLAN OF CARE
Problem: DISCHARGE PLANNING - CARE MANAGEMENT  Goal: Discharge to post-acute care or home with appropriate resources  Description  INTERVENTIONS:  - Conduct assessment to determine patient/family and health care team treatment goals, and need for post-acute services based on payer coverage, community resources, and patient preferences, and barriers to discharge  - Address psychosocial, clinical, and financial barriers to discharge as identified in assessment in conjunction with the patient/family and health care team  - Arrange appropriate level of post-acute services according to patient?s   needs and preference and payer coverage in collaboration with the physician and health care team  - Communicate with and update the patient/family, physician, and health care team regarding progress on the discharge plan  - Arrange appropriate transportation to post-acute venues  Outcome: Progressing     Problem: Prexisting or High Potential for Compromised Skin Integrity  Goal: Skin integrity is maintained or improved  Description  INTERVENTIONS:  - Identify patients at risk for skin breakdown  - Assess and monitor skin integrity  - Assess and monitor nutrition and hydration status  - Monitor labs (i e  albumin)  - Assess for incontinence   - Turn and reposition patient  - Assist with mobility/ambulation  - Relieve pressure over bony prominences  - Avoid friction and shearing  - Provide appropriate hygiene as needed including keeping skin clean and dry  - Evaluate need for skin moisturizer/barrier cream  - Collaborate with interdisciplinary team (i e  Nutrition, Rehabilitation, etc )   - Patient/family teaching  Outcome: Progressing     Problem: Potential for Falls  Goal: Patient will remain free of falls  Description  INTERVENTIONS:  - Assess patient frequently for physical needs  -  Identify cognitive and physical deficits and behaviors that affect risk of falls    -  [place] fall precautions as indicated by assessment   - Educate patient/family on patient safety including physical limitations  - Instruct patient to call for assistance with activity based on assessment  - Modify environment to reduce risk of injury  - Consider OT/PT consult to assist with strengthening/mobility  Outcome: Progressing     Problem: PAIN - ADULT  Goal: Verbalizes/displays adequate comfort level or baseline comfort level  Description  Interventions:  - Encourage patient to monitor pain and request assistance  - Assess pain using appropriate pain scale  - Administer analgesics based on type and severity of pain and evaluate response  - Implement non-pharmacological measures as appropriate and evaluate response  - Consider cultural and social influences on pain and pain management  - Notify physician/advanced practitioner if interventions unsuccessful or patient reports new pain  Outcome: Progressing     Problem: INFECTION - ADULT  Goal: Absence or prevention of progression during hospitalization  Description  INTERVENTIONS:  - Assess and monitor for signs and symptoms of infection  - Monitor lab/diagnostic results  - Monitor all insertion sites, i e  indwelling lines, tubes, and drains  - Monitor endotracheal (as able) and nasal secretions for changes in amount and color  - Redig appropriate cooling/warming therapies per order  - Administer medications as ordered  - Instruct and encourage patient and family to use good hand hygiene technique  - Identify and instruct in appropriate isolation precautions for identified infection/condition  Outcome: Progressing  Goal: Absence of fever/infection during neutropenic period  Description  INTERVENTIONS:  - Monitor WBC  - Implement neutropenic guidelines  Outcome: Progressing     Problem: SAFETY ADULT  Goal: Patient will remain free of falls  Description  INTERVENTIONS:  - Assess patient frequently for physical needs  -  Identify cognitive and physical deficits and behaviors that affect risk of falls   -  [place] fall precautions as indicated by assessment   - Educate patient/family on patient safety including physical limitations  - Instruct patient to call for assistance with activity based on assessment  - Modify environment to reduce risk of injury  - Consider OT/PT consult to assist with strengthening/mobility  Outcome: Progressing  Goal: Maintain or return to baseline ADL function  Description  INTERVENTIONS:  -  Assess patient's ability to carry out ADLs; assess patient's baseline for ADL function and identify physical deficits which impact ability to perform ADLs (bathing, care of mouth/teeth, toileting, grooming, dressing, etc )  - Assess/evaluate cause of self-care deficits   - Assess range of motion  - Assess patient's mobility; develop plan if impaired  - Assess patient's need for assistive devices and provide as appropriate  - Encourage maximum independence but intervene and supervise when necessary  ¯ Involve family in performance of ADLs  ¯ Assess for home care needs following discharge   ¯ Request OT consult to assist with ADL evaluation and planning for discharge  ¯ Provide patient education as appropriate  Outcome: Progressing  Goal: Maintain or return mobility status to optimal level  Description  INTERVENTIONS:  - Assess patient's baseline mobility status (ambulation, transfers, stairs, etc )    - Identify cognitive and physical deficits and behaviors that affect mobility  - Identify mobility aids required to assist with transfers and/or ambulation (gait belt, sit-to-stand, lift, walker, cane, etc )  - [place] fall precautions as indicated by assessment  - Record patient progress and toleration of activity level on Mobility SBAR; progress patient to next Phase/Stage  - Instruct patient to call for assistance with activity based on assessment  - Request Rehabilitation consult to assist with strengthening/weightbearing, etc   Outcome: Progressing     Problem: DISCHARGE PLANNING  Goal: Discharge to home or other facility with appropriate resources  Description  INTERVENTIONS:  - Identify barriers to discharge w/patient and caregiver  - Arrange for needed discharge resources and transportation as appropriate  - Identify discharge learning needs (meds, wound care, etc )  - Arrange for interpretive services to assist at discharge as needed  - Refer to Case Management Department for coordinating discharge planning if the patient needs post-hospital services based on physician/advanced practitioner order or complex needs related to functional status, cognitive ability, or social support system  Outcome: Progressing     Problem: Knowledge Deficit  Goal: Patient/family/caregiver demonstrates understanding of disease process, treatment plan, medications, and discharge instructions  Description  Complete learning assessment and assess knowledge base  Interventions:  - Provide teaching at level of understanding  - Provide teaching via preferred learning methods  Outcome: Progressing     Problem: Nutrition/Hydration-ADULT  Goal: Nutrient/Hydration intake appropriate for improving, restoring or maintaining nutritional needs  Description  Monitor and assess patient's nutrition/hydration status for malnutrition (ex- brittle hair, bruises, dry skin, pale skin and conjunctiva, muscle wasting, smooth red tongue, and disorientation)  Collaborate with interdisciplinary team and initiate plan and interventions as ordered  Monitor patient's weight and dietary intake as ordered or per policy  Utilize nutrition screening tool and intervene per policy  Determine patient's food preferences and provide high-protein, high-caloric foods as appropriate       INTERVENTIONS:  - Monitor oral intake, urinary output, labs, and treatment plans  - Assess nutrition and hydration status and recommend course of action  - Evaluate amount of meals eaten  - Assist patient with eating if necessary   - Allow adequate time for meals  - Recommend/ encourage appropriate diets, oral nutritional supplements, and vitamin/mineral supplements  - Order, calculate, and assess calorie counts as needed  - Assess need for intravenous fluids  - Provide nutrition/hydration education as appropriate  - Include patient/family/caregiver in decisions related to nutrition   Outcome: Progressing

## 2019-03-15 NOTE — NURSING NOTE
Patient stated being anxious and having SOB  Patient's vital signs stable  Ativan 0 5 mg IV given as per MD's order  Will continue to monitor

## 2019-03-15 NOTE — PLAN OF CARE
Problem: DISCHARGE PLANNING - CARE MANAGEMENT  Goal: Discharge to post-acute care or home with appropriate resources  Description  INTERVENTIONS:  - Conduct assessment to determine patient/family and health care team treatment goals, and need for post-acute services based on payer coverage, community resources, and patient preferences, and barriers to discharge  - Address psychosocial, clinical, and financial barriers to discharge as identified in assessment in conjunction with the patient/family and health care team  - Arrange appropriate level of post-acute services according to patient?s   needs and preference and payer coverage in collaboration with the physician and health care team  - Communicate with and update the patient/family, physician, and health care team regarding progress on the discharge plan  - Arrange appropriate transportation to post-acute venues  Outcome: Progressing  Note:   Per bariatric surgeon, pt may be able to d/c tomorrow if able to tolerate PO supplements and work with PT  PT notified to prioritize pt for today  Per CM notes pt previously refused VNA as her daughters are CNAs   CM department will continue to follow for needs at discharge

## 2019-03-16 PROCEDURE — 99232 SBSQ HOSP IP/OBS MODERATE 35: CPT | Performed by: INTERNAL MEDICINE

## 2019-03-16 PROCEDURE — 99024 POSTOP FOLLOW-UP VISIT: CPT | Performed by: SURGERY

## 2019-03-16 RX ORDER — ACETAMINOPHEN 325 MG/1
650 TABLET ORAL EVERY 6 HOURS SCHEDULED
Status: DISCONTINUED | OUTPATIENT
Start: 2019-03-16 | End: 2019-03-18 | Stop reason: HOSPADM

## 2019-03-16 RX ADMIN — OXYCODONE HYDROCHLORIDE 5 MG: 5 TABLET ORAL at 08:44

## 2019-03-16 RX ADMIN — Medication 1 TABLET: at 17:55

## 2019-03-16 RX ADMIN — SIMETHICONE CHEW TAB 80 MG 80 MG: 80 TABLET ORAL at 08:44

## 2019-03-16 RX ADMIN — OXYCODONE HYDROCHLORIDE 10 MG: 10 TABLET ORAL at 14:09

## 2019-03-16 RX ADMIN — HYDROCHLOROTHIAZIDE 12.5 MG: 12.5 TABLET ORAL at 08:44

## 2019-03-16 RX ADMIN — ENOXAPARIN SODIUM 40 MG: 40 INJECTION SUBCUTANEOUS at 08:44

## 2019-03-16 RX ADMIN — METOPROLOL TARTRATE 50 MG: 50 TABLET, FILM COATED ORAL at 22:13

## 2019-03-16 RX ADMIN — Medication 250 MG: at 08:44

## 2019-03-16 RX ADMIN — MELATONIN 6 MG: 3 TAB ORAL at 22:12

## 2019-03-16 RX ADMIN — OXYCODONE HYDROCHLORIDE 5 MG: 5 TABLET ORAL at 18:46

## 2019-03-16 RX ADMIN — ACETAMINOPHEN 975 MG: 325 TABLET, FILM COATED ORAL at 07:54

## 2019-03-16 RX ADMIN — OXYCODONE HYDROCHLORIDE 10 MG: 10 TABLET ORAL at 20:02

## 2019-03-16 RX ADMIN — Medication 1 TABLET: at 08:44

## 2019-03-16 RX ADMIN — ACETAMINOPHEN 650 MG: 325 TABLET, FILM COATED ORAL at 17:55

## 2019-03-16 RX ADMIN — Medication 250 MG: at 17:55

## 2019-03-16 RX ADMIN — VENLAFAXINE HYDROCHLORIDE 37.5 MG: 37.5 CAPSULE, EXTENDED RELEASE ORAL at 08:44

## 2019-03-16 RX ADMIN — SIMETHICONE CHEW TAB 80 MG 80 MG: 80 TABLET ORAL at 14:09

## 2019-03-16 RX ADMIN — ACETAMINOPHEN 650 MG: 325 TABLET, FILM COATED ORAL at 14:09

## 2019-03-16 RX ADMIN — LORAZEPAM 0.5 MG: 2 INJECTION INTRAMUSCULAR; INTRAVENOUS at 17:56

## 2019-03-16 RX ADMIN — METOPROLOL TARTRATE 50 MG: 50 TABLET, FILM COATED ORAL at 08:44

## 2019-03-16 RX ADMIN — LORAZEPAM 0.5 MG: 2 INJECTION INTRAMUSCULAR; INTRAVENOUS at 08:04

## 2019-03-16 NOTE — PROGRESS NOTES
[facility] Progress Note  Patient: Sherrill Arrington 51 y o  female   MRN: [id number]  PCP: Jami Naranjo  Unit/Bed#: -01 Encounter: [id number]  Date Of Visit: 19    Assessment:    Principal Problem:    Gastric outflow obstruction  Active Problems:    Abdominal pain    Essential hypertension    Tachycardia      Plan:    · 1  S/p laprascopic remnant gastrectomy, transverse/descending colon resection and conversion to ex lap for completion of colocolostomy- POD#9  Bariatric surgery following  Continue pain control  Patient for possible dc home on 3/18/19   · 2  HTN- stable on HCTZ and metoprolol  · 3  Anemia- stable  Mostlikely postop related  · 4  LEANA- resolved  · 5  Diarrhea- Stopped  C diff negative       VTE Pharmacologic Prophylaxis:   Pharmacologic: Enoxaparin (Lovenox)  Mechanical VTE Prophylaxis in Place: Yes    Patient Centered Rounds: I have performed bedside rounds with nursing staff today  Discussions with Specialists or Other Care Team Provider:     Education and Discussions with Family / Patient:     Time Spent for Care: 20 minutes  More than 50% of total time spent on counseling and coordination of care as described above  Current Length of Stay: 9 day(s)    Current Patient Status: Inpatient   Certification Statement: The patient will continue to require additional inpatient hospital stay due to abdominal pain    Discharge Plan / Estimated Discharge Date: once surgery clears    Code Status: Level 1 - Full Code      Subjective:   Patient seen and examined at bedside  Patient has no new complaints  Objective:     Vitals:   Temp (24hrs), Av 2 °F (36 8 °C), Min:97 6 °F (36 4 °C), Max:98 6 °F (37 °C)    Temp:  [97 6 °F (36 4 °C)-98 6 °F (37 °C)] 98 3 °F (36 8 °C)  HR:  [] 81  Resp:  [17-18] 18  BP: (122-142)/(66-84) 142/84  SpO2:  [94 %-95 %] 95 %  Body mass index is 34 38 kg/m²  Input and Output Summary (last 24 hours):        Intake/Output Summary (Last 24 hours) at 3/16/2019 1407  Last data filed at 3/16/2019 1300  Gross per 24 hour   Intake 898 ml   Output 1200 ml   Net -302 ml       Physical Exam:     Physical Exam   Constitutional: She is oriented to person, place, and time  She appears well-developed and well-nourished  HENT:   Head: Normocephalic and atraumatic  Eyes: Pupils are equal, round, and reactive to light  EOM are normal    Neck: Normal range of motion  Neck supple  No JVD present  No tracheal deviation present  No thyromegaly present  Cardiovascular: Normal rate, regular rhythm and normal heart sounds  Exam reveals no gallop and no friction rub  No murmur heard  Pulmonary/Chest: Effort normal and breath sounds normal  No stridor  No respiratory distress  She has no wheezes  Abdominal: She exhibits distension  There is tenderness  Musculoskeletal: Normal range of motion  She exhibits no edema  Neurological: She is alert and oriented to person, place, and time  Skin: Skin is warm and dry  Vitals reviewed  Additional Data:     Labs:    Results from last 7 days   Lab Units 03/14/19  0453 03/13/19  0500 03/12/19  0437   WBC Thousand/uL 7 69 7 82 7 77   HEMOGLOBIN g/dL 10 3* 10 4* 10 2*   HEMATOCRIT % 31 8* 32 0* 31 7*   PLATELETS Thousands/uL 476* 457* 440*   NEUTROS PCT %  --   --  73   LYMPHS PCT %  --   --  10*   LYMPHO PCT %  --  13*  --    MONOS PCT %  --   --  8   MONO PCT %  --  8  --    EOS PCT %  --  8* 7*     Results from last 7 days   Lab Units 03/14/19  0453   POTASSIUM mmol/L 3 7   CHLORIDE mmol/L 100   CO2 mmol/L 29   BUN mg/dL 5   CREATININE mg/dL 0 72   CALCIUM mg/dL 9 1           * I Have Reviewed All Lab Data Listed Above  * Additional Pertinent Lab Tests Reviewed:  [facility] [facility] Admission Reviewed    Imaging:    Imaging Reports Reviewed Today Include:   Imaging Personally Reviewed by Myself Includes:      Recent Cultures (last 7 days):     Results from last 7 days   Lab Units 03/13/19  1614   C DIFF TOXIN B  NEGATIVE for C difficle toxin by PCR  Last 24 Hours Medication List:     Current Facility-Administered Medications:  acetaminophen 650 mg Oral Q6H [address] Ludivina Dow MD   enoxaparin 40 mg Subcutaneous Q24H [address] Ludivina Dow MD   hydrALAZINE 10 mg Intravenous Q6H PRN Ronnie Christianson MD   hydrochlorothiazide 12 5 mg Oral Daily Ludivina Dow MD   LORazepam 0 5 mg Intravenous Q6H PRN Mildred Otis, CRNP   melatonin 6 mg Oral HS Ludivina Dow MD   metoclopramide 5 mg Intravenous Q6H PRN Ludivina Dow MD   metoprolol tartrate 50 mg Oral Q12H [address] Ronnie Christianson MD   multivitamin-minerals 1 tablet Oral BID Ludivina Dow MD   naloxone 0 04 mg Intravenous Q3 min PRN Mildred Otis, CRNP   ondansetron 4 mg Intravenous Q4H PRN Mildred Otis, CRNP   oxyCODONE 10 mg Oral Q4H PRN Ludivina Dow MD   oxyCODONE 5 mg Oral Q4H PRN Ludivina Dow MD   promethazine 25 mg Intramuscular Q6H PRN Mildred Otis, CRNP   saccharomyces boulardii 250 mg Oral BID Ludivina Dow MD   simethicone 80 mg Oral Q6H PRN Mildred Otis, CRNP   venlafaxine 37 5 mg Oral Daily Dorei Fraga MD        Today, Patient Was Seen By: Ronnie Christianson MD    ** Please Note: This note has been constructed using a voice recognition system   **

## 2019-03-16 NOTE — PROGRESS NOTES
Progress Note - Bariatric Surgery   Naomy De Paz 51 y o  female MRN: [id number]  Unit/Bed#: -01 Encounter: [id number]    Assessment:  48/F POD 9 s/p laparoscopic remnant gastrectomy, transverse/descending colon resection and conversion to ex lap for completion of colocolostomy  +ve bowel function  PO intake continues to improve       Plan:  Regular diet with protein supplements  Probiotics BID  MVI QD  Melatonin QHS  HTN - HCTZ and metoprolol per SLIM (BP improved)  Pain control (IV dilaudid discontinued)  OOB/ambulating halls   Cont PT   Saline lock    IS   DVT ppx (lovenox; SCDs)  Lab holiday  Dispo planning - likely home 3/18/19    Subjective/Objective     Subjective: Patient oral intake continues to improve and was able to eat two meals yesterday in addition to a single protein supplement  Pain controlled  Still passing flatus but no bowel movements for the past two days  Objective: Looks well    Blood pressure 142/84, pulse 81, temperature 98 3 °F (36 8 °C), temperature source Oral, resp  rate 18, height 5' 9" (1 753 m), weight 106 kg (232 lb 12 9 oz), SpO2 95 %, not currently breastfeeding  ,Body mass index is 34 38 kg/m²        Intake/Output Summary (Last 24 hours) at 3/16/2019 1226  Last data filed at 3/16/2019 0723  Gross per 24 hour   Intake 250 ml   Output 1400 ml   Net -1150 ml         Physical Exam:     No distress   RRR  Breathing non labored   Abdomen appropriately tender; soft, ND, wounds C/D/I without erythema (steristrips in place)       Lab, Imaging and other studies:  CBC:   No results found for: WBC, HGB, HCT, MCV, PLT, ADJUSTEDWBC, MCH, MCHC, RDW, MPV, NRBC, CMP:   No results found for: SODIUM, K, CL, CO2, ANIONGAP, BUN, CREATININE, GLUCOSE, CALCIUM, AST, ALT, ALKPHOS, PROT, BILITOT, EGFR    VTE Pharmacologic Prophylaxis: Enoxaparin (Lovenox)  VTE Mechanical Prophylaxis: sequential compression device

## 2019-03-16 NOTE — PLAN OF CARE
Problem: DISCHARGE PLANNING - CARE MANAGEMENT  Goal: Discharge to post-acute care or home with appropriate resources  Description  INTERVENTIONS:  - Conduct assessment to determine patient/family and health care team treatment goals, and need for post-acute services based on payer coverage, community resources, and patient preferences, and barriers to discharge  - Address psychosocial, clinical, and financial barriers to discharge as identified in assessment in conjunction with the patient/family and health care team  - Arrange appropriate level of post-acute services according to patient?s   needs and preference and payer coverage in collaboration with the physician and health care team  - Communicate with and update the patient/family, physician, and health care team regarding progress on the discharge plan  - Arrange appropriate transportation to post-acute venues  Outcome: Progressing     Problem: Prexisting or High Potential for Compromised Skin Integrity  Goal: Skin integrity is maintained or improved  Description  INTERVENTIONS:  - Identify patients at risk for skin breakdown  - Assess and monitor skin integrity  - Assess and monitor nutrition and hydration status  - Monitor labs (i e  albumin)  - Assess for incontinence   - Turn and reposition patient  - Assist with mobility/ambulation  - Relieve pressure over bony prominences  - Avoid friction and shearing  - Provide appropriate hygiene as needed including keeping skin clean and dry  - Evaluate need for skin moisturizer/barrier cream  - Collaborate with interdisciplinary team (i e  Nutrition, Rehabilitation, etc )   - Patient/family teaching  Outcome: Progressing     Problem: Potential for Falls  Goal: Patient will remain free of falls  Description  INTERVENTIONS:  - Assess patient frequently for physical needs  -  Identify cognitive and physical deficits and behaviors that affect risk of falls    -  [place] fall precautions as indicated by assessment   - Educate patient/family on patient safety including physical limitations  - Instruct patient to call for assistance with activity based on assessment  - Modify environment to reduce risk of injury  - Consider OT/PT consult to assist with strengthening/mobility  Outcome: Progressing     Problem: PAIN - ADULT  Goal: Verbalizes/displays adequate comfort level or baseline comfort level  Description  Interventions:  - Encourage patient to monitor pain and request assistance  - Assess pain using appropriate pain scale  - Administer analgesics based on type and severity of pain and evaluate response  - Implement non-pharmacological measures as appropriate and evaluate response  - Consider cultural and social influences on pain and pain management  - Notify physician/advanced practitioner if interventions unsuccessful or patient reports new pain  Outcome: Progressing     Problem: INFECTION - ADULT  Goal: Absence or prevention of progression during hospitalization  Description  INTERVENTIONS:  - Assess and monitor for signs and symptoms of infection  - Monitor lab/diagnostic results  - Monitor all insertion sites, i e  indwelling lines, tubes, and drains  - Monitor endotracheal (as able) and nasal secretions for changes in amount and color  - [place] appropriate cooling/warming therapies per order  - Administer medications as ordered  - Instruct and encourage patient and family to use good hand hygiene technique  - Identify and instruct in appropriate isolation precautions for identified infection/condition  Outcome: Progressing  Goal: Absence of fever/infection during neutropenic period  Description  INTERVENTIONS:  - Monitor WBC  - Implement neutropenic guidelines  Outcome: Progressing     Problem: SAFETY ADULT  Goal: Patient will remain free of falls  Description  INTERVENTIONS:  - Assess patient frequently for physical needs  -  Identify cognitive and physical deficits and behaviors that affect risk of falls   -  [place] fall precautions as indicated by assessment   - Educate patient/family on patient safety including physical limitations  - Instruct patient to call for assistance with activity based on assessment  - Modify environment to reduce risk of injury  - Consider OT/PT consult to assist with strengthening/mobility  Outcome: Progressing  Goal: Maintain or return to baseline ADL function  Description  INTERVENTIONS:  -  Assess patient's ability to carry out ADLs; assess patient's baseline for ADL function and identify physical deficits which impact ability to perform ADLs (bathing, care of mouth/teeth, toileting, grooming, dressing, etc )  - Assess/evaluate cause of self-care deficits   - Assess range of motion  - Assess patient's mobility; develop plan if impaired  - Assess patient's need for assistive devices and provide as appropriate  - Encourage maximum independence but intervene and supervise when necessary  ¯ Involve family in performance of ADLs  ¯ Assess for home care needs following discharge   ¯ Request OT consult to assist with ADL evaluation and planning for discharge  ¯ Provide patient education as appropriate  Outcome: Progressing  Goal: Maintain or return mobility status to optimal level  Description  INTERVENTIONS:  - Assess patient's baseline mobility status (ambulation, transfers, stairs, etc )    - Identify cognitive and physical deficits and behaviors that affect mobility  - Identify mobility aids required to assist with transfers and/or ambulation (gait belt, sit-to-stand, lift, walker, cane, etc )  - [place] fall precautions as indicated by assessment  - Record patient progress and toleration of activity level on Mobility SBAR; progress patient to next Phase/Stage  - Instruct patient to call for assistance with activity based on assessment  - Request Rehabilitation consult to assist with strengthening/weightbearing, etc   Outcome: Progressing     Problem: DISCHARGE PLANNING  Goal: Discharge to home or other facility with appropriate resources  Description  INTERVENTIONS:  - Identify barriers to discharge w/patient and caregiver  - Arrange for needed discharge resources and transportation as appropriate  - Identify discharge learning needs (meds, wound care, etc )  - Arrange for interpretive services to assist at discharge as needed  - Refer to Case Management Department for coordinating discharge planning if the patient needs post-hospital services based on physician/advanced practitioner order or complex needs related to functional status, cognitive ability, or social support system  Outcome: Progressing     Problem: Knowledge Deficit  Goal: Patient/family/caregiver demonstrates understanding of disease process, treatment plan, medications, and discharge instructions  Description  Complete learning assessment and assess knowledge base  Interventions:  - Provide teaching at level of understanding  - Provide teaching via preferred learning methods  Outcome: Progressing     Problem: Nutrition/Hydration-ADULT  Goal: Nutrient/Hydration intake appropriate for improving, restoring or maintaining nutritional needs  Description  Monitor and assess patient's nutrition/hydration status for malnutrition (ex- brittle hair, bruises, dry skin, pale skin and conjunctiva, muscle wasting, smooth red tongue, and disorientation)  Collaborate with interdisciplinary team and initiate plan and interventions as ordered  Monitor patient's weight and dietary intake as ordered or per policy  Utilize nutrition screening tool and intervene per policy  Determine patient's food preferences and provide high-protein, high-caloric foods as appropriate       INTERVENTIONS:  - Monitor oral intake, urinary output, labs, and treatment plans  - Assess nutrition and hydration status and recommend course of action  - Evaluate amount of meals eaten  - Assist patient with eating if necessary   - Allow adequate time for meals  - Recommend/ encourage appropriate diets, oral nutritional supplements, and vitamin/mineral supplements  - Order, calculate, and assess calorie counts as needed  - Assess need for intravenous fluids  - Provide nutrition/hydration education as appropriate  - Include patient/family/caregiver in decisions related to nutrition   Outcome: Progressing

## 2019-03-17 PROCEDURE — 99232 SBSQ HOSP IP/OBS MODERATE 35: CPT | Performed by: INTERNAL MEDICINE

## 2019-03-17 PROCEDURE — 97116 GAIT TRAINING THERAPY: CPT

## 2019-03-17 PROCEDURE — 99024 POSTOP FOLLOW-UP VISIT: CPT | Performed by: SURGERY

## 2019-03-17 RX ORDER — OXYCODONE HYDROCHLORIDE 5 MG/1
5 TABLET ORAL ONCE
Status: COMPLETED | OUTPATIENT
Start: 2019-03-17 | End: 2019-03-17

## 2019-03-17 RX ORDER — POLYETHYLENE GLYCOL 3350 17 G/17G
17 POWDER, FOR SOLUTION ORAL ONCE
Status: COMPLETED | OUTPATIENT
Start: 2019-03-17 | End: 2019-03-17

## 2019-03-17 RX ORDER — MAGNESIUM CARB/ALUMINUM HYDROX 105-160MG
296 TABLET,CHEWABLE ORAL ONCE
Status: COMPLETED | OUTPATIENT
Start: 2019-03-17 | End: 2019-03-17

## 2019-03-17 RX ORDER — SIMETHICONE 80 MG
80 TABLET,CHEWABLE ORAL ONCE
Status: COMPLETED | OUTPATIENT
Start: 2019-03-17 | End: 2019-03-17

## 2019-03-17 RX ADMIN — ENOXAPARIN SODIUM 40 MG: 40 INJECTION SUBCUTANEOUS at 08:44

## 2019-03-17 RX ADMIN — MELATONIN 6 MG: 3 TAB ORAL at 20:41

## 2019-03-17 RX ADMIN — OXYCODONE HYDROCHLORIDE 5 MG: 5 TABLET ORAL at 06:17

## 2019-03-17 RX ADMIN — OXYCODONE HYDROCHLORIDE 5 MG: 5 TABLET ORAL at 10:44

## 2019-03-17 RX ADMIN — Medication 1 TABLET: at 08:44

## 2019-03-17 RX ADMIN — SIMETHICONE CHEW TAB 80 MG 80 MG: 80 TABLET ORAL at 08:43

## 2019-03-17 RX ADMIN — VENLAFAXINE HYDROCHLORIDE 37.5 MG: 37.5 CAPSULE, EXTENDED RELEASE ORAL at 08:44

## 2019-03-17 RX ADMIN — HYDROCHLOROTHIAZIDE 12.5 MG: 12.5 TABLET ORAL at 08:44

## 2019-03-17 RX ADMIN — OXYCODONE HYDROCHLORIDE 10 MG: 10 TABLET ORAL at 20:41

## 2019-03-17 RX ADMIN — Medication 1 TABLET: at 17:56

## 2019-03-17 RX ADMIN — OXYCODONE HYDROCHLORIDE 5 MG: 5 TABLET ORAL at 14:32

## 2019-03-17 RX ADMIN — Medication 250 MG: at 17:56

## 2019-03-17 RX ADMIN — SIMETHICONE 80 MG: 80 TABLET, CHEWABLE ORAL at 13:32

## 2019-03-17 RX ADMIN — POLYETHYLENE GLYCOL 3350 17 G: 17 POWDER, FOR SOLUTION ORAL at 15:01

## 2019-03-17 RX ADMIN — METOPROLOL TARTRATE 50 MG: 50 TABLET, FILM COATED ORAL at 20:42

## 2019-03-17 RX ADMIN — MAGESIUM CITRATE 296 ML: 1.75 LIQUID ORAL at 20:42

## 2019-03-17 RX ADMIN — METOPROLOL TARTRATE 50 MG: 50 TABLET, FILM COATED ORAL at 08:44

## 2019-03-17 RX ADMIN — Medication 250 MG: at 08:45

## 2019-03-17 NOTE — PLAN OF CARE
Problem: PHYSICAL THERAPY ADULT  Goal: Performs mobility at highest level of function for planned discharge setting  See evaluation for individualized goals  Description  Treatment/Interventions: Functional transfer training, LE strengthening/ROM, Elevations, Therapeutic exercise, Endurance training, Patient/family training, Equipment eval/education, Bed mobility, Gait training, Spoke to nursing, Spoke to MD, Family, Spoke to case management  Equipment Recommended: Walker(RW)       See flowsheet documentation for full assessment, interventions and recommendations  Outcome: Progressing  Note:   Prognosis: Good  Problem List: Decreased strength, Decreased endurance, Impaired balance, Decreased mobility, Pain, Decreased skin integrity  Assessment: pt demonstrating progress c PT  performed all OOB mobility tasks c (S)  progressed ambulation distance to 100'x2 c RW c rest for stair training  negotiated 6 stairs in stairwell c R handrail c cues for sequencing + pacing  pt cont to require min (A)x1 to complete bed mobility 2* pain  will cont to follow during hospital course to maximize functional mobility + return home safely  upon d/c, recommend pt return home c family support once medically cleared by MD  will also need RW for home use  Barriers to Discharge: None  Barriers to Discharge Comments: 10 TORI into sister-in-laws home where pt plans to stay post d/c  Recommendation: Home with family support     PT - OK to Discharge: Yes    See flowsheet documentation for full assessment

## 2019-03-17 NOTE — PLAN OF CARE
Problem: DISCHARGE PLANNING - CARE MANAGEMENT  Goal: Discharge to post-acute care or home with appropriate resources  Description  INTERVENTIONS:  - Conduct assessment to determine patient/family and health care team treatment goals, and need for post-acute services based on payer coverage, community resources, and patient preferences, and barriers to discharge  - Address psychosocial, clinical, and financial barriers to discharge as identified in assessment in conjunction with the patient/family and health care team  - Arrange appropriate level of post-acute services according to patient?s   needs and preference and payer coverage in collaboration with the physician and health care team  - Communicate with and update the patient/family, physician, and health care team regarding progress on the discharge plan  - Arrange appropriate transportation to post-acute venues  Outcome: Progressing     Problem: Prexisting or High Potential for Compromised Skin Integrity  Goal: Skin integrity is maintained or improved  Description  INTERVENTIONS:  - Identify patients at risk for skin breakdown  - Assess and monitor skin integrity  - Assess and monitor nutrition and hydration status  - Monitor labs (i e  albumin)  - Assess for incontinence   - Turn and reposition patient  - Assist with mobility/ambulation  - Relieve pressure over bony prominences  - Avoid friction and shearing  - Provide appropriate hygiene as needed including keeping skin clean and dry  - Evaluate need for skin moisturizer/barrier cream  - Collaborate with interdisciplinary team (i e  Nutrition, Rehabilitation, etc )   - Patient/family teaching  Outcome: Progressing     Problem: Potential for Falls  Goal: Patient will remain free of falls  Description  INTERVENTIONS:  - Assess patient frequently for physical needs  -  Identify cognitive and physical deficits and behaviors that affect risk of falls    -  [place] fall precautions as indicated by assessment   - Educate patient/family on patient safety including physical limitations  - Instruct patient to call for assistance with activity based on assessment  - Modify environment to reduce risk of injury  - Consider OT/PT consult to assist with strengthening/mobility  Outcome: Progressing     Problem: PAIN - ADULT  Goal: Verbalizes/displays adequate comfort level or baseline comfort level  Description  Interventions:  - Encourage patient to monitor pain and request assistance  - Assess pain using appropriate pain scale  - Administer analgesics based on type and severity of pain and evaluate response  - Implement non-pharmacological measures as appropriate and evaluate response  - Consider cultural and social influences on pain and pain management  - Notify physician/advanced practitioner if interventions unsuccessful or patient reports new pain  Outcome: Progressing     Problem: INFECTION - ADULT  Goal: Absence or prevention of progression during hospitalization  Description  INTERVENTIONS:  - Assess and monitor for signs and symptoms of infection  - Monitor lab/diagnostic results  - Monitor all insertion sites, i e  indwelling lines, tubes, and drains  - Monitor endotracheal (as able) and nasal secretions for changes in amount and color  - [place] appropriate cooling/warming therapies per order  - Administer medications as ordered  - Instruct and encourage patient and family to use good hand hygiene technique  - Identify and instruct in appropriate isolation precautions for identified infection/condition  Outcome: Progressing  Goal: Absence of fever/infection during neutropenic period  Description  INTERVENTIONS:  - Monitor WBC  - Implement neutropenic guidelines  Outcome: Progressing     Problem: SAFETY ADULT  Goal: Patient will remain free of falls  Description  INTERVENTIONS:  - Assess patient frequently for physical needs  -  Identify cognitive and physical deficits and behaviors that affect risk of falls   -  [place] fall precautions as indicated by assessment   - Educate patient/family on patient safety including physical limitations  - Instruct patient to call for assistance with activity based on assessment  - Modify environment to reduce risk of injury  - Consider OT/PT consult to assist with strengthening/mobility  Outcome: Progressing  Goal: Maintain or return to baseline ADL function  Description  INTERVENTIONS:  -  Assess patient's ability to carry out ADLs; assess patient's baseline for ADL function and identify physical deficits which impact ability to perform ADLs (bathing, care of mouth/teeth, toileting, grooming, dressing, etc )  - Assess/evaluate cause of self-care deficits   - Assess range of motion  - Assess patient's mobility; develop plan if impaired  - Assess patient's need for assistive devices and provide as appropriate  - Encourage maximum independence but intervene and supervise when necessary  ¯ Involve family in performance of ADLs  ¯ Assess for home care needs following discharge   ¯ Request OT consult to assist with ADL evaluation and planning for discharge  ¯ Provide patient education as appropriate  Outcome: Progressing  Goal: Maintain or return mobility status to optimal level  Description  INTERVENTIONS:  - Assess patient's baseline mobility status (ambulation, transfers, stairs, etc )    - Identify cognitive and physical deficits and behaviors that affect mobility  - Identify mobility aids required to assist with transfers and/or ambulation (gait belt, sit-to-stand, lift, walker, cane, etc )  - [place] fall precautions as indicated by assessment  - Record patient progress and toleration of activity level on Mobility SBAR; progress patient to next Phase/Stage  - Instruct patient to call for assistance with activity based on assessment  - Request Rehabilitation consult to assist with strengthening/weightbearing, etc   Outcome: Progressing     Problem: DISCHARGE PLANNING  Goal: Discharge to home or other facility with appropriate resources  Description  INTERVENTIONS:  - Identify barriers to discharge w/patient and caregiver  - Arrange for needed discharge resources and transportation as appropriate  - Identify discharge learning needs (meds, wound care, etc )  - Arrange for interpretive services to assist at discharge as needed  - Refer to Case Management Department for coordinating discharge planning if the patient needs post-hospital services based on physician/advanced practitioner order or complex needs related to functional status, cognitive ability, or social support system  Outcome: Progressing     Problem: Knowledge Deficit  Goal: Patient/family/caregiver demonstrates understanding of disease process, treatment plan, medications, and discharge instructions  Description  Complete learning assessment and assess knowledge base  Interventions:  - Provide teaching at level of understanding  - Provide teaching via preferred learning methods  Outcome: Progressing     Problem: Nutrition/Hydration-ADULT  Goal: Nutrient/Hydration intake appropriate for improving, restoring or maintaining nutritional needs  Description  Monitor and assess patient's nutrition/hydration status for malnutrition (ex- brittle hair, bruises, dry skin, pale skin and conjunctiva, muscle wasting, smooth red tongue, and disorientation)  Collaborate with interdisciplinary team and initiate plan and interventions as ordered  Monitor patient's weight and dietary intake as ordered or per policy  Utilize nutrition screening tool and intervene per policy  Determine patient's food preferences and provide high-protein, high-caloric foods as appropriate       INTERVENTIONS:  - Monitor oral intake, urinary output, labs, and treatment plans  - Assess nutrition and hydration status and recommend course of action  - Evaluate amount of meals eaten  - Assist patient with eating if necessary   - Allow adequate time for meals  - Recommend/ encourage appropriate diets, oral nutritional supplements, and vitamin/mineral supplements  - Order, calculate, and assess calorie counts as needed  - Assess need for intravenous fluids  - Provide nutrition/hydration education as appropriate  - Include patient/family/caregiver in decisions related to nutrition   Outcome: Progressing

## 2019-03-17 NOTE — PROGRESS NOTES
Progress Note - Bariatric Surgery   Calos Mcdonald 51 y o  female MRN: [id number]  Unit/Bed#: -01 Encounter: [id number]    Assessment:  48/F POD 10 s/p laparoscopic remnant gastrectomy, transverse/descending colon resection and conversion to ex lap for completion of colocolostomy  Plan:  Regular diet with protein supplements  Probiotics BID  MVI QD  Melatonin QHS  HTN controlled on metoprolol/HCTZ per SLIM  Pain control  OOB/ambulating halls   PT for stairs today  IS   DVT ppx (lovenox; SCDs)  Dispo planning - home tomorrow pending PT evaluation    Subjective/Objective     Subjective: Oral intake greatly improved  > 1L yesterday  Pain controlled without IV analgesics  Passing flatus but no bowel movement yesterday  Objective: Looks well    Blood pressure 115/69, pulse 68, temperature 97 9 °F (36 6 °C), temperature source Oral, resp  rate 18, height 5' 9" (1 753 m), weight 107 kg (236 lb), SpO2 95 %, not currently breastfeeding  ,Body mass index is 34 85 kg/m²        Intake/Output Summary (Last 24 hours) at 3/17/2019 1307  Last data filed at 3/17/2019 0707  Gross per 24 hour   Intake 900 ml   Output 475 ml   Net 425 ml         Physical Exam:     No distress   RRR  Breathing non labored   Abdomen appropriately tender; soft, ND, wounds C/D/I without erythema (steristrips in place)       Lab, Imaging and other studies:  CBC:   No results found for: WBC, HGB, HCT, MCV, PLT, ADJUSTEDWBC, MCH, MCHC, RDW, MPV, NRBC, CMP:   No results found for: SODIUM, K, CL, CO2, ANIONGAP, BUN, CREATININE, GLUCOSE, CALCIUM, AST, ALT, ALKPHOS, PROT, BILITOT, EGFR    VTE Pharmacologic Prophylaxis: Enoxaparin (Lovenox)  VTE Mechanical Prophylaxis: sequential compression device

## 2019-03-17 NOTE — DISCHARGE SUMMARY
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]; Gastric remnant leak at site of gastrotomy    Post-Operative Diagnosis: Post-Op Diagnosis Codes:     * Gastric outflow obstruction [K31 1]; Gastric remnant leak at site of gastrotomy    Procedures Performed:  Procedure(s):  LAPAROSCOPY DIAGNOSTIC  RESECTION GASTRIC PARTIAL/GASTRECTOMY PARTIAL LAPAROSCOPIC  INTRAOPERATIVE EGD  LAPAROSCOPIC TRANSVERSE/PARTIAL DESCENDING COLON RESECTION  OPEN COLOCOLOSTOMY    Surgeon: Annabel Laughlin MD    Patient is a 51year old female s/p exploratory laparoscopy, lysis of adhesions, small bowel resection and gastrotomy with decompression/drainage/closure 2/11/19  The gastrotomy was performed as multiple imaging studies initially identified the gastric remnant as a large 10 cm cyst  After drainage I determined this was the gastric remnant and was not truly a cyst as was previously identified so I closed the gastrotomy  The cause of the portion of the defunctionalized remnant being extremely dilated and filled with mucous was due to the original construct and configuration of the RYGB created in 2010  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 and likely also contributed to the new onset abdominal pain she developed in December in addition to the afferent limb syndrome  She represented to the hospital 7 days after surgery with diffuse abdominal pain and ascites  She was treated with abx and bowel rest and the pain resolved  She again represented to the hospital POD 14 this time with a localized collection by the spleen which was drained by IR  Initially the drainage was brown but then became serous leading me to believe that the collection was from contamination at the original surgery  However her drainage became brown/serous again   It was now clear that due to the distal obstruction at the gastric remnant the remnant blew out proximally at the previous gastrotomy site leading to ongoing leakage which was controlled by the drain  The drain stopped functioning despite multiple attempts to restore function and the gastric remnant obstruction persisted with the ongoing leakage at the gastrotomy site as a result  She developed localized peritonitis  For fear she may developed generalized peritonitis when the leakage spread beyond the confines of the drain which was previously controlling the leak she was taken to the operating room emergently for a gastric remnant resection  In the operating room the combination of dense adhesions and the inflammatory reaction from a recent operation and the gastric remnant leak made identification of the gastric remnant difficult  The colon was transected and mobilized inadvertently  The gastric remnant was resected and then I completed the resection of the colon  I called general surgery and at this point we converted to a laparotomy to complete the anastomosis  Post operatively she was tachycardic  This resolved by POD 2  Her pain was initially difficult to control  Initially she required a PCA and then her pain was able to be controlled with IV and subsequently oral analgesics  She was on toradol for a brief period but we discontinued this when her renal function slightly declined  Her renal functioned normalized the next day  She initially was not tolerating oral diet well  We discussed potential need for PICC/TPN vs keofed tube  Neither of these interventions were ultimately necessary as she was able to tolerate a diet after  She regained bowel function by way of flatus and bowel movements  She did develop diarrhea which resolved  Clostridium difficile test was negative  Internal medicine started metoprolol 50 mg BID in addition to her home hydrochlorothiazide for blood pressure control   She did work with physical therapy and they are recommending home with family support and a roller walker  Currently she is tolerating a diet, ambulating/micturating without difficulty and pain is controlled with oral analgesics  She now desires to be discharged home  Provisions for Follow-Up Care:  See After Visit Summary for information related to follow-up care and home orders  Disposition: Home, in stable condition  Planned Readmission: No    Discharge Medications:  See After Visit Summary for reconciled discharge medications provided to patient and family  Post Operative instructions: Reviewed with patient and/or family  This text is generated with voice recognition software  There may be translation, syntax,  or grammatical errors  If you have any questions, please contact the dictating provider       Signature:   Casi Prasad MD  Date: 3/18/19 Time: 11:55 am

## 2019-03-17 NOTE — PHYSICAL THERAPY NOTE
PT Progress Note (25min)  (13:00-13:25)       03/17/19 1325   Pain Assessment   Pain Assessment No/denies pain   Restrictions/Precautions   Other Precautions Pain; Fall Risk   General   Chart Reviewed Yes   Response to Previous Treatment Patient with no complaints from previous session  Family/Caregiver Present No   Cognition   Orientation Level Oriented X4   Subjective   Subjective pt agreeable to PT session  "I'll just need some help c my gown to cover up"  Bed Mobility   Supine to Sit 4  Minimal assistance   Additional items Assist x 1;HOB elevated; Increased time required;Verbal cues   Transfers   Sit to Stand 5  Supervision   Additional items Verbal cues; Increased time required   Stand to Sit 5  Supervision   Additional items Verbal cues; Increased time required;Armrests   Stand pivot 5  Supervision   Additional items Armrests; Verbal cues; Increased time required   Ambulation/Elevation   Gait pattern Antalgic; Excessively slow;Decreased foot clearance   Gait Assistance 5  Supervision   Additional items Verbal cues   Assistive Device Rolling walker   Distance 100'x2 c rest for stair training   Stair Management Assistance 5  Supervision   Additional items Verbal cues   Stair Management Technique One rail R;Step to pattern; Foreward   Number of Stairs 6   Balance   Static Sitting Good   Dynamic Sitting Fair +   Static Standing Fair +   Dynamic Standing Fair +   Ambulatory Fair   Endurance Deficit   Endurance Deficit Yes   Endurance Deficit Description pain/fatigue   Activity Tolerance   Activity Tolerance Patient limited by fatigue;Patient limited by pain   Nurse Made Aware Ed   Assessment   Prognosis Good   Problem List Decreased strength;Decreased endurance; Impaired balance;Decreased mobility;Pain;Decreased skin integrity   Assessment pt demonstrating progress c PT  performed all OOB mobility tasks c (S)  progressed ambulation distance to 100'x2 c RW c rest for stair training   negotiated 6 stairs in stairwell c R handrail c cues for sequencing + pacing  pt cont to require min (A)x1 to complete bed mobility 2* pain  will cont to follow during hospital course to maximize functional mobility + return home safely  upon d/c, recommend pt return home c family support once medically cleared by MD  will also need RW for home use  Barriers to Discharge None   Goals   Patient Goals "to get back to work"  STG Expiration Date 03/24/19   Treatment Day 1   Plan   Treatment/Interventions Functional transfer training;LE strengthening/ROM; Elevations; Therapeutic exercise; Endurance training;Patient/family training;Equipment eval/education; Bed mobility;Gait training;Spoke to nursing;Spoke to MD;Family;Spoke to case management   Progress Progressing toward goals   PT Frequency 2-3x/wk   Recommendation   Recommendation Home with family support   Equipment Recommended Walker  (RW)   PT - OK to Discharge Yes     Papo Geronimo, PT, DPT

## 2019-03-17 NOTE — PROGRESS NOTES
[facility] Progress Note  Patient: Shasta Rivera 51 y o  female   MRN: [id number]  PCP: Rose Mary Dick  Unit/Bed#: -01 Encounter: [id number]  Date Of Visit: 19    Assessment:    Principal Problem:    Gastric outflow obstruction  Active Problems:    Abdominal pain    Essential hypertension    Tachycardia      Plan:    · 1  S/p laprascopic remnant gastrectomy, transverse/descending colon resection and conversion to ex lap for completion of colocolostomy- POD#10  Bariatric surgery following  Continue pain control  Patient for possible dc home in am as per surgery  · 2  HTN- stable on HCTZ and metoprolol  · 3  Anemia- stable  Mostlikely postop related  · 4  LEANA- resolved  · 5  Diarrhea- Stopped  C diff negative       VTE Pharmacologic Prophylaxis:   Pharmacologic: Enoxaparin (Lovenox)  Mechanical VTE Prophylaxis in Place: Yes    Patient Centered Rounds: I have performed bedside rounds with nursing staff today  Discussions with Specialists or Other Care Team Provider:     Education and Discussions with Family / Patient:     Time Spent for Care: 20 minutes  More than 50% of total time spent on counseling and coordination of care as described above  Current Length of Stay: 10 day(s)    Current Patient Status: Inpatient   Certification Statement: The patient will continue to require additional inpatient hospital stay due to abdominal pain    Discharge Plan / Estimated Discharge Date: once surgery clears    Code Status: Level 1 - Full Code      Subjective:   Patient seen and examined at bedside  Patient has no new complaints  Objective:     Vitals:   Temp (24hrs), Av 8 °F (36 6 °C), Min:97 6 °F (36 4 °C), Max:97 9 °F (36 6 °C)    Temp:  [97 6 °F (36 4 °C)-97 9 °F (36 6 °C)] 97 9 °F (36 6 °C)  HR:  [68-79] 68  Resp:  [18-19] 18  BP: (115-126)/(62-76) 115/69  SpO2:  [95 %-96 %] 95 %  Body mass index is 34 85 kg/m²       Input and Output Summary (last 24 hours): Intake/Output Summary (Last 24 hours) at 3/17/2019 1433  Last data filed at 3/17/2019 1314  Gross per 24 hour   Intake 900 ml   Output 825 ml   Net 75 ml       Physical Exam:     Physical Exam   Constitutional: She is oriented to person, place, and time  She appears well-developed and well-nourished  HENT:   Head: Normocephalic and atraumatic  Eyes: Pupils are equal, round, and reactive to light  EOM are normal    Neck: Normal range of motion  Neck supple  No JVD present  No tracheal deviation present  No thyromegaly present  Cardiovascular: Normal rate, regular rhythm and normal heart sounds  Exam reveals no gallop and no friction rub  No murmur heard  Pulmonary/Chest: Effort normal and breath sounds normal  No stridor  No respiratory distress  She has no wheezes  Abdominal: She exhibits distension  There is tenderness  Musculoskeletal: Normal range of motion  She exhibits no edema  Neurological: She is alert and oriented to person, place, and time  Skin: Skin is warm and dry  Vitals reviewed  Additional Data:     Labs:    Results from last 7 days   Lab Units 03/14/19  0453 03/13/19  0500 03/12/19  0437   WBC Thousand/uL 7 69 7 82 7 77   HEMOGLOBIN g/dL 10 3* 10 4* 10 2*   HEMATOCRIT % 31 8* 32 0* 31 7*   PLATELETS Thousands/uL 476* 457* 440*   NEUTROS PCT %  --   --  73   LYMPHS PCT %  --   --  10*   LYMPHO PCT %  --  13*  --    MONOS PCT %  --   --  8   MONO PCT %  --  8  --    EOS PCT %  --  8* 7*     Results from last 7 days   Lab Units 03/14/19  0453   POTASSIUM mmol/L 3 7   CHLORIDE mmol/L 100   CO2 mmol/L 29   BUN mg/dL 5   CREATININE mg/dL 0 72   CALCIUM mg/dL 9 1           * I Have Reviewed All Lab Data Listed Above  * Additional Pertinent Lab Tests Reviewed:  [facility] Admission Reviewed    Imaging:    Imaging Reports Reviewed Today Include:   Imaging Personally Reviewed by Myself Includes:      Recent Cultures (last 7 days):     Results from last 7 days   Lab Units 03/13/19  1614   C DIFF TOXIN B  NEGATIVE for C difficle toxin by PCR  Last 24 Hours Medication List:     Current Facility-Administered Medications:  acetaminophen 650 mg Oral Q6H [address] Leona Wagner MD   enoxaparin 40 mg Subcutaneous Q24H [address] Leona Wagner MD   hydrALAZINE 10 mg Intravenous Q6H PRN Manolo Keenan MD   hydrochlorothiazide 12 5 mg Oral Daily Leona Wagner MD   LORazepam 0 5 mg Intravenous Q6H PRN Jack English, CRNP   melatonin 6 mg Oral HS Leona Wagner MD   metoclopramide 5 mg Intravenous Q6H PRN Leona Wagner MD   metoprolol tartrate 50 mg Oral Q12H [address] Manolo Keenan MD   multivitamin-minerals 1 tablet Oral BID Leona Wagner MD   naloxone 0 04 mg Intravenous Q3 min PRN Jack English, CRNP   ondansetron 4 mg Intravenous Q4H PRN Jack English, CRNP   oxyCODONE 10 mg Oral Q4H PRN Leona Wagner MD   oxyCODONE 5 mg Oral Q4H PRN Leona Wagner MD   polyethylene glycol 17 g Oral Once Leona Wagner MD   promethazine 25 mg Intramuscular Q6H PRN Jack English, CRNP   saccharomyces boulardii 250 mg Oral BID Leona Wagner MD   simethicone 80 mg Oral Q6H PRN Jack English, CRNP   venlafaxine 37 5 mg Oral Daily Kandice Serrato MD        Today, Patient Was Seen By: Manolo Keenan MD    ** Please Note: This note has been constructed using a voice recognition system   **

## 2019-03-18 VITALS
TEMPERATURE: 98.3 F | HEIGHT: 69 IN | HEART RATE: 70 BPM | RESPIRATION RATE: 18 BRPM | DIASTOLIC BLOOD PRESSURE: 65 MMHG | OXYGEN SATURATION: 95 % | SYSTOLIC BLOOD PRESSURE: 132 MMHG | BODY MASS INDEX: 34.3 KG/M2 | WEIGHT: 231.6 LBS

## 2019-03-18 LAB
ANION GAP SERPL CALCULATED.3IONS-SCNC: 9 MMOL/L (ref 4–13)
BUN SERPL-MCNC: 20 MG/DL (ref 5–25)
CALCIUM SERPL-MCNC: 9.6 MG/DL (ref 8.3–10.1)
CHLORIDE SERPL-SCNC: 99 MMOL/L (ref 100–108)
CO2 SERPL-SCNC: 28 MMOL/L (ref 21–32)
CREAT SERPL-MCNC: 0.79 MG/DL (ref 0.6–1.3)
CRP SERPL HS-MCNC: 12.64 MG/L
ERYTHROCYTE [DISTWIDTH] IN BLOOD BY AUTOMATED COUNT: 14.9 % (ref 11.6–15.1)
GFR SERPL CREATININE-BSD FRML MDRD: 88 ML/MIN/1.73SQ M
GLUCOSE SERPL-MCNC: 112 MG/DL (ref 65–140)
HCT VFR BLD AUTO: 41.1 % (ref 34.8–46.1)
HGB BLD-MCNC: 13.1 G/DL (ref 11.5–15.4)
MAGNESIUM SERPL-MCNC: 2.3 MG/DL (ref 1.6–2.6)
MCH RBC QN AUTO: 27.2 PG (ref 26.8–34.3)
MCHC RBC AUTO-ENTMCNC: 31.9 G/DL (ref 31.4–37.4)
MCV RBC AUTO: 85 FL (ref 82–98)
PLATELET # BLD AUTO: 490 THOUSANDS/UL (ref 149–390)
PMV BLD AUTO: 10.5 FL (ref 8.9–12.7)
POTASSIUM SERPL-SCNC: 3.8 MMOL/L (ref 3.5–5.3)
PREALB SERPL-MCNC: 27.3 MG/DL (ref 18–40)
RBC # BLD AUTO: 4.82 MILLION/UL (ref 3.81–5.12)
SODIUM SERPL-SCNC: 136 MMOL/L (ref 136–145)
WBC # BLD AUTO: 10.65 THOUSAND/UL (ref 4.31–10.16)

## 2019-03-18 PROCEDURE — 99024 POSTOP FOLLOW-UP VISIT: CPT | Performed by: SURGERY

## 2019-03-18 PROCEDURE — 86141 C-REACTIVE PROTEIN HS: CPT | Performed by: SURGERY

## 2019-03-18 PROCEDURE — 85027 COMPLETE CBC AUTOMATED: CPT | Performed by: SURGERY

## 2019-03-18 PROCEDURE — 84134 ASSAY OF PREALBUMIN: CPT | Performed by: SURGERY

## 2019-03-18 PROCEDURE — 99232 SBSQ HOSP IP/OBS MODERATE 35: CPT | Performed by: INTERNAL MEDICINE

## 2019-03-18 PROCEDURE — 83735 ASSAY OF MAGNESIUM: CPT | Performed by: SURGERY

## 2019-03-18 PROCEDURE — 80048 BASIC METABOLIC PNL TOTAL CA: CPT | Performed by: SURGERY

## 2019-03-18 RX ORDER — MAGNESIUM CARB/ALUMINUM HYDROX 105-160MG
296 TABLET,CHEWABLE ORAL ONCE
Status: COMPLETED | OUTPATIENT
Start: 2019-03-18 | End: 2019-03-18

## 2019-03-18 RX ORDER — ACETAMINOPHEN 325 MG/1
650 TABLET ORAL EVERY 4 HOURS
Qty: 30 TABLET | Refills: 0 | Status: SHIPPED | OUTPATIENT
Start: 2019-03-18 | End: 2019-03-23

## 2019-03-18 RX ORDER — METOPROLOL TARTRATE 50 MG/1
50 TABLET, FILM COATED ORAL EVERY 12 HOURS SCHEDULED
Qty: 60 TABLET | Refills: 0 | Status: SHIPPED | OUTPATIENT
Start: 2019-03-18 | End: 2019-04-17

## 2019-03-18 RX ORDER — OXYCODONE HYDROCHLORIDE 5 MG/1
5 TABLET ORAL EVERY 4 HOURS PRN
Qty: 30 TABLET | Refills: 0 | Status: SHIPPED | OUTPATIENT
Start: 2019-03-18 | End: 2019-03-28

## 2019-03-18 RX ADMIN — ENOXAPARIN SODIUM 40 MG: 40 INJECTION SUBCUTANEOUS at 08:49

## 2019-03-18 RX ADMIN — OXYCODONE HYDROCHLORIDE 10 MG: 10 TABLET ORAL at 12:39

## 2019-03-18 RX ADMIN — OXYCODONE HYDROCHLORIDE 5 MG: 5 TABLET ORAL at 10:30

## 2019-03-18 RX ADMIN — METOPROLOL TARTRATE 50 MG: 50 TABLET, FILM COATED ORAL at 08:49

## 2019-03-18 RX ADMIN — MAGESIUM CITRATE 296 ML: 1.75 LIQUID ORAL at 08:50

## 2019-03-18 RX ADMIN — Medication 1 TABLET: at 08:49

## 2019-03-18 RX ADMIN — Medication 250 MG: at 08:49

## 2019-03-18 RX ADMIN — VENLAFAXINE HYDROCHLORIDE 37.5 MG: 37.5 CAPSULE, EXTENDED RELEASE ORAL at 08:49

## 2019-03-18 RX ADMIN — OXYCODONE HYDROCHLORIDE 10 MG: 10 TABLET ORAL at 06:23

## 2019-03-18 RX ADMIN — HYDROCHLOROTHIAZIDE 12.5 MG: 12.5 TABLET ORAL at 08:49

## 2019-03-18 NOTE — SOCIAL WORK
Pt in need of a walker to go home with  LALITA met with pt at bedside  Pt alert  Pt agreeable to Jackie Myles and would like the walker to be delivered to her room in hospital  LALITA sent walker script to Cyn to have delivered to her room

## 2019-03-18 NOTE — DISCHARGE INSTRUCTIONS
Bariatric/Weight Loss Surgery  Hospital Discharge Instructions  1  ACTIVITY:  a  Progress as feels comfortable - a good rule is:  if you are doing something and it begins to hurt, stop doing the activity  Walk at least 3 times per day at home  b  Rema Haddad may walk stairs if you do so slowly  c  You may shower, but do not brush/scrub bandaging  d  Use your incentive spirometer 10 times per hour while awake for 2 weeks  e  No driving if you are still taking certain prescription pain medication  Examples of such medication are Percocet, Darvocet, Oxycodone, Tylenol #3, and Tylenol with Codeine  Follow your pharmacists orders  AND no driving until cleared in office by your surgeon      2  DIET  a  Bariatric maintenance diet without carbonation  b  Make sure that you are taking in 60 grams of protein daily and 48 ounces of water daily  3  MEDICATIONS:  a  Start vitamins and minerals when you get home  b  Pain and Anti-acid Medication as per prescription  c  Other medications as indicated on the Physician Patient Discharge Instructions form given to you at the time of discharge  d  Rema Haddad will need to consult with your Family Doctor in regards to all your prescribed medication, particularly those for blood pressure          4  INCISION CARE  a  You may shower but do not brush/scrub steri-strips  5  FOLLOW-UP APPOINTMENT should be made for one week after discharge  Call surgeons office at [phone] to schedule an appointment  6  CALL YOUR DOCTOR FOR:  pain not controlled by pain medications, a temperature greater than 101 5° F, any increase or change in drainage or redness from any incision, any vomiting or inability to keep liquids down, shortness of breath, shoulder pain, or bleeding    ********Letter and Information for Patient's Primary Health Care Provider************      Dear Javier Burdick Provider,       Your patient had bariatric surgery on this admission to [address]   Due to the restrictive and/or malabsorptive nature of their procedure our surgeons recommend routine lab studies  Your patients surgeon will provide orders initially and ask that they continue to follow-up with us for life even if they see you  As time moves on some patients prefer to follow-up only with their family doctor  We ask that you discuss this regular work-up with them when they make an appointment to see you  *The lab studies recommended to best identify deficiencies are: CBC, CMP, Lipid Profile, Fe, TIBC, %Sat, Vitamin D, Folate, B12, Whole Blood Thiamine, Vitamin A, PTH, Zinc and Ferritin  We recommend HgbA1C for diabetics  *These studies should be done minimally at 6 months and a year post-operatively and then yearly thereafter  *Recommended Daily Supplements: Please see the attached Vitamin Sheet    If your patient has any dietary or psycho-social concerns, they can follow-up with our Team Dietitian and Licensed Clinical   They can reach these team members by calling the [facility]  We also encourage them to follow up at our regularly scheduled support groups or join our [employer] at [phone] Patient Forum  For your convenience we have also included a list of medicines that should be avoided after weight loss surgery and a list of the vitamin and minerals they should be taking for the rest of their lives  The patients are provided this information as well  The [facility]'s Bariatric Team would like to thank you for the opportunity to assist in the care of your patient    Feel free to contact us with any questions by calling the [facility] office at [phone]    Sincerely,         [facility] Team      ****************Additional Information for Providers and Patients*****************   Vitamins After Randy en Y Gastric Bypass or Sleeve Gastrectomy Surgery    Due to the decreased absorption of nutrients and the decreased amount of food eaten it is difficult to obtain all the nutrients needed consuming food  We recommend a bariatric formulated vitamin for the rest of your life  If you wish to use an over the counter vitamins please understand you may not get all the recommended daily requirements  Use the following guidelines for over the counter vitamins  Multivitamins    We recommend 2 chewable multivitamins with iron (do not take gummy chewable as they do not contain thiamine or iron)     You can continue with the chewable or take any well formulated, high potency multivitamin containing 22 nutrients including zinc and copper   If you decide to take a bariatric vitamin the number of vitamins that you need to take will vary  Refer to the chart provided at team meeting    Calcium - Calcium is absorbed in the part of the small bowel that is bypassed in gastric bypass patients  In addition, as you lose weight, you are more at risk for loss of bone density leading to osteoporosis   The best form of calcium is Calcium Citrate  This form of calcium is better absorbed after your surgery    Recommended daily dose is 1500 mg  Take 500 mg in (3) divided doses  You can only absorb about 500 mg at a time   We recommend 2000 IU of vitamin D3 per day, in addition to what is in your calcium supplement  If you were instructed to take a higher dose based on a deficiency, then continue to take the higher vitamin D dose  Iron - Iron is absorbed in the part of the small bowel that is bypassed   You will need to take extra iron in addition to what is already in the multivitamin if you are a menstruating woman (25-45 mg of additional elemental iron) or have been diagnosed with an iron deficiency     We recommend iron in the form of Ferrous Fumarate with Vitamin C    Follow the instructions on the package or bottle unless your physician has given other dosage amounts  B12 (Cyanocobalamin) may also be decreased   Additional Vitamin B12 is recommended if you are not taking a bariatric vitamin   Take 350 to 500 micrograms (mcg) per day of B12 (Cyanocobalamin) in a sublingual form (for under the tongue)   Note:  Calcium interferes with the absorption of iron, so it is recommended that you take the calcium at least 2 hours apart from iron  The tannins in tea also interfere with the absorption of iron   Note: Anti-ulcer medications interfere with the absorption of calcium iron and B12  Space your anti-ulcer medication 2 hours apart from your vitamins  * Based on the recommendations of the ASMBS and the National Osteoporosis foundation    Non-steroidal anti-inflammatory drugs or medications containing them  You should take caution or avoid these medications as they could harm your pouch or sleeve  **This is a sample list and is not all inclusive  Please read labels carefully  **      Non Steroidal anti-inflammatory drugs  Advil (ibuprofen)  Aleve (naproxen)  Anaprox (naproxen)  Ansaid (flurbiprofen)  Azolid (phenylbutazone)  Bextra (valdecoxib)  Butazolidin (phenylbutazone)  Celebrex (celecoxib)  Clinoril (sulindac)  Dolobid (diflunisal)  Excedrin IB (ibuprofen)  Feldene (piroxicam)  Ibuprin (ibuprofen)  Indocin (indomethacin)  Lodine (etodolac)  Meclomen (meclofenamate)  Midol IB (ibuprofen)  Motrin IB (ibuprofen)  Nalfon (fenoprofen)  Naprosyn (naproxen)  Nuprin (ibuprofen)  Orudis (ketoprofen)  Oruvail (ketoprofen)  Pamprin - IB (ibuprofen)  Ponstel (mefenamic acid)  Rexolate (sodium thiosalicylate)  Tandearil (oxyphenbutazone)  Tolectin (tolmetin)  Voltaren (diclofenac)      Barbiturate  Fiorinal (butalbital/aspirin/caffeine)    Salicylates  Amigesic (salsalate)  Anacin (aspirin)  Arthropan (choline salicylate)  Ascriptin (buffered aspirin)  Aspirin (aspirin)  Aspirtab (aspirin)  Bufferin (buffered aspirin)  Disalcid (salsalate)  Ecotrin (aspirin)  Uracel (sodium salicylate)    Analgesics  Equagesic (meprobamate/aspirin)  Micrainin (meprobamate/aspirin)  Percodan (oxycodone/aspirin)    OTC  Pepto-Bismol®  Alma-Dinuba®  Excedrin®    For Gastric Bypass Patients  Extended Release Medications  Sustained Release Medications  Time Released Medications

## 2019-03-18 NOTE — PROGRESS NOTES
[facility] Progress Note  Patient: Audra Clinton 51 y o  female   MRN: [id number]  PCP: Taj Bailey  Unit/Bed#: -01 Encounter: [id number]  Date Of Visit: 19    Assessment:    Principal Problem:    Gastric outflow obstruction  Active Problems:    Abdominal pain    Essential hypertension    Tachycardia      Plan:    · 1  S/p laprascopic remnant gastrectomy, transverse/descending colon resection and conversion to ex lap for completion of colocolostomy- POD#11  Bariatric surgery following  Continue pain control  Patient for dc home today as per surgery  · 2  HTN- stable on HCTZ and metoprolol  · 3  Anemia- stable  Mostlikely postop related  · 4  LEANA- resolved  · 5  Diarrhea- Stopped  C diff negative       VTE Pharmacologic Prophylaxis:   Pharmacologic: Enoxaparin (Lovenox)  Mechanical VTE Prophylaxis in Place: Yes    Patient Centered Rounds: I have performed bedside rounds with nursing staff today  Discussions with Specialists or Other Care Team Provider:     Education and Discussions with Family / Patient:     Time Spent for Care: 20 minutes  More than 50% of total time spent on counseling and coordination of care as described above  Current Length of Stay: 11 day(s)    Current Patient Status: Inpatient   Certification Statement: The patient will continue to require additional inpatient hospital stay due to abdominal pain    Discharge Plan / Estimated Discharge Date: once surgery clears    Code Status: Level 1 - Full Code      Subjective:   Patient seen and examined at bedside  Patient has no new complaints  Objective:     Vitals:   Temp (24hrs), Av °F (36 7 °C), Min:97 7 °F (36 5 °C), Max:98 3 °F (36 8 °C)    Temp:  [97 7 °F (36 5 °C)-98 3 °F (36 8 °C)] 98 3 °F (36 8 °C)  HR:  [70-84] 70  Resp:  [18-19] 18  BP: (122-132)/(64-72) 132/65  SpO2:  [94 %-96 %] 95 %  Body mass index is 34 2 kg/m²  Input and Output Summary (last 24 hours):        Intake/Output Summary (Last 24 hours) at 3/18/2019 1339  Last data filed at 3/18/2019 1031  Gross per 24 hour   Intake 960 ml   Output 850 ml   Net 110 ml       Physical Exam:     Physical Exam   Constitutional: She is oriented to person, place, and time  She appears well-developed and well-nourished  HENT:   Head: Normocephalic and atraumatic  Eyes: Pupils are equal, round, and reactive to light  EOM are normal    Neck: Normal range of motion  Neck supple  No JVD present  No tracheal deviation present  No thyromegaly present  Cardiovascular: Normal rate, regular rhythm and normal heart sounds  Exam reveals no gallop and no friction rub  No murmur heard  Pulmonary/Chest: Effort normal and breath sounds normal  No stridor  No respiratory distress  She has no wheezes  Abdominal: She exhibits distension  There is tenderness  Musculoskeletal: Normal range of motion  She exhibits no edema  Neurological: She is alert and oriented to person, place, and time  Skin: Skin is warm and dry  Vitals reviewed  Additional Data:     Labs:    Results from last 7 days   Lab Units 03/18/19  0538  03/13/19  0500 03/12/19  0437   WBC Thousand/uL 10 65*   < > 7 82 7 77   HEMOGLOBIN g/dL 13 1   < > 10 4* 10 2*   HEMATOCRIT % 41 1   < > 32 0* 31 7*   PLATELETS Thousands/uL 490*   < > 457* 440*   NEUTROS PCT %  --   --   --  73   LYMPHS PCT %  --   --   --  10*   LYMPHO PCT %  --   --  13*  --    MONOS PCT %  --   --   --  8   MONO PCT %  --   --  8  --    EOS PCT %  --   --  8* 7*    < > = values in this interval not displayed  Results from last 7 days   Lab Units 03/18/19  0545   POTASSIUM mmol/L 3 8   CHLORIDE mmol/L 99*   CO2 mmol/L 28   BUN mg/dL 20   CREATININE mg/dL 0 79   CALCIUM mg/dL 9 6           * I Have Reviewed All Lab Data Listed Above  * Additional Pertinent Lab Tests Reviewed:  All Labs For Current Hospital Admission Reviewed    Imaging:    Imaging Reports Reviewed Today Include:   Imaging Personally Reviewed by Myself Includes:      Recent Cultures (last 7 days):     Results from last 7 days   Lab Units 03/13/19  1614   C DIFF TOXIN B  NEGATIVE for C difficle toxin by PCR  Last 24 Hours Medication List:     Current Facility-Administered Medications:  acetaminophen 650 mg Oral Q6H [facility] Samantha Mary MD   enoxaparin 40 mg Subcutaneous Q24H [facility] Samantha Mary MD   hydrALAZINE 10 mg Intravenous Q6H PRN Flash Olson MD   hydrochlorothiazide 12 5 mg Oral Daily Samantha Mary MD   LORazepam 0 5 mg Intravenous Q6H PRN Christelle Rust, CRNP   melatonin 6 mg Oral HS Samantha Mary MD   metoclopramide 5 mg Intravenous Q6H PRN Samantha Mary MD   metoprolol tartrate 50 mg Oral Q12H [facility] & half-way Flash Olson MD   multivitamin-minerals 1 tablet Oral BID Samantha Mary MD   naloxone 0 04 mg Intravenous Q3 min PRN Christelle Rust, CRNP   ondansetron 4 mg Intravenous Q4H PRN Christelle Rust, CRNP   oxyCODONE 10 mg Oral Q4H PRN Samantha Mary MD   oxyCODONE 5 mg Oral Q4H PRN Samantha Mary MD   promethazine 25 mg Intramuscular Q6H PRN Christelle Rust, CRNP   saccharomyces boulardii 250 mg Oral BID Samantha Mary MD   simethicone 80 mg Oral Q6H PRN Christelle Rust, CRNP   venlafaxine 37 5 mg Oral Daily Kika Burnham MD        Today, Patient Was Seen By: Flash Olson MD    ** Please Note: This note has been constructed using a voice recognition system   **

## 2019-03-18 NOTE — PLAN OF CARE
Problem: DISCHARGE PLANNING - CARE MANAGEMENT  Goal: Discharge to post-acute care or home with appropriate resources  Description  INTERVENTIONS:  - Conduct assessment to determine patient/family and health care team treatment goals, and need for post-acute services based on payer coverage, community resources, and patient preferences, and barriers to discharge  - Address psychosocial, clinical, and financial barriers to discharge as identified in assessment in conjunction with the patient/family and health care team  - Arrange appropriate level of post-acute services according to patient?s   needs and preference and payer coverage in collaboration with the physician and health care team  - Communicate with and update the patient/family, physician, and health care team regarding progress on the discharge plan  - Arrange appropriate transportation to post-acute venues  Outcome: Adequate for Discharge     Problem: Prexisting or High Potential for Compromised Skin Integrity  Goal: Skin integrity is maintained or improved  Description  INTERVENTIONS:  - Identify patients at risk for skin breakdown  - Assess and monitor skin integrity  - Assess and monitor nutrition and hydration status  - Monitor labs (i e  albumin)  - Assess for incontinence   - Turn and reposition patient  - Assist with mobility/ambulation  - Relieve pressure over bony prominences  - Avoid friction and shearing  - Provide appropriate hygiene as needed including keeping skin clean and dry  - Evaluate need for skin moisturizer/barrier cream  - Collaborate with interdisciplinary team (i e  Nutrition, Rehabilitation, etc )   - Patient/family teaching  Outcome: Adequate for Discharge     Problem: Potential for Falls  Goal: Patient will remain free of falls  Description  INTERVENTIONS:  - Assess patient frequently for physical needs  -  Identify cognitive and physical deficits and behaviors that affect risk of falls    -  [place] fall precautions as indicated by assessment   - Educate patient/family on patient safety including physical limitations  - Instruct patient to call for assistance with activity based on assessment  - Modify environment to reduce risk of injury  - Consider OT/PT consult to assist with strengthening/mobility  Outcome: Adequate for Discharge     Problem: PAIN - ADULT  Goal: Verbalizes/displays adequate comfort level or baseline comfort level  Description  Interventions:  - Encourage patient to monitor pain and request assistance  - Assess pain using appropriate pain scale  - Administer analgesics based on type and severity of pain and evaluate response  - Implement non-pharmacological measures as appropriate and evaluate response  - Consider cultural and social influences on pain and pain management  - Notify physician/advanced practitioner if interventions unsuccessful or patient reports new pain  Outcome: Adequate for Discharge     Problem: INFECTION - ADULT  Goal: Absence or prevention of progression during hospitalization  Description  INTERVENTIONS:  - Assess and monitor for signs and symptoms of infection  - Monitor lab/diagnostic results  - Monitor all insertion sites, i e  indwelling lines, tubes, and drains  - Monitor endotracheal (as able) and nasal secretions for changes in amount and color  - [place] appropriate cooling/warming therapies per order  - Administer medications as ordered  - Instruct and encourage patient and family to use good hand hygiene technique  - Identify and instruct in appropriate isolation precautions for identified infection/condition  Outcome: Adequate for Discharge  Goal: Absence of fever/infection during neutropenic period  Description  INTERVENTIONS:  - Monitor WBC  - Implement neutropenic guidelines  Outcome: Adequate for Discharge     Problem: SAFETY ADULT  Goal: Patient will remain free of falls  Description  INTERVENTIONS:  - Assess patient frequently for physical needs  -  Identify cognitive and physical deficits and behaviors that affect risk of falls    -  [place] fall precautions as indicated by assessment   - Educate patient/family on patient safety including physical limitations  - Instruct patient to call for assistance with activity based on assessment  - Modify environment to reduce risk of injury  - Consider OT/PT consult to assist with strengthening/mobility  Outcome: Adequate for Discharge  Goal: Maintain or return to baseline ADL function  Description  INTERVENTIONS:  -  Assess patient's ability to carry out ADLs; assess patient's baseline for ADL function and identify physical deficits which impact ability to perform ADLs (bathing, care of mouth/teeth, toileting, grooming, dressing, etc )  - Assess/evaluate cause of self-care deficits   - Assess range of motion  - Assess patient's mobility; develop plan if impaired  - Assess patient's need for assistive devices and provide as appropriate  - Encourage maximum independence but intervene and supervise when necessary  ¯ Involve family in performance of ADLs  ¯ Assess for home care needs following discharge   ¯ Request OT consult to assist with ADL evaluation and planning for discharge  ¯ Provide patient education as appropriate  Outcome: Adequate for Discharge  Goal: Maintain or return mobility status to optimal level  Description  INTERVENTIONS:  - Assess patient's baseline mobility status (ambulation, transfers, stairs, etc )    - Identify cognitive and physical deficits and behaviors that affect mobility  - Identify mobility aids required to assist with transfers and/or ambulation (gait belt, sit-to-stand, lift, walker, cane, etc )  - [place] fall precautions as indicated by assessment  - Record patient progress and toleration of activity level on Mobility SBAR; progress patient to next Phase/Stage  - Instruct patient to call for assistance with activity based on assessment  - Request Rehabilitation consult to assist with strengthening/weightbearing, etc   Outcome: Adequate for Discharge     Problem: DISCHARGE PLANNING  Goal: Discharge to home or other facility with appropriate resources  Description  INTERVENTIONS:  - Identify barriers to discharge w/patient and caregiver  - Arrange for needed discharge resources and transportation as appropriate  - Identify discharge learning needs (meds, wound care, etc )  - Arrange for interpretive services to assist at discharge as needed  - Refer to Case Management Department for coordinating discharge planning if the patient needs post-hospital services based on physician/advanced practitioner order or complex needs related to functional status, cognitive ability, or social support system  Outcome: Adequate for Discharge     Problem: Knowledge Deficit  Goal: Patient/family/caregiver demonstrates understanding of disease process, treatment plan, medications, and discharge instructions  Description  Complete learning assessment and assess knowledge base  Interventions:  - Provide teaching at level of understanding  - Provide teaching via preferred learning methods  Outcome: Adequate for Discharge     Problem: Nutrition/Hydration-ADULT  Goal: Nutrient/Hydration intake appropriate for improving, restoring or maintaining nutritional needs  Description  Monitor and assess patient's nutrition/hydration status for malnutrition (ex- brittle hair, bruises, dry skin, pale skin and conjunctiva, muscle wasting, smooth red tongue, and disorientation)  Collaborate with interdisciplinary team and initiate plan and interventions as ordered  Monitor patient's weight and dietary intake as ordered or per policy  Utilize nutrition screening tool and intervene per policy  Determine patient's food preferences and provide high-protein, high-caloric foods as appropriate       INTERVENTIONS:  - Monitor oral intake, urinary output, labs, and treatment plans  - Assess nutrition and hydration status and recommend course of action  - Evaluate amount of meals eaten  - Assist patient with eating if necessary   - Allow adequate time for meals  - Recommend/ encourage appropriate diets, oral nutritional supplements, and vitamin/mineral supplements  - Order, calculate, and assess calorie counts as needed  - Assess need for intravenous fluids  - Provide nutrition/hydration education as appropriate  - Include patient/family/caregiver in decisions related to nutrition   Outcome: Adequate for Discharge

## 2019-03-18 NOTE — PROGRESS NOTES
Progress Note - Bariatric Surgery   Silvio Arreola 51 y o  female MRN: [id number]  Unit/Bed#: -01 Encounter: [id number]    Assessment:  48/F POD 11 s/p laparoscopic remnant gastrectomy, transverse/descending colon resection and conversion to ex lap for completion of colocolostomy  Plan:  Regular diet with protein supplements  Probiotics BID  MVI QD  Melatonin QHS  HTN controlled on metoprolol/HCTZ per SLIM  Pain control  OOB/ambulating halls  IS   DVT ppx (lovenox; SCDs)  Cont bowel regimen at home  Home today with roller walker     Subjective/Objective     Subjective: Good oral intake  Pain controlled  Passing flatus  No BM  Objective: Looks well    Blood pressure 132/65, pulse 70, temperature 98 3 °F (36 8 °C), temperature source Oral, resp  rate 18, height 5' 9" (1 753 m), weight 105 kg (231 lb 9 6 oz), SpO2 95 %, not currently breastfeeding  ,Body mass index is 34 2 kg/m²        Intake/Output Summary (Last 24 hours) at 3/18/2019 1153  Last data filed at 3/18/2019 1031  Gross per 24 hour   Intake 960 ml   Output 1200 ml   Net -240 ml         Physical Exam:     No distress   RRR  Breathing non labored   Abdomen appropriately tender; soft, ND, wounds C/D/I without erythema (steristrips in place)       Lab, Imaging and other studies:  CBC:   Lab Results   Component Value Date    WBC 10 65 (H) 03/18/2019    HGB 13 1 03/18/2019    HCT 41 1 03/18/2019    MCV 85 03/18/2019     (H) 03/18/2019    MCH 27 2 03/18/2019    MCHC 31 9 03/18/2019    RDW 14 9 03/18/2019    MPV 10 5 03/18/2019   , CMP:   Lab Results   Component Value Date    SODIUM 136 03/18/2019    K 3 8 03/18/2019    CL 99 (L) 03/18/2019    CO2 28 03/18/2019    BUN 20 03/18/2019    CREATININE 0 79 03/18/2019    CALCIUM 9 6 03/18/2019    EGFR 88 03/18/2019       VTE Pharmacologic Prophylaxis: Enoxaparin (Lovenox)  VTE Mechanical Prophylaxis: sequential compression device

## 2019-03-18 NOTE — NURSING NOTE
Discharge order placed, IV removed, instructions reviewed and given to patient along with a note for work

## 2019-03-19 NOTE — PHYSICAL THERAPY NOTE
Patient's chart accessed to review PT evaluation, to add billing and G-Codes from March 14, 2019       Saige Herrera, PT, DPT

## 2019-03-20 ENCOUNTER — TELEPHONE (OUTPATIENT)
Dept: BARIATRICS | Facility: CLINIC | Age: 49
End: 2019-03-20

## 2019-03-20 NOTE — TELEPHONE ENCOUNTER
Spoke with pt to follow up after d/c  She's doing ok  Tolerating diet but very little  Drinking enough fluids  Taking meds as ordered  We set her up for an appt with Dr Adeel oKthari on 3/28  She will call with any concerns prior to that appt

## 2019-03-20 NOTE — UTILIZATION REVIEW
Continued Stay Review    Date: 3/18  Vital Signs: Blood pressure 132/65, pulse 70, temperature 98 3 °F (36 8 °C), temperature source Oral, resp  rate 18, height 5' 9  Assessment/Plan: 48/F POD 11 s/p laparoscopic remnant gastrectomy, transverse/descending colon resection and conversion to ex lap for completion of colocolostomy  Medications:   Scheduled Meds:   acetaminophen 975 mg Oral Q6H [address]    enoxaparin 40 mg Subcutaneous Q24H [address]    hydrALAZINE 10 mg Intravenous Q6H PRN    hydrochlorothiazide 12 5 mg Oral Daily    HYDROmorphone 0 5 mg Intravenous Q4H PRN    LORazepam 0 5 mg Intravenous Q6H PRN    melatonin 6 mg Oral HS    metoclopramide 5 mg Intravenous Q6H PRN    metoprolol tartrate 50 mg Oral Q12H EDA    multivitamin-minerals 1 tablet Oral BID    naloxone 0 04 mg Intravenous Q3 min PRN    ondansetron 4 mg Intravenous Q4H PRN    oxyCODONE 10 mg Oral Q4H PRN    oxyCODONE 5 mg Oral Q4H PRN    promethazine 25 mg Intramuscular Q6H PRN    saccharomyces boulardii 250 mg Oral BID    simethicone 80 mg Oral Q6H PRN    venlafaxine 37 5 mg Oral Daily      Pertinent Labs/Diagnostic Results:  Wbc 10 65 plt  490 cl 99  Age/Sex: 51 y o  female   Discharge Plan: Home today w/ rolling walker       Continued Stay Review    Date: 3/17  Vital Signs: Blood pressure 115/69, pulse 68, temperature 97 9 °F (36 6 °C), temperature source Oral, resp  rate 18, height 5' 9" (1 753 m), weight 107 kg (236     Assessment/Plan: 48/F POD 10 s/p laparoscopic remnant gastrectomy, transverse/descending colon resection and conversion to ex lap for completion of colocolostomy    Reg diet w/ protein supplements , PT for stairs today     Cont ww/ abd distention and tenderness    Medications:   Scheduled Meds:      acetaminophen 975 mg Oral Q6H EDA    enoxaparin 40 mg Subcutaneous Q24H EDA    hydrALAZINE 10 mg Intravenous Q6H PRN    hydrochlorothiazide 12 5 mg Oral Daily    HYDROmorphone 0 5 mg Intravenous Q4H PRN    LORazepam 0 5 mg Intravenous Q6H PRN    melatonin 6 mg Oral HS    metoclopramide 5 mg Intravenous Q6H PRN    metoprolol tartrate 50 mg Oral Q12H [address]    multivitamin-minerals 1 tablet Oral BID    naloxone 0 04 mg Intravenous Q3 min PRN    ondansetron 4 mg Intravenous Q4H PRN    oxyCODONE 10 mg Oral Q4H PRN    oxyCODONE 5 mg Oral Q4H PRN    promethazine 25 mg Intramuscular Q6H PRN    saccharomyces boulardii 250 mg Oral BID    simethicone 80 mg Oral Q6H PRN    venlafaxine 37 5 mg Oral Daily    Pertinent Labs/Diagnostic Results: none   Age/Sex: 51 y o  female  Discharge Plan: TBD       Continued Stay Review    Date: 3/15  Vital Signs:Blood pressure 132/77, pulse 65, temperature 97 5 °F (36 4 °C), temperature source Oral, resp  rate 18    · Assessment/Plan: 52 yo female admitted w/ 1  S/p laprascopic remnant gastrectomy, transverse/descending colon resection and conversion to ex lap for completion of colocolostomy- POD#8  Bariatric surgery following  Continue pain control  Patient diet advanced  · 2  HTN- patient HCTZ restarted  Metoprolol 50mg PO BID also added  BP better controlled  · 3  Anemia- stable  Mostlikely postop related  · 4  LEANA- resolved  · 5  Diarrhea- Stopped   C diff negative      Medications:   Scheduled Meds:   acetaminophen 975 mg Oral Q6H [address]    enoxaparin 40 mg Subcutaneous Q24H EDA    hydrALAZINE 10 mg Intravenous Q6H PRN    hydrochlorothiazide 12 5 mg Oral Daily    HYDROmorphone 0 5 mg Intravenous Q4H PRN    LORazepam 0 5 mg Intravenous Q6H PRN    melatonin 6 mg Oral HS    metoclopramide 5 mg Intravenous Q6H PRN    metoprolol tartrate 50 mg Oral Q12H [address]    multivitamin-minerals 1 tablet Oral BID    naloxone 0 04 mg Intravenous Q3 min PRN    ondansetron 4 mg Intravenous Q4H PRN    oxyCODONE 10 mg Oral Q4H PRN    oxyCODONE 5 mg Oral Q4H PRN    promethazine 25 mg Intramuscular Q6H PRN    saccharomyces boulardii 250 mg Oral BID    simethicone 80 mg Oral Q6H PRN    venlafaxine 37 5 mg Oral Daily      Pertinent Labs/Diagnostic Results:   Age/Sex: 51 y o  female   Discharge Plan: TBD     Continued Stay Review    Date: 3/16  Vital Signs: Temp:  [97 6 °F (36 4 °C)-98 6 °F (37 °C)] 98 3 °F (36 8 °C)  HR:  [] 81  Resp:  [17-18] 18  BP: (122-142)/(66-84) 142/84  SpO2:  [94 %-95 %] 95 %    Assessment/Plan: 52 yo female admitted POD 9 s/p laparoscopic remnant gastrectomy, transverse/descending colon resection and conversion to ex lap for completion of colocolostomy    +ve bowel function  PO intake continues to improve     Medications:   Scheduled Meds:   acetaminophen 975 mg Oral Q6H [address]    enoxaparin 40 mg Subcutaneous Q24H EDA    hydrALAZINE 10 mg Intravenous Q6H PRN    hydrochlorothiazide 12 5 mg Oral Daily    HYDROmorphone 0 5 mg Intravenous Q4H PRN    LORazepam 0 5 mg Intravenous Q6H PRN    melatonin 6 mg Oral HS    metoclopramide 5 mg Intravenous Q6H PRN    metoprolol tartrate 50 mg Oral Q12H [address]    multivitamin-minerals 1 tablet Oral BID    naloxone 0 04 mg Intravenous Q3 min PRN    ondansetron 4 mg Intravenous Q4H PRN    oxyCODONE 10 mg Oral Q4H PRN    oxyCODONE 5 mg Oral Q4H PRN    promethazine 25 mg Intramuscular Q6H PRN    saccharomyces boulardii 250 mg Oral BID    simethicone 80 mg Oral Q6H PRN    venlafaxine 37 5 mg Oral Daily    Pertinent Labs/Diagnostic Results: none   Age/Sex: 51 y o  female  Discharge Plan: TBD       Continued Stay Review    Date: 3/15  Vital Signs:Blood pressure 132/77, pulse 65, temperature 97 5 °F (36 4 °C), temperature source Oral, resp  rate 18    · Assessment/Plan: 52 yo female admitted w/ 1  S/p laprascopic remnant gastrectomy, transverse/descending colon resection and conversion to ex lap for completion of colocolostomy- POD#8  Bariatric surgery following  Continue pain control  Patient diet advanced  · 2  HTN- patient HCTZ restarted  Metoprolol 50mg PO BID also added  BP better controlled  · 3  Anemia- stable  Mostlikely postop related  · 4  LEANA- resolved  · 5  Diarrhea- Stopped  C diff negative      Medications:   Scheduled Meds:   acetaminophen 975 mg Oral Q6H EDA    enoxaparin 40 mg Subcutaneous Q24H EDA    hydrALAZINE 10 mg Intravenous Q6H PRN    hydrochlorothiazide 12 5 mg Oral Daily    HYDROmorphone 0 5 mg Intravenous Q4H PRN    LORazepam 0 5 mg Intravenous Q6H PRN    melatonin 6 mg Oral HS    metoclopramide 5 mg Intravenous Q6H PRN    metoprolol tartrate 50 mg Oral Q12H EDA    multivitamin-minerals 1 tablet Oral BID    naloxone 0 04 mg Intravenous Q3 min PRN    ondansetron 4 mg Intravenous Q4H PRN    oxyCODONE 10 mg Oral Q4H PRN    oxyCODONE 5 mg Oral Q4H PRN    promethazine 25 mg Intramuscular Q6H PRN    saccharomyces boulardii 250 mg Oral BID    simethicone 80 mg Oral Q6H PRN    venlafaxine 37 5 mg Oral Daily     Pertinent Labs/Diagnostic Results: none  Age/Sex: 51 y o  female   Discharge Plan: TBD         Continued Stay Review    Date: 3/14  Vital Signs: Blood pressure 149/79, pulse 72, temperature 97 8 °F (36 6 °C), temperature source Oral, resp  rate 18    Assessment/Plan: 50 yo female POD #7 s/p  laparoscopic remnant gastrectomy, transverse/descending colon resection and conversion to ex lap for completion of colocolostomy  Cont to have bowel function , C-diff neg   IV analgesics x1 over night    Cont w/ diet as zelda , probiotics, OOB ambulating halls , IS , DVT prophy , pain control       Medications:   Scheduled Meds: Current Facility-Administered Medications:  acetaminophen 975 mg Oral Q6H [address]     enoxaparin 40 mg Subcutaneous Q24H EDA     hydrALAZINE 10 mg Intravenous Q6H PRN     hydrochlorothiazide 12 5 mg Oral Daily     HYDROmorphone 0 5 mg Intravenous Q4H PRN     LORazepam 0 5 mg Intravenous Q6H PRN     melatonin 6 mg Oral HS     metoclopramide 5 mg Intravenous Q6H PRN     metoprolol tartrate 50 mg Oral Q12H EDA     multivitamin-minerals 1 tablet Oral BID     naloxone 0 04 mg Intravenous Q3 min PRN     ondansetron 4 mg Intravenous Q4H PRN     oxyCODONE 10 mg Oral Q4H PRN     oxyCODONE 5 mg Oral Q4H PRN     pantoprazole 40 mg Intravenous Q12H EDA     promethazine 25 mg Intramuscular Q6H PRN     saccharomyces boulardii 250 mg Oral BID     simethicone 80 mg Oral Q6H PRN     venlafaxine 37 5 mg Oral Daily        Continuous Infusions:   No current facility-administered medications for this encounter  PRN Meds:   Pertinent Labs/Diagnostic Results: H&H   10 3  31 8 plt   476  Age/Sex: 51 y o  female   Discharge Plan: TBD    Network Utilization Review Department  Phone: [phone]; Fax [phone]  [email or web address]  ATTENTION: Please call with any questions or concerns to [phone]  and carefully listen to the prompts so that you are directed to the right person  Send all requests for admission clinical reviews, approved or denied determinations and any other requests to fax [phone]   All voicemails are confidential

## 2019-03-27 NOTE — UTILIZATION REVIEW
Notification of Discharge  This is a Notification of Discharge from our facility [address]  Please be advised that this patient has been discharge from our facility  Below you will find the admission and discharge date and time including the patients disposition  PRESENTATION DATE: 3/6/2019  7:43 PM  IP ADMISSION DATE: 3/6/19 2118  DISCHARGE DATE: 3/18/2019  1:46 PM  DISPOSITION: [id number] Utilization Review Department  Phone: [phone]; Fax [phone]  [email or web address]  ATTENTION: Please call with any questions or concerns to [phone]  and carefully listen to the prompts so that you are directed to the right person  Send all requests for admission clinical reviews, approved or denied determinations and any other requests to fax [phone]   All voicemails are confidential

## 2019-03-28 ENCOUNTER — OFFICE VISIT (OUTPATIENT)
Dept: BARIATRICS | Facility: CLINIC | Age: 49
End: 2019-03-28

## 2019-03-28 VITALS
HEART RATE: 96 BPM | WEIGHT: 233.4 LBS | SYSTOLIC BLOOD PRESSURE: 134 MMHG | BODY MASS INDEX: 35.37 KG/M2 | TEMPERATURE: 98.9 F | HEIGHT: 68 IN | DIASTOLIC BLOOD PRESSURE: 86 MMHG

## 2019-03-28 DIAGNOSIS — E66.01 MORBID (SEVERE) OBESITY DUE TO EXCESS CALORIES (HCC): Primary | ICD-10-CM

## 2019-03-28 PROBLEM — K92.2 ACUTE GASTROINTESTINAL HEMORRHAGE: Status: RESOLVED | Noted: 2019-02-06 | Resolved: 2019-03-28

## 2019-03-28 PROBLEM — R00.0 TACHYCARDIA: Status: RESOLVED | Noted: 2019-03-07 | Resolved: 2019-03-28

## 2019-03-28 PROBLEM — K91.89 AFFERENT LOOP SYNDROME: Status: RESOLVED | Noted: 2019-02-06 | Resolved: 2019-03-28

## 2019-03-28 PROBLEM — R10.12 ABDOMINAL PAIN, LUQ: Status: RESOLVED | Noted: 2019-02-01 | Resolved: 2019-03-28

## 2019-03-28 PROBLEM — K31.1 GASTRIC OUTFLOW OBSTRUCTION: Status: RESOLVED | Noted: 2019-03-06 | Resolved: 2019-03-28

## 2019-03-28 PROBLEM — K92.2 UPPER GI BLEED: Status: RESOLVED | Noted: 2018-03-20 | Resolved: 2019-03-28

## 2019-03-28 PROBLEM — J98.6: Status: RESOLVED | Noted: 2019-02-06 | Resolved: 2019-03-28

## 2019-03-28 PROBLEM — R11.14 BILIOUS VOMITING WITH NAUSEA: Status: RESOLVED | Noted: 2019-01-24 | Resolved: 2019-03-28

## 2019-03-28 PROBLEM — D73.89: Status: RESOLVED | Noted: 2019-02-25 | Resolved: 2019-03-28

## 2019-03-28 PROBLEM — K25.4 GASTROINTESTINAL HEMORRHAGE ASSOCIATED WITH GASTRIC ULCER: Status: RESOLVED | Noted: 2018-03-23 | Resolved: 2019-03-28

## 2019-03-28 PROCEDURE — 99024 POSTOP FOLLOW-UP VISIT: CPT | Performed by: SURGERY

## 2019-03-28 RX ORDER — TRAMADOL HYDROCHLORIDE 50 MG/1
50 TABLET ORAL EVERY 6 HOURS PRN
Qty: 30 TABLET | Refills: 0 | Status: SHIPPED | OUTPATIENT
Start: 2019-03-28 | End: 2021-07-14 | Stop reason: ALTCHOICE

## 2019-03-28 RX ORDER — GABAPENTIN 100 MG/1
100 CAPSULE ORAL 3 TIMES DAILY
Qty: 15 CAPSULE | Refills: 0 | Status: SHIPPED | OUTPATIENT
Start: 2019-03-28 | End: 2021-12-17

## 2019-03-28 NOTE — ASSESSMENT & PLAN NOTE
A:   48/ morbidly obese female s/p diagnostic laparoscopy, partial remnant gastrectomy, EGD complicated by inadvertent colon resection/colotomies resulting in laparoscopic transverse/partial descending colon resection with open colocolostomy by Dr Marshall Iverson 3/6/19  This occurred after her original surgery 2/11/19 (Diagnostic laparoscopy, small bowel resection, drainage/decompression/closure of gastric remnant) was complicated by a gastric remnant leak at the gastrostomy site due to a distal gastric obstruction secondary to the construct of her original bypass  She is tolerating a regular diet, taking in > 60 grams protein daily, ambulating and has bowel function  Pain is present and tylenol is not controlling pain adequately  P:   Will add gabapentin and tramadol for pain control; Continue regular diet with special attention to obtain 60 grams of protein daily; 48 ounces of water at minimum daily; cont MVI, B12 and Calcium/D3; Follow up in two weeks

## 2019-03-28 NOTE — PROGRESS NOTES
Progress Note - Bariatric Surgery   Timothy Smaller 51 y o  female MRN: [id number]  Unit/Bed#:  Encounter: [id number]    Assessment/Plan: Morbid (severe) obesity due to excess calories [facility])  A:   48/ morbidly obese female s/p diagnostic laparoscopy, partial remnant gastrectomy, EGD complicated by inadvertent colon resection/colotomies resulting in laparoscopic transverse/partial descending colon resection with open colocolostomy by Dr Kenny Weathers 3/6/19  This occurred after her original surgery 2/11/19 (Diagnostic laparoscopy, small bowel resection, drainage/decompression/closure of gastric remnant) was complicated by a gastric remnant leak at the gastrostomy site due to a distal gastric obstruction secondary to the construct of her original bypass  She is tolerating a regular diet, taking in > 60 grams protein daily, ambulating and has bowel function  Pain is present and tylenol is not controlling pain adequately  P:   Will add gabapentin and tramadol for pain control; Continue regular diet with special attention to obtain 60 grams of protein daily; 48 ounces of water at minimum daily; cont MVI, B12 and Calcium/D3; Follow up in two weeks  Subjective/Objective     Subjective: She is tolerating a regular diet  Daily bowel movements at least  Ambulating daily  Is taking vitamins/minerals  Abdominal pain not controlled with tylenol only  Bilateral lower extremity soreness  Objective: Looks well     Blood pressure 134/86, pulse 96, temperature 98 9 °F (37 2 °C), temperature source Tympanic, height 5' 7 5" (1 715 m), weight 106 kg (233 lb 6 4 oz), not currently breastfeeding  ,Body mass index is 36 02 kg/m²        Physical Exam:    No distress   RRR  CTAB  Abd soft, ND, mild incisional tenderness; steristrips still overlying wounds without any erythema/odor

## 2019-04-02 LAB
MYCOBACTERIUM SPEC CULT: NORMAL
RHODAMINE-AURAMINE STN SPEC: NORMAL

## 2019-04-11 ENCOUNTER — OFFICE VISIT (OUTPATIENT)
Dept: BARIATRICS | Facility: CLINIC | Age: 49
End: 2019-04-11

## 2019-04-11 VITALS
DIASTOLIC BLOOD PRESSURE: 72 MMHG | TEMPERATURE: 98.3 F | HEART RATE: 70 BPM | RESPIRATION RATE: 18 BRPM | HEIGHT: 68 IN | WEIGHT: 233.4 LBS | BODY MASS INDEX: 35.37 KG/M2 | SYSTOLIC BLOOD PRESSURE: 112 MMHG

## 2019-04-11 DIAGNOSIS — E66.01 MORBID (SEVERE) OBESITY DUE TO EXCESS CALORIES (HCC): ICD-10-CM

## 2019-04-11 DIAGNOSIS — K91.2 POSTSURGICAL MALABSORPTION: Primary | ICD-10-CM

## 2019-04-11 PROCEDURE — 99024 POSTOP FOLLOW-UP VISIT: CPT | Performed by: SURGERY

## 2019-05-01 ENCOUNTER — OFFICE VISIT (OUTPATIENT)
Dept: BARIATRICS | Facility: CLINIC | Age: 49
End: 2019-05-01

## 2019-05-01 VITALS
HEART RATE: 88 BPM | HEIGHT: 68 IN | WEIGHT: 235.8 LBS | BODY MASS INDEX: 35.74 KG/M2 | DIASTOLIC BLOOD PRESSURE: 70 MMHG | SYSTOLIC BLOOD PRESSURE: 140 MMHG | TEMPERATURE: 98.7 F

## 2019-05-01 DIAGNOSIS — R10.30 LOWER ABDOMINAL PAIN: Primary | ICD-10-CM

## 2019-05-01 PROCEDURE — 99024 POSTOP FOLLOW-UP VISIT: CPT | Performed by: SURGERY

## 2019-05-01 RX ORDER — FERROUS SULFATE 325(65) MG
TABLET ORAL
COMMUNITY
Start: 2018-05-24

## 2019-05-13 ENCOUNTER — HOSPITAL ENCOUNTER (OUTPATIENT)
Dept: CT IMAGING | Facility: HOSPITAL | Age: 49
Discharge: HOME | End: 2019-05-13
Attending: SURGERY
Payer: COMMERCIAL

## 2019-05-13 DIAGNOSIS — R10.30 LOWER ABDOMINAL PAIN: ICD-10-CM

## 2019-05-13 PROCEDURE — 74177 CT ABD & PELVIS W/CONTRAST: CPT

## 2019-05-13 RX ADMIN — IOHEXOL 100 ML: 350 INJECTION, SOLUTION INTRAVENOUS at 07:21

## 2019-05-14 ENCOUNTER — APPOINTMENT (OUTPATIENT)
Dept: LAB | Facility: HOSPITAL | Age: 49
End: 2019-05-14
Attending: SURGERY
Payer: COMMERCIAL

## 2019-05-14 DIAGNOSIS — K91.2 POSTSURGICAL MALABSORPTION: ICD-10-CM

## 2019-05-14 LAB
25(OH)D3 SERPL-MCNC: 18 NG/ML (ref 30–100)
ALBUMIN SERPL BCP-MCNC: 3.7 G/DL (ref 3.5–5)
ALP SERPL-CCNC: 76 U/L (ref 46–116)
ALT SERPL W P-5'-P-CCNC: 24 U/L (ref 12–78)
ANION GAP SERPL CALCULATED.3IONS-SCNC: 9 MMOL/L (ref 4–13)
AST SERPL W P-5'-P-CCNC: 10 U/L (ref 5–45)
BILIRUB SERPL-MCNC: 0.7 MG/DL (ref 0.2–1)
BUN SERPL-MCNC: 13 MG/DL (ref 5–25)
CALCIUM SERPL-MCNC: 9.4 MG/DL (ref 8.3–10.1)
CHLORIDE SERPL-SCNC: 106 MMOL/L (ref 100–108)
CHOLEST SERPL-MCNC: 211 MG/DL (ref 50–200)
CO2 SERPL-SCNC: 29 MMOL/L (ref 21–32)
CREAT SERPL-MCNC: 0.68 MG/DL (ref 0.6–1.3)
ERYTHROCYTE [DISTWIDTH] IN BLOOD BY AUTOMATED COUNT: 14.6 % (ref 11.6–15.1)
EST. AVERAGE GLUCOSE BLD GHB EST-MCNC: 117 MG/DL
FERRITIN SERPL-MCNC: 10 NG/ML (ref 8–388)
FOLATE SERPL-MCNC: 9.7 NG/ML (ref 3.1–17.5)
GFR SERPL CREATININE-BSD FRML MDRD: 103 ML/MIN/1.73SQ M
GLUCOSE P FAST SERPL-MCNC: 101 MG/DL (ref 65–99)
HBA1C MFR BLD: 5.7 % (ref 4.2–6.3)
HCT VFR BLD AUTO: 44.7 % (ref 34.8–46.1)
HDLC SERPL-MCNC: 46 MG/DL (ref 40–60)
HGB BLD-MCNC: 13.8 G/DL (ref 11.5–15.4)
IRON SERPL-MCNC: 47 UG/DL (ref 50–170)
LDLC SERPL CALC-MCNC: 142 MG/DL (ref 0–100)
MCH RBC QN AUTO: 25.9 PG (ref 26.8–34.3)
MCHC RBC AUTO-ENTMCNC: 30.9 G/DL (ref 31.4–37.4)
MCV RBC AUTO: 84 FL (ref 82–98)
NONHDLC SERPL-MCNC: 165 MG/DL
PLATELET # BLD AUTO: 297 THOUSANDS/UL (ref 149–390)
PMV BLD AUTO: 12.3 FL (ref 8.9–12.7)
POTASSIUM SERPL-SCNC: 3 MMOL/L (ref 3.5–5.3)
PROT SERPL-MCNC: 7.9 G/DL (ref 6.4–8.2)
PTH-INTACT SERPL-MCNC: 80.6 PG/ML (ref 18.4–80.1)
RBC # BLD AUTO: 5.32 MILLION/UL (ref 3.81–5.12)
SODIUM SERPL-SCNC: 144 MMOL/L (ref 136–145)
T4 FREE SERPL-MCNC: 1.05 NG/DL (ref 0.76–1.46)
TIBC SERPL-MCNC: 423 UG/DL (ref 250–450)
TRIGL SERPL-MCNC: 114 MG/DL
TSH SERPL DL<=0.05 MIU/L-ACNC: 0.29 UIU/ML (ref 0.36–3.74)
VIT B12 SERPL-MCNC: 589 PG/ML (ref 100–900)
WBC # BLD AUTO: 8.64 THOUSAND/UL (ref 4.31–10.16)

## 2019-05-14 PROCEDURE — 84425 ASSAY OF VITAMIN B-1: CPT

## 2019-05-14 PROCEDURE — 84439 ASSAY OF FREE THYROXINE: CPT

## 2019-05-14 PROCEDURE — 84590 ASSAY OF VITAMIN A: CPT

## 2019-05-14 PROCEDURE — 85027 COMPLETE CBC AUTOMATED: CPT

## 2019-05-14 PROCEDURE — 80061 LIPID PANEL: CPT

## 2019-05-14 PROCEDURE — 83036 HEMOGLOBIN GLYCOSYLATED A1C: CPT

## 2019-05-14 PROCEDURE — 83540 ASSAY OF IRON: CPT

## 2019-05-14 PROCEDURE — 80053 COMPREHEN METABOLIC PANEL: CPT

## 2019-05-14 PROCEDURE — 84630 ASSAY OF ZINC: CPT

## 2019-05-14 PROCEDURE — 82525 ASSAY OF COPPER: CPT

## 2019-05-14 PROCEDURE — 82306 VITAMIN D 25 HYDROXY: CPT

## 2019-05-14 PROCEDURE — 36415 COLL VENOUS BLD VENIPUNCTURE: CPT

## 2019-05-14 PROCEDURE — 83550 IRON BINDING TEST: CPT

## 2019-05-14 PROCEDURE — 84443 ASSAY THYROID STIM HORMONE: CPT

## 2019-05-14 PROCEDURE — 82746 ASSAY OF FOLIC ACID SERUM: CPT

## 2019-05-14 PROCEDURE — 82728 ASSAY OF FERRITIN: CPT

## 2019-05-14 PROCEDURE — 84207 ASSAY OF VITAMIN B-6: CPT

## 2019-05-14 PROCEDURE — 83970 ASSAY OF PARATHORMONE: CPT

## 2019-05-14 PROCEDURE — 82607 VITAMIN B-12: CPT

## 2019-05-15 ENCOUNTER — TELEPHONE (OUTPATIENT)
Dept: OTHER | Facility: HOSPITAL | Age: 49
End: 2019-05-15

## 2019-05-15 DIAGNOSIS — E87.6 HYPOKALEMIA: Primary | ICD-10-CM

## 2019-05-15 RX ORDER — POTASSIUM CHLORIDE 20 MEQ/1
20 TABLET, EXTENDED RELEASE ORAL 2 TIMES DAILY
Qty: 14 TABLET | Refills: 0 | Status: SHIPPED | OUTPATIENT
Start: 2019-05-15 | End: 2021-12-17

## 2019-05-16 LAB
COPPER SERPL-MCNC: 108 UG/DL (ref 72–166)
ZINC SERPL-MCNC: 83 UG/DL (ref 56–134)

## 2019-05-18 LAB
VIT A SERPL-MCNC: 59.8 UG/DL (ref 20.1–62)
VIT B6 SERPL-MCNC: 2.7 UG/L (ref 2–32.8)

## 2019-05-19 LAB — VIT B1 BLD-SCNC: 122.7 NMOL/L (ref 66.5–200)

## 2019-05-22 ENCOUNTER — OFFICE VISIT (OUTPATIENT)
Dept: BARIATRICS | Facility: CLINIC | Age: 49
End: 2019-05-22
Payer: COMMERCIAL

## 2019-05-22 VITALS
HEIGHT: 68 IN | SYSTOLIC BLOOD PRESSURE: 124 MMHG | HEART RATE: 75 BPM | WEIGHT: 232.4 LBS | RESPIRATION RATE: 18 BRPM | TEMPERATURE: 98.1 F | DIASTOLIC BLOOD PRESSURE: 72 MMHG | BODY MASS INDEX: 35.22 KG/M2

## 2019-05-22 DIAGNOSIS — E87.6 HYPOKALEMIA: ICD-10-CM

## 2019-05-22 DIAGNOSIS — E55.9 VITAMIN D DEFICIENCY: Primary | ICD-10-CM

## 2019-05-22 DIAGNOSIS — E66.01 MORBID (SEVERE) OBESITY DUE TO EXCESS CALORIES (HCC): ICD-10-CM

## 2019-05-22 PROBLEM — R10.13 EPIGASTRIC PAIN: Status: RESOLVED | Noted: 2019-01-24 | Resolved: 2019-05-22

## 2019-05-22 PROBLEM — R10.9 ABDOMINAL PAIN: Status: RESOLVED | Noted: 2019-02-18 | Resolved: 2019-05-22

## 2019-05-22 PROBLEM — K92.2 GASTROINTESTINAL HEMORRHAGE: Status: RESOLVED | Noted: 2019-01-24 | Resolved: 2019-05-22

## 2019-05-22 PROCEDURE — 99213 OFFICE O/P EST LOW 20 MIN: CPT | Performed by: SURGERY

## 2019-05-22 RX ORDER — ERGOCALCIFEROL 1.25 MG/1
50000 CAPSULE ORAL WEEKLY
Qty: 12 CAPSULE | Refills: 0 | Status: SHIPPED | OUTPATIENT
Start: 2019-05-22 | End: 2021-06-22

## 2019-05-31 ENCOUNTER — APPOINTMENT (OUTPATIENT)
Dept: LAB | Facility: HOSPITAL | Age: 49
End: 2019-05-31
Attending: SURGERY
Payer: COMMERCIAL

## 2019-05-31 DIAGNOSIS — E87.6 HYPOKALEMIA: ICD-10-CM

## 2019-05-31 LAB
ANION GAP SERPL CALCULATED.3IONS-SCNC: 10 MMOL/L (ref 4–13)
BUN SERPL-MCNC: 14 MG/DL (ref 5–25)
CALCIUM SERPL-MCNC: 9.5 MG/DL (ref 8.3–10.1)
CHLORIDE SERPL-SCNC: 106 MMOL/L (ref 100–108)
CO2 SERPL-SCNC: 29 MMOL/L (ref 21–32)
CREAT SERPL-MCNC: 0.8 MG/DL (ref 0.6–1.3)
GFR SERPL CREATININE-BSD FRML MDRD: 86 ML/MIN/1.73SQ M
GLUCOSE P FAST SERPL-MCNC: 95 MG/DL (ref 65–99)
MAGNESIUM SERPL-MCNC: 2.2 MG/DL (ref 1.6–2.6)
POTASSIUM SERPL-SCNC: 4 MMOL/L (ref 3.5–5.3)
SODIUM SERPL-SCNC: 145 MMOL/L (ref 136–145)

## 2019-05-31 PROCEDURE — 36415 COLL VENOUS BLD VENIPUNCTURE: CPT

## 2019-05-31 PROCEDURE — 83735 ASSAY OF MAGNESIUM: CPT

## 2019-05-31 PROCEDURE — 80048 BASIC METABOLIC PNL TOTAL CA: CPT

## 2019-06-06 ENCOUNTER — OFFICE VISIT (OUTPATIENT)
Dept: BARIATRICS | Facility: CLINIC | Age: 49
End: 2019-06-06

## 2019-06-06 VITALS
HEIGHT: 68 IN | SYSTOLIC BLOOD PRESSURE: 118 MMHG | WEIGHT: 232.2 LBS | BODY MASS INDEX: 35.19 KG/M2 | TEMPERATURE: 97.6 F | DIASTOLIC BLOOD PRESSURE: 78 MMHG | HEART RATE: 65 BPM

## 2019-06-06 DIAGNOSIS — E55.9 VITAMIN D DEFICIENCY: Primary | ICD-10-CM

## 2019-06-06 DIAGNOSIS — E66.01 MORBID (SEVERE) OBESITY DUE TO EXCESS CALORIES (HCC): ICD-10-CM

## 2019-06-06 PROCEDURE — 99024 POSTOP FOLLOW-UP VISIT: CPT | Performed by: SURGERY

## 2019-08-27 ENCOUNTER — TELEPHONE (OUTPATIENT)
Dept: NEUROLOGY | Facility: CLINIC | Age: 49
End: 2019-08-27

## 2019-08-27 NOTE — TELEPHONE ENCOUNTER
Patient stopped by the office requesting a copy of her office note for Date of Service 03/14/18 with Dr Pedro Chin  Patient signed a Release of Records  Copy of note given to patient

## 2019-11-03 ENCOUNTER — HOSPITAL ENCOUNTER (EMERGENCY)
Facility: HOSPITAL | Age: 49
Discharge: HOME | End: 2019-11-03
Attending: EMERGENCY MEDICINE | Admitting: EMERGENCY MEDICINE

## 2019-11-03 ENCOUNTER — APPOINTMENT (EMERGENCY)
Dept: CT IMAGING | Facility: HOSPITAL | Age: 49
End: 2019-11-03

## 2019-11-03 VITALS
OXYGEN SATURATION: 98 % | HEART RATE: 88 BPM | RESPIRATION RATE: 16 BRPM | SYSTOLIC BLOOD PRESSURE: 143 MMHG | TEMPERATURE: 97.7 F | DIASTOLIC BLOOD PRESSURE: 75 MMHG

## 2019-11-03 DIAGNOSIS — R10.9 NONSPECIFIC ABDOMINAL PAIN: Primary | ICD-10-CM

## 2019-11-03 LAB
ALBUMIN SERPL BCP-MCNC: 3.7 G/DL (ref 3.5–5)
ALP SERPL-CCNC: 83 U/L (ref 46–116)
ALT SERPL W P-5'-P-CCNC: 21 U/L (ref 12–78)
ANION GAP SERPL CALCULATED.3IONS-SCNC: 9 MMOL/L (ref 4–13)
AST SERPL W P-5'-P-CCNC: 11 U/L (ref 5–45)
BASOPHILS # BLD AUTO: 0.04 THOUSANDS/ΜL (ref 0–0.1)
BASOPHILS NFR BLD AUTO: 1 % (ref 0–1)
BILIRUB DIRECT SERPL-MCNC: 0.07 MG/DL (ref 0–0.2)
BILIRUB SERPL-MCNC: 0.4 MG/DL (ref 0.2–1)
BILIRUB UR QL STRIP: NEGATIVE
BUN SERPL-MCNC: 20 MG/DL (ref 5–25)
CALCIUM SERPL-MCNC: 8.9 MG/DL (ref 8.3–10.1)
CHLORIDE SERPL-SCNC: 105 MMOL/L (ref 100–108)
CLARITY UR: CLEAR
CO2 SERPL-SCNC: 28 MMOL/L (ref 21–32)
COLOR UR: YELLOW
CREAT SERPL-MCNC: 0.7 MG/DL (ref 0.6–1.3)
EOSINOPHIL # BLD AUTO: 0.24 THOUSAND/ΜL (ref 0–0.61)
EOSINOPHIL NFR BLD AUTO: 4 % (ref 0–6)
ERYTHROCYTE [DISTWIDTH] IN BLOOD BY AUTOMATED COUNT: 14.6 % (ref 11.6–15.1)
EXT PREG TEST URINE: NEGATIVE
EXT. CONTROL ED NAV: NORMAL
GFR SERPL CREATININE-BSD FRML MDRD: 101 ML/MIN/1.73SQ M
GLUCOSE SERPL-MCNC: 96 MG/DL (ref 65–140)
GLUCOSE UR STRIP-MCNC: NEGATIVE MG/DL
HCT VFR BLD AUTO: 38 % (ref 34.8–46.1)
HGB BLD-MCNC: 12 G/DL (ref 11.5–15.4)
HGB UR QL STRIP.AUTO: NEGATIVE
IMM GRANULOCYTES # BLD AUTO: 0.02 THOUSAND/UL (ref 0–0.2)
IMM GRANULOCYTES NFR BLD AUTO: 0 % (ref 0–2)
KETONES UR STRIP-MCNC: NEGATIVE MG/DL
LEUKOCYTE ESTERASE UR QL STRIP: NEGATIVE
LIPASE SERPL-CCNC: 185 U/L (ref 73–393)
LYMPHOCYTES # BLD AUTO: 2.18 THOUSANDS/ΜL (ref 0.6–4.47)
LYMPHOCYTES NFR BLD AUTO: 32 % (ref 14–44)
MCH RBC QN AUTO: 25.4 PG (ref 26.8–34.3)
MCHC RBC AUTO-ENTMCNC: 31.6 G/DL (ref 31.4–37.4)
MCV RBC AUTO: 80 FL (ref 82–98)
MONOCYTES # BLD AUTO: 0.41 THOUSAND/ΜL (ref 0.17–1.22)
MONOCYTES NFR BLD AUTO: 6 % (ref 4–12)
NEUTROPHILS # BLD AUTO: 3.92 THOUSANDS/ΜL (ref 1.85–7.62)
NEUTS SEG NFR BLD AUTO: 57 % (ref 43–75)
NITRITE UR QL STRIP: NEGATIVE
NRBC BLD AUTO-RTO: 0 /100 WBCS
PH UR STRIP.AUTO: 6 [PH]
PLATELET # BLD AUTO: 298 THOUSANDS/UL (ref 149–390)
PMV BLD AUTO: 11 FL (ref 8.9–12.7)
POTASSIUM SERPL-SCNC: 3.7 MMOL/L (ref 3.5–5.3)
PROT SERPL-MCNC: 7.5 G/DL (ref 6.4–8.2)
PROT UR STRIP-MCNC: NEGATIVE MG/DL
RBC # BLD AUTO: 4.73 MILLION/UL (ref 3.81–5.12)
SODIUM SERPL-SCNC: 142 MMOL/L (ref 136–145)
SP GR UR STRIP.AUTO: 1.02 (ref 1–1.03)
UROBILINOGEN UR QL STRIP.AUTO: 0.2 E.U./DL
WBC # BLD AUTO: 6.81 THOUSAND/UL (ref 4.31–10.16)

## 2019-11-03 PROCEDURE — 36415 COLL VENOUS BLD VENIPUNCTURE: CPT | Performed by: PHYSICIAN ASSISTANT

## 2019-11-03 PROCEDURE — 80076 HEPATIC FUNCTION PANEL: CPT | Performed by: PHYSICIAN ASSISTANT

## 2019-11-03 PROCEDURE — 99284 EMERGENCY DEPT VISIT MOD MDM: CPT | Performed by: PHYSICIAN ASSISTANT

## 2019-11-03 PROCEDURE — 83690 ASSAY OF LIPASE: CPT | Performed by: PHYSICIAN ASSISTANT

## 2019-11-03 PROCEDURE — 81025 URINE PREGNANCY TEST: CPT | Performed by: PHYSICIAN ASSISTANT

## 2019-11-03 PROCEDURE — 81003 URINALYSIS AUTO W/O SCOPE: CPT | Performed by: PHYSICIAN ASSISTANT

## 2019-11-03 PROCEDURE — 85025 COMPLETE CBC W/AUTO DIFF WBC: CPT | Performed by: PHYSICIAN ASSISTANT

## 2019-11-03 PROCEDURE — 80048 BASIC METABOLIC PNL TOTAL CA: CPT | Performed by: PHYSICIAN ASSISTANT

## 2019-11-03 PROCEDURE — 74177 CT ABD & PELVIS W/CONTRAST: CPT

## 2019-11-03 PROCEDURE — 99284 EMERGENCY DEPT VISIT MOD MDM: CPT

## 2019-11-03 RX ORDER — LIDOCAINE HYDROCHLORIDE 20 MG/ML
15 SOLUTION OROPHARYNGEAL ONCE
Status: COMPLETED | OUTPATIENT
Start: 2019-11-03 | End: 2019-11-03

## 2019-11-03 RX ORDER — SUCRALFATE 1 G/1
1 TABLET ORAL 4 TIMES DAILY
Qty: 20 TABLET | Refills: 0 | Status: SHIPPED | OUTPATIENT
Start: 2019-11-03

## 2019-11-03 RX ORDER — SUCRALFATE ORAL 1 G/10ML
1000 SUSPENSION ORAL ONCE
Status: COMPLETED | OUTPATIENT
Start: 2019-11-03 | End: 2019-11-03

## 2019-11-03 RX ADMIN — SUCRALFATE 1000 MG: 1 SUSPENSION ORAL at 18:36

## 2019-11-03 RX ADMIN — IOHEXOL 50 ML: 240 INJECTION, SOLUTION INTRATHECAL; INTRAVASCULAR; INTRAVENOUS; ORAL at 18:59

## 2019-11-03 RX ADMIN — LIDOCAINE HYDROCHLORIDE 15 ML: 20 SOLUTION ORAL; TOPICAL at 18:36

## 2019-11-03 RX ADMIN — IOHEXOL 100 ML: 350 INJECTION, SOLUTION INTRAVENOUS at 20:38

## 2019-11-03 NOTE — ED PROVIDER NOTES
History  Chief Complaint   Patient presents with    Epigastric Pain     onset 1 week ago, bariatric surgery in march,      This is a 79-year-old female status post Randy-en-Y in March of this year here for evaluation of left upper quadrant and epigastric abdominal pain  Onset about 1 week ago, gradual   Every day getting progressively worse  States within 20-30 minutes of eating her pain worsens  Nauseous but overloaded  Pain has not migrated since onset  Denies any diarrhea  She has normal urination  She follows with [facility]  She was instructed to come in for further evaluation  Denies fevers chills chest pain shortness of breath  No back pain no flank pain  History provided by:  Patient   used: No    Abdominal Pain   Pain location:  Epigastric and LUQ  Pain quality: burning and cramping    Pain radiates to:  Does not radiate  Pain severity:  Moderate  Onset quality:  Gradual  Duration:  1 week  Timing:  Intermittent  Progression:  Worsening  Chronicity:  New  Context: not alcohol use, not awakening from sleep, not diet changes, not eating, not laxative use, not medication withdrawal, not previous surgeries, not recent illness, not recent sexual activity, not recent travel, not retching, not sick contacts and not suspicious food intake    Relieved by:  Nothing  Worsened by:  Eating  Ineffective treatments:  None tried  Associated symptoms: nausea    Associated symptoms: no anorexia, no belching, no chest pain, no chills, no constipation, no cough, no diarrhea, no dysuria, no fatigue, no fever, no flatus, no hematemesis, no hematochezia, no hematuria, no melena, no shortness of breath, no sore throat, no vaginal bleeding, no vaginal discharge and no vomiting    Risk factors: multiple surgeries        Prior to Admission Medications   Prescriptions Last Dose Informant Patient Reported? Taking?    Biotin 1000 MCG tablet 11/2/2019 at Unknown time  Yes Yes Sig: Take 1,000 mcg by mouth   Melatonin 5 MG CAPS Past Week at Unknown time  Yes Yes   Sig: Take 5 mg by mouth   Multiple Vitamin (MULTIVITAMIN) tablet 11/2/2019 at Unknown time  No Yes   Sig: Take 1 tablet by mouth 2 (two) times a day   Omega-3 Fatty Acids (FISH OIL) 1,000 mg Not Taking at Unknown time Self Yes No   Sig: Take 1,000 mg by mouth daily   butalbital-acetaminophen-caffeine (FIORICET,ESGIC) -40 mg per tablet Not Taking at Unknown time Self Yes No   Sig: take 1-2 tablets by mouth every 4 to 6 hours if needed maximum daily dose of 6   calcium citrate-vitamin D (CITRACAL+D) 315-200 MG-UNIT per tablet 11/2/2019 at Unknown time  No Yes   Sig: Take 2 tablets by mouth 2 (two) times a day   cyclobenzaprine (FLEXERIL) 10 mg tablet Not Taking at Unknown time  No No   Sig: Take 1 tablet (10 mg total) by mouth 3 (three) times a day as needed for muscle spasms   Patient not taking: Reported on 5/22/2019   doxepin (SINEquan) 10 mg capsule 11/2/2019 at Unknown time Self Yes Yes   Sig: Take 10 mg by mouth daily at bedtime   ergocalciferol (VITAMIN D2) 50,000 units   No No   Sig: Take 1 capsule (50,000 Units total) by mouth once a week for 12 doses   esterified estrogens (MENEST) 0 625 MG tablet 11/2/2019 at Unknown time Self Yes Yes   Sig: Take 0 625 mg by mouth daily   estradiol (VIVELLE-DOT) 0 075 MG/24HR   Yes No   Sig: Place 1 patch on the skin   ferrous sulfate 325 (65 Fe) mg tablet 11/2/2019 at Unknown time  Yes Yes   Sig: TAKE 1 TABLET BY MOUTH TWICE DAILY   gabapentin (NEURONTIN) 100 mg capsule   No No   Sig: Take 1 capsule (100 mg total) by mouth 3 (three) times a day for 5 days   hydrochlorothiazide (HYDRODIURIL) 12 5 mg tablet Not Taking at Unknown time Self Yes No   Sig: Take 12 5 mg by mouth daily   metoprolol tartrate (LOPRESSOR) 50 mg tablet   No No   Sig: Take 1 tablet (50 mg total) by mouth every 12 (twelve) hours for 30 days   Patient not taking: Reported on 3/28/2019   ondansetron (ZOFRAN) 4 mg tablet Not Taking at Unknown time  No No   Sig: Take 1 tablet (4 mg total) by mouth every 8 (eight) hours as needed for nausea or vomiting   Patient not taking: Reported on 2/18/2019   potassium chloride (K-DUR,KLOR-CON) 20 mEq tablet   No No   Sig: Take 1 tablet (20 mEq total) by mouth 2 (two) times a day for 7 days   simethicone (MYLICON) 80 mg chewable tablet Not Taking at Unknown time  No No   Sig: Chew 1 tablet (80 mg total) every 12 (twelve) hours   Patient not taking: Reported on 11/3/2019   traMADol (ULTRAM) 50 mg tablet Not Taking at Unknown time  No No   Sig: Take 1 tablet (50 mg total) by mouth every 6 (six) hours as needed for moderate pain   Patient not taking: Reported on 5/1/2019   venlafaxine (EFFEXOR) 37 5 mg tablet Not Taking at Unknown time  Yes No   Sig: Take 37 5 mg by mouth 2 (two) times a day   vitamin B-12 (CYANOCOBALAMIN) 500 MCG TABS   No No   Sig: Take 2 tablets (1,000 mcg total) by mouth daily for 120 days      Facility-Administered Medications: None       Past Medical History:   Diagnosis Date    Bell palsy     Gastric ulcer     GERD (gastroesophageal reflux disease)     History of transfusion     Melena     Obesity (BMI 30-39  9)     Pancreatic cyst     Right facial numbness     Upper GI bleed        Past Surgical History:   Procedure Laterality Date    ABDOMINOPLASTY      BARIATRIC SURGERY      BOWEL RESECTION LAPAROSCOPIC N/A 2/11/2019    Procedure: LAPAROSCOPIC LYSIS OF ADHESIONS; RESECTION SMALL BOWEL; DECOMPRESSION OF GASTRIC REMNANT; EGD;  Surgeon: Jaren Jaimes MD;  [facility];  Service: General    CHOLECYSTECTOMY      CT GUIDED PERC DRAINAGE CATHETER PLACEMENT  2/25/2019    ESOPHAGOGASTRODUODENOSCOPY N/A 3/6/2019    Procedure: INTRAOPERATIVE EGD;  Surgeon: Jaren Jaimes MD;  [facility];  Service: Bariatrics    HERNIA REPAIR      HYSTERECTOMY      KNEE CARTILAGE SURGERY      PARTIAL GASTRECTOMY N/A 3/6/2019    Procedure: RESECTION GASTRIC PARTIAL/GASTRECTOMY PARTIAL LAPAROSCOPIC;  Surgeon: Martha Willard MD;  [facility];  Service: Bariatrics    [place] COLONOSCOPY FLX DX W/COLLJ SPEC WHEN PFRMD N/A 3/28/2018    Procedure: COLONOSCOPY;  Surgeon: Ej Ordoñez MD;  [facility]; Service: Gastroenterology    [place] COLONOSCOPY FLX DX W/COLLJ [facility] WHEN PFRMD N/A 1/31/2019    Procedure: COLONOSCOPY;  Surgeon: Ej Ordoñez MD;  [facility]; Service: Gastroenterology    [place] ESOPHAGOGASTRODUODENOSCOPY TRANSORAL DIAGNOSTIC N/A 3/22/2018    Procedure: ESOPHAGOGASTRODUODENOSCOPY (EGD); Surgeon: Ej Ordoñez MD;  [facility]; Service: Gastroenterology    [place] ESOPHAGOGASTRODUODENOSCOPY TRANSORAL DIAGNOSTIC N/A 1/31/2019    Procedure: ESOPHAGOGASTRODUODENOSCOPY (EGD); Surgeon: Ej Ordoñez MD;  [facility]; Service: Gastroenterology    [place] LAP,DIAGNOSTIC ABDOMEN N/A 3/6/2019    Procedure: LAPAROSCOPY DIAGNOSTIC;  Surgeon: Martha Willard MD;  [facility];  Service: Holden Footman [place] PART REMOVAL COLON W ANASTOMOSIS N/A 3/6/2019    Procedure: OPEN TRANSVERSE COLON RESECTION;  Surgeon: Martha Willard MD;  [facility];  Service: Bariatrics    REDUCTION MAMMAPLASTY Bilateral     SHOULDER ARTHROSCOPY DISTAL CLAVICLE EXCISION AND OPEN ROTATOR CUFF REPAIR         Family History   Problem Relation Age of Onset    Heart attack Mother     Diabetes Mother     Heart attack Father     Stroke Father     Hypertension Brother     Leukemia Paternal Aunt      I have reviewed and agree with the history as documented      Social History     Tobacco Use    Smoking status: Never Smoker    Smokeless tobacco: Never Used   Substance Use Topics    Alcohol use: Not Currently     Frequency: Never     Binge frequency: Less than monthly    Drug use: No        Review of Systems   Constitutional: Negative for activity change, appetite change, chills, diaphoresis, fatigue, fever and unexpected weight change  HENT: Negative for congestion, rhinorrhea, sinus pressure, sore throat and trouble swallowing  Eyes: Negative for photophobia and visual disturbance  Respiratory: Negative for apnea, cough, choking, chest tightness, shortness of breath, wheezing and stridor  Cardiovascular: Negative for chest pain, palpitations and leg swelling  Gastrointestinal: Positive for abdominal pain and nausea  Negative for abdominal distention, anorexia, blood in stool, constipation, diarrhea, flatus, hematemesis, hematochezia, melena and vomiting  Genitourinary: Negative for decreased urine volume, difficulty urinating, dysuria, enuresis, flank pain, frequency, hematuria, urgency, vaginal bleeding and vaginal discharge  Musculoskeletal: Negative for arthralgias, myalgias, neck pain and neck stiffness  Skin: Negative for color change, pallor, rash and wound  Allergic/Immunologic: Negative  Neurological: Negative for dizziness, tremors, syncope, weakness, light-headedness, numbness and headaches  Hematological: Negative  Psychiatric/Behavioral: Negative  All other systems reviewed and are negative  Physical Exam  Physical Exam   Constitutional: She is oriented to person, place, and time  She appears well-developed and well-nourished  Non-toxic appearance  She does not have a sickly appearance  She does not appear ill  No distress  HENT:   Head: Normocephalic and atraumatic  Eyes: Pupils are equal, round, and reactive to light  EOM and lids are normal    Neck: Normal range of motion  Neck supple  Cardiovascular: Normal rate, regular rhythm, S1 normal, S2 normal, normal heart sounds, intact distal pulses and normal pulses  Exam reveals no gallop, no distant heart sounds, no friction rub and no decreased pulses  No murmur heard  Pulses:       Radial pulses are 2+ on the right side, and 2+ on the left side     Pulmonary/Chest: Effort normal and breath sounds normal  No accessory muscle usage  No apnea, no tachypnea and no bradypnea  No respiratory distress  She has no decreased breath sounds  She has no wheezes  She has no rhonchi  She has no rales  Abdominal: Soft  Normal appearance  She exhibits no distension  There is tenderness in the epigastric area and left upper quadrant  There is no rigidity, no rebound and no guarding  Musculoskeletal: Normal range of motion  She exhibits no edema, tenderness or deformity  Neurological: She is alert and oriented to person, place, and time  No cranial nerve deficit  GCS eye subscore is 4  GCS verbal subscore is 5  GCS motor subscore is 6  Skin: Skin is warm, dry and intact  No rash noted  She is not diaphoretic  No erythema  No pallor  Psychiatric: Her speech is normal    Nursing note and vitals reviewed        Vital Signs  ED Triage Vitals   Temperature Pulse Respirations Blood Pressure SpO2   11/03/19 1749 11/03/19 1749 11/03/19 1749 11/03/19 1749 11/03/19 1749   97 7 °F (36 5 °C) 68 16 157/86 97 %      Temp Source Heart Rate Source Patient Position - Orthostatic VS BP Location FiO2 (%)   11/03/19 1749 11/03/19 1749 11/03/19 1749 11/03/19 1749 --   Oral Monitor Sitting Right arm       Pain Score       11/03/19 1747       7           Vitals:    11/03/19 1749 11/03/19 1900 11/03/19 1930 11/03/19 2208   BP: 157/86 150/68 136/72 143/75   Pulse: 68 60 63 88   Patient Position - Orthostatic VS: Sitting   Lying         Visual Acuity      ED Medications  Medications   sucralfate (CARAFATE) oral suspension 1,000 mg (1,000 mg Oral Given 11/3/19 1836)   Lidocaine Viscous HCl (XYLOCAINE) 2 % mucosal solution 15 mL (15 mL Swish & Swallow Given 11/3/19 1836)   iohexol (OMNIPAQUE) 240 MG/ML solution 50 mL (50 mL Oral Given 11/3/19 1859)   iohexol (OMNIPAQUE) 350 MG/ML injection (MULTI-DOSE) 100 mL (100 mL Intravenous Given 11/3/19 2038)       Diagnostic Studies  Results Reviewed     Procedure Component Value Units Date/Time    Lipase [[id number]]  (Normal) Collected:  11/03/19 1819    Lab Status:  Final result Specimen:  Blood from Arm, Right Updated:  11/03/19 1848     Lipase 185 u/L     Basic metabolic panel [[id number]] Collected:  11/03/19 1819    Lab Status:  Final result Specimen:  Blood from Arm, Right Updated:  11/03/19 1848     Sodium 142 mmol/L      Potassium 3 7 mmol/L      Chloride 105 mmol/L      CO2 28 mmol/L      ANION GAP 9 mmol/L      BUN 20 mg/dL      Creatinine 0 70 mg/dL      Glucose 96 mg/dL      Calcium 8 9 mg/dL      eGFR 101 ml/min/1 73sq m     Narrative:       National Kidney Disease Foundation guidelines for Chronic Kidney Disease (CKD):     Stage 1 with normal or high GFR (GFR > 90 mL/min/1 73 square meters)    Stage 2 Mild CKD (GFR = 60-89 mL/min/1 73 square meters)    Stage 3A Moderate CKD (GFR = 45-59 mL/min/1 73 square meters)    Stage 3B Moderate CKD (GFR = 30-44 mL/min/1 73 square meters)    Stage 4 Severe CKD (GFR = 15-29 mL/min/1 73 square meters)    Stage 5 End Stage CKD (GFR <15 mL/min/1 73 square meters)  Note: GFR calculation is accurate only with a steady state creatinine    Hepatic function panel [[id number]]  (Normal) Collected:  11/03/19 1819    Lab Status:  Final result Specimen:  Blood from Arm, Right Updated:  11/03/19 1848     Total Bilirubin 0 40 mg/dL      Bilirubin, Direct 0 07 mg/dL      Alkaline Phosphatase 83 U/L      AST 11 U/L      ALT 21 U/L      Total Protein 7 5 g/dL      Albumin 3 7 g/dL     CBC and differential [[id number]]  (Abnormal) Collected:  11/03/19 1819    Lab Status:  Final result Specimen:  Blood from Arm, Right Updated:  11/03/19 1832     WBC 6 81 Thousand/uL      RBC 4 73 Million/uL      Hemoglobin 12 0 g/dL      Hematocrit 38 0 %      MCV 80 fL      MCH 25 4 pg      MCHC 31 6 g/dL      RDW 14 6 %      MPV 11 0 fL      Platelets 560 Thousands/uL      nRBC 0 /100 WBCs      Neutrophils Relative 57 %      Immat GRANS % 0 %      Lymphocytes Relative 32 %      Monocytes Relative 6 %      Eosinophils Relative 4 %      Basophils Relative 1 %      Neutrophils Absolute 3 92 Thousands/µL      Immature Grans Absolute 0 02 Thousand/uL      Lymphocytes Absolute 2 18 Thousands/µL      Monocytes Absolute 0 41 Thousand/µL      Eosinophils Absolute 0 24 Thousand/µL      Basophils Absolute 0 04 Thousands/µL     UA w Reflex to Microscopic w Reflex to Culture [[id number]] Collected:  11/03/19 1818    Lab Status:  Final result Specimen:  Urine, Clean Catch Updated:  11/03/19 1832     Color, UA Yellow     Clarity, UA Clear     Specific [place], UA 1 020     pH, UA 6 0     Leukocytes, UA Negative     Nitrite, UA Negative     Protein, UA Negative mg/dl      Glucose, UA Negative mg/dl      Ketones, UA Negative mg/dl      Urobilinogen, UA 0 2 E U /dl      Bilirubin, UA Negative     Blood, UA Negative    POCT pregnancy, urine [[id number]]  (Normal) Resulted:  11/03/19 1821    Lab Status:  Final result Updated:  11/03/19 1821     EXT PREG TEST UR (Ref: Negative) negative     Control valid                 CT abdomen pelvis with contrast   Final Result by Samara Cabrales MD (11/03 2142)   1  Prior gastric surgery  Stable appearance to the post surgical stomach  No obstruction  No findings for complication  2   Stable appearance to the pancreatic head cyst   Please see the MRI abdomen report of 1/29/2019 for previous recommendations  Workstation performed: [id number]                    Procedures  Procedures       ED Course                               MDM  Number of Diagnoses or Management Options  Nonspecific abdominal pain: new and requires workup  Diagnosis management comments: Differential diagnosis includes but is not limited to SBO volvulus, peptic ulcer disease, gastritis gastroenteritis, reflux, esophagitis, pancreatitis, colitis  Plan: Will obtain CT with bariatric protocol including p o  And IV contrast   Lipase CBC BMP LFTs  Will discussed with on-call bariatric surgeon    Dispo pending  Amount and/or Complexity of Data Reviewed  Clinical lab tests: reviewed and ordered  Tests in the radiology section of CPT®: ordered and reviewed  Independent visualization of images, tracings, or specimens: yes    Risk of Complications, Morbidity, and/or Mortality  Presenting problems: moderate  Management options: low  General comments: 58-year-old female patient with abdominal pain  Her CT scan was overall unremarkable  Her labs also unremarkable  She is given a GI cocktail  She has been resting comfortably  She has normal vital signs  Suspect this could be GI etiology including gastritis, peptic ulcer disease  Recommended close follow-up with her bariatric surgeon  I discussed with the on-call bariatric surgery who agrees with plan for outpatient follow-up given normal scan, normal labs  We discussed earlier return parameters  Patient understands and agrees the plan  Will start Carafate until seen in office for follow-up  Patient Progress  Patient progress: stable      Disposition  Final diagnoses:   Nonspecific abdominal pain     Time reflects when diagnosis was documented in both MDM as applicable and the Disposition within this note     Time User Action Codes Description Comment    11/3/2019 10:48 PM Brandy Hussein Add [R10 9] Nonspecific abdominal pain       ED Disposition     ED Disposition Condition Date/Time Comment    Discharge Stable Sun Nov 3, 2019 10:48 PM Haily Lees discharge to home/self care              Follow-up Information     Follow up With Specialties Details Why Contact Info    Chantel Ramos MD Bariatrics, General Surgery Call  for routine follow up [address] [place], [place] [address] [place] [address]  [phone]            Discharge Medication List as of 11/3/2019 10:50 PM      START taking these medications    Details   sucralfate (CARAFATE) 1 g tablet Take 1 tablet (1 g total) by mouth 4 (four) times a day, Starting Sun 11/3/2019, Print         CONTINUE these medications which have NOT CHANGED    Details   Biotin 1000 MCG tablet Take 1,000 mcg by mouth, Historical Med      calcium citrate-vitamin D (CITRACAL+D) 315-200 MG-UNIT per tablet Take 2 tablets by mouth 2 (two) times a day, Starting Mon 3/4/2019, Normal      doxepin (SINEquan) 10 mg capsule Take 10 mg by mouth daily at bedtime, Starting Thu 4/26/2018, Historical Med      esterified estrogens (MENEST) 0 625 MG tablet Take 0 625 mg by mouth daily, Historical Med      ferrous sulfate 325 (65 Fe) mg tablet TAKE 1 TABLET BY MOUTH TWICE DAILY, Historical Med      Melatonin 5 MG CAPS Take 5 mg by mouth, Historical Med      Multiple Vitamin (MULTIVITAMIN) tablet Take 1 tablet by mouth 2 (two) times a day, Starting Mon 3/4/2019, Normal      butalbital-acetaminophen-caffeine (FIORICET,ESGIC) -40 mg per tablet take 1-2 tablets by mouth every 4 to 6 hours if needed maximum daily dose of 6, Historical Med      cyclobenzaprine (FLEXERIL) 10 mg tablet Take 1 tablet (10 mg total) by mouth 3 (three) times a day as needed for muscle spasms, Starting Fri 2/22/2019, Normal      ergocalciferol (VITAMIN D2) 50,000 units Take 1 capsule (50,000 Units total) by mouth once a week for 12 doses, Starting Wed 5/22/2019, Until Thu 8/8/2019, Normal      estradiol (VIVELLE-DOT) 0 075 MG/24HR Place 1 patch on the skin, Starting Thu 8/9/2018, Until Fri 8/9/2019, Historical Med      gabapentin (NEURONTIN) 100 mg capsule Take 1 capsule (100 mg total) by mouth 3 (three) times a day for 5 days, Starting Thu 3/28/2019, Until Tue 4/2/2019, Normal      hydrochlorothiazide (HYDRODIURIL) 12 5 mg tablet Take 12 5 mg by mouth daily, Starting Mon 3/26/2018, Historical Med      metoprolol tartrate (LOPRESSOR) 50 mg tablet Take 1 tablet (50 mg total) by mouth every 12 (twelve) hours for 30 days, Starting Mon 3/18/2019, Until Wed 4/17/2019, Normal      Omega-3 Fatty Acids (FISH OIL) 1,000 mg Take 1,000 mg by mouth daily, Historical Med      ondansetron (ZOFRAN) 4 mg tablet Take 1 tablet (4 mg total) by mouth every 8 (eight) hours as needed for nausea or vomiting, Starting Thu 1/24/2019, Normal      potassium chloride (K-DUR,KLOR-CON) 20 mEq tablet Take 1 tablet (20 mEq total) by mouth 2 (two) times a day for 7 days, Starting Wed 5/15/2019, Until Wed 5/22/2019, Normal      simethicone (MYLICON) 80 mg chewable tablet Chew 1 tablet (80 mg total) every 12 (twelve) hours, Starting Fri 2/22/2019, Normal      traMADol (ULTRAM) 50 mg tablet Take 1 tablet (50 mg total) by mouth every 6 (six) hours as needed for moderate pain, Starting Thu 3/28/2019, Normal      venlafaxine (EFFEXOR) 37 5 mg tablet Take 37 5 mg by mouth 2 (two) times a day, Historical Med      vitamin B-12 (CYANOCOBALAMIN) 500 MCG TABS Take 2 tablets (1,000 mcg total) by mouth daily for 120 days, Starting Mon 3/4/2019, Until Tue 7/2/2019, Normal           No discharge procedures on file      ED Provider  Electronically Signed by           Azul Cosme PA-C  11/05/19 [id number]

## 2019-11-04 NOTE — ED NOTES
Patient transported to [facility] [place], RN  11/03/19 2034

## 2019-11-26 DIAGNOSIS — Z87.11 HISTORY OF GASTRIC ULCER: ICD-10-CM

## 2019-11-26 DIAGNOSIS — Z87.19 HISTORY OF MELENA: Primary | ICD-10-CM

## 2019-12-28 ENCOUNTER — HOSPITAL ENCOUNTER (EMERGENCY)
Facility: HOSPITAL | Age: 49
Discharge: HOME | End: 2019-12-28
Attending: EMERGENCY MEDICINE | Admitting: EMERGENCY MEDICINE

## 2019-12-28 ENCOUNTER — APPOINTMENT (EMERGENCY)
Dept: CT IMAGING | Facility: HOSPITAL | Age: 49
End: 2019-12-28

## 2019-12-28 VITALS
HEART RATE: 81 BPM | WEIGHT: 245 LBS | HEIGHT: 69 IN | DIASTOLIC BLOOD PRESSURE: 83 MMHG | TEMPERATURE: 97.4 F | BODY MASS INDEX: 36.29 KG/M2 | SYSTOLIC BLOOD PRESSURE: 135 MMHG | OXYGEN SATURATION: 100 %

## 2019-12-28 DIAGNOSIS — R10.9 RIGHT FLANK PAIN: Primary | ICD-10-CM

## 2019-12-28 DIAGNOSIS — N20.0 RIGHT KIDNEY STONE: ICD-10-CM

## 2019-12-28 DIAGNOSIS — D17.71 RENAL ANGIOMYOLIPOMA: ICD-10-CM

## 2019-12-28 DIAGNOSIS — K86.2 PANCREAS CYST: ICD-10-CM

## 2019-12-28 LAB
ALBUMIN SERPL BCP-MCNC: 3.4 G/DL (ref 3.5–5)
ALP SERPL-CCNC: 100 U/L (ref 46–116)
ALT SERPL W P-5'-P-CCNC: 20 U/L (ref 12–78)
ANION GAP SERPL CALCULATED.3IONS-SCNC: 7 MMOL/L (ref 4–13)
AST SERPL W P-5'-P-CCNC: 19 U/L (ref 5–45)
ATRIAL RATE: 71 BPM
BACTERIA UR QL AUTO: ABNORMAL /HPF
BASOPHILS # BLD AUTO: 0.05 THOUSANDS/ΜL (ref 0–0.1)
BASOPHILS NFR BLD AUTO: 1 % (ref 0–1)
BILIRUB SERPL-MCNC: 0.4 MG/DL (ref 0.2–1)
BILIRUB UR QL STRIP: NEGATIVE
BUN SERPL-MCNC: 17 MG/DL (ref 5–25)
CALCIUM SERPL-MCNC: 9.1 MG/DL (ref 8.3–10.1)
CHLORIDE SERPL-SCNC: 104 MMOL/L (ref 100–108)
CLARITY UR: CLEAR
CO2 SERPL-SCNC: 30 MMOL/L (ref 21–32)
COLOR UR: YELLOW
CREAT SERPL-MCNC: 0.85 MG/DL (ref 0.6–1.3)
EOSINOPHIL # BLD AUTO: 0.07 THOUSAND/ΜL (ref 0–0.61)
EOSINOPHIL NFR BLD AUTO: 1 % (ref 0–6)
ERYTHROCYTE [DISTWIDTH] IN BLOOD BY AUTOMATED COUNT: 15 % (ref 11.6–15.1)
EXT PREG TEST URINE: NEGATIVE
EXT. CONTROL ED NAV: NORMAL
GFR SERPL CREATININE-BSD FRML MDRD: 80 ML/MIN/1.73SQ M
GLUCOSE SERPL-MCNC: 112 MG/DL (ref 65–140)
GLUCOSE UR STRIP-MCNC: NEGATIVE MG/DL
HCT VFR BLD AUTO: 38.8 % (ref 34.8–46.1)
HGB BLD-MCNC: 12.2 G/DL (ref 11.5–15.4)
HGB UR QL STRIP.AUTO: ABNORMAL
IMM GRANULOCYTES # BLD AUTO: 0.04 THOUSAND/UL (ref 0–0.2)
IMM GRANULOCYTES NFR BLD AUTO: 0 % (ref 0–2)
KETONES UR STRIP-MCNC: NEGATIVE MG/DL
LEUKOCYTE ESTERASE UR QL STRIP: NEGATIVE
LIPASE SERPL-CCNC: 217 U/L (ref 73–393)
LYMPHOCYTES # BLD AUTO: 1.03 THOUSANDS/ΜL (ref 0.6–4.47)
LYMPHOCYTES NFR BLD AUTO: 10 % (ref 14–44)
MCH RBC QN AUTO: 25.4 PG (ref 26.8–34.3)
MCHC RBC AUTO-ENTMCNC: 31.4 G/DL (ref 31.4–37.4)
MCV RBC AUTO: 81 FL (ref 82–98)
MONOCYTES # BLD AUTO: 0.74 THOUSAND/ΜL (ref 0.17–1.22)
MONOCYTES NFR BLD AUTO: 7 % (ref 4–12)
NEUTROPHILS # BLD AUTO: 8.28 THOUSANDS/ΜL (ref 1.85–7.62)
NEUTS SEG NFR BLD AUTO: 81 % (ref 43–75)
NITRITE UR QL STRIP: NEGATIVE
NON-SQ EPI CELLS URNS QL MICRO: ABNORMAL /HPF
NRBC BLD AUTO-RTO: 0 /100 WBCS
P AXIS: 57 DEGREES
PH UR STRIP.AUTO: 7 [PH]
PLATELET # BLD AUTO: 397 THOUSANDS/UL (ref 149–390)
PMV BLD AUTO: 10.7 FL (ref 8.9–12.7)
POTASSIUM SERPL-SCNC: 3.7 MMOL/L (ref 3.5–5.3)
PR INTERVAL: 146 MS
PROT SERPL-MCNC: 7.2 G/DL (ref 6.4–8.2)
PROT UR STRIP-MCNC: NEGATIVE MG/DL
QRS AXIS: 39 DEGREES
QRSD INTERVAL: 72 MS
QT INTERVAL: 434 MS
QTC INTERVAL: 471 MS
RBC # BLD AUTO: 4.8 MILLION/UL (ref 3.81–5.12)
RBC #/AREA URNS AUTO: ABNORMAL /HPF
SODIUM SERPL-SCNC: 141 MMOL/L (ref 136–145)
SP GR UR STRIP.AUTO: 1.01 (ref 1–1.03)
T WAVE AXIS: 20 DEGREES
UROBILINOGEN UR QL STRIP.AUTO: 0.2 E.U./DL
VENTRICULAR RATE: 71 BPM
WBC # BLD AUTO: 10.21 THOUSAND/UL (ref 4.31–10.16)
WBC #/AREA URNS AUTO: ABNORMAL /HPF

## 2019-12-28 PROCEDURE — 93010 ELECTROCARDIOGRAM REPORT: CPT | Performed by: INTERNAL MEDICINE

## 2019-12-28 PROCEDURE — 99284 EMERGENCY DEPT VISIT MOD MDM: CPT | Performed by: EMERGENCY MEDICINE

## 2019-12-28 PROCEDURE — 85025 COMPLETE CBC W/AUTO DIFF WBC: CPT | Performed by: EMERGENCY MEDICINE

## 2019-12-28 PROCEDURE — 83690 ASSAY OF LIPASE: CPT | Performed by: EMERGENCY MEDICINE

## 2019-12-28 PROCEDURE — 93005 ELECTROCARDIOGRAM TRACING: CPT

## 2019-12-28 PROCEDURE — 74177 CT ABD & PELVIS W/CONTRAST: CPT

## 2019-12-28 PROCEDURE — 81001 URINALYSIS AUTO W/SCOPE: CPT

## 2019-12-28 PROCEDURE — 36415 COLL VENOUS BLD VENIPUNCTURE: CPT | Performed by: EMERGENCY MEDICINE

## 2019-12-28 PROCEDURE — 96361 HYDRATE IV INFUSION ADD-ON: CPT

## 2019-12-28 PROCEDURE — 99284 EMERGENCY DEPT VISIT MOD MDM: CPT

## 2019-12-28 PROCEDURE — 80053 COMPREHEN METABOLIC PANEL: CPT | Performed by: EMERGENCY MEDICINE

## 2019-12-28 PROCEDURE — 96375 TX/PRO/DX INJ NEW DRUG ADDON: CPT

## 2019-12-28 PROCEDURE — 81025 URINE PREGNANCY TEST: CPT | Performed by: EMERGENCY MEDICINE

## 2019-12-28 PROCEDURE — 96374 THER/PROPH/DIAG INJ IV PUSH: CPT

## 2019-12-28 RX ORDER — TAMSULOSIN HYDROCHLORIDE 0.4 MG/1
0.4 CAPSULE ORAL
Qty: 5 CAPSULE | Refills: 0 | Status: SHIPPED | OUTPATIENT
Start: 2019-12-28 | End: 2019-12-28 | Stop reason: SDUPTHER

## 2019-12-28 RX ORDER — TAMSULOSIN HYDROCHLORIDE 0.4 MG/1
0.4 CAPSULE ORAL
Qty: 5 CAPSULE | Refills: 0 | Status: SHIPPED | OUTPATIENT
Start: 2019-12-28

## 2019-12-28 RX ORDER — MORPHINE SULFATE 4 MG/ML
4 INJECTION, SOLUTION INTRAMUSCULAR; INTRAVENOUS ONCE
Status: COMPLETED | OUTPATIENT
Start: 2019-12-28 | End: 2019-12-28

## 2019-12-28 RX ORDER — OXYCODONE HYDROCHLORIDE AND ACETAMINOPHEN 5; 325 MG/1; MG/1
1 TABLET ORAL EVERY 4 HOURS PRN
Qty: 15 TABLET | Refills: 0 | Status: SHIPPED | OUTPATIENT
Start: 2019-12-28 | End: 2020-01-02

## 2019-12-28 RX ORDER — ONDANSETRON 2 MG/ML
4 INJECTION INTRAMUSCULAR; INTRAVENOUS ONCE
Status: COMPLETED | OUTPATIENT
Start: 2019-12-28 | End: 2019-12-28

## 2019-12-28 RX ADMIN — IOHEXOL 100 ML: 350 INJECTION, SOLUTION INTRAVENOUS at 05:33

## 2019-12-28 RX ADMIN — ONDANSETRON 4 MG: 2 INJECTION INTRAMUSCULAR; INTRAVENOUS at 04:09

## 2019-12-28 RX ADMIN — IOHEXOL 100 ML: 240 INJECTION, SOLUTION INTRATHECAL; INTRAVASCULAR; INTRAVENOUS; ORAL at 04:20

## 2019-12-28 RX ADMIN — SODIUM CHLORIDE 1000 ML: 0.9 INJECTION, SOLUTION INTRAVENOUS at 04:09

## 2019-12-28 RX ADMIN — MORPHINE SULFATE 4 MG: 4 INJECTION INTRAVENOUS at 04:09

## 2019-12-28 NOTE — ED PROVIDER NOTES
History  Chief Complaint   Patient presents with    Flank Pain     pt had bariatric surgery in march 9587 with complications  RLQ pain radiates to back     Syncope     2 episodes of syncope from pain per pt       47year old female presents with abdominal pain  This is in the setting of bariatric surgery in March which has had complications  She states that the pain was acute onset, right flank pain with radiation around to the front  She denies pain in urination  She denies fevers or chills  She states this is a similar pain to when complications were developed with her gastric bypass surgery  Surgical hx:morbidly obese female s/p diagnostic laparoscopy, partial remnant gastrectomy, EGD complicated by inadvertent colon resection/colotomies resulting in laparoscopic transverse/partial descending colon resection with open colocolostomy by Dr Nirav Wolfe 3/6/19  This occurred after her original surgery 2/11/19 (Diagnostic laparoscopy, small bowel resection, drainage/decompression/closure of gastric remnant) was complicated by a gastric remnant leak at the gastrostomy site due to a distal gastric obstruction secondary to the construct of her original bypass  Prior to Admission Medications   Prescriptions Last Dose Informant Patient Reported? Taking?    Biotin 1000 MCG tablet   Yes No   Sig: Take 1,000 mcg by mouth   Melatonin 5 MG CAPS   Yes No   Sig: Take 5 mg by mouth   Multiple Vitamin (MULTIVITAMIN) tablet   No No   Sig: Take 1 tablet by mouth 2 (two) times a day   Omega-3 Fatty Acids (FISH OIL) 1,000 mg  Self Yes No   Sig: Take 1,000 mg by mouth daily   butalbital-acetaminophen-caffeine (FIORICET,ESGIC) -40 mg per tablet  Self Yes No   Sig: take 1-2 tablets by mouth every 4 to 6 hours if needed maximum daily dose of 6   calcium citrate-vitamin D (CITRACAL+D) 315-200 MG-UNIT per tablet   No No   Sig: Take 2 tablets by mouth 2 (two) times a day   cyclobenzaprine (FLEXERIL) 10 mg tablet   No No Sig: Take 1 tablet (10 mg total) by mouth 3 (three) times a day as needed for muscle spasms   Patient not taking: Reported on 5/22/2019   doxepin (SINEquan) 10 mg capsule  Self Yes No   Sig: Take 10 mg by mouth daily at bedtime   ergocalciferol (VITAMIN D2) 50,000 units   No No   Sig: Take 1 capsule (50,000 Units total) by mouth once a week for 12 doses   esterified estrogens (MENEST) 0 625 MG tablet  Self Yes No   Sig: Take 0 625 mg by mouth daily   estradiol (VIVELLE-DOT) 0 075 MG/24HR   Yes No   Sig: Place 1 patch on the skin   ferrous sulfate 325 (65 Fe) mg tablet   Yes No   Sig: TAKE 1 TABLET BY MOUTH TWICE DAILY   gabapentin (NEURONTIN) 100 mg capsule   No No   Sig: Take 1 capsule (100 mg total) by mouth 3 (three) times a day for 5 days   hydrochlorothiazide (HYDRODIURIL) 12 5 mg tablet  Self Yes No   Sig: Take 12 5 mg by mouth daily   metoprolol tartrate (LOPRESSOR) 50 mg tablet   No No   Sig: Take 1 tablet (50 mg total) by mouth every 12 (twelve) hours for 30 days   Patient not taking: Reported on 3/28/2019   ondansetron (ZOFRAN) 4 mg tablet   No No   Sig: Take 1 tablet (4 mg total) by mouth every 8 (eight) hours as needed for nausea or vomiting   Patient not taking: Reported on 2/18/2019   potassium chloride (K-DUR,KLOR-CON) 20 mEq tablet   No No   Sig: Take 1 tablet (20 mEq total) by mouth 2 (two) times a day for 7 days   simethicone (MYLICON) 80 mg chewable tablet   No No   Sig: Chew 1 tablet (80 mg total) every 12 (twelve) hours   Patient not taking: Reported on 11/3/2019   sucralfate (CARAFATE) 1 g tablet   No No   Sig: Take 1 tablet (1 g total) by mouth 4 (four) times a day   traMADol (ULTRAM) 50 mg tablet   No No   Sig: Take 1 tablet (50 mg total) by mouth every 6 (six) hours as needed for moderate pain   Patient not taking: Reported on 5/1/2019   venlafaxine (EFFEXOR) 37 5 mg tablet   Yes No   Sig: Take 37 5 mg by mouth 2 (two) times a day   vitamin B-12 (CYANOCOBALAMIN) 500 MCG TABS   No No   Sig: Take 2 tablets (1,000 mcg total) by mouth daily for 120 days      Facility-Administered Medications: None       Past Medical History:   Diagnosis Date    Bell palsy     Gastric ulcer     GERD (gastroesophageal reflux disease)     History of transfusion     Melena     Obesity (BMI 30-39  9)     Pancreatic cyst     Right facial numbness     Upper GI bleed        Past Surgical History:   Procedure Laterality Date    ABDOMINOPLASTY      BARIATRIC SURGERY      BOWEL RESECTION LAPAROSCOPIC N/A 2/11/2019    Procedure: LAPAROSCOPIC LYSIS OF ADHESIONS; RESECTION SMALL BOWEL; DECOMPRESSION OF GASTRIC REMNANT; EGD;  Surgeon: Minh Barrett MD;  [facility];  Service: General    CHOLECYSTECTOMY      CT GUIDED PERC DRAINAGE CATHETER PLACEMENT  2/25/2019    ESOPHAGOGASTRODUODENOSCOPY N/A 3/6/2019    Procedure: INTRAOPERATIVE EGD;  Surgeon: Minh Barrett MD;  [facility];  Service: Bariatrics    HERNIA REPAIR      HYSTERECTOMY      KNEE CARTILAGE SURGERY      PARTIAL GASTRECTOMY N/A 3/6/2019    Procedure: RESECTION GASTRIC PARTIAL/GASTRECTOMY PARTIAL LAPAROSCOPIC;  Surgeon: Minh Barrett MD;  [facility];  Service: Bariatrics    DC COLONOSCOPY FLX DX W/COLLJ SPEC WHEN PFRMD N/A 3/28/2018    Procedure: COLONOSCOPY;  Surgeon: Nader Salvador MD;  [facility]; Service: Gastroenterology    DC COLONOSCOPY FLX DX W/COLLJ [facility] WHEN PFRMD N/A 1/31/2019    Procedure: COLONOSCOPY;  Surgeon: Nader Salvador MD;  [facility]; Service: Gastroenterology    DC ESOPHAGOGASTRODUODENOSCOPY TRANSORAL DIAGNOSTIC N/A 3/22/2018    Procedure: ESOPHAGOGASTRODUODENOSCOPY (EGD); Surgeon: Nader Salvador MD;  [facility]; Service: Gastroenterology    DC ESOPHAGOGASTRODUODENOSCOPY TRANSORAL DIAGNOSTIC N/A 1/31/2019    Procedure: ESOPHAGOGASTRODUODENOSCOPY (EGD); Surgeon: Nader Salvador MD;  [facility];   Service: Gastroenterology    DC LAP,DIAGNOSTIC ABDOMEN N/A 3/6/2019    Procedure: LAPAROSCOPY DIAGNOSTIC;  Surgeon: Chantel Ramos MD;  [facility];  Service: Bariatrics    AK PART REMOVAL COLON W ANASTOMOSIS N/A 3/6/2019    Procedure: OPEN TRANSVERSE COLON RESECTION;  Surgeon: Chantel Ramos MD;  [facility];  Service: Bariatrics    REDUCTION MAMMAPLASTY Bilateral     SHOULDER ARTHROSCOPY DISTAL CLAVICLE EXCISION AND OPEN ROTATOR CUFF REPAIR         Family History   Problem Relation Age of Onset    Heart attack Mother     Diabetes Mother     Heart attack Father     Stroke Father     Hypertension Brother     Leukemia Paternal Aunt      I have reviewed and agree with the history as documented  Social History     Tobacco Use    Smoking status: Never Smoker    Smokeless tobacco: Never Used   Substance Use Topics    Alcohol use: Not Currently     Frequency: Never     Binge frequency: Less than monthly    Drug use: No        Review of Systems   Constitutional: Negative for chills, fatigue and fever  HENT: Negative for congestion and sore throat  Respiratory: Negative for cough, chest tightness and shortness of breath  Cardiovascular: Negative for chest pain and palpitations  Gastrointestinal: Positive for abdominal pain (Right flank, radiating to right lower quadrant ) and nausea  Negative for constipation, diarrhea and vomiting  Genitourinary: Positive for flank pain  Negative for dysuria, frequency, hematuria, pelvic pain and vaginal discharge  Musculoskeletal: Negative for back pain and neck pain  Skin: Negative for color change and rash  Allergic/Immunologic: Negative for immunocompromised state  Neurological: Negative for dizziness, syncope and headaches  Physical Exam  Physical Exam   Constitutional: She is oriented to person, place, and time  She appears well-developed and well-nourished  No distress     Appears uncomfortable but in no acute distress   HENT:   Head: Normocephalic and atraumatic  Mouth/Throat: Oropharynx is clear and moist    Eyes: Pupils are equal, round, and reactive to light  Conjunctivae and EOM are normal  Right eye exhibits no discharge  Left eye exhibits no discharge  No scleral icterus  Neck: Normal range of motion  Neck supple  No JVD present  Cardiovascular: Normal rate, regular rhythm, normal heart sounds and intact distal pulses  Exam reveals no gallop and no friction rub  No murmur heard  Pulmonary/Chest: Effort normal and breath sounds normal  No respiratory distress  She has no wheezes  She has no rales  She exhibits no tenderness  Abdominal: Soft  Bowel sounds are normal  She exhibits no distension  There is tenderness (Right flank, right lower quadrant)  There is no rebound and no guarding  Musculoskeletal: Normal range of motion  She exhibits no edema, tenderness or deformity  Neurological: She is alert and oriented to person, place, and time  No cranial nerve deficit  Skin: Skin is warm and dry  No rash noted  She is not diaphoretic  No erythema  No pallor  Psychiatric: She has a normal mood and affect  Her behavior is normal    Vitals reviewed        Vital Signs  ED Triage Vitals [12/28/19 0241]   Temperature Pulse Resp Blood Pressure SpO2   (!) 97 4 °F (36 3 °C) 81 -- 135/83 100 %      Temp Source Heart Rate Source Patient Position - Orthostatic VS BP Location FiO2 (%)   Oral Monitor Lying Right arm --      Pain Score       Worst Possible Pain           Vitals:    12/28/19 0241   BP: 135/83   Pulse: 81   Patient Position - Orthostatic VS: Lying         Visual Acuity      ED Medications  Medications   sodium chloride 0 9 % bolus 1,000 mL (0 mL Intravenous Stopped 12/28/19 0725)   morphine (PF) 4 mg/mL injection 4 mg (4 mg Intravenous Given 12/28/19 0409)   ondansetron (ZOFRAN) injection 4 mg (4 mg Intravenous Given 12/28/19 0409)   iohexol (OMNIPAQUE) 240 MG/ML solution 100 mL (100 mL Oral Given 12/28/19 0420)   iohexol (OMNIPAQUE) 350 MG/ML injection (MULTI-DOSE) 100 mL (100 mL Intravenous Given 12/28/19 0533)       Diagnostic Studies  Results Reviewed     Procedure Component Value Units Date/Time    Comprehensive metabolic panel [[id number]]  (Abnormal) Collected:  12/28/19 0402    Lab Status:  Final result Specimen:  Blood from Arm, Right Updated:  12/28/19 0424     Sodium 141 mmol/L      Potassium 3 7 mmol/L      Chloride 104 mmol/L      CO2 30 mmol/L      ANION GAP 7 mmol/L      BUN 17 mg/dL      Creatinine 0 85 mg/dL      Glucose 112 mg/dL      Calcium 9 1 mg/dL      AST 19 U/L      ALT 20 U/L      Alkaline Phosphatase 100 U/L      Total Protein 7 2 g/dL      Albumin 3 4 g/dL      Total Bilirubin 0 40 mg/dL      eGFR 80 ml/min/1 73sq m     Narrative:       National Kidney Disease Foundation guidelines for Chronic Kidney Disease (CKD):     Stage 1 with normal or high GFR (GFR > 90 mL/min/1 73 square meters)    Stage 2 Mild CKD (GFR = 60-89 mL/min/1 73 square meters)    Stage 3A Moderate CKD (GFR = 45-59 mL/min/1 73 square meters)    Stage 3B Moderate CKD (GFR = 30-44 mL/min/1 73 square meters)    Stage 4 Severe CKD (GFR = 15-29 mL/min/1 73 square meters)    Stage 5 End Stage CKD (GFR <15 mL/min/1 73 square meters)  Note: GFR calculation is accurate only with a steady state creatinine    Lipase [[id number]]  (Normal) Collected:  12/28/19 0402    Lab Status:  Final result Specimen:  Blood from Arm, Right Updated:  12/28/19 0424     Lipase 217 u/L     CBC and differential [[id number]]  (Abnormal) Collected:  12/28/19 0402    Lab Status:  Final result Specimen:  Blood from Arm, Right Updated:  12/28/19 0408     WBC 10 21 Thousand/uL      RBC 4 80 Million/uL      Hemoglobin 12 2 g/dL      Hematocrit 38 8 %      MCV 81 fL      MCH 25 4 pg      MCHC 31 4 g/dL      RDW 15 0 %      MPV 10 7 fL      Platelets 430 Thousands/uL      nRBC 0 /100 WBCs      Neutrophils Relative 81 %      Immat GRANS % 0 %      Lymphocytes Relative 10 %      Monocytes Relative 7 %      Eosinophils Relative 1 %      Basophils Relative 1 %      Neutrophils Absolute 8 28 Thousands/µL      Immature Grans Absolute 0 04 Thousand/uL      Lymphocytes Absolute 1 03 Thousands/µL      Monocytes Absolute 0 74 Thousand/µL      Eosinophils Absolute 0 07 Thousand/µL      Basophils Absolute 0 05 Thousands/µL     POCT pregnancy, urine [[id number]]  (Normal) Resulted:  12/28/19 0356    Lab Status:  Final result Updated:  12/28/19 0356     EXT PREG TEST UR (Ref: Negative) negative     Control valid    Urine Microscopic [[id number]]  (Abnormal) Collected:  12/28/19 0313    Lab Status:  Final result Specimen:  Urine, Clean Catch Updated:  12/28/19 0335     RBC, UA 30-50 /hpf      WBC, UA None Seen /hpf      Epithelial Cells Occasional /hpf      Bacteria, UA None Seen /hpf     UA w Reflex to Microscopic w Reflex to Culture [[id number]]  (Abnormal) Collected:  12/28/19 0313    Lab Status:  Final result Specimen:  Urine, Clean Catch Updated:  12/28/19 0326     Color, UA Yellow     Clarity, UA Clear     Specific Gravity, UA 1 010     pH, UA 7 0     Leukocytes, UA Negative     Nitrite, UA Negative     Protein, UA Negative mg/dl      Glucose, UA Negative mg/dl      Ketones, UA Negative mg/dl      Urobilinogen, UA 0 2 E U /dl      Bilirubin, UA Negative     Blood, UA Large                 CT abdomen pelvis with contrast   ED Interpretation by Luis Gauthier DO (12/28 0611)   Bilateral nephrolithiasis with 4 mm obstructing stone in the distal right ureter just proximal to the ureterovesicular junction with associated mild right hydroureteronephrosis        Bladder is partially distended  Mild circumferential bladder wall thickening noted, probably exaggerated by underdistention  Superimposed cystitis could be considered in the appropriate clinical setting    Correlation with patient's symptoms, laboratory    values, and urinalysis recommended        Status post gastric bypass, no evidence of bowel obstruction        Some contrast is seen in the distal esophagus which may suggest a component of gastroesophageal reflux        Focal cystic pancreatic lesion redemonstrated in the pancreatic neck, this measures approximately 1 8 x 1 4 x 1 6 cm in size (axial image 31, series 2), similar to the prior  Differential considerations include cyst, cystic neoplasm, or IPMN  Follow-up    cross-sectional imaging in 1 year recommended to evaluate for interval change        Right renal angiomyolipoma, and other findings as above           Final Result by Lynn Vizcarra DO (12/28 0973)      Bilateral nephrolithiasis with 4 mm obstructing stone in the distal right ureter just proximal to the ureterovesicular junction with associated mild right hydroureteronephrosis  Bladder is partially distended  Mild circumferential bladder wall thickening noted, probably exaggerated by underdistention  Superimposed cystitis could be considered in the appropriate clinical setting  Correlation with patient's symptoms, laboratory    values, and urinalysis recommended  Status post gastric bypass, no evidence of bowel obstruction  Some contrast is seen in the distal esophagus which may suggest a component of gastroesophageal reflux  Focal cystic pancreatic lesion redemonstrated in the pancreatic neck, this measures approximately 1 8 x 1 4 x 1 6 cm in size (axial image 31, series 2), similar to the prior  Differential considerations include cyst, cystic neoplasm, or IPMN  Follow-up    cross-sectional imaging in 1 year recommended to evaluate for interval change  Right renal angiomyolipoma, and other findings as above        Workstation performed: [id number]                    Procedures  ECG 12 Lead Documentation Only  Date/Time: 12/28/2019 2:59 AM  Performed by: Nora Stevens DO  Authorized by: Nora Stevens DO     Indications / Diagnosis:    Syncope  ECG reviewed by me, the ED Provider: yes Patient location:  ED  Previous ECG:     Previous ECG:  Compared to current    Comparison ECG info:   March 2019    Similarity:  No change    Comparison to cardiac monitor: Yes    Interpretation:     Interpretation: normal    Rate:     ECG rate:  71    ECG rate assessment: normal    Rhythm:     Rhythm: sinus rhythm    Ectopy:     Ectopy: none    QRS:     QRS axis:  Normal    QRS intervals:  Normal  Conduction:     Conduction: normal    ST segments:     ST segments:  Normal  T waves:     T waves: inverted      Inverted:  III             ED Course  ED Course as of Dec 28 0736   Sat Dec 28, 2019   0651  Feels improved after medications provided here  Advised patient that she has a kidney stone and she has a follow-up with Urology                                   MDM  Number of Diagnoses or Management Options  Pancreas cyst:   Renal angiomyolipoma:   Right flank pain:   Right kidney stone:   Diagnosis management comments: 39-year-old female with flank pain, 9 month ago with gastric bypass surgery  Concern for complications from that  Will require abdominal lab work, CT scan  Will evaluate urinalysis  Also concern for kidney stone, biliary colic, retained stone, gastritis, peptic ulcer, colitis        kidney stone  Patient advised follow-up with Urology  Given pain control  Advised good return precautions  Patient is updated on the multiple incidental findings found on her CT scan and advised follow-up for these  Patient given a copy of her CAT scan so she can bring it to her primary care provider for appropriate care  Patient has no further questions on discharge  She appears stable on discharge  Discharged w stable vital signs         Amount and/or Complexity of Data Reviewed  Clinical lab tests: reviewed and ordered  Tests in the radiology section of CPT®: reviewed and ordered          Disposition  Final diagnoses:   Right flank pain   Right kidney stone   Pancreas cyst   Renal angiomyolipoma     Time reflects when diagnosis was documented in both MDM as applicable and the Disposition within this note     Time User Action Codes Description Comment    12/28/2019  5:50 AM Chico Chase Add [R10 9] Right flank pain     12/28/2019  6:57 AM Chico Chase Add [N20 0] Right kidney stone     12/28/2019  6:57 AM Luci Chaes He Add [K86 2] Pancreas cyst     12/28/2019  6:57 AM Bautista Kaminskiselenabarney He Add [D17 71] Renal angiomyolipoma       ED Disposition     ED Disposition Condition Date/Time Comment    Discharge Stable Sat Dec 28, 2019  6:57 AM Maynor Ring discharge to home/self care  Follow-up Information     Follow up With Specialties Details Why Contact Info Additional Information    [facility] Emergency Department Emergency Medicine  If symptoms worsen [address] [facility] [address] [phone] [place] [facility], [address], [place], [address] For Urology [facility] Urology Schedule an appointment as soon as possible for a visit  For follow up  regarding kidney stones [address]  [facility] [address]  701  [facility] [address]  [address], [address] [phone]    follow-up with Dr Juanita Downs regarding bariatric surgery                 Discharge Medication List as of 12/28/2019  7:01 AM      CONTINUE these medications which have NOT CHANGED    Details   Biotin 1000 MCG tablet Take 1,000 mcg by mouth, Historical Med      butalbital-acetaminophen-caffeine (FIORICET,ESGIC) -40 mg per tablet take 1-2 tablets by mouth every 4 to 6 hours if needed maximum daily dose of 6, Historical Med      calcium citrate-vitamin D (CITRACAL+D) 315-200 MG-UNIT per tablet Take 2 tablets by mouth 2 (two) times a day, Starting Mon 3/4/2019, Normal      cyclobenzaprine (FLEXERIL) 10 mg tablet Take 1 tablet (10 mg total) by mouth 3 (three) times a day as needed for muscle spasms, Starting Fri 2/22/2019, Normal      doxepin (SINEquan) 10 mg capsule Take 10 mg by mouth daily at bedtime, Starting Thu 4/26/2018, Historical Med      ergocalciferol (VITAMIN D2) 50,000 units Take 1 capsule (50,000 Units total) by mouth once a week for 12 doses, Starting Wed 5/22/2019, Until Thu 8/8/2019, Normal      esterified estrogens (MENEST) 0 625 MG tablet Take 0 625 mg by mouth daily, Historical Med      estradiol (VIVELLE-DOT) 0 075 MG/24HR Place 1 patch on the skin, Starting Thu 8/9/2018, Until Fri 8/9/2019, Historical Med      ferrous sulfate 325 (65 Fe) mg tablet TAKE 1 TABLET BY MOUTH TWICE DAILY, Historical Med      gabapentin (NEURONTIN) 100 mg capsule Take 1 capsule (100 mg total) by mouth 3 (three) times a day for 5 days, Starting Thu 3/28/2019, Until Tue 4/2/2019, Normal      hydrochlorothiazide (HYDRODIURIL) 12 5 mg tablet Take 12 5 mg by mouth daily, Starting Mon 3/26/2018, Historical Med      Melatonin 5 MG CAPS Take 5 mg by mouth, Historical Med      metoprolol tartrate (LOPRESSOR) 50 mg tablet Take 1 tablet (50 mg total) by mouth every 12 (twelve) hours for 30 days, Starting Mon 3/18/2019, Until Wed 4/17/2019, Normal      Multiple Vitamin (MULTIVITAMIN) tablet Take 1 tablet by mouth 2 (two) times a day, Starting Mon 3/4/2019, Normal      Omega-3 Fatty Acids (FISH OIL) 1,000 mg Take 1,000 mg by mouth daily, Historical Med      ondansetron (ZOFRAN) 4 mg tablet Take 1 tablet (4 mg total) by mouth every 8 (eight) hours as needed for nausea or vomiting, Starting Thu 1/24/2019, Normal      potassium chloride (K-DUR,KLOR-CON) 20 mEq tablet Take 1 tablet (20 mEq total) by mouth 2 (two) times a day for 7 days, Starting Wed 5/15/2019, Until Wed 5/22/2019, Normal      simethicone (MYLICON) 80 mg chewable tablet Chew 1 tablet (80 mg total) every 12 (twelve) hours, Starting Fri 2/22/2019, Normal      sucralfate (CARAFATE) 1 g tablet Take 1 tablet (1 g total) by mouth 4 (four) times a day, Starting Sun 11/3/2019, Print      traMADol (ULTRAM) 50 mg tablet Take 1 tablet (50 mg total) by mouth every 6 (six) hours as needed for moderate pain, Starting u 3/28/2019, Normal      venlafaxine (EFFEXOR) 37 5 mg tablet Take 37 5 mg by mouth 2 (two) times a day, Historical Med      vitamin B-12 (CYANOCOBALAMIN) 500 MCG TABS Take 2 tablets (1,000 mcg total) by mouth daily for 120 days, Starting Mon 3/4/2019, Until Tue 7/2/2019, Normal           No discharge procedures on file      ED Provider  Electronically Signed by           Juana Aguilera DO  12/28/19 7391

## 2020-10-19 RX ORDER — OXYCODONE AND ACETAMINOPHEN 2.5; 325 MG/1; MG/1
1000 TABLET ORAL
COMMUNITY

## 2020-10-19 RX ORDER — OXYCODONE HYDROCHLORIDE AND ACETAMINOPHEN 5; 325 MG/1; MG/1
1 TABLET ORAL EVERY 4 HOURS PRN
COMMUNITY
Start: 2020-10-13

## 2020-10-20 ENCOUNTER — TELEPHONE (OUTPATIENT)
Dept: BARIATRICS | Facility: CLINIC | Age: 50
End: 2020-10-20

## 2020-10-21 ENCOUNTER — OFFICE VISIT (OUTPATIENT)
Dept: BARIATRICS | Facility: CLINIC | Age: 50
End: 2020-10-21
Payer: COMMERCIAL

## 2020-10-21 ENCOUNTER — TELEPHONE (OUTPATIENT)
Dept: BARIATRICS | Facility: CLINIC | Age: 50
End: 2020-10-21

## 2020-10-21 VITALS
TEMPERATURE: 98.7 F | SYSTOLIC BLOOD PRESSURE: 138 MMHG | HEIGHT: 68 IN | DIASTOLIC BLOOD PRESSURE: 90 MMHG | HEART RATE: 89 BPM | WEIGHT: 260.9 LBS | BODY MASS INDEX: 39.54 KG/M2

## 2020-10-21 DIAGNOSIS — I10 ESSENTIAL HYPERTENSION: ICD-10-CM

## 2020-10-21 DIAGNOSIS — E66.01 OBESITY, CLASS III, BMI 40-49.9 (MORBID OBESITY) (HCC): ICD-10-CM

## 2020-10-21 DIAGNOSIS — R73.03 PREDIABETES: ICD-10-CM

## 2020-10-21 DIAGNOSIS — E66.01 MORBID (SEVERE) OBESITY DUE TO EXCESS CALORIES (HCC): ICD-10-CM

## 2020-10-21 DIAGNOSIS — Z48.815 ENCOUNTER FOR SURGICAL AFTERCARE FOLLOWING SURGERY ON THE DIGESTIVE SYSTEM: ICD-10-CM

## 2020-10-21 DIAGNOSIS — R10.30 LOWER ABDOMINAL PAIN: Primary | ICD-10-CM

## 2020-10-21 DIAGNOSIS — K91.2 POSTSURGICAL MALABSORPTION: ICD-10-CM

## 2020-10-21 DIAGNOSIS — R53.83 MALAISE AND FATIGUE: ICD-10-CM

## 2020-10-21 DIAGNOSIS — R53.81 MALAISE AND FATIGUE: ICD-10-CM

## 2020-10-21 PROCEDURE — 99213 OFFICE O/P EST LOW 20 MIN: CPT | Performed by: SURGERY

## 2020-10-27 ENCOUNTER — HOSPITAL ENCOUNTER (OUTPATIENT)
Dept: RADIOLOGY | Facility: MEDICAL CENTER | Age: 50
Discharge: HOME | End: 2020-10-27
Payer: COMMERCIAL

## 2020-10-27 DIAGNOSIS — R10.30 LOWER ABDOMINAL PAIN: ICD-10-CM

## 2020-10-27 PROCEDURE — 74177 CT ABD & PELVIS W/CONTRAST: CPT

## 2020-10-27 PROCEDURE — G1004 CDSM NDSC: HCPCS

## 2020-10-27 RX ADMIN — IOHEXOL 100 ML: 350 INJECTION, SOLUTION INTRAVENOUS at 11:45

## 2020-11-02 ENCOUNTER — LAB (OUTPATIENT)
Dept: LAB | Facility: CLINIC | Age: 50
End: 2020-11-02
Payer: COMMERCIAL

## 2020-11-02 DIAGNOSIS — K91.2 POSTSURGICAL MALABSORPTION: ICD-10-CM

## 2020-11-02 DIAGNOSIS — R53.81 MALAISE AND FATIGUE: ICD-10-CM

## 2020-11-02 DIAGNOSIS — R53.83 MALAISE AND FATIGUE: ICD-10-CM

## 2020-11-02 LAB
25(OH)D3 SERPL-MCNC: 34.2 NG/ML (ref 30–100)
ALBUMIN SERPL BCP-MCNC: 4 G/DL (ref 3.5–5)
ALP SERPL-CCNC: 92 U/L (ref 46–116)
ALT SERPL W P-5'-P-CCNC: 16 U/L (ref 12–78)
ANION GAP SERPL CALCULATED.3IONS-SCNC: 5 MMOL/L (ref 4–13)
AST SERPL W P-5'-P-CCNC: 13 U/L (ref 5–45)
BILIRUB SERPL-MCNC: 0.69 MG/DL (ref 0.2–1)
BUN SERPL-MCNC: 13 MG/DL (ref 5–25)
CALCIUM SERPL-MCNC: 9.4 MG/DL (ref 8.3–10.1)
CHLORIDE SERPL-SCNC: 104 MMOL/L (ref 100–108)
CHOLEST SERPL-MCNC: 200 MG/DL (ref 50–200)
CO2 SERPL-SCNC: 30 MMOL/L (ref 21–32)
CREAT SERPL-MCNC: 0.81 MG/DL (ref 0.6–1.3)
ERYTHROCYTE [DISTWIDTH] IN BLOOD BY AUTOMATED COUNT: 15.7 % (ref 11.6–15.1)
FERRITIN SERPL-MCNC: 14 NG/ML (ref 8–388)
FOLATE SERPL-MCNC: 9 NG/ML (ref 3.1–17.5)
GFR SERPL CREATININE-BSD FRML MDRD: 84 ML/MIN/1.73SQ M
GLUCOSE P FAST SERPL-MCNC: 95 MG/DL (ref 65–99)
HCT VFR BLD AUTO: 38.3 % (ref 34.8–46.1)
HDLC SERPL-MCNC: 44 MG/DL
HGB BLD-MCNC: 11.7 G/DL (ref 11.5–15.4)
IRON SATN MFR SERPL: 6 %
IRON SERPL-MCNC: 27 UG/DL (ref 50–170)
LDLC SERPL CALC-MCNC: 130 MG/DL (ref 0–100)
MCH RBC QN AUTO: 23.9 PG (ref 26.8–34.3)
MCHC RBC AUTO-ENTMCNC: 30.5 G/DL (ref 31.4–37.4)
MCV RBC AUTO: 78 FL (ref 82–98)
NONHDLC SERPL-MCNC: 156 MG/DL
PLATELET # BLD AUTO: 432 THOUSANDS/UL (ref 149–390)
PMV BLD AUTO: 11.3 FL (ref 8.9–12.7)
POTASSIUM SERPL-SCNC: 3.9 MMOL/L (ref 3.5–5.3)
PROT SERPL-MCNC: 8 G/DL (ref 6.4–8.2)
PTH-INTACT SERPL-MCNC: 66.2 PG/ML (ref 18.4–80.1)
RBC # BLD AUTO: 4.9 MILLION/UL (ref 3.81–5.12)
SODIUM SERPL-SCNC: 139 MMOL/L (ref 136–145)
TIBC SERPL-MCNC: 430 UG/DL (ref 250–450)
TRIGL SERPL-MCNC: 132 MG/DL
TSH SERPL DL<=0.05 MIU/L-ACNC: 1.5 UIU/ML (ref 0.36–3.74)
VIT B12 SERPL-MCNC: 494 PG/ML (ref 100–900)
WBC # BLD AUTO: 6.14 THOUSAND/UL (ref 4.31–10.16)

## 2020-11-02 PROCEDURE — 82525 ASSAY OF COPPER: CPT

## 2020-11-02 PROCEDURE — 84443 ASSAY THYROID STIM HORMONE: CPT

## 2020-11-02 PROCEDURE — 84630 ASSAY OF ZINC: CPT

## 2020-11-02 PROCEDURE — 80061 LIPID PANEL: CPT

## 2020-11-02 PROCEDURE — 80053 COMPREHEN METABOLIC PANEL: CPT

## 2020-11-02 PROCEDURE — 82746 ASSAY OF FOLIC ACID SERUM: CPT

## 2020-11-02 PROCEDURE — 82728 ASSAY OF FERRITIN: CPT

## 2020-11-02 PROCEDURE — 83550 IRON BINDING TEST: CPT

## 2020-11-02 PROCEDURE — 84425 ASSAY OF VITAMIN B-1: CPT

## 2020-11-02 PROCEDURE — 82607 VITAMIN B-12: CPT

## 2020-11-02 PROCEDURE — 85027 COMPLETE CBC AUTOMATED: CPT

## 2020-11-02 PROCEDURE — 83540 ASSAY OF IRON: CPT

## 2020-11-02 PROCEDURE — 36415 COLL VENOUS BLD VENIPUNCTURE: CPT

## 2020-11-02 PROCEDURE — 82306 VITAMIN D 25 HYDROXY: CPT

## 2020-11-02 PROCEDURE — 84590 ASSAY OF VITAMIN A: CPT

## 2020-11-02 PROCEDURE — 83970 ASSAY OF PARATHORMONE: CPT

## 2020-11-05 LAB — COPPER SERPL-MCNC: 165 UG/DL (ref 72–166)

## 2020-11-06 LAB — ZINC SERPL-MCNC: 97 UG/DL (ref 56–134)

## 2020-11-08 LAB — VIT A SERPL-MCNC: 61.6 UG/DL (ref 20.1–62)

## 2020-11-09 ENCOUNTER — TELEPHONE (OUTPATIENT)
Dept: BARIATRICS | Facility: CLINIC | Age: 50
End: 2020-11-09

## 2020-11-09 LAB — VIT B1 BLD-SCNC: 117.2 NMOL/L (ref 66.5–200)

## 2020-11-12 ENCOUNTER — TELEPHONE (OUTPATIENT)
Dept: BARIATRICS | Facility: CLINIC | Age: 50
End: 2020-11-12

## 2020-11-12 DIAGNOSIS — E61.1 LOW IRON: ICD-10-CM

## 2020-11-12 DIAGNOSIS — K91.2 POSTSURGICAL MALABSORPTION: Primary | ICD-10-CM

## 2020-11-12 DIAGNOSIS — E53.8 LOW VITAMIN B12 LEVEL: ICD-10-CM

## 2020-11-12 DIAGNOSIS — R79.89 LOW VITAMIN D LEVEL: ICD-10-CM

## 2020-11-12 DIAGNOSIS — E53.8 LOW FOLATE: ICD-10-CM

## 2020-11-17 ENCOUNTER — OFFICE VISIT (OUTPATIENT)
Dept: BARIATRICS | Facility: CLINIC | Age: 50
End: 2020-11-17

## 2020-11-17 VITALS — WEIGHT: 257.4 LBS | BODY MASS INDEX: 39.01 KG/M2 | TEMPERATURE: 97.7 F | HEIGHT: 68 IN

## 2020-11-17 DIAGNOSIS — K91.2 POSTSURGICAL MALABSORPTION: Primary | ICD-10-CM

## 2020-11-17 PROCEDURE — RECHECK

## 2021-03-21 ENCOUNTER — HOSPITAL ENCOUNTER (EMERGENCY)
Facility: HOSPITAL | Age: 51
Discharge: HOME | End: 2021-03-21
Attending: EMERGENCY MEDICINE | Admitting: EMERGENCY MEDICINE
Payer: COMMERCIAL

## 2021-03-21 ENCOUNTER — APPOINTMENT (EMERGENCY)
Dept: CT IMAGING | Facility: HOSPITAL | Age: 51
End: 2021-03-21
Payer: COMMERCIAL

## 2021-03-21 VITALS
TEMPERATURE: 97.4 F | RESPIRATION RATE: 19 BRPM | BODY MASS INDEX: 39.96 KG/M2 | DIASTOLIC BLOOD PRESSURE: 66 MMHG | WEIGHT: 263.67 LBS | HEIGHT: 68 IN | SYSTOLIC BLOOD PRESSURE: 142 MMHG | HEART RATE: 65 BPM | OXYGEN SATURATION: 95 %

## 2021-03-21 DIAGNOSIS — N20.1 LEFT URETERAL STONE: Primary | ICD-10-CM

## 2021-03-21 LAB
ALBUMIN SERPL BCP-MCNC: 3.7 G/DL (ref 3.5–5)
ALP SERPL-CCNC: 119 U/L (ref 46–116)
ALT SERPL W P-5'-P-CCNC: 33 U/L (ref 12–78)
ANION GAP SERPL CALCULATED.3IONS-SCNC: 12 MMOL/L (ref 4–13)
AST SERPL W P-5'-P-CCNC: 25 U/L (ref 5–45)
BACTERIA UR QL AUTO: ABNORMAL /HPF
BASOPHILS # BLD AUTO: 0.04 THOUSANDS/ΜL (ref 0–0.1)
BASOPHILS NFR BLD AUTO: 0 % (ref 0–1)
BILIRUB SERPL-MCNC: 0.53 MG/DL (ref 0.2–1)
BILIRUB UR QL STRIP: NEGATIVE
BUN SERPL-MCNC: 17 MG/DL (ref 5–25)
CALCIUM SERPL-MCNC: 9.6 MG/DL (ref 8.3–10.1)
CHLORIDE SERPL-SCNC: 102 MMOL/L (ref 100–108)
CLARITY UR: CLEAR
CO2 SERPL-SCNC: 25 MMOL/L (ref 21–32)
COLOR UR: YELLOW
CREAT SERPL-MCNC: 0.99 MG/DL (ref 0.6–1.3)
EOSINOPHIL # BLD AUTO: 0.09 THOUSAND/ΜL (ref 0–0.61)
EOSINOPHIL NFR BLD AUTO: 1 % (ref 0–6)
ERYTHROCYTE [DISTWIDTH] IN BLOOD BY AUTOMATED COUNT: 19.1 % (ref 11.6–15.1)
EXT PREG TEST URINE: NEGATIVE
EXT. CONTROL ED NAV: NORMAL
GFR SERPL CREATININE-BSD FRML MDRD: 66 ML/MIN/1.73SQ M
GLUCOSE SERPL-MCNC: 131 MG/DL (ref 65–140)
GLUCOSE UR STRIP-MCNC: NEGATIVE MG/DL
HCG SERPL QL: NEGATIVE
HCT VFR BLD AUTO: 39 % (ref 34.8–46.1)
HGB BLD-MCNC: 11.9 G/DL (ref 11.5–15.4)
HGB UR QL STRIP.AUTO: ABNORMAL
IMM GRANULOCYTES # BLD AUTO: 0.04 THOUSAND/UL (ref 0–0.2)
IMM GRANULOCYTES NFR BLD AUTO: 0 % (ref 0–2)
KETONES UR STRIP-MCNC: ABNORMAL MG/DL
LEUKOCYTE ESTERASE UR QL STRIP: NEGATIVE
LIPASE SERPL-CCNC: 212 U/L (ref 73–393)
LYMPHOCYTES # BLD AUTO: 1.78 THOUSANDS/ΜL (ref 0.6–4.47)
LYMPHOCYTES NFR BLD AUTO: 18 % (ref 14–44)
MCH RBC QN AUTO: 23 PG (ref 26.8–34.3)
MCHC RBC AUTO-ENTMCNC: 30.5 G/DL (ref 31.4–37.4)
MCV RBC AUTO: 75 FL (ref 82–98)
MONOCYTES # BLD AUTO: 0.6 THOUSAND/ΜL (ref 0.17–1.22)
MONOCYTES NFR BLD AUTO: 6 % (ref 4–12)
MUCOUS THREADS UR QL AUTO: ABNORMAL
NEUTROPHILS # BLD AUTO: 7.17 THOUSANDS/ΜL (ref 1.85–7.62)
NEUTS SEG NFR BLD AUTO: 75 % (ref 43–75)
NITRITE UR QL STRIP: NEGATIVE
NON-SQ EPI CELLS URNS QL MICRO: ABNORMAL /HPF
NRBC BLD AUTO-RTO: 0 /100 WBCS
PH UR STRIP.AUTO: 8 [PH]
PLATELET # BLD AUTO: 313 THOUSANDS/UL (ref 149–390)
PMV BLD AUTO: 11.5 FL (ref 8.9–12.7)
POTASSIUM SERPL-SCNC: 3.5 MMOL/L (ref 3.5–5.3)
PROT SERPL-MCNC: 7.8 G/DL (ref 6.4–8.2)
PROT UR STRIP-MCNC: NEGATIVE MG/DL
RBC # BLD AUTO: 5.18 MILLION/UL (ref 3.81–5.12)
RBC #/AREA URNS AUTO: ABNORMAL /HPF
SODIUM SERPL-SCNC: 139 MMOL/L (ref 136–145)
SP GR UR STRIP.AUTO: 1.02 (ref 1–1.03)
UROBILINOGEN UR QL STRIP.AUTO: 0.2 E.U./DL
WBC # BLD AUTO: 9.72 THOUSAND/UL (ref 4.31–10.16)
WBC #/AREA URNS AUTO: ABNORMAL /HPF

## 2021-03-21 PROCEDURE — G1004 CDSM NDSC: HCPCS

## 2021-03-21 PROCEDURE — 84703 CHORIONIC GONADOTROPIN ASSAY: CPT | Performed by: EMERGENCY MEDICINE

## 2021-03-21 PROCEDURE — 85025 COMPLETE CBC W/AUTO DIFF WBC: CPT | Performed by: EMERGENCY MEDICINE

## 2021-03-21 PROCEDURE — 80053 COMPREHEN METABOLIC PANEL: CPT | Performed by: EMERGENCY MEDICINE

## 2021-03-21 PROCEDURE — 74177 CT ABD & PELVIS W/CONTRAST: CPT

## 2021-03-21 PROCEDURE — 96375 TX/PRO/DX INJ NEW DRUG ADDON: CPT

## 2021-03-21 PROCEDURE — 81001 URINALYSIS AUTO W/SCOPE: CPT | Performed by: EMERGENCY MEDICINE

## 2021-03-21 PROCEDURE — 99284 EMERGENCY DEPT VISIT MOD MDM: CPT

## 2021-03-21 PROCEDURE — 83690 ASSAY OF LIPASE: CPT | Performed by: EMERGENCY MEDICINE

## 2021-03-21 PROCEDURE — 81025 URINE PREGNANCY TEST: CPT | Performed by: EMERGENCY MEDICINE

## 2021-03-21 PROCEDURE — 96374 THER/PROPH/DIAG INJ IV PUSH: CPT

## 2021-03-21 PROCEDURE — 36415 COLL VENOUS BLD VENIPUNCTURE: CPT | Performed by: EMERGENCY MEDICINE

## 2021-03-21 PROCEDURE — 99285 EMERGENCY DEPT VISIT HI MDM: CPT | Performed by: EMERGENCY MEDICINE

## 2021-03-21 RX ORDER — ONDANSETRON 2 MG/ML
4 INJECTION INTRAMUSCULAR; INTRAVENOUS ONCE
Status: COMPLETED | OUTPATIENT
Start: 2021-03-21 | End: 2021-03-21

## 2021-03-21 RX ORDER — TAMSULOSIN HYDROCHLORIDE 0.4 MG/1
0.4 CAPSULE ORAL
Qty: 7 CAPSULE | Refills: 0 | Status: SHIPPED | OUTPATIENT
Start: 2021-03-21

## 2021-03-21 RX ORDER — ONDANSETRON 4 MG/1
4 TABLET, ORALLY DISINTEGRATING ORAL EVERY 6 HOURS PRN
Qty: 20 TABLET | Refills: 0 | Status: SHIPPED | OUTPATIENT
Start: 2021-03-21

## 2021-03-21 RX ORDER — HYDROMORPHONE HCL/PF 1 MG/ML
1 SYRINGE (ML) INJECTION ONCE
Status: COMPLETED | OUTPATIENT
Start: 2021-03-21 | End: 2021-03-21

## 2021-03-21 RX ORDER — OXYCODONE HYDROCHLORIDE AND ACETAMINOPHEN 5; 325 MG/1; MG/1
1 TABLET ORAL EVERY 4 HOURS PRN
Qty: 8 TABLET | Refills: 0 | Status: SHIPPED | OUTPATIENT
Start: 2021-03-21 | End: 2021-03-31

## 2021-03-21 RX ADMIN — IOHEXOL 100 ML: 350 INJECTION, SOLUTION INTRAVENOUS at 09:12

## 2021-03-21 RX ADMIN — HYDROMORPHONE HYDROCHLORIDE 1 MG: 1 INJECTION, SOLUTION INTRAMUSCULAR; INTRAVENOUS; SUBCUTANEOUS at 07:48

## 2021-03-21 RX ADMIN — ONDANSETRON 4 MG: 2 INJECTION INTRAMUSCULAR; INTRAVENOUS at 07:48

## 2021-03-21 NOTE — ED PROVIDER NOTES
History  Chief Complaint   Patient presents with    Flank Pain     Patient presents to ED with co left side flank pain that started this morning  Hx of kidney stones  46 y/o female, hx of kidney stones and multiple abdominal surgeries, presents to the ED for L flank pain since 5am this morning  Patient states that it is a constant sharp/ stabbing pain that radiates to her LLQ  Nothing worsens or improves the pain  She has had nausea and multiple episodes of vomiting  She denies any fevers, chest pain, shortness of breath, or diarrhea/constipation  States that she has had slight dysuria but denies any hematuria or urinary frequency/urgency  She states that she has not tried anything for the pain  She does report a history of kidney stones in the past but denies any procedures to remove the kidney stones  She states that the pain feels exactly the same as prior  No other complaints  History provided by:  Patient  Flank Pain  Pain location:  L flank  Pain quality: sharp and stabbing    Pain radiates to:  LLQ  Pain severity:  Moderate  Onset quality:  Sudden  Timing:  Constant  Progression:  Worsening  Chronicity:  Recurrent  Context: previous surgery    Relieved by:  None tried  Worsened by:  Nothing  Ineffective treatments:  None tried  Associated symptoms: nausea and vomiting    Associated symptoms: no chest pain, no chills, no cough, no diarrhea, no dysuria, no fever, no hematuria, no shortness of breath and no sore throat        Prior to Admission Medications   Prescriptions Last Dose Informant Patient Reported? Taking?    Biotin 1000 MCG tablet   Yes Yes   Sig: Take 1,000 mcg by mouth   Melatonin 5 MG CAPS   Yes Yes   Sig: Take 5 mg by mouth   Multiple Vitamin (MULTIVITAMIN) tablet   No Yes   Sig: Take 1 tablet by mouth 2 (two) times a day   Omega-3 Fatty Acids (FISH OIL) 1,000 mg  Self Yes Yes   Sig: Take 1,000 mg by mouth daily   butalbital-acetaminophen-caffeine (FIORICET,ESGIC) -40 mg per tablet  Self Yes Yes   Sig: take 1-2 tablets by mouth every 4 to 6 hours if needed maximum daily dose of 6   calcium citrate-vitamin D (CITRACAL+D) 315-200 MG-UNIT per tablet   No Yes   Sig: Take 2 tablets by mouth 2 (two) times a day   cyclobenzaprine (FLEXERIL) 10 mg tablet Not Taking at Unknown time  No No   Sig: Take 1 tablet (10 mg total) by mouth 3 (three) times a day as needed for muscle spasms   Patient not taking: Reported on 5/22/2019   doxepin (SINEquan) 10 mg capsule  Self Yes Yes   Sig: Take 10 mg by mouth daily at bedtime   ergocalciferol (VITAMIN D2) 50,000 units   No No   Sig: Take 1 capsule (50,000 Units total) by mouth once a week for 12 doses   esterified estrogens (MENEST) 0 625 MG tablet  Self Yes Yes   Sig: Take 0 625 mg by mouth daily   estradiol (VIVELLE-DOT) 0 075 MG/24HR   Yes No   Sig: Place 1 patch on the skin   ferrous sulfate 325 (65 Fe) mg tablet   Yes Yes   Sig: TAKE 1 TABLET BY MOUTH TWICE DAILY   gabapentin (NEURONTIN) 100 mg capsule   No No   Sig: Take 1 capsule (100 mg total) by mouth 3 (three) times a day for 5 days   hydrochlorothiazide (HYDRODIURIL) 12 5 mg tablet  Self Yes Yes   Sig: Take 12 5 mg by mouth daily   metoprolol tartrate (LOPRESSOR) 50 mg tablet   No No   Sig: Take 1 tablet (50 mg total) by mouth every 12 (twelve) hours for 30 days   Patient not taking: Reported on 3/28/2019   ondansetron (ZOFRAN) 4 mg tablet Not Taking at Unknown time  No No   Sig: Take 1 tablet (4 mg total) by mouth every 8 (eight) hours as needed for nausea or vomiting   Patient not taking: Reported on 2/18/2019   oxyCODONE-acetaminophen (PERCOCET) 5-325 mg per tablet   Yes No   Sig: Take 1 tablet by mouth every 4 (four) hours as needed   oxyCODONE-acetaminophen (Percocet) 2 5-325 MG per tablet   Yes No   Sig: Take 1,000 mg by mouth   potassium chloride (K-DUR,KLOR-CON) 20 mEq tablet   No No   Sig: Take 1 tablet (20 mEq total) by mouth 2 (two) times a day for 7 days   simethicone (MYLICON) 80 mg chewable tablet Not Taking at Unknown time  No No   Sig: Chew 1 tablet (80 mg total) every 12 (twelve) hours   Patient not taking: Reported on 11/3/2019   sucralfate (CARAFATE) 1 g tablet Not Taking at Unknown time  No No   Sig: Take 1 tablet (1 g total) by mouth 4 (four) times a day   Patient not taking: Reported on 10/21/2020   tamsulosin (FLOMAX) 0 4 mg Not Taking at Unknown time  No No   Sig: Take 1 capsule (0 4 mg total) by mouth daily with dinner Until kidney stone passes   Patient not taking: Reported on 10/21/2020   traMADol (ULTRAM) 50 mg tablet Not Taking at Unknown time  No No   Sig: Take 1 tablet (50 mg total) by mouth every 6 (six) hours as needed for moderate pain   Patient not taking: Reported on 5/1/2019   venlafaxine (EFFEXOR) 37 5 mg tablet   Yes Yes   Sig: Take 37 5 mg by mouth 2 (two) times a day   vitamin B-12 (CYANOCOBALAMIN) 500 MCG TABS   No No   Sig: Take 2 tablets (1,000 mcg total) by mouth daily for 120 days      Facility-Administered Medications: None       Past Medical History:   Diagnosis Date    Arthritis     Bell palsy     Gastric ulcer     GERD (gastroesophageal reflux disease)     History of transfusion     Melena     Obesity (BMI 30-39  9)     Pancreatic cyst     Right facial numbness     Upper GI bleed        Past Surgical History:   Procedure Laterality Date    ABDOMINOPLASTY      BARIATRIC SURGERY      BOWEL RESECTION LAPAROSCOPIC N/A 2/11/2019    Procedure: LAPAROSCOPIC LYSIS OF ADHESIONS; RESECTION SMALL BOWEL; DECOMPRESSION OF GASTRIC REMNANT; EGD;  Surgeon: Jaylon Wei MD;  [facility];  Service: General    CHOLECYSTECTOMY      CT GUIDED PERC DRAINAGE CATHETER PLACEMENT  2/25/2019    ESOPHAGOGASTRODUODENOSCOPY N/A 3/6/2019    Procedure: INTRAOPERATIVE EGD;  Surgeon: Jaylon Wei MD;  [facility];  Service: [address] [place] [address] PARTIAL GASTRECTOMY N/A 3/6/2019 Procedure: RESECTION GASTRIC PARTIAL/GASTRECTOMY PARTIAL LAPAROSCOPIC;  Surgeon: Gail Fonseca MD;  [facility];  Service: Bariatrics    ND COLONOSCOPY FLX DX W/COLLJ SPEC WHEN PFRMD N/A 3/28/2018    Procedure: COLONOSCOPY;  Surgeon: Claudette Spencer MD;  [facility]; Service: Gastroenterology    ND COLONOSCOPY FLX DX W/COLLJ [facility] WHEN PFRMD N/A 1/31/2019    Procedure: COLONOSCOPY;  Surgeon: Claudette Spencer MD;  [facility]; Service: Gastroenterology    ND ESOPHAGOGASTRODUODENOSCOPY TRANSORAL DIAGNOSTIC N/A 3/22/2018    Procedure: ESOPHAGOGASTRODUODENOSCOPY (EGD); Surgeon: Claudette Spencer MD;  [facility]; Service: Gastroenterology    ND ESOPHAGOGASTRODUODENOSCOPY TRANSORAL DIAGNOSTIC N/A 1/31/2019    Procedure: ESOPHAGOGASTRODUODENOSCOPY (EGD); Surgeon: Claudette Spencer MD;  [facility]; Service: Gastroenterology    ND LAP,DIAGNOSTIC ABDOMEN N/A 3/6/2019    Procedure: LAPAROSCOPY DIAGNOSTIC;  Surgeon: Gail Fonseca MD;  [facility];  Service: [place] ND PART REMOVAL COLON W ANASTOMOSIS N/A 3/6/2019    Procedure: OPEN TRANSVERSE COLON RESECTION;  Surgeon: Gail Fonseca MD;  [facility];  Service: Bariatrics    REDUCTION MAMMAPLASTY Bilateral     REPLACEMENT UNICONDYLAR JOINT KNEE      SHOULDER ARTHROSCOPY DISTAL CLAVICLE EXCISION AND OPEN ROTATOR CUFF REPAIR         Family History   Problem Relation Age of Onset    Heart attack Mother     Diabetes Mother     Heart attack Father     Stroke Father     Hypertension Brother     Leukemia Paternal Aunt      I have reviewed and agree with the history as documented      E-Cigarette/Vaping    E-Cigarette Use Never User      E-Cigarette/Vaping Substances     Social History     Tobacco Use    Smoking status: Never Smoker    Smokeless tobacco: Never Used   Substance Use Topics    Alcohol use: Not Currently     Frequency: Never     Binge frequency: Less than monthly  Drug use: No       Review of Systems   Constitutional: Negative for chills and fever  HENT: Negative for congestion, ear pain and sore throat  Eyes: Negative for pain and visual disturbance  Respiratory: Negative for cough, shortness of breath and wheezing  Cardiovascular: Negative for chest pain and leg swelling  Gastrointestinal: Positive for nausea and vomiting  Negative for abdominal pain and diarrhea  Genitourinary: Positive for flank pain  Negative for dysuria, frequency, hematuria and urgency  Musculoskeletal: Negative for neck pain and neck stiffness  Skin: Negative for rash and wound  Neurological: Negative for weakness, numbness and headaches  Psychiatric/Behavioral: Negative for agitation and confusion  All other systems reviewed and are negative  Physical Exam  Physical Exam  Vitals signs and nursing note reviewed  Constitutional:       Appearance: She is well-developed  HENT:      Head: Normocephalic and atraumatic  Eyes:      Pupils: Pupils are equal, round, and reactive to light  Neck:      Musculoskeletal: Normal range of motion and neck supple  Cardiovascular:      Rate and Rhythm: Normal rate and regular rhythm  Pulmonary:      Effort: Pulmonary effort is normal       Breath sounds: Normal breath sounds  Abdominal:      General: Bowel sounds are normal       Palpations: Abdomen is soft  Tenderness: There is abdominal tenderness in the left lower quadrant  There is left CVA tenderness  Musculoskeletal: Normal range of motion  Skin:     General: Skin is warm and dry  Neurological:      General: No focal deficit present  Mental Status: She is alert and oriented to person, place, and time        Comments: No focal deficits         Vital Signs  ED Triage Vitals [03/21/21 0731]   Temperature Pulse Respirations Blood Pressure SpO2   (!) 97 4 °F (36 3 °C) 94 22 (!) 178/99 100 %      Temp Source Heart Rate Source Patient Position - Orthostatic VS BP Location FiO2 (%)   Oral Monitor Sitting Right arm --      Pain Score       Worst Possible Pain           Vitals:    03/21/21 0731   BP: (!) 178/99   Pulse: 94   Patient Position - Orthostatic VS: Sitting         Visual Acuity      ED Medications  Medications   HYDROmorphone (DILAUDID) injection 1 mg (1 mg Intravenous Given 3/21/21 0748)   ondansetron (ZOFRAN) injection 4 mg (4 mg Intravenous Given 3/21/21 0748)   iohexol (OMNIPAQUE) 350 MG/ML injection (MULTI-DOSE) 100 mL (100 mL Intravenous Given 3/21/21 0912)       Diagnostic Studies  Results Reviewed     Procedure Component Value Units Date/Time    Comprehensive metabolic panel [[id number]]  (Abnormal) Collected: 03/21/21 0809    Lab Status: Final result Specimen: Blood from Hand, Left Updated: 03/21/21 0848     Sodium 139 mmol/L      Potassium 3 5 mmol/L      Chloride 102 mmol/L      CO2 25 mmol/L      ANION GAP 12 mmol/L      BUN 17 mg/dL      Creatinine 0 99 mg/dL      Glucose 131 mg/dL      Calcium 9 6 mg/dL      AST 25 U/L      ALT 33 U/L      Alkaline Phosphatase 119 U/L      Total Protein 7 8 g/dL      Albumin 3 7 g/dL      Total Bilirubin 0 53 mg/dL      eGFR 66 ml/min/1 73sq m     Narrative:      [place] guidelines for Chronic Kidney Disease (CKD):     Stage 1 with normal or high GFR (GFR > 90 mL/min/1 73 square meters)    Stage 2 Mild CKD (GFR = 60-89 mL/min/1 73 square meters)    Stage 3A Moderate CKD (GFR = 45-59 mL/min/1 73 square meters)    Stage 3B Moderate CKD (GFR = 30-44 mL/min/1 73 square meters)    Stage 4 Severe CKD (GFR = 15-29 mL/min/1 73 square meters)    Stage 5 End Stage CKD (GFR <15 mL/min/1 73 square meters)  Note: GFR calculation is accurate only with a steady state creatinine    Lipase [[id number]]  (Normal) Collected: 03/21/21 0809    Lab Status: Final result Specimen: Blood from Hand, Left Updated: 03/21/21 0848     Lipase 212 u/L     hCG, qualitative pregnancy [[id number]]  (Normal) Collected: 03/21/21 0809    Lab Status: Final result Specimen: Blood from Hand, Left Updated: 03/21/21 0848     Preg, Serum Negative    Urine Microscopic [[id number]]  (Abnormal) Collected: 03/21/21 0817    Lab Status: Final result Specimen: Urine, Clean Catch Updated: 03/21/21 0832     RBC, UA 2-4 /hpf      WBC, UA 0-1 /hpf      Epithelial Cells Occasional /hpf      Bacteria, UA Occasional /hpf      MUCUS THREADS Occasional    UA w Reflex to Microscopic w Reflex to Culture [[id number]]  (Abnormal) Collected: 03/21/21 0817    Lab Status: Final result Specimen: Urine, Clean Catch Updated: 03/21/21 0826     Color, UA Yellow     Clarity, UA Clear     Specific [place], UA 1 020     pH, UA 8 0     Leukocytes, UA Negative     Nitrite, UA Negative     Protein, UA Negative mg/dl      Glucose, UA Negative mg/dl      Ketones, UA Trace mg/dl      Urobilinogen, UA 0 2 E U /dl      Bilirubin, UA Negative     Blood, UA Trace-Intact    CBC and differential [[id number]]  (Abnormal) Collected: 03/21/21 0809    Lab Status: Final result Specimen: Blood from Hand, Left Updated: 03/21/21 0822     WBC 9 72 Thousand/uL      RBC 5 18 Million/uL      Hemoglobin 11 9 g/dL      Hematocrit 39 0 %      MCV 75 fL      MCH 23 0 pg      MCHC 30 5 g/dL      RDW 19 1 %      MPV 11 5 fL      Platelets 893 Thousands/uL      nRBC 0 /100 WBCs      Neutrophils Relative 75 %      Immat GRANS % 0 %      Lymphocytes Relative 18 %      Monocytes Relative 6 %      Eosinophils Relative 1 %      Basophils Relative 0 %      Neutrophils Absolute 7 17 Thousands/µL      Immature Grans Absolute 0 04 Thousand/uL      Lymphocytes Absolute 1 78 Thousands/µL      Monocytes Absolute 0 60 Thousand/µL      Eosinophils Absolute 0 09 Thousand/µL      Basophils Absolute 0 04 Thousands/µL     POCT pregnancy, urine [[id number]]  (Normal) Resulted: 03/21/21 0817    Lab Status: Final result Specimen: Urine Updated: 03/21/21 0817     EXT PREG TEST UR (Ref: Negative) Negative     Control Valid CT abdomen pelvis w contrast   Final Result by Yonatan Mcgarry DO (03/21 0965)   1   1 mm mildly obstructing distal left ureteric calculus at the ureterovesical junction  Recommend follow-up emergent urology consultation  2   Additional nonobstructing 2 mm right lower pole renal calyceal calculi  3   Stable 1 9 cm septated cystic mass in the pancreatic head  For simple cyst(s) between 1 5 to 2 0 cm, recommend followup every 6 months for 4 times, then every 1 year for 2 times, then every 2 years for 3 times  If stable after 10 years, then no more    followups  (If patient reaches age [age] [facility], then can switch to age [de-identified] algorithm ) Recommend next followup in 1 year  Preferred imaging modality: abdomen MRI and MRCP with and without IV contrast, or triple phase abdomen CT with IV contrast, or abdomen MRI    and MRCP without IV contrast       The recommendations regarding pancreatic findings assumes that patient does not have family history of pancreatic cancer nor have any symptoms potentially attributable to pancreatic cystic lesions (hyperamylasemia, recent-onset diabetes, severe    epigastric pain, weight loss, steatorrhea, or jaundice ) If these conditions are not true, then management should be deferred to judgement of specialists such as gastroenterologists or oncologic surgeons  Recommendations are based on recent consensus    statements on management of pancreatic cystic lesions from [address] [place] [address] Gastroenterology Association, [employer] of Radiology, the journal Pancreatology, and our own institutional consensus  The study was marked in [facility] for immediate notification                 Workstation performed: [id number]                    Procedures  Procedures         ED Course  ED Course as of Mar 21 1014   Sun Mar 21, 2021   6100 Patient refusing PO contrast- understands risks- but states that she still does not want PO contrast                                  SBIRT 22yo+      Most Recent Value   SBIRT (24 yo +)   In order to provide better care to our patients, we are screening all of our patients for alcohol and drug use  Would it be okay to ask you these screening questions? Yes Filed at: 03/21/2021 4491   Initial Alcohol Screen: US AUDIT-C    1  How often do you have a drink containing alcohol?  0 Filed at: 03/21/2021 0739   2  How many drinks containing alcohol do you have on a typical day you are drinking? 0 Filed at: 03/21/2021 0739   3b  FEMALE Any Age, or MALE 65+: How often do you have 4 or more drinks on one occassion? 0 Filed at: 03/21/2021 0739   Audit-C Score  0 Filed at: 03/21/2021 5817   TRESSA: How many times in the past year have you    Used an illegal drug or used a prescription medication for non-medical reasons? Never Filed at: 03/21/2021 3026                    MDM  Number of Diagnoses or Management Options  Left ureteral stone: new and requires workup  Diagnosis management comments: Patient with L flank and LLQ abd pain- will get labs, UA, and ct abd/pel  Patient reevaluated and feels improved  Patient updated on results of tests  Discharge instructions given including medications, follow-up, and return precautions  Patient demonstrates verbal understanding and agrees with plan   Narcotic precautions given        Amount and/or Complexity of Data Reviewed  Clinical lab tests: ordered and reviewed  Tests in the radiology section of CPT®: ordered and reviewed  Tests in the medicine section of CPT®: ordered and reviewed  Discussion of test results with the performing providers: yes  Decide to obtain previous medical records or to obtain history from someone other than the patient: yes  Obtain history from someone other than the patient: yes  Review and summarize past medical records: yes  Discuss the patient with other providers: yes  Independent visualization of images, tracings, or specimens: yes    Patient Progress  Patient progress: improved      Disposition  Final diagnoses:   Left ureteral stone     Time reflects when diagnosis was documented in both MDM as applicable and the Disposition within this note     Time User Action Codes Description Comment    3/21/2021 10:05 AM Elyse GOULD Add [N20 1] Left ureteral stone       ED Disposition     ED Disposition Condition Date/Time Comment    Discharge Stable Sun Mar 21, 2021 10:05 AM Mehrdad Goyal discharge to home/self care  Follow-up Information     Follow up With Specialties Details Why Contact Info Additional [address] [place] [address] For Urology [facility] Urology Call in 1 day for follow up within 2-3 days [phone]  [place] [address] [facility],[address] [address]  [phone] [facility] [address]  [address] [facility] [address], [place], [address]    [address] [facility] Emergency Department Emergency Medicine Go to  immediately for any new or worsening symptoms   [address] [facility] [id number] [facility], [address] [place], [place], [address]          Patient's Medications   Discharge Prescriptions    ONDANSETRON (ZOFRAN-ODT) 4 MG DISINTEGRATING TABLET    Take 1 tablet (4 mg total) by mouth every 6 (six) hours as needed for nausea or vomiting       Start Date: 3/21/2021 End Date: --       Order Dose: 4 mg       Quantity: 20 tablet    Refills: 0    OXYCODONE-ACETAMINOPHEN (PERCOCET) 5-325 MG PER TABLET    Take 1 tablet by mouth every 4 (four) hours as needed for moderate pain for up to 10 daysMax Daily Amount: 6 tablets       Start Date: 3/21/2021 End Date: 3/31/2021       Order Dose: 1 tablet       Quantity: 8 tablet    Refills: 0    TAMSULOSIN (FLOMAX) 0 4 MG    Take 1 capsule (0 4 mg total) by mouth daily with dinner       Start Date: 3/21/2021 End Date: --       Order Dose: 0 4 mg       Quantity: 7 capsule    Refills: 0 No discharge procedures on file      PDMP Review     None          ED Provider  Electronically Signed by           Efren Craven,   03/21/21 1011

## 2021-03-21 NOTE — ED NOTES
Patient states "I am not going to be able to drink the oral contract  I can barely eat or drink due to my nausea, vomiting, and pain  Could you ask the Doctor if we can do just IV contrast " Dr Vani Fernandez made aware        Abiel Tipton RN  03/21/21 0417

## 2021-06-10 ENCOUNTER — APPOINTMENT (OUTPATIENT)
Dept: LAB | Facility: HOSPITAL | Age: 51
End: 2021-06-10
Attending: SURGERY
Payer: COMMERCIAL

## 2021-06-10 DIAGNOSIS — R19.7 DIARRHEA: ICD-10-CM

## 2021-06-10 DIAGNOSIS — K91.2 POSTSURGICAL MALABSORPTION: Primary | ICD-10-CM

## 2021-06-10 DIAGNOSIS — R53.83 MALAISE AND FATIGUE: ICD-10-CM

## 2021-06-10 DIAGNOSIS — R53.81 MALAISE AND FATIGUE: ICD-10-CM

## 2021-06-10 PROCEDURE — 87209 SMEAR COMPLEX STAIN: CPT

## 2021-06-10 PROCEDURE — 87505 NFCT AGENT DETECTION GI: CPT

## 2021-06-10 PROCEDURE — 87177 OVA AND PARASITES SMEARS: CPT

## 2021-06-11 LAB — C DIFF TOX B TCDB STL QL NAA+PROBE: NEGATIVE

## 2021-06-12 LAB
CAMPYLOBACTER DNA SPEC NAA+PROBE: NORMAL
SALMONELLA DNA SPEC QL NAA+PROBE: NORMAL
SHIGA TOXIN STX GENE SPEC NAA+PROBE: NORMAL
SHIGELLA DNA SPEC QL NAA+PROBE: NORMAL

## 2021-06-14 LAB — O+P STL CONC: NORMAL

## 2021-06-15 ENCOUNTER — APPOINTMENT (OUTPATIENT)
Dept: LAB | Facility: CLINIC | Age: 51
End: 2021-06-15
Payer: COMMERCIAL

## 2021-06-15 DIAGNOSIS — R53.81 MALAISE AND FATIGUE: ICD-10-CM

## 2021-06-15 DIAGNOSIS — K91.2 POSTSURGICAL MALABSORPTION: ICD-10-CM

## 2021-06-15 DIAGNOSIS — R53.83 MALAISE AND FATIGUE: ICD-10-CM

## 2021-06-15 LAB
ALBUMIN SERPL BCP-MCNC: 3.7 G/DL (ref 3.5–5)
ALP SERPL-CCNC: 105 U/L (ref 46–116)
ALT SERPL W P-5'-P-CCNC: 21 U/L (ref 12–78)
ANION GAP SERPL CALCULATED.3IONS-SCNC: 4 MMOL/L (ref 4–13)
AST SERPL W P-5'-P-CCNC: 15 U/L (ref 5–45)
BILIRUB SERPL-MCNC: 0.47 MG/DL (ref 0.2–1)
BUN SERPL-MCNC: 16 MG/DL (ref 5–25)
CALCIUM SERPL-MCNC: 9.2 MG/DL (ref 8.3–10.1)
CHLORIDE SERPL-SCNC: 110 MMOL/L (ref 100–108)
CHOLEST SERPL-MCNC: 223 MG/DL (ref 50–200)
CO2 SERPL-SCNC: 28 MMOL/L (ref 21–32)
CREAT SERPL-MCNC: 0.95 MG/DL (ref 0.6–1.3)
ERYTHROCYTE [DISTWIDTH] IN BLOOD BY AUTOMATED COUNT: 17.2 % (ref 11.6–15.1)
FERRITIN SERPL-MCNC: 5 NG/ML (ref 8–388)
FOLATE SERPL-MCNC: 9.6 NG/ML (ref 3.1–17.5)
GFR SERPL CREATININE-BSD FRML MDRD: 69 ML/MIN/1.73SQ M
GLUCOSE SERPL-MCNC: 97 MG/DL (ref 65–140)
HCT VFR BLD AUTO: 39.8 % (ref 34.8–46.1)
HDLC SERPL-MCNC: 42 MG/DL
HGB BLD-MCNC: 12 G/DL (ref 11.5–15.4)
IRON SATN MFR SERPL: 7 %
IRON SERPL-MCNC: 29 UG/DL (ref 50–170)
LDLC SERPL CALC-MCNC: 132 MG/DL (ref 0–100)
MCH RBC QN AUTO: 23.2 PG (ref 26.8–34.3)
MCHC RBC AUTO-ENTMCNC: 30.2 G/DL (ref 31.4–37.4)
MCV RBC AUTO: 77 FL (ref 82–98)
NONHDLC SERPL-MCNC: 181 MG/DL
PLATELET # BLD AUTO: 411 THOUSANDS/UL (ref 149–390)
PMV BLD AUTO: 12.1 FL (ref 8.9–12.7)
POTASSIUM SERPL-SCNC: 3.9 MMOL/L (ref 3.5–5.3)
PROT SERPL-MCNC: 7.4 G/DL (ref 6.4–8.2)
RBC # BLD AUTO: 5.18 MILLION/UL (ref 3.81–5.12)
SODIUM SERPL-SCNC: 142 MMOL/L (ref 136–145)
T4 FREE SERPL-MCNC: 0.89 NG/DL (ref 0.76–1.46)
TIBC SERPL-MCNC: 444 UG/DL (ref 250–450)
TRIGL SERPL-MCNC: 246 MG/DL
TSH SERPL DL<=0.05 MIU/L-ACNC: 0.27 UIU/ML (ref 0.36–3.74)
VIT B12 SERPL-MCNC: 593 PG/ML (ref 100–900)
WBC # BLD AUTO: 10.6 THOUSAND/UL (ref 4.31–10.16)

## 2021-06-15 PROCEDURE — 84207 ASSAY OF VITAMIN B-6: CPT

## 2021-06-15 PROCEDURE — 82607 VITAMIN B-12: CPT

## 2021-06-15 PROCEDURE — 83550 IRON BINDING TEST: CPT

## 2021-06-15 PROCEDURE — 85027 COMPLETE CBC AUTOMATED: CPT

## 2021-06-15 PROCEDURE — 82746 ASSAY OF FOLIC ACID SERUM: CPT

## 2021-06-15 PROCEDURE — 82728 ASSAY OF FERRITIN: CPT

## 2021-06-15 PROCEDURE — 80053 COMPREHEN METABOLIC PANEL: CPT

## 2021-06-15 PROCEDURE — 83540 ASSAY OF IRON: CPT

## 2021-06-15 PROCEDURE — 83970 ASSAY OF PARATHORMONE: CPT

## 2021-06-15 PROCEDURE — 84590 ASSAY OF VITAMIN A: CPT

## 2021-06-15 PROCEDURE — 84439 ASSAY OF FREE THYROXINE: CPT

## 2021-06-15 PROCEDURE — 82525 ASSAY OF COPPER: CPT

## 2021-06-15 PROCEDURE — 84443 ASSAY THYROID STIM HORMONE: CPT

## 2021-06-15 PROCEDURE — 82306 VITAMIN D 25 HYDROXY: CPT

## 2021-06-15 PROCEDURE — 84425 ASSAY OF VITAMIN B-1: CPT

## 2021-06-15 PROCEDURE — 84630 ASSAY OF ZINC: CPT

## 2021-06-15 PROCEDURE — 80061 LIPID PANEL: CPT

## 2021-06-15 PROCEDURE — 36415 COLL VENOUS BLD VENIPUNCTURE: CPT

## 2021-06-15 RX ORDER — ORPHENADRINE CITRATE 100 MG/1
100 TABLET, EXTENDED RELEASE ORAL 2 TIMES DAILY
COMMUNITY
Start: 2021-03-01 | End: 2021-08-23

## 2021-06-15 RX ORDER — PREDNISONE 20 MG/1
TABLET ORAL
COMMUNITY
Start: 2021-05-06

## 2021-06-16 LAB
25(OH)D3 SERPL-MCNC: 21.6 NG/ML (ref 30–100)
PTH-INTACT SERPL-MCNC: 100.5 PG/ML (ref 18.4–80.1)

## 2021-06-17 ENCOUNTER — OFFICE VISIT (OUTPATIENT)
Dept: BARIATRICS | Facility: CLINIC | Age: 51
End: 2021-06-17
Payer: COMMERCIAL

## 2021-06-17 ENCOUNTER — TELEPHONE (OUTPATIENT)
Dept: GASTROENTEROLOGY | Facility: CLINIC | Age: 51
End: 2021-06-17

## 2021-06-17 ENCOUNTER — TELEPHONE (OUTPATIENT)
Dept: SURGICAL ONCOLOGY | Facility: CLINIC | Age: 51
End: 2021-06-17

## 2021-06-17 VITALS
RESPIRATION RATE: 12 BRPM | HEART RATE: 76 BPM | SYSTOLIC BLOOD PRESSURE: 130 MMHG | DIASTOLIC BLOOD PRESSURE: 88 MMHG | BODY MASS INDEX: 39.89 KG/M2 | HEIGHT: 68 IN | WEIGHT: 263.2 LBS | TEMPERATURE: 97.2 F

## 2021-06-17 DIAGNOSIS — K63.89 SMALL INTESTINAL BACTERIAL OVERGROWTH (SIBO): ICD-10-CM

## 2021-06-17 DIAGNOSIS — E66.01 OBESITY, CLASS III, BMI 40-49.9 (MORBID OBESITY) (HCC): ICD-10-CM

## 2021-06-17 DIAGNOSIS — E66.01 MORBID (SEVERE) OBESITY DUE TO EXCESS CALORIES (HCC): ICD-10-CM

## 2021-06-17 DIAGNOSIS — D50.9 IDA (IRON DEFICIENCY ANEMIA): ICD-10-CM

## 2021-06-17 DIAGNOSIS — Z48.815 ENCOUNTER FOR SURGICAL AFTERCARE FOLLOWING SURGERY ON THE DIGESTIVE SYSTEM: Primary | ICD-10-CM

## 2021-06-17 DIAGNOSIS — E55.9 VITAMIN D DEFICIENCY: ICD-10-CM

## 2021-06-17 PROCEDURE — 99213 OFFICE O/P EST LOW 20 MIN: CPT | Performed by: SURGERY

## 2021-06-17 NOTE — PROGRESS NOTES
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) which was complicated by a gastric remnant blowout s/p diagnostic laparoscopy converted to open, remnant gastrectomy, left hemicolectomy has had diarrhea over the past month (suspect possible small intestinal bacterial overgrowth)      -Heme/onc consult re iron deficiency anemia   -GI consult re psb SIBO  -Metabolic panel pending (encouraged to take MVI/minerals/calcium/D3 as directed)  -RTO 1 month       Subjective/Objective     Subjective: Patient reports diarrhea since May 2nd  Nine - 15 BMs a day that smell like sewage  She states she has tried probiotics the past week and this has not helped  She denies abd pain  She denies fevers/chills/sweats  She does complain of chronic joint pains  Metabolic panel was reviewed, but other labs pending  She states she does not always take her vitamins/minerals  Review of systems: Negative except as above     Objective:     Blood pressure 130/88, pulse 76, temperature (!) 97 2 °F (36 2 °C), resp  rate 12, height 5' 7 5" (1 715 m), weight 119 kg (263 lb 3 2 oz), not currently breastfeeding  ,Body mass index is 40 61 kg/m²  Invasive Devices     None                 Physical Exam:     No distress   EOMI   CN II-XII grossly intact  Neck ROM wnl   RRR  Breathing non labored   Abd flat, ND  Skin warm/dry  Msk ROM wnl   Thought content/behavior/mood wnl       Lab, Imaging and other studies:I have personally reviewed pertinent results

## 2021-06-17 NOTE — TELEPHONE ENCOUNTER
New Patient Encounter    New Patient Intake Form   Patient Details:  Quiana Unger  1970  [id number]    Background Information:  [address] starts by opening a telephone encounter and gathering the following information   Who is calling to schedule? If not self, relationship to patient? Patient   Referring Provider Dr Sarah Yun is the diagnosis? anemia   Is this Cancer or Non-Cancer? Non-Cancer   Is this diagnosis confirmed? Yes   When was the diagnosis? 6/2021   Is there a confirmed diagnosis from a biopsy/tissue reviewed by pathology? NA   Were outside slides requested? NA   Is patient aware of diagnosis? Yes   Is there a personal history and what kind? No   Is there a family history and what kind? No   Reason for visit? New Diagnosis   Have you had any imaging or labs done? If so: when, where? yes  [facility]   Are records in [facility]? yes   If patient has a prior history of cancer were old records obtained? NA   Was the patient told to bring a disk? No   Does the patient smoke or Vape? No   If yes, how many packs or cartridges per day? Scheduling Information:   Preferred [place]:  [place]     Are there any dates/time the patient cannot be seen? Miscellaneous:    After completing the above information, please route to Financial Counselor and the appropriate Nurse Navigator for review

## 2021-06-17 NOTE — TELEPHONE ENCOUNTER
Mike patient - RTRN call to attempt to schedule an OV asap  LMOM to RTRN call to [phone]  Option 1    Thx

## 2021-06-18 LAB
COPPER SERPL-MCNC: 128 UG/DL (ref 80–158)
VIT A SERPL-MCNC: 61.5 UG/DL (ref 20.1–62)
VIT B1 BLD-SCNC: 148.2 NMOL/L (ref 66.5–200)

## 2021-06-19 LAB — ZINC SERPL-MCNC: 65 UG/DL (ref 44–115)

## 2021-06-20 LAB — VIT B6 SERPL-MCNC: 7.7 UG/L (ref 2–32.8)

## 2021-06-22 ENCOUNTER — TELEPHONE (OUTPATIENT)
Dept: BARIATRICS | Facility: CLINIC | Age: 51
End: 2021-06-22

## 2021-06-22 DIAGNOSIS — K91.2 POSTSURGICAL MALABSORPTION: ICD-10-CM

## 2021-06-22 DIAGNOSIS — E55.9 VITAMIN D DEFICIENCY: Primary | ICD-10-CM

## 2021-06-22 RX ORDER — ERGOCALCIFEROL 1.25 MG/1
50000 CAPSULE ORAL
Qty: 24 CAPSULE | Refills: 0 | Status: SHIPPED | OUTPATIENT
Start: 2021-06-24 | End: 2022-04-07

## 2021-06-22 NOTE — TELEPHONE ENCOUNTER
Left message for the patient  labs from 06/15/21:    -MONICA: referral to hematology for iron infusions - scheduled with them for 06/28; continue with Le[facility]ardo MVI with 45mg iron and 2 Vitron C daily for now    -Vitamin D insufficiency (21 6): Start vitamin D deficiency Rx for 50,000IU 2x/week with FOOD x 12 weeks   Repeat vitamin D lab in about 4 months as ordered      -Rest of vitamins WNL    -WBC mildly elevated - f/u with PCP    -TSH mildly low - f/u with PCP    -Lipids: elevated Trigs, :   -Avoid fried foods and trans fat, limit saturated fats and refined carbohydrates  -Increase fish/omega 3 FA consumption  -Increase physical activity

## 2021-06-28 ENCOUNTER — CONSULT (OUTPATIENT)
Dept: HEMATOLOGY ONCOLOGY | Facility: CLINIC | Age: 51
End: 2021-06-28
Payer: COMMERCIAL

## 2021-06-28 VITALS
BODY MASS INDEX: 39.25 KG/M2 | TEMPERATURE: 98.2 F | RESPIRATION RATE: 16 BRPM | HEIGHT: 69 IN | OXYGEN SATURATION: 97 % | DIASTOLIC BLOOD PRESSURE: 90 MMHG | HEART RATE: 75 BPM | WEIGHT: 265 LBS | SYSTOLIC BLOOD PRESSURE: 112 MMHG

## 2021-06-28 DIAGNOSIS — D50.0 IRON DEFICIENCY ANEMIA DUE TO CHRONIC BLOOD LOSS: Primary | ICD-10-CM

## 2021-06-28 DIAGNOSIS — M89.8X9 BONE PAIN: ICD-10-CM

## 2021-06-28 DIAGNOSIS — D50.9 IDA (IRON DEFICIENCY ANEMIA): ICD-10-CM

## 2021-06-28 PROCEDURE — 99205 OFFICE O/P NEW HI 60 MIN: CPT | Performed by: INTERNAL MEDICINE

## 2021-06-28 RX ORDER — SODIUM CHLORIDE 9 MG/ML
20 INJECTION, SOLUTION INTRAVENOUS ONCE
Status: CANCELLED | OUTPATIENT
Start: 2021-07-06

## 2021-06-28 RX ORDER — SODIUM CHLORIDE 9 MG/ML
20 INJECTION, SOLUTION INTRAVENOUS ONCE
Status: CANCELLED | OUTPATIENT
Start: 2021-07-01

## 2021-06-28 NOTE — PROGRESS NOTES
Hematology/Oncology Consult Note    Date of Service: 6/28/2021    [facility] HEMATOLOGY ONCOLOGY SPECIAL[facility] [place]   [address] [place] [id number]    Reason for Consultation: anemia,   Leukocytosis    Cancer Stage: NA    Referral Physician: Yadiel Monet MD    Oncology/Hematology History:   History of chronic anemia, started at least from March 2018  Normal kidney function   March 15, 2018, WBC 12 6, hemoglobin 10 3, normocytic normochromic anemia, with platelet count 956     March 10, 2019, hemoglobin 9 6, normal WBC and platelet count  Normocytic, normochromic anemia    November 2, 2020  Iron 27, ferritin 14, iron saturation 6%, TIBC 430, 49  B12 494  Copper, vitamin-A, vitamin B1, Zinc are within normal limits  Hemoglobin 11 7  Microcytic hypochromic anemia  Platelet count 699  WBC 6 IV     March 21, 2021, hemoglobin 11 9, normal WBC, MCV 75, MCH 23, MCHC 30 5, with RDW 18 1  Platelet count 218  No immature cells in peripheral blood   Kristina 15, 2021, WBC 10 6, hemoglobin 12, hematocrit 39 8%, MCV 77, MCH is 23 2, MCHC 30 2, with platelet count 707  Normal kidney and liver function  Iron 29, ferritin 5, iron saturation 7%, TIBC 444, folate 9 6  Copper 128, B12 593,  Zinc,  Vitamin B6, vitamin B1, vitamin 8, are all within normal limits    EGD and colonoscopy 2 -3  years    History of PRBC transfusion with hemoglobin around 7,  The post hysterectomy    Assessment and Recommendations:   1  Microcytic hypochromic anemia due to iron deficiency  EGD and colonoscopy 2-3 years ago showed no evidence of malignancy or source of bleeding  History of multiple surgeries  The anemia is likely secondary to iron malabsorption,  and blood loss from the surgeries  Patient denies bleeding anywhere at this time  Patient failed oral iron supplement  I will give patient IV Feraheme 510 mg weekly x2 doses    I will repeat a CBC and iron study in 2 months  2   Bone pain and joint pain  Patient has a history of hysterectomy with BSO , which can increase the risk of developing osteopenia or osteoporosis  I will order DEXA scan to evaluate the bone density  Patient currently takes multivitamin, calcium and vitamin-D supplement  3  Leukocytosis and thrombocytosis  This could be secondary to iron deficiency  I will give patient IV iron and monitor the CBC  I will check lymphoma leukemia panel,   BCR-ABL by PCR, JAK2 mutation  If patient has persistent leukocytosis and thrombocytosis after IV iron infusion  4    Hot flash after hysterectomy plus BSO  Patient takes Vivelle, and Menest   5    ACE horse kegs due to iron deficiency  Ferritin 5  I will give patient IV iron  Monitoring  6   Follow-up: Return to clinic in 2 months with MD and labs prior  Thank you very much for your consultation and making us part of this nice patient's care  I will continue to follow closely with you  Please contact me with any additional questions  Disclaimer: This document was prepared using Networked [employer] Direct technology  If a word or phrase is confusing, or does not make sense, this is likely due to recognition error which was not discovered during the providers review  If you believe an error has occurred, please contact me through [address] line for trinity? cation  HPI:   Jaswant Feliciano is a 45 y o  female with a past medical history of   Chronic anemia with hemoglobin 12 6 in 2018  Status post PRBC transfusion  Patient underwent multiple surgeries including  gastric bypass surgery, partial gastrectomy, resection of small bowel, hysterectomy, and open transverse colon resection in the past several years  LAB showed persistent iron deficient anemia  Patient has been taking oral iron supplement on and off without improvement in anemia and iron deficiency    The patient is referred to Hematology Clinic for comanagement of iron deficient anemia  Patient feels fine except fatigue  Patient denied fever or chills  No chest pain or shortness of breath  No headache upper of bleeding  Patient denies seizure activity  The patient reported significant Sage horse legs and  Joint/bone pain  No nausea vomiting  No diarrhea or constipation  No abdominal pain  Patient denies  symptoms  All appetite good  The patient gained weight  Patient denies bleeding anywhere  Last EGD and colonoscopy in 2-3 years ago  Patient will have EGD and colonoscopy in July 1, 2021  Review of system:  12-point review of system was performed, pertinent positive and negative were detailed as above    Past Medical History:   Diagnosis Date    Arthritis     Bell palsy     Gastric ulcer     GERD (gastroesophageal reflux disease)     History of transfusion     Melena     Obesity (BMI 30-39  9)     Pancreatic cyst     Right facial numbness     Upper GI bleed        Past Surgical History:   Procedure Laterality Date    ABDOMINOPLASTY      BARIATRIC SURGERY      BOWEL RESECTION LAPAROSCOPIC N/A 2/11/2019    Procedure: LAPAROSCOPIC LYSIS OF ADHESIONS; RESECTION SMALL BOWEL; DECOMPRESSION OF GASTRIC REMNANT; EGD;  Surgeon: Marilyn White MD;  [facility];  Service: General    CHOLECYSTECTOMY      CT GUIDED PERC DRAINAGE CATHETER PLACEMENT  2/25/2019    ESOPHAGOGASTRODUODENOSCOPY N/A 3/6/2019    Procedure: INTRAOPERATIVE EGD;  Surgeon: Marilyn White MD;  [facility];  Service: Bariatrics    HERNIA REPAIR      HYSTERECTOMY      KNEE CARTILAGE SURGERY      PARTIAL GASTRECTOMY N/A 3/6/2019    Procedure: RESECTION GASTRIC PARTIAL/GASTRECTOMY PARTIAL LAPAROSCOPIC;  Surgeon: Marilyn White MD;  [facility];  Service: Bariatrics    MI COLONOSCOPY FLX DX W/COLLJ SPEC WHEN PFRMD N/A 3/28/2018    Procedure: COLONOSCOPY;  Surgeon: Bee Diallo MD;  [facility];   Service: Gastroenterology    MI COLONOSCOPY FLX DX W/COLLJ SPEC WHEN PFRMD N/A 1/31/2019    Procedure: COLONOSCOPY;  Surgeon: Jennifer Marie MD;  [facility]; Service: Gastroenterology    IN ESOPHAGOGASTRODUODENOSCOPY TRANSORAL DIAGNOSTIC N/A 3/22/2018    Procedure: ESOPHAGOGASTRODUODENOSCOPY (EGD); Surgeon: Jennifer Marie MD;  [facility]; Service: Gastroenterology    IN ESOPHAGOGASTRODUODENOSCOPY TRANSORAL DIAGNOSTIC N/A 1/31/2019    Procedure: ESOPHAGOGASTRODUODENOSCOPY (EGD); Surgeon: Jennifer Marie MD;  [facility];   Service: Gastroenterology    IN LAP,DIAGNOSTIC ABDOMEN N/A 3/6/2019    Procedure: LAPAROSCOPY DIAGNOSTIC;  Surgeon: Casi Prasad MD;  [facility];  Service: Vear Prabhakar IN PART REMOVAL COLON W ANASTOMOSIS N/A 3/6/2019    Procedure: OPEN TRANSVERSE COLON RESECTION;  Surgeon: Casi Prasad MD;  [facility];  Service: Bariatrics    REDUCTION MAMMAPLASTY Bilateral     REPLACEMENT UNICONDYLAR JOINT KNEE      SHOULDER ARTHROSCOPY DISTAL CLAVICLE EXCISION AND OPEN ROTATOR CUFF REPAIR         Family History   Problem Relation Age of Onset    Heart attack Mother     Diabetes Mother     Heart attack Father     Stroke Father     Hypertension Brother     Leukemia Paternal Aunt        Social History     Socioeconomic History    Marital status: /Civil Union     Spouse name: Not on file    Number of children: Not on file    Years of education: Not on file    Highest education level: Not on file   Occupational History    Not on file   Tobacco Use    Smoking status: Never Smoker    Smokeless tobacco: Never Used   Vaping Use    Vaping Use: Never used   Substance and Sexual Activity    Alcohol use: Not Currently    Drug use: No    Sexual activity: Yes     Partners: Male   Other Topics Concern    Not on file   Social History Narrative    Not on file     Social Determinants of Health     Financial Resource Strain:     Difficulty of Paying Living Expenses:    Food Insecurity:     Worried About Running Out of Food in the Last Year:     [address] in the Last Year:    Transportation Needs:     Lack of Transportation (Medical):  Lack of Transportation (Non-Medical):    Physical Activity:     Days of Exercise per Week:     Minutes of Exercise per Session:    Stress:     Feeling of Stress :    Social Connections:     Frequency of Communication with Friends and Family:     Frequency of Social Gatherings with Friends and Family:     Attends Jain Services:     Active Member of Clubs or Organizations:     Attends Club or Organization Meetings:     Marital Status:    Intimate Partner Violence:     Fear of Current or Ex-Partner:     Emotionally Abused:     Physically Abused:     Sexually Abused:         Allergies   Allergen Reactions    Other Headache     MSG    Ibuprofen Other (See Comments)     USE CAUTION DUE TO BARIATRIC SX    Penicillins Rash       Current Outpatient Medications   Medication Sig Dispense Refill    Biotin 1000 MCG tablet Take 1,000 mcg by mouth      butalbital-acetaminophen-caffeine (FIORICET,ESGIC) -40 mg per tablet take 1-2 tablets by mouth every 4 to 6 hours if needed maximum daily dose of 6  0    calcium citrate-vitamin D (CITRACAL+D) 315-200 MG-UNIT per tablet Take 2 tablets by mouth 2 (two) times a day 60 tablet 6    conjugated estrogens (PREMARIN) 0 625 mg tablet Take 0 625 mg by mouth daily      cyclobenzaprine (FLEXERIL) 10 mg tablet Take 1 tablet (10 mg total) by mouth 3 (three) times a day as needed for muscle spasms 30 tablet 0    doxepin (SINEquan) 10 mg capsule Take 10 mg by mouth daily at bedtime  0    ergocalciferol (VITAMIN D2) 50,000 units Take 1 capsule (50,000 Units total) by mouth 2 (two) times a week with meals 24 capsule 0    esterified estrogens (MENEST) 0 625 MG tablet Take 0 625 mg by mouth daily      estradiol (VIVELLE-DOT) 0 075 MG/24HR Place 1 patch on the skin      ferrous sulfate 325 (65 Fe) mg tablet TAKE 1 TABLET BY MOUTH TWICE DAILY      gabapentin (NEURONTIN) 100 mg capsule Take 1 capsule (100 mg total) by mouth 3 (three) times a day for 5 days 15 capsule 0    hydrochlorothiazide (HYDRODIURIL) 12 5 mg tablet Take 12 5 mg by mouth daily  0    Melatonin 5 MG CAPS Take 5 mg by mouth      metoprolol tartrate (LOPRESSOR) 50 mg tablet Take 1 tablet (50 mg total) by mouth every 12 (twelve) hours for 30 days (Patient not taking: Reported on 3/28/2019) 60 tablet 0    Multiple Vitamin (MULTIVITAMIN) tablet Take 1 tablet by mouth 2 (two) times a day 60 tablet 3    Omega-3 Fatty Acids (FISH OIL) 1,000 mg Take 1,000 mg by mouth daily      ondansetron (ZOFRAN) 4 mg tablet Take 1 tablet (4 mg total) by mouth every 8 (eight) hours as needed for nausea or vomiting 20 tablet 0    ondansetron (ZOFRAN-ODT) 4 mg disintegrating tablet Take 1 tablet (4 mg total) by mouth every 6 (six) hours as needed for nausea or vomiting (Patient not taking: Reported on 6/17/2021) 20 tablet 0    orphenadrine (NORFLEX) 100 mg tablet Take 100 mg by mouth 2 (two) times a day      oxyCODONE-acetaminophen (Percocet) 2 5-325 MG per tablet Take 1,000 mg by mouth      oxyCODONE-acetaminophen (PERCOCET) 5-325 mg per tablet Take 1 tablet by mouth every 4 (four) hours as needed      potassium chloride (K-DUR,KLOR-CON) 20 mEq tablet Take 1 tablet (20 mEq total) by mouth 2 (two) times a day for 7 days 14 tablet 0    predniSONE 20 mg tablet take 1 tablet by mouth three times a day for 4 days then twice a    (REFER TO PRESCRIPTION NOTES)        simethicone (MYLICON) 80 mg chewable tablet Chew 1 tablet (80 mg total) every 12 (twelve) hours (Patient not taking: Reported on 11/3/2019) 30 tablet 0    sucralfate (CARAFATE) 1 g tablet Take 1 tablet (1 g total) by mouth 4 (four) times a day (Patient not taking: Reported on 10/21/2020) 20 tablet 0    tamsulosin (FLOMAX) 0 4 mg Take 1 capsule (0 4 mg total) by mouth daily with dinner Until kidney stone passes (Patient not taking: Reported on 10/21/2020) 5 capsule 0    tamsulosin (FLOMAX) 0 4 mg Take 1 capsule (0 4 mg total) by mouth daily with dinner (Patient not taking: Reported on 6/17/2021) 7 capsule 0    traMADol (ULTRAM) 50 mg tablet Take 1 tablet (50 mg total) by mouth every 6 (six) hours as needed for moderate pain (Patient not taking: Reported on 5/1/2019) 30 tablet 0    venlafaxine (EFFEXOR) 37 5 mg tablet Take 37 5 mg by mouth 2 (two) times a day      vitamin B-12 (CYANOCOBALAMIN) 500 MCG TABS Take 2 tablets (1,000 mcg total) by mouth daily for 120 days 240 tablet 0     No current facility-administered medications for this visit  (Not in a hospital admission)      Pain Score: 1-2    ECOG PS: 0    Objective:     24 Hour Vitals Assessment:  Vitals:    06/28/21 1250   BP: 112/90   Pulse: 75   Resp: 16   Temp: 98 2 °F (36 8 °C)   SpO2: 97%       PHYSICIAN EXAM:    General: Appearance: alert, cooperative, no distress  HEENT: Normocephalic, atraumatic  No scleral icterus  conjunctivae clear  PERRL, EOM's intact  No sinus drainage or tenderness  Chest: No tenderness  Lungs: Clear to auscultation bilaterally, Respirations unlabored  Cardiac: Regular rate and rhythm, S1and S2 are normal, no murmur, click, rubs or gallops  Abdomen: Soft, non-tender, non-distended  Bowel sounds are normal  No masses, no organomegaly  Pelvic: deferred  Extremities:  No edema, cyanosis, clubbing  Skin: Skin color, turgor are normal  No rashes  Lymphatics: no palpable adenopathy  Neurologic: Alert and oriented X 3, Cranial nerve 2-12 grosely intact  Normal strength and sensation  DATA REVIEW:    Pathology Result:    [unfilled]    Image Results:   They are reviewed and documented in Hematology/Oncology history    CT abdomen pelvis w contrast  Narrative: CT ABDOMEN AND PELVIS WITH IV CONTRAST    INDICATION:   Flank pain, kidney stone suspected;  left flank pain- hx of kidney stones (likely kidney stone based on presentation) however, has a hx of multiple abd surgeries including bariatric surgery  Left-sided flank pain starting this morning  History of renal calculi  58-year-old female with history of kidney stones and multiple abdominal surgeries  Presents to the emergency department for left flank pain since 5:00 AM this morning  States that pain is constant, sharp and stabbing, radiating to left lower quadrant  Associated nausea and vomiting  Denies fevers, chest pain, shortness of breath or diarrhea/constipation  Admits to dysuria but denies hematuria or urinary frequency/urgency  States pain is similar to prior kidney stones  COMPARISON:  Numerous prior studies, most recent of which is a contrast-enhanced CT abdomen pelvis October 27, 2020  TECHNIQUE:  CT examination of the abdomen and pelvis was performed  Axial, sagittal, and coronal 2D reformatted images were created from the source data and submitted for interpretation  Radiation dose length product (DLP) for this visit:  1157 mGy-cm   This examination, like all CT scans performed in the [facility], was performed utilizing techniques to minimize radiation dose exposure, including the use of iterative   reconstruction and automated exposure control  IV Contrast:  100 mL of iohexol (OMNIPAQUE)  Enteric Contrast:  Enteric contrast was not administered  FINDINGS:  ABDOMEN  LOWER CHEST:  No clinically significant abnormality identified in the visualized lower chest     LIVER/BILIARY TREE:  Liver enlarged with fatty infiltrative changes  One or more subcentimeter sharply circumscribed low-density hepatic lesion(s) are noted, too small to accurately characterize, but statistically most likely to represent subcentimeter hepatic cysts  No suspicious solid hepatic lesion is identified  Hepatic contours are normal   No biliary dilatation      GALLBLADDER:  Surgically absent  SPLEEN:  Mildly enlarged  PANCREAS:  Stable 1 9 cm septated cystic lesion in the pancreatic head  ADRENAL GLANDS:  Unremarkable  KIDNEYS/URETERS:  Stable exophytic 1 4 cm fat-containing mass upper pole right kidney, most compatible with angiomyolipoma  One or more sharply circumscribed subcentimeter renal hypodensities are present, too small to accurately characterize, and statistically most likely benign findings  According to recent literature (Radiology 2019) no further workup of these findings is   recommended  2 mm calyceal calculus lower pole right kidney (series 601, image 69)  No ureteric calculi involving the right kidney  No hydronephrosis or hydroureter on the right  There is slight delayed enhancement of the left kidney  The left kidney is mildly enlarged when compared to the right  Mild perinephric and periureteric inflammation  There is mild hydroureteronephrosis, up to the level of the urinary bladder  There   is a punctate, 1 mm calculus in the distal ureter, at the ureterovesical junction (series 2, image 82; series 601, image 81; series 602, image 106)  STOMACH AND BOWEL:    Evaluation of the GI tract is somewhat limited due to lack of oral contrast material     There has been prior gastric bypass surgery  There is a hiatal hernia  No evidence of small bowel obstruction  Mild increased fecal burden in the proximal colon  Scattered diverticula in the sigmoid colon  Nothing to suggest acute diverticulitis  APPENDIX:  No findings to suggest appendicitis  ABDOMINOPELVIC CAVITY:  No ascites  No pneumoperitoneum  No lymphadenopathy  VESSELS:  Unremarkable for patient's age  PELVIS  REPRODUCTIVE ORGANS:  Surgical changes of prior hysterectomy  URINARY BLADDER:  Incompletely distended  Grossly unremarkable  ABDOMINAL WALL/INGUINAL REGIONS:  Unremarkable  OSSEOUS STRUCTURES:  No acute fracture or destructive osseous lesion    Spinal degenerative changes are noted  Impression: 1   1 mm mildly obstructing distal left ureteric calculus at the ureterovesical junction  Recommend follow-up emergent urology consultation  2   Additional nonobstructing 2 mm right lower pole renal calyceal calculi  3   Stable 1 9 cm septated cystic mass in the pancreatic head  For simple cyst(s) between 1 5 to 2 0 cm, recommend followup every 6 months for 4 times, then every 1 year for 2 times, then every 2 years for 3 times  If stable after 10 years, then no more   followups  (If patient reaches age [de-identified], then can switch to age [de-identified] algorithm ) Recommend next followup in 1 year  Preferred imaging modality: abdomen MRI and MRCP with and without IV contrast, or triple phase abdomen CT with IV contrast, or abdomen MRI   and MRCP without IV contrast     The recommendations regarding pancreatic findings assumes that patient does not have family history of pancreatic cancer nor have any symptoms potentially attributable to pancreatic cystic lesions (hyperamylasemia, recent-onset diabetes, severe   epigastric pain, weight loss, steatorrhea, or jaundice ) If these conditions are not true, then management should be deferred to judgement of specialists such as gastroenterologists or oncologic surgeons  Recommendations are based on recent consensus   statements on management of pancreatic cystic lesions from [address] [place] [address] Gastro[employer]ology Association, [employer] of Radiology, the journal Pancreatology, and our own institutional consensus  The study was marked in [facility] for immediate notification  Workstation performed: [id number]      No results found  LABS:  Lab data are reviewed and documented in HemOnc history  No results found for this or any previous visit (from the past 48 hour(s))      By:  Yesi Solis MD, 6/28/2021, 1:58 PM                                  Primary Care Physician:  Reyna Ramirez

## 2021-07-01 ENCOUNTER — TELEPHONE (OUTPATIENT)
Dept: GASTROENTEROLOGY | Facility: CLINIC | Age: 51
End: 2021-07-01

## 2021-07-01 ENCOUNTER — OFFICE VISIT (OUTPATIENT)
Dept: GASTROENTEROLOGY | Facility: CLINIC | Age: 51
End: 2021-07-01
Payer: COMMERCIAL

## 2021-07-01 VITALS
HEART RATE: 66 BPM | SYSTOLIC BLOOD PRESSURE: 136 MMHG | WEIGHT: 259.8 LBS | BODY MASS INDEX: 38.48 KG/M2 | HEIGHT: 69 IN | DIASTOLIC BLOOD PRESSURE: 80 MMHG

## 2021-07-01 DIAGNOSIS — R11.0 NAUSEA: ICD-10-CM

## 2021-07-01 DIAGNOSIS — K58.0 IRRITABLE BOWEL SYNDROME WITH DIARRHEA: ICD-10-CM

## 2021-07-01 DIAGNOSIS — R19.7 ACUTE DIARRHEA: Primary | ICD-10-CM

## 2021-07-01 DIAGNOSIS — R10.30 LOWER ABDOMINAL PAIN: ICD-10-CM

## 2021-07-01 DIAGNOSIS — K63.89 SMALL INTESTINAL BACTERIAL OVERGROWTH (SIBO): ICD-10-CM

## 2021-07-01 PROCEDURE — 99214 OFFICE O/P EST MOD 30 MIN: CPT | Performed by: PHYSICIAN ASSISTANT

## 2021-07-01 RX ORDER — DULOXETIN HYDROCHLORIDE 30 MG/1
30 CAPSULE, DELAYED RELEASE ORAL DAILY
COMMUNITY
Start: 2021-06-29 | End: 2021-12-17

## 2021-07-01 NOTE — TELEPHONE ENCOUNTER
Prior request for Xifaxan 550 mg tid submitted to [facility] [place]  Will wait for reply

## 2021-07-01 NOTE — H&P (VIEW-ONLY)
[facility] - Outpatient Follow-up Note  Sisi Katz 45 y o  female MRN: [id number]  Encounter: [id number]          ASSESSMENT AND PLAN:      1  Acute diarrhea  2  Lower abdominal pain  - She develop acute watery diarrhea on May 2nd which has persisted with negative stool studies for infectious causes (Cdiff, enteric panel, O+P), no associated weight loss, and no evidence of significant malabsorption per baratrics workup  - Check fecal elastase testing to rule out EPI  - We will try giving an empiric course of rifaximin for SIBO as she does have risk factors for this with her prior surgical history  - Schedule colonoscopy for further evaluation; rule out microscopic colitis      3  Nausea  - She reports nausea associated with her lower GI symptoms at times  -  Schedule EGD at the time of the colonoscopy for further evaluation    ______________________________________________________________________    SUBJECTIVE:  Roger Calvin is a pleasant 44 yo F with a PMH of Randy-en-Y bypass in 2010, diagnostic laparoscopy in February 2019 with afferent limb small bowel resection and 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 s/p diagnostic laparoscopy in March 2019 converted to open with remnant gastrectomy and L hemicolectomy with primary anastomosis, presenting due to significant watery diarrhea since May 2 between 5-15 times daily  She reports that prior to this she was having normal formed BMs 1-2 times daily  She had stool testing performed which ruled out infectious etiologies  She denies any associated weight loss  She will have lower abdominal pain at times which is then relived by having a BM  She reports nausea without vomiting  She had a EGD/colonoscopy in 2019 with Dr Jin Lawton which were normal exams except for possible melena in the cecum noted  This was prior to her surgery  She saw bariatrics a few weeks ago and there is concern for possible SIBO        REVIEW OF SYSTEMS IS OTHERWISE NEGATIVE  Historical Information   Past Medical History:   Diagnosis Date    Arthritis     Bell palsy     Gastric ulcer     GERD (gastroesophageal reflux disease)     History of transfusion     Melena     Obesity (BMI 30-39  9)     Pancreatic cyst     Right facial numbness     Upper GI bleed      Past Surgical History:   Procedure Laterality Date    ABDOMINOPLASTY      BARIATRIC SURGERY      BOWEL RESECTION LAPAROSCOPIC N/A 2/11/2019    Procedure: LAPAROSCOPIC LYSIS OF ADHESIONS; RESECTION SMALL BOWEL; DECOMPRESSION OF GASTRIC REMNANT; EGD;  Surgeon: Rei Conrad MD;  [facility];  Service: General    CHOLECYSTECTOMY      CT GUIDED PERC DRAINAGE CATHETER PLACEMENT  2/25/2019    ESOPHAGOGASTRODUODENOSCOPY N/A 3/6/2019    Procedure: INTRAOPERATIVE EGD;  Surgeon: Rei Conrad MD;  [facility];  Service: Bariatrics    HERNIA REPAIR      HYSTERECTOMY      KNEE CARTILAGE SURGERY      PARTIAL GASTRECTOMY N/A 3/6/2019    Procedure: RESECTION GASTRIC PARTIAL/GASTRECTOMY PARTIAL LAPAROSCOPIC;  Surgeon: Rei Conrad MD;  [facility];  Service: Bariatrics    WA COLONOSCOPY FLX DX W/COLLJ SPEC WHEN PFRMD N/A 3/28/2018    Procedure: COLONOSCOPY;  Surgeon: Kim Mcclure MD;  [facility]; Service: Gastroenterology    WA COLONOSCOPY FLX DX W/COLLJ [facility] WHEN PFRMD N/A 1/31/2019    Procedure: COLONOSCOPY;  Surgeon: Kim Mcclure MD;  [facility]; Service: Gastroenterology    WA ESOPHAGOGASTRODUODENOSCOPY TRANSORAL DIAGNOSTIC N/A 3/22/2018    Procedure: ESOPHAGOGASTRODUODENOSCOPY (EGD); Surgeon: Kim Mcclure MD;  [facility]; Service: Gastroenterology    WA ESOPHAGOGASTRODUODENOSCOPY TRANSORAL DIAGNOSTIC N/A 1/31/2019    Procedure: ESOPHAGOGASTRODUODENOSCOPY (EGD); Surgeon: Kim Mcclure MD;  [facility];   Service: Gastroenterology    WA LAP,DIAGNOSTIC ABDOMEN N/A 3/6/2019 Procedure: LAPAROSCOPY DIAGNOSTIC;  Surgeon: Ronald Cardona MD;  [facility];  Service: Bariatrics    VA PART REMOVAL COLON W ANASTOMOSIS N/A 3/6/2019    Procedure: OPEN TRANSVERSE COLON RESECTION;  Surgeon: Ronald Cardona MD;  [facility];  Service: Bariatrics    REDUCTION MAMMAPLASTY Bilateral     REPLACEMENT UNICONDYLAR JOINT KNEE      SHOULDER ARTHROSCOPY DISTAL CLAVICLE EXCISION AND OPEN ROTATOR CUFF REPAIR       Social History   Social History     Substance and Sexual Activity   Alcohol Use Not Currently     Social History     Substance and Sexual Activity   Drug Use No     Social History     Tobacco Use   Smoking Status Never Smoker   Smokeless Tobacco Never Used     Family History   Problem Relation Age of Onset    Heart attack Mother     Diabetes Mother     Heart attack Father     Stroke Father     Hypertension Brother     Leukemia Paternal Aunt        Meds/Allergies       Current Outpatient Medications:     Biotin 1000 MCG tablet    butalbital-acetaminophen-caffeine (FIORICET,ESGIC) -40 mg per tablet    calcium citrate-vitamin D (CITRACAL+D) 315-200 MG-UNIT per tablet    conjugated estrogens (PREMARIN) 0 625 mg tablet    doxepin (SINEquan) 10 mg capsule    DULoxetine (CYMBALTA) 30 mg delayed release capsule    ergocalciferol (VITAMIN D2) 50,000 units    ferrous sulfate 325 (65 Fe) mg tablet    Melatonin 5 MG CAPS    Multiple Vitamin (MULTIVITAMIN) tablet    Omega-3 Fatty Acids (FISH OIL) 1,000 mg    ondansetron (ZOFRAN) 4 mg tablet    ondansetron (ZOFRAN-ODT) 4 mg disintegrating tablet    potassium chloride (K-DUR,KLOR-CON) 20 mEq tablet    simethicone (MYLICON) 80 mg chewable tablet    sucralfate (CARAFATE) 1 g tablet    traMADol (ULTRAM) 50 mg tablet    cyclobenzaprine (FLEXERIL) 10 mg tablet    esterified estrogens (MENEST) 0 625 MG tablet    estradiol (VIVELLE-DOT) 0 075 MG/24HR    gabapentin (NEURONTIN) 100 mg capsule   hydrochlorothiazide (HYDRODIURIL) 12 5 mg tablet    metoprolol tartrate (LOPRESSOR) 50 mg tablet    orphenadrine (NORFLEX) 100 mg tablet    oxyCODONE-acetaminophen (Percocet) 2 5-325 MG per tablet    oxyCODONE-acetaminophen (PERCOCET) 5-325 mg per tablet    predniSONE 20 mg tablet    rifaximin (XIFAXAN) 550 mg tablet    tamsulosin (FLOMAX) 0 4 mg    tamsulosin (FLOMAX) 0 4 mg    venlafaxine (EFFEXOR) 37 5 mg tablet    vitamin B-12 (CYANOCOBALAMIN) 500 MCG TABS    Allergies   Allergen Reactions    Other Headache     MSG    Ibuprofen Other (See Comments)     USE CAUTION DUE TO BARIATRIC SX    Penicillins Rash           Objective     Blood pressure 136/80, pulse 66, height 5' 9" (1 753 m), weight 118 kg (259 lb 12 8 oz), not currently breastfeeding  Body mass index is 38 37 kg/m²  PHYSICAL EXAM:      General Appearance:   Alert, cooperative, no distress   HEENT:   Normocephalic, atraumatic, anicteric      Neck:  Supple, symmetrical, trachea midline   Lungs:   Clear to auscultation bilaterally; no rales, rhonchi or wheezing; respirations unlabored    Heart[de-identified]   Regular rate and rhythm; no murmur, rub, or gallop  Abdomen:   Soft, non-tender, non-distended; normal bowel sounds; no masses, no organomegaly    Rectal:   Deferred    Extremities:  No cyanosis, clubbing or edema    Pulses:  2+ and symmetric    Skin:  No jaundice, rashes, or lesions      Lab Results:   No visits with results within 1 Day(s) from this visit  Latest known visit with results is:   Appointment on 06/15/2021   Component Date Value    Copper 06/15/2021 128     Folate 06/15/2021 9 6     PTH 06/15/2021 100 5*    Vitamin A 06/15/2021 61 5     Vitamin B1, Whole Blood 06/15/2021 148  2     Vitamin B6 06/15/2021 7 7     Vitamin B-12 06/15/2021 593     Vit D, 25-Hydroxy 06/15/2021 21 6*    Zinc 06/15/2021 65     Cholesterol 06/15/2021 223*    Triglycerides 06/15/2021 246*    HDL, Direct 06/15/2021 42     LDL Calculated 06/15/2021 132*    Non-HDL-Chol (CHOL-HDL) 06/15/2021 181     WBC 06/15/2021 10 60*    RBC 06/15/2021 5 18*    Hemoglobin 06/15/2021 12 0     Hematocrit 06/15/2021 39 8     MCV 06/15/2021 77*    MCH 06/15/2021 23 2*    MCHC 06/15/2021 30 2*    RDW 06/15/2021 17 2*    Platelets 55/20/7607 411*    MPV 06/15/2021 12 1     Sodium 06/15/2021 142     Potassium 06/15/2021 3 9     Chloride 06/15/2021 110*    CO2 06/15/2021 28     ANION GAP 06/15/2021 4     BUN 06/15/2021 16     Creatinine 06/15/2021 0 95     Glucose 06/15/2021 97     Calcium 06/15/2021 9 2     AST 06/15/2021 15     ALT 06/15/2021 21     Alkaline Phosphatase 06/15/2021 105     Total Protein 06/15/2021 7 4     Albumin 06/15/2021 3 7     Total Bilirubin 06/15/2021 0 47     eGFR 06/15/2021 69     TSH 3RD GENERATON 06/15/2021 0 269*    Iron Saturation 06/15/2021 7     TIBC 06/15/2021 444     Iron 06/15/2021 29*    Ferritin 06/15/2021 5*    Free T4 06/15/2021 0 89          Radiology Results:   No results found

## 2021-07-01 NOTE — PROGRESS NOTES
[facility] - Outpatient Follow-up Note  Araceli Smoker 45 y o  female MRN: [id number]  Encounter: [id number]          ASSESSMENT AND PLAN:      1  Acute diarrhea  2  Lower abdominal pain  - She develop acute watery diarrhea on May 2nd which has persisted with negative stool studies for infectious causes (Cdiff, enteric panel, O+P), no associated weight loss, and no evidence of significant malabsorption per baratrics workup  - Check fecal elastase testing to rule out EPI  - We will try giving an empiric course of rifaximin for SIBO as she does have risk factors for this with her prior surgical history  - Schedule colonoscopy for further evaluation; rule out microscopic colitis      3  Nausea  - She reports nausea associated with her lower GI symptoms at times  -  Schedule EGD at the time of the colonoscopy for further evaluation    ______________________________________________________________________    SUBJECTIVE:  Jaleesa Amador is a pleasant 46 yo F with a PMH of Randy-en-Y bypass in 2010, diagnostic laparoscopy in February 2019 with afferent limb small bowel resection and 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 s/p diagnostic laparoscopy in March 2019 converted to open with remnant gastrectomy and L hemicolectomy with primary anastomosis, presenting due to significant watery diarrhea since May 2 between 5-15 times daily  She reports that prior to this she was having normal formed BMs 1-2 times daily  She had stool testing performed which ruled out infectious etiologies  She denies any associated weight loss  She will have lower abdominal pain at times which is then relived by having a BM  She reports nausea without vomiting  She had a EGD/colonoscopy in 2019 with Dr Tiana Hassan which were normal exams except for possible melena in the cecum noted  This was prior to her surgery  She saw bariatrics a few weeks ago and there is concern for possible SIBO        REVIEW OF SYSTEMS IS OTHERWISE NEGATIVE  Historical Information   Past Medical History:   Diagnosis Date    Arthritis     Bell palsy     Gastric ulcer     GERD (gastroesophageal reflux disease)     History of transfusion     Melena     Obesity (BMI 30-39  9)     Pancreatic cyst     Right facial numbness     Upper GI bleed      Past Surgical History:   Procedure Laterality Date    ABDOMINOPLASTY      BARIATRIC SURGERY      BOWEL RESECTION LAPAROSCOPIC N/A 2/11/2019    Procedure: LAPAROSCOPIC LYSIS OF ADHESIONS; RESECTION SMALL BOWEL; DECOMPRESSION OF GASTRIC REMNANT; EGD;  Surgeon: Yosef Gyoal MD;  [facility];  Service: General    CHOLECYSTECTOMY      CT GUIDED PERC DRAINAGE CATHETER PLACEMENT  2/25/2019    ESOPHAGOGASTRODUODENOSCOPY N/A 3/6/2019    Procedure: INTRAOPERATIVE EGD;  Surgeon: Yosef Goyal MD;  [facility];  Service: Bariatrics    HERNIA REPAIR      HYSTERECTOMY      KNEE CARTILAGE SURGERY      PARTIAL GASTRECTOMY N/A 3/6/2019    Procedure: RESECTION GASTRIC PARTIAL/GASTRECTOMY PARTIAL LAPAROSCOPIC;  Surgeon: Yosef Goyal MD;  [facility];  Service: Bariatrics    MT COLONOSCOPY FLX DX W/[facility] SPEC WHEN PFRMD N/A 3/28/2018    Procedure: COLONOSCOPY;  Surgeon: Cecil Alexander MD;  [facility]; Service: Gastroenterology    MT COLONOSCOPY FLX DX W/[facility] WHEN PFRMD N/A 1/31/2019    Procedure: COLONOSCOPY;  Surgeon: Cecil Alexander MD;  [facility]; Service: Gastroenterology    MT ESOPHAGOGASTRODUODENOSCOPY TRANSORAL DIAGNOSTIC N/A 3/22/2018    Procedure: ESOPHAGOGASTRODUODENOSCOPY (EGD); Surgeon: Cecil Alexander MD;  [facility]; Service: Gastroenterology    MT ESOPHAGOGASTRODUODENOSCOPY TRANSORAL DIAGNOSTIC N/A 1/31/2019    Procedure: ESOPHAGOGASTRODUODENOSCOPY (EGD); Surgeon: Cecil Alexander MD;  [facility];   Service: Gastroenterology    MT LAP,DIAGNOSTIC ABDOMEN N/A 3/6/2019 Procedure: LAPAROSCOPY DIAGNOSTIC;  Surgeon: Ronald Cardona MD;  [facility];  Service: Bariatrics    NH PART REMOVAL COLON W ANASTOMOSIS N/A 3/6/2019    Procedure: OPEN TRANSVERSE COLON RESECTION;  Surgeon: Ronald Cardona MD;  [facility];  Service: Bariatrics    REDUCTION MAMMAPLASTY Bilateral     REPLACEMENT UNICONDYLAR JOINT KNEE      SHOULDER ARTHROSCOPY DISTAL CLAVICLE EXCISION AND OPEN ROTATOR CUFF REPAIR       Social History   Social History     Substance and Sexual Activity   Alcohol Use Not Currently     Social History     Substance and Sexual Activity   Drug Use No     Social History     Tobacco Use   Smoking Status Never Smoker   Smokeless Tobacco Never Used     Family History   Problem Relation Age of Onset    Heart attack Mother     Diabetes Mother     Heart attack Father     Stroke Father     Hypertension Brother     Leukemia Paternal Aunt        Meds/Allergies       Current Outpatient Medications:     Biotin 1000 MCG tablet    butalbital-acetaminophen-caffeine (FIORICET,ESGIC) -40 mg per tablet    calcium citrate-vitamin D (CITRACAL+D) 315-200 MG-UNIT per tablet    conjugated estrogens (PREMARIN) 0 625 mg tablet    doxepin (SINEquan) 10 mg capsule    DULoxetine (CYMBALTA) 30 mg delayed release capsule    ergocalciferol (VITAMIN D2) 50,000 units    ferrous sulfate 325 (65 Fe) mg tablet    Melatonin 5 MG CAPS    Multiple Vitamin (MULTIVITAMIN) tablet    Omega-3 Fatty Acids (FISH OIL) 1,000 mg    ondansetron (ZOFRAN) 4 mg tablet    ondansetron (ZOFRAN-ODT) 4 mg disintegrating tablet    potassium chloride (K-DUR,KLOR-CON) 20 mEq tablet    simethicone (MYLICON) 80 mg chewable tablet    sucralfate (CARAFATE) 1 g tablet    traMADol (ULTRAM) 50 mg tablet    cyclobenzaprine (FLEXERIL) 10 mg tablet    esterified estrogens (MENEST) 0 625 MG tablet    estradiol (VIVELLE-DOT) 0 075 MG/24HR    gabapentin (NEURONTIN) 100 mg capsule   hydrochlorothiazide (HYDRODIURIL) 12 5 mg tablet    metoprolol tartrate (LOPRESSOR) 50 mg tablet    orphenadrine (NORFLEX) 100 mg tablet    oxyCODONE-acetaminophen (Percocet) 2 5-325 MG per tablet    oxyCODONE-acetaminophen (PERCOCET) 5-325 mg per tablet    predniSONE 20 mg tablet    rifaximin (XIFAXAN) 550 mg tablet    tamsulosin (FLOMAX) 0 4 mg    tamsulosin (FLOMAX) 0 4 mg    venlafaxine (EFFEXOR) 37 5 mg tablet    vitamin B-12 (CYANOCOBALAMIN) 500 MCG TABS    Allergies   Allergen Reactions    Other Headache     MSG    Ibuprofen Other (See Comments)     USE CAUTION DUE TO BARIATRIC SX    Penicillins Rash           Objective     Blood pressure 136/80, pulse 66, height 5' 9" (1 753 m), weight 118 kg (259 lb 12 8 oz), not currently breastfeeding  Body mass index is 38 37 kg/m²  PHYSICAL EXAM:      General Appearance:   Alert, cooperative, no distress   HEENT:   Normocephalic, atraumatic, anicteric      Neck:  Supple, symmetrical, trachea midline   Lungs:   Clear to auscultation bilaterally; no rales, rhonchi or wheezing; respirations unlabored    Heart[de-identified]   Regular rate and rhythm; no murmur, rub, or gallop  Abdomen:   Soft, non-tender, non-distended; normal bowel sounds; no masses, no organomegaly    Rectal:   Deferred    Extremities:  No cyanosis, clubbing or edema    Pulses:  2+ and symmetric    Skin:  No jaundice, rashes, or lesions      Lab Results:   No visits with results within 1 Day(s) from this visit  Latest known visit with results is:   Appointment on 06/15/2021   Component Date Value    Copper 06/15/2021 128     Folate 06/15/2021 9 6     PTH 06/15/2021 100 5*    Vitamin A 06/15/2021 61 5     Vitamin B1, Whole Blood 06/15/2021 148  2     Vitamin B6 06/15/2021 7 7     Vitamin B-12 06/15/2021 593     Vit D, 25-Hydroxy 06/15/2021 21 6*    Zinc 06/15/2021 65     Cholesterol 06/15/2021 223*    Triglycerides 06/15/2021 246*    HDL, Direct 06/15/2021 42     LDL Calculated 06/15/2021 132*    Non-HDL-Chol (CHOL-HDL) 06/15/2021 181     WBC 06/15/2021 10 60*    RBC 06/15/2021 5 18*    Hemoglobin 06/15/2021 12 0     Hematocrit 06/15/2021 39 8     MCV 06/15/2021 77*    MCH 06/15/2021 23 2*    MCHC 06/15/2021 30 2*    RDW 06/15/2021 17 2*    Platelets 89/12/4298 411*    MPV 06/15/2021 12 1     Sodium 06/15/2021 142     Potassium 06/15/2021 3 9     Chloride 06/15/2021 110*    CO2 06/15/2021 28     ANION GAP 06/15/2021 4     BUN 06/15/2021 16     Creatinine 06/15/2021 0 95     Glucose 06/15/2021 97     Calcium 06/15/2021 9 2     AST 06/15/2021 15     ALT 06/15/2021 21     Alkaline Phosphatase 06/15/2021 105     Total Protein 06/15/2021 7 4     Albumin 06/15/2021 3 7     Total Bilirubin 06/15/2021 0 47     eGFR 06/15/2021 69     TSH 3RD GENERATON 06/15/2021 0 269*    Iron Saturation 06/15/2021 7     TIBC 06/15/2021 444     Iron 06/15/2021 29*    Ferritin 06/15/2021 5*    Free T4 06/15/2021 0 89          Radiology Results:   No results found

## 2021-07-01 NOTE — TELEPHONE ENCOUNTER
----- Message from Gianni Chacon PA-C sent at 7/1/2021 12:16 PM EDT -----  I sent rifaximin for her to [facility] [place], operated by [facility] for IBS-D  Can you follow up on this? Thanks!

## 2021-07-05 ENCOUNTER — APPOINTMENT (OUTPATIENT)
Dept: LAB | Facility: CLINIC | Age: 51
End: 2021-07-05
Payer: COMMERCIAL

## 2021-07-05 DIAGNOSIS — R19.7 ACUTE DIARRHEA: ICD-10-CM

## 2021-07-05 PROCEDURE — 82656 EL-1 FECAL QUAL/SEMIQ: CPT

## 2021-07-06 ENCOUNTER — HOSPITAL ENCOUNTER (OUTPATIENT)
Dept: INFUSION CENTER | Facility: CLINIC | Age: 51
Discharge: HOME | End: 2021-07-06
Payer: COMMERCIAL

## 2021-07-06 VITALS
TEMPERATURE: 97.5 F | RESPIRATION RATE: 18 BRPM | SYSTOLIC BLOOD PRESSURE: 144 MMHG | HEART RATE: 68 BPM | DIASTOLIC BLOOD PRESSURE: 88 MMHG

## 2021-07-06 DIAGNOSIS — K58.0 IRRITABLE BOWEL SYNDROME WITH DIARRHEA: Primary | ICD-10-CM

## 2021-07-06 DIAGNOSIS — D50.0 IRON DEFICIENCY ANEMIA DUE TO CHRONIC BLOOD LOSS: Primary | ICD-10-CM

## 2021-07-06 PROCEDURE — 96365 THER/PROPH/DIAG IV INF INIT: CPT

## 2021-07-06 RX ORDER — SODIUM CHLORIDE 9 MG/ML
20 INJECTION, SOLUTION INTRAVENOUS ONCE
Status: CANCELLED | OUTPATIENT
Start: 2021-07-13

## 2021-07-06 RX ORDER — SODIUM CHLORIDE 9 MG/ML
20 INJECTION, SOLUTION INTRAVENOUS ONCE
Status: COMPLETED | OUTPATIENT
Start: 2021-07-06 | End: 2021-07-06

## 2021-07-06 RX ADMIN — SODIUM CHLORIDE 20 ML/HR: 0.9 INJECTION, SOLUTION INTRAVENOUS at 10:23

## 2021-07-06 RX ADMIN — FERUMOXYTOL 510 MG: 510 INJECTION INTRAVENOUS at 10:25

## 2021-07-06 NOTE — PROGRESS NOTES
Pt here for feraheme  Offers no complaints  Left hand IV placed with positive blood return noted  Tolerated infusion without incident  Vitals signs stable throughout treatment  PIV removed  AVS declined  Walked out in stable condition

## 2021-07-08 LAB — ELASTASE PANC STL-MCNT: >500 UG ELAST./G

## 2021-07-12 ENCOUNTER — TELEPHONE (OUTPATIENT)
Dept: GASTROENTEROLOGY | Facility: CLINIC | Age: 51
End: 2021-07-12

## 2021-07-12 NOTE — TELEPHONE ENCOUNTER
----- Message from Ernestina Mi PA-C sent at 7/8/2021  4:13 PM EDT -----  Please let Glo Walton know that her stool test to check her pancreas function is normal so this is not causing her diarrhea

## 2021-07-13 ENCOUNTER — TELEPHONE (OUTPATIENT)
Dept: GASTROENTEROLOGY | Facility: HOSPITAL | Age: 51
End: 2021-07-13

## 2021-07-14 ENCOUNTER — ANESTHESIA EVENT (OUTPATIENT)
Dept: GASTROENTEROLOGY | Facility: HOSPITAL | Age: 51
End: 2021-07-14

## 2021-07-14 ENCOUNTER — HOSPITAL ENCOUNTER (OUTPATIENT)
Dept: GASTROENTEROLOGY | Facility: HOSPITAL | Age: 51
Setting detail: OUTPATIENT SURGERY
Discharge: HOME | End: 2021-07-14
Attending: INTERNAL MEDICINE | Admitting: INTERNAL MEDICINE
Payer: COMMERCIAL

## 2021-07-14 ENCOUNTER — ANESTHESIA (OUTPATIENT)
Dept: GASTROENTEROLOGY | Facility: HOSPITAL | Age: 51
End: 2021-07-14

## 2021-07-14 VITALS
OXYGEN SATURATION: 100 % | HEIGHT: 70 IN | WEIGHT: 260.58 LBS | DIASTOLIC BLOOD PRESSURE: 68 MMHG | SYSTOLIC BLOOD PRESSURE: 117 MMHG | BODY MASS INDEX: 37.31 KG/M2 | TEMPERATURE: 97.3 F | HEART RATE: 61 BPM | RESPIRATION RATE: 14 BRPM

## 2021-07-14 DIAGNOSIS — R11.0 NAUSEA: ICD-10-CM

## 2021-07-14 DIAGNOSIS — R10.30 LOWER ABDOMINAL PAIN: ICD-10-CM

## 2021-07-14 DIAGNOSIS — R19.7 ACUTE DIARRHEA: ICD-10-CM

## 2021-07-14 PROBLEM — E66.812 CLASS 2 OBESITY IN ADULT: Status: ACTIVE | Noted: 2019-03-28

## 2021-07-14 PROBLEM — Z98.84 HISTORY OF BARIATRIC SURGERY: Status: ACTIVE | Noted: 2020-09-14

## 2021-07-14 PROBLEM — E66.9 CLASS 2 OBESITY IN ADULT: Status: ACTIVE | Noted: 2019-03-28

## 2021-07-14 PROCEDURE — 88305 TISSUE EXAM BY PATHOLOGIST: CPT | Performed by: PATHOLOGY

## 2021-07-14 PROCEDURE — 43239 EGD BIOPSY SINGLE/MULTIPLE: CPT | Performed by: INTERNAL MEDICINE

## 2021-07-14 PROCEDURE — 45380 COLONOSCOPY AND BIOPSY: CPT | Performed by: INTERNAL MEDICINE

## 2021-07-14 RX ORDER — LIDOCAINE HYDROCHLORIDE 20 MG/ML
INJECTION, SOLUTION EPIDURAL; INFILTRATION; INTRACAUDAL; PERINEURAL AS NEEDED
Status: DISCONTINUED | OUTPATIENT
Start: 2021-07-14 | End: 2021-07-14

## 2021-07-14 RX ORDER — SODIUM CHLORIDE, SODIUM LACTATE, POTASSIUM CHLORIDE, CALCIUM CHLORIDE 600; 310; 30; 20 MG/100ML; MG/100ML; MG/100ML; MG/100ML
125 INJECTION, SOLUTION INTRAVENOUS CONTINUOUS
Status: DISCONTINUED | OUTPATIENT
Start: 2021-07-14 | End: 2021-07-18 | Stop reason: HOSPADM

## 2021-07-14 RX ORDER — PROPOFOL 10 MG/ML
INJECTION, EMULSION INTRAVENOUS AS NEEDED
Status: DISCONTINUED | OUTPATIENT
Start: 2021-07-14 | End: 2021-07-14

## 2021-07-14 RX ADMIN — SODIUM CHLORIDE, SODIUM LACTATE, POTASSIUM CHLORIDE, AND CALCIUM CHLORIDE 125 ML/HR: .6; .31; .03; .02 INJECTION, SOLUTION INTRAVENOUS at 11:31

## 2021-07-14 RX ADMIN — PROPOFOL 50 MG: 10 INJECTION, EMULSION INTRAVENOUS at 11:51

## 2021-07-14 RX ADMIN — PROPOFOL 20 MG: 10 INJECTION, EMULSION INTRAVENOUS at 12:00

## 2021-07-14 RX ADMIN — PROPOFOL 50 MG: 10 INJECTION, EMULSION INTRAVENOUS at 11:57

## 2021-07-14 RX ADMIN — LIDOCAINE HYDROCHLORIDE 100 MG: 20 INJECTION, SOLUTION EPIDURAL; INFILTRATION; INTRACAUDAL; PERINEURAL at 11:50

## 2021-07-14 RX ADMIN — PROPOFOL 100 MG: 10 INJECTION, EMULSION INTRAVENOUS at 11:50

## 2021-07-14 RX ADMIN — PROPOFOL 50 MG: 10 INJECTION, EMULSION INTRAVENOUS at 11:54

## 2021-07-14 NOTE — INTERVAL H&P NOTE
H&P reviewed  After examining the patient I find no changes in the patients condition since the H&P had been written      Vitals:    07/14/21 1107   BP: 168/96   Pulse: 74   Resp: (!) 11   SpO2: 99%

## 2021-07-14 NOTE — ANESTHESIA PREPROCEDURE EVALUATION
Procedure:  EGD  COLONOSCOPY    Relevant Problems   CARDIO   (+) Essential hypertension      GI/HEPATIC   (+) Cyst of pancreas   (+) History of bariatric surgery   (+) Pancreatic cyst      HEMATOLOGY   (+) Iron deficiency anemia, unspecified      NEURO/PSYCH   (+) History of gastric ulcer      Other   (+) Class 2 obesity in adult        Physical Exam    Airway    Mallampati score: II  TM Distance: >3 FB  Neck ROM: full     Dental   Comment: Denies loose teeth,     Cardiovascular  Cardiovascular exam normal    Pulmonary  Pulmonary exam normal     Other Findings  Portions of exam deferred due to low yield and/or unknown COVID status      Anesthesia Plan  ASA Score- 2     Anesthesia Type- IV sedation with anesthesia with ASA Monitors  Additional Monitors:   Airway Plan:           Plan Factors-Exercise tolerance (METS): >4 METS  Chart reviewed  Existing labs reviewed  Patient summary reviewed  Patient is not a current smoker  Induction- intravenous  Postoperative Plan-     Informed Consent- Anesthetic plan and risks discussed with patient  I personally reviewed this patient with the CRNA  Discussed and agreed on the Anesthesia Plan with the CRNA  Rolando Ramos

## 2021-07-14 NOTE — ANESTHESIA POSTPROCEDURE EVALUATION
Post-Op Assessment Note    CV Status:  Stable  Pain Score: 0    Pain management: adequate     Mental Status:  Sleepy and awake   Hydration Status:  Stable   PONV Controlled:  None   Airway Patency:  Patent and adequate      Post Op Vitals Reviewed: Yes      Staff: CRNA         No complications documented      BP   125/64   Temp      Pulse  81   Resp  18   SpO2   99

## 2021-07-15 ENCOUNTER — HOSPITAL ENCOUNTER (OUTPATIENT)
Dept: INFUSION CENTER | Facility: CLINIC | Age: 51
Discharge: HOME | End: 2021-07-15
Payer: COMMERCIAL

## 2021-07-15 VITALS
DIASTOLIC BLOOD PRESSURE: 77 MMHG | RESPIRATION RATE: 18 BRPM | TEMPERATURE: 97.4 F | HEART RATE: 62 BPM | SYSTOLIC BLOOD PRESSURE: 140 MMHG

## 2021-07-15 DIAGNOSIS — D50.0 IRON DEFICIENCY ANEMIA DUE TO CHRONIC BLOOD LOSS: Primary | ICD-10-CM

## 2021-07-15 PROCEDURE — 96365 THER/PROPH/DIAG IV INF INIT: CPT

## 2021-07-15 RX ORDER — SODIUM CHLORIDE 9 MG/ML
20 INJECTION, SOLUTION INTRAVENOUS ONCE
Status: CANCELLED | OUTPATIENT
Start: 2021-07-20

## 2021-07-15 RX ORDER — SODIUM CHLORIDE 9 MG/ML
20 INJECTION, SOLUTION INTRAVENOUS ONCE
Status: COMPLETED | OUTPATIENT
Start: 2021-07-15 | End: 2021-07-15

## 2021-07-15 RX ADMIN — FERUMOXYTOL 510 MG: 510 INJECTION INTRAVENOUS at 09:38

## 2021-07-15 RX ADMIN — SODIUM CHLORIDE 20 ML/HR: 9 INJECTION, SOLUTION INTRAVENOUS at 09:20

## 2021-07-15 NOTE — PROGRESS NOTES
Pt presents for 2nd feraheme infusion offering no complaints  Infusion tolerated without incident  Vitals stable  PIV removed  Pt discharged in stable condition

## 2021-08-02 ENCOUNTER — TELEPHONE (OUTPATIENT)
Dept: OTHER | Facility: OTHER | Age: 51
End: 2021-08-02

## 2021-08-02 ENCOUNTER — APPOINTMENT (OUTPATIENT)
Dept: LAB | Facility: CLINIC | Age: 51
End: 2021-08-02
Payer: COMMERCIAL

## 2021-08-02 DIAGNOSIS — R79.89 LOW VITAMIN D LEVEL: ICD-10-CM

## 2021-08-02 DIAGNOSIS — E55.9 VITAMIN D DEFICIENCY: ICD-10-CM

## 2021-08-02 DIAGNOSIS — D50.9 IDA (IRON DEFICIENCY ANEMIA): ICD-10-CM

## 2021-08-02 DIAGNOSIS — D50.0 IRON DEFICIENCY ANEMIA DUE TO CHRONIC BLOOD LOSS: ICD-10-CM

## 2021-08-02 DIAGNOSIS — E53.8 LOW VITAMIN B12 LEVEL: ICD-10-CM

## 2021-08-02 DIAGNOSIS — E53.8 LOW FOLATE: ICD-10-CM

## 2021-08-02 DIAGNOSIS — E61.1 LOW IRON: ICD-10-CM

## 2021-08-02 DIAGNOSIS — K91.2 POSTSURGICAL MALABSORPTION: ICD-10-CM

## 2021-08-02 DIAGNOSIS — M89.8X9 BONE PAIN: ICD-10-CM

## 2021-08-02 LAB
25(OH)D3 SERPL-MCNC: 21.6 NG/ML (ref 30–100)
ALBUMIN SERPL BCP-MCNC: 3.5 G/DL (ref 3.5–5)
ALP SERPL-CCNC: 89 U/L (ref 46–116)
ALT SERPL W P-5'-P-CCNC: 23 U/L (ref 12–78)
ANION GAP SERPL CALCULATED.3IONS-SCNC: 9 MMOL/L (ref 4–13)
AST SERPL W P-5'-P-CCNC: 15 U/L (ref 5–45)
BASOPHILS # BLD AUTO: 0.04 THOUSANDS/ΜL (ref 0–0.1)
BASOPHILS NFR BLD AUTO: 1 % (ref 0–1)
BILIRUB SERPL-MCNC: 0.72 MG/DL (ref 0.2–1)
BUN SERPL-MCNC: 15 MG/DL (ref 5–25)
CALCIUM SERPL-MCNC: 9.2 MG/DL (ref 8.3–10.1)
CHLORIDE SERPL-SCNC: 107 MMOL/L (ref 100–108)
CO2 SERPL-SCNC: 24 MMOL/L (ref 21–32)
CREAT SERPL-MCNC: 0.72 MG/DL (ref 0.6–1.3)
EOSINOPHIL # BLD AUTO: 0.11 THOUSAND/ΜL (ref 0–0.61)
EOSINOPHIL NFR BLD AUTO: 2 % (ref 0–6)
ERYTHROCYTE [DISTWIDTH] IN BLOOD BY AUTOMATED COUNT: 21.4 % (ref 11.6–15.1)
FERRITIN SERPL-MCNC: 103 NG/ML (ref 8–388)
FOLATE SERPL-MCNC: 7.1 NG/ML (ref 3.1–17.5)
GFR SERPL CREATININE-BSD FRML MDRD: 97 ML/MIN/1.73SQ M
GLUCOSE P FAST SERPL-MCNC: 97 MG/DL (ref 65–99)
HCT VFR BLD AUTO: 42.1 % (ref 34.8–46.1)
HGB BLD-MCNC: 13.4 G/DL (ref 11.5–15.4)
IMM GRANULOCYTES # BLD AUTO: 0.02 THOUSAND/UL (ref 0–0.2)
IMM GRANULOCYTES NFR BLD AUTO: 0 % (ref 0–2)
IRON SATN MFR SERPL: 24 %
IRON SERPL-MCNC: 74 UG/DL (ref 50–170)
LYMPHOCYTES # BLD AUTO: 1.28 THOUSANDS/ΜL (ref 0.6–4.47)
LYMPHOCYTES NFR BLD AUTO: 22 % (ref 14–44)
MCH RBC QN AUTO: 25.9 PG (ref 26.8–34.3)
MCHC RBC AUTO-ENTMCNC: 31.8 G/DL (ref 31.4–37.4)
MCV RBC AUTO: 81 FL (ref 82–98)
MONOCYTES # BLD AUTO: 0.39 THOUSAND/ΜL (ref 0.17–1.22)
MONOCYTES NFR BLD AUTO: 7 % (ref 4–12)
NEUTROPHILS # BLD AUTO: 3.89 THOUSANDS/ΜL (ref 1.85–7.62)
NEUTS SEG NFR BLD AUTO: 68 % (ref 43–75)
NRBC BLD AUTO-RTO: 0 /100 WBCS
PLATELET # BLD AUTO: 245 THOUSANDS/UL (ref 149–390)
PMV BLD AUTO: 11.8 FL (ref 8.9–12.7)
POTASSIUM SERPL-SCNC: 3.6 MMOL/L (ref 3.5–5.3)
PROT SERPL-MCNC: 7.1 G/DL (ref 6.4–8.2)
RBC # BLD AUTO: 5.18 MILLION/UL (ref 3.81–5.12)
SODIUM SERPL-SCNC: 140 MMOL/L (ref 136–145)
TIBC SERPL-MCNC: 305 UG/DL (ref 250–450)
VIT B12 SERPL-MCNC: 513 PG/ML (ref 100–900)
WBC # BLD AUTO: 5.73 THOUSAND/UL (ref 4.31–10.16)

## 2021-08-02 PROCEDURE — 83540 ASSAY OF IRON: CPT

## 2021-08-02 PROCEDURE — 80053 COMPREHEN METABOLIC PANEL: CPT

## 2021-08-02 PROCEDURE — 82728 ASSAY OF FERRITIN: CPT

## 2021-08-02 PROCEDURE — 86255 FLUORESCENT ANTIBODY SCREEN: CPT

## 2021-08-02 PROCEDURE — 82306 VITAMIN D 25 HYDROXY: CPT

## 2021-08-02 PROCEDURE — 36415 COLL VENOUS BLD VENIPUNCTURE: CPT

## 2021-08-02 PROCEDURE — 82784 ASSAY IGA/IGD/IGG/IGM EACH: CPT

## 2021-08-02 PROCEDURE — 83550 IRON BINDING TEST: CPT

## 2021-08-02 PROCEDURE — 82607 VITAMIN B-12: CPT

## 2021-08-02 PROCEDURE — 83516 IMMUNOASSAY NONANTIBODY: CPT

## 2021-08-02 PROCEDURE — 85025 COMPLETE CBC W/AUTO DIFF WBC: CPT

## 2021-08-02 PROCEDURE — 82746 ASSAY OF FOLIC ACID SERUM: CPT

## 2021-08-02 NOTE — TELEPHONE ENCOUNTER
The patient called stating that she received a call from one of the offices regarding the results of her lab work  The patient is not sure which office it was and for which lab work  The patient would like the office to call her back again with the results  The patient stated that if she does not answer she gives the officer permission to leave a detailed message about the results and what they mean

## 2021-08-03 LAB
ENDOMYSIUM IGA SER QL: NEGATIVE
GLIADIN PEPTIDE IGA SER-ACNC: 4 UNITS (ref 0–19)
GLIADIN PEPTIDE IGG SER-ACNC: 3 UNITS (ref 0–19)
IGA SERPL-MCNC: 131 MG/DL (ref 87–352)
TTG IGA SER-ACNC: <2 U/ML (ref 0–3)
TTG IGG SER-ACNC: <2 U/ML (ref 0–5)

## 2021-08-03 NOTE — TELEPHONE ENCOUNTER
There was bw that resulted yesterday but the celiac disease did not result  Routing to pa  Thank you

## 2021-08-03 NOTE — TELEPHONE ENCOUNTER
Please tell her that the blood work that Dr Aletta Aase ordered is not resulted, but we will let her know when it does result

## 2021-08-04 ENCOUNTER — TELEPHONE (OUTPATIENT)
Dept: GASTROENTEROLOGY | Facility: CLINIC | Age: 51
End: 2021-08-04

## 2021-08-04 NOTE — TELEPHONE ENCOUNTER
----- Message from Dejon Bowling MD sent at 8/3/2021  4:29 PM EDT -----  Please tell her that the celiac antibody panel was negative  Please tell her that her official diagnosis is IBS versus small bowel bacterial overgrowth related to her bypass    Please tell her I hope the Scooby Edwards is working

## 2021-08-04 NOTE — TELEPHONE ENCOUNTER
Spoke with patient advised of negative cleiac antibody panel advised her her diagnosis is IBS versus small bowel bacterial overgrowth  She state she Xifaxan is helping

## 2021-08-05 ENCOUNTER — TELEPHONE (OUTPATIENT)
Dept: HEMATOLOGY ONCOLOGY | Facility: CLINIC | Age: 51
End: 2021-08-05

## 2021-08-05 NOTE — TELEPHONE ENCOUNTER
Patient calling to review lab results ordered By Dr Karen Worley and Iron studies  Iron studies have improved  Patient is schedule to see Dr Quoc Lacey later this month    She will discuss this with him at that time    Sending to RN as [facility]

## 2021-08-12 ENCOUNTER — HOSPITAL ENCOUNTER (OUTPATIENT)
Dept: MAMMOGRAPHY | Facility: CLINIC | Age: 51
Discharge: HOME | End: 2021-08-12
Payer: COMMERCIAL

## 2021-08-12 DIAGNOSIS — M89.8X9 BONE PAIN: ICD-10-CM

## 2021-08-12 PROCEDURE — 77080 DXA BONE DENSITY AXIAL: CPT

## 2021-08-17 ENCOUNTER — HOSPITAL ENCOUNTER (EMERGENCY)
Facility: HOSPITAL | Age: 51
Discharge: HOME | End: 2021-08-17
Attending: EMERGENCY MEDICINE | Admitting: EMERGENCY MEDICINE
Payer: COMMERCIAL

## 2021-08-17 ENCOUNTER — APPOINTMENT (EMERGENCY)
Dept: RADIOLOGY | Facility: HOSPITAL | Age: 51
End: 2021-08-17
Payer: COMMERCIAL

## 2021-08-17 VITALS
HEART RATE: 66 BPM | SYSTOLIC BLOOD PRESSURE: 150 MMHG | RESPIRATION RATE: 18 BRPM | OXYGEN SATURATION: 98 % | DIASTOLIC BLOOD PRESSURE: 85 MMHG | TEMPERATURE: 98.3 F

## 2021-08-17 DIAGNOSIS — R07.9 CHEST PAIN: Primary | ICD-10-CM

## 2021-08-17 LAB
ALBUMIN SERPL BCP-MCNC: 3.6 G/DL (ref 3.5–5)
ALP SERPL-CCNC: 109 U/L (ref 46–116)
ALT SERPL W P-5'-P-CCNC: 24 U/L (ref 12–78)
ANION GAP SERPL CALCULATED.3IONS-SCNC: 6 MMOL/L (ref 4–13)
AST SERPL W P-5'-P-CCNC: 15 U/L (ref 5–45)
ATRIAL RATE: 76 BPM
BASOPHILS # BLD AUTO: 0.04 THOUSANDS/ΜL (ref 0–0.1)
BASOPHILS NFR BLD AUTO: 1 % (ref 0–1)
BILIRUB DIRECT SERPL-MCNC: 0.08 MG/DL (ref 0–0.2)
BILIRUB SERPL-MCNC: 0.52 MG/DL (ref 0.2–1)
BUN SERPL-MCNC: 17 MG/DL (ref 5–25)
CALCIUM SERPL-MCNC: 8.8 MG/DL (ref 8.3–10.1)
CHLORIDE SERPL-SCNC: 106 MMOL/L (ref 100–108)
CO2 SERPL-SCNC: 29 MMOL/L (ref 21–32)
CREAT SERPL-MCNC: 0.85 MG/DL (ref 0.6–1.3)
EOSINOPHIL # BLD AUTO: 0.16 THOUSAND/ΜL (ref 0–0.61)
EOSINOPHIL NFR BLD AUTO: 3 % (ref 0–6)
ERYTHROCYTE [DISTWIDTH] IN BLOOD BY AUTOMATED COUNT: 20.6 % (ref 11.6–15.1)
GFR SERPL CREATININE-BSD FRML MDRD: 79 ML/MIN/1.73SQ M
GLUCOSE SERPL-MCNC: 75 MG/DL (ref 65–140)
HCT VFR BLD AUTO: 42.2 % (ref 34.8–46.1)
HGB BLD-MCNC: 13.3 G/DL (ref 11.5–15.4)
IMM GRANULOCYTES # BLD AUTO: 0.01 THOUSAND/UL (ref 0–0.2)
IMM GRANULOCYTES NFR BLD AUTO: 0 % (ref 0–2)
LIPASE SERPL-CCNC: 140 U/L (ref 73–393)
LYMPHOCYTES # BLD AUTO: 1.25 THOUSANDS/ΜL (ref 0.6–4.47)
LYMPHOCYTES NFR BLD AUTO: 23 % (ref 14–44)
MCH RBC QN AUTO: 25.7 PG (ref 26.8–34.3)
MCHC RBC AUTO-ENTMCNC: 31.5 G/DL (ref 31.4–37.4)
MCV RBC AUTO: 82 FL (ref 82–98)
MONOCYTES # BLD AUTO: 0.38 THOUSAND/ΜL (ref 0.17–1.22)
MONOCYTES NFR BLD AUTO: 7 % (ref 4–12)
NEUTROPHILS # BLD AUTO: 3.65 THOUSANDS/ΜL (ref 1.85–7.62)
NEUTS SEG NFR BLD AUTO: 66 % (ref 43–75)
NRBC BLD AUTO-RTO: 0 /100 WBCS
P AXIS: 72 DEGREES
PLATELET # BLD AUTO: 258 THOUSANDS/UL (ref 149–390)
PMV BLD AUTO: 11.2 FL (ref 8.9–12.7)
POTASSIUM SERPL-SCNC: 3.3 MMOL/L (ref 3.5–5.3)
PR INTERVAL: 158 MS
PROT SERPL-MCNC: 7.1 G/DL (ref 6.4–8.2)
QRS AXIS: 47 DEGREES
QRSD INTERVAL: 82 MS
QT INTERVAL: 402 MS
QTC INTERVAL: 452 MS
RBC # BLD AUTO: 5.18 MILLION/UL (ref 3.81–5.12)
SODIUM SERPL-SCNC: 141 MMOL/L (ref 136–145)
T WAVE AXIS: 38 DEGREES
TROPONIN I SERPL-MCNC: <0.02 NG/ML
VENTRICULAR RATE: 76 BPM
WBC # BLD AUTO: 5.49 THOUSAND/UL (ref 4.31–10.16)

## 2021-08-17 PROCEDURE — 85025 COMPLETE CBC W/AUTO DIFF WBC: CPT | Performed by: EMERGENCY MEDICINE

## 2021-08-17 PROCEDURE — 93005 ELECTROCARDIOGRAM TRACING: CPT

## 2021-08-17 PROCEDURE — 80076 HEPATIC FUNCTION PANEL: CPT | Performed by: EMERGENCY MEDICINE

## 2021-08-17 PROCEDURE — 84484 ASSAY OF TROPONIN QUANT: CPT | Performed by: EMERGENCY MEDICINE

## 2021-08-17 PROCEDURE — 93010 ELECTROCARDIOGRAM REPORT: CPT | Performed by: INTERNAL MEDICINE

## 2021-08-17 PROCEDURE — 83690 ASSAY OF LIPASE: CPT | Performed by: EMERGENCY MEDICINE

## 2021-08-17 PROCEDURE — 99285 EMERGENCY DEPT VISIT HI MDM: CPT

## 2021-08-17 PROCEDURE — 71045 X-RAY EXAM CHEST 1 VIEW: CPT

## 2021-08-17 PROCEDURE — 36415 COLL VENOUS BLD VENIPUNCTURE: CPT | Performed by: EMERGENCY MEDICINE

## 2021-08-17 PROCEDURE — 99285 EMERGENCY DEPT VISIT HI MDM: CPT | Performed by: EMERGENCY MEDICINE

## 2021-08-17 PROCEDURE — 80048 BASIC METABOLIC PNL TOTAL CA: CPT | Performed by: EMERGENCY MEDICINE

## 2021-08-17 NOTE — ED PROVIDER NOTES
History  Chief Complaint   Patient presents with    Chest Pain     started yesterday      46 yo female without h/o CAD who presents to ED for evaluation of intermittent L parasternal nonradiating nonpleuritic sharp and stabbing chest pain since last night  Lasts mostly between 2 and 4 seconds but had one episode that lasted a minute  No dyspnea, diaphoresis, vomiting, syncope or presyncope  No leg pain or swelling or h/o recent surgery or immobilization or prior VTE  No clear precipitant  No aggravating or alleviating factors  Not worsened by exertion  No back pain  No numbness or weakness  Currently asymptomatic  States she had this pain once in the past and underwent a normal cardiac stress test            Prior to Admission Medications   Prescriptions Last Dose Informant Patient Reported? Taking?    Biotin 1000 MCG tablet   Yes No   Sig: Take 1,000 mcg by mouth   DULoxetine (CYMBALTA) 30 mg delayed release capsule   Yes No   Sig: Take 30 mg by mouth daily   Melatonin 5 MG CAPS   Yes No   Sig: Take 5 mg by mouth   Multiple Vitamin (MULTIVITAMIN) tablet   No No   Sig: Take 1 tablet by mouth 2 (two) times a day   Omega-3 Fatty Acids (FISH OIL) 1,000 mg  Self Yes No   Sig: Take 1,000 mg by mouth daily   butalbital-acetaminophen-caffeine (FIORICET,ESGIC) -40 mg per tablet  Self Yes No   Sig: take 1-2 tablets by mouth every 4 to 6 hours if needed maximum daily dose of 6   calcium citrate-vitamin D (CITRACAL+D) 315-200 MG-UNIT per tablet   No No   Sig: Take 2 tablets by mouth 2 (two) times a day   conjugated estrogens (PREMARIN) 0 625 mg tablet   Yes No   Sig: Take 0 625 mg by mouth daily   cyclobenzaprine (FLEXERIL) 10 mg tablet   No No   Sig: Take 1 tablet (10 mg total) by mouth 3 (three) times a day as needed for muscle spasms   Patient not taking: Reported on 7/1/2021   doxepin (SINEquan) 10 mg capsule  Self Yes No   Sig: Take 10 mg by mouth daily at bedtime   ergocalciferol (VITAMIN D2) 50,000 units   No No Sig: Take 1 capsule (50,000 Units total) by mouth 2 (two) times a week with meals   esterified estrogens (MENEST) 0 625 MG tablet  Self Yes No   Sig: Take 0 625 mg by mouth daily   Patient not taking: Reported on 7/1/2021   estradiol (VIVELLE-DOT) 0 075 MG/24HR   Yes No   Sig: Place 1 patch on the skin   ferrous sulfate 325 (65 Fe) mg tablet   Yes No   Sig: TAKE 1 TABLET BY MOUTH TWICE DAILY   gabapentin (NEURONTIN) 100 mg capsule   No No   Sig: Take 1 capsule (100 mg total) by mouth 3 (three) times a day for 5 days   hydrochlorothiazide (HYDRODIURIL) 12 5 mg tablet  Self Yes No   Sig: Take 12 5 mg by mouth daily   Patient not taking: Reported on 7/1/2021   metoprolol tartrate (LOPRESSOR) 50 mg tablet   No No   Sig: Take 1 tablet (50 mg total) by mouth every 12 (twelve) hours for 30 days   Patient not taking: Reported on 3/28/2019   ondansetron (ZOFRAN) 4 mg tablet   No No   Sig: Take 1 tablet (4 mg total) by mouth every 8 (eight) hours as needed for nausea or vomiting   ondansetron (ZOFRAN-ODT) 4 mg disintegrating tablet   No No   Sig: Take 1 tablet (4 mg total) by mouth every 6 (six) hours as needed for nausea or vomiting   orphenadrine (NORFLEX) 100 mg tablet   Yes No   Sig: Take 100 mg by mouth 2 (two) times a day   oxyCODONE-acetaminophen (PERCOCET) 5-325 mg per tablet   Yes No   Sig: Take 1 tablet by mouth every 4 (four) hours as needed   Patient not taking: Reported on 7/1/2021   oxyCODONE-acetaminophen (Percocet) 2 5-325 MG per tablet   Yes No   Sig: Take 1,000 mg by mouth   Patient not taking: Reported on 7/1/2021   potassium chloride (K-DUR,KLOR-CON) 20 mEq tablet   No No   Sig: Take 1 tablet (20 mEq total) by mouth 2 (two) times a day for 7 days   predniSONE 20 mg tablet   Yes No   Sig: take 1 tablet by mouth three times a day for 4 days then twice a    (REFER TO PRESCRIPTION NOTES)     Patient not taking: Reported on 7/1/2021   simethicone (MYLICON) 80 mg chewable tablet   No No   Sig: Chew 1 tablet (80 mg total) every 12 (twelve) hours   sucralfate (CARAFATE) 1 g tablet   No No   Sig: Take 1 tablet (1 g total) by mouth 4 (four) times a day   tamsulosin (FLOMAX) 0 4 mg   No No   Sig: Take 1 capsule (0 4 mg total) by mouth daily with dinner Until kidney stone passes   Patient not taking: Reported on 7/1/2021   tamsulosin (FLOMAX) 0 4 mg   No No   Sig: Take 1 capsule (0 4 mg total) by mouth daily with dinner   Patient not taking: Reported on 7/1/2021   venlafaxine (EFFEXOR) 37 5 mg tablet   Yes No   Sig: Take 37 5 mg by mouth 2 (two) times a day   Patient not taking: Reported on 7/1/2021   vitamin B-12 (CYANOCOBALAMIN) 500 MCG TABS   No No   Sig: Take 2 tablets (1,000 mcg total) by mouth daily for 120 days      Facility-Administered Medications: None       Past Medical History:   Diagnosis Date    Arthritis     Bell palsy     Gastric ulcer     GERD (gastroesophageal reflux disease)     History of transfusion     Melena     Obesity (BMI 30-39  9)     Pancreatic cyst     Right facial numbness     Upper GI bleed        Past Surgical History:   Procedure Laterality Date    ABDOMINOPLASTY      BARIATRIC SURGERY      BOWEL RESECTION LAPAROSCOPIC N/A 2/11/2019    Procedure: LAPAROSCOPIC LYSIS OF ADHESIONS; RESECTION SMALL BOWEL; DECOMPRESSION OF GASTRIC REMNANT; EGD;  Surgeon: Joaquina Moon MD;  [facility];  Service: General    CHOLECYSTECTOMY      CT GUIDED PERC DRAINAGE CATHETER PLACEMENT  2/25/2019    ESOPHAGOGASTRODUODENOSCOPY N/A 3/6/2019    Procedure: INTRAOPERATIVE EGD;  Surgeon: Joaquina Moon MD;  [facility];  Service: Bariatrics    HERNIA REPAIR      HYSTERECTOMY      KNEE CARTILAGE SURGERY      PARTIAL GASTRECTOMY N/A 3/6/2019    Procedure: RESECTION GASTRIC PARTIAL/GASTRECTOMY PARTIAL LAPAROSCOPIC;  Surgeon: Joaquina Moon MD;  [facility];  Service: Bariatrics    CO COLONOSCOPY FLX DX W/JEANETTE Plasencia 1979 PFRMD N/A 3/28/2018    Procedure: COLONOSCOPY;  Surgeon: Mehdi Shearer MD;  [facility]; Service: Gastroenterology    [facility] PFRMD N/A 1/31/2019    Procedure: COLONOSCOPY;  Surgeon: Mehdi Shearer MD;  [facility]; Service: Gastroenterology    LA ESOPHAGOGASTRODUODENOSCOPY TRANSORAL DIAGNOSTIC N/A 3/22/2018    Procedure: ESOPHAGOGASTRODUODENOSCOPY (EGD); Surgeon: Mehdi Shearer MD;  [facility]; Service: Gastroenterology    LA ESOPHAGOGASTRODUODENOSCOPY TRANSORAL DIAGNOSTIC N/A 1/31/2019    Procedure: ESOPHAGOGASTRODUODENOSCOPY (EGD); Surgeon: Mehdi Shearer MD;  [facility]; Service: Gastroenterology    LA LAP,DIAGNOSTIC ABDOMEN N/A 3/6/2019    Procedure: LAPAROSCOPY DIAGNOSTIC;  Surgeon: Ciera Hernandez MD;  [facility];  Service: Nette Saliva LA PART REMOVAL COLON W ANASTOMOSIS N/A 3/6/2019    Procedure: OPEN TRANSVERSE COLON RESECTION;  Surgeon: Ciera Hernandez MD;  [facility];  Service: Bariatrics    REDUCTION MAMMAPLASTY Bilateral     REPLACEMENT UNICONDYLAR JOINT KNEE      SHOULDER ARTHROSCOPY DISTAL CLAVICLE EXCISION AND OPEN ROTATOR CUFF REPAIR         Family History   Problem Relation Age of Onset    Heart attack Mother     Diabetes Mother     Heart attack Father     Stroke Father     Hypertension Brother     Leukemia Paternal Aunt      I have reviewed and agree with the history as documented  E-Cigarette/Vaping    E-Cigarette Use Never User      E-Cigarette/Vaping Substances    Nicotine No     THC No     CBD No     Flavoring No     Other No     Unknown No      Social History     Tobacco Use    Smoking status: Never Smoker    Smokeless tobacco: Never Used   Vaping Use    Vaping Use: Never used   Substance Use Topics    Alcohol use: Not Currently    Drug use: No       Review of Systems   Cardiovascular: Positive for chest pain  All other systems reviewed and are negative        Physical Exam  Physical Exam  Vitals and nursing note reviewed  Constitutional:       General: She is not in acute distress  Appearance: She is well-developed  She is not ill-appearing, toxic-appearing or diaphoretic  HENT:      Head: Normocephalic and atraumatic  Eyes:      Conjunctiva/sclera: Conjunctivae normal       Pupils: Pupils are equal, round, and reactive to light  Neck:      Vascular: No JVD  Cardiovascular:      Rate and Rhythm: Normal rate and regular rhythm  Pulses: Normal pulses  Heart sounds: Normal heart sounds  Heart sounds not distant  No murmur heard  No systolic murmur is present  No diastolic murmur is present  No friction rub  Pulmonary:      Effort: Pulmonary effort is normal  No respiratory distress  Breath sounds: Normal breath sounds  No stridor  No wheezing, rhonchi or rales  Abdominal:      General: There is no distension  Palpations: Abdomen is soft  Tenderness: There is no abdominal tenderness  There is no guarding or rebound  Musculoskeletal:         General: No swelling, tenderness, deformity or signs of injury  Normal range of motion  Cervical back: Normal range of motion and neck supple  No rigidity or tenderness  Right lower leg: No edema  Left lower leg: No edema  Skin:     General: Skin is warm and dry  Capillary Refill: Capillary refill takes less than 2 seconds  Coloration: Skin is not jaundiced or pale  Findings: No bruising, erythema, lesion or rash  Neurological:      General: No focal deficit present  Mental Status: She is alert and oriented to person, place, and time  Cranial Nerves: No cranial nerve deficit  Sensory: No sensory deficit  Motor: No weakness or abnormal muscle tone        Coordination: Coordination normal          Vital Signs  ED Triage Vitals [08/17/21 0700]   Temperature Pulse Respirations Blood Pressure SpO2   98 3 °F (36 8 °C) 66 18 150/85 98 %      Temp Source Heart Rate Source Patient Position - Orthostatic VS BP Location FiO2 (%)   Oral -- Sitting Right arm --      Pain Score       --           Vitals:    08/17/21 0700   BP: 150/85   Pulse: 66   Patient Position - Orthostatic VS: Sitting         Visual Acuity      ED Medications  Medications - No data to display    Diagnostic Studies  Results Reviewed     Procedure Component Value Units Date/Time    Basic metabolic panel [[id number]]  (Abnormal) Collected: 08/17/21 0805    Lab Status: Final result Specimen: Blood from Arm, Left Updated: 08/17/21 0842     Sodium 141 mmol/L      Potassium 3 3 mmol/L      Chloride 106 mmol/L      CO2 29 mmol/L      ANION GAP 6 mmol/L      BUN 17 mg/dL      Creatinine 0 85 mg/dL      Glucose 75 mg/dL      Calcium 8 8 mg/dL      eGFR 79 ml/min/1 73sq m     Narrative:      [place] guidelines for Chronic Kidney Disease (CKD):     Stage 1 with normal or high GFR (GFR > 90 mL/min/1 73 square meters)    Stage 2 Mild CKD (GFR = 60-89 mL/min/1 73 square meters)    Stage 3A Moderate CKD (GFR = 45-59 mL/min/1 73 square meters)    Stage 3B Moderate CKD (GFR = 30-44 mL/min/1 73 square meters)    Stage 4 Severe CKD (GFR = 15-29 mL/min/1 73 square meters)    Stage 5 End Stage CKD (GFR <15 mL/min/1 73 square meters)  Note: GFR calculation is accurate only with a steady state creatinine    Hepatic function panel [[id number]]  (Normal) Collected: 08/17/21 0805    Lab Status: Final result Specimen: Blood from Arm, Left Updated: 08/17/21 0842     Total Bilirubin 0 52 mg/dL      Bilirubin, Direct 0 08 mg/dL      Alkaline Phosphatase 109 U/L      AST 15 U/L      ALT 24 U/L      Total Protein 7 1 g/dL      Albumin 3 6 g/dL     Lipase [[id number]]  (Normal) Collected: 08/17/21 0805    Lab Status: Final result Specimen: Blood from Arm, Left Updated: 08/17/21 0842     Lipase 140 u/L     Troponin I [[id number]]  (Normal) Collected: 08/17/21 0805    Lab Status: Final result Specimen: Blood from Arm, Left Updated: 08/17/21 0840     Troponin I <0 02 ng/mL     CBC and differential [[id number]]  (Abnormal) Collected: 08/17/21 0805    Lab Status: Final result Specimen: Blood from Arm, Left Updated: 08/17/21 0833     WBC 5 49 Thousand/uL      RBC 5 18 Million/uL      Hemoglobin 13 3 g/dL      Hematocrit 42 2 %      MCV 82 fL      MCH 25 7 pg      MCHC 31 5 g/dL      RDW 20 6 %      MPV 11 2 fL      Platelets 583 Thousands/uL      nRBC 0 /100 WBCs      Neutrophils Relative 66 %      Immat GRANS % 0 %      Lymphocytes Relative 23 %      Monocytes Relative 7 %      Eosinophils Relative 3 %      Basophils Relative 1 %      Neutrophils Absolute 3 65 Thousands/µL      Immature Grans Absolute 0 01 Thousand/uL      Lymphocytes Absolute 1 25 Thousands/µL      Monocytes Absolute 0 38 Thousand/µL      Eosinophils Absolute 0 16 Thousand/µL      Basophils Absolute 0 04 Thousands/µL                  XR chest 1 view portable   ED Interpretation by Betty Lopez MD (08/17 0845)   Normal study  Final Result by Jovan Cleaning MD (08/17 1431)      No acute cardiopulmonary disease  Workstation performed: [id number]                    Procedures  ECG 12 Lead Documentation Only    Date/Time: 8/17/2021 8:09 AM  Performed by: Betty Lopez MD  Authorized by: Betty Lopez MD     Indications / Diagnosis:  Cp  ECG reviewed by me, the ED Provider: yes    Patient location:  ED  Previous ECG:     Previous ECG:  Compared to current    Comparison ECG info:  28 dec 2019    Similarity:  No change  Interpretation:     Interpretation: normal    Rate:     ECG rate:  76    ECG rate assessment: normal    Rhythm:     Rhythm: sinus rhythm    Comments:      Normal ekg  Unchanged as compared to prior                ED Course             HEART Risk Score      Most Recent Value   Heart Score Risk Calculator   History  0 Filed at: 08/17/2021 0808   ECG  0 Filed at: 08/17/2021 0356   Age  1 Filed at: 08/17/2021 6203   Risk Factors  1 Filed at: 08/17/2021 4246   Troponin  0 Filed at: 08/17/2021 9718   HEART Score  2 Filed at: 08/17/2021 8794                      SBIRT 22yo+      Most Recent Value   SBIRT (25 yo +)   In order to provide better care to our patients, we are screening all of our patients for alcohol and drug use  Would it be okay to ask you these screening questions? Yes Filed at: 08/17/2021 0759   Initial Alcohol Screen: US AUDIT-C    1  How often do you have a drink containing alcohol?  0 Filed at: 08/17/2021 0759   2  How many drinks containing alcohol do you have on a typical day you are drinking? 0 Filed at: 08/17/2021 0759   3a  Male UNDER 65: How often do you have five or more drinks on one occasion? 0 Filed at: 08/17/2021 0759   3b  FEMALE Any Age, or MALE 65+: How often do you have 4 or more drinks on one occassion? 0 Filed at: 08/17/2021 0759   Audit-C Score  0 Filed at: 08/17/2021 1353   TRESSA: How many times in the past year have you    Used an illegal drug or used a prescription medication for non-medical reasons? Never Filed at: 08/17/2021 2538                    MDM    Disposition  Final diagnoses:   Chest pain     Time reflects when diagnosis was documented in both MDM as applicable and the Disposition within this note     Time User Action Codes Description Comment    8/17/2021  8:45 AM Grey [place] Add [R07 9] Chest pain       ED Disposition     ED Disposition Condition Date/Time Comment    Discharge Stable Tue Aug 17, 2021  8:45 AM Key Aggarwal discharge to home/self care              Follow-up Information     Follow up With Specialties Details Why Contact Info Additional Information    [address] [facility] Emergency Medicine  If symptoms worsen [address] [facility] [id number] [facility], [address] [place], [place], [address] [facility]  to talk about scheduling a cardiac stress test [address] [facility] [address]  [phone]             Discharge Medication List as of 8/17/2021  8:46 AM      CONTINUE these medications which have NOT CHANGED    Details   Biotin 1000 MCG tablet Take 1,000 mcg by mouth, Historical Med      butalbital-acetaminophen-caffeine (FIORICET,ESGIC) -40 mg per tablet take 1-2 tablets by mouth every 4 to 6 hours if needed maximum daily dose of 6, Historical Med      calcium citrate-vitamin D (CITRACAL+D) 315-200 MG-UNIT per tablet Take 2 tablets by mouth 2 (two) times a day, Starting Mon 3/4/2019, Normal      conjugated estrogens (PREMARIN) 0 625 mg tablet Take 0 625 mg by mouth daily, Starting Wed 1/27/2021, Historical Med      cyclobenzaprine (FLEXERIL) 10 mg tablet Take 1 tablet (10 mg total) by mouth 3 (three) times a day as needed for muscle spasms, Starting Fri 2/22/2019, Normal      doxepin (SINEquan) 10 mg capsule Take 10 mg by mouth daily at bedtime, Starting Thu 4/26/2018, Historical Med      DULoxetine (CYMBALTA) 30 mg delayed release capsule Take 30 mg by mouth daily, Starting Tue 6/29/2021, Until Thu 7/29/2021, Historical Med      ergocalciferol (VITAMIN D2) 50,000 units Take 1 capsule (50,000 Units total) by mouth 2 (two) times a week with meals, Starting Thu 6/24/2021, Normal      esterified estrogens (MENEST) 0 625 MG tablet Take 0 625 mg by mouth daily, Historical Med      estradiol (VIVELLE-DOT) 0 075 MG/24HR Place 1 patch on the skin, Starting Thu 8/9/2018, Until Fri 8/9/2019, Historical Med      ferrous sulfate 325 (65 Fe) mg tablet TAKE 1 TABLET BY MOUTH TWICE DAILY, Historical Med      gabapentin (NEURONTIN) 100 mg capsule Take 1 capsule (100 mg total) by mouth 3 (three) times a day for 5 days, Starting Thu 3/28/2019, Until Tue 4/2/2019, Normal      hydrochlorothiazide (HYDRODIURIL) 12 5 mg tablet Take 12 5 mg by mouth daily, Starting Mon 3/26/2018, Historical Med      Melatonin 5 MG CAPS Take 5 mg by mouth, Historical Med      metoprolol tartrate (LOPRESSOR) 50 mg tablet Take 1 tablet (50 mg total) by mouth every 12 (twelve) hours for 30 days, Starting Mon 3/18/2019, Until Wed 4/17/2019, Normal      Multiple Vitamin (MULTIVITAMIN) tablet Take 1 tablet by mouth 2 (two) times a day, Starting Mon 3/4/2019, Normal      Omega-3 Fatty Acids (FISH OIL) 1,000 mg Take 1,000 mg by mouth daily, Historical Med      ondansetron (ZOFRAN) 4 mg tablet Take 1 tablet (4 mg total) by mouth every 8 (eight) hours as needed for nausea or vomiting, Starting Thu 1/24/2019, Normal      ondansetron (ZOFRAN-ODT) 4 mg disintegrating tablet Take 1 tablet (4 mg total) by mouth every 6 (six) hours as needed for nausea or vomiting, Starting Sun 3/21/2021, Normal      orphenadrine (NORFLEX) 100 mg tablet Take 100 mg by mouth 2 (two) times a day, Starting Mon 3/1/2021, Until Tue 6/29/2021, Historical Med      oxyCODONE-acetaminophen (Percocet) 2 5-325 MG per tablet Take 1,000 mg by mouth, Historical Med      oxyCODONE-acetaminophen (PERCOCET) 5-325 mg per tablet Take 1 tablet by mouth every 4 (four) hours as needed, Starting Tue 10/13/2020, Historical Med      potassium chloride (K-DUR,KLOR-CON) 20 mEq tablet Take 1 tablet (20 mEq total) by mouth 2 (two) times a day for 7 days, Starting Wed 5/15/2019, Until Wed 7/14/2021, Normal      predniSONE 20 mg tablet take 1 tablet by mouth three times a day for 4 days then twice a    (REFER TO PRESCRIPTION NOTES)  , Historical Med      simethicone (MYLICON) 80 mg chewable tablet Chew 1 tablet (80 mg total) every 12 (twelve) hours, Starting Fri 2/22/2019, Normal      sucralfate (CARAFATE) 1 g tablet Take 1 tablet (1 g total) by mouth 4 (four) times a day, Starting Sun 11/3/2019, Print      !! tamsulosin (FLOMAX) 0 4 mg Take 1 capsule (0 4 mg total) by mouth daily with dinner Until kidney stone passes, Starting Sat 12/28/2019, Print      !! tamsulosin (FLOMAX) 0 4 mg Take 1 capsule (0 4 mg total) by mouth daily with dinner, Starting Sun 3/21/2021, Normal      venlafaxine (EFFEXOR) 37 5 mg tablet Take 37 5 mg by mouth 2 (two) times a day, Historical Med      vitamin B-12 (CYANOCOBALAMIN) 500 MCG TABS Take 2 tablets (1,000 mcg total) by mouth daily for 120 days, Starting Mon 3/4/2019, Until Wed 7/14/2021, Normal       !! - Potential duplicate medications found  Please discuss with provider  No discharge procedures on file      PDMP Review     None          ED Provider  Electronically Signed by           Trae Sun MD  08/17/21 8980

## 2021-08-17 NOTE — Clinical Note
Laura Jon was seen and treated in our emergency department on 8/17/2021  Diagnosis:     Lucille    She may return on this date: 08/19/2021         If you have any questions or concerns, please don't hesitate to call        Vince Montoya MD    ______________________________           _______________          _______________  Hospital Representative                              Date                                Time

## 2021-08-23 ENCOUNTER — OFFICE VISIT (OUTPATIENT)
Dept: HEMATOLOGY ONCOLOGY | Facility: CLINIC | Age: 51
End: 2021-08-23
Payer: COMMERCIAL

## 2021-08-23 VITALS
RESPIRATION RATE: 16 BRPM | HEIGHT: 70 IN | SYSTOLIC BLOOD PRESSURE: 132 MMHG | OXYGEN SATURATION: 100 % | BODY MASS INDEX: 38.08 KG/M2 | HEART RATE: 67 BPM | WEIGHT: 266 LBS | TEMPERATURE: 97.4 F | DIASTOLIC BLOOD PRESSURE: 84 MMHG

## 2021-08-23 DIAGNOSIS — D50.8 IRON DEFICIENCY ANEMIA SECONDARY TO INADEQUATE DIETARY IRON INTAKE: Primary | ICD-10-CM

## 2021-08-23 DIAGNOSIS — M85.88 OSTEOPENIA OF LUMBAR SPINE: ICD-10-CM

## 2021-08-23 PROCEDURE — 99214 OFFICE O/P EST MOD 30 MIN: CPT | Performed by: INTERNAL MEDICINE

## 2021-08-23 NOTE — PROGRESS NOTES
Hematology/Oncology Progress Note    Date of Service: 8/22/2021    [facility] HEMATOLOGY ONCOLOGY SPECIALISTS [facility] [address] [address]    Hem/Onc Problem List:   1  Anemia   2  Leukocytosis    Chief Complaint:     routine follow-up to discuss workup result    Assessment/Plan:     I personally reviewed the lab results, image studies results and other specialties/physicians consult notes and recommendations  I shared the findings with patient, discussed the diagnosis and management plan as below  1  Microcytic hypochromic anemia due to iron deficiency  EGD and colonoscopy in July 14, 2021 showed no evidence of malignancy or source of bleeding  History of multiple surgeries  Status post IV Feraheme in July 2021  Repeat lab showed normal hemoglobin and iron study  I will repeat CBC and iron study in 3 months  2  Osteopenia, with Bone pain and joint pain  Patient has a history of hysterectomy with BSO ,  DEXA scan in August 12, 2021 showed osteopenia in lumbar spine  Patient takes multivitamin, calcium, vitamin-D supplement, and doing exercise  Repeat DEXA scan in 2 years  3  Leukocytosis and thrombocytosis  This could be secondary to iron deficiency  Resolved  4    Hot flash after hysterectomy plus BSO  Patient takes Vivelle, and Menest   5    ACE horse kegs due to iron deficiency  Ferritin 5  Status post IV iron  Patient also takes magnesium supplement as needed  It gets better  6   Follow-up: Return to clinic in 3 months with MD and labs prior  Disclaimer: This document was prepared using [email or web address] Fluency Direct technology  If a word or phrase is confusing, or does not make sense, this is likely due to recognition error which was not discovered during the providers review  If you believe an error has occurred, please Contact me through [employer] service for trinity? cation      AJCC 8th Edition Cancer Stage :      Cancer Staging  No matching staging information was found for the patient  Hematology/Oncology History:      · History of chronic anemia, started at least from March 2018  Normal kidney function  · March 15, 2018, WBC 12 6, hemoglobin 10 3, normocytic normochromic anemia, with platelet count 977  ·   March 10, 2019, hemoglobin 9 6, normal WBC and platelet count  Normocytic, normochromic anemia  ·  November 2, 2020  Iron 27, ferritin 14, iron saturation 6%, TIBC 430, 49  B12 494  Copper, vitamin-A, vitamin B1, Zinc are within normal limits  Hemoglobin 11 7  Microcytic hypochromic anemia  Platelet count 865  WBC 6 IV  ·   March 21, 2021, hemoglobin 11 9, normal WBC, MCV 75, MCH 23, MCHC 30 5, with RDW 18 1  Platelet count 119  No immature cells in peripheral blood  · Kristina 15, 2021, WBC 10 6, hemoglobin 12, hematocrit 39 8%, MCV 77, MCH is 23 2, MCHC 30 2, with platelet count 139  Normal kidney and liver function  Iron 29, ferritin 5, iron saturation 7%, TIBC 444, folate 9 6  Copper 128, B12 593,  Zinc,  Vitamin B6, vitamin B1, vitamin 8, are all within normal limits  ·  EGD and colonoscopy 2 -3  years  ·  History of PRBC transfusion with hemoglobin around 7,   status post hysterectomy  · Status post IV Feraheme in July 6, 2021  · July 14, 2021 EGD and colonoscopy are normal   Randomized biopsy negative for malignancy  · August 2, 2021 iron 74, iron saturation 24%, ferritin 103  Normal folate and B12  Twenty-five hydroxy level 21 6  · August 12, 2021 DEXA  Scan shows osteopenia   In the lumbar spine  History of Present Illiness:   Sisi Katz is a 45 y o  female with the above-noted HemOnc history who is here  to  discuss the workup result  Patient feels better  She tolerated the IV iron very well  Repeat lab showed normal CBC and iron study  Thyroid function normal   Patient denied fever or chills  DEXA scan in August 12, 2021 showed osteopenia in the lumbar spine  Patient is taking calcium, vitamin-D, and doing exercise  Otherwise, patient feels better  No fever or chills  ACE horse legs are improving  Patient denies GI or  symptoms  Appetite good  No bleeding anywhere  Patient had EGD and colonoscopy in July 14, 2021 which was unremarkable  No evidence of malignancy  No source of bleeding  ROS: A 12-point of review of systems is obtained and other than the above is noncontributory  Objective:   VITALS:   LMP  (LMP Unknown)     Physical EXAM:  General:  Alert, cooperative, no distress, appears stated age  Head:  Normocephalic, without obvious abnormality, atraumatic  Eyes:  Conjunctivae/corneas clear  PERRL, EOMs intact  No evidence of conjunctivitis     Throat: Lips, mucosa, and tongue normal  No lesions in the oropharynx   Neck: Supple, symmetrical, trachea midline, no adenopathy    Lungs:   Clear to auscultation bilaterally  Respiratory effort easy, nonlabored    Heart:  Regular rate and rhythm, S1, S2 normal, no murmur, click, rub or the gallop  Abdomen:   Soft, non-tender,nondistended  Bowel sounds normal  No masses,  No organomegaly  Extremities: Extremities normal, atraumatic, no cyanosis or edema  No axillary or inguinal adenopathy   Skin: Skin color, texture, turgor normal  No rashes or lesions    Neurologic: A&Ox4  No focal neuro deficits       Allergies   Allergen Reactions    Other Headache     MSG    Ibuprofen Other (See Comments)     USE CAUTION DUE TO BARIATRIC SX    Penicillins Rash       Past Medical History:   Diagnosis Date    Arthritis     Bell palsy     Gastric ulcer     GERD (gastroesophageal reflux disease)     History of transfusion     Melena     Obesity (BMI 30-39  9)     Pancreatic cyst     Right facial numbness     Upper GI bleed        Past Surgical History:   Procedure Laterality Date    ABDOMINOPLASTY      BARIATRIC SURGERY      BOWEL RESECTION LAPAROSCOPIC N/A 2/11/2019    Procedure: LAPAROSCOPIC LYSIS OF ADHESIONS; RESECTION SMALL BOWEL; DECOMPRESSION OF GASTRIC REMNANT; EGD;  Surgeon: Emiliana Knight MD;  [facility];  Service: General    CHOLECYSTECTOMY      CT GUIDED PERC DRAINAGE CATHETER PLACEMENT  2/25/2019    ESOPHAGOGASTRODUODENOSCOPY N/A 3/6/2019    Procedure: INTRAOPERATIVE EGD;  Surgeon: Emiliana Knight MD;  [facility];  Service: Bariatrics    HERNIA REPAIR      HYSTERECTOMY      KNEE CARTILAGE SURGERY      PARTIAL GASTRECTOMY N/A 3/6/2019    Procedure: RESECTION GASTRIC PARTIAL/GASTRECTOMY PARTIAL LAPAROSCOPIC;  Surgeon: Emiliana Knight MD;  [facility];  Service: Bariatrics    MA COLONOSCOPY FLX DX W/[facility] SPEC WHEN PFRMD N/A 3/28/2018    Procedure: COLONOSCOPY;  Surgeon: Alfonso Ramos MD;  [facility]; Service: Gastroenterology    MA COLONOSCOPY FLX DX W/[facility] WHEN PFRMD N/A 1/31/2019    Procedure: COLONOSCOPY;  Surgeon: Alfonso Ramos MD;  [facility]; Service: Gastroenterology    MA ESOPHAGOGASTRODUODENOSCOPY TRANSORAL DIAGNOSTIC N/A 3/22/2018    Procedure: ESOPHAGOGASTRODUODENOSCOPY (EGD); Surgeon: Alfonso Ramos MD;  [facility]; Service: Gastroenterology    MA ESOPHAGOGASTRODUODENOSCOPY TRANSORAL DIAGNOSTIC N/A 1/31/2019    Procedure: ESOPHAGOGASTRODUODENOSCOPY (EGD); Surgeon: Alfonso Ramos MD;  [facility];   Service: Gastroenterology    MA LAP,DIAGNOSTIC ABDOMEN N/A 3/6/2019    Procedure: LAPAROSCOPY DIAGNOSTIC;  Surgeon: Emiliana Knight MD;  [facility];  Service: Bariatrics    MA PART REMOVAL COLON W ANASTOMOSIS N/A 3/6/2019    Procedure: OPEN TRANSVERSE COLON RESECTION;  Surgeon: Emiliana Knight MD;  [facility];  Service: Bariatrics    REDUCTION MAMMAPLASTY Bilateral     REPLACEMENT UNICONDYLAR JOINT KNEE      SHOULDER ARTHROSCOPY DISTAL CLAVICLE EXCISION AND OPEN ROTATOR CUFF REPAIR         Family History   Problem Relation Age of Onset    Heart attack Mother     Diabetes Mother     Heart attack Father     Stroke Father     Hypertension Brother     Leukemia Paternal Aunt        Social History     Socioeconomic History    Marital status: /Civil Union     Spouse name: Not on file    Number of children: Not on file    Years of education: Not on file    Highest education level: Not on file   Occupational History    Not on file   Tobacco Use    Smoking status: Never Smoker    Smokeless tobacco: Never Used   Vaping Use    Vaping Use: Never used   Substance and Sexual Activity    Alcohol use: Not Currently    Drug use: No    Sexual activity: Yes     Partners: Male   Other Topics Concern    Not on file   Social History Narrative    Not on file     Social Determinants of Health     Financial Resource Strain:     Difficulty of Paying Living Expenses:    Food Insecurity:     Worried About Running Out of Food in the Last Year:     Ran Out of Food in the Last Year:    Transportation Needs:     Lack of Transportation (Medical):      Lack of Transportation (Non-Medical):    Physical Activity:     Days of Exercise per Week:     Minutes of Exercise per Session:    Stress:     Feeling of Stress :    Social Connections:     Frequency of Communication with Friends and Family:     Frequency of Social Gatherings with Friends and Family:     Attends Sabianist Services:     Active Member of Clubs or Organizations:     Attends Club or Organization Meetings:     Marital Status:    Intimate Partner Violence:     Fear of Current or Ex-Partner:     Emotionally Abused:     Physically Abused:     Sexually Abused:        Current Outpatient Medications   Medication Sig Dispense Refill    Biotin 1000 MCG tablet Take 1,000 mcg by mouth      butalbital-acetaminophen-caffeine (FIORICET,ESGIC) -40 mg per tablet take 1-2 tablets by mouth every 4 to 6 hours if needed maximum daily dose of 6  0    calcium citrate-vitamin D (CITRACAL+D) 315-200 MG-UNIT per tablet Take 2 tablets by mouth 2 (two) times a day 60 tablet 6    conjugated estrogens (PREMARIN) 0 625 mg tablet Take 0 625 mg by mouth daily      cyclobenzaprine (FLEXERIL) 10 mg tablet Take 1 tablet (10 mg total) by mouth 3 (three) times a day as needed for muscle spasms (Patient not taking: Reported on 7/1/2021) 30 tablet 0    doxepin (SINEquan) 10 mg capsule Take 10 mg by mouth daily at bedtime  0    DULoxetine (CYMBALTA) 30 mg delayed release capsule Take 30 mg by mouth daily      ergocalciferol (VITAMIN D2) 50,000 units Take 1 capsule (50,000 Units total) by mouth 2 (two) times a week with meals 24 capsule 0    esterified estrogens (MENEST) 0 625 MG tablet Take 0 625 mg by mouth daily (Patient not taking: Reported on 7/1/2021)      estradiol (VIVELLE-DOT) 0 075 MG/24HR Place 1 patch on the skin      ferrous sulfate 325 (65 Fe) mg tablet TAKE 1 TABLET BY MOUTH TWICE DAILY      gabapentin (NEURONTIN) 100 mg capsule Take 1 capsule (100 mg total) by mouth 3 (three) times a day for 5 days 15 capsule 0    hydrochlorothiazide (HYDRODIURIL) 12 5 mg tablet Take 12 5 mg by mouth daily (Patient not taking: Reported on 7/1/2021)  0    Melatonin 5 MG CAPS Take 5 mg by mouth      metoprolol tartrate (LOPRESSOR) 50 mg tablet Take 1 tablet (50 mg total) by mouth every 12 (twelve) hours for 30 days (Patient not taking: Reported on 3/28/2019) 60 tablet 0    Multiple Vitamin (MULTIVITAMIN) tablet Take 1 tablet by mouth 2 (two) times a day 60 tablet 3    Omega-3 Fatty Acids (FISH OIL) 1,000 mg Take 1,000 mg by mouth daily      ondansetron (ZOFRAN) 4 mg tablet Take 1 tablet (4 mg total) by mouth every 8 (eight) hours as needed for nausea or vomiting 20 tablet 0    ondansetron (ZOFRAN-ODT) 4 mg disintegrating tablet Take 1 tablet (4 mg total) by mouth every 6 (six) hours as needed for nausea or vomiting 20 tablet 0    orphenadrine (NORFLEX) 100 mg tablet Take 100 mg by mouth 2 (two) times a day      oxyCODONE-acetaminophen (Percocet) 2 5-325 MG per tablet Take 1,000 mg by mouth (Patient not taking: Reported on 7/1/2021)      oxyCODONE-acetaminophen (PERCOCET) 5-325 mg per tablet Take 1 tablet by mouth every 4 (four) hours as needed (Patient not taking: Reported on 7/1/2021)      potassium chloride (K-DUR,KLOR-CON) 20 mEq tablet Take 1 tablet (20 mEq total) by mouth 2 (two) times a day for 7 days 14 tablet 0    predniSONE 20 mg tablet take 1 tablet by mouth three times a day for 4 days then twice a    (REFER TO PRESCRIPTION NOTES)  (Patient not taking: Reported on 7/1/2021)      simethicone (MYLICON) 80 mg chewable tablet Chew 1 tablet (80 mg total) every 12 (twelve) hours 30 tablet 0    sucralfate (CARAFATE) 1 g tablet Take 1 tablet (1 g total) by mouth 4 (four) times a day 20 tablet 0    tamsulosin (FLOMAX) 0 4 mg Take 1 capsule (0 4 mg total) by mouth daily with dinner Until kidney stone passes (Patient not taking: Reported on 7/1/2021) 5 capsule 0    tamsulosin (FLOMAX) 0 4 mg Take 1 capsule (0 4 mg total) by mouth daily with dinner (Patient not taking: Reported on 7/1/2021) 7 capsule 0    venlafaxine (EFFEXOR) 37 5 mg tablet Take 37 5 mg by mouth 2 (two) times a day (Patient not taking: Reported on 7/1/2021)      vitamin B-12 (CYANOCOBALAMIN) 500 MCG TABS Take 2 tablets (1,000 mcg total) by mouth daily for 120 days 240 tablet 0     No current facility-administered medications for this visit  (Not in a hospital admission)      DATA REVIEW:    Pathology Result:    Final Diagnosis   Date Value Ref Range Status   07/14/2021   Final    A  Jejunum, biopsy:  - Small intestinal mucosa with patchy increased intraepithelial lymphocytes  See comment   - No villous blunting is seen  Comment:   This is a histologically nonspecific diagnosis with a broad differential diagnosis including gluten-sensitive enteropathy (celiac sprue), bacterial overgrowth, other enteric infection, autoimmune disease, inflammatory bowel disease, drug (NSAID) reaction, and gastric infection with Helicobacter pylori  Correlation with patient history and additional clinical laboratory testing (antibody studies) may be helpful for interpretation of the results  B  Colon, random colon:  - Fragments of colonic mucosa without significant histopathologic changes  - There is no increase in the number of intraepithelial lymphocytes or thickened basement membranes to suggest the presence of microscopic colitis  03/07/2019   Final    A  Stomach, segmental resection:  - Acute antral gastritis with multifocal ulcerations, lamina propria hemorrhage and reactive/regenerative epithelial atypia  - Acute fibrinous serositis  - No Helicobacter pylori organisms identified on H&E stain  B  Colon, transverse and splenic flexure, segmental resection:  - Segment of colon (42 cm) with mucosal ischemic and hemorrhagic changes  - Acute serositis extending to margins of resection   - Margins of resection appear viable  Interpretation performed at [facility] [address] [address]       02/25/2019   Final    A  Spleen, perisplenic collection:  Negative for malignancy  Neutrophils and debris seen  Satisfactory for evaluation  02/11/2019   Final    A  Portion of small bowel, resection:             - Focal acute enteritis  - The margins of resection are viable  - No dysplasia or malignancy is identified  Interpretation performed at [address]      01/31/2019   Final    A  Stomach, Anastomosis, Biopsy:  - Antral- and oxyntic-type gastric mucosa with no specific pathologic change  - Helicobacter pylori immunohistochemistry is in progress, and results will be issued separately in an addendum report      03/22/2018   Final    A  Stomach, anastomosis, [id number]-[id number], biopsy:             - Benign small bowel mucosa with reactive features  - Benign glandular elements are noted on the Alcian blue-PAS stain, performed with an appropriate control              - No Helicobacter pylori organisms are identified on the immunohistochemical stain, performed with an appropriate control              - No dysplasia or malignancy is identified  Interpretation performed at [address]           Image Results: They are reviewed and documented in Hematology/Oncology history    XR chest 1 view portable  Narrative: CHEST     INDICATION:   CP     COMPARISON:  February 25, 2019 chest x-ray    EXAM PERFORMED/VIEWS:  XR CHEST PORTABLE    FINDINGS:    Cardiomediastinal silhouette appears unremarkable  The lungs are clear  No pneumothorax or pleural effusion  Osseous structures appear within normal limits for patient age  Impression: No acute cardiopulmonary disease  Workstation performed: [id number]        LABS:  Lab data are reviewed and documented in HemOnc history  No results found for this or any previous visit (from the past 48 hour(s))            Eusebia Fox MD  8/22/2021, 10:31 PM

## 2021-08-25 ENCOUNTER — APPOINTMENT (OUTPATIENT)
Dept: LAB | Facility: CLINIC | Age: 51
End: 2021-08-25
Payer: COMMERCIAL

## 2021-08-25 DIAGNOSIS — D50.8 IRON DEFICIENCY ANEMIA SECONDARY TO INADEQUATE DIETARY IRON INTAKE: ICD-10-CM

## 2021-08-25 LAB
ANION GAP SERPL CALCULATED.3IONS-SCNC: 5 MMOL/L (ref 4–13)
BASOPHILS # BLD AUTO: 0.04 THOUSANDS/ΜL (ref 0–0.1)
BASOPHILS NFR BLD AUTO: 1 % (ref 0–1)
BUN SERPL-MCNC: 15 MG/DL (ref 5–25)
CALCIUM SERPL-MCNC: 9.1 MG/DL (ref 8.3–10.1)
CHLORIDE SERPL-SCNC: 107 MMOL/L (ref 100–108)
CO2 SERPL-SCNC: 28 MMOL/L (ref 21–32)
CREAT SERPL-MCNC: 0.8 MG/DL (ref 0.6–1.3)
EOSINOPHIL # BLD AUTO: 0.21 THOUSAND/ΜL (ref 0–0.61)
EOSINOPHIL NFR BLD AUTO: 4 % (ref 0–6)
ERYTHROCYTE [DISTWIDTH] IN BLOOD BY AUTOMATED COUNT: 20.8 % (ref 11.6–15.1)
GFR SERPL CREATININE-BSD FRML MDRD: 85 ML/MIN/1.73SQ M
GLUCOSE SERPL-MCNC: 100 MG/DL (ref 65–140)
HCT VFR BLD AUTO: 43.9 % (ref 34.8–46.1)
HGB BLD-MCNC: 14.1 G/DL (ref 11.5–15.4)
IMM GRANULOCYTES # BLD AUTO: 0.02 THOUSAND/UL (ref 0–0.2)
IMM GRANULOCYTES NFR BLD AUTO: 0 % (ref 0–2)
LYMPHOCYTES # BLD AUTO: 1.52 THOUSANDS/ΜL (ref 0.6–4.47)
LYMPHOCYTES NFR BLD AUTO: 31 % (ref 14–44)
MCH RBC QN AUTO: 26.3 PG (ref 26.8–34.3)
MCHC RBC AUTO-ENTMCNC: 32.1 G/DL (ref 31.4–37.4)
MCV RBC AUTO: 82 FL (ref 82–98)
MONOCYTES # BLD AUTO: 0.38 THOUSAND/ΜL (ref 0.17–1.22)
MONOCYTES NFR BLD AUTO: 8 % (ref 4–12)
NEUTROPHILS # BLD AUTO: 2.81 THOUSANDS/ΜL (ref 1.85–7.62)
NEUTS SEG NFR BLD AUTO: 56 % (ref 43–75)
NRBC BLD AUTO-RTO: 0 /100 WBCS
PLATELET # BLD AUTO: 288 THOUSANDS/UL (ref 149–390)
PMV BLD AUTO: 11.6 FL (ref 8.9–12.7)
POTASSIUM SERPL-SCNC: 3.8 MMOL/L (ref 3.5–5.3)
RBC # BLD AUTO: 5.36 MILLION/UL (ref 3.81–5.12)
SODIUM SERPL-SCNC: 140 MMOL/L (ref 136–145)
WBC # BLD AUTO: 4.98 THOUSAND/UL (ref 4.31–10.16)

## 2021-08-25 PROCEDURE — 80048 BASIC METABOLIC PNL TOTAL CA: CPT

## 2021-08-25 PROCEDURE — 36415 COLL VENOUS BLD VENIPUNCTURE: CPT

## 2021-08-25 PROCEDURE — 85025 COMPLETE CBC W/AUTO DIFF WBC: CPT

## 2021-09-01 ENCOUNTER — HOSPITAL ENCOUNTER (EMERGENCY)
Facility: HOSPITAL | Age: 51
Discharge: HOME | End: 2021-09-01
Attending: EMERGENCY MEDICINE | Admitting: EMERGENCY MEDICINE

## 2021-09-01 ENCOUNTER — APPOINTMENT (EMERGENCY)
Dept: RADIOLOGY | Facility: HOSPITAL | Age: 51
End: 2021-09-01

## 2021-09-01 ENCOUNTER — APPOINTMENT (EMERGENCY)
Dept: CT IMAGING | Facility: HOSPITAL | Age: 51
End: 2021-09-01

## 2021-09-01 VITALS
BODY MASS INDEX: 38.51 KG/M2 | OXYGEN SATURATION: 98 % | HEIGHT: 69 IN | SYSTOLIC BLOOD PRESSURE: 167 MMHG | WEIGHT: 260 LBS | DIASTOLIC BLOOD PRESSURE: 114 MMHG | RESPIRATION RATE: 20 BRPM | TEMPERATURE: 97.9 F | HEART RATE: 79 BPM

## 2021-09-01 DIAGNOSIS — S30.0XXA CONTUSION OF LOWER BACK, INITIAL ENCOUNTER: Primary | ICD-10-CM

## 2021-09-01 DIAGNOSIS — S16.1XXA STRAIN OF NECK MUSCLE, INITIAL ENCOUNTER: ICD-10-CM

## 2021-09-01 PROCEDURE — G1004 CDSM NDSC: HCPCS

## 2021-09-01 PROCEDURE — 72125 CT NECK SPINE W/O DYE: CPT

## 2021-09-01 PROCEDURE — 99284 EMERGENCY DEPT VISIT MOD MDM: CPT

## 2021-09-01 PROCEDURE — 71046 X-RAY EXAM CHEST 2 VIEWS: CPT

## 2021-09-01 PROCEDURE — 72131 CT LUMBAR SPINE W/O DYE: CPT

## 2021-09-01 PROCEDURE — 99284 EMERGENCY DEPT VISIT MOD MDM: CPT | Performed by: EMERGENCY MEDICINE

## 2021-09-01 PROCEDURE — 72128 CT CHEST SPINE W/O DYE: CPT

## 2021-09-01 RX ORDER — OXYCODONE HYDROCHLORIDE AND ACETAMINOPHEN 5; 325 MG/1; MG/1
1 TABLET ORAL EVERY 6 HOURS PRN
Qty: 20 TABLET | Refills: 0 | Status: SHIPPED | OUTPATIENT
Start: 2021-09-01

## 2021-09-01 RX ORDER — OXYCODONE HYDROCHLORIDE AND ACETAMINOPHEN 5; 325 MG/1; MG/1
1 TABLET ORAL ONCE
Status: COMPLETED | OUTPATIENT
Start: 2021-09-01 | End: 2021-09-01

## 2021-09-01 RX ADMIN — OXYCODONE HYDROCHLORIDE AND ACETAMINOPHEN 1 TABLET: 5; 325 TABLET ORAL at 13:16

## 2021-09-01 RX ADMIN — OXYCODONE HYDROCHLORIDE AND ACETAMINOPHEN 1 TABLET: 5; 325 TABLET ORAL at 11:38

## 2021-09-01 NOTE — Clinical Note
Joana Arredondo was seen and treated in our emergency department on 9/1/2021  Diagnosis:     Lucille    She may return on this date: May return to work when cleared by primary care provider or comprehensive spine program     If you have any questions or concerns, please don't hesitate to call        Robin Mariee MD    ______________________________           _______________          _______________  Hospital Representative                              Date                                Time

## 2021-09-01 NOTE — ED PROVIDER NOTES
History  Chief Complaint   Patient presents with    Back Injury     Pt fell down 4-5 steps while trying to ambulate her patient at work  Pt struck middle of her back, -LOC, -BT, - head strike     HPI patient is a 58-year-old female, apparently at work apparently going to do some exercises with the patient  Apparently she went to get the patient's walker and apparently the walker got caught on something tripping her and she fell down for 5 steps primarily landing on her buttocks  Patient denies any loss of consciousness  She denies any headache  There is no known head injury  She complains of neck upper back and low back pain  Complains of pain the base of her lumbar spine  Patient reports pain with any movement  Patient reports pain in her upper back when she coughs or takes a deep breath  Patient denies any symptoms prior to the fall  Past medical history gastric ulcer, Bell's palsy  Family history noncontributory  Social history, employed nonsmoker    Prior to Admission Medications   Prescriptions Last Dose Informant Patient Reported? Taking?    Biotin 1000 MCG tablet  Self Yes No   Sig: Take 1,000 mcg by mouth   DULoxetine (CYMBALTA) 30 mg delayed release capsule  Self Yes No   Sig: Take 30 mg by mouth daily   Melatonin 5 MG CAPS  Self Yes No   Sig: Take 5 mg by mouth   Multiple Vitamin (MULTIVITAMIN) tablet  Self No No   Sig: Take 1 tablet by mouth 2 (two) times a day   Omega-3 Fatty Acids (FISH OIL) 1,000 mg  Self Yes No   Sig: Take 1,000 mg by mouth daily   butalbital-acetaminophen-caffeine (FIORICET,ESGIC) -40 mg per tablet  Self Yes No   Sig: take 1-2 tablets by mouth every 4 to 6 hours if needed maximum daily dose of 6   calcium citrate-vitamin D (CITRACAL+D) 315-200 MG-UNIT per tablet  Self No No   Sig: Take 2 tablets by mouth 2 (two) times a day   conjugated estrogens (PREMARIN) 0 625 mg tablet  Self Yes No   Sig: Take 0 625 mg by mouth daily   cyclobenzaprine (FLEXERIL) 10 mg tablet  Self No No   Sig: Take 1 tablet (10 mg total) by mouth 3 (three) times a day as needed for muscle spasms   doxepin (SINEquan) 10 mg capsule  Self Yes No   Sig: Take 10 mg by mouth daily at bedtime   ergocalciferol (VITAMIN D2) 50,000 units  Self No No   Sig: Take 1 capsule (50,000 Units total) by mouth 2 (two) times a week with meals   esterified estrogens (MENEST) 0 625 MG tablet  Self Yes No   Sig: Take 0 625 mg by mouth daily    estradiol (VIVELLE-DOT) 0 075 MG/24HR   Yes No   Sig: Place 1 patch on the skin   ferrous sulfate 325 (65 Fe) mg tablet  Self Yes No   Sig: TAKE 1 TABLET BY MOUTH TWICE DAILY   gabapentin (NEURONTIN) 100 mg capsule  Self No No   Sig: Take 1 capsule (100 mg total) by mouth 3 (three) times a day for 5 days   hydrochlorothiazide (HYDRODIURIL) 12 5 mg tablet  Self Yes No   Sig: Take 12 5 mg by mouth daily    metoprolol tartrate (LOPRESSOR) 50 mg tablet   No No   Sig: Take 1 tablet (50 mg total) by mouth every 12 (twelve) hours for 30 days   Patient not taking: Reported on 3/28/2019   ondansetron (ZOFRAN) 4 mg tablet  Self No No   Sig: Take 1 tablet (4 mg total) by mouth every 8 (eight) hours as needed for nausea or vomiting   ondansetron (ZOFRAN-ODT) 4 mg disintegrating tablet  Self No No   Sig: Take 1 tablet (4 mg total) by mouth every 6 (six) hours as needed for nausea or vomiting   orphenadrine (NORFLEX) 100 mg tablet  Self Yes No   Sig: Take 100 mg by mouth 2 (two) times a day   oxyCODONE-acetaminophen (PERCOCET) 5-325 mg per tablet  Self Yes No   Sig: Take 1 tablet by mouth every 4 (four) hours as needed    oxyCODONE-acetaminophen (Percocet) 2 5-325 MG per tablet  Self Yes No   Sig: Take 1,000 mg by mouth    potassium chloride (K-DUR,KLOR-CON) 20 mEq tablet  Self No No   Sig: Take 1 tablet (20 mEq total) by mouth 2 (two) times a day for 7 days   predniSONE 20 mg tablet  Self Yes No   Sig: take 1 tablet by mouth three times a day for 4 days then twice a    (REFER TO PRESCRIPTION NOTES)  simethicone (MYLICON) 80 mg chewable tablet  Self No No   Sig: Chew 1 tablet (80 mg total) every 12 (twelve) hours   sucralfate (CARAFATE) 1 g tablet  Self No No   Sig: Take 1 tablet (1 g total) by mouth 4 (four) times a day   tamsulosin (FLOMAX) 0 4 mg  Self No No   Sig: Take 1 capsule (0 4 mg total) by mouth daily with dinner Until kidney stone passes   tamsulosin (FLOMAX) 0 4 mg  Self No No   Sig: Take 1 capsule (0 4 mg total) by mouth daily with dinner   venlafaxine (EFFEXOR) 37 5 mg tablet  Self Yes No   Sig: Take 37 5 mg by mouth 2 (two) times a day    vitamin B-12 (CYANOCOBALAMIN) 500 MCG TABS   No No   Sig: Take 2 tablets (1,000 mcg total) by mouth daily for 120 days      Facility-Administered Medications: None       Past Medical History:   Diagnosis Date    Arthritis     Bell palsy     Gastric ulcer     GERD (gastroesophageal reflux disease)     History of transfusion     Melena     Obesity (BMI 30-39  9)     Pancreatic cyst     Right facial numbness     Upper GI bleed        Past Surgical History:   Procedure Laterality Date    ABDOMINOPLASTY      BARIATRIC SURGERY      BOWEL RESECTION LAPAROSCOPIC N/A 2/11/2019    Procedure: LAPAROSCOPIC LYSIS OF ADHESIONS; RESECTION SMALL BOWEL; DECOMPRESSION OF GASTRIC REMNANT; EGD;  Surgeon: Tory Greenwood MD;  [facility];  Service: General    CHOLECYSTECTOMY      CT GUIDED PERC DRAINAGE CATHETER PLACEMENT  2/25/2019    ESOPHAGOGASTRODUODENOSCOPY N/A 3/6/2019    Procedure: INTRAOPERATIVE EGD;  Surgeon: Tory Greenwood MD;  [facility];  Service: Bariatrics    HERNIA REPAIR      HYSTERECTOMY      KNEE CARTILAGE SURGERY      PARTIAL GASTRECTOMY N/A 3/6/2019    Procedure: RESECTION GASTRIC PARTIAL/GASTRECTOMY PARTIAL LAPAROSCOPIC;  Surgeon: Tory Greenwood MD;  [facility];  Service: Bariatrics    [place] COLONOSCOPY FLX DX W/COLLJ [address] WHEN PFRMD N/A 3/28/2018    Procedure: COLONOSCOPY;  Surgeon: Fletcher Monahan MD;  [facility]; Service: Gastroenterology    NV COLONOSCOPY FLX DX W/COLLJ [facility] WHEN PFRMD N/A 1/31/2019    Procedure: COLONOSCOPY;  Surgeon: Cr Vazquez MD;  [facility]; Service: Gastroenterology    NV ESOPHAGOGASTRODUODENOSCOPY TRANSORAL DIAGNOSTIC N/A 3/22/2018    Procedure: ESOPHAGOGASTRODUODENOSCOPY (EGD); Surgeon: Cr Vazquez MD;  [facility]; Service: Gastroenterology    NV ESOPHAGOGASTRODUODENOSCOPY TRANSORAL DIAGNOSTIC N/A 1/31/2019    Procedure: ESOPHAGOGASTRODUODENOSCOPY (EGD); Surgeon: Cr Vazquez MD;  [facility]; Service: Gastroenterology    NV LAP,DIAGNOSTIC ABDOMEN N/A 3/6/2019    Procedure: LAPAROSCOPY DIAGNOSTIC;  Surgeon: Dinorah Dean MD;  [facility];  Service: [facility] [place] PART REMOVAL COLON W ANASTOMOSIS N/A 3/6/2019    Procedure: OPEN TRANSVERSE COLON RESECTION;  Surgeon: Dinorah Dean MD;  [facility];  Service: Bariatrics    REDUCTION MAMMAPLASTY Bilateral     REPLACEMENT UNICONDYLAR JOINT KNEE      SHOULDER ARTHROSCOPY DISTAL CLAVICLE EXCISION AND OPEN ROTATOR CUFF REPAIR         Family History   Problem Relation Age of Onset    Heart attack Mother     Diabetes Mother     Heart attack Father     Stroke Father     Hypertension Brother     Leukemia Paternal Aunt      I have reviewed and agree with the history as documented  E-Cigarette/Vaping    E-Cigarette Use Never User      E-Cigarette/Vaping Substances    Nicotine No     THC No     CBD No     Flavoring No     Other No     Unknown No      Social History     Tobacco Use    Smoking status: Never Smoker    Smokeless tobacco: Never Used   Vaping Use    Vaping Use: Never used   Substance Use Topics    Alcohol use: Not Currently    Drug use: No       Review of Systems   Constitutional: Negative for diaphoresis, fatigue and fever  HENT: Negative for congestion, ear pain, nosebleeds and sore throat      Eyes: Negative for photophobia, pain, discharge and visual disturbance  Respiratory: Negative for cough, choking, chest tightness, shortness of breath and wheezing  Cardiovascular: Negative for chest pain and palpitations  Gastrointestinal: Negative for abdominal distention, abdominal pain, diarrhea and vomiting  Genitourinary: Negative for dysuria, flank pain and frequency  Musculoskeletal: Positive for back pain and neck pain  Negative for gait problem and joint swelling  Skin: Negative for color change and rash  Neurological: Negative for dizziness, syncope and headaches  Psychiatric/Behavioral: Negative for behavioral problems and confusion  The patient is not nervous/anxious  All other systems reviewed and are negative  Physical Exam  Physical Exam  Vitals and nursing note reviewed  Constitutional:       Appearance: She is well-developed  Comments: Appears uncomfortable   HENT:      Head: Normocephalic  Right Ear: External ear normal       Left Ear: External ear normal       Nose: Nose normal    Eyes:      General: Lids are normal       Pupils: Pupils are equal, round, and reactive to light  Neck:      Comments: Immobilized in a cervical collar, complaining of neck pain, able to clear by nexus midline tenderness  Cardiovascular:      Rate and Rhythm: Normal rate and regular rhythm  Pulmonary:      Effort: Pulmonary effort is normal  No respiratory distress  Abdominal:      General: Abdomen is flat  Bowel sounds are normal       Tenderness: There is no abdominal tenderness  Musculoskeletal:         General: Tenderness present  No deformity  Normal range of motion  Cervical back: Neck supple  Comments: There is thoracic spine and lumbar spine tenderness, pain with bending patient can stand she can ambulate but extremely painful  Primarily complaining low back pain, focal weakness  Skin:     General: Skin is warm and dry     Neurological:      Mental Status: She is alert and oriented to person, place, and time  Pulse oximetry normal 98% adequate oxygenation, there is no hypoxia    Vital Signs  ED Triage Vitals   Temperature Pulse Respirations Blood Pressure SpO2   09/01/21 1137 09/01/21 1133 09/01/21 1133 09/01/21 1133 09/01/21 1133   97 9 °F (36 6 °C) 79 20 (!) 167/114 98 %      Temp Source Heart Rate Source Patient Position - Orthostatic VS BP Location FiO2 (%)   09/01/21 1137 09/01/21 1133 09/01/21 1133 09/01/21 1133 --   Oral Monitor Sitting Right arm       Pain Score       09/01/21 1133       Worst Possible Pain           Vitals:    09/01/21 1133   BP: (!) 167/114   Pulse: 79   Patient Position - Orthostatic VS: Sitting         Visual Acuity      ED Medications  Medications   oxyCODONE-acetaminophen (PERCOCET) 5-325 mg per tablet 1 tablet (1 tablet Oral Given 9/1/21 1138)   oxyCODONE-acetaminophen (PERCOCET) 5-325 mg per tablet 1 tablet (1 tablet Oral Given 9/1/21 1316)       Diagnostic Studies  Results Reviewed     None                 CT spine cervical without contrast   Final Result by Colonel Fariba DO (09/01 1258)      Minor cervical degenerative change with no acute osseous abnormality  Incidental thyroid nodule(s) for which nonemergent thyroid ultrasound is recommended  This examination was marked "immediate notification" in Epic in order to begin the standard process by which the radiology reading room liaison alerts the referring practitioner  Workstation performed: [id number]         CT spine thoracic & lumbar wo contrast   Final Result by Colonel Fariba DO (09/01 1301)      No acute osseous abnormality  Lumbar degenerative change at L5-S1 with mild left foraminal narrowing              Workstation performed: [id number]         XR chest 2 views    (Results Pending)              Procedures  Procedures         ED Course        Chest x-ray: Chest x-ray showed a normal cardiac silhouette, no pneumothorax no infiltrates, No sign of pathology, interpreted by me, I was the primary   CT scan of the cervical spine showed no fracture there was a thyroid nodule in discussed with patient discussed need follow-up  CT of the thoracic lumbar sacral spine showed no fracture  SBIRT 22yo+      Most Recent Value   SBIRT (24 yo +)   In order to provide better care to our patients, we are screening all of our patients for alcohol and drug use  Would it be okay to ask you these screening questions? Yes Filed at: 09/01/2021 1141   Initial Alcohol Screen: US AUDIT-C    1  How often do you have a drink containing alcohol?  0 Filed at: 09/01/2021 1141   2  How many drinks containing alcohol do you have on a typical day you are drinking? 0 Filed at: 09/01/2021 1141   3b  FEMALE Any Age, or MALE 65+: How often do you have 4 or more drinks on one occassion? 0 Filed at: 09/01/2021 1141   Audit-C Score  0 Filed at: 09/01/2021 1141   TRESSA: How many times in the past year have you    Used an illegal drug or used a prescription medication for non-medical reasons? Never Filed at: 09/01/2021 1141                    [facility] medical decision making 58-year-old female status post fall complaining of neck back pain pain in her buttocks after the fall  CT scan showed no acute fracture  CT did show a thyroid nodule, discussed with patient, advised follow-up care discussed the need for ultrasound  CT scan of the lumbar spine cervical spine thoracic spine showed no fracture-  Discussed with patient, discussed analgesics  Discussed follow-up with her provider through the [facility]  Discussed indications to return      Disposition  Final diagnoses:   Contusion of lower back, initial encounter   Strain of neck muscle, initial encounter     Time reflects when diagnosis was documented in both MDM as applicable and the Disposition within this note     Time User Action Codes Description Comment    9/1/2021  1:18 PM Janay Mcgrath Cj Add [S30  0XXA] Contusion of lower back, initial encounter     9/1/2021  1:18 PM Isabelkendra Jose Add [S16  1XXA] Strain of neck muscle, initial encounter       ED Disposition     ED Disposition Condition Date/Time Comment    Discharge Stable Wed Sep 1, 2021  1:18 PM Hanna Mora discharge to home/self care  Follow-up Information     Follow up With Specialties Details Why Contact Info Additional Information    [address] [facility]   [phone]          Discharge Medication List as of 9/1/2021  1:20 PM      START taking these medications    Details   !! oxyCODONE-acetaminophen (PERCOCET) 5-325 mg per tablet Take 1 tablet by mouth every 6 (six) hours as needed for severe pain for up to 30 dosesMax Daily Amount: 4 tablets, Starting Wed 9/1/2021, Normal       !! - Potential duplicate medications found  Please discuss with provider        CONTINUE these medications which have NOT CHANGED    Details   Biotin 1000 MCG tablet Take 1,000 mcg by mouth, Historical Med      butalbital-acetaminophen-caffeine (FIORICET,ESGIC) -40 mg per tablet take 1-2 tablets by mouth every 4 to 6 hours if needed maximum daily dose of 6, Historical Med      calcium citrate-vitamin D (CITRACAL+D) 315-200 MG-UNIT per tablet Take 2 tablets by mouth 2 (two) times a day, Starting Mon 3/4/2019, Normal      conjugated estrogens (PREMARIN) 0 625 mg tablet Take 0 625 mg by mouth daily, Starting Wed 1/27/2021, Historical Med      cyclobenzaprine (FLEXERIL) 10 mg tablet Take 1 tablet (10 mg total) by mouth 3 (three) times a day as needed for muscle spasms, Starting Fri 2/22/2019, Normal      doxepin (SINEquan) 10 mg capsule Take 10 mg by mouth daily at bedtime, Starting Thu 4/26/2018, Historical Med      DULoxetine (CYMBALTA) 30 mg delayed release capsule Take 30 mg by mouth daily, Starting Tue 6/29/2021, Until Mon 8/23/2021, Historical Med      ergocalciferol (VITAMIN D2) 50,000 units Take 1 capsule (50,000 Units total) by mouth 2 (two) times a week with meals, Starting Thu 6/24/2021, Normal      esterified estrogens (MENEST) 0 625 MG tablet Take 0 625 mg by mouth daily , Historical Med      estradiol (VIVELLE-DOT) 0 075 MG/24HR Place 1 patch on the skin, Starting Thu 8/9/2018, Until Fri 8/9/2019, Historical Med      ferrous sulfate 325 (65 Fe) mg tablet TAKE 1 TABLET BY MOUTH TWICE DAILY, Historical Med      gabapentin (NEURONTIN) 100 mg capsule Take 1 capsule (100 mg total) by mouth 3 (three) times a day for 5 days, Starting Thu 3/28/2019, Until Mon 8/23/2021, Normal      hydrochlorothiazide (HYDRODIURIL) 12 5 mg tablet Take 12 5 mg by mouth daily , Starting Mon 3/26/2018, Historical Med      Melatonin 5 MG CAPS Take 5 mg by mouth, Historical Med      metoprolol tartrate (LOPRESSOR) 50 mg tablet Take 1 tablet (50 mg total) by mouth every 12 (twelve) hours for 30 days, Starting Mon 3/18/2019, Until Wed 4/17/2019, Normal      Multiple Vitamin (MULTIVITAMIN) tablet Take 1 tablet by mouth 2 (two) times a day, Starting Mon 3/4/2019, Normal      Omega-3 Fatty Acids (FISH OIL) 1,000 mg Take 1,000 mg by mouth daily, Historical Med      ondansetron (ZOFRAN) 4 mg tablet Take 1 tablet (4 mg total) by mouth every 8 (eight) hours as needed for nausea or vomiting, Starting Thu 1/24/2019, Normal      ondansetron (ZOFRAN-ODT) 4 mg disintegrating tablet Take 1 tablet (4 mg total) by mouth every 6 (six) hours as needed for nausea or vomiting, Starting Sun 3/21/2021, Normal      orphenadrine (NORFLEX) 100 mg tablet Take 100 mg by mouth 2 (two) times a day, Starting Mon 3/1/2021, Until Mon 8/23/2021, Historical Med      oxyCODONE-acetaminophen (Percocet) 2 5-325 MG per tablet Take 1,000 mg by mouth , Historical Med      !! oxyCODONE-acetaminophen (PERCOCET) 5-325 mg per tablet Take 1 tablet by mouth every 4 (four) hours as needed , Starting Tue 10/13/2020, Historical Med      potassium chloride (K-DUR,KLOR-CON) 20 mEq tablet Take 1 tablet (20 mEq total) by mouth 2 (two) times a day for 7 days, Starting Wed 5/15/2019, Until Mon 8/23/2021, Normal      predniSONE 20 mg tablet take 1 tablet by mouth three times a day for 4 days then twice a    (REFER TO PRESCRIPTION NOTES)  , Historical Med      simethicone (MYLICON) 80 mg chewable tablet Chew 1 tablet (80 mg total) every 12 (twelve) hours, Starting Fri 2/22/2019, Normal      sucralfate (CARAFATE) 1 g tablet Take 1 tablet (1 g total) by mouth 4 (four) times a day, Starting Sun 11/3/2019, Print      !! tamsulosin (FLOMAX) 0 4 mg Take 1 capsule (0 4 mg total) by mouth daily with dinner Until kidney stone passes, Starting Sat 12/28/2019, Print      !! tamsulosin (FLOMAX) 0 4 mg Take 1 capsule (0 4 mg total) by mouth daily with dinner, Starting Sun 3/21/2021, Normal      venlafaxine (EFFEXOR) 37 5 mg tablet Take 37 5 mg by mouth 2 (two) times a day , Historical Med      vitamin B-12 (CYANOCOBALAMIN) 500 MCG TABS Take 2 tablets (1,000 mcg total) by mouth daily for 120 days, Starting Mon 3/4/2019, Until Wed 7/14/2021, Normal       !! - Potential duplicate medications found  Please discuss with provider              PDMP Review     None          ED Provider  Electronically Signed by           Philippe Sinclair MD  09/01/21 0964

## 2021-09-01 NOTE — DISCHARGE INSTRUCTIONS
Follow-up with your primary doctor about the thyroid nodule  Rest  Ice next 24-48 hours  Percocet 1 every 6 hours if needed for pain caution narcotic will make a sleepy  Follow-up here provider through the [facility]

## 2021-09-02 ENCOUNTER — TELEPHONE (OUTPATIENT)
Dept: PHYSICAL THERAPY | Facility: OTHER | Age: 51
End: 2021-09-02

## 2021-09-02 NOTE — TELEPHONE ENCOUNTER
Call placed to the patient per [facility] referral     After explanation of the program the patient is refusing at this time  Patient states she is following up with her PCP  Referral Closed

## 2021-11-01 ENCOUNTER — TELEPHONE (OUTPATIENT)
Dept: HEMATOLOGY ONCOLOGY | Facility: CLINIC | Age: 51
End: 2021-11-01

## 2021-12-10 ENCOUNTER — TELEPHONE (OUTPATIENT)
Dept: HEMATOLOGY ONCOLOGY | Facility: CLINIC | Age: 51
End: 2021-12-10

## 2021-12-14 ENCOUNTER — APPOINTMENT (OUTPATIENT)
Dept: LAB | Facility: CLINIC | Age: 51
End: 2021-12-14
Payer: COMMERCIAL

## 2021-12-14 DIAGNOSIS — D50.8 IRON DEFICIENCY ANEMIA SECONDARY TO INADEQUATE DIETARY IRON INTAKE: ICD-10-CM

## 2021-12-14 LAB
ALBUMIN SERPL BCP-MCNC: 3.7 G/DL (ref 3.5–5)
ALP SERPL-CCNC: 107 U/L (ref 46–116)
ALT SERPL W P-5'-P-CCNC: 23 U/L (ref 12–78)
ANION GAP SERPL CALCULATED.3IONS-SCNC: 4 MMOL/L (ref 4–13)
AST SERPL W P-5'-P-CCNC: 11 U/L (ref 5–45)
BASOPHILS # BLD AUTO: 0.04 THOUSANDS/ΜL (ref 0–0.1)
BASOPHILS NFR BLD AUTO: 1 % (ref 0–1)
BILIRUB SERPL-MCNC: 0.82 MG/DL (ref 0.2–1)
BUN SERPL-MCNC: 17 MG/DL (ref 5–25)
CALCIUM SERPL-MCNC: 9.9 MG/DL (ref 8.3–10.1)
CHLORIDE SERPL-SCNC: 105 MMOL/L (ref 100–108)
CO2 SERPL-SCNC: 30 MMOL/L (ref 21–32)
CREAT SERPL-MCNC: 0.76 MG/DL (ref 0.6–1.3)
EOSINOPHIL # BLD AUTO: 0.2 THOUSAND/ΜL (ref 0–0.61)
EOSINOPHIL NFR BLD AUTO: 3 % (ref 0–6)
ERYTHROCYTE [DISTWIDTH] IN BLOOD BY AUTOMATED COUNT: 14.8 % (ref 11.6–15.1)
FERRITIN SERPL-MCNC: 22 NG/ML (ref 8–388)
GFR SERPL CREATININE-BSD FRML MDRD: 90 ML/MIN/1.73SQ M
GLUCOSE P FAST SERPL-MCNC: 101 MG/DL (ref 65–99)
HCT VFR BLD AUTO: 43.5 % (ref 34.8–46.1)
HGB BLD-MCNC: 14.2 G/DL (ref 11.5–15.4)
IMM GRANULOCYTES # BLD AUTO: 0.04 THOUSAND/UL (ref 0–0.2)
IMM GRANULOCYTES NFR BLD AUTO: 1 % (ref 0–2)
IRON SATN MFR SERPL: 17 % (ref 15–50)
IRON SERPL-MCNC: 61 UG/DL (ref 50–170)
LYMPHOCYTES # BLD AUTO: 1.48 THOUSANDS/ΜL (ref 0.6–4.47)
LYMPHOCYTES NFR BLD AUTO: 21 % (ref 14–44)
MCH RBC QN AUTO: 28.2 PG (ref 26.8–34.3)
MCHC RBC AUTO-ENTMCNC: 32.6 G/DL (ref 31.4–37.4)
MCV RBC AUTO: 87 FL (ref 82–98)
MONOCYTES # BLD AUTO: 0.54 THOUSAND/ΜL (ref 0.17–1.22)
MONOCYTES NFR BLD AUTO: 8 % (ref 4–12)
NEUTROPHILS # BLD AUTO: 4.8 THOUSANDS/ΜL (ref 1.85–7.62)
NEUTS SEG NFR BLD AUTO: 66 % (ref 43–75)
NRBC BLD AUTO-RTO: 0 /100 WBCS
PLATELET # BLD AUTO: 275 THOUSANDS/UL (ref 149–390)
PMV BLD AUTO: 11.3 FL (ref 8.9–12.7)
POTASSIUM SERPL-SCNC: 3.9 MMOL/L (ref 3.5–5.3)
PROT SERPL-MCNC: 7.3 G/DL (ref 6.4–8.2)
RBC # BLD AUTO: 5.03 MILLION/UL (ref 3.81–5.12)
SODIUM SERPL-SCNC: 139 MMOL/L (ref 136–145)
TIBC SERPL-MCNC: 350 UG/DL (ref 250–450)
WBC # BLD AUTO: 7.1 THOUSAND/UL (ref 4.31–10.16)

## 2021-12-14 PROCEDURE — 80053 COMPREHEN METABOLIC PANEL: CPT

## 2021-12-14 PROCEDURE — 36415 COLL VENOUS BLD VENIPUNCTURE: CPT

## 2021-12-14 PROCEDURE — 85025 COMPLETE CBC W/AUTO DIFF WBC: CPT

## 2021-12-14 PROCEDURE — 83540 ASSAY OF IRON: CPT

## 2021-12-14 PROCEDURE — 82728 ASSAY OF FERRITIN: CPT

## 2021-12-14 PROCEDURE — 83550 IRON BINDING TEST: CPT

## 2021-12-17 ENCOUNTER — TELEPHONE (OUTPATIENT)
Dept: HEMATOLOGY ONCOLOGY | Facility: CLINIC | Age: 51
End: 2021-12-17

## 2021-12-17 ENCOUNTER — OFFICE VISIT (OUTPATIENT)
Dept: HEMATOLOGY ONCOLOGY | Facility: CLINIC | Age: 51
End: 2021-12-17
Payer: COMMERCIAL

## 2021-12-17 VITALS
HEART RATE: 84 BPM | DIASTOLIC BLOOD PRESSURE: 82 MMHG | TEMPERATURE: 98 F | HEIGHT: 69 IN | SYSTOLIC BLOOD PRESSURE: 126 MMHG | OXYGEN SATURATION: 100 % | RESPIRATION RATE: 16 BRPM | BODY MASS INDEX: 38.4 KG/M2

## 2021-12-17 DIAGNOSIS — D50.0 IRON DEFICIENCY ANEMIA DUE TO CHRONIC BLOOD LOSS: Primary | ICD-10-CM

## 2021-12-17 PROCEDURE — 99214 OFFICE O/P EST MOD 30 MIN: CPT | Performed by: INTERNAL MEDICINE

## 2021-12-17 RX ORDER — SODIUM CHLORIDE 9 MG/ML
20 INJECTION, SOLUTION INTRAVENOUS ONCE
Status: CANCELLED | OUTPATIENT
Start: 2021-12-27

## 2021-12-29 ENCOUNTER — HOSPITAL ENCOUNTER (OUTPATIENT)
Dept: INFUSION CENTER | Facility: CLINIC | Age: 51
Discharge: HOME | End: 2021-12-29
Payer: COMMERCIAL

## 2021-12-29 VITALS
TEMPERATURE: 97.3 F | DIASTOLIC BLOOD PRESSURE: 78 MMHG | RESPIRATION RATE: 16 BRPM | SYSTOLIC BLOOD PRESSURE: 163 MMHG | HEART RATE: 77 BPM

## 2021-12-29 DIAGNOSIS — D50.0 IRON DEFICIENCY ANEMIA DUE TO CHRONIC BLOOD LOSS: Primary | ICD-10-CM

## 2021-12-29 PROCEDURE — 96365 THER/PROPH/DIAG IV INF INIT: CPT

## 2021-12-29 RX ORDER — SODIUM CHLORIDE 9 MG/ML
20 INJECTION, SOLUTION INTRAVENOUS ONCE
Status: COMPLETED | OUTPATIENT
Start: 2021-12-29 | End: 2021-12-29

## 2021-12-29 RX ORDER — SODIUM CHLORIDE 9 MG/ML
20 INJECTION, SOLUTION INTRAVENOUS ONCE
Status: CANCELLED | OUTPATIENT
Start: 2022-01-05

## 2021-12-29 RX ADMIN — FERUMOXYTOL 510 MG: 510 INJECTION INTRAVENOUS at 15:02

## 2021-12-29 RX ADMIN — SODIUM CHLORIDE 20 ML/HR: 0.9 INJECTION, SOLUTION INTRAVENOUS at 15:01

## 2021-12-29 NOTE — PROGRESS NOTES
Pt presents for Feraheme infusion offering no complaints  Pt tolerated treatment without incident  PIV removed  AVS declined, next appointment reviewed

## 2022-01-05 ENCOUNTER — HOSPITAL ENCOUNTER (OUTPATIENT)
Dept: INFUSION CENTER | Facility: CLINIC | Age: 52
Discharge: HOME | End: 2022-01-05
Payer: COMMERCIAL

## 2022-01-05 VITALS
HEART RATE: 84 BPM | TEMPERATURE: 98.5 F | SYSTOLIC BLOOD PRESSURE: 138 MMHG | RESPIRATION RATE: 20 BRPM | DIASTOLIC BLOOD PRESSURE: 80 MMHG

## 2022-01-05 DIAGNOSIS — D50.0 IRON DEFICIENCY ANEMIA DUE TO CHRONIC BLOOD LOSS: Primary | ICD-10-CM

## 2022-01-05 PROCEDURE — 96365 THER/PROPH/DIAG IV INF INIT: CPT

## 2022-01-05 RX ORDER — SODIUM CHLORIDE 9 MG/ML
20 INJECTION, SOLUTION INTRAVENOUS ONCE
Status: COMPLETED | OUTPATIENT
Start: 2022-01-05 | End: 2022-01-05

## 2022-01-05 RX ORDER — SODIUM CHLORIDE 9 MG/ML
20 INJECTION, SOLUTION INTRAVENOUS ONCE
Status: CANCELLED | OUTPATIENT
Start: 2022-01-05

## 2022-01-05 RX ADMIN — FERUMOXYTOL 510 MG: 510 INJECTION INTRAVENOUS at 14:45

## 2022-01-05 RX ADMIN — SODIUM CHLORIDE 20 ML/HR: 9 INJECTION, SOLUTION INTRAVENOUS at 14:47

## 2022-01-05 NOTE — PROGRESS NOTES
Pt here fereheme, offering no complaints  Left arm IV placed with positive blood return noted  Tolerated infusion without incident  PIV removed  AVS declined   Walked out in stable condition

## 2022-03-31 ENCOUNTER — OFFICE VISIT (OUTPATIENT)
Dept: BARIATRICS | Facility: CLINIC | Age: 52
End: 2022-03-31
Payer: COMMERCIAL

## 2022-03-31 VITALS
WEIGHT: 282 LBS | SYSTOLIC BLOOD PRESSURE: 134 MMHG | BODY MASS INDEX: 42.74 KG/M2 | TEMPERATURE: 97.8 F | HEIGHT: 68 IN | DIASTOLIC BLOOD PRESSURE: 90 MMHG | RESPIRATION RATE: 14 BRPM | HEART RATE: 98 BPM

## 2022-03-31 DIAGNOSIS — E88.81 METABOLIC SYNDROME: ICD-10-CM

## 2022-03-31 DIAGNOSIS — E78.1 HYPERTRIGLYCERIDEMIA: ICD-10-CM

## 2022-03-31 DIAGNOSIS — E78.5 HYPERLIPIDEMIA: ICD-10-CM

## 2022-03-31 DIAGNOSIS — R73.01 IFG (IMPAIRED FASTING GLUCOSE): ICD-10-CM

## 2022-03-31 DIAGNOSIS — R73.03 PREDIABETES: ICD-10-CM

## 2022-03-31 DIAGNOSIS — Z48.815 ENCOUNTER FOR SURGICAL AFTERCARE FOLLOWING SURGERY ON THE DIGESTIVE SYSTEM: Primary | ICD-10-CM

## 2022-03-31 DIAGNOSIS — Z98.84 HISTORY OF BARIATRIC SURGERY: ICD-10-CM

## 2022-03-31 DIAGNOSIS — I10 ESSENTIAL HYPERTENSION: ICD-10-CM

## 2022-03-31 DIAGNOSIS — K91.2 POSTSURGICAL MALABSORPTION: ICD-10-CM

## 2022-03-31 PROBLEM — E88.810 METABOLIC SYNDROME: Status: ACTIVE | Noted: 2022-03-31

## 2022-03-31 PROCEDURE — 99213 OFFICE O/P EST LOW 20 MIN: CPT | Performed by: SURGERY

## 2022-03-31 NOTE — PROGRESS NOTES
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 s/p diagnostic laparoscopy converted to open, remnant gastrectomy, left hemicolectomy presents with weight gain        Mindful eating, stress reduction, sleep hygiene   Increase exercise  MVI/minerals  RD/LCSW follow up for education and guidance   MWM consult potential medications (pre-DM)  Obtain metabolic panel        Subjective/Objective     Subjective: Weight gain related to stress  Working 70 hours a week for the past 5 years, but has recently cut her hours in half and is feeling better  She is starting to eat mindfully and is starting to cook more at home  She is receiving iron transfusions  Review of systems: Negative except as noted above    Objective:     Blood pressure 134/90, pulse 98, temperature 97 8 °F (36 6 °C), temperature source Tympanic, resp  rate 14, height 5' 7 5" (1 715 m), weight 128 kg (282 lb), not currently breastfeeding  ,Body mass index is 43 52 kg/m²  Invasive Devices  Report    None                 Physical Exam:     No distress   EOMI   CN II-XII grossly intact  Neck ROM wnl   RRR  Breathing non labored   Abd flat, ND  Skin warm/dry  Msk ROM wnl   Thought content/behavior/mood wnl               Lab, Imaging and other studies:I have personally reviewed pertinent lab results

## 2022-04-07 ENCOUNTER — TELEPHONE (OUTPATIENT)
Dept: BARIATRICS | Facility: CLINIC | Age: 52
End: 2022-04-07

## 2022-04-07 ENCOUNTER — APPOINTMENT (OUTPATIENT)
Dept: LAB | Facility: CLINIC | Age: 52
End: 2022-04-07
Payer: COMMERCIAL

## 2022-04-07 DIAGNOSIS — R73.03 PREDIABETES: ICD-10-CM

## 2022-04-07 DIAGNOSIS — K91.2 POSTSURGICAL MALABSORPTION: ICD-10-CM

## 2022-04-07 DIAGNOSIS — R73.01 IFG (IMPAIRED FASTING GLUCOSE): ICD-10-CM

## 2022-04-07 DIAGNOSIS — E55.9 VITAMIN D DEFICIENCY: ICD-10-CM

## 2022-04-07 DIAGNOSIS — K91.2 POSTSURGICAL MALABSORPTION: Primary | ICD-10-CM

## 2022-04-07 LAB
25(OH)D3 SERPL-MCNC: 20.2 NG/ML (ref 30–100)
ALBUMIN SERPL BCP-MCNC: 4.1 G/DL (ref 3.5–5)
ALP SERPL-CCNC: 102 U/L (ref 46–116)
ALT SERPL W P-5'-P-CCNC: 25 U/L (ref 12–78)
ANION GAP SERPL CALCULATED.3IONS-SCNC: 5 MMOL/L (ref 4–13)
AST SERPL W P-5'-P-CCNC: 14 U/L (ref 5–45)
BILIRUB SERPL-MCNC: 0.65 MG/DL (ref 0.2–1)
BUN SERPL-MCNC: 22 MG/DL (ref 5–25)
CALCIUM SERPL-MCNC: 9.9 MG/DL (ref 8.3–10.1)
CHLORIDE SERPL-SCNC: 108 MMOL/L (ref 100–108)
CHOLEST SERPL-MCNC: 213 MG/DL
CO2 SERPL-SCNC: 25 MMOL/L (ref 21–32)
CREAT SERPL-MCNC: 0.76 MG/DL (ref 0.6–1.3)
ERYTHROCYTE [DISTWIDTH] IN BLOOD BY AUTOMATED COUNT: 13.8 % (ref 11.6–15.1)
EST. AVERAGE GLUCOSE BLD GHB EST-MCNC: 120 MG/DL
FERRITIN SERPL-MCNC: 149 NG/ML (ref 8–388)
FOLATE SERPL-MCNC: 7.6 NG/ML (ref 3.1–17.5)
GFR SERPL CREATININE-BSD FRML MDRD: 90 ML/MIN/1.73SQ M
GLUCOSE P FAST SERPL-MCNC: 129 MG/DL (ref 65–99)
HBA1C MFR BLD: 5.8 %
HCT VFR BLD AUTO: 47.3 % (ref 34.8–46.1)
HDLC SERPL-MCNC: 55 MG/DL
HGB BLD-MCNC: 15.8 G/DL (ref 11.5–15.4)
IRON SATN MFR SERPL: 23 % (ref 15–50)
IRON SERPL-MCNC: 76 UG/DL (ref 50–170)
LDLC SERPL CALC-MCNC: 140 MG/DL (ref 0–100)
MCH RBC QN AUTO: 29 PG (ref 26.8–34.3)
MCHC RBC AUTO-ENTMCNC: 33.4 G/DL (ref 31.4–37.4)
MCV RBC AUTO: 87 FL (ref 82–98)
NONHDLC SERPL-MCNC: 158 MG/DL
PLATELET # BLD AUTO: 296 THOUSANDS/UL (ref 149–390)
PMV BLD AUTO: 11.3 FL (ref 8.9–12.7)
POTASSIUM SERPL-SCNC: 3.9 MMOL/L (ref 3.5–5.3)
PROT SERPL-MCNC: 7.9 G/DL (ref 6.4–8.2)
PTH-INTACT SERPL-MCNC: 87.6 PG/ML (ref 18.4–80.1)
RBC # BLD AUTO: 5.45 MILLION/UL (ref 3.81–5.12)
SODIUM SERPL-SCNC: 138 MMOL/L (ref 136–145)
TIBC SERPL-MCNC: 337 UG/DL (ref 250–450)
TRIGL SERPL-MCNC: 91 MG/DL
VIT B12 SERPL-MCNC: 608 PG/ML (ref 100–900)
WBC # BLD AUTO: 10.43 THOUSAND/UL (ref 4.31–10.16)

## 2022-04-07 PROCEDURE — 82728 ASSAY OF FERRITIN: CPT

## 2022-04-07 PROCEDURE — 80061 LIPID PANEL: CPT

## 2022-04-07 PROCEDURE — 84590 ASSAY OF VITAMIN A: CPT

## 2022-04-07 PROCEDURE — 83550 IRON BINDING TEST: CPT

## 2022-04-07 PROCEDURE — 82306 VITAMIN D 25 HYDROXY: CPT

## 2022-04-07 PROCEDURE — 84425 ASSAY OF VITAMIN B-1: CPT

## 2022-04-07 PROCEDURE — 83036 HEMOGLOBIN GLYCOSYLATED A1C: CPT

## 2022-04-07 PROCEDURE — 84630 ASSAY OF ZINC: CPT

## 2022-04-07 PROCEDURE — 83540 ASSAY OF IRON: CPT

## 2022-04-07 PROCEDURE — 85027 COMPLETE CBC AUTOMATED: CPT

## 2022-04-07 PROCEDURE — 82746 ASSAY OF FOLIC ACID SERUM: CPT

## 2022-04-07 PROCEDURE — 82607 VITAMIN B-12: CPT

## 2022-04-07 PROCEDURE — 36415 COLL VENOUS BLD VENIPUNCTURE: CPT

## 2022-04-07 PROCEDURE — 80053 COMPREHEN METABOLIC PANEL: CPT

## 2022-04-07 PROCEDURE — 83970 ASSAY OF PARATHORMONE: CPT

## 2022-04-07 RX ORDER — ERGOCALCIFEROL 1.25 MG/1
50000 CAPSULE ORAL
Qty: 24 CAPSULE | Refills: 0 | Status: SHIPPED | OUTPATIENT
Start: 2022-04-07

## 2022-04-07 NOTE — TELEPHONE ENCOUNTER
Please advise patient that labs from 04/07/22 show:     -Vitamin D deficiency - Start vitamin D deficiency Rx for 50,000IU 2x/week with FOOD x 12 weeks  Repeat vitamin D lab in about 4 months as ordered       -LDL elevated - -Avoid fried foods and trans fat, limit saturated fats and refined carbohydrates  -Increase fish/omega 3 FA consumption  -Increase physical activity    -Elevated WBC and Hemoglobin and hematocrit - push hydrating fluids and discuss with PCP

## 2022-04-10 LAB — ZINC SERPL-MCNC: 76 UG/DL (ref 44–115)

## 2022-04-14 ENCOUNTER — CONSULT (OUTPATIENT)
Dept: BARIATRICS | Facility: CLINIC | Age: 52
End: 2022-04-14
Payer: COMMERCIAL

## 2022-04-14 VITALS
HEART RATE: 81 BPM | RESPIRATION RATE: 16 BRPM | DIASTOLIC BLOOD PRESSURE: 80 MMHG | HEIGHT: 65 IN | WEIGHT: 280 LBS | BODY MASS INDEX: 46.65 KG/M2 | SYSTOLIC BLOOD PRESSURE: 128 MMHG | TEMPERATURE: 98.9 F

## 2022-04-14 DIAGNOSIS — R73.03 PREDIABETES: ICD-10-CM

## 2022-04-14 DIAGNOSIS — Z98.84 S/P GASTRIC BYPASS: ICD-10-CM

## 2022-04-14 DIAGNOSIS — Z98.84 WEIGHT GAIN STATUS POST GASTRIC BYPASS: ICD-10-CM

## 2022-04-14 DIAGNOSIS — R63.5 ABNORMAL WEIGHT GAIN: ICD-10-CM

## 2022-04-14 DIAGNOSIS — E66.01 OBESITY, CLASS III, BMI 40-49.9 (MORBID OBESITY) (HCC): Primary | ICD-10-CM

## 2022-04-14 DIAGNOSIS — I10 ESSENTIAL HYPERTENSION: ICD-10-CM

## 2022-04-14 DIAGNOSIS — E55.9 VITAMIN D DEFICIENCY: ICD-10-CM

## 2022-04-14 DIAGNOSIS — R63.5 WEIGHT GAIN STATUS POST GASTRIC BYPASS: ICD-10-CM

## 2022-04-14 PROCEDURE — 99214 OFFICE O/P EST MOD 30 MIN: CPT | Performed by: INTERNAL MEDICINE

## 2022-04-14 RX ORDER — TOPIRAMATE 50 MG/1
TABLET, FILM COATED ORAL
Qty: 30 TABLET | Refills: 3 | Status: SHIPPED | OUTPATIENT
Start: 2022-04-14 | End: 2022-06-08 | Stop reason: SDUPTHER

## 2022-04-14 NOTE — PROGRESS NOTES
Assessment/Plan:  Hkaeem Lu was seen today for consult  Diagnoses and all orders for this visit:    Prediabetes  A1C:   Lab Results   Component Value Date    HGBA1C 5 8 (H) 04/07/2022    HGBA1C 5 9 (H) 09/01/2020   consider metformin   Weight loss/low carbohydrate diet should help improve blood glucose levels    Abnormal weight gain  -     topiramate (TOPAMAX) 50 MG tablet; Take half tablet at night for 1 week, then take 1 tablet at night    Weight gain status post gastric bypass  -     topiramate (TOPAMAX) 50 MG tablet; Take half tablet at night for 1 week, then take 1 tablet at night    S/P gastric bypass  Vitamin D deficiency  Pt to start taking her vitamins    Essential hypertension  Weight loss and increasing activity level should help  Limit sodium intake <2500mg per day     Obesity, Class III, BMI 40-49 9 (morbid obesity) (HCC)  Trial topamax to help with cravings/appetite  -     topiramate (TOPAMAX) 50 MG tablet; Take half tablet at night for 1 week, then take 1 tablet at night    Goals:  Goal protein intake 90-100g per day  8072-6887 calories per day    Total time spent: 30 minutes with >50% face-to-face time with the patient  Follow up in approximately 2 months with Non-Surgical Physician/Advanced Practitioner  Subjective:   Chief Complaint   Patient presents with    Consult     Patient presents for an initial visit with Luci Santiago  Pt states her medicines are the same Not changes  Patient ID: Dunia Adam  is a 45 y o  female with excess weight/obesity here to pursue weight management  Patient is interested in pursuing MWM  Most recent notes and records were reviewed      HPI   50/F s/p gastric band with Dr Amberly Díaz, then s/p LRYGB in 2019 s/p diagnostic laparoscopy/afferent limb small bowel resection with 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) which was complicated by a gastric remnant blowout s/p diagnostic laparoscopy converted to open, remnant gastrectomy, left hemicolectomy presents with weight gain    Prior to surgery: 350# lost 30#   #   SUNG: 230#   Weight regain 50 lbs dt bad habits    Wt Readings from Last 10 Encounters:   04/14/22 127 kg (280 lb)   03/31/22 128 kg (282 lb)   09/01/21 118 kg (260 lb)   08/23/21 121 kg (266 lb)   07/14/21 118 kg (260 lb 9 3 oz)   07/01/21 118 kg (259 lb 12 8 oz)   06/28/21 120 kg (265 lb)   06/17/21 119 kg (263 lb 3 2 oz)   03/21/21 120 kg (263 lb 10 7 oz)   11/17/20 117 kg (257 lb 6 4 oz)     Initial weight MWM: 280 lbs  Current weight: --  Change in weight: --    Stress eats  Frustrated with weight gain, tearful  Likes sweets and cannot seem to stop  Knows what to do but unable to carry out her thoughts  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  Drinks- 32 oz   Alcohol- no  Exercise- no      The following portions of the patient's history were reviewed and updated as appropriate: allergies, current medications, past family history, past medical history, past social history, past surgical history, and problem list     Review of Systems   Respiratory: Negative  Cardiovascular: Negative  Gastrointestinal: Negative  Musculoskeletal: Positive for arthralgias  Objective:  /80 (BP Location: Left arm, Patient Position: Sitting, Cuff Size: Large)   Pulse 81   Temp 98 9 °F (37 2 °C)   Resp 16   Ht 5' 5" (1 651 m)   Wt 127 kg (280 lb)   LMP  (LMP Unknown)   BMI 46 59 kg/m²   Constitutional: Well-developed, well-nourished and obese Body mass index is 46 59 kg/m²  Mukund Anderson HEENT: No conjunctival pallor or jaundice  Pulmonary: No increased work of breathing or signs of respiratory distress  CV: Normal rate, well-perfused  GI: Obese  Non-distended  MSK: No edema   Neuro: Oriented to person, place and time  Normal Speech  Normal gait  Psych: Normal affect and mood       Labs and Imaging  Most recent labs and imaging reviewed

## 2022-04-15 ENCOUNTER — OFFICE VISIT (OUTPATIENT)
Dept: BARIATRICS | Facility: CLINIC | Age: 52
End: 2022-04-15

## 2022-04-15 DIAGNOSIS — Z98.84 BARIATRIC SURGERY STATUS: Primary | ICD-10-CM

## 2022-04-15 PROCEDURE — RECHECK

## 2022-04-15 NOTE — PROGRESS NOTES
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  Was working 70 hours a week, but 3 weeks ago decided that she needed to take care of herself  Has 3 children and  has 4  Has 8 grandchildren and daughter and daughter in law are both pregnant  Son and his girlfriend live with her and her   Had band placed about 10 years ago by Dr Seth Chew and then had to have it removed due to it slipping  No routine for meals  Started yesterday starting to focus on having a routine for hear meals and prepping her meals  Drinking about 32 oz of water, was drinking a lot of coffee and stopped or will have decaf  Always does for others  Father passed 20 years ago  Encouraged balance  Discouraged the over-restricting/over-eating cycle  Doing PT for her shoulder, back, and neck 1-2x per week  Goals discussed: 1  Be mindful of self care 2  Increase fluids 3  Increase positive coping skills 4  Be mindful not to skip meals 5   Increase activity HC LCSW

## 2022-04-18 LAB — VIT B1 BLD-SCNC: 181.3 NMOL/L (ref 66.5–200)

## 2022-04-21 LAB — VIT A SERPL-MCNC: 55.3 UG/DL (ref 20.1–62)

## 2022-04-26 ENCOUNTER — TELEPHONE (OUTPATIENT)
Dept: HEMATOLOGY ONCOLOGY | Facility: HOSPITAL | Age: 52
End: 2022-04-26

## 2022-04-26 NOTE — TELEPHONE ENCOUNTER
Called patient and LVM with rescheduled appt, date, time, location and different provider, due to provider not being here anymore  LVM with hopeline # provided if appt needed to be rescheduled

## 2022-05-04 ENCOUNTER — TELEPHONE (OUTPATIENT)
Dept: HEMATOLOGY ONCOLOGY | Facility: HOSPITAL | Age: 52
End: 2022-05-04

## 2022-05-12 ENCOUNTER — TELEPHONE (OUTPATIENT)
Dept: HEMATOLOGY ONCOLOGY | Facility: CLINIC | Age: 52
End: 2022-05-12

## 2022-05-13 ENCOUNTER — TELEPHONE (OUTPATIENT)
Dept: HEMATOLOGY ONCOLOGY | Facility: CLINIC | Age: 52
End: 2022-05-13

## 2022-05-13 ENCOUNTER — TELEPHONE (OUTPATIENT)
Dept: BARIATRICS | Facility: CLINIC | Age: 52
End: 2022-05-13

## 2022-05-13 NOTE — TELEPHONE ENCOUNTER
Appointment Cancellation Or Reschedule     Person calling in patient   Provider Ramya Melo   Office Visit Date and Time 6/29 @ 10   [facility]   Did patient want to reschedule their office appointment? If so, when was it scheduled to? Yes 6/28 @ 1pm   Is this patient calling to reschedule an infusion appointment? no   When is their next infusion appointment? n/a   Is this patient a Chemo patient? no   Reason for Cancellation or Reschedule Provider out of office on 6/29     If the patient is a treatment patient, please route this to the office nurse  If the patient is not on treatment, please route to the office MA

## 2022-05-13 NOTE — TELEPHONE ENCOUNTER
Pt called, would like to speak to Dr To Copeland, informed pt she will be on vacation for the next two weeks  Pt would like Dr To Copeland to call her when she returns from vacation

## 2022-05-16 NOTE — TELEPHONE ENCOUNTER
Spoke to patient she is wondering if Phentermine can be prescribed for her in conjunction with the Topamax she is taking  She says the Topamax is helping her tremendously with her sleeping at night, but doesn't feel it is helping much for the appetite  Please advise

## 2022-05-17 NOTE — TELEPHONE ENCOUNTER
Called patient back and informed her that it would be Dr Geoff Quinones decision if she would be able to add Phentermine to her medications  Informed her that Dr Alexander Weston will be returning on 5/31 and we would get back to her as soon as possible

## 2022-05-17 NOTE — TELEPHONE ENCOUNTER
I briefly reviewed patient's chart  It appears she is status post bariatric surgery  Topamax is a great choice for treatment for patients that are post bariatric surgery that are having weight regain  As I have told many patients in the past with regard to anti obesity medication as well as other medications they do take time to work  While patient may not notice a change in her appetite the medication may be working to help with weight loss  As for phentermine this will need to be addressed by Dr Wendy Colby when she returns  I do see patient has a follow-up with Dr Wendy Colby in June  Patient does have hypertension which may prevent her from taking phentermine  That will be something patient has to discuss with Dr Wendy Colby

## 2022-05-31 ENCOUNTER — TELEPHONE (OUTPATIENT)
Dept: BARIATRICS | Facility: CLINIC | Age: 52
End: 2022-05-31

## 2022-05-31 DIAGNOSIS — E66.01 OBESITY, CLASS III, BMI 40-49.9 (MORBID OBESITY) (HCC): Primary | ICD-10-CM

## 2022-05-31 RX ORDER — PHENTERMINE HYDROCHLORIDE 37.5 MG/1
TABLET ORAL
Qty: 15 TABLET | Refills: 1 | Status: SHIPPED | OUTPATIENT
Start: 2022-05-31 | End: 2022-06-02

## 2022-05-31 NOTE — TELEPHONE ENCOUNTER
Called pt to discuss her concerns  Since she started topamax, she has been sleeping better, now 6-7 hours vs 2-3 hours before  States her weight is now down to 277 lbs from 280-285 lbs  Making lifestlye changes including doing a Premier shake in the morning, Drinking enough water  Still gets cravings between 5-9 pm  Takes topamax at 5pm  Exercise- limited due to pain   Pt asking if phentermine can be added to her regimen as she continues to have cravings  I discussed the potential side effects including addictive features, insomnia, tachycardia etc and it is not meant to be taking for long term  Advised not to take any opioids with it  Last EKG a year ago looks okay   Will add phentermine 18 75mg to topamax  I will see her in a few weeks to discuss her progress  Also discussed going up on topamax is an option

## 2022-06-01 ENCOUNTER — TELEPHONE (OUTPATIENT)
Dept: BARIATRICS | Facility: CLINIC | Age: 52
End: 2022-06-01

## 2022-06-01 NOTE — TELEPHONE ENCOUNTER
Pt called about her rx Gabrielle Mayen sent her yesterday, Lew Shelton told her Dr Gabrielle Mayen is not authorized or liecensed  to prescribe her phentermine  Pt wants Gabrielle Mayen to give her a call again

## 2022-06-01 NOTE — TELEPHONE ENCOUNTER
Per [facility] there is a problem with Dr Misha Fernandez #  We made sure it had nothing to do with the name change to [facility]  Not sure what the problem is  Contacted patient and told her that yes there is a problem with the MELANIE # and once it's corrected we'll let her know and at that point medication may be filled  Patient verbalized understanding

## 2022-06-02 NOTE — PROGRESS NOTES
I discontinued the phentermine as the patient has been continuously calling our office asking for the medication, despite her being aware that there is an issue with my MELANIE number and we are unable to process that through the pharmacy  Our office staff has communicated with her numerous times that the medication is unable to be prescribed due to [address] [facility] issue and although she has expressed verbal understanding during those conversations, she continues to keep calling back asking for it  This is consistent with drug-seeking behavior, thus I will not prescribe her phentermine

## 2022-06-03 ENCOUNTER — TELEPHONE (OUTPATIENT)
Dept: BARIATRICS | Facility: CLINIC | Age: 52
End: 2022-06-03

## 2022-06-03 NOTE — TELEPHONE ENCOUNTER
Patient called again regarding her prescription  Per the last note I indicated that the phentermine is discontinued for now due to the issue with dr gilbert's MELANIE # and that nothing can be done until corrected  I also explained that we were aware that she is also aware of this as we had told her this multiple times  She did acknowledge this

## 2022-06-08 ENCOUNTER — TELEPHONE (OUTPATIENT)
Dept: BARIATRICS | Facility: CLINIC | Age: 52
End: 2022-06-08

## 2022-06-08 DIAGNOSIS — Z98.84 WEIGHT GAIN STATUS POST GASTRIC BYPASS: ICD-10-CM

## 2022-06-08 DIAGNOSIS — E66.01 OBESITY, CLASS III, BMI 40-49.9 (MORBID OBESITY) (HCC): Primary | ICD-10-CM

## 2022-06-08 DIAGNOSIS — R63.5 ABNORMAL WEIGHT GAIN: ICD-10-CM

## 2022-06-08 DIAGNOSIS — R63.5 WEIGHT GAIN STATUS POST GASTRIC BYPASS: ICD-10-CM

## 2022-06-08 RX ORDER — TOPIRAMATE 50 MG/1
TABLET, FILM COATED ORAL
Qty: 60 TABLET | Refills: 1 | Status: SHIPPED | OUTPATIENT
Start: 2022-06-08 | End: 2022-06-23 | Stop reason: SDUPTHER

## 2022-06-08 NOTE — TELEPHONE ENCOUNTER
Per provider doctor Loc Rebollar, patient was told Phentermine was d/c and she can take topamax twice a day

## 2022-06-08 NOTE — TELEPHONE ENCOUNTER
I called the patient to clarify regarding phentermine/topamax  After the last conversation with her on 5/31, she started taking topamax 50mg BID, tolerating w/o issues  She had lost another 2 lbs or so since  She needed a refill and I told her I already sent it to her pharmacy  In regards to phentermine, however--   She called again today asking about the status of phentermine  Huma MA spoke to her and let her know the prescription was discontinued  Pt said she was "lied to about it" that no one contacted her until today that phentermine was discontinued and and asked why it was discontinued  I clarified that there was no lie and it was due to a miscommunication that she was not let aware of  I explained that I did prescribe it to her initially  However, as she has been calling excessively to check on the status on phentermine despite being aware there was an issue with the MELANIE going through (as previously noted in my note 6/2), I discontinued it due to the concerns of her drug seeking behavior  I made Sandy TURNER aware that I discontinued the medication for her and also noted on the patient's chart  I also made Joellen Holter, our practice coordinator aware of the situation  After explaining my reasoning, she got very upset and yelled "I don't even know what kind of medication phentermine is and don't want to be labeled as a drug seeker " And yet, she did admit that she heard it's a controlled substance and it cannot be taken for longer than a few months  She denies ever trying phentermine in the past and currently she doesn't take any pain meds, also saying "you can come to my house and do a drug test on me " She was told by her step-daughter that topamax is taken with phentermine together and that she should try it  I told her although adding phentermine can be helpful, it is ultimately my medical decision to prescribe her what I think is necessary       I made it clear that although I had initially prescribed phentermine to her based on my assessment at the time, my decision has changed based on her actions with our practice in the past week or so and that my ultimate decision is for her to only take topamax 50mg BID for now and I will not prescribe her phentermine  She said "okay" and expressed her understanding  I told her she can f/u with me in a few weeks to assess her progress

## 2022-06-08 NOTE — TELEPHONE ENCOUNTER
Patient called in again for two reasons, first to check on the status of the phentermine/shena status situation and 2nd the confirmt the upgrade on her topamax to 2x per day  Please call to confirm that this was conveyed to the pharmacy so that when she picks it up, it's correct

## 2022-06-10 ENCOUNTER — TELEPHONE (OUTPATIENT)
Dept: BARIATRICS | Facility: CLINIC | Age: 52
End: 2022-06-10

## 2022-06-14 ENCOUNTER — APPOINTMENT (OUTPATIENT)
Dept: LAB | Facility: CLINIC | Age: 52
End: 2022-06-14
Payer: COMMERCIAL

## 2022-06-14 DIAGNOSIS — D50.0 IRON DEFICIENCY ANEMIA DUE TO CHRONIC BLOOD LOSS: ICD-10-CM

## 2022-06-14 LAB
ALBUMIN SERPL BCP-MCNC: 3.9 G/DL (ref 3.5–5)
ALP SERPL-CCNC: 85 U/L (ref 46–116)
ALT SERPL W P-5'-P-CCNC: 26 U/L (ref 12–78)
ANION GAP SERPL CALCULATED.3IONS-SCNC: 2 MMOL/L (ref 4–13)
AST SERPL W P-5'-P-CCNC: 16 U/L (ref 5–45)
BASOPHILS # BLD AUTO: 0.05 THOUSANDS/ΜL (ref 0–0.1)
BASOPHILS NFR BLD AUTO: 1 % (ref 0–1)
BILIRUB SERPL-MCNC: 1.08 MG/DL (ref 0.2–1)
BUN SERPL-MCNC: 19 MG/DL (ref 5–25)
CALCIUM SERPL-MCNC: 9.7 MG/DL (ref 8.3–10.1)
CHLORIDE SERPL-SCNC: 111 MMOL/L (ref 100–108)
CO2 SERPL-SCNC: 26 MMOL/L (ref 21–32)
CREAT SERPL-MCNC: 0.88 MG/DL (ref 0.6–1.3)
EOSINOPHIL # BLD AUTO: 0.11 THOUSAND/ΜL (ref 0–0.61)
EOSINOPHIL NFR BLD AUTO: 1 % (ref 0–6)
ERYTHROCYTE [DISTWIDTH] IN BLOOD BY AUTOMATED COUNT: 13.7 % (ref 11.6–15.1)
FERRITIN SERPL-MCNC: 134 NG/ML (ref 8–388)
GFR SERPL CREATININE-BSD FRML MDRD: 75 ML/MIN/1.73SQ M
GLUCOSE P FAST SERPL-MCNC: 91 MG/DL (ref 65–99)
HCT VFR BLD AUTO: 45.8 % (ref 34.8–46.1)
HGB BLD-MCNC: 15.4 G/DL (ref 11.5–15.4)
IMM GRANULOCYTES # BLD AUTO: 0.03 THOUSAND/UL (ref 0–0.2)
IMM GRANULOCYTES NFR BLD AUTO: 0 % (ref 0–2)
IRON SATN MFR SERPL: 33 % (ref 15–50)
IRON SERPL-MCNC: 101 UG/DL (ref 50–170)
LYMPHOCYTES # BLD AUTO: 1.57 THOUSANDS/ΜL (ref 0.6–4.47)
LYMPHOCYTES NFR BLD AUTO: 20 % (ref 14–44)
MCH RBC QN AUTO: 29.6 PG (ref 26.8–34.3)
MCHC RBC AUTO-ENTMCNC: 33.6 G/DL (ref 31.4–37.4)
MCV RBC AUTO: 88 FL (ref 82–98)
MONOCYTES # BLD AUTO: 0.6 THOUSAND/ΜL (ref 0.17–1.22)
MONOCYTES NFR BLD AUTO: 8 % (ref 4–12)
NEUTROPHILS # BLD AUTO: 5.69 THOUSANDS/ΜL (ref 1.85–7.62)
NEUTS SEG NFR BLD AUTO: 70 % (ref 43–75)
NRBC BLD AUTO-RTO: 0 /100 WBCS
PLATELET # BLD AUTO: 269 THOUSANDS/UL (ref 149–390)
PMV BLD AUTO: 11.4 FL (ref 8.9–12.7)
POTASSIUM SERPL-SCNC: 3.9 MMOL/L (ref 3.5–5.3)
PROT SERPL-MCNC: 7.8 G/DL (ref 6.4–8.2)
RBC # BLD AUTO: 5.21 MILLION/UL (ref 3.81–5.12)
SODIUM SERPL-SCNC: 139 MMOL/L (ref 136–145)
TIBC SERPL-MCNC: 302 UG/DL (ref 250–450)
WBC # BLD AUTO: 8.05 THOUSAND/UL (ref 4.31–10.16)

## 2022-06-14 PROCEDURE — 83550 IRON BINDING TEST: CPT

## 2022-06-14 PROCEDURE — 82728 ASSAY OF FERRITIN: CPT

## 2022-06-14 PROCEDURE — 85025 COMPLETE CBC W/AUTO DIFF WBC: CPT

## 2022-06-14 PROCEDURE — 36415 COLL VENOUS BLD VENIPUNCTURE: CPT

## 2022-06-14 PROCEDURE — 80053 COMPREHEN METABOLIC PANEL: CPT

## 2022-06-14 PROCEDURE — 83540 ASSAY OF IRON: CPT

## 2022-06-14 NOTE — PROGRESS NOTES
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

## 2022-06-17 ENCOUNTER — OFFICE VISIT (OUTPATIENT)
Dept: BARIATRICS | Facility: CLINIC | Age: 52
End: 2022-06-17

## 2022-06-17 DIAGNOSIS — Z98.84 BARIATRIC SURGERY STATUS: Primary | ICD-10-CM

## 2022-06-17 PROCEDURE — RECHECK

## 2022-06-17 NOTE — PROGRESS NOTES
Bariatric Nutrition Note       Type of surgery  Gastric bypass: laparoscopic years ago [facility] surgeon's name  Had gastric band prior w/ Dr Christopher Tee w/Dr Nayla Patrick on 2/11/2019    Nutrition Assessment   Silas Banks  47 y o   female  not currently breastfeeding  Height: 5'7 5" Current Weight: 273 4#  350# lost 30# # SUNG:  485# but due to complications - Regaining - Had knee surgery Staying home - little activity except for PT    Vitamin Regimen "when remembers" Multi, Calcium Citrate, Iron and prescribed Vitamin D  Bloodwork on 4/7/2022 completed and Vit D Deficiency - addressed by Kamila Hickey PA-C      Review of History and Medications   Past Medical History:   Diagnosis Date    Arthritis     Bell palsy     Gastric ulcer     GERD (gastroesophageal reflux disease)     History of transfusion     Melena     Obesity (BMI 30-39  9)     Pancreatic cyst     Right facial numbness     Upper GI bleed      Past Surgical History:   Procedure Laterality Date    ABDOMINOPLASTY      BARIATRIC SURGERY      BOWEL RESECTION LAPAROSCOPIC N/A 2/11/2019    Procedure: LAPAROSCOPIC LYSIS OF ADHESIONS; RESECTION SMALL BOWEL; DECOMPRESSION OF GASTRIC REMNANT; EGD;  Surgeon: Octavia Lewis MD;  [facility];  Service: General    CHOLECYSTECTOMY      CT GUIDED PERC DRAINAGE CATHETER PLACEMENT  2/25/2019    ESOPHAGOGASTRODUODENOSCOPY N/A 3/6/2019    Procedure: INTRAOPERATIVE EGD;  Surgeon: Octavia Lewis MD;  [facility];  Service: Bariatrics    HERNIA REPAIR      HYSTERECTOMY      KNEE CARTILAGE SURGERY      PARTIAL GASTRECTOMY N/A 3/6/2019    Procedure: RESECTION GASTRIC PARTIAL/GASTRECTOMY PARTIAL LAPAROSCOPIC;  Surgeon: Octavia Lewis MD;  [facility];  Service: Bariatrics    DC COLONOSCOPY FLX DX W/COLLJ SPEC WHEN PFRMD N/A 3/28/2018    Procedure: COLONOSCOPY;  Surgeon: Eric Azevedo MD;  [facility];   Service: Gastroenterology  CA COLONOSCOPY FLX DX W/COLLJ SPEC WHEN PFRMD N/A 1/31/2019    Procedure: COLONOSCOPY;  Surgeon: Boston Conrad MD;  [facility]; Service: Gastroenterology    CA ESOPHAGOGASTRODUODENOSCOPY TRANSORAL DIAGNOSTIC N/A 3/22/2018    Procedure: ESOPHAGOGASTRODUODENOSCOPY (EGD); Surgeon: Boston Conrad MD;  [facility]; Service: Gastroenterology    CA ESOPHAGOGASTRODUODENOSCOPY TRANSORAL DIAGNOSTIC N/A 1/31/2019    Procedure: ESOPHAGOGASTRODUODENOSCOPY (EGD); Surgeon: Boston Conrad MD;  [facility];   Service: Gastroenterology    CA LAP,DIAGNOSTIC ABDOMEN N/A 3/6/2019    Procedure: LAPAROSCOPY DIAGNOSTIC;  Surgeon: Nga Treviño MD;  [facility];  Service: Wilhelmena Bering CA PART REMOVAL COLON W ANASTOMOSIS N/A 3/6/2019    Procedure: OPEN TRANSVERSE COLON RESECTION;  Surgeon: Nga Treviño MD;  [facility];  Service: Bariatrics    REDUCTION MAMMAPLASTY Bilateral     REPLACEMENT UNICONDYLAR JOINT KNEE      SHOULDER ARTHROSCOPY DISTAL CLAVICLE EXCISION AND OPEN ROTATOR CUFF REPAIR       Social History     Socioeconomic History    Marital status: /Civil Union     Spouse name: Not on file    Number of children: Not on file    Years of education: Not on file    Highest education level: Not on file   Occupational History    Not on file   Tobacco Use    Smoking status: Never Smoker    Smokeless tobacco: Never Used   Vaping Use    Vaping Use: Never used   Substance and Sexual Activity    Alcohol use: Not Currently    Drug use: No    Sexual activity: Yes     Partners: Male   Other Topics Concern    Not on file   Social History Narrative    Not on file     Social Determinants of Health     Financial Resource Strain: Not on file   Food Insecurity: Not on file   Transportation Needs: Not on file   Physical Activity: Not on file   Stress: Not on file   Social Connections: Not on file   Intimate Partner Violence: Not on file   Housing Stability: Not on file       Current Outpatient Medications:     Biotin 1000 MCG tablet, Take 1,000 mcg by mouth, Disp: , Rfl:     butalbital-acetaminophen-caffeine (FIORICET,ESGIC) -40 mg per tablet, take 1-2 tablets by mouth every 4 to 6 hours if needed maximum daily dose of 6, Disp: , Rfl: 0    calcium citrate-vitamin D (CITRACAL+D) 315-200 MG-UNIT per tablet, Take 2 tablets by mouth 2 (two) times a day, Disp: 60 tablet, Rfl: 6    conjugated estrogens (PREMARIN) 0 625 mg tablet, Take 0 625 mg by mouth daily  , Disp: , Rfl:     cyclobenzaprine (FLEXERIL) 10 mg tablet, Take 1 tablet (10 mg total) by mouth 3 (three) times a day as needed for muscle spasms, Disp: 30 tablet, Rfl: 0    doxepin (SINEquan) 10 mg capsule, Take 10 mg by mouth daily at bedtime  , Disp: , Rfl: 0    DULoxetine (CYMBALTA) 30 mg delayed release capsule, Take 30 mg by mouth daily, Disp: , Rfl:     ergocalciferol (VITAMIN D2) 50,000 units, Take 1 capsule (50,000 Units total) by mouth 2 (two) times a week with meals, Disp: 24 capsule, Rfl: 0    esterified estrogens (MENEST) 0 625 MG tablet, Take 0 625 mg by mouth daily  , Disp: , Rfl:     estradiol (VIVELLE-DOT) 0 075 MG/24HR, Place 1 patch on the skin (Patient not taking: Reported on 12/17/2021 ), Disp: , Rfl:     ferrous sulfate 325 (65 Fe) mg tablet, TAKE 1 TABLET BY MOUTH TWICE DAILY, Disp: , Rfl:     gabapentin (NEURONTIN) 100 mg capsule, Take 1 capsule (100 mg total) by mouth 3 (three) times a day for 5 days, Disp: 15 capsule, Rfl: 0    hydrochlorothiazide (HYDRODIURIL) 12 5 mg tablet, Take 12 5 mg by mouth daily  , Disp: , Rfl: 0    Melatonin 5 MG CAPS, Take 5 mg by mouth, Disp: , Rfl:     metoprolol tartrate (LOPRESSOR) 50 mg tablet, Take 1 tablet (50 mg total) by mouth every 12 (twelve) hours for 30 days (Patient not taking: Reported on 3/28/2019), Disp: 60 tablet, Rfl: 0    Multiple Vitamin (MULTIVITAMIN) tablet, Take 1 tablet by mouth 2 (two) times a day, Disp: 60 tablet, Rfl: 3    Omega-3 Fatty Acids (FISH OIL) 1,000 mg, Take 1,000 mg by mouth daily  , Disp: , Rfl:     ondansetron (ZOFRAN) 4 mg tablet, Take 1 tablet (4 mg total) by mouth every 8 (eight) hours as needed for nausea or vomiting, Disp: 20 tablet, Rfl: 0    ondansetron (ZOFRAN-ODT) 4 mg disintegrating tablet, Take 1 tablet (4 mg total) by mouth every 6 (six) hours as needed for nausea or vomiting, Disp: 20 tablet, Rfl: 0    orphenadrine (NORFLEX) 100 mg tablet, Take 100 mg by mouth 2 (two) times a day (Patient not taking: Reported on 12/17/2021 ), Disp: , Rfl:     oxyCODONE-acetaminophen (Percocet) 2 5-325 MG per tablet, Take 1,000 mg by mouth  , Disp: , Rfl:     oxyCODONE-acetaminophen (PERCOCET) 5-325 mg per tablet, Take 1 tablet by mouth every 4 (four) hours as needed  (Patient not taking: Reported on 12/17/2021 ), Disp: , Rfl:     oxyCODONE-acetaminophen (PERCOCET) 5-325 mg per tablet, Take 1 tablet by mouth every 6 (six) hours as needed for severe pain for up to 30 dosesMax Daily Amount: 4 tablets (Patient not taking: Reported on 12/17/2021 ), Disp: 20 tablet, Rfl: 0    potassium chloride (K-DUR,KLOR-CON) 20 mEq tablet, Take 1 tablet (20 mEq total) by mouth 2 (two) times a day for 7 days, Disp: 14 tablet, Rfl: 0    predniSONE 20 mg tablet, take 1 tablet by mouth three times a day for 4 days then twice a    (REFER TO PRESCRIPTION NOTES)  , Disp: , Rfl:     simethicone (MYLICON) 80 mg chewable tablet, Chew 1 tablet (80 mg total) every 12 (twelve) hours, Disp: 30 tablet, Rfl: 0    sucralfate (CARAFATE) 1 g tablet, Take 1 tablet (1 g total) by mouth 4 (four) times a day, Disp: 20 tablet, Rfl: 0    tamsulosin (FLOMAX) 0 4 mg, Take 1 capsule (0 4 mg total) by mouth daily with dinner Until kidney stone passes, Disp: 5 capsule, Rfl: 0    tamsulosin (FLOMAX) 0 4 mg, Take 1 capsule (0 4 mg total) by mouth daily with dinner, Disp: 7 capsule, Rfl: 0    topiramate (TOPAMAX) 50 MG tablet, Take 1 tablet in AM and 1 in PM, Disp: 60 tablet, Rfl: 1    venlafaxine (EFFEXOR) 37 5 mg tablet, Take 37 5 mg by mouth 2 (two) times a day  , Disp: , Rfl:     vitamin B-12 (CYANOCOBALAMIN) 500 MCG TABS, Take 2 tablets (1,000 mcg total) by mouth daily for 120 days, Disp: 240 tablet, Rfl: 0    Food Intake and Lifestyle Assessment  - cannot eat pizza cheeseburger doesn't drink after meals shrim p and broccoli stopped eating meats before to knee surgery  Uses tumeric   Food Intake Assessment completed via usual diet recall  Breakfast: Cereals Fairlife Milk, Oat milk  Snack:   Lunch: Protein [place] w/tortilla chips   Snack:   Dinner: Chicken 1-2 oz  1/4 cup starch and vegetables   Snacks :  3-5X/day   Likes cookies, cheese, etc  Beverage intake: water not much , skim Fairlife  and unsweetened iced tea   Diet texture/stage: Regular  Protein supplement: Not at present  Estimated protein intake per day: 40-45 max  Estimated fluid intake per day: 32-48 oz  Meals eaten away from home: occ  Typical meal pattern: 3 meals per day and 3-5 snacks per day  Eating Behaviors: Appropriate diet advancement, Appropriate portion sizes, Does not drink with meals and waits 30-minutes after meal before resuming drinking, Frequent snacking/ grazing and Craves sweet foods    Food allergies or intolerances: Some food intolerances since surgery  Cultural or Yazidi considerations: None    Physical Assessment  Nutrition Related Findings  Not at present time    Physical Activity  Types of exercise: PT for recent knee surgery  Current physical limitations: surgery    Psychosocial Assessment   Support systems: spouse and children parent(s)  Socioeconomic factors: none    Nutrition Diagnosis  Diagnosis: Altered nutrition related lab value (NC2 2)  Related to: Altered GI function  As Evidenced by: Expected laboratory outcomes are not achieved     Interventions and Teaching   Patient educated on post-op nutrition guidelines         Patient educated and handouts provided  Surgical changes to stomach / GI  Capacity of post-surgery stomach  Adequate hydration  Vitamin / Mineral supplementation of Multivitamin with minerals, Vitamin B12, Iron and Vitamin D    Education provided to: patient    Barriers to learning: No barriers identified    Readiness to change: monitoring    Comprehension: verbalizes understanding     Expected Compliance: good  Visit Summary 11/17/2020  Post revision near 2 years  Abnormal bloodwork prompted visit with RD to discuss vitamin regimen  Pt with significant complications from bariatric surgeries  Feels better now and wants to get back on track  Reports her daughter to start bariatric program here next month and pt would like to re- learn with her  Pt had limited time today and will reschedule f/u once her schedule is settled post return to work  Visit Summary 6/17/2022  Returns to office for nutrition follow-up  Last visit in 2020  Has been following Dr Luis A Kinsey for her weight management needs  Was on phentermine but Dr Luis A Kinsey decided not to continue script and will keep her on Toprmax  Pt taking the topramax which is helping with sleep - 8 hours  Increased protein (using shake)  and decreased carbs  Making healthier choices: cauliflower and bean based products- stopped cookies  Following social media for idea  Enjoys "yogurt barks" to help with sweet cravings  Switched rice to brown and using chick pea pasta  Portions good as maintains restriction  Admits sweets is her problem and finding alternatives  Doesn't have dumping  Nearly stopped drinking coffee  Looking at including cinnamon, tumeric and other supplements  Also discussed her grief- 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 passed  Support given  Pt feels she is back on tract and will follow up as needed    Recall  B- Premier Shake or egg/avocado or oatmeal or pancake  L- Cauliflower pizza - Bean Burger PB & berries or banana on bread  D- Chicken, fish, turkey chop meat - vegetables - chickpea pasta  Snacks when craves - making healthy snacks - getting recipes from social media sites and daughter who eats organic  Follows 30/60 rule  Drinking more water - 3 bottles and has increased       Goals  Eat 3 meals per day  Healthier choices in meals and snacks  Continue lcegreasingess snacking and simple carbs  Increase protein to 60-70 gram   Fluid to 64 oz  Consistency with intake of vitamins  Follow-up as needed         Time Spent:   30 Minutes

## 2022-06-20 ENCOUNTER — TELEPHONE (OUTPATIENT)
Dept: HEMATOLOGY ONCOLOGY | Facility: CLINIC | Age: 52
End: 2022-06-20

## 2022-06-20 NOTE — TELEPHONE ENCOUNTER
My Chart message to patient to review that patient has an appointment coming up on 6/28/22 to discuss lab results with Torrie Bullard PA-C

## 2022-06-20 NOTE — TELEPHONE ENCOUNTER
CALL RETURN FORM   Reason for patient call? patient is calling to discuss her blood work results from 6/14    Patient's primary oncologist? Torrie Shown   Name of person the patient was calling for? Hem onc jennifer    Any additional information to add, if applicable? Best call back number is [phone]   Informed patient that the message will be forwarded to the team and someone will get back to them as soon as possible    Did you relay this information to the patient?  yes

## 2022-06-23 ENCOUNTER — OFFICE VISIT (OUTPATIENT)
Dept: BARIATRICS | Facility: CLINIC | Age: 52
End: 2022-06-23
Payer: COMMERCIAL

## 2022-06-23 VITALS
WEIGHT: 271.5 LBS | DIASTOLIC BLOOD PRESSURE: 82 MMHG | HEART RATE: 72 BPM | HEIGHT: 68 IN | TEMPERATURE: 98.5 F | SYSTOLIC BLOOD PRESSURE: 130 MMHG | BODY MASS INDEX: 41.15 KG/M2 | RESPIRATION RATE: 16 BRPM

## 2022-06-23 DIAGNOSIS — R63.5 ABNORMAL WEIGHT GAIN: ICD-10-CM

## 2022-06-23 DIAGNOSIS — E66.01 OBESITY, CLASS III, BMI 40-49.9 (MORBID OBESITY) (HCC): Primary | ICD-10-CM

## 2022-06-23 DIAGNOSIS — R73.03 PREDIABETES: ICD-10-CM

## 2022-06-23 DIAGNOSIS — R63.5 RECENT WEIGHT GAIN AFTER SURGICAL THERAPY FOR OBESITY: ICD-10-CM

## 2022-06-23 DIAGNOSIS — Z98.84 RECENT WEIGHT GAIN AFTER SURGICAL THERAPY FOR OBESITY: ICD-10-CM

## 2022-06-23 DIAGNOSIS — Z98.84 WEIGHT GAIN STATUS POST GASTRIC BYPASS: ICD-10-CM

## 2022-06-23 DIAGNOSIS — Z98.84 S/P GASTRIC BYPASS: ICD-10-CM

## 2022-06-23 DIAGNOSIS — K91.2 POSTSURGICAL MALABSORPTION: ICD-10-CM

## 2022-06-23 DIAGNOSIS — R63.5 WEIGHT GAIN STATUS POST GASTRIC BYPASS: ICD-10-CM

## 2022-06-23 PROCEDURE — 99214 OFFICE O/P EST MOD 30 MIN: CPT | Performed by: INTERNAL MEDICINE

## 2022-06-23 RX ORDER — TOPIRAMATE 50 MG/1
TABLET, FILM COATED ORAL
Qty: 60 TABLET | Refills: 3 | Status: SHIPPED | OUTPATIENT
Start: 2022-06-23

## 2022-06-23 NOTE — PROGRESS NOTES
Assessment/Plan:  [facility] was seen today for consult  Diagnoses and all orders for this visit:    Prediabetes  A1C:   Lab Results   Component Value Date    HGBA1C 5 8 (H) 04/07/2022    HGBA1C 5 9 (H) 09/01/2020   consider metformin   Weight loss/low carbohydrate diet should help improve blood glucose levels    S/P gastric bypass  Postsurgical malabsorption  Started taking her vitamins    Obesity, Class III, BMI 40-49 9 (morbid obesity) ([address] )  Recent Weight gain after surgical therapy for obesity  Abnormal weight gain   Doing well on weight loss with topamax 50mg BID- continue  May consider increasing to 100mg bid if weight loss plateaus  Discussed the misunderstanding between prior encounters and she expressed her genuine desire to lose her weight to get healthier, rather than seeking for medications  She felt insulted that she was being labeled as drug seeker and still does not know what phentermine is  I explained that phentermine is a controlled substance which can be abused, and therefore it was important for me to take precautionary measures as the phone encounters raised a red flag for me  However, after the conversation I told her I understand now where she is coming from and I believe she is not seeking phentermine for abuse, and I will document this for other providers in the future  Goal protein intake 90-100g per day  7795-8072 calories per day      Total time spent: 30 minutes with >50% face-to-face time with the patient  Follow up in approximately 2 months with Non-Surgical Physician/Advanced Practitioner  Subjective:   Chief Complaint   Patient presents with    Follow-up       Patient ID: Sherlyn Amin  is a 47 y o  female with excess weight/obesity here to pursue weight management  Patient is pursuing conservative program  Most recent notes and records were reviewed      HPI   50/F s/p gastric band with Dr Murray Galeazzi, then s/p LRYGB in 2019 s/p diagnostic laparoscopy/afferent limb small bowel resection with 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) which was complicated by a gastric remnant blowout s/p diagnostic laparoscopy converted to open, remnant gastrectomy, left hemicolectomy presents with weight gain    Prior to surgery: 350# lost 30#   #   SUNG: 230#   Weight regain 50 lbs dt bad habits    Wt Readings from Last 10 Encounters:   06/23/22 123 kg (271 lb 8 oz)   04/14/22 127 kg (280 lb)   03/31/22 128 kg (282 lb)   09/01/21 118 kg (260 lb)   08/23/21 121 kg (266 lb)   07/14/21 118 kg (260 lb 9 3 oz)   07/01/21 118 kg (259 lb 12 8 oz)   06/28/21 120 kg (265 lb)   06/17/21 119 kg (263 lb 3 2 oz)   03/21/21 120 kg (263 lb 10 7 oz)     Initial weight MWM 4/2022: 280 lbs  Current weight: 271 lbs  Change in weight:  -9 lbs     Started topamax 50mg in 4/2022  Pt c/o not noticing much difference in her cravings so increased to 50mg BID  I declined to give her phentermine due to the prior phone encounters  Less cravings overall, not as hungry  Eating properly now - more protein, no junk    Drinks- 48 oz   Alcohol- no  Exercise- none other than housework  Sleep- 6-8 hours; sleeping more on topamax       The following portions of the patient's history were reviewed and updated as appropriate: allergies, current medications, past family history, past medical history, past social history, past surgical history, and problem list     Review of Systems   Respiratory: Negative  Cardiovascular: Negative  Gastrointestinal: Negative  Musculoskeletal: Positive for arthralgias (improved)  Objective:  /82 (BP Location: Right arm, Patient Position: Sitting, Cuff Size: Large)   Pulse 72   Temp 98 5 °F (36 9 °C)   Resp 16   Ht 5' 7 5" (1 715 m)   Wt 123 kg (271 lb 8 oz)   LMP  (LMP Unknown)   BMI 41 90 kg/m²   Constitutional: Well-developed, well-nourished and obese Body mass index is 41 9 kg/m²  [facility] HEENT: No conjunctival pallor or jaundice    Pulmonary: No increased work of breathing or signs of respiratory distress  CV: Normal rate, well-perfused  GI: Obese  Non-distended  MSK: No edema   Neuro: Oriented to person, place and time  Normal Speech  Normal gait  Psych: Normal affect and mood       Labs and Imaging  Most recent labs and imaging reviewed

## 2022-06-27 ENCOUNTER — TELEPHONE (OUTPATIENT)
Dept: HEMATOLOGY ONCOLOGY | Facility: CLINIC | Age: 52
End: 2022-06-27

## 2022-06-27 NOTE — TELEPHONE ENCOUNTER
Appointment Cancellation Or Reschedule     Person calling in Patient    Provider Joaquin   Office Visit Date and Time 6/28/22 @ 1pm   Office Visit Location [facility]   Did patient want to reschedule their office appointment? If so, when was it scheduled to? no   Is this patient calling to reschedule an infusion appointment? no   When is their next infusion appointment? na   Is this patient a Chemo patient? no   Reason for Cancellation or Reschedule Patient will call back to r/s  She is leaving town  Also please be advised patient is having issues with my chart  I gave her the Ricky Garces customer service  If the patient is a treatment patient, please route this to the office nurse  If the patient is not on treatment, please route to the office MA  If the patient is a surgical oncology patient, please route to surg/onc clinical pool

## 2022-09-07 ENCOUNTER — TELEPHONE (OUTPATIENT)
Dept: BARIATRICS | Facility: CLINIC | Age: 52
End: 2022-09-07

## 2022-09-07 DIAGNOSIS — R10.84 GENERALIZED ABDOMINAL PAIN: Primary | ICD-10-CM

## 2022-09-15 ENCOUNTER — TELEPHONE (OUTPATIENT)
Dept: BARIATRICS | Facility: CLINIC | Age: 52
End: 2022-09-15

## 2022-09-15 ENCOUNTER — OFFICE VISIT (OUTPATIENT)
Dept: BARIATRICS | Facility: CLINIC | Age: 52
End: 2022-09-15
Payer: COMMERCIAL

## 2022-09-15 ENCOUNTER — HOSPITAL ENCOUNTER (OUTPATIENT)
Dept: RADIOLOGY | Facility: MEDICAL CENTER | Age: 52
Discharge: HOME | End: 2022-09-15
Payer: COMMERCIAL

## 2022-09-15 VITALS
RESPIRATION RATE: 16 BRPM | HEART RATE: 86 BPM | DIASTOLIC BLOOD PRESSURE: 88 MMHG | WEIGHT: 259.6 LBS | BODY MASS INDEX: 39.34 KG/M2 | HEIGHT: 68 IN | TEMPERATURE: 98 F | SYSTOLIC BLOOD PRESSURE: 128 MMHG

## 2022-09-15 DIAGNOSIS — R63.5 WEIGHT GAIN STATUS POST GASTRIC BYPASS: ICD-10-CM

## 2022-09-15 DIAGNOSIS — R10.84 GENERALIZED ABDOMINAL PAIN: ICD-10-CM

## 2022-09-15 DIAGNOSIS — Z98.84 WEIGHT GAIN STATUS POST GASTRIC BYPASS: ICD-10-CM

## 2022-09-15 DIAGNOSIS — R63.5 ABNORMAL WEIGHT GAIN: ICD-10-CM

## 2022-09-15 DIAGNOSIS — K86.2 CYST OF PANCREAS: Primary | ICD-10-CM

## 2022-09-15 DIAGNOSIS — R73.03 PREDIABETES: Primary | ICD-10-CM

## 2022-09-15 DIAGNOSIS — E66.01 OBESITY, CLASS III, BMI 40-49.9 (MORBID OBESITY) (HCC): ICD-10-CM

## 2022-09-15 PROCEDURE — G1004 CDSM NDSC: HCPCS

## 2022-09-15 PROCEDURE — 99214 OFFICE O/P EST MOD 30 MIN: CPT | Performed by: FAMILY MEDICINE

## 2022-09-15 PROCEDURE — 74176 CT ABD & PELVIS W/O CONTRAST: CPT

## 2022-09-15 RX ORDER — TOPIRAMATE 50 MG/1
TABLET, FILM COATED ORAL
Qty: 90 TABLET | Refills: 3 | Status: SHIPPED | OUTPATIENT
Start: 2022-09-15

## 2022-09-15 NOTE — PROGRESS NOTES
Assessment/Plan:  Angy Bansal was seen today for follow-up  Diagnoses and all orders for this visit:    Prediabetes  -     metFORMIN (GLUCOPHAGE) 500 mg tablet; Take 1 tablet (500 mg total) by mouth daily with dinner  Start new med  Abnormal weight gain  -     topiramate (TOPAMAX) 50 MG tablet; Take 1 tablet in AM and 2 in PM    Obesity, Class III, BMI 40-49 9 (morbid obesity) (HCC)  -     topiramate (TOPAMAX) 50 MG tablet; Take 1 tablet in AM and 2 in PM    Weight gain status post gastric bypass  -     topiramate (TOPAMAX) 50 MG tablet; Take 1 tablet in AM and 2 in PM       Nutrition prescription:  Continue Topamax no side effects so far can sleep better with it  Calorie goal: 1200kcal  Protein:  60g  Encourage mindful eating, portion control, motivational interview performed to help patient reach goals   Patient asks about Phentermine saying that she does not know much about it , although there was an extensive dialogue regarding this with the previous provider  Discussed with patient Phentermine is not indicated for her due to high BP  She also had few discussions about the Phentermine use with the previous provider    Total time spent:30 minutes with >50% face-to-face time with the patient  Follow up in approximately 2 mo      Subjective:   Chief Complaint   Patient presents with    Follow-up     Patient here to discuss weight associated problems and nutrition goals  HPI: Quiana Unger  is a 47 y o  female with excess weight/obesity here to pursue weight management  Patient is pursuing Conservative Program    Most recent notes and records were reviewed    Initial weight loss goal of 5-10% weight loss for improved health  50/F s/p gastric band with Dr Chyna Landry, then s/p LRYGB in 2019 s/p diagnostic laparoscopy/afferent limb small bowel resection with 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) which was complicated by a gastric remnant blowout s/p diagnostic laparoscopy converted to open, remnant gastrectomy, left hemicolectomy presents with weight gain    Prior to surgery: 350# lost 30#   #   SUNG: 230#   Weight regain 50 lbs dt bad habits    Wt Readings from Last 10 Encounters:   09/15/22 118 kg (259 lb 9 6 oz)   06/23/22 123 kg (271 lb 8 oz)   04/14/22 127 kg (280 lb)   03/31/22 128 kg (282 lb)   09/01/21 118 kg (260 lb)   08/23/21 121 kg (266 lb)   07/14/21 118 kg (260 lb 9 3 oz)   07/01/21 118 kg (259 lb 12 8 oz)   06/28/21 120 kg (265 lb)   06/17/21 119 kg (263 lb 3 2 oz)         Last OV weight: 6/23/21 271lbs  Current weight:259  Change in weight:-12lbs    B: shake 26g P  and banana   sugar free jello , grapes or strawberries or tangerine or half banana  L: turkey cheese or salad with beans nuts cheese  D: varies salad or chickpea pasta     Food logging: none  Increased appetite/cravings: feels hunger between 1-7 pm   Exercise: knees are bad and back hurts   Hydration: 2 5 bottles   Alcohol: none  Sleep: better after Topamax        The following portions of the patient's history were reviewed and updated as appropriate: allergies, current medications, past family history, past medical history, past social history, past surgical history, and problem list       Review of Systems   Constitutional: Negative for activity change  Fatigue  HENT: Negative for trouble swallowing  Respiratory: Negative for shortness of breath  Cardiovascular: Negative for chest pain, edema  Gastrointestinal: Negative for abdominal pain, nausea and vomiting, acid reflux, constipation/diarrhea  Endocrine: negative for heat /cold intolerance  Genitourinary: Negative for difficulty urinating  Musculoskeletal: Negative for gait problem and myalgias     Psychiatric/Behavioral: Negative for behavioral problems , anxiety or depression    Objective:  /88 (BP Location: Left arm, Patient Position: Sitting, Cuff Size: Large)   Pulse 86   Temp 98 °F (36 7 °C) (Tympanic)   Resp 16   Ht 5' 7 5" (1 715 m)   Wt 118 kg (259 lb 9 6 oz)   LMP  (LMP Unknown)   BMI 40 06 kg/m²   Constitutional: Well-developed, well-nourished and Obese Body mass index is 40 06 kg/m²  [facility] HEENT: No conjunctival injection  No thyroid masses  Pulmonary: No increased work of breathing or signs of respiratory distress  Clear respiratory sounds  CV: Well-perfused, Regular rate and rhythm, no murmurs, no edema  GI: increased abdominal girth  Non-distended  Not tender   Endo: no ophthalmopathy, no dermopathy, truncal obesity  MSK: no sarcopenia  Neuro: Oriented to person, place and time  Normal Speech  Normal gait  Psych: Normal affect and mood  No delusion or hallucinations, normal thought process    Labs and Imaging  Recent labs and imaging have been personally reviewed    Lab Results   Component Value Date    WBC 8 05 06/14/2022    HGB 15 4 06/14/2022    HCT 45 8 06/14/2022    MCV 88 06/14/2022     06/14/2022     Lab Results   Component Value Date    SODIUM 139 06/14/2022    K 3 9 06/14/2022     (H) 06/14/2022    CO2 26 06/14/2022    AGAP 2 (L) 06/14/2022    BUN 19 06/14/2022    CREATININE 0 88 06/14/2022    GLUC 100 08/25/2021    GLUF 91 06/14/2022    CALCIUM 9 7 06/14/2022    AST 16 06/14/2022    ALT 26 06/14/2022    ALKPHOS 85 06/14/2022    TP 7 8 06/14/2022    TBILI 1 08 (H) 06/14/2022    EGFR 75 06/14/2022     Lab Results   Component Value Date    HGBA1C 5 8 (H) 04/07/2022     Lab Results   Component Value Date    DEH7HPXHKVGX 0 269 (L) 06/15/2021     Lab Results   Component Value Date    CHOLESTEROL 213 (H) 04/07/2022     Lab Results   Component Value Date    HDL 55 04/07/2022     Lab Results   Component Value Date    TRIG 91 04/07/2022     Lab Results   Component Value Date    LDLCALC 140 (H) 04/07/2022

## 2022-09-15 NOTE — TELEPHONE ENCOUNTER
Reviewed CTAP with Dr Rissa Spencer  Impression: Pancreatic head cystic mass measures slightly larger compared to prior CT  Placed referral to Dr Maylin Domínguez - she needs f/u with SurgOnc for review of pancreatic cyst       Fecalized material in multiple nondilated small bowel loops with steatorrhea colonic stool burden, may be secondary to slow enteric transit       Will plan for bowel regimen     -Push hydrating fluids to goal of 64-80oz daily  -1 cap miralax daily (may separate into 1/2 cap BID)  -HS natural calm magnesium (1 Tbsp) in warm water  -1-2 soaked prunes in warm water, may need colace as well prn  -Include soluble and insoluble fiber daily  -Probiotics (Align or Culturelle)  -Squatty potty, avoid straining

## 2022-09-16 NOTE — TELEPHONE ENCOUNTER
Patient reports BM 2x/day and they usually feel like complete bowel movements but can be hard at times  She is having intermittent twisting type pain in RLQ  Diet Recall:   B - banana or granola bar or protein shake  L - green salad with beans, nuts, corn, cheese  D - steak or chicken and rice    Fluids - 24oz water    Pain likely from constipation r/t inadequate fluid intake   She agrees to:    -Push hydrating fluids to goal of 64-80oz daily - start first thing in morning with warm glass water with lemon, try bone broth, iftikhar tea  -Miralax daily - start with 1/2 cap and titrate up prn  May need to consider:  -HS natural calm magnesium (1 Tbsp) in warm water  -1-2 soaked prunes in warm water,   -Include soluble and insoluble fiber daily  -Probiotics (Align or Culturelle)  -Squatty potty, avoid straining    She will keep me updated

## 2022-09-16 NOTE — TELEPHONE ENCOUNTER
Danielito Horn I sent the patient a msg via [employer]   I saw your msg to remind you to call her around 10am :)

## 2022-10-06 ENCOUNTER — CONSULT (OUTPATIENT)
Dept: SURGICAL ONCOLOGY | Facility: CLINIC | Age: 52
End: 2022-10-06
Payer: COMMERCIAL

## 2022-10-06 VITALS
BODY MASS INDEX: 39.19 KG/M2 | HEART RATE: 88 BPM | TEMPERATURE: 98.5 F | SYSTOLIC BLOOD PRESSURE: 122 MMHG | HEIGHT: 68 IN | RESPIRATION RATE: 18 BRPM | OXYGEN SATURATION: 98 % | WEIGHT: 258.6 LBS | DIASTOLIC BLOOD PRESSURE: 86 MMHG

## 2022-10-06 DIAGNOSIS — K86.2 CYST OF PANCREAS: ICD-10-CM

## 2022-10-06 DIAGNOSIS — K86.2 PANCREATIC CYST: Primary | ICD-10-CM

## 2022-10-06 PROCEDURE — 99204 OFFICE O/P NEW MOD 45 MIN: CPT | Performed by: SURGERY

## 2022-10-06 NOTE — PROGRESS NOTES
Surgical Oncology Consult       [facility] [address]  [facility] [place]  [address] [facility] [address]  [address] [place], [place] [id number]   Kaden Gordon  1970  [phone]  [facility] [place]  [address] [facility] [address]  [address] [facility]  [phone]    Diagnoses and all orders for this visit:    Pancreatic cyst  -     MRI abdomen w wo contrast and mrcp; Future  -     BUN; Future  -     Creatinine, serum; Future    Cyst of pancreas  -     Ambulatory Referral to Surgical Oncology        Chief Complaint   Patient presents with    Consult     23mm cyst lesion on panc head       Return in about 2 weeks (around 10/20/2022) for Office Visit, Imaging - See orders  Oncology History    No history exists  History of Present Illness:  50-year-old female who has had a pancreas cyst known since 2018  CT from March 15, 2018 revealed a 1 6 cm hypodense lesion in the pancreas  She had a follow-up MRI in January 2019  This revealed a lobulated cystic mass in the pancreatic head that measured 1 6 cm  There were no worrisome or suspicious features  This was most consistent with a side-branch IPMN  The main duct at that time was normal   Follow-up CTs have shown this to progressively enlarge  Her most recent CT from September 15, 2022 reveals that this is a 2 2 x 2 3 cm cystic lesion in the pancreatic head  I personally reviewed the films  The patient has had a gastric bypass as well as resection of her gastric remnant  She comes in now to discuss further therapy  There is no family history of pancreas cancer  No personal history of pancreatitis  She does have some dull abdominal discomfort  Review of Systems  Complete ROS Surg Onc:   Constitutional: The patient denies new or recent history of general fatigue, no recent weight loss, no change in appetite  Eyes: No complaints of visual problems, no scleral icterus  ENT: no complaints of ear pain, no hoarseness, no difficulty swallowing,  no tinnitus and no new masses in head, oral cavity, or neck  Cardiovascular: No complaints of chest pain, no palpitations, no ankle edema  Respiratory: No complaints of shortness of breath, no cough  Gastrointestinal: No complaints of jaundice, no bloody stools, no pale stools  Genitourinary: No complaints of dysuria, no hematuria, no nocturia, no frequent urination, no urethral discharge  Musculoskeletal: No complaints of weakness, paralysis, joint stiffness or arthralgias  Integumentary: No complaints of rash, no new lesions  Neurological: No complaints of convulsions, no seizures, no dizziness  Hematologic/Lymphatic: No complaints of easy bruising  Endocrine:  No hot or cold intolerance  No polydipsia, polyphagia, or polyuria  Allergy/immunology:  No environmental allergies  No food allergies  Not immunocompromised  Skin:  No pallor or rash  No wound  Patient Active Problem List   Diagnosis    History of gastric ulcer    Pancreatic cyst    Abnormal CT scan, colon    Abnormal CT of the abdomen    Cyst of pancreas    Essential hypertension    Class 2 obesity in adult    Vitamin D deficiency    Prediabetes    Iron deficiency anemia, unspecified    History of bariatric surgery    Osteopenia of lumbar spine    Hypertriglyceridemia    Hyperlipidemia    Metabolic syndrome     Past Medical History:   Diagnosis Date    Arthritis     Bell palsy     Gastric ulcer     GERD (gastroesophageal reflux disease)     History of transfusion     Melena     Obesity (BMI 30-39  9)     Pancreatic cyst     Right facial numbness     Upper GI bleed      Past Surgical History:   Procedure Laterality Date    ABDOMINOPLASTY      BARIATRIC SURGERY      BOWEL RESECTION LAPAROSCOPIC N/A 2/11/2019    Procedure: LAPAROSCOPIC LYSIS OF ADHESIONS; RESECTION SMALL BOWEL; DECOMPRESSION OF GASTRIC REMNANT; EGD;  Surgeon: Fareed Perez MD;  [facility];  Service: General    CHOLECYSTECTOMY      CT GUIDED PERC DRAINAGE CATHETER PLACEMENT  2/25/2019    ESOPHAGOGASTRODUODENOSCOPY N/A 3/6/2019    Procedure: INTRAOPERATIVE EGD;  Surgeon: Fareed Perez MD;  [facility];  Service: Bariatrics    HERNIA REPAIR      HYSTERECTOMY      KNEE CARTILAGE SURGERY      PARTIAL GASTRECTOMY N/A 3/6/2019    Procedure: RESECTION GASTRIC PARTIAL/GASTRECTOMY PARTIAL LAPAROSCOPIC;  Surgeon: Fareed Perez MD;  [facility];  Service: Bariatrics    KS COLONOSCOPY FLX DX W/[facility] SPEC WHEN PFRMD N/A 3/28/2018    Procedure: COLONOSCOPY;  Surgeon: Clara Welsh MD;  [facility]; Service: Gastroenterology    KS COLONOSCOPY FLX DX W/[facility] WHEN PFRMD N/A 1/31/2019    Procedure: COLONOSCOPY;  Surgeon: Clara Welsh MD;  [facility]; Service: Gastroenterology    KS ESOPHAGOGASTRODUODENOSCOPY TRANSORAL DIAGNOSTIC N/A 3/22/2018    Procedure: ESOPHAGOGASTRODUODENOSCOPY (EGD); Surgeon: Clara Welsh MD;  [facility]; Service: Gastroenterology    KS ESOPHAGOGASTRODUODENOSCOPY TRANSORAL DIAGNOSTIC N/A 1/31/2019    Procedure: ESOPHAGOGASTRODUODENOSCOPY (EGD); Surgeon: Clara Welsh MD;  [facility];   Service: Gastroenterology    KS LAP,DIAGNOSTIC ABDOMEN N/A 3/6/2019    Procedure: LAPAROSCOPY DIAGNOSTIC;  Surgeon: Fareed Perez MD;  [facility];  Service: Bariatrics    KS PART REMOVAL COLON W ANASTOMOSIS N/A 3/6/2019    Procedure: OPEN TRANSVERSE COLON RESECTION;  Surgeon: Fareed Perez MD;  [facility];  Service: Bariatrics    REDUCTION MAMMAPLASTY Bilateral     REPLACEMENT UNICONDYLAR JOINT KNEE      SHOULDER ARTHROSCOPY DISTAL CLAVICLE EXCISION AND OPEN ROTATOR CUFF REPAIR       Family History   Problem Relation Age of Onset    Heart attack Mother     Diabetes Mother     Heart attack Father     Stroke Father     Hypertension Brother     Leukemia Paternal Aunt      Social History     Socioeconomic History    Marital status: /Civil Union     Spouse name: Not on file    Number of children: Not on file    Years of education: Not on file    Highest education level: Not on file   Occupational History    Occupation: Personal Caregiver   Tobacco Use    Smoking status: Never Smoker    Smokeless tobacco: Never Used   Vaping Use    Vaping Use: Never used   Substance and Sexual Activity    Alcohol use: Not Currently    Drug use: No    Sexual activity: Yes     Partners: Male   Other Topics Concern    Not on file   Social History Narrative    Not on file     Social Determinants of Health     Financial Resource Strain: Not on file   Food Insecurity: Not on file   Transportation Needs: Not on file   Physical Activity: Not on file   Stress: Not on file   Social Connections: Not on file   Intimate Partner Violence: Not on file   Housing Stability: Not on file       Current Outpatient Medications:     Biotin 1000 MCG tablet, Take 1,000 mcg by mouth, Disp: , Rfl:     calcium citrate-vitamin D (CITRACAL+D) 315-200 MG-UNIT per tablet, Take 2 tablets by mouth 2 (two) times a day, Disp: 60 tablet, Rfl: 6    ferrous sulfate 325 (65 Fe) mg tablet, TAKE 1 TABLET BY MOUTH TWICE DAILY, Disp: , Rfl:     metFORMIN (GLUCOPHAGE) 500 mg tablet, Take 1 tablet (500 mg total) by mouth daily with dinner, Disp: 30 tablet, Rfl: 2    Multiple Vitamin (MULTIVITAMIN) tablet, Take 1 tablet by mouth 2 (two) times a day, Disp: 60 tablet, Rfl: 3    topiramate (TOPAMAX) 50 MG tablet, Take 1 tablet in AM and 2 in PM, Disp: 90 tablet, Rfl: 3    butalbital-acetaminophen-caffeine (FIORICET,ESGIC) -40 mg per tablet, take 1-2 tablets by mouth every 4 to 6 hours if needed maximum daily dose of 6 (Patient not taking: No sig reported), Disp: , Rfl: 0    conjugated estrogens (PREMARIN) 0 625 mg tablet, Take 0 625 mg by mouth daily   (Patient not taking: No sig reported), Disp: , Rfl:     cyclobenzaprine (FLEXERIL) 10 mg tablet, Take 1 tablet (10 mg total) by mouth 3 (three) times a day as needed for muscle spasms (Patient not taking: No sig reported), Disp: 30 tablet, Rfl: 0    doxepin (SINEquan) 10 mg capsule, Take 10 mg by mouth daily at bedtime   (Patient not taking: No sig reported), Disp: , Rfl: 0    DULoxetine (CYMBALTA) 30 mg delayed release capsule, Take 30 mg by mouth daily, Disp: , Rfl:     ergocalciferol (VITAMIN D2) 50,000 units, Take 1 capsule (50,000 Units total) by mouth 2 (two) times a week with meals (Patient not taking: Reported on 10/6/2022), Disp: 24 capsule, Rfl: 0    esterified estrogens (MENEST) 0 625 MG tablet, Take 0 625 mg by mouth daily   (Patient not taking: No sig reported), Disp: , Rfl:     estradiol (VIVELLE-DOT) 0 075 MG/24HR, Place 1 patch on the skin (Patient not taking: Reported on 12/17/2021 ), Disp: , Rfl:     gabapentin (NEURONTIN) 100 mg capsule, Take 1 capsule (100 mg total) by mouth 3 (three) times a day for 5 days, Disp: 15 capsule, Rfl: 0    hydrochlorothiazide (HYDRODIURIL) 12 5 mg tablet, Take 12 5 mg by mouth daily   (Patient not taking: No sig reported), Disp: , Rfl: 0    Melatonin 5 MG CAPS, Take 5 mg by mouth (Patient not taking: No sig reported), Disp: , Rfl:     metoprolol tartrate (LOPRESSOR) 50 mg tablet, Take 1 tablet (50 mg total) by mouth every 12 (twelve) hours for 30 days (Patient not taking: Reported on 3/28/2019), Disp: 60 tablet, Rfl: 0    Omega-3 Fatty Acids (FISH OIL) 1,000 mg, Take 1,000 mg by mouth daily   (Patient not taking: No sig reported), Disp: , Rfl:     ondansetron (ZOFRAN) 4 mg tablet, Take 1 tablet (4 mg total) by mouth every 8 (eight) hours as needed for nausea or vomiting (Patient not taking: No sig reported), Disp: 20 tablet, Rfl: 0    ondansetron (ZOFRAN-ODT) 4 mg disintegrating tablet, Take 1 tablet (4 mg total) by mouth every 6 (six) hours as needed for nausea or vomiting (Patient not taking: No sig reported), Disp: 20 tablet, Rfl: 0    orphenadrine (NORFLEX) 100 mg tablet, Take 100 mg by mouth 2 (two) times a day (Patient not taking: Reported on 12/17/2021 ), Disp: , Rfl:     oxyCODONE-acetaminophen (PERCOCET) 2 5-325 MG per tablet, Take 1,000 mg by mouth   (Patient not taking: No sig reported), Disp: , Rfl:     oxyCODONE-acetaminophen (PERCOCET) 5-325 mg per tablet, Take 1 tablet by mouth every 4 (four) hours as needed  (Patient not taking: Reported on 12/17/2021 ), Disp: , Rfl:     oxyCODONE-acetaminophen (PERCOCET) 5-325 mg per tablet, Take 1 tablet by mouth every 6 (six) hours as needed for severe pain for up to 30 dosesMax Daily Amount: 4 tablets (Patient not taking: Reported on 12/17/2021 ), Disp: 20 tablet, Rfl: 0    potassium chloride (K-DUR,KLOR-CON) 20 mEq tablet, Take 1 tablet (20 mEq total) by mouth 2 (two) times a day for 7 days, Disp: 14 tablet, Rfl: 0    predniSONE 20 mg tablet, take 1 tablet by mouth three times a day for 4 days then twice a    (REFER TO PRESCRIPTION NOTES)   (Patient not taking: No sig reported), Disp: , Rfl:     simethicone (MYLICON) 80 mg chewable tablet, Chew 1 tablet (80 mg total) every 12 (twelve) hours (Patient not taking: No sig reported), Disp: 30 tablet, Rfl: 0    sucralfate (CARAFATE) 1 g tablet, Take 1 tablet (1 g total) by mouth 4 (four) times a day (Patient not taking: No sig reported), Disp: 20 tablet, Rfl: 0    tamsulosin (FLOMAX) 0 4 mg, Take 1 capsule (0 4 mg total) by mouth daily with dinner Until kidney stone passes (Patient not taking: No sig reported), Disp: 5 capsule, Rfl: 0    tamsulosin (FLOMAX) 0 4 mg, Take 1 capsule (0 4 mg total) by mouth daily with dinner (Patient not taking: No sig reported), Disp: 7 capsule, Rfl: 0    venlafaxine (EFFEXOR) 37 5 mg tablet, Take 37 5 mg by mouth 2 (two) times a day   (Patient not taking: No sig reported), Disp: , Rfl:     vitamin B-12 (CYANOCOBALAMIN) 500 MCG TABS, Take 2 tablets (1,000 mcg total) by mouth daily for 120 days, Disp: 240 tablet, Rfl: 0  Allergies   Allergen Reactions    Other Headache     MSG    Ibuprofen Other (See Comments)     USE CAUTION DUE TO BARIATRIC SX    Penicillins Rash     Vitals:    10/06/22 1321   BP: 122/86   Pulse: 88   Resp: 18   Temp: 98 5 °F (36 9 °C)   SpO2: 98%       Physical Exam   Constitutional: General appearance: The Patient is well-developed and well-nourished who appears the stated age in no acute distress  Patient is pleasant and talkative  HEENT:  Normocephalic  Sclerae are anicteric  Mucous membranes are moist  Neck is supple without adenopathy  No JVD  Chest: The lungs are clear to auscultation  Cardiac: Heart is regular rate  Abdomen: Abdomen is soft, non-tender, non-distended and without masses  Extremities: There is no clubbing or cyanosis  There is no edema  Symmetric  Neuro: Grossly nonfocal  Gait is normal      Lymphatic: No evidence of cervical adenopathy bilaterally  No evidence of axillary adenopathy bilaterally  Skin: Warm, anicteric  Psych:  Patient is pleasant and talkative  Breasts:      Pathology:  [unfilled]    Labs:      Imaging  CT abdomen pelvis wo contrast    Result Date: 9/15/2022  Narrative: CT ABDOMEN AND PELVIS WITHOUT IV CONTRAST INDICATION:   R10 84: Generalized abdominal pain  Hysterectomy  Bariatric surgery  Cholecystectomy  Hernia repair  Abdominoplasty  Small bowel resection  Partial gastrectomy    s/p gastric band , then s/p LRYGB in 2019 s/p diagnostic laparoscopy/afferent limb small bowel resection with 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) which was complicated by a gastric remnant blowout s/p diagnostic laparoscopy converted to open, remnant gastrectomy, left hemicolectomy COMPARISON:  CT abdomen pelvis with contrast 03/21/2021 TECHNIQUE:  CT examination of the abdomen and pelvis was performed without intravenous contrast  Axial, sagittal, and coronal 2D reformatted images were created from the source data and submitted for interpretation  Radiation dose length product (DLP) for this visit:  1197 55 mGy-cm   This examination, like all CT scans performed in the [facility], was performed utilizing techniques to minimize radiation dose exposure, including the use of iterative reconstruction and automated exposure control  Enteric contrast was not administered  Evaluation is limited without the use of intravenous contrast  FINDINGS: ABDOMEN LOWER CHEST:  No clinically significant abnormality identified in the visualized lower chest  LIVER/BILIARY TREE:  Unremarkable  GALLBLADDER:  Gallbladder is surgically absent  SPLEEN:  Unremarkable  PANCREAS:  Cyst lesion in the pancreatic head currently maximum axial dimensions of 22 x 23 mm, 23 mm in the coronal plane maximum dimension, similar to slightly larger compared to 03/21/2021  ADRENAL GLANDS:  Unremarkable  KIDNEYS/URETERS:  Right upper pole macroscopic fat density lesion consistent with AML  2 mm right lower pole nonobstructing renal calculus and 2 mm left interpolar nonobstructing renal calculus  No hydronephrosis  STOMACH AND BOWEL:  Status post gastric bypass surgery  Colonic diverticulosis, without findings for diverticulitis  There is a right lower quadrant spigelian type hernia containing the anterior margin of the cecum series 2 image 60-65, this is similar compared to study of 10/27/2020  No evidence for obstruction  Fecalized material in multiple nondilated small bowel loops  Large colonic stool burden  APPENDIX:  No findings to suggest appendicitis  ABDOMINOPELVIC CAVITY:  No ascites  No pneumoperitoneum  No lymphadenopathy  VESSELS:  Unremarkable for patient's age  PELVIS REPRODUCTIVE ORGANS:  Surgical changes of prior hysterectomy  URINARY BLADDER:  Collapsed ABDOMINAL WALL/INGUINAL REGIONS:  Unremarkable  OSSEOUS STRUCTURES:  No acute fracture or destructive osseous lesion  Impression: Pancreatic head cystic mass measures slightly larger compared to prior CT  Continued follow-up as per recommendations on prior CT  Fecalized material in multiple nondilated small bowel loops with steatorrhea colonic stool burden, may be secondary to slow enteric transit   Additional findings as above  Workstation performed: [id number]     I reviewed the above laboratory and imaging data  Discussion/Summary:  70-year-old female with a slowly enlarging pancreatic cyst   This is now 2 3 cm in size  We had a long discussion regarding pancreas cysts  These can be benign, malignant or premalignant  This certainly does not look malignant  This may be benign  Based on her previous MRI, this is likely a side-branch IPMN  We discussed with side-branch IPMN, the risk of malignancy in her lifetime is 10-20%  With main duct IPMN, the risk is 50-70%  Based on her last MRI the main duct was not involved  I have recommended repeating an MRI with MRCP now  I will see her back once we have those results  We also discussed the less than 10% risk of developing a cancer elsewhere in the pancreas  We discussed that normally we would obtain an EUS for assist the size, but given her gastric bypass as well as resection of her gastric remnant, it is unlikely that they will be able to easily see the cyst by EUS  Consequently I would plan on just following this with MRI  We will see her back once we have the results of the MRI  Should there continue to be no worrisome or suspicious features we will see her in discuss six-month versus 1 year imaging  She is agreeable to this plan  All questions were answered

## 2022-11-14 ENCOUNTER — TELEPHONE (OUTPATIENT)
Dept: HEMATOLOGY ONCOLOGY | Facility: CLINIC | Age: 52
End: 2022-11-14

## 2022-11-14 NOTE — TELEPHONE ENCOUNTER
LM for patient to call back to go over screening questions prior to scheduling her MRI  Her f/u appt  Is on 12/1/22 w/ Dr Hailee Brock

## 2022-11-17 ENCOUNTER — OFFICE VISIT (OUTPATIENT)
Dept: BARIATRICS | Facility: CLINIC | Age: 52
End: 2022-11-17

## 2022-11-17 VITALS
TEMPERATURE: 98.6 F | BODY MASS INDEX: 37.89 KG/M2 | RESPIRATION RATE: 16 BRPM | HEIGHT: 68 IN | WEIGHT: 250 LBS | HEART RATE: 79 BPM | DIASTOLIC BLOOD PRESSURE: 80 MMHG | SYSTOLIC BLOOD PRESSURE: 120 MMHG

## 2022-11-17 DIAGNOSIS — R73.03 PREDIABETES: ICD-10-CM

## 2022-11-17 DIAGNOSIS — E88.81 METABOLIC SYNDROME: ICD-10-CM

## 2022-11-17 DIAGNOSIS — E78.1 HYPERTRIGLYCERIDEMIA: ICD-10-CM

## 2022-11-17 DIAGNOSIS — E66.09 CLASS 2 OBESITY DUE TO EXCESS CALORIES IN ADULT: Primary | ICD-10-CM

## 2022-11-17 NOTE — PROGRESS NOTES
Assessment/Plan:  Sirena Loya was seen today for follow-up  Diagnoses and all orders for this visit:  1  Class 2 obesity due to excess calories in adult   1400kcal diet  Try Tirzepatide  Did well on Topamax increased dose- continue, no side effects  Started Metoformin and tolerates well- continue  Craves sweets at night but with 2 pieces of dark chocolate satisfies her cravings  BP was high better controlled with weight loss  Cont current nutrition plan    2  Prediabetes  - Tirzepatide 2 5 MG/0 5ML SOPN; Inject 0 5 mL (2 5 mg total) under the skin once a week  Dispense: 2 mL; Refill: 1    3  Hypertriglyceridemia  4  Metabolic syndrome  Tirzepatide will help    Ok to use GLP-1 for short course  CT scan IMPRESSION:9/2022     Pancreatic head cystic mass measures slightly larger compared to prior CT  Continued follow-up as per recommendations on prior CT      Nutrition prescription:  Continue Topamax no side effects so far can sleep better with it  Calorie goal: 1400kcal  Protein:  60g  Encourage mindful eating, portion control, motivational interview performed to help patient reach goals     Total time spent:30 minutes with >50% face-to-face time with the patient  Follow up in approximately 2 mo      Subjective:   Chief Complaint   Patient presents with   • Follow-up     Patient presents for a f/u  Patient here to discuss weight associated problems and nutrition goals  HPI: Lorne Campos  is a 47 y o  female with excess weight/obesity here to pursue weight management  Patient is pursuing [facility]    Most recent notes and records were reviewed    Initial weight loss goal of 5-10% weight loss for improved health  50/F s/p gastric band with Dr Kelsea Hernandez, then s/p LRYGB in 2019 s/p diagnostic laparoscopy/afferent limb small bowel resection with 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) which was complicated by a gastric remnant blowout s/p diagnostic laparoscopy converted to open, remnant gastrectomy, left hemicolectomy presents with weight gain    Prior to surgery: 350# lost 30#   #   SUNG: 230#   Weight regain 50 lbs dt bad habits    Wt Readings from Last 10 Encounters:   11/17/22 113 kg (250 lb)   10/06/22 117 kg (258 lb 9 6 oz)   09/15/22 118 kg (259 lb 9 6 oz)   06/23/22 123 kg (271 lb 8 oz)   04/14/22 127 kg (280 lb)   03/31/22 128 kg (282 lb)   09/01/21 118 kg (260 lb)   08/23/21 121 kg (266 lb)   07/14/21 118 kg (260 lb 9 3 oz)   07/01/21 118 kg (259 lb 12 8 oz)         Last OV weight:259 lbs 9-13-22 started Metformin and Topamax was increase  Current weight : 250lbs  Had COVID infection 17 days ago   Change in weight:-9lbs          B: shake 26g P  and banana   sugar free jello , grapes or strawberries or tangerine or half banana  L: turkey cheese or salad with beans nuts cheese  D: varies salad or chickpea pasta     Food logging: none  Increased appetite/cravings: feels hunger between 1-7 pm   Exercise: knees are bad and back hurts   Hydration: 2 5 bottles   Alcohol: none  Sleep: better after Topamax        The following portions of the patient's history were reviewed and updated as appropriate: allergies, current medications, past family history, past medical history, past social history, past surgical history, and problem list       Review of Systems   Constitutional: Negative for activity change  HENT: Negative for trouble swallowing  Respiratory: Negative for shortness of breath      Cardiovascular: Negative for chest pain, edema  Gastrointestinal: Negative for abdominal pain, nausea and vomiting, acid reflux, constipation/diarrhea   Psychiatric/Behavioral: + for behavioral problems ,better controlled anxiety or depression    Objective:  /80 (BP Location: Left arm, Patient Position: Sitting, Cuff Size: Large)   Pulse 79   Temp 98 6 °F (37 °C)   Resp 16   Ht 5' 7 5" (1 715 m)   Wt 113 kg (250 lb)   LMP  (LMP Unknown)   BMI 38 58 kg/m²   Constitutional: Well-developed, well-nourished and Obese Body mass index is 38 58 kg/m²  Earle Hoang HEENT: No conjunctival injection  No thyroid masses  Pulmonary: No increased work of breathing or signs of respiratory distress  Clear respiratory sounds  CV: Well-perfused, Regular rate and rhythm, no murmurs, no edema  GI: increased abdominal girth  Non-distended  Neuro: Oriented to person, place and time  Normal Speech  Normal gait  Psych: Normal affect and mood  No delusion or hallucinations, normal thought process    Labs and Imaging  Recent labs and imaging have been personally reviewed    Lab Results   Component Value Date    WBC 8 05 06/14/2022    HGB 15 4 06/14/2022    HCT 45 8 06/14/2022    MCV 88 06/14/2022     06/14/2022     Lab Results   Component Value Date    SODIUM 139 06/14/2022    K 3 9 06/14/2022     (H) 06/14/2022    CO2 26 06/14/2022    AGAP 2 (L) 06/14/2022    BUN 19 06/14/2022    CREATININE 0 88 06/14/2022    GLUC 100 08/25/2021    GLUF 91 06/14/2022    CALCIUM 9 7 06/14/2022    AST 16 06/14/2022    ALT 26 06/14/2022    ALKPHOS 85 06/14/2022    TP 7 8 06/14/2022    TBILI 1 08 (H) 06/14/2022    EGFR 75 06/14/2022     Lab Results   Component Value Date    HGBA1C 5 8 (H) 04/07/2022     Lab Results   Component Value Date    CDF8GUKURCOL 0 269 (L) 06/15/2021     Lab Results   Component Value Date    CHOLESTEROL 213 (H) 04/07/2022     Lab Results   Component Value Date    HDL 55 04/07/2022     Lab Results   Component Value Date    TRIG 91 04/07/2022     Lab Results   Component Value Date    LDLCALC 140 (H) 04/07/2022

## 2022-11-21 ENCOUNTER — TELEPHONE (OUTPATIENT)
Dept: SURGICAL ONCOLOGY | Facility: CLINIC | Age: 52
End: 2022-11-21

## 2022-11-21 NOTE — TELEPHONE ENCOUNTER
Called patient to scheduled MRI before appointment 12/1  Patient informed me that her insurance was canceled due to work hours  She is got new insurance, but it doesn't start until 12/1  [employer] stated that once the insurance is in effect she will call and schedule the MRI and the follow up with Dr Olga Lidia Hernández

## 2022-12-02 ENCOUNTER — TELEPHONE (OUTPATIENT)
Dept: BARIATRICS | Facility: CLINIC | Age: 52
End: 2022-12-02

## 2022-12-02 DIAGNOSIS — E66.9 CLASS 2 OBESITY IN ADULT: Primary | ICD-10-CM

## 2022-12-02 NOTE — TELEPHONE ENCOUNTER
Pt called and stated she has new insurance and her refill is not covered  She sent a list that is covered with her insurance  It is scan in her chart, and if Bea can send something that is covered

## 2022-12-15 DIAGNOSIS — R73.03 PREDIABETES: ICD-10-CM

## 2022-12-21 ENCOUNTER — APPOINTMENT (EMERGENCY)
Dept: RADIOLOGY | Facility: HOSPITAL | Age: 52
End: 2022-12-21

## 2022-12-21 ENCOUNTER — APPOINTMENT (EMERGENCY)
Dept: CT IMAGING | Facility: HOSPITAL | Age: 52
End: 2022-12-21

## 2022-12-21 ENCOUNTER — HOSPITAL ENCOUNTER (OUTPATIENT)
Facility: HOSPITAL | Age: 52
Setting detail: OUTPATIENT SURGERY
Discharge: HOME | End: 2022-12-23
Attending: EMERGENCY MEDICINE | Admitting: UROLOGY

## 2022-12-21 DIAGNOSIS — N20.0 KIDNEY STONE: Primary | ICD-10-CM

## 2022-12-21 DIAGNOSIS — N13.5 OBSTRUCTION OF LEFT URETER: ICD-10-CM

## 2022-12-21 LAB
ALBUMIN SERPL BCP-MCNC: 4.1 G/DL (ref 3.5–5)
ALP SERPL-CCNC: 84 U/L (ref 46–116)
ALT SERPL W P-5'-P-CCNC: 18 U/L (ref 12–78)
ANION GAP SERPL CALCULATED.3IONS-SCNC: 11 MMOL/L (ref 4–13)
AST SERPL W P-5'-P-CCNC: 16 U/L (ref 5–45)
BASOPHILS # BLD AUTO: 0.05 THOUSANDS/ÂΜL (ref 0–0.1)
BASOPHILS NFR BLD AUTO: 1 % (ref 0–1)
BILIRUB SERPL-MCNC: 0.54 MG/DL (ref 0.2–1)
BUN SERPL-MCNC: 19 MG/DL (ref 5–25)
CALCIUM SERPL-MCNC: 9.2 MG/DL (ref 8.3–10.1)
CARDIAC TROPONIN I PNL SERPL HS: 2 NG/L
CHLORIDE SERPL-SCNC: 105 MMOL/L (ref 96–108)
CO2 SERPL-SCNC: 25 MMOL/L (ref 21–32)
CREAT SERPL-MCNC: 1.09 MG/DL (ref 0.6–1.3)
EOSINOPHIL # BLD AUTO: 0.17 THOUSAND/ÂΜL (ref 0–0.61)
EOSINOPHIL NFR BLD AUTO: 2 % (ref 0–6)
ERYTHROCYTE [DISTWIDTH] IN BLOOD BY AUTOMATED COUNT: 14.1 % (ref 11.6–15.1)
GFR SERPL CREATININE-BSD FRML MDRD: 58 ML/MIN/1.73SQ M
GLUCOSE SERPL-MCNC: 116 MG/DL (ref 65–140)
HCT VFR BLD AUTO: 42.6 % (ref 34.8–46.1)
HGB BLD-MCNC: 13.9 G/DL (ref 11.5–15.4)
IMM GRANULOCYTES # BLD AUTO: 0.06 THOUSAND/UL (ref 0–0.2)
IMM GRANULOCYTES NFR BLD AUTO: 1 % (ref 0–2)
LIPASE SERPL-CCNC: 139 U/L (ref 73–393)
LYMPHOCYTES # BLD AUTO: 2.57 THOUSANDS/ÂΜL (ref 0.6–4.47)
LYMPHOCYTES NFR BLD AUTO: 31 % (ref 14–44)
MCH RBC QN AUTO: 29.2 PG (ref 26.8–34.3)
MCHC RBC AUTO-ENTMCNC: 32.6 G/DL (ref 31.4–37.4)
MCV RBC AUTO: 90 FL (ref 82–98)
MONOCYTES # BLD AUTO: 0.57 THOUSAND/ÂΜL (ref 0.17–1.22)
MONOCYTES NFR BLD AUTO: 7 % (ref 4–12)
NEUTROPHILS # BLD AUTO: 4.94 THOUSANDS/ÂΜL (ref 1.85–7.62)
NEUTS SEG NFR BLD AUTO: 58 % (ref 43–75)
NRBC BLD AUTO-RTO: 0 /100 WBCS
PLATELET # BLD AUTO: 274 THOUSANDS/UL (ref 149–390)
PMV BLD AUTO: 11.2 FL (ref 8.9–12.7)
POTASSIUM SERPL-SCNC: 3.2 MMOL/L (ref 3.5–5.3)
PROT SERPL-MCNC: 7.7 G/DL (ref 6.4–8.4)
RBC # BLD AUTO: 4.76 MILLION/UL (ref 3.81–5.12)
SODIUM SERPL-SCNC: 141 MMOL/L (ref 135–147)
WBC # BLD AUTO: 8.36 THOUSAND/UL (ref 4.31–10.16)

## 2022-12-21 RX ORDER — MORPHINE SULFATE 4 MG/ML
4 INJECTION, SOLUTION INTRAMUSCULAR; INTRAVENOUS ONCE
Status: COMPLETED | OUTPATIENT
Start: 2022-12-21 | End: 2022-12-21

## 2022-12-21 RX ORDER — KETOROLAC TROMETHAMINE 30 MG/ML
15 INJECTION, SOLUTION INTRAMUSCULAR; INTRAVENOUS ONCE
Status: COMPLETED | OUTPATIENT
Start: 2022-12-21 | End: 2022-12-21

## 2022-12-21 RX ORDER — ONDANSETRON 2 MG/ML
4 INJECTION INTRAMUSCULAR; INTRAVENOUS ONCE
Status: COMPLETED | OUTPATIENT
Start: 2022-12-21 | End: 2022-12-21

## 2022-12-21 RX ORDER — MORPHINE SULFATE 4 MG/ML
4 INJECTION, SOLUTION INTRAMUSCULAR; INTRAVENOUS ONCE
Status: DISCONTINUED | OUTPATIENT
Start: 2022-12-21 | End: 2022-12-23 | Stop reason: HOSPADM

## 2022-12-21 RX ORDER — ONDANSETRON 2 MG/ML
INJECTION INTRAMUSCULAR; INTRAVENOUS
Status: COMPLETED
Start: 2022-12-21 | End: 2022-12-21

## 2022-12-21 RX ADMIN — MORPHINE SULFATE 4 MG: 4 INJECTION INTRAVENOUS at 23:29

## 2022-12-21 RX ADMIN — MORPHINE SULFATE 4 MG: 4 INJECTION INTRAVENOUS at 21:00

## 2022-12-21 RX ADMIN — ONDANSETRON 4 MG: 2 INJECTION INTRAMUSCULAR; INTRAVENOUS at 20:38

## 2022-12-21 RX ADMIN — KETOROLAC TROMETHAMINE 15 MG: 30 INJECTION, SOLUTION INTRAMUSCULAR; INTRAVENOUS at 21:27

## 2022-12-21 RX ADMIN — IOHEXOL 100 ML: 350 INJECTION, SOLUTION INTRAVENOUS at 21:35

## 2022-12-22 ENCOUNTER — ANESTHESIA (OUTPATIENT)
Dept: PERIOP | Facility: HOSPITAL | Age: 52
End: 2022-12-22

## 2022-12-22 ENCOUNTER — TELEPHONE (OUTPATIENT)
Dept: OTHER | Facility: HOSPITAL | Age: 52
End: 2022-12-22

## 2022-12-22 ENCOUNTER — ANESTHESIA EVENT (OUTPATIENT)
Dept: PERIOP | Facility: HOSPITAL | Age: 52
End: 2022-12-22

## 2022-12-22 ENCOUNTER — APPOINTMENT (OUTPATIENT)
Dept: RADIOLOGY | Facility: HOSPITAL | Age: 52
End: 2022-12-22

## 2022-12-22 PROBLEM — N13.5 URETERAL OBSTRUCTION: Status: ACTIVE | Noted: 2022-12-22

## 2022-12-22 PROBLEM — E66.9 OBESITY: Status: ACTIVE | Noted: 2022-12-22

## 2022-12-22 PROBLEM — E11.9 DIABETES (HCC): Status: ACTIVE | Noted: 2022-12-22

## 2022-12-22 LAB
ALBUMIN SERPL BCP-MCNC: 3.7 G/DL (ref 3.5–5)
ALP SERPL-CCNC: 74 U/L (ref 46–116)
ALT SERPL W P-5'-P-CCNC: 16 U/L (ref 12–78)
ANION GAP SERPL CALCULATED.3IONS-SCNC: 12 MMOL/L (ref 4–13)
AST SERPL W P-5'-P-CCNC: 16 U/L (ref 5–45)
ATRIAL RATE: 90 BPM
BACTERIA UR QL AUTO: ABNORMAL /HPF
BILIRUB SERPL-MCNC: 0.69 MG/DL (ref 0.2–1)
BILIRUB UR QL STRIP: NEGATIVE
BUN SERPL-MCNC: 17 MG/DL (ref 5–25)
CALCIUM SERPL-MCNC: 9.2 MG/DL (ref 8.3–10.1)
CHLORIDE SERPL-SCNC: 105 MMOL/L (ref 96–108)
CLARITY UR: ABNORMAL
CO2 SERPL-SCNC: 23 MMOL/L (ref 21–32)
COLOR UR: YELLOW
CREAT SERPL-MCNC: 1.21 MG/DL (ref 0.6–1.3)
ERYTHROCYTE [DISTWIDTH] IN BLOOD BY AUTOMATED COUNT: 14.2 % (ref 11.6–15.1)
GFR SERPL CREATININE-BSD FRML MDRD: 51 ML/MIN/1.73SQ M
GLUCOSE P FAST SERPL-MCNC: 121 MG/DL (ref 65–99)
GLUCOSE SERPL-MCNC: 102 MG/DL (ref 65–140)
GLUCOSE SERPL-MCNC: 108 MG/DL (ref 65–140)
GLUCOSE SERPL-MCNC: 116 MG/DL (ref 65–140)
GLUCOSE SERPL-MCNC: 121 MG/DL (ref 65–140)
GLUCOSE SERPL-MCNC: 125 MG/DL (ref 65–140)
GLUCOSE SERPL-MCNC: 97 MG/DL (ref 65–140)
GLUCOSE UR STRIP-MCNC: NEGATIVE MG/DL
HCT VFR BLD AUTO: 41.7 % (ref 34.8–46.1)
HGB BLD-MCNC: 13.5 G/DL (ref 11.5–15.4)
HGB UR QL STRIP.AUTO: ABNORMAL
HYALINE CASTS #/AREA URNS LPF: ABNORMAL /LPF
KETONES UR STRIP-MCNC: NEGATIVE MG/DL
LEUKOCYTE ESTERASE UR QL STRIP: ABNORMAL
MCH RBC QN AUTO: 29 PG (ref 26.8–34.3)
MCHC RBC AUTO-ENTMCNC: 32.4 G/DL (ref 31.4–37.4)
MCV RBC AUTO: 90 FL (ref 82–98)
MUCOUS THREADS UR QL AUTO: ABNORMAL
NITRITE UR QL STRIP: NEGATIVE
NON-SQ EPI CELLS URNS QL MICRO: ABNORMAL /HPF
P AXIS: 73 DEGREES
PH UR STRIP.AUTO: 6.5 [PH]
PLATELET # BLD AUTO: 255 THOUSANDS/UL (ref 149–390)
PMV BLD AUTO: 11.4 FL (ref 8.9–12.7)
POTASSIUM SERPL-SCNC: 3.7 MMOL/L (ref 3.5–5.3)
PR INTERVAL: 162 MS
PROT SERPL-MCNC: 7 G/DL (ref 6.4–8.4)
PROT UR STRIP-MCNC: ABNORMAL MG/DL
QRS AXIS: 46 DEGREES
QRSD INTERVAL: 80 MS
QT INTERVAL: 378 MS
QTC INTERVAL: 462 MS
RBC # BLD AUTO: 4.65 MILLION/UL (ref 3.81–5.12)
RBC #/AREA URNS AUTO: ABNORMAL /HPF
SODIUM SERPL-SCNC: 140 MMOL/L (ref 135–147)
SP GR UR STRIP.AUTO: 1.02 (ref 1–1.03)
T WAVE AXIS: 55 DEGREES
UROBILINOGEN UR STRIP-ACNC: <2 MG/DL
VENTRICULAR RATE: 90 BPM
WBC # BLD AUTO: 11.18 THOUSAND/UL (ref 4.31–10.16)
WBC #/AREA URNS AUTO: ABNORMAL /HPF

## 2022-12-22 DEVICE — INLAY OPTIMA URETERAL STENT W/O GUIDEWIRE
Type: IMPLANTABLE DEVICE | Site: URETER | Status: FUNCTIONAL
Brand: BARD® INLAY OPTIMA® URETERAL STENT

## 2022-12-22 RX ORDER — ONDANSETRON 2 MG/ML
4 INJECTION INTRAMUSCULAR; INTRAVENOUS ONCE AS NEEDED
Status: DISCONTINUED | OUTPATIENT
Start: 2022-12-22 | End: 2022-12-22 | Stop reason: HOSPADM

## 2022-12-22 RX ORDER — ONDANSETRON 2 MG/ML
4 INJECTION INTRAMUSCULAR; INTRAVENOUS EVERY 6 HOURS PRN
Status: DISCONTINUED | OUTPATIENT
Start: 2022-12-22 | End: 2022-12-23 | Stop reason: HOSPADM

## 2022-12-22 RX ORDER — HYDROMORPHONE HCL/PF 1 MG/ML
0.5 SYRINGE (ML) INJECTION EVERY 6 HOURS PRN
Status: DISCONTINUED | OUTPATIENT
Start: 2022-12-22 | End: 2022-12-23 | Stop reason: HOSPADM

## 2022-12-22 RX ORDER — FENTANYL CITRATE 50 UG/ML
INJECTION, SOLUTION INTRAMUSCULAR; INTRAVENOUS AS NEEDED
Status: DISCONTINUED | OUTPATIENT
Start: 2022-12-22 | End: 2022-12-22

## 2022-12-22 RX ORDER — INSULIN LISPRO 100 [IU]/ML
2-12 INJECTION, SOLUTION INTRAVENOUS; SUBCUTANEOUS
Status: DISCONTINUED | OUTPATIENT
Start: 2022-12-22 | End: 2022-12-23 | Stop reason: HOSPADM

## 2022-12-22 RX ORDER — PROPOFOL 10 MG/ML
INJECTION, EMULSION INTRAVENOUS AS NEEDED
Status: DISCONTINUED | OUTPATIENT
Start: 2022-12-22 | End: 2022-12-22

## 2022-12-22 RX ORDER — SODIUM CHLORIDE 9 MG/ML
125 INJECTION, SOLUTION INTRAVENOUS CONTINUOUS
Status: DISCONTINUED | OUTPATIENT
Start: 2022-12-22 | End: 2022-12-23 | Stop reason: HOSPADM

## 2022-12-22 RX ORDER — LEVOFLOXACIN 5 MG/ML
750 INJECTION, SOLUTION INTRAVENOUS ONCE
Status: COMPLETED | OUTPATIENT
Start: 2022-12-22 | End: 2022-12-23

## 2022-12-22 RX ORDER — TAMSULOSIN HYDROCHLORIDE 0.4 MG/1
0.4 CAPSULE ORAL
Status: DISCONTINUED | OUTPATIENT
Start: 2022-12-22 | End: 2022-12-23 | Stop reason: HOSPADM

## 2022-12-22 RX ORDER — SUCCINYLCHOLINE/SOD CL,ISO/PF 100 MG/5ML
SYRINGE (ML) INTRAVENOUS AS NEEDED
Status: DISCONTINUED | OUTPATIENT
Start: 2022-12-22 | End: 2022-12-22

## 2022-12-22 RX ORDER — PROMETHAZINE HYDROCHLORIDE 25 MG/ML
12.5 INJECTION, SOLUTION INTRAMUSCULAR; INTRAVENOUS ONCE
Status: COMPLETED | OUTPATIENT
Start: 2022-12-22 | End: 2022-12-22

## 2022-12-22 RX ORDER — BUTALBITAL, ACETAMINOPHEN AND CAFFEINE 50; 325; 40 MG/1; MG/1; MG/1
1 TABLET ORAL EVERY 6 HOURS PRN
Status: DISCONTINUED | OUTPATIENT
Start: 2022-12-22 | End: 2022-12-23 | Stop reason: HOSPADM

## 2022-12-22 RX ORDER — MIDAZOLAM HYDROCHLORIDE 2 MG/2ML
INJECTION, SOLUTION INTRAMUSCULAR; INTRAVENOUS AS NEEDED
Status: DISCONTINUED | OUTPATIENT
Start: 2022-12-22 | End: 2022-12-22

## 2022-12-22 RX ORDER — FENTANYL CITRATE/PF 50 MCG/ML
25 SYRINGE (ML) INJECTION
Status: DISCONTINUED | OUTPATIENT
Start: 2022-12-22 | End: 2022-12-22 | Stop reason: HOSPADM

## 2022-12-22 RX ORDER — VENLAFAXINE 37.5 MG/1
37.5 TABLET ORAL 2 TIMES DAILY
Status: DISCONTINUED | OUTPATIENT
Start: 2022-12-22 | End: 2022-12-22

## 2022-12-22 RX ORDER — HYDROMORPHONE HCL IN WATER/PF 6 MG/30 ML
0.2 PATIENT CONTROLLED ANALGESIA SYRINGE INTRAVENOUS
Status: DISCONTINUED | OUTPATIENT
Start: 2022-12-22 | End: 2022-12-23 | Stop reason: HOSPADM

## 2022-12-22 RX ORDER — MAGNESIUM HYDROXIDE 1200 MG/15ML
LIQUID ORAL AS NEEDED
Status: DISCONTINUED | OUTPATIENT
Start: 2022-12-22 | End: 2022-12-22 | Stop reason: HOSPADM

## 2022-12-22 RX ORDER — ACETAMINOPHEN 325 MG/1
650 TABLET ORAL EVERY 6 HOURS PRN
Status: DISCONTINUED | OUTPATIENT
Start: 2022-12-22 | End: 2022-12-23 | Stop reason: HOSPADM

## 2022-12-22 RX ORDER — TOPIRAMATE 100 MG/1
100 TABLET, FILM COATED ORAL
Status: DISCONTINUED | OUTPATIENT
Start: 2022-12-22 | End: 2022-12-23 | Stop reason: HOSPADM

## 2022-12-22 RX ORDER — LIDOCAINE HYDROCHLORIDE 10 MG/ML
INJECTION, SOLUTION EPIDURAL; INFILTRATION; INTRACAUDAL; PERINEURAL AS NEEDED
Status: DISCONTINUED | OUTPATIENT
Start: 2022-12-22 | End: 2022-12-22

## 2022-12-22 RX ORDER — TOPIRAMATE 25 MG/1
50 TABLET ORAL DAILY
Status: DISCONTINUED | OUTPATIENT
Start: 2022-12-22 | End: 2022-12-23 | Stop reason: HOSPADM

## 2022-12-22 RX ADMIN — ONDANSETRON 4 MG: 2 INJECTION INTRAMUSCULAR; INTRAVENOUS at 12:48

## 2022-12-22 RX ADMIN — HYDROMORPHONE HYDROCHLORIDE 0.5 MG: 1 INJECTION, SOLUTION INTRAMUSCULAR; INTRAVENOUS; SUBCUTANEOUS at 12:49

## 2022-12-22 RX ADMIN — ONDANSETRON 4 MG: 2 INJECTION INTRAMUSCULAR; INTRAVENOUS at 14:54

## 2022-12-22 RX ADMIN — TOPIRAMATE 50 MG: 25 TABLET, FILM COATED ORAL at 09:23

## 2022-12-22 RX ADMIN — LEVOFLOXACIN: 750 INJECTION, SOLUTION INTRAVENOUS at 14:36

## 2022-12-22 RX ADMIN — HYDROMORPHONE HYDROCHLORIDE 0.2 MG: 0.2 INJECTION, SOLUTION INTRAMUSCULAR; INTRAVENOUS; SUBCUTANEOUS at 02:51

## 2022-12-22 RX ADMIN — TOPIRAMATE 100 MG: 100 TABLET, FILM COATED ORAL at 21:51

## 2022-12-22 RX ADMIN — Medication 100 MG: at 14:41

## 2022-12-22 RX ADMIN — TAMSULOSIN HYDROCHLORIDE 0.4 MG: 0.4 CAPSULE ORAL at 17:05

## 2022-12-22 RX ADMIN — SODIUM CHLORIDE 125 ML/HR: 0.9 INJECTION, SOLUTION INTRAVENOUS at 17:01

## 2022-12-22 RX ADMIN — PROPOFOL 200 MG: 10 INJECTION, EMULSION INTRAVENOUS at 14:41

## 2022-12-22 RX ADMIN — BUTALBITAL, ACETAMINOPHEN AND CAFFEINE 1 TABLET: 50; 325; 40 TABLET ORAL at 20:06

## 2022-12-22 RX ADMIN — MIDAZOLAM HYDROCHLORIDE 2 MG: 1 INJECTION, SOLUTION INTRAMUSCULAR; INTRAVENOUS at 14:33

## 2022-12-22 RX ADMIN — SODIUM CHLORIDE 125 ML/HR: 0.9 INJECTION, SOLUTION INTRAVENOUS at 05:27

## 2022-12-22 RX ADMIN — ONDANSETRON 4 MG: 2 INJECTION INTRAMUSCULAR; INTRAVENOUS at 02:54

## 2022-12-22 RX ADMIN — LIDOCAINE HYDROCHLORIDE 50 MG: 10 INJECTION, SOLUTION EPIDURAL; INFILTRATION; INTRACAUDAL; PERINEURAL at 14:41

## 2022-12-22 RX ADMIN — FENTANYL CITRATE 50 MCG: 50 INJECTION, SOLUTION INTRAMUSCULAR; INTRAVENOUS at 14:54

## 2022-12-22 RX ADMIN — PROMETHAZINE HYDROCHLORIDE 12.5 MG: 25 INJECTION INTRAMUSCULAR; INTRAVENOUS at 13:47

## 2022-12-22 RX ADMIN — HYDROMORPHONE HYDROCHLORIDE 0.5 MG: 1 INJECTION, SOLUTION INTRAMUSCULAR; INTRAVENOUS; SUBCUTANEOUS at 06:21

## 2022-12-22 RX ADMIN — FENTANYL CITRATE 50 MCG: 50 INJECTION, SOLUTION INTRAMUSCULAR; INTRAVENOUS at 14:41

## 2022-12-22 NOTE — ASSESSMENT & PLAN NOTE
· CT C/A/P:   · 1  Thickening of the gastric wall which may represent gastritis  Follow-up with gastroenterology is recommended  · 2  Pancreatic head cystic mass similar to prior study    Continued follow-up as per recommendations on prior CT scans  · Encouraged outpatient follow up with GI

## 2022-12-22 NOTE — ANESTHESIA POSTPROCEDURE EVALUATION
Post-Op Assessment Note    CV Status:  Stable    Pain management: adequate     Mental Status:  Alert and awake   Hydration Status:  Euvolemic   PONV Controlled:  Controlled   Airway Patency:  Patent      Post Op Vitals Reviewed: Yes      Staff: CRNA         No notable events documented      /61   Temp  98 2   Pulse  101   Resp 18   SpO2   99

## 2022-12-22 NOTE — ASSESSMENT & PLAN NOTE
Patient presenting with epigastric pain radiating into the back  She also reports associated nausea and vomiting  Denies any associated fever/chills, chest pain or shortness of breath  Following multiple doses of pain medications in the ED, patient's pain has not resolved  · CT C/A/P: Moderate left-sided hydroureteronephrosis with a 3 mm obstructing stone in the distal left ureter  Nonobstructing right-sided punctate intrarenal calculi  · Patient does not meet sepsis criteria at this time  Afebrile and no leukocytosis  Will monitor off of antibiotics for now   UA is pending  · Keep NPO  · Start on continues IVF  · Pain control, antiemetics  · Consult to Urology, recommendations are appreciated

## 2022-12-22 NOTE — TELEPHONE ENCOUNTER
Patient has left ureteral stent with string  Please arrange follow-up for removal in office on 12/27

## 2022-12-22 NOTE — ASSESSMENT & PLAN NOTE
Lab Results   Component Value Date    HGBA1C 5 8 (H) 04/07/2022     · Holding home oral anti-diabetics  · Start on SSI with accu checks  · Hypoglycemia protocol  · Diabetic diet

## 2022-12-22 NOTE — TELEPHONE ENCOUNTER
Appointment has been tentatively scheduled for 12/27 @ 10:30 AM in the [facility] office for stent removal     Patient remains inpatient at this time  Will need to confirm once she is discharged

## 2022-12-22 NOTE — ANESTHESIA PREPROCEDURE EVALUATION
Procedure:  CYSTOSCOPY URETEROSCOPY WITH LITHOTRIPSY HOLMIUM LASER, RETROGRADE PYELOGRAM AND INSERTION STENT URETERAL (Left: Bladder)    Relevant Problems   CARDIO   (+) Essential hypertension   (+) Hyperlipidemia   (+) Hypertriglyceridemia      GI/HEPATIC   (+) Cyst of pancreas   (+) History of bariatric surgery   (+) Pancreatic cyst      HEMATOLOGY   (+) Iron deficiency anemia, unspecified      NEURO/PSYCH   (+) History of gastric ulcer      Endocrine   (+) Diabetes (HCC)      Musculoskeletal and Integument   (+) Osteopenia of lumbar spine      Genitourinary   (+) Ureteral obstruction      Other   (+) Abnormal CT of the abdomen   (+) Metabolic syndrome   (+) Obesity   (+) Prediabetes        Physical Exam    Airway    Mallampati score: III  TM Distance: >3 FB  Neck ROM: full     Dental   No notable dental hx     Cardiovascular  Cardiovascular exam normal    Pulmonary  Pulmonary exam normal Breath sounds clear to auscultation,     Other Findings        Anesthesia Plan  ASA Score- 3     Anesthesia Type- general with ASA Monitors  Additional Monitors:   Airway Plan: LMA  Plan Factors-    Chart reviewed  EKG reviewed  Imaging results reviewed  Existing labs reviewed  Patient summary reviewed  Patient is not a current smoker  Patient instructed to abstain from smoking on day of procedure  Patient did not smoke on day of surgery  Obstructive sleep apnea risk education given perioperatively  Induction- intravenous  Postoperative Plan- Plan for postoperative opioid use  Planned trial extubation    Informed Consent- Anesthetic plan and risks discussed with patient  I personally reviewed this patient with the CRNA  Discussed and agreed on the Anesthesia Plan with the CRNA           Lab Results   Component Value Date    WBC 11 18 (H) 12/22/2022    HGB 13 5 12/22/2022    HCT 41 7 12/22/2022    MCV 90 12/22/2022     12/22/2022     Lab Results   Component Value Date    CALCIUM 9 2 12/22/2022 K 3 7 12/22/2022    CO2 23 12/22/2022     12/22/2022    BUN 17 12/22/2022    CREATININE 1 21 12/22/2022     Lab Results   Component Value Date    INR 1 10 03/02/2019    INR 1 04 02/25/2019    INR 1 14 03/15/2018    PROTIME 14 1 03/02/2019    PROTIME 13 5 02/25/2019    PROTIME 14 8 (H) 03/15/2018     Lab Results   Component Value Date    PTT 37 03/02/2019     Type and Screen:  A        Discussed with pt the benefits/alternatives and risks or General Anesthesia including breathing tube remaining in place if not strong enough, PONV, damage to lips and teeth, sore throat, eye injury or blindness    I, Dr Prabha Buchanan, the attending physician, have personally seen and evaluated the patient prior to anesthetic care  I have reviewed the pre-anesthetic record, and other medical records if appropriate to the anesthetic care  If a CRNA is involved in the case, I have reviewed the CRNA assessment, if present, and agree  The patient is in a suitable condition to proceed with my formulated anesthetic plan

## 2022-12-22 NOTE — H&P
[address] [facility] 1970, 47 y o  female MRN: [id number]  Unit/Bed#: ED 28 Encounter: [id number]  Primary Care Provider: Dena Kaye   Date and time admitted to hospital: 12/21/2022  8:23 PM    * Ureteral obstruction  Assessment & Plan  Patient presenting with epigastric pain radiating into the back  She also reports associated nausea and vomiting  Denies any associated fever/chills, chest pain or shortness of breath  Following multiple doses of pain medications in the ED, patient's pain has not resolved  · CT C/A/P: Moderate left-sided hydroureteronephrosis with a 3 mm obstructing stone in the distal left ureter  Nonobstructing right-sided punctate intrarenal calculi  · Patient does not meet sepsis criteria at this time  Afebrile and no leukocytosis  Will monitor off of antibiotics for now  UA is pending  · Keep NPO  · Start on continues IVF  · Pain control, antiemetics  · Consult to Urology, recommendations are appreciated    Obesity  Assessment & Plan  · BMI 38 58 kg/m2  · Encouraged healthy diet and lifestyle modifications    Diabetes ([facility] [address] )  Assessment & Plan    Lab Results   Component Value Date    HGBA1C 5 8 (H) 04/07/2022     · Holding home oral anti-diabetics  · Start on SSI with accu checks  · Hypoglycemia protocol  · Diabetic diet    Abnormal CT of the abdomen  Assessment & Plan  · CT C/A/P:   · 1  Thickening of the gastric wall which may represent gastritis  Follow-up with gastroenterology is recommended  · 2  Pancreatic head cystic mass similar to prior study  Continued follow-up as per recommendations on prior CT scans  · Encouraged outpatient follow up with GI    VTE Prophylaxis: Heparin  / sequential compression device   Code Status: Level 1 code  Discussion with family: Update in the AM    Anticipated Length of Stay:  Patient will be admitted on an Observation basis with an anticipated length of stay of  Less than 2 midnights     Justification for Hospital Stay: Ureteral obstruction    Total Time for Visit, including Counseling / Coordination of Care: 60 minutes  Greater than 50% of this total time spent on direct patient counseling and coordination of care  Chief Complaint:   Back pain    History of Present Illness:    Akanksha Jang is a 47 y o  female who has a past medical history significant for diabetes, anxiety, obesity  Patient presenting with epigastric pain radiating into the back  She also reports associated nausea and vomiting  Denies any associated fever/chills, chest pain or shortness of breath  Following multiple doses of pain medications in the ED, patient's pain has not resolved  Patient required medical admission for further treatment and evaluation of ureteral obstruction with urology evaluation  All patient questions answered to the best of my ability  Review of Systems:    Review of Systems   Constitutional: Negative for chills and fever  HENT: Negative for ear pain and sore throat  Eyes: Negative for pain and visual disturbance  Respiratory: Negative for cough and shortness of breath  Cardiovascular: Negative for chest pain and palpitations  Gastrointestinal: Positive for abdominal pain, nausea and vomiting  Genitourinary: Negative for dysuria and hematuria  Musculoskeletal: Negative for arthralgias and back pain  Skin: Negative for color change and rash  Neurological: Negative for seizures and syncope  All other systems reviewed and are negative  Past Medical and Surgical History:     Past Medical History:   Diagnosis Date   • Arthritis    • Bell palsy    • Gastric ulcer    • GERD (gastroesophageal reflux disease)    • History of transfusion    • Melena    • Obesity (BMI 30-39  9)    • Pancreatic cyst    • Right facial numbness    • Upper GI bleed        Past Surgical History:   Procedure Laterality Date   • ABDOMINOPLASTY     • BARIATRIC SURGERY     • BOWEL RESECTION LAPAROSCOPIC N/A 2/11/2019 Procedure: LAPAROSCOPIC LYSIS OF ADHESIONS; RESECTION SMALL BOWEL; DECOMPRESSION OF GASTRIC REMNANT; EGD;  Surgeon: Tanisha Lopez MD;  [facility];  Service: General   • CHOLECYSTECTOMY     • CT GUIDED PERC DRAINAGE CATHETER PLACEMENT  2/25/2019   • ESOPHAGOGASTRODUODENOSCOPY N/A 3/6/2019    Procedure: INTRAOPERATIVE EGD;  Surgeon: Tanisha Lopez MD;  [facility];  Service: Bariatrics   • HERNIA REPAIR     • HYSTERECTOMY     • KNEE CARTILAGE SURGERY     • PARTIAL GASTRECTOMY N/A 3/6/2019    Procedure: RESECTION GASTRIC PARTIAL/GASTRECTOMY PARTIAL LAPAROSCOPIC;  Surgeon: Tanisha Lopez MD;  [facility];  Service: Bariatrics   • AK COLONOSCOPY FLX DX W/COLLJ SPEC WHEN PFRMD N/A 3/28/2018    Procedure: COLONOSCOPY;  Surgeon: Chico Blandon MD;  Location: [facility]; Service: Gastroenterology   • AK COLONOSCOPY FLX DX W/COLLJ [facility] WHEN PFRMD N/A 1/31/2019    Procedure: COLONOSCOPY;  Surgeon: Chico Blandon MD;  [facility]; Service: Gastroenterology   • AK ESOPHAGOGASTRODUODENOSCOPY TRANSORAL DIAGNOSTIC N/A 3/22/2018    Procedure: ESOPHAGOGASTRODUODENOSCOPY (EGD); Surgeon: Chico Blandon MD;  [facility]; Service: Gastroenterology   • AK ESOPHAGOGASTRODUODENOSCOPY TRANSORAL DIAGNOSTIC N/A 1/31/2019    Procedure: ESOPHAGOGASTRODUODENOSCOPY (EGD); Surgeon: Chico Blandon MD;  [facility];   Service: Gastroenterology   • AK LAP,DIAGNOSTIC ABDOMEN N/A 3/6/2019    Procedure: LAPAROSCOPY DIAGNOSTIC;  Surgeon: Tanisha Lopez MD;  [facility];  Service: Bariatrics   • AK PART REMOVAL COLON W ANASTOMOSIS N/A 3/6/2019    Procedure: OPEN TRANSVERSE COLON RESECTION;  Surgeon: Tanisha Lopez MD;  [facility];  Service: Bariatrics   • REDUCTION MAMMAPLASTY Bilateral    • REPLACEMENT UNICONDYLAR JOINT KNEE     • SHOULDER ARTHROSCOPY DISTAL CLAVICLE EXCISION AND OPEN ROTATOR CUFF REPAIR         Meds/Allergies:    Prior to Admission medications    Medication Sig Start Date End Date Taking? Authorizing Provider   Biotin 1000 MCG tablet Take 1,000 mcg by mouth    Historical Provider, MD   butalbital-acetaminophen-caffeine (FIORICET,ESGIC) -40 mg per tablet take 1-2 tablets by mouth every 4 to 6 hours if needed maximum daily dose of 6 3/12/18   Historical Provider, MD   calcium citrate-vitamin D (CITRACAL+D) 315-200 MG-UNIT per tablet Take 2 tablets by mouth 2 (two) times a day 3/4/19   Hilda Ramos MD   cyclobenzaprine (FLEXERIL) 10 mg tablet Take 1 tablet (10 mg total) by mouth 3 (three) times a day as needed for muscle spasms 2/22/19   Hilda Ramos MD   ergocalciferol (VITAMIN D2) 50,000 units Take 1 capsule (50,000 Units total) by mouth 2 (two) times a week with meals 4/7/22   Jessie Pan PA-C   ferrous sulfate 325 (65 Fe) mg tablet TAKE 1 TABLET BY MOUTH TWICE DAILY 5/24/18   Historical Provider, MD   liraglutide (SAXENDA) injection INJECT SAXENDA DAILY SUBCUTANEOUSLY  -WEEK 1: 0 6mg daily -if tolerated WEEK 2: 1 2mg daily -if tolerated WEEK 3: 1 8mg daily -if tolerated WEEK 4: 2 4mg daily -if tolerated WEEK 5: 3mg daily -IF NAUSEA/VOMITING DEVELOP STOP MEDICATION FOR A FEW DAYS AND DECREASE TO PREVIOUSLY TOLERATED DOSE  STAY HYDRATED  -IF YOU DEVELOP SEVERE ABDOMINAL PAIN WHICH MAY RADIATE TO THE BACK, SOMETIMES ASSOCIATED WITH FEVER, AND VOMITING, STOP MEDICATION AND SEEK URGENT CARE AS THIS MAY BE A SIGN OF PANCREATITIS   12/5/22   Radha Campbell MD   Melatonin 5 MG CAPS Take 5 mg by mouth    Historical Provider, MD   metFORMIN (GLUCOPHAGE) 500 mg tablet take 1 tablet by mouth EVERY EVENING WITH DINNER 12/15/22   Radha Campbell MD   Multiple Vitamin (MULTIVITAMIN) tablet Take 1 tablet by mouth 2 (two) times a day 3/4/19   Hilda Ramos MD   Omega-3 Fatty Acids (FISH OIL) 1,000 mg Take 1,000 mg by mouth daily    Historical Provider, MD   ondansetron (ZOFRAN) 4 mg tablet Take 1 tablet (4 mg total) by mouth every 8 (eight) hours as needed for nausea or vomiting 1/24/19   GREGORY Gandhi   ondansetron (ZOFRAN-ODT) 4 mg disintegrating tablet Take 1 tablet (4 mg total) by mouth every 6 (six) hours as needed for nausea or vomiting 3/21/21   Leonid Boyd, DO   orphenadrine (NORFLEX) 100 mg tablet Take 100 mg by mouth 2 (two) times a day  Patient not taking: Reported on 12/17/2021  3/1/21 8/23/21  Historical Provider, MD   oxyCODONE-acetaminophen (PERCOCET) 2 5-325 MG per tablet Take 1,000 mg by mouth    Historical Provider, MD   oxyCODONE-acetaminophen (PERCOCET) 5-325 mg per tablet Take 1 tablet by mouth every 4 (four) hours as needed   Patient not taking: Reported on 12/17/2021  10/13/20   Historical Provider, MD   oxyCODONE-acetaminophen (PERCOCET) 5-325 mg per tablet Take 1 tablet by mouth every 6 (six) hours as needed for severe pain for up to 30 dosesMax Daily Amount: 4 tablets  Patient not taking: Reported on 12/17/2021 9/1/21   Megan Richardson MD   potassium chloride (K-DUR,KLOR-CON) 20 mEq tablet Take 1 tablet (20 mEq total) by mouth 2 (two) times a day for 7 days 5/15/19 12/17/21  Ralph Frye MD   predniSONE 20 mg tablet take 1 tablet by mouth three times a day for 4 days then twice a    (REFER TO PRESCRIPTION NOTES)   5/6/21   Historical Provider, MD   simethicone (MYLICON) 80 mg chewable tablet Chew 1 tablet (80 mg total) every 12 (twelve) hours 2/22/19   Ralph Frye MD   sucralfate (CARAFATE) 1 g tablet Take 1 tablet (1 g total) by mouth 4 (four) times a day 11/3/19   Ji Vazquez PA-C   tamsulosin (FLOMAX) 0 4 mg Take 1 capsule (0 4 mg total) by mouth daily with dinner Until kidney stone passes 12/28/19   Gaby Render L Coppersmith, DO   tamsulosin (FLOMAX) 0 4 mg Take 1 capsule (0 4 mg total) by mouth daily with dinner 3/21/21   Leonid Boyd DO   topiramate (TOPAMAX) 50 MG tablet Take 1 tablet in AM and 2 in PM 9/15/22   Sherri Ya MD   venlafaxine [facility]) 37 5 mg tablet Take 37 5 mg by mouth 2 (two) times a day    Historical Provider, MD   vitamin B-12 (CYANOCOBALAMIN) 500 MCG TABS Take 2 tablets (1,000 mcg total) by mouth daily for 120 days 3/4/19 6/23/22  Flo Ho MD     I have reviewed home medications using allscripts  Allergies: Allergies   Allergen Reactions   • Other Headache     MSG   • Ibuprofen Other (See Comments)     USE CAUTION DUE TO BARIATRIC SX   • Paxlovid [Nirmatrelvir-Ritonavir] Rash     11/2/2022   • Penicillins Rash       Social History:     Marital Status: /Civil Union   Occupation: NA  Patient Pre-hospital Living Situation: Home  Patient Pre-hospital Level of Mobility: Walks  Patient Pre-hospital Diet Restrictions: Diabetic  Substance Use History:   Social History     Substance and Sexual Activity   Alcohol Use Not Currently     Social History     Tobacco Use   Smoking Status Never   Smokeless Tobacco Never     Social History     Substance and Sexual Activity   Drug Use No       Family History:    Family History   Problem Relation Age of Onset   • Heart attack Mother    • Diabetes Mother    • Heart attack Father    • Stroke Father    • Hypertension Brother    • Leukemia Paternal Aunt        Physical Exam:     Vitals:   Blood Pressure: 139/67 (12/22/22 0000)  Pulse: 72 (12/22/22 0000)  Temperature: 98 5 °F (36 9 °C) (12/21/22 2025)  Temp Source: Temporal (12/21/22 2025)  Respirations: 15 (12/22/22 0000)  SpO2: 97 % (12/22/22 0000)    Physical Exam  Vitals and nursing note reviewed  Constitutional:       General: She is in acute distress  Appearance: She is well-developed  HENT:      Head: Normocephalic and atraumatic  Mouth/Throat:      Pharynx: Oropharynx is clear  Eyes:      Conjunctiva/sclera: Conjunctivae normal    Cardiovascular:      Rate and Rhythm: Normal rate and regular rhythm  Heart sounds: No murmur heard    Pulmonary:      Effort: Pulmonary effort is normal  No respiratory distress  Breath sounds: Normal breath sounds  Abdominal:      Palpations: Abdomen is soft  Tenderness: There is abdominal tenderness  There is left CVA tenderness  Musculoskeletal:         General: No swelling  Cervical back: Neck supple  Skin:     General: Skin is warm and dry  Capillary Refill: Capillary refill takes less than 2 seconds  Neurological:      Mental Status: She is alert and oriented to person, place, and time  Psychiatric:         Mood and Affect: Mood normal          Additional Data:     Lab Results: I have personally reviewed pertinent reports  Results from last 7 days   Lab Units 12/21/22 2034   WBC Thousand/uL 8 36   HEMOGLOBIN g/dL 13 9   HEMATOCRIT % 42 6   PLATELETS Thousands/uL 274   NEUTROS PCT % 58   LYMPHS PCT % 31   MONOS PCT % 7   EOS PCT % 2     Results from last 7 days   Lab Units 12/21/22 2034   SODIUM mmol/L 141   POTASSIUM mmol/L 3 2*   CHLORIDE mmol/L 105   CO2 mmol/L 25   BUN mg/dL 19   CREATININE mg/dL 1 09   ANION GAP mmol/L 11   CALCIUM mg/dL 9 2   ALBUMIN g/dL 4 1   TOTAL BILIRUBIN mg/dL 0 54   ALK PHOS U/L 84   ALT U/L 18   AST U/L 16   GLUCOSE RANDOM mg/dL 116                       Imaging: I have personally reviewed pertinent reports  CTA dissection protocol chest/abdomen/pelvis   Final Result by Juan C Hughes DO (12/21 2229)      No evidence of aortic dissection or aneurysm  Moderate left-sided hydroureteronephrosis with a 3 mm obstructing stone in the distal left ureter      Nonobstructing right-sided punctate intrarenal calculi  Thickening of the gastric wall which may represent gastritis  Follow-up with gastroenterology is recommended  Pancreatic head cystic mass similar to prior study  Continued follow-up as per recommendations on prior CT scans  The study was marked in [facility] for immediate notification           Workstation performed: [id number]         XR chest 1 view portable    (Results Pending)       EKG, Pathology, and Other Studies Reviewed on Admission:   · EKG: Reviewed    Allscripts / Epic Records Reviewed: Yes     ** Please Note: This note has been constructed using a voice recognition system   **

## 2022-12-22 NOTE — CONSULTS
Consult - Urology   Jennifer Haro 1970, 47 y o  female MRN: [id number]    Unit/Bed#: ED 28 Encounter: [id number]      Assessment & Plan:  1  Left ureteral stone  - CT showing moderate left-sided hydroureteronephrosis with a 3 mm obstructing stone in the distal left ureter   - Cr 1 21, GFR 51, stable  - WBC 11 81  - UA micro showing 20-30 WBC, no bacteria  - VSS, afebrile  - Pt having ongoing left flank and abdominal pain  - Discussed with pt cystoscopy, ureteroscopy, retrograde pyelogram, laser lithotripsy, and insertion of left ureteral stent  Reviewed risks vs benefits and pt wants to proceed  Discussed possible insertion of left ureteral stent and need for additional ureteroscopy at later date, and patient understands  Consent signed  - Maintain NPO    Subjective:   Pt is a 46 yo female with PMH diabetes, anxiety who presented to the ED last night for left sided flank pain and abdominal pain  She has associated nausea and vomiting,  reporting 5 episodes of  vomiting since last night  Since being admitted to the ED, pts pain still persists  Her pain is in her left flank and radiates into abdomen  She reports one prior kidney stone which she passed on her own without need for procedures  She reports her urine looks darker in color  She denies any prior adverse reactions to anesthesia  She denies hematuria, frequency, urgency, fevers, chills  Review of Systems   Constitutional: Negative for chills, diaphoresis and fever  Respiratory: Negative for cough and shortness of breath  Cardiovascular: Negative for chest pain and palpitations  Gastrointestinal: Positive for abdominal pain, nausea and vomiting  Negative for abdominal distention  Genitourinary: Positive for flank pain  Negative for difficulty urinating, dysuria, hematuria and urgency  Musculoskeletal: Negative for arthralgias and back pain  Skin: Negative for color change and rash  Neurological: Negative for seizures and syncope  All other systems reviewed and are negative  Objective:  Vitals: Blood pressure 122/70, pulse 68, temperature 98 5 °F (36 9 °C), temperature source Temporal, resp  rate 18, SpO2 100 %, not currently breastfeeding  ,There is no height or weight on file to calculate BMI  Physical Exam  Vitals reviewed  Constitutional:       General: She is not in acute distress  Appearance: Normal appearance  She is normal weight  She is not ill-appearing, toxic-appearing or diaphoretic  HENT:      Head: Normocephalic and atraumatic  Right Ear: External ear normal       Left Ear: External ear normal       Nose: Nose normal       Mouth/Throat:      Mouth: Mucous membranes are moist    Eyes:      General: No scleral icterus  Conjunctiva/sclera: Conjunctivae normal    Cardiovascular:      Rate and Rhythm: Normal rate and regular rhythm  Pulses: Normal pulses  Heart sounds: Normal heart sounds  No murmur heard  No friction rub  No gallop  Pulmonary:      Effort: Pulmonary effort is normal  No respiratory distress  Breath sounds: Normal breath sounds  No stridor  No wheezing, rhonchi or rales  Chest:      Chest wall: No tenderness  Abdominal:      General: Abdomen is flat  There is no distension  Palpations: There is no mass  Tenderness: There is abdominal tenderness  There is left CVA tenderness  There is no right CVA tenderness or guarding  Hernia: No hernia is present  Musculoskeletal:         General: Normal range of motion  Cervical back: Normal range of motion  Skin:     General: Skin is warm  Findings: No rash  Neurological:      General: No focal deficit present  Mental Status: She is alert and oriented to person, place, and time  Mental status is at baseline  Psychiatric:         Mood and Affect: Mood normal          Behavior: Behavior normal          Thought Content:  Thought content normal          Judgment: Judgment normal  Imaging:  CTA - CHEST, ABDOMEN AND PELVIS - WITHOUT AND WITH IV CONTRAST     INDICATION:   chest pain radiating to back and stomach      COMPARISON:  September 15, 2022     TECHNIQUE: CT examination of the chest, abdomen and pelvis was performed both prior to and after the administration of intravenous contrast   The noncontrast portion of this examination was performed utilizing low radiation dose technique  Thin section   angiographic arterial phase post contrast technique was used in order to evaluate for aortic dissection  3D reformatted images and volume rendering were performed on an independent workstation  Additionally, axial, sagittal, and coronal 2D reformatted   images were created from the source data and submitted for interpretation      Radiation dose length product (DLP) for this visit:  1896 mGy-cm   This examination, like all CT scans performed in the [facility], was performed utilizing techniques to minimize radiation dose exposure, including the use of iterative   reconstruction and automated exposure control      IV Contrast:  100 mL of iohexol (OMNIPAQUE)  Enteric Contrast:  Enteric contrast was not administered      FINDINGS:      AORTA:  There is no aortic dissection or intramural hematoma  There is no aortic aneurysm      CHEST     LUNGS:  Lungs are clear  There is no tracheal or endobronchial lesion      PLEURA:  Unremarkable      HEART/PULMONARY ARTERIAL TREE:  Unremarkable for patient's age      MEDIASTINUM AND JOSEE:  Unremarkable      CHEST WALL AND LOWER NECK: Left-sided thyroid nodule measuring 1 8 cm with calcifications    Incidental discovery of one or more thyroid nodule(s) measuring more than 1 5 cm and without suspicious features is noted in this patient who is above 28 years   old; according to guidelines published in the February 2015 white paper on incidental thyroid nodules in the Journal of the [employer] Vicky Cannon), further characterization with thyroid ultrasound is recommended      ABDOMEN     LIVER/BILIARY TREE:  Unremarkable      GALLBLADDER:  Gallbladder is surgically absent      SPLEEN:  Unremarkable      PANCREAS:  Pancreatic cystic mass similar to prior study      ADRENAL GLANDS:  Unremarkable      KIDNEYS/URETERS:  Moderate left-sided hydroureteronephrosis with a 3 mm obstructing stone in the distal left ureter  Nonobstructing right-sided punctate intrarenal calculi  Fat-containing lesion in the right upper pole measuring 1 1 cm similar to prior   study likely representing AML      STOMACH AND BOWEL:  Status post gastric surgery  Thickening of the gastric wall is seen  No evidence of bowel obstruction      APPENDIX:  No findings to suggest appendicitis      ABDOMINOPELVIC CAVITY:  No ascites or free intraperitoneal air  No lymphadenopathy      PELVIS     REPRODUCTIVE ORGANS:  Patient is status post hysterectomy      URINARY BLADDER:  Unremarkable      ABDOMINAL WALL/INGUINAL REGIONS:  Unremarkable      OSSEOUS STRUCTURES:  No acute fracture or destructive osseous lesion      IMPRESSION:     No evidence of aortic dissection or aneurysm      Moderate left-sided hydroureteronephrosis with a 3 mm obstructing stone in the distal left ureter     Nonobstructing right-sided punctate intrarenal calculi      Thickening of the gastric wall which may represent gastritis  Follow-up with gastroenterology is recommended      Pancreatic head cystic mass similar to prior study    Continued follow-up as per recommendations on prior CT scans      The study was marked in [facility] for immediate notification         Workstation performed: [id number]    Labs:  Recent Labs     12/21/22 2034 12/22/22  0613   WBC 8 36 11 18*     Recent Labs     12/21/22 2034 12/22/22  0613   HGB 13 9 13 5     Recent Labs     12/21/22 2034 12/22/22  0613   CREATININE 1 09 1 21       History:  Social History     Socioeconomic History   • Marital status: /Civil Union     Spouse name: Not on file   • Number of children: Not on file   • Years of education: Not on file   • Highest education level: Not on file   Occupational History   • Occupation: Personal Caregiver   Tobacco Use   • Smoking status: Never   • Smokeless tobacco: Never   Vaping Use   • Vaping Use: Never used   Substance and Sexual Activity   • Alcohol use: Not Currently   • Drug use: No   • Sexual activity: Yes     Partners: Male   Other Topics Concern   • Not on file   Social History Narrative   • Not on file     Social Determinants of Health     Financial Resource Strain: Not on file   Food Insecurity: Not on file   Transportation Needs: Not on file   Physical Activity: Not on file   Stress: Not on file   Social Connections: Not on file   Intimate Partner Violence: Not on file   Housing Stability: Not on file     Past Medical History:   Diagnosis Date   • Arthritis    • Bell palsy    • Gastric ulcer    • GERD (gastroesophageal reflux disease)    • History of transfusion    • Melena    • Obesity (BMI 30-39  9)    • Pancreatic cyst    • Right facial numbness    • Upper GI bleed      Past Surgical History:   Procedure Laterality Date   • ABDOMINOPLASTY     • BARIATRIC SURGERY     • BOWEL RESECTION LAPAROSCOPIC N/A 2/11/2019    Procedure: LAPAROSCOPIC LYSIS OF ADHESIONS; RESECTION SMALL BOWEL; DECOMPRESSION OF GASTRIC REMNANT; EGD;  Surgeon: Tanisha Lopez MD;  [facility];  Service: General   • CHOLECYSTECTOMY     • CT GUIDED PERC DRAINAGE CATHETER PLACEMENT  2/25/2019   • ESOPHAGOGASTRODUODENOSCOPY N/A 3/6/2019    Procedure: INTRAOPERATIVE EGD;  Surgeon: Tanisha Lopez MD;  [facility];  Service: Bariatrics   • HERNIA REPAIR     • HYSTERECTOMY     • KNEE CARTILAGE SURGERY     • PARTIAL GASTRECTOMY N/A 3/6/2019    Procedure: RESECTION GASTRIC PARTIAL/GASTRECTOMY PARTIAL LAPAROSCOPIC;  Surgeon: Tanisha Lopez MD;  [facility];  Service: Bariatrics   • MD COLONOSCOPY FLX DX [facility] INPATIENT REHABILITATION WHEN PFRMD N/A 3/28/2018    Procedure: COLONOSCOPY;  Surgeon: Erin Ivey MD;  Location: [facility]; Service: Gastroenterology   • IA [facility] WHEN PFRMD N/A 1/31/2019    Procedure: COLONOSCOPY;  Surgeon: Erin Ivey MD;  [facility]; Service: Gastroenterology   • IA ESOPHAGOGASTRODUODENOSCOPY TRANSORAL DIAGNOSTIC N/A 3/22/2018    Procedure: ESOPHAGOGASTRODUODENOSCOPY (EGD); Surgeon: Erin Ivey MD;  [facility]; Service: Gastroenterology   • IA ESOPHAGOGASTRODUODENOSCOPY TRANSORAL DIAGNOSTIC N/A 1/31/2019    Procedure: ESOPHAGOGASTRODUODENOSCOPY (EGD); Surgeon: Erin Ivey MD;  [facility];   Service: Gastroenterology   • IA LAP,DIAGNOSTIC ABDOMEN N/A 3/6/2019    Procedure: LAPAROSCOPY DIAGNOSTIC;  Surgeon: Crystal Salinas MD;  [facility];  Service: Bariatrics   • IA PART REMOVAL COLON W ANASTOMOSIS N/A 3/6/2019    Procedure: OPEN TRANSVERSE COLON RESECTION;  Surgeon: Crystal Salinas MD;  [facility];  Service: Bariatrics   • REDUCTION MAMMAPLASTY Bilateral    • REPLACEMENT UNICONDYLAR JOINT KNEE     • SHOULDER ARTHROSCOPY DISTAL CLAVICLE EXCISION AND OPEN ROTATOR CUFF REPAIR       Family History   Problem Relation Age of Onset   • Heart attack Mother    • Diabetes Mother    • Heart attack Father    • Stroke Father    • Hypertension Brother    • Leukemia Paternal Aunt        Verenice Sanchez [place]  Date: 12/22/2022 Time: 9:20 AM

## 2022-12-22 NOTE — OP NOTE
OPERATIVE REPORT  PATIENT NAME: Hailee Mckeon    :  1970  MRN: [id number]  Pt Location: [facility]OM [facility]    SURGERY DATE: 2022    Surgeon(s) and Role:     Suad العلي MD - Primary    Preop Diagnosis:  Kidney stone [N20 0]    Post-Op Diagnosis Codes:     * Kidney stone [N20 0]    Procedure(s) (LRB):  CYSTOSCOPY URETEROSCOPY WITH LITHOTRIPSY HOLMIUM LASER, RETROGRADE PYELOGRAM AND INSERTION STENT URETERAL (Left)    Specimen(s):  ID Type Source Tests Collected by Time Destination   A : Left Ureteral Calculus Calculus Ureter, Left STONE ANALYSIS Narda Lester MD 2022 [id number]        Estimated Blood Loss:   Minimal    Drains:  * No LDAs found *    Anesthesia Type:   General    Operative Indications:  Kidney stone [N20 0]      Operative Findings:  Stone retrieved    Complications:   None    Procedure and Technique:  The patient was identified, brought to the operating room, and placed on the table in supine position  After induction of general anesthesia, the patient was placed in dorsal lithotomy position and prepped and draped in the usual sterile fashion  A complete formal timeout was performed  The 25 Barbadian rigid cystoscope was placed per urethra and cystoscopy was performed  There was no bladder abnormality identified  The Left ureteral orifice was identified and cannulated with a Solo wire  A semirigid ureteroscope was then placed alongside the wire into the ureter, and the stone was identified  A 4 wire stone basket was placed and the stone was retrieved  At this point, a retrograde pyelogram was performed delineating the upper urinary tract anatomy  No further filling defect identified  The ureteral length was measured  The safety wire was backloaded into the cystoscope and a 26 cm x 6 Barbadian double-J stent was placed with string  The patient tolerated the procedure well and was transferred to the recovery room awake alert and in stable condition      Plan: stent/string until 12/27  May be discharged when stable       I was present for the entire procedure    Patient Disposition:  PACU         SIGNATURE: Gina Henderson MD  DATE: December 22, 2022  TIME: 3:04 PM

## 2022-12-23 VITALS
OXYGEN SATURATION: 98 % | SYSTOLIC BLOOD PRESSURE: 123 MMHG | HEART RATE: 64 BPM | DIASTOLIC BLOOD PRESSURE: 74 MMHG | TEMPERATURE: 98.2 F | RESPIRATION RATE: 16 BRPM

## 2022-12-23 LAB
ANION GAP SERPL CALCULATED.3IONS-SCNC: 10 MMOL/L (ref 4–13)
BACTERIA UR CULT: NORMAL
BASOPHILS # BLD AUTO: 0.04 THOUSANDS/ÂΜL (ref 0–0.1)
BASOPHILS NFR BLD AUTO: 1 % (ref 0–1)
BUN SERPL-MCNC: 16 MG/DL (ref 5–25)
CALCIUM SERPL-MCNC: 8.6 MG/DL (ref 8.3–10.1)
CHLORIDE SERPL-SCNC: 110 MMOL/L (ref 96–108)
CO2 SERPL-SCNC: 21 MMOL/L (ref 21–32)
CREAT SERPL-MCNC: 0.97 MG/DL (ref 0.6–1.3)
EOSINOPHIL # BLD AUTO: 0.12 THOUSAND/ÂΜL (ref 0–0.61)
EOSINOPHIL NFR BLD AUTO: 2 % (ref 0–6)
ERYTHROCYTE [DISTWIDTH] IN BLOOD BY AUTOMATED COUNT: 14.4 % (ref 11.6–15.1)
GFR SERPL CREATININE-BSD FRML MDRD: 66 ML/MIN/1.73SQ M
GLUCOSE P FAST SERPL-MCNC: 99 MG/DL (ref 65–99)
GLUCOSE SERPL-MCNC: 109 MG/DL (ref 65–140)
GLUCOSE SERPL-MCNC: 98 MG/DL (ref 65–140)
GLUCOSE SERPL-MCNC: 99 MG/DL (ref 65–140)
HCT VFR BLD AUTO: 39.5 % (ref 34.8–46.1)
HGB BLD-MCNC: 12.5 G/DL (ref 11.5–15.4)
IMM GRANULOCYTES # BLD AUTO: 0.02 THOUSAND/UL (ref 0–0.2)
IMM GRANULOCYTES NFR BLD AUTO: 0 % (ref 0–2)
LYMPHOCYTES # BLD AUTO: 1.78 THOUSANDS/ÂΜL (ref 0.6–4.47)
LYMPHOCYTES NFR BLD AUTO: 24 % (ref 14–44)
MCH RBC QN AUTO: 28.9 PG (ref 26.8–34.3)
MCHC RBC AUTO-ENTMCNC: 31.6 G/DL (ref 31.4–37.4)
MCV RBC AUTO: 91 FL (ref 82–98)
MONOCYTES # BLD AUTO: 0.67 THOUSAND/ÂΜL (ref 0.17–1.22)
MONOCYTES NFR BLD AUTO: 9 % (ref 4–12)
NEUTROPHILS # BLD AUTO: 4.73 THOUSANDS/ÂΜL (ref 1.85–7.62)
NEUTS SEG NFR BLD AUTO: 64 % (ref 43–75)
NRBC BLD AUTO-RTO: 0 /100 WBCS
PLATELET # BLD AUTO: 237 THOUSANDS/UL (ref 149–390)
PMV BLD AUTO: 11.5 FL (ref 8.9–12.7)
POTASSIUM SERPL-SCNC: 3.6 MMOL/L (ref 3.5–5.3)
RBC # BLD AUTO: 4.32 MILLION/UL (ref 3.81–5.12)
SODIUM SERPL-SCNC: 141 MMOL/L (ref 135–147)
WBC # BLD AUTO: 7.36 THOUSAND/UL (ref 4.31–10.16)

## 2022-12-23 RX ORDER — PHENAZOPYRIDINE HYDROCHLORIDE 100 MG/1
100 TABLET, FILM COATED ORAL
Qty: 10 TABLET | Refills: 0 | Status: SHIPPED | OUTPATIENT
Start: 2022-12-23 | End: 2022-12-25

## 2022-12-23 RX ORDER — TRAMADOL HYDROCHLORIDE 50 MG/1
50 TABLET ORAL EVERY 8 HOURS PRN
Qty: 12 TABLET | Refills: 0 | Status: SHIPPED | OUTPATIENT
Start: 2022-12-23 | End: 2022-12-23

## 2022-12-23 RX ORDER — IBUPROFEN 400 MG/1
400 TABLET ORAL ONCE
Status: COMPLETED | OUTPATIENT
Start: 2022-12-23 | End: 2022-12-23

## 2022-12-23 RX ORDER — OXYBUTYNIN CHLORIDE 5 MG/1
10 TABLET, EXTENDED RELEASE ORAL DAILY
Status: DISCONTINUED | OUTPATIENT
Start: 2022-12-23 | End: 2022-12-23 | Stop reason: HOSPADM

## 2022-12-23 RX ORDER — PHENAZOPYRIDINE HYDROCHLORIDE 100 MG/1
100 TABLET, FILM COATED ORAL
Status: DISCONTINUED | OUTPATIENT
Start: 2022-12-23 | End: 2022-12-23 | Stop reason: HOSPADM

## 2022-12-23 RX ORDER — OXYBUTYNIN CHLORIDE 10 MG/1
10 TABLET, EXTENDED RELEASE ORAL DAILY
Qty: 5 TABLET | Refills: 0 | Status: SHIPPED | OUTPATIENT
Start: 2022-12-23 | End: 2022-12-28

## 2022-12-23 RX ADMIN — TOPIRAMATE 50 MG: 25 TABLET, FILM COATED ORAL at 08:00

## 2022-12-23 RX ADMIN — HYDROMORPHONE HYDROCHLORIDE 0.5 MG: 1 INJECTION, SOLUTION INTRAMUSCULAR; INTRAVENOUS; SUBCUTANEOUS at 07:58

## 2022-12-23 RX ADMIN — OXYBUTYNIN 10 MG: 5 TABLET, FILM COATED, EXTENDED RELEASE ORAL at 11:44

## 2022-12-23 RX ADMIN — BUTALBITAL, ACETAMINOPHEN AND CAFFEINE 1 TABLET: 50; 325; 40 TABLET ORAL at 06:07

## 2022-12-23 RX ADMIN — HYDROMORPHONE HYDROCHLORIDE 0.5 MG: 1 INJECTION, SOLUTION INTRAMUSCULAR; INTRAVENOUS; SUBCUTANEOUS at 00:11

## 2022-12-23 RX ADMIN — PHENAZOPYRIDINE 100 MG: 100 TABLET ORAL at 11:44

## 2022-12-23 RX ADMIN — IBUPROFEN 400 MG: 400 TABLET ORAL at 11:47

## 2022-12-23 NOTE — DISCHARGE INSTR - AVS FIRST PAGE
Recommended close follow-up with primary care provider within 5 days of discharge  recommend outpatient follow-up with urology  Commended to maintain ureteral stent until 12/27/2022

## 2022-12-23 NOTE — DISCHARGE SUMMARY
[address] [facility]  Discharge- Sharifa Wu 1970, 47 y o  female MRN: [id number]  Unit/Bed#: -Joni Encounter: [id number]  Primary Care Provider: Janelle Fonseca   Date and time admitted to hospital: 12/21/2022  8:23 PM    * Ureteral obstruction  Assessment & Plan  Patient presenting with epigastric pain radiating into the back  She also reports associated nausea and vomiting  CT C/A/P: Moderate left-sided hydroureteronephrosis with a 3 mm obstructing stone in the distal left ureter  Nonobstructing right-sided punctate intrarenal calculi  Now s/p cystoscopy, lithotripsy, with left ureteral stent placement on 12/22/2022  Does report minor discomfort with urination however reports feeling much better compared to yesterday  Renal function within normal limits  CBC without leukocytosis but  UA noted indicative of UTI  Urology recommended maintain left ureteral stent until 12/27/2022 and outpatient follow-up with urology  Urology also recommended to continue Ditropan and Pyridium on discharge  Patient does not want to take anything sedative pain therefore rx for Ultram has been discontinued  Currently she is hemodynamically stable for discharge  Obesity  Assessment & Plan  · BMI 38 58 kg/m2  · Encouraged healthy diet and lifestyle modifications    [facility])  Assessment & Plan    Lab Results   Component Value Date    HGBA1C 5 8 (H) 04/07/2022     · Resume home medications  · Outpatient follow-up with PCP  Abnormal CT of the abdomen  Assessment & Plan  · CT C/A/P:   · 1  Thickening of the gastric wall which may represent gastritis  Follow-up with gastroenterology is recommended  · 2  Pancreatic head cystic mass similar to prior study    Continued follow-up as per recommendations on prior CT scans  · She follow with Surgery oncology        Medical Problems     Resolved Problems  Date Reviewed: 12/23/2022   None       Discharging Physician / Practitioner: Nabor Rebolledo MD  PCP: Lois Gonzales  Admission Date:   Admission Orders (From admission, onward)     Ordered        12/22/22 0140  Place in Observation  Once                      Discharge Date: 12/23/22    Consultations During Hospital Stay:  · Urology    Procedures Performed:   · Cystoscopy, lithotripsy, with left ureteral stent placement  Incidental Findings:   · Pancreatic head with cystic mass      Reason for Admission: Left ureteral hydronephrosis secondary to obstructing left nephrolithiasis  Hospital Course:     Gloria Delgado is a 47 y o  female patient with past medical history of obesity, diabetes, anxiety, who originally presented to the hospital on 12/21/2022 due to complaining of left-sided lower abdominal radiating to left flank area, associated with nausea, nonbloody vomiting  On further evaluation with with left hydroureteronephrosis secondary to 3 mm obstructing left nephrolithiasis  UA not indicative of UTI  Renal function within normal limits  Patient did not improve despite of hydration and medically expulsive therapy  She had cystoscopy, lithotripsy, left ureteral stent placement on 12/22/2022 with resolution of her symptoms  Currently only experiencing mild discomfort upon urination however overall reports feeling much better  Cleared by urology for discharge with out pt follow up and to maintain ureteral stent until 12/27/2022  Currently she is hemodynamically stable for discharge  No other events reported  Refer to earlier notes for further verification  Please see above list of diagnoses and related plan for additional information  Condition at Discharge: good    Discharge Day Visit / Exam:   Subjective: Denies fever, chills, nausea, vomiting, abdominal pain, any other new complaints  Does report mild discomfort with urination however significantly better compared to yesterday  Ricky Gave to go home      Vitals: Blood Pressure: 123/74 (12/23/22 0728)  Pulse: 64 (12/23/22 0658)  Temperature: 98 2 °F (36 8 °C) (12/23/22 0728)  Temp Source: Temporal (12/22/22 1300)  Respirations: 16 (12/23/22 0728)  SpO2: 98 % (12/23/22 0728)  Exam:   Physical Exam  Constitutional:       General: She is not in acute distress  Appearance: Normal appearance  She is not ill-appearing  HENT:      Head: Normocephalic and atraumatic  Eyes:      Extraocular Movements: Extraocular movements intact  Conjunctiva/sclera: Conjunctivae normal       Pupils: Pupils are equal, round, and reactive to light  Cardiovascular:      Rate and Rhythm: Normal rate  Pulses: Normal pulses  Pulmonary:      Effort: Pulmonary effort is normal  No respiratory distress  Breath sounds: Normal breath sounds  No stridor  No wheezing or rhonchi  Abdominal:      General: Bowel sounds are normal  There is no distension  Palpations: Abdomen is soft  Tenderness: There is no abdominal tenderness  Musculoskeletal:      Cervical back: Normal range of motion and neck supple  No rigidity  Neurological:      General: No focal deficit present  Mental Status: She is alert and oriented to person, place, and time  Mental status is at baseline  Psychiatric:         Mood and Affect: Mood normal          Behavior: Behavior normal            Discharge instructions/Information to patient and family:   See after visit summary for information provided to patient and family  Provisions for Follow-Up Care:  See after visit summary for information related to follow-up care and any pertinent home health orders  Disposition:   Home    Planned Readmission:      Discharge Statement:  I spent 32 minutes discharging the patient  This time was spent on the day of discharge  I had direct contact with the patient on the day of discharge   Greater than 50% of the total time was spent examining patient, answering all patient questions, arranging and discussing plan of care with patient as well as directly providing post-discharge instructions  Additional time then spent on discharge activities  Discharge Medications:  See after visit summary for reconciled discharge medications provided to patient and/or family        **Please Note: This note may have been constructed using a voice recognition system**

## 2022-12-23 NOTE — ED PROVIDER NOTES
History  Chief Complaint   Patient presents with   • Chest Pain     Pt c/o L sided chest pain radiating from back that began approx 40 mins PTA  Reporting nausea and vomiting      67-year-old female presenting to the emergency department for evaluation of chest pain  Patient reports substernal chest pain which radiates into her back and into her lower abdomen, it began suddenly about 40 minutes ago, associated with nausea as well  No fevers or chills, no shortness of breath, no lightheadedness syncope or presyncope  Prior to Admission Medications   Prescriptions Last Dose Informant Patient Reported? Taking? Biotin 1000 MCG tablet   Yes No   Sig: Take 1,000 mcg by mouth   Melatonin 5 MG CAPS   Yes No   Sig: Take 5 mg by mouth   Multiple Vitamin (MULTIVITAMIN) tablet Past Week  No Yes   Sig: Take 1 tablet by mouth 2 (two) times a day   Omega-3 Fatty Acids (FISH OIL) 1,000 mg   Yes No   Sig: Take 1,000 mg by mouth daily   butalbital-acetaminophen-caffeine (FIORICET,ESGIC) -40 mg per tablet   Yes No   Sig: take 1-2 tablets by mouth every 4 to 6 hours if needed maximum daily dose of 6   calcium citrate-vitamin D (CITRACAL+D) 315-200 MG-UNIT per tablet Past Week  No Yes   Sig: Take 2 tablets by mouth 2 (two) times a day   cyclobenzaprine (FLEXERIL) 10 mg tablet   No No   Sig: Take 1 tablet (10 mg total) by mouth 3 (three) times a day as needed for muscle spasms   ergocalciferol (VITAMIN D2) 50,000 units   No No   Sig: Take 1 capsule (50,000 Units total) by mouth 2 (two) times a week with meals   ferrous sulfate 325 (65 Fe) mg tablet Unknown  Yes No   Sig: TAKE 1 TABLET BY MOUTH TWICE DAILY   liraglutide (SAXENDA) injection   No No   Sig: INJECT SAXENDA DAILY SUBCUTANEOUSLY   -WEEK 1: 0 6mg daily -if tolerated WEEK 2: 1 2mg daily -if tolerated WEEK 3: 1 8mg daily -if tolerated WEEK 4: 2 4mg daily -if tolerated WEEK 5: 3mg daily -IF NAUSEA/VOMITING DEVELOP STOP MEDICATION FOR A FEW DAYS AND DECREASE TO PREVIOUSLY TOLERATED DOSE  STAY HYDRATED  -IF YOU DEVELOP SEVERE ABDOMINAL PAIN WHICH MAY RADIATE TO THE BACK, SOMETIMES ASSOCIATED WITH FEVER, AND VOMITING, STOP MEDICATION AND SEEK URGENT CARE AS THIS MAY BE A SIGN OF PANCREATITIS    metFORMIN (GLUCOPHAGE) 500 mg tablet 12/21/2022  No Yes   Sig: take 1 tablet by mouth EVERY EVENING WITH DINNER   ondansetron (ZOFRAN) 4 mg tablet Past Week  No Yes   Sig: Take 1 tablet (4 mg total) by mouth every 8 (eight) hours as needed for nausea or vomiting   ondansetron (ZOFRAN-ODT) 4 mg disintegrating tablet Past Week  No Yes   Sig: Take 1 tablet (4 mg total) by mouth every 6 (six) hours as needed for nausea or vomiting   orphenadrine (NORFLEX) 100 mg tablet   Yes No   Sig: Take 100 mg by mouth 2 (two) times a day   Patient not taking: Reported on 12/17/2021    oxyCODONE-acetaminophen (PERCOCET) 2 5-325 MG per tablet   Yes No   Sig: Take 1,000 mg by mouth   potassium chloride (K-DUR,KLOR-CON) 20 mEq tablet   No No   Sig: Take 1 tablet (20 mEq total) by mouth 2 (two) times a day for 7 days   predniSONE 20 mg tablet   Yes No   Sig: take 1 tablet by mouth three times a day for 4 days then twice a    (REFER TO PRESCRIPTION NOTES)     simethicone (MYLICON) 80 mg chewable tablet   No No   Sig: Chew 1 tablet (80 mg total) every 12 (twelve) hours   sucralfate (CARAFATE) 1 g tablet   No No   Sig: Take 1 tablet (1 g total) by mouth 4 (four) times a day   tamsulosin (FLOMAX) 0 4 mg Unknown  No No   Sig: Take 1 capsule (0 4 mg total) by mouth daily with dinner Until kidney stone passes   tamsulosin (FLOMAX) 0 4 mg Unknown  No No   Sig: Take 1 capsule (0 4 mg total) by mouth daily with dinner   topiramate (TOPAMAX) 50 MG tablet Past Week  No Yes   Sig: Take 1 tablet in AM and 2 in PM   venlafaxine (EFFEXOR) 37 5 mg tablet 12/21/2022  Yes Yes   Sig: Take 37 5 mg by mouth 2 (two) times a day   vitamin B-12 (CYANOCOBALAMIN) 500 MCG TABS   No No   Sig: Take 2 tablets (1,000 mcg total) by mouth daily for 120 days      Facility-Administered Medications: None       Past Medical History:   Diagnosis Date   • Arthritis    • Bell palsy    • Gastric ulcer    • GERD (gastroesophageal reflux disease)    • History of transfusion    • Melena    • Obesity (BMI 30-39  9)    • Pancreatic cyst    • Right facial numbness    • Upper GI bleed        Past Surgical History:   Procedure Laterality Date   • ABDOMINOPLASTY     • BARIATRIC SURGERY     • BOWEL RESECTION LAPAROSCOPIC N/A 2/11/2019    Procedure: LAPAROSCOPIC LYSIS OF ADHESIONS; RESECTION SMALL BOWEL; DECOMPRESSION OF GASTRIC REMNANT; EGD;  Surgeon: Evelyn Hawthorne MD;  [facility];  Service: General   • CHOLECYSTECTOMY     • CT GUIDED PERC DRAINAGE CATHETER PLACEMENT  2/25/2019   • ESOPHAGOGASTRODUODENOSCOPY N/A 3/6/2019    Procedure: INTRAOPERATIVE EGD;  Surgeon: Evelyn Hawthorne MD;  [facility];  Service: Bariatrics   • HERNIA REPAIR     • HYSTERECTOMY     • KNEE CARTILAGE SURGERY     • PARTIAL GASTRECTOMY N/A 3/6/2019    Procedure: RESECTION GASTRIC PARTIAL/GASTRECTOMY PARTIAL LAPAROSCOPIC;  Surgeon: Evelyn Hawthorne MD;  [facility];  Service: Bariatrics   • MO COLONOSCOPY FLX DX W/COLLJ SPEC WHEN PFRMD N/A 3/28/2018    Procedure: COLONOSCOPY;  Surgeon: Arabella Almonte MD;  [facility]; Service: Gastroenterology   • MO COLONOSCOPY FLX DX W/COLLJ [facility] WHEN PFRMD N/A 1/31/2019    Procedure: COLONOSCOPY;  Surgeon: Arabella Almonte MD;  [facility]; Service: Gastroenterology   • MO ESOPHAGOGASTRODUODENOSCOPY TRANSORAL DIAGNOSTIC N/A 3/22/2018    Procedure: ESOPHAGOGASTRODUODENOSCOPY (EGD); Surgeon: Arabella Almonte MD;  [facility]; Service: Gastroenterology   • MO ESOPHAGOGASTRODUODENOSCOPY TRANSORAL DIAGNOSTIC N/A 1/31/2019    Procedure: ESOPHAGOGASTRODUODENOSCOPY (EGD); Surgeon: Arabella Almonte MD;  [facility];   Service: Gastroenterology   • MO LAP,DIAGNOSTIC ABDOMEN N/A 3/6/2019 Procedure: LAPAROSCOPY DIAGNOSTIC;  Surgeon: Aj Jones MD;  [facility];  Service: Bariatrics   • TN PART REMOVAL COLON W ANASTOMOSIS N/A 3/6/2019    Procedure: OPEN TRANSVERSE COLON RESECTION;  Surgeon: Aj Jones MD;  [facility];  Service: Bariatrics   • REDUCTION MAMMAPLASTY Bilateral    • REPLACEMENT UNICONDYLAR JOINT KNEE     • SHOULDER ARTHROSCOPY DISTAL CLAVICLE EXCISION AND OPEN ROTATOR CUFF REPAIR         Family History   Problem Relation Age of Onset   • Heart attack Mother    • Diabetes Mother    • Heart attack Father    • Stroke Father    • Hypertension Brother    • Leukemia Paternal Aunt      I have reviewed and agree with the history as documented  E-Cigarette/Vaping   • E-Cigarette Use Never User      E-Cigarette/Vaping Substances   • Nicotine No    • THC No    • CBD No    • Flavoring No    • Other No    • Unknown No      Social History     Tobacco Use   • Smoking status: Never   • Smokeless tobacco: Never   Vaping Use   • Vaping Use: Never used   Substance Use Topics   • Alcohol use: Not Currently   • Drug use: No       Review of Systems   Constitutional: Negative for appetite change, chills, fatigue and fever  HENT: Negative for sneezing and sore throat  Eyes: Negative for visual disturbance  Respiratory: Negative for cough, choking, chest tightness, shortness of breath and wheezing  Cardiovascular: Positive for chest pain  Negative for palpitations  Gastrointestinal: Positive for abdominal pain  Negative for constipation, diarrhea, nausea and vomiting  Genitourinary: Negative for difficulty urinating and dysuria  Musculoskeletal: Positive for back pain  Neurological: Negative for dizziness, weakness, light-headedness, numbness and headaches  All other systems reviewed and are negative  Physical Exam  Physical Exam  Constitutional:       General: She is in acute distress  Appearance: She is well-developed  She is ill-appearing  She is not diaphoretic  HENT:      Head: Normocephalic and atraumatic  Eyes:      Pupils: Pupils are equal, round, and reactive to light  Neck:      Vascular: No JVD  Trachea: No tracheal deviation  Cardiovascular:      Rate and Rhythm: Normal rate and regular rhythm  Heart sounds: Normal heart sounds  No murmur heard  No friction rub  No gallop  Pulmonary:      Effort: Pulmonary effort is normal  No respiratory distress  Breath sounds: Normal breath sounds  No wheezing or rales  Abdominal:      General: Bowel sounds are normal  There is no distension  Palpations: Abdomen is soft  Tenderness: There is no abdominal tenderness  There is no guarding or rebound  Skin:     General: Skin is warm and dry  Coloration: Skin is not pale  Neurological:      Mental Status: She is alert and oriented to person, place, and time  Cranial Nerves: No cranial nerve deficit  Motor: No abnormal muscle tone     Psychiatric:         Behavior: Behavior normal          Vital Signs  ED Triage Vitals   Temperature Pulse Respirations Blood Pressure SpO2   12/21/22 2025 12/21/22 2025 12/21/22 2025 12/21/22 2025 12/21/22 2025   98 5 °F (36 9 °C) 87 (!) 24 153/99 96 %      Temp Source Heart Rate Source Patient Position - Orthostatic VS BP Location FiO2 (%)   12/21/22 2025 12/21/22 2025 12/21/22 2025 12/21/22 2025 --   Temporal Monitor Sitting Right arm       Pain Score       12/21/22 2100       10 - Worst Possible Pain           Vitals:    12/22/22 1647 12/22/22 1721 12/22/22 1749 12/22/22 1859   BP: 125/65 145/72 131/69 117/64   Pulse:    68   Patient Position - Orthostatic VS:             Visual Acuity      ED Medications  Medications   morphine injection 4 mg (4 mg Intravenous Not Given 12/22/22 0248)   butalbital-acetaminophen-caffeine (FIORICET,ESGIC) -40 mg per tablet 1 tablet (1 tablet Oral Given 12/22/22 2006)   tamsulosin (FLOMAX) capsule 0 4 mg (0 4 mg Oral Given 12/22/22 1705)   topiramate (TOPAMAX) tablet 50 mg (50 mg Oral Given 12/22/22 0923)   topiramate (TOPAMAX) tablet 100 mg (100 mg Oral Not Given 12/22/22 0300)   sodium chloride 0 9 % infusion (125 mL/hr Intravenous New Bag 12/22/22 1701)   acetaminophen (TYLENOL) tablet 650 mg (has no administration in time range)   insulin lispro (HumaLOG) 100 units/mL subcutaneous injection 2-12 Units (2 Units Subcutaneous Not Given 12/22/22 1615)   HYDROmorphone (DILAUDID) injection 0 5 mg (0 5 mg Intravenous Given 12/22/22 1249)   HYDROmorphone HCl (DILAUDID) injection 0 2 mg (0 2 mg Intravenous Given 12/22/22 0251)   ondansetron (ZOFRAN) injection 4 mg ( Intravenous MAR Unhold 12/22/22 1611)   ondansetron (ZOFRAN) injection 4 mg (4 mg Intravenous Given 12/21/22 2038)   morphine injection 4 mg (4 mg Intravenous Given 12/21/22 2100)   iohexol (OMNIPAQUE) 350 MG/ML injection (SINGLE-DOSE) 100 mL (100 mL Intravenous Given 12/21/22 2135)   ketorolac (TORADOL) injection 15 mg (15 mg Intravenous Given 12/21/22 2127)   morphine injection 4 mg (4 mg Intravenous Given 12/21/22 2329)   levofloxacin (LEVAQUIN) IVPB (premix in dextrose) 750 mg 150 mL ( Intravenous Anesthesia Volume Adjustment 12/22/22 1457)   promethazine (PHENERGAN) injection 12 5 mg (12 5 mg Intramuscular Given 12/22/22 1347)       Diagnostic Studies  Results Reviewed     Procedure Component Value Units Date/Time    Fingerstick Glucose (POCT) [[id number]]  (Normal) Collected: 12/22/22 1104    Lab Status: Final result Updated: 12/22/22 1106     POC Glucose 97 mg/dl     Comprehensive metabolic panel [[id number]]  (Abnormal) Collected: 12/22/22 0613    Lab Status: Final result Specimen: Blood from Arm, Right Updated: 12/22/22 1143     Sodium 140 mmol/L      Potassium 3 7 mmol/L      Chloride 105 mmol/L      CO2 23 mmol/L      ANION GAP 12 mmol/L      BUN 17 mg/dL      Creatinine 1 21 mg/dL      Glucose 121 mg/dL      Glucose, Fasting 121 mg/dL      Calcium 9 2 mg/dL      AST 16 U/L ALT 16 U/L      Alkaline Phosphatase 74 U/L      Total Protein 7 0 g/dL      Albumin 3 7 g/dL      Total Bilirubin 0 69 mg/dL      eGFR 51 ml/min/1 73sq m     Narrative:      [place] guidelines for Chronic Kidney Disease (CKD):   •  Stage 1 with normal or high GFR (GFR > 90 mL/min/1 73 square meters)  •  Stage 2 Mild CKD (GFR = 60-89 mL/min/1 73 square meters)  •  Stage 3A Moderate CKD (GFR = 45-59 mL/min/1 73 square meters)  •  Stage 3B Moderate CKD (GFR = 30-44 mL/min/1 73 square meters)  •  Stage 4 Severe CKD (GFR = 15-29 mL/min/1 73 square meters)  •  Stage 5 End Stage CKD (GFR <15 mL/min/1 73 square meters)  Note: GFR calculation is accurate only with a steady state creatinine    Fingerstick Glucose (POCT) [[id number]]  (Normal) Collected: 12/22/22 0649    Lab Status: Final result Updated: 12/22/22 0650     POC Glucose 116 mg/dl     CBC (With Platelets) [[id number]]  (Abnormal) Collected: 12/22/22 0613    Lab Status: Final result Specimen: Blood from Arm, Right Updated: 12/22/22 0620     WBC 11 18 Thousand/uL      RBC 4 65 Million/uL      Hemoglobin 13 5 g/dL      Hematocrit 41 7 %      MCV 90 fL      MCH 29 0 pg      MCHC 32 4 g/dL      RDW 14 2 %      Platelets 433 Thousands/uL      MPV 11 4 fL     Urine Microscopic [[id number]]  (Abnormal) Collected: 12/22/22 0125    Lab Status: Final result Specimen: Urine, Clean Catch Updated: 12/22/22 0153     RBC, UA Innumerable /hpf      WBC, UA 20-30 /hpf      Epithelial Cells Occasional /hpf      Bacteria, UA None Seen /hpf      MUCUS THREADS Occasional     Hyaline Casts, UA 0-3 /lpf     Urine culture [[id number]] Collected: 12/22/22 0125    Lab Status:  In process Specimen: Urine, Clean Catch Updated: 12/22/22 0153    UA w Reflex to Microscopic w Reflex to Culture [[id number]]  (Abnormal) Collected: 12/22/22 0125    Lab Status: Final result Specimen: Urine, Clean Catch Updated: 12/22/22 0151     Color, UA Yellow     Clarity, UA Turbid Specific Gravity, UA 1 023     pH, UA 6 5     Leukocytes, UA Moderate     Nitrite, UA Negative     Protein, UA 30 (1+) mg/dl      Glucose, UA Negative mg/dl      Ketones, UA Negative mg/dl      Urobilinogen, UA <2 0 mg/dl      Bilirubin, UA Negative     Occult Blood, UA Large    Lipase [[id number]]  (Normal) Collected: 12/21/22 2034    Lab Status: Final result Specimen: Blood from Arm, Right Updated: 12/21/22 2115     Lipase 139 u/L     HS Troponin 0hr (reflex protocol) [[id number]]  (Normal) Collected: 12/21/22 2034    Lab Status: Final result Specimen: Blood from Arm, Right Updated: 12/21/22 2105     hs TnI 0hr 2 ng/L     Comprehensive metabolic panel [[id number]]  (Abnormal) Collected: 12/21/22 2034    Lab Status: Final result Specimen: Blood from Arm, Right Updated: 12/21/22 2101     Sodium 141 mmol/L      Potassium 3 2 mmol/L      Chloride 105 mmol/L      CO2 25 mmol/L      ANION GAP 11 mmol/L      BUN 19 mg/dL      Creatinine 1 09 mg/dL      Glucose 116 mg/dL      Calcium 9 2 mg/dL      AST 16 U/L      ALT 18 U/L      Alkaline Phosphatase 84 U/L      Total Protein 7 7 g/dL      Albumin 4 1 g/dL      Total Bilirubin 0 54 mg/dL      eGFR 58 ml/min/1 73sq m     Narrative:      [place] guidelines for Chronic Kidney Disease (CKD):   •  Stage 1 with normal or high GFR (GFR > 90 mL/min/1 73 square meters)  •  Stage 2 Mild CKD (GFR = 60-89 mL/min/1 73 square meters)  •  Stage 3A Moderate CKD (GFR = 45-59 mL/min/1 73 square meters)  •  Stage 3B Moderate CKD (GFR = 30-44 mL/min/1 73 square meters)  •  Stage 4 Severe CKD (GFR = 15-29 mL/min/1 73 square meters)  •  Stage 5 End Stage CKD (GFR <15 mL/min/1 73 square meters)  Note: GFR calculation is accurate only with a steady state creatinine    CBC and differential [[id number]] Collected: 12/21/22 2034    Lab Status: Final result Specimen: Blood from Arm, Right Updated: 12/21/22 2039     WBC 8 36 Thousand/uL      RBC 4 76 Million/uL      Hemoglobin 13 9 g/dL      Hematocrit 42 6 %      MCV 90 fL      MCH 29 2 pg      MCHC 32 6 g/dL      RDW 14 1 %      MPV 11 2 fL      Platelets 860 Thousands/uL      nRBC 0 /100 WBCs      Neutrophils Relative 58 %      Immat GRANS % 1 %      Lymphocytes Relative 31 %      Monocytes Relative 7 %      Eosinophils Relative 2 %      Basophils Relative 1 %      Neutrophils Absolute 4 94 Thousands/µL      Immature Grans Absolute 0 06 Thousand/uL      Lymphocytes Absolute 2 57 Thousands/µL      Monocytes Absolute 0 57 Thousand/µL      Eosinophils Absolute 0 17 Thousand/µL      Basophils Absolute 0 05 Thousands/µL                  CTA dissection protocol chest/abdomen/pelvis   Final Result by Norman Carpio DO (12/21 2229)      No evidence of aortic dissection or aneurysm  Moderate left-sided hydroureteronephrosis with a 3 mm obstructing stone in the distal left ureter      Nonobstructing right-sided punctate intrarenal calculi  Thickening of the gastric wall which may represent gastritis  Follow-up with gastroenterology is recommended  Pancreatic head cystic mass similar to prior study  Continued follow-up as per recommendations on prior CT scans  The study was marked in [facility] for immediate notification  Workstation performed: [id number]         XR chest 1 view portable   Final Result by Nick Leal MD ([id number])      No acute cardiopulmonary disease  Workstation performed: [id number]         FL retrograde pyelogram    (Results Pending)              Procedures  Procedures         ED Course                               SBIRT 22yo+    Flowsheet Row Most Recent Value   SBIRT (23 yo +)    In order to provide better care to our patients, we are screening all of our patients for alcohol and drug use  Would it be okay to ask you these screening questions? Yes Filed at: 12/21/2022 3237   Initial Alcohol Screen: US AUDIT-C     1   How often do you have a drink containing alcohol? 0 Filed at: 12/21/2022 2314   2  How many drinks containing alcohol do you have on a typical day you are drinking? 0 Filed at: 12/21/2022 2314   3b  FEMALE Any Age, or MALE 65+: How often do you have 4 or more drinks on one occassion? 0 Filed at: 12/21/2022 2314   Audit-C Score 0 Filed at: 12/21/2022 2314   TRESSA: How many times in the past year have you    Used an illegal drug or used a prescription medication for non-medical reasons? Never Filed at: 12/21/2022 2314                    [facility]  Number of Diagnoses or Management Options  Kidney stone  Diagnosis management comments: 44-year-old female with chest pain radiating to back and abdomen, patient appears unwell, concern for possible dissection, will check cardiac labs, CT dissection study, give fluids and pain meds, reassess  CT demonstrates a 3 mm stone in the distal ureter, reviewed results with patient, reviewed the anticipation that it would pass spontaneously, however patient is having persistent pain, difficult to control even with IV narcotic pain meds, offered patient admission for pain control discussed case with urology and medicine, patient should be able to stay here  Disposition  Final diagnoses:   Kidney stone     Time reflects when diagnosis was documented in both MDM as applicable and the Disposition within this note     Time User Action Codes Description Comment    12/22/2022  1:38 AM Derek Russell Add [N20 0] Kidney stone     12/22/2022  1:47 AM Joseline Garcia Add [N13 5] Obstruction of left ureter     12/22/2022  3:01 PM Yesenia Crowell Modify [N20 0] Kidney stone       ED Disposition     ED Disposition   Admit    Condition   Stable    Date/Time   u Dec 22, 2022  1:38 AM    Comment   Case was discussed with EMILI and the patient's admission status was agreed to be Admission Status: observation status to the service of Dr Shannon Gipson              Follow-up Information    None         Current Discharge Medication List      CONTINUE these medications which have NOT CHANGED    Details   calcium citrate-vitamin D (CITRACAL+D) 315-200 MG-UNIT per tablet Take 2 tablets by mouth 2 (two) times a day  Qty: 60 tablet, Refills: 6    Associated Diagnoses: Postsurgical malabsorption      metFORMIN (GLUCOPHAGE) 500 mg tablet take 1 tablet by mouth EVERY EVENING WITH DINNER  Qty: 30 tablet, Refills: 2    Associated Diagnoses: Prediabetes      Multiple Vitamin (MULTIVITAMIN) tablet Take 1 tablet by mouth 2 (two) times a day  Qty: 60 tablet, Refills: 3    Associated Diagnoses: Postsurgical malabsorption      ondansetron (ZOFRAN) 4 mg tablet Take 1 tablet (4 mg total) by mouth every 8 (eight) hours as needed for nausea or vomiting  Qty: 20 tablet, Refills: 0    Associated Diagnoses: Epigastric pain; History of gastric ulcer; Abnormal CT scan, colon; Bilious vomiting with nausea; Gastrointestinal hemorrhage associated with gastritis, unspecified gastritis type      ondansetron (ZOFRAN-ODT) 4 mg disintegrating tablet Take 1 tablet (4 mg total) by mouth every 6 (six) hours as needed for nausea or vomiting  Qty: 20 tablet, Refills: 0    Associated Diagnoses: Left ureteral stone      topiramate (TOPAMAX) 50 MG tablet Take 1 tablet in AM and 2 in PM  Qty: 90 tablet, Refills: 3    Associated Diagnoses: Abnormal weight gain; Obesity, Class III, BMI 40-49 9 (morbid obesity) ([facility] [address] );  Weight gain status post gastric bypass      venlafaxine (EFFEXOR) 37 5 mg tablet Take 37 5 mg by mouth 2 (two) times a day      Biotin 1000 MCG tablet Take 1,000 mcg by mouth      butalbital-acetaminophen-caffeine (FIORICET,ESGIC) -40 mg per tablet take 1-2 tablets by mouth every 4 to 6 hours if needed maximum daily dose of 6  Refills: 0      cyclobenzaprine (FLEXERIL) 10 mg tablet Take 1 tablet (10 mg total) by mouth 3 (three) times a day as needed for muscle spasms  Qty: 30 tablet, Refills: 0    Associated Diagnoses: Enterocolitis      ergocalciferol (VITAMIN D2) 50,000 units Take 1 capsule (50,000 Units total) by mouth 2 (two) times a week with meals  Qty: 24 capsule, Refills: 0    Associated Diagnoses: Postsurgical malabsorption; Vitamin D deficiency      ferrous sulfate 325 (65 Fe) mg tablet TAKE 1 TABLET BY MOUTH TWICE DAILY      liraglutide (SAXENDA) injection INJECT SAXENDA DAILY SUBCUTANEOUSLY  -WEEK 1: 0 6mg daily -if tolerated WEEK 2: 1 2mg daily -if tolerated WEEK 3: 1 8mg daily -if tolerated WEEK 4: 2 4mg daily -if tolerated WEEK 5: 3mg daily -IF NAUSEA/VOMITING DEVELOP STOP MEDICATION FOR A FEW DAYS AND DECREASE TO PREVIOUSLY TOLERATED DOSE  STAY HYDRATED  -IF YOU DEVELOP SEVERE ABDOMINAL PAIN WHICH MAY RADIATE TO THE BACK, SOMETIMES ASSOCIATED WITH FEVER, AND VOMITING, STOP MEDICATION AND SEEK URGENT CARE AS THIS MAY BE A SIGN OF PANCREATITIS  Qty: 15 mL, Refills: 2    Associated Diagnoses: Class 2 obesity in adult      Melatonin 5 MG CAPS Take 5 mg by mouth      Omega-3 Fatty Acids (FISH OIL) 1,000 mg Take 1,000 mg by mouth daily      orphenadrine (NORFLEX) 100 mg tablet Take 100 mg by mouth 2 (two) times a day      oxyCODONE-acetaminophen (PERCOCET) 2 5-325 MG per tablet Take 1,000 mg by mouth      potassium chloride (K-DUR,KLOR-CON) 20 mEq tablet Take 1 tablet (20 mEq total) by mouth 2 (two) times a day for 7 days  Qty: 14 tablet, Refills: 0    Associated Diagnoses: Hypokalemia      predniSONE 20 mg tablet take 1 tablet by mouth three times a day for 4 days then twice a    (REFER TO PRESCRIPTION NOTES)        simethicone (MYLICON) 80 mg chewable tablet Chew 1 tablet (80 mg total) every 12 (twelve) hours  Qty: 30 tablet, Refills: 0    Associated Diagnoses: Enterocolitis      sucralfate (CARAFATE) 1 g tablet Take 1 tablet (1 g total) by mouth 4 (four) times a day  Qty: 20 tablet, Refills: 0    Associated Diagnoses: Nonspecific abdominal pain      !! tamsulosin (FLOMAX) 0 4 mg Take 1 capsule (0 4 mg total) by mouth daily with dinner Until kidney stone passes  Qty: 5 capsule, Refills: 0 Associated Diagnoses: Right kidney stone      !! tamsulosin (FLOMAX) 0 4 mg Take 1 capsule (0 4 mg total) by mouth daily with dinner  Qty: 7 capsule, Refills: 0    Associated Diagnoses: Left ureteral stone      vitamin B-12 (CYANOCOBALAMIN) 500 MCG TABS Take 2 tablets (1,000 mcg total) by mouth daily for 120 days  Qty: 240 tablet, Refills: 0    Associated Diagnoses: Postsurgical malabsorption       !! - Potential duplicate medications found  Please discuss with provider  No discharge procedures on file      PDMP Review       Value Time User    PDMP Reviewed  Yes 5/31/2022  1:15 PM Jerrod Foote DO          ED Provider  Electronically Signed by           Nithya Ingram MD  12/22/22 9658

## 2022-12-23 NOTE — PROGRESS NOTES
Progress Note - Audra Nurse 47 y o  female MRN: [id number]    Unit/Bed#: -[facility] Encounter: [id number]      Assessment:  Audra Nurse is a 28-year-old female presenting with left flank pain secondary to CT confirmed left obstructing ureteral calculus  Postoperative day 1 cystoscopy, left retrograde pyelography, left ureteroscopy, basket stone extraction and left ureteral stent insertion  Patient is afebrile, hemodynamically stable with complaints of mild stent related colic--to be expected  Normal renal function  No leukocytosis  Plan:  · Discharge at the discretion of the internal medicine service  · Reviewed postoperative expectations--dysuria, urinary frequency, urinary incontinence, and hematuria  · Explained signs and symptoms to report  · Stent on string until 12/27  · Ditropan and Pyridium prescribed  · No further  intervention indicated this hospital stay  · Urology will sign off  Subjective:   Denies fever or chills  Mild dysuria  Objective:     Vitals: Blood pressure 123/74, pulse 64, temperature 98 2 °F (36 8 °C), resp  rate 16, SpO2 98 %, not currently breastfeeding  ,There is no height or weight on file to calculate BMI        Intake/Output Summary (Last 24 hours) at 12/23/2022 0933  Last data filed at 12/23/2022 0908  Gross per 24 hour   Intake 1540 42 ml   Output 1100 ml   Net 440 42 ml       Physical Exam: General appearance: alert and oriented, in no acute distress, appears stated age, cooperative and no distress  Head: Normocephalic, without obvious abnormality, atraumatic  Neck: no adenopathy, no carotid bruit, no JVD, supple, symmetrical, trachea midline and thyroid not enlarged, symmetric, no tenderness/mass/nodules  Lungs: diminished breath sounds  Heart: regular rate and rhythm, S1, S2 normal, no murmur, click, rub or gallop  Abdomen: abnormal findings:  mild tenderness in the lower abdomen  Extremities: extremities normal, warm and well-perfused; no cyanosis, clubbing, or edema  Pulses: 2+ and symmetric  Neurologic: Grossly normal  Stent on string     Invasive Devices     Peripheral Intravenous Line  Duration           Peripheral IV 12/21/22 Dorsal (posterior); Right Forearm 1 day          Drain  Duration           Ureteral Internal Stent Left ureter 6 Fr  <1 day              Lab Results   Component Value Date    WBC 7 36 12/23/2022    HGB 12 5 12/23/2022    HCT 39 5 12/23/2022    MCV 91 12/23/2022     12/23/2022      Lab Results   Component Value Date    SODIUM 141 12/23/2022    K 3 6 12/23/2022     (H) 12/23/2022    CO2 21 12/23/2022    BUN 16 12/23/2022    CREATININE 0 97 12/23/2022    GLUC 99 12/23/2022    CALCIUM 8 6 12/23/2022       Lab, Imaging and other studies: I have personally reviewed pertinent reports

## 2022-12-27 ENCOUNTER — PROCEDURE VISIT (OUTPATIENT)
Dept: UROLOGY | Facility: CLINIC | Age: 52
End: 2022-12-27

## 2022-12-27 VITALS
SYSTOLIC BLOOD PRESSURE: 132 MMHG | HEART RATE: 78 BPM | HEIGHT: 68 IN | WEIGHT: 250 LBS | BODY MASS INDEX: 37.89 KG/M2 | DIASTOLIC BLOOD PRESSURE: 70 MMHG | OXYGEN SATURATION: 97 %

## 2022-12-27 DIAGNOSIS — N20.0 KIDNEY STONE: Primary | ICD-10-CM

## 2022-12-27 NOTE — PROGRESS NOTES
12/27/2022  Lucille Garvey is a 47 y o  female  [id number]        Diagnosis  Chief Complaint    Nephrolithiasis         Patient is s/p left ureteroscopy with stone extraction on 12/22/22 with Dr Jas Noonan  Patient to follow up as scheduled       Procedure Stent with String Removal    Vitals:    12/27/22 1029   BP: 132/70   Pulse: 78   SpO2: 97%   Weight: 113 kg (250 lb)   Height: 5' 7 5" (1 715 m)       Stent with string removed intact without difficulty  Reviewed post stent removal symptoms including flank pain, dysuria, and hematuria  Instructed patient to increase oral fluid intake  Encouraged the use of NSAIDS and other prescribed pain medication as needed for discomfort  Patient instructed to call the office or report to the ER for uncontrolled pain, fever, chills, nausea or vomiting         Debby Love LPN

## 2022-12-28 LAB
COLOR STONE: NORMAL
COM MFR STONE: 100 %
COMMENT-STONE3: NORMAL
COMPOSITION: NORMAL
LABORATORY COMMENT REPORT: NORMAL
PHOTO: NORMAL
SIZE STONE: NORMAL MM
SPEC SOURCE SUBJ: NORMAL
STONE ANALYSIS-IMP: NORMAL
WT STONE: 19 MG

## 2023-01-16 DIAGNOSIS — E66.01 OBESITY, CLASS III, BMI 40-49.9 (MORBID OBESITY) (HCC): ICD-10-CM

## 2023-01-16 DIAGNOSIS — R63.5 ABNORMAL WEIGHT GAIN: ICD-10-CM

## 2023-01-16 DIAGNOSIS — R63.5 WEIGHT GAIN STATUS POST GASTRIC BYPASS: ICD-10-CM

## 2023-01-16 DIAGNOSIS — Z98.84 WEIGHT GAIN STATUS POST GASTRIC BYPASS: ICD-10-CM

## 2023-01-16 RX ORDER — TOPIRAMATE 50 MG/1
TABLET, FILM COATED ORAL
Qty: 90 TABLET | Refills: 3 | Status: SHIPPED | OUTPATIENT
Start: 2023-01-16 | End: 2023-01-24

## 2023-01-24 ENCOUNTER — OFFICE VISIT (OUTPATIENT)
Dept: BARIATRICS | Facility: CLINIC | Age: 53
End: 2023-01-24

## 2023-01-24 VITALS
BODY MASS INDEX: 35.09 KG/M2 | HEART RATE: 88 BPM | WEIGHT: 231.5 LBS | DIASTOLIC BLOOD PRESSURE: 80 MMHG | SYSTOLIC BLOOD PRESSURE: 124 MMHG | HEIGHT: 68 IN | RESPIRATION RATE: 16 BRPM

## 2023-01-24 DIAGNOSIS — E78.1 HYPERTRIGLYCERIDEMIA: ICD-10-CM

## 2023-01-24 DIAGNOSIS — E66.9 CLASS 2 OBESITY IN ADULT: Primary | ICD-10-CM

## 2023-01-24 DIAGNOSIS — Z98.84 WEIGHT GAIN STATUS POST GASTRIC BYPASS: ICD-10-CM

## 2023-01-24 DIAGNOSIS — R63.5 ABNORMAL WEIGHT GAIN: ICD-10-CM

## 2023-01-24 DIAGNOSIS — E55.9 VITAMIN D DEFICIENCY: ICD-10-CM

## 2023-01-24 DIAGNOSIS — R63.5 WEIGHT GAIN STATUS POST GASTRIC BYPASS: ICD-10-CM

## 2023-01-24 DIAGNOSIS — R73.03 PREDIABETES: ICD-10-CM

## 2023-01-24 DIAGNOSIS — Z98.84 HISTORY OF BARIATRIC SURGERY: ICD-10-CM

## 2023-01-24 RX ORDER — TOPIRAMATE 50 MG/1
50 TABLET, FILM COATED ORAL 2 TIMES DAILY
Qty: 60 TABLET | Refills: 0 | Status: SHIPPED | OUTPATIENT
Start: 2023-01-24

## 2023-01-24 NOTE — PROGRESS NOTES
Assessment/Plan:  Megan Jauregui was seen today for follow-up  1  Class 2 obesity in adult  Lost 9% with nutrition control and using Topamax  - Semaglutide-Weight Management (WEGOVY) 0 25 MG/0 5ML; Inject 0 5 mL (0 25 mg total) under the skin once a week  Dispense: 2 mL; Refill: 1  Ok to use GLP-1 for short course  CT scan IMPRESSION:9/2022     Pancreatic head cystic mass measures slightly larger compared to prior CT  Continued follow-up as per recommendations on prior CT  Saxenda and Tirzepatide not covered by insurance  Body comp to assess progress   Protein:  60g no more due to deonte ramos    Encourage mindful eating, portion control, motivational interview performed to help patient reach goals       2  Prediabetes  Try [facility] if not covered by insurance continue Metformin 500mg daily, tolerates so far  Cont vit B12 and iron supplementation    3  Hypertriglyceridemia  Will correct with weight loss     4  History of bariatric surgery    Cont vitamins  5  Vitamin D deficiency  Cont supplementation    6  Abnormal weight gain  Decrease Topamax dose due to kidney stone, plan to stop it   Taper down in 1 mo and stop  - topiramate (TOPAMAX) 50 MG tablet; Take 1 tablet (50 mg total) by mouth 2 (two) times a day take 1 tablet by mouth IN THE MORNING and 2 tablets IN THE EVENING  Dispense: 60 tablet; Refill: 0    7  Obesity, Class III, BMI 40-49 9 (morbid obesity) (HCC)    - topiramate (TOPAMAX) 50 MG tablet; Take 1 tablet (50 mg total) by mouth 2 (two) times a day take 1 tablet by mouth IN THE MORNING and 2 tablets IN THE EVENING  Dispense: 60 tablet; Refill: 0    8  Weight gain status post gastric bypass  Taper Topamax   - topiramate (TOPAMAX) 50 MG tablet; Take 1 tablet (50 mg total) by mouth 2 (two) times a day take 1 tablet by mouth IN THE MORNING and 2 tablets IN THE EVENING  Dispense: 60 tablet;  Refill: 0  Diagnoses and all orders for this visit:   1400kcal diet  Try Tirzepatide  Did well on Topamax increased dose- continue, no side effects  Started Metoformin and tolerates well- continue  Craves sweets at night but with 2 pieces of dark chocolate satisfies her cravings  BP was high better controlled with weight loss  Cont current nutrition plan  Component Ref Range & Units 4/7/22  8:03 AM 9/1/20  3:09 PM 5/14/19 10:37 AM   Hemoglobin A1C Normal 3 8-5 6%; PreDiabetic 5 7-6 4%; Diabetic >=6 5%; Glycemic control for adults with diabetes <7 0% % 5 8 High   5 9 High  R, CM  5 7 R       30 minute visit, >50% face-to-face time spent counseling patient on diet behavior and exercise modification for weight loss  Encouraged mindful eating, portion control, motivational interview performed to help patient reach goals   Patient noted changes to work on until next visit  Follow up in approximately 2 mo      Subjective:   Chief Complaint   Patient presents with   • Follow-up     Patient here to discuss weight associated problems and nutrition goals  HPI: Katarzyna Escobar  is a 47 y o  female with excess weight/obesity here to pursue weight management  Patient is pursuing [facility]    Most recent notes and records were reviewed    Initial weight loss goal of 5-10% weight loss for improved health  50/F s/p gastric band with Dr Aristeo Capps, then s/p LRYGB in 2019 s/p diagnostic laparoscopy/afferent limb small bowel resection with 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) which was complicated by a gastric remnant blowout s/p diagnostic laparoscopy converted to open, remnant gastrectomy, left hemicolectomy     Prior to surgery: 350# lost 30#   SUNG: 230#   Weight regain 50 lbs dt bad habits    Wt Readings from Last 10 Encounters:   01/24/23 105 kg (231 lb 8 oz)   12/27/22 113 kg (250 lb)   11/17/22 113 kg (250 lb)   10/06/22 117 kg (258 lb 9 6 oz)   09/15/22 118 kg (259 lb 9 6 oz)   06/23/22 123 kg (271 lb 8 oz)   04/14/22 127 kg (280 lb)   03/31/22 128 kg (282 lb)   09/01/21 118 kg (260 lb)   08/23/21 121 kg (266 lb) OV :259 lbs 9-13-22 started Metformin and Topamax was increased  Last OV:11/17/2022 250lbs  Current weight : 231lbs   Change in weight:-19lbs lost 8% of bodyweight only by changin diet and cotninuing Topamax increased 50mg am and 100mg pm  Could not get Saxenda not covered by insurance  Nutrition changed to focus on protein   Had a kidney stone that passed 12/2023 went to ER  Food logging: none  Increased appetite/cravings:better controlled  Exercise: knees are bad and back hurts , bougth a home gym device  Hydration: 2 5 bottles   Alcohol: none  Sleep: better after Topamax        The following portions of the patient's history were reviewed and updated as appropriate: allergies, current medications, past family history, past medical history, past social history, past surgical history, and problem list       Review of Systems   Constitutional: Negative for activity change  HENT: Negative for trouble swallowing  Respiratory: Negative for shortness of breath  Cardiovascular: Negative for chest pain, edema  Gastrointestinal: Negative for abdominal pain, nausea and vomiting, acid reflux, constipation/diarrhea   Psychiatric/Behavioral: + for behavioral problems ,better controlled anxiety or depression    Objective:  /80 (BP Location: Left arm, Patient Position: Sitting, Cuff Size: Large)   Pulse 88   Resp 16   Ht 5' 7 5" (1 715 m)   Wt 105 kg (231 lb 8 oz)   LMP  (LMP Unknown)   BMI 35 72 kg/m²   Constitutional: Well-developed, well-nourished and Obese Body mass index is 35 72 kg/m²  Jemma Russ HEENT: No conjunctival injection  No thyroid masses  Pulmonary: No increased work of breathing or signs of respiratory distress  Clear respiratory sounds  CV: Well-perfused, Regular rate and rhythm, no murmurs, no edema  GI: increased abdominal girth  Non-distended  Neuro: Oriented to person, place and time  Normal Speech  Normal gait  Psych: Normal affect and mood   No delusion or hallucinations, normal thought process    Labs and Imaging  Recent labs and imaging have been personally reviewed    Lab Results   Component Value Date    WBC 7 36 12/23/2022    HGB 12 5 12/23/2022    HCT 39 5 12/23/2022    MCV 91 12/23/2022     12/23/2022     Lab Results   Component Value Date    SODIUM 141 12/23/2022    K 3 6 12/23/2022     (H) 12/23/2022    CO2 21 12/23/2022    AGAP 10 12/23/2022    BUN 16 12/23/2022    CREATININE 0 97 12/23/2022    GLUC 99 12/23/2022    GLUF 99 12/23/2022    CALCIUM 8 6 12/23/2022    AST 16 12/22/2022    ALT 16 12/22/2022    ALKPHOS 74 12/22/2022    TP 7 0 12/22/2022    TBILI 0 69 12/22/2022    EGFR 66 12/23/2022     Lab Results   Component Value Date    HGBA1C 5 8 (H) 04/07/2022     Lab Results   Component Value Date    [id number] 0 269 (L) 06/15/2021     Lab Results   Component Value Date    CHOLESTEROL 213 (H) 04/07/2022     Lab Results   Component Value Date    HDL 55 04/07/2022     Lab Results   Component Value Date    TRIG 91 04/07/2022     Lab Results   Component Value Date    LDLCALC 140 (H) 04/07/2022

## 2023-02-11 ENCOUNTER — HOSPITAL ENCOUNTER (EMERGENCY)
Facility: HOSPITAL | Age: 53
Discharge: HOME | End: 2023-02-11

## 2023-02-11 ENCOUNTER — APPOINTMENT (EMERGENCY)
Dept: CT IMAGING | Facility: HOSPITAL | Age: 53
End: 2023-02-11

## 2023-02-11 VITALS
SYSTOLIC BLOOD PRESSURE: 109 MMHG | RESPIRATION RATE: 18 BRPM | DIASTOLIC BLOOD PRESSURE: 56 MMHG | HEART RATE: 63 BPM | TEMPERATURE: 98.1 F | OXYGEN SATURATION: 96 %

## 2023-02-11 DIAGNOSIS — N20.1 RIGHT URETERAL STONE: Primary | ICD-10-CM

## 2023-02-11 LAB
ALBUMIN SERPL BCP-MCNC: 3.9 G/DL (ref 3.5–5)
ALP SERPL-CCNC: 72 U/L (ref 46–116)
ALT SERPL W P-5'-P-CCNC: 13 U/L (ref 12–78)
ANION GAP SERPL CALCULATED.3IONS-SCNC: 11 MMOL/L (ref 4–13)
AST SERPL W P-5'-P-CCNC: 15 U/L (ref 5–45)
BACTERIA UR QL AUTO: ABNORMAL /HPF
BASOPHILS # BLD AUTO: 0.03 THOUSANDS/ÂΜL (ref 0–0.1)
BASOPHILS NFR BLD AUTO: 0 % (ref 0–1)
BILIRUB SERPL-MCNC: 0.59 MG/DL (ref 0.2–1)
BILIRUB UR QL STRIP: NEGATIVE
BUN SERPL-MCNC: 17 MG/DL (ref 5–25)
CALCIUM SERPL-MCNC: 9.4 MG/DL (ref 8.3–10.1)
CHLORIDE SERPL-SCNC: 106 MMOL/L (ref 96–108)
CLARITY UR: CLEAR
CO2 SERPL-SCNC: 26 MMOL/L (ref 21–32)
COLOR UR: ABNORMAL
CREAT SERPL-MCNC: 0.76 MG/DL (ref 0.6–1.3)
EOSINOPHIL # BLD AUTO: 0.14 THOUSAND/ÂΜL (ref 0–0.61)
EOSINOPHIL NFR BLD AUTO: 2 % (ref 0–6)
ERYTHROCYTE [DISTWIDTH] IN BLOOD BY AUTOMATED COUNT: 14 % (ref 11.6–15.1)
GFR SERPL CREATININE-BSD FRML MDRD: 89 ML/MIN/1.73SQ M
GLUCOSE SERPL-MCNC: 104 MG/DL (ref 65–140)
GLUCOSE UR STRIP-MCNC: NEGATIVE MG/DL
HCT VFR BLD AUTO: 44.2 % (ref 34.8–46.1)
HGB BLD-MCNC: 14.6 G/DL (ref 11.5–15.4)
HGB UR QL STRIP.AUTO: ABNORMAL
HYALINE CASTS #/AREA URNS LPF: ABNORMAL /LPF
IMM GRANULOCYTES # BLD AUTO: 0.02 THOUSAND/UL (ref 0–0.2)
IMM GRANULOCYTES NFR BLD AUTO: 0 % (ref 0–2)
KETONES UR STRIP-MCNC: NEGATIVE MG/DL
LEUKOCYTE ESTERASE UR QL STRIP: NEGATIVE
LYMPHOCYTES # BLD AUTO: 1.5 THOUSANDS/ÂΜL (ref 0.6–4.47)
LYMPHOCYTES NFR BLD AUTO: 21 % (ref 14–44)
MCH RBC QN AUTO: 28.9 PG (ref 26.8–34.3)
MCHC RBC AUTO-ENTMCNC: 33 G/DL (ref 31.4–37.4)
MCV RBC AUTO: 88 FL (ref 82–98)
MONOCYTES # BLD AUTO: 0.43 THOUSAND/ÂΜL (ref 0.17–1.22)
MONOCYTES NFR BLD AUTO: 6 % (ref 4–12)
MUCOUS THREADS UR QL AUTO: ABNORMAL
NEUTROPHILS # BLD AUTO: 4.99 THOUSANDS/ÂΜL (ref 1.85–7.62)
NEUTS SEG NFR BLD AUTO: 71 % (ref 43–75)
NITRITE UR QL STRIP: NEGATIVE
NON-SQ EPI CELLS URNS QL MICRO: ABNORMAL /HPF
NRBC BLD AUTO-RTO: 0 /100 WBCS
PH UR STRIP.AUTO: 6.5 [PH]
PLATELET # BLD AUTO: 301 THOUSANDS/UL (ref 149–390)
PMV BLD AUTO: 11.5 FL (ref 8.9–12.7)
POTASSIUM SERPL-SCNC: 3.2 MMOL/L (ref 3.5–5.3)
PROT SERPL-MCNC: 7.4 G/DL (ref 6.4–8.4)
PROT UR STRIP-MCNC: NEGATIVE MG/DL
RBC # BLD AUTO: 5.05 MILLION/UL (ref 3.81–5.12)
RBC #/AREA URNS AUTO: ABNORMAL /HPF
SODIUM SERPL-SCNC: 143 MMOL/L (ref 135–147)
SP GR UR STRIP.AUTO: 1.02 (ref 1–1.03)
UROBILINOGEN UR STRIP-ACNC: <2 MG/DL
WBC # BLD AUTO: 7.11 THOUSAND/UL (ref 4.31–10.16)
WBC #/AREA URNS AUTO: ABNORMAL /HPF

## 2023-02-11 RX ORDER — ONDANSETRON 2 MG/ML
4 INJECTION INTRAMUSCULAR; INTRAVENOUS ONCE
Status: COMPLETED | OUTPATIENT
Start: 2023-02-11 | End: 2023-02-11

## 2023-02-11 RX ORDER — POTASSIUM CHLORIDE 20 MEQ/1
40 TABLET, EXTENDED RELEASE ORAL ONCE
Status: COMPLETED | OUTPATIENT
Start: 2023-02-11 | End: 2023-02-11

## 2023-02-11 RX ORDER — TAMSULOSIN HYDROCHLORIDE 0.4 MG/1
0.4 CAPSULE ORAL ONCE
Status: DISCONTINUED | OUTPATIENT
Start: 2023-02-11 | End: 2023-02-11 | Stop reason: HOSPADM

## 2023-02-11 RX ORDER — ONDANSETRON 4 MG/1
4 TABLET, ORALLY DISINTEGRATING ORAL EVERY 8 HOURS PRN
Qty: 20 TABLET | Refills: 0 | Status: SHIPPED | OUTPATIENT
Start: 2023-02-11

## 2023-02-11 RX ORDER — SENNOSIDES 8.6 MG
650 CAPSULE ORAL EVERY 8 HOURS PRN
Qty: 30 TABLET | Refills: 0 | Status: SHIPPED | OUTPATIENT
Start: 2023-02-11

## 2023-02-11 RX ORDER — OXYCODONE HYDROCHLORIDE AND ACETAMINOPHEN 5; 325 MG/1; MG/1
1 TABLET ORAL EVERY 8 HOURS PRN
Qty: 15 TABLET | Refills: 0 | Status: SHIPPED | OUTPATIENT
Start: 2023-02-11 | End: 2023-02-15

## 2023-02-11 RX ORDER — TAMSULOSIN HYDROCHLORIDE 0.4 MG/1
0.4 CAPSULE ORAL
Qty: 20 CAPSULE | Refills: 0 | Status: SHIPPED | OUTPATIENT
Start: 2023-02-11 | End: 2023-02-15

## 2023-02-11 RX ORDER — MORPHINE SULFATE 4 MG/ML
4 INJECTION, SOLUTION INTRAMUSCULAR; INTRAVENOUS ONCE
Status: COMPLETED | OUTPATIENT
Start: 2023-02-11 | End: 2023-02-11

## 2023-02-11 RX ORDER — OXYCODONE HYDROCHLORIDE AND ACETAMINOPHEN 5; 325 MG/1; MG/1
1 TABLET ORAL ONCE
Status: COMPLETED | OUTPATIENT
Start: 2023-02-11 | End: 2023-02-11

## 2023-02-11 RX ADMIN — MORPHINE SULFATE 4 MG: 4 INJECTION INTRAVENOUS at 06:38

## 2023-02-11 RX ADMIN — SODIUM CHLORIDE 1000 ML: 0.9 INJECTION, SOLUTION INTRAVENOUS at 06:39

## 2023-02-11 RX ADMIN — POTASSIUM CHLORIDE 40 MEQ: 1500 TABLET, EXTENDED RELEASE ORAL at 10:31

## 2023-02-11 RX ADMIN — ONDANSETRON 4 MG: 2 INJECTION INTRAMUSCULAR; INTRAVENOUS at 06:38

## 2023-02-11 RX ADMIN — OXYCODONE HYDROCHLORIDE AND ACETAMINOPHEN 1 TABLET: 5; 325 TABLET ORAL at 10:31

## 2023-02-11 RX ADMIN — MORPHINE SULFATE 4 MG: 4 INJECTION INTRAVENOUS at 09:27

## 2023-02-11 NOTE — ED PROVIDER NOTES
History  Chief Complaint   Patient presents with   • Abdominal Pain     Pt arrived ambulatory with c/o abdominal pain that radiates to her back, pt has a hx of kidney stones and states the pain is similar     Diallo Cleary is a 29-year-old female with past medical history including GERD and nephrolithiasis presenting for evaluation with complaint of abdominal pain  She reports rather spontaneous onset of right-sided abdominal pain last night with radiation towards the right flank area, which has been gradually worsening in intensity and severity from time of onset  She states that the quality of this pain feels similar to when previously diagnosed with a kidney stone on the left side  She admits to some nausea stating that she did have an episode of vomiting earlier today  She denies any fevers, chills, sweats  She denies dysuria  She states that she found that her urine appeared cloudy this morning  She states that she has had no significant difficulty with urinating denying decreased urine output  No appreciable hematuria  Patient states that she has attributed the nephrolithiasis to taking Topamax, noting that she is currently in the process of weaning off of this  Her past surgical history is notable for Randy-en-Y bypass, cholecystectomy, hysterectomy, and prior left ureteral stent  She offers no other complaints or concerns at this time  Prior to Admission Medications   Prescriptions Last Dose Informant Patient Reported? Taking?    Biotin 1000 MCG tablet   Yes No   Sig: Take 1,000 mcg by mouth   Multiple Vitamin (MULTIVITAMIN) tablet   No No   Sig: Take 1 tablet by mouth 2 (two) times a day   Semaglutide-Weight Management (WEGOVY) 0 25 MG/0 5ML   No No   Sig: Inject 0 5 mL (0 25 mg total) under the skin once a week   Patient not taking: Reported on 2/13/2023   butalbital-acetaminophen-caffeine (FIORICET,ESGIC) -40 mg per tablet   Yes No   Sig: take 1-2 tablets by mouth every 4 to 6 hours if needed maximum daily dose of 6   Patient not taking: Reported on 2/13/2023   calcium citrate-vitamin D (CITRACAL+D) 315-200 MG-UNIT per tablet   No No   Sig: Take 2 tablets by mouth 2 (two) times a day   ergocalciferol (VITAMIN D2) 50,000 units   No No   Sig: Take 1 capsule (50,000 Units total) by mouth 2 (two) times a week with meals   ferrous sulfate 325 (65 Fe) mg tablet   Yes No   Sig: TAKE 1 TABLET BY MOUTH TWICE DAILY   Patient not taking: Reported on 2/13/2023   metFORMIN (GLUCOPHAGE) 500 mg tablet   No No   Sig: take 1 tablet by mouth EVERY EVENING WITH DINNER   tamsulosin (FLOMAX) 0 4 mg   No No   Sig: Take 1 capsule (0 4 mg total) by mouth daily with dinner Until kidney stone passes   Patient not taking: Reported on 1/24/2023   tamsulosin (FLOMAX) 0 4 mg   No No   Sig: Take 1 capsule (0 4 mg total) by mouth daily with dinner   Patient not taking: Reported on 1/24/2023   topiramate (TOPAMAX) 50 MG tablet   No No   Sig: Take 1 tablet (50 mg total) by mouth 2 (two) times a day take 1 tablet by mouth IN THE MORNING and 2 tablets IN THE EVENING   vitamin B-12 (CYANOCOBALAMIN) 500 MCG TABS   No No   Sig: Take 2 tablets (1,000 mcg total) by mouth daily for 120 days      Facility-Administered Medications: None       Past Medical History:   Diagnosis Date   • Arthritis    • Bell palsy    • Gastric ulcer    • GERD (gastroesophageal reflux disease)    • History of transfusion    • Hyperlipidemia 3/31/2022   • Melena    • Obesity (BMI 30-39  9)    • Pancreatic cyst    • Right facial numbness    • Upper GI bleed        Past Surgical History:   Procedure Laterality Date   • ABDOMINOPLASTY     • BARIATRIC SURGERY     • BOWEL RESECTION LAPAROSCOPIC N/A 2/11/2019    Procedure: LAPAROSCOPIC LYSIS OF ADHESIONS; RESECTION SMALL BOWEL; DECOMPRESSION OF GASTRIC REMNANT; EGD;  Surgeon: Jigar Samuel MD;  [facility];  Service: General   • CHOLECYSTECTOMY     • CT GUIDED [address] 2/25/2019   • ESOPHAGOGASTRODUODENOSCOPY N/A 3/6/2019    Procedure: INTRAOPERATIVE EGD;  Surgeon: Jose Francisco Tripp MD;  [facility];  Service: Bariatrics   • FL RETROGRADE PYELOGRAM  12/22/2022   • HERNIA REPAIR     • HYSTERECTOMY     • KNEE CARTILAGE SURGERY     • PARTIAL GASTRECTOMY N/A 3/6/2019    Procedure: RESECTION GASTRIC PARTIAL/GASTRECTOMY PARTIAL LAPAROSCOPIC;  Surgeon: Jose Francisco Tripp MD;  [facility];  Service: Bariatrics   • AK COLECTOMY PARTIAL W/ANASTOMOSIS N/A 3/6/2019    Procedure: OPEN TRANSVERSE COLON RESECTION;  Surgeon: Jose Francisco Tripp MD;  [facility];  Service: Bariatrics   • AK COLONOSCOPY FLX DX W/COLLJ SPEC WHEN PFRMD N/A 3/28/2018    Procedure: COLONOSCOPY;  Surgeon: Robert Fontenot MD;  [facility]; Service: Gastroenterology   • AK COLONOSCOPY FLX DX W/COLLJ [facility] WHEN PFRMD N/A 1/31/2019    Procedure: COLONOSCOPY;  Surgeon: Robert Fontenot MD;  [facility]; Service: Gastroenterology   • AK CYSTO/URETERO W/LITHOTRIPSY &INDWELL STENT INSRT Left 12/22/2022    Procedure: CYSTOSCOPY URETEROSCOPY, RETROGRADE PYELOGRAM AND INSERTION STENT URETERAL;  Surgeon: Lindsey Delong MD;  [facility];  Service: Urology   • AK ESOPHAGOGASTRODUODENOSCOPY TRANSORAL DIAGNOSTIC N/A 3/22/2018    Procedure: ESOPHAGOGASTRODUODENOSCOPY (EGD); Surgeon: Robert Fontenot MD;  [facility]; Service: Gastroenterology   • AK ESOPHAGOGASTRODUODENOSCOPY TRANSORAL DIAGNOSTIC N/A 1/31/2019    Procedure: ESOPHAGOGASTRODUODENOSCOPY (EGD); Surgeon: Robert Fontenot MD;  [facility];   Service: Gastroenterology   • AK LAPS ABD PRTM&OMENTUM DX W/WO SPEC BR/WA SPX N/A 3/6/2019    Procedure: LAPAROSCOPY DIAGNOSTIC;  Surgeon: Jose Francisco Tripp MD;  [facility];  Service: Bariatrics   • REDUCTION MAMMAPLASTY Bilateral    • REPLACEMENT UNICONDYLAR JOINT KNEE     • SHOULDER ARTHROSCOPY DISTAL CLAVICLE EXCISION AND OPEN ROTATOR CUFF REPAIR         Family History   Problem Relation Age of Onset   • Heart attack Mother    • Diabetes Mother    • Heart attack Father    • Stroke Father    • Hypertension Brother    • Leukemia Paternal Aunt      I have reviewed and agree with the history as documented  E-Cigarette/Vaping   • E-Cigarette Use Never User      E-Cigarette/Vaping Substances   • Nicotine No    • THC No    • CBD No    • Flavoring No    • Other No    • Unknown No      Social History     Tobacco Use   • Smoking status: Never   • Smokeless tobacco: Never   Vaping Use   • Vaping Use: Never used   Substance Use Topics   • Alcohol use: Not Currently   • Drug use: No       Review of Systems   Constitutional: Negative for chills, diaphoresis and fever  Gastrointestinal: Positive for abdominal pain, nausea and vomiting  Genitourinary: Positive for flank pain  Negative for difficulty urinating, dysuria, hematuria and urgency  All other systems reviewed and are negative  Physical Exam  Physical Exam  Vitals and nursing note reviewed  Constitutional:       General: She is in acute distress (2/2 pain)  Appearance: Normal appearance  She is well-developed  She is not ill-appearing, toxic-appearing or diaphoretic  HENT:      Head: Normocephalic and atraumatic  Right Ear: External ear normal       Left Ear: External ear normal    Eyes:      Conjunctiva/sclera: Conjunctivae normal    Cardiovascular:      Rate and Rhythm: Normal rate and regular rhythm  Pulses: Normal pulses  Pulmonary:      Effort: Pulmonary effort is normal  No respiratory distress  Breath sounds: Normal breath sounds  No wheezing, rhonchi or rales  Chest:      Chest wall: No tenderness  Abdominal:      General: There is no distension  Palpations: Abdomen is soft  Tenderness: There is abdominal tenderness  Comments: Diffuse right-sided abdominal tenderness upon palpation  No peritoneal signs     Musculoskeletal:         General: Normal range of motion  Cervical back: Normal range of motion and neck supple  Skin:     General: Skin is warm and dry  Capillary Refill: Capillary refill takes less than 2 seconds  Neurological:      Mental Status: She is alert  Motor: Motor function is intact  No weakness  Gait: Gait is intact     Psychiatric:         Mood and Affect: Mood normal          Vital Signs  ED Triage Vitals   Temperature Pulse Respirations Blood Pressure SpO2   02/11/23 0618 02/11/23 0618 02/11/23 0618 02/11/23 0618 02/11/23 0618   97 8 °F (36 6 °C) 62 18 154/69 100 %      Temp Source Heart Rate Source Patient Position - Orthostatic VS BP Location FiO2 (%)   02/11/23 0618 02/11/23 0618 02/11/23 0618 02/11/23 0618 --   Oral Monitor Sitting Right arm       Pain Score       02/11/23 0903       5           Vitals:    02/11/23 0618 02/11/23 0903   BP: 154/69 109/56   Pulse: 62 63   Patient Position - Orthostatic VS: Sitting Lying         Visual Acuity      ED Medications  Medications   sodium chloride 0 9 % bolus 1,000 mL (0 mL Intravenous Stopped 2/11/23 1032)   morphine injection 4 mg (4 mg Intravenous Given 2/11/23 0638)   ondansetron (ZOFRAN) injection 4 mg (4 mg Intravenous Given 2/11/23 1931)   morphine injection 4 mg (4 mg Intravenous Given 2/11/23 0927)   potassium chloride (K-DUR,KLOR-CON) CR tablet 40 mEq (40 mEq Oral Given 2/11/23 1031)   oxyCODONE-acetaminophen (PERCOCET) 5-325 mg per tablet 1 tablet (1 tablet Oral Given 2/11/23 1031)       Diagnostic Studies  Results Reviewed     Procedure Component Value Units Date/Time    Urine Microscopic [[id number]]  (Abnormal) Collected: 02/11/23 0929    Lab Status: Final result Specimen: Urine, Clean Catch Updated: 02/11/23 0950     RBC, UA Innumerable /hpf      WBC, UA 1-2 /hpf      Epithelial Cells Occasional /hpf      Bacteria, UA None Seen /hpf      MUCUS THREADS Occasional     Hyaline Casts, UA 0-3 /lpf     UA w Reflex to Microscopic w Reflex to Culture [[id number]]  (Abnormal) Collected: 02/11/23 0929    Lab Status: Final result Specimen: Urine, Clean Catch Updated: 02/11/23 0945     Color, UA Light Yellow     Clarity, UA Clear     Specific Gravity, UA 1 018     pH, UA 6 5     Leukocytes, UA Negative     Nitrite, UA Negative     Protein, UA Negative mg/dl      Glucose, UA Negative mg/dl      Ketones, UA Negative mg/dl      Urobilinogen, UA <2 0 mg/dl      Bilirubin, UA Negative     Occult Blood, UA Large    Comprehensive metabolic panel [[id number]]  (Abnormal) Collected: 02/11/23 0634    Lab Status: Final result Specimen: Blood from Arm, Right Updated: 02/11/23 0704     Sodium 143 mmol/L      Potassium 3 2 mmol/L      Chloride 106 mmol/L      CO2 26 mmol/L      ANION GAP 11 mmol/L      BUN 17 mg/dL      Creatinine 0 76 mg/dL      Glucose 104 mg/dL      Calcium 9 4 mg/dL      AST 15 U/L      ALT 13 U/L      Alkaline Phosphatase 72 U/L      Total Protein 7 4 g/dL      Albumin 3 9 g/dL      Total Bilirubin 0 59 mg/dL      eGFR 89 ml/min/1 73sq m     Narrative:      [facility] guidelines for Chronic Kidney Disease (CKD):   •  Stage 1 with normal or high GFR (GFR > 90 mL/min/1 73 square meters)  •  Stage 2 Mild CKD (GFR = 60-89 mL/min/1 73 square meters)  •  Stage 3A Moderate CKD (GFR = 45-59 mL/min/1 73 square meters)  •  Stage 3B Moderate CKD (GFR = 30-44 mL/min/1 73 square meters)  •  Stage 4 Severe CKD (GFR = 15-29 mL/min/1 73 square meters)  •  Stage 5 End Stage CKD (GFR <15 mL/min/1 73 square meters)  Note: GFR calculation is accurate only with a steady state creatinine    CBC and differential [[id number]] Collected: 02/11/23 0634    Lab Status: Final result Specimen: Blood from Arm, Right Updated: 02/11/23 0644     WBC 7 11 Thousand/uL      RBC 5 05 Million/uL      Hemoglobin 14 6 g/dL      Hematocrit 44 2 %      MCV 88 fL      MCH 28 9 pg      MCHC 33 0 g/dL      RDW 14 0 %      MPV 11 5 fL      Platelets 179 Thousands/uL      nRBC 0 /100 WBCs      Neutrophils Relative 71 %      Immat GRANS % 0 %      Lymphocytes Relative 21 %      Monocytes Relative 6 %      Eosinophils Relative 2 %      Basophils Relative 0 %      Neutrophils Absolute 4 99 Thousands/µL      Immature Grans Absolute 0 02 Thousand/uL      Lymphocytes Absolute 1 50 Thousands/µL      Monocytes Absolute 0 43 Thousand/µL      Eosinophils Absolute 0 14 Thousand/µL      Basophils Absolute 0 03 Thousands/µL                  CT abdomen pelvis wo contrast   Final Result by Tiff White MD (02/11 2609)      Proximal right ureteral stone resulting in mild proximal hydronephrosis  Other stable and nonemergent findings above  Recommend continued surveillance of pancreatic head cystic mass, preferably with MRI  Workstation performed: [id number]                    Procedures  Procedures         ED Course                               SBIRT 20yo+    Flowsheet Row Most Recent Value   SBIRT (23 yo +)    In order to provide better care to our patients, we are screening all of our patients for alcohol and drug use  Would it be okay to ask you these screening questions? Yes Filed at: 02/11/2023 4783   Initial Alcohol Screen: US AUDIT-C     1  How often do you have a drink containing alcohol? 0 Filed at: 02/11/2023 0620   2  How many drinks containing alcohol do you have on a typical day you are drinking? 0 Filed at: 02/11/2023 0620   3a  Male UNDER 65: How often do you have five or more drinks on one occasion? 0 Filed at: 02/11/2023 0620   3b  FEMALE Any Age, or MALE 65+: How often do you have 4 or more drinks on one occassion? 0 Filed at: 02/11/2023 1357   Audit-C Score 0 Filed at: 02/11/2023 3781   TRESSA: How many times in the past year have you    Used an illegal drug or used a prescription medication for non-medical reasons?  Never Filed at: 02/11/2023 5819                    Medical Decision Making  This is a 69-year-old female presenting for evaluation with complaint of right-sided flank pain  This had started yesterday and has been gradually worsening from time of symptom onset  Reports nausea with one episode of vomiting this morning  No fevers, chills, sweats, or UTI symptoms  She reports symptoms ultimately feel similar to when previously diagnosed with a kidney stone  Differential diagnosis includes but is not limited to: Nephrolithiasis, hydronephrosis, infected stone, cystitis, UTI, colitis, diverticulitis, acute appendicitis, msk pain    Initial ED plan: Labs, imaging, urinalysis, IV fluids/analgesics and reassessment    Final ED Assessment: Vital signs reviewed on ED presentation, examination as above  All labs and imaging independently reviewed with imaging interpreted by the Radiologist   There are no significant lab findings, no leukocytosis, no renal impairment  There is mild hypokalemia of 3 2 and oral supplementation was given  CT abdomen/pelvis reports proximal right ureteral stone resulting in mild proximal hydronephrosis  Urinalysis without evidence of urinary tract infection  All test results reviewed with patient at bedside  Offered admission for pain control and continued management per internal medicine which patient had declined, stating she would prefer to be discharged home with trial of medical expulsion therapy  She will be discharged with prescriptions for Zofran, Flomax, Tylenol and Percocet for breakthrough pain  Standard narcotic precautions given  Ambulatory referral to urology placed  Encouraged hydration  Parameters for ED return discussed at length  Patient verbalized understanding and is comfortable and agreeable with disposition and care plan  She was discharged in stable condition  Right ureteral stone: acute illness or injury  Amount and/or Complexity of Data Reviewed  Labs: ordered  Radiology: ordered  Risk  OTC drugs  Prescription drug management            Disposition  Final diagnoses:   Right ureteral stone Time reflects when diagnosis was documented in both MDM as applicable and the Disposition within this note     Time User Action Codes Description Comment    2/11/2023 10:09 AM Sully Al Add [N20 1] Right ureteral stone       ED Disposition     ED Disposition   Discharge    Condition   Stable    Date/Time   Sat Feb 11, 2023  9:53 AM    Comment   Angelesfabio Massey discharge to home/self care                 Follow-up Information     Follow up With Specialties Details Why Contact Info Additional Information    [address] [address]  [address] [facility] Urology   [address]  [facility] [address] [phone] [facility], [address], [facility], [address] [facility], [address]    [facility] Emergency Department Emergency Medicine  If symptoms worsen [address] Emergency Department, [address] [place] [address], [address] [place] [address], [id number]          Discharge Medication List as of 2/11/2023 10:13 AM      START taking these medications    Details   acetaminophen (TYLENOL) 650 mg CR tablet Take 1 tablet (650 mg total) by mouth every 8 (eight) hours as needed for mild pain, Starting Sat 2/11/2023, Normal      ondansetron (Zofran ODT) 4 mg disintegrating tablet Take 1 tablet (4 mg total) by mouth every 8 (eight) hours as needed for nausea or vomiting, Starting Sat 2/11/2023, Normal      oxyCODONE-acetaminophen (Percocet) 5-325 mg per tablet Take 1 tablet by mouth every 8 (eight) hours as needed for moderate pain for up to 10 days Max Daily Amount: 3 tablets, Starting Sat 2/11/2023, Until Tue 2/21/2023 at 2359, Normal      !! tamsulosin (FLOMAX) 0 4 mg Take 1 capsule (0 4 mg total) by mouth daily with dinner, Starting Sat 2/11/2023, Normal       !! - Potential duplicate medications found  Please discuss with provider  CONTINUE these medications which have NOT CHANGED    Details   Biotin 1000 MCG tablet Take 1,000 mcg by mouth, Historical Med      butalbital-acetaminophen-caffeine (FIORICET,ESGIC) -40 mg per tablet take 1-2 tablets by mouth every 4 to 6 hours if needed maximum daily dose of 6, Historical Med      calcium citrate-vitamin D (CITRACAL+D) 315-200 MG-UNIT per tablet Take 2 tablets by mouth 2 (two) times a day, Starting Mon 3/4/2019, Normal      ergocalciferol (VITAMIN D2) 50,000 units Take 1 capsule (50,000 Units total) by mouth 2 (two) times a week with meals, Starting Thu 4/7/2022, Normal      ferrous sulfate 325 (65 Fe) mg tablet TAKE 1 TABLET BY MOUTH TWICE DAILY, Historical Med      metFORMIN (GLUCOPHAGE) 500 mg tablet take 1 tablet by mouth EVERY EVENING WITH DINNER, Normal      Multiple Vitamin (MULTIVITAMIN) tablet Take 1 tablet by mouth 2 (two) times a day, Starting Mon 3/4/2019, Normal      Semaglutide-Weight Management (WEGOVY) 0 25 MG/0 5ML Inject 0 5 mL (0 25 mg total) under the skin once a week, Starting Tue 1/24/2023, Normal      !! tamsulosin (FLOMAX) 0 4 mg Take 1 capsule (0 4 mg total) by mouth daily with dinner Until kidney stone passes, Starting Sat 12/28/2019, Print      !! tamsulosin (FLOMAX) 0 4 mg Take 1 capsule (0 4 mg total) by mouth daily with dinner, Starting Sun 3/21/2021, Normal      topiramate (TOPAMAX) 50 MG tablet Take 1 tablet (50 mg total) by mouth 2 (two) times a day take 1 tablet by mouth IN THE MORNING and 2 tablets IN THE EVENING, Starting Tue 1/24/2023, Normal      vitamin B-12 (CYANOCOBALAMIN) 500 MCG TABS Take 2 tablets (1,000 mcg total) by mouth daily for 120 days, Starting Mon 3/4/2019, Until Tue 1/24/2023, Normal       !! - Potential duplicate medications found  Please discuss with provider                PDMP Review       Value Time User    PDMP Reviewed  Yes 2/13/2023 11:50 AM Jos Hardwick MD ED Provider  Electronically Signed by           Frederick Borrego PA-C  02/14/23 1384

## 2023-02-11 NOTE — DISCHARGE INSTRUCTIONS
Drink plenty of fluids  Take Zofran as needed for relief of nausea, and Tylenol for mild to moderate pain relief  Take Percocet as needed for episodes of breakthrough pain  Take Flomax daily as directed  Continue to strain your urine  Close outpatient urology follow-up early next week  Please return immediately to the emergency department for any new or worsening symptoms including but not limited to intractable pain or vomiting, decreased urination or urinary retention, or any other worrisome symptoms as discussed

## 2023-02-12 ENCOUNTER — APPOINTMENT (EMERGENCY)
Dept: CT IMAGING | Facility: HOSPITAL | Age: 53
End: 2023-02-12

## 2023-02-12 ENCOUNTER — HOSPITAL ENCOUNTER (EMERGENCY)
Facility: HOSPITAL | Age: 53
Discharge: HOME | End: 2023-02-12
Attending: EMERGENCY MEDICINE

## 2023-02-12 VITALS
TEMPERATURE: 97.7 F | OXYGEN SATURATION: 98 % | RESPIRATION RATE: 21 BRPM | SYSTOLIC BLOOD PRESSURE: 138 MMHG | DIASTOLIC BLOOD PRESSURE: 69 MMHG | HEART RATE: 71 BPM

## 2023-02-12 DIAGNOSIS — N20.1 RIGHT URETERAL STONE: Primary | ICD-10-CM

## 2023-02-12 LAB
ALBUMIN SERPL BCP-MCNC: 4.2 G/DL (ref 3.5–5)
ALP SERPL-CCNC: 76 U/L (ref 46–116)
ALT SERPL W P-5'-P-CCNC: 24 U/L (ref 12–78)
ANION GAP SERPL CALCULATED.3IONS-SCNC: 13 MMOL/L (ref 4–13)
AST SERPL W P-5'-P-CCNC: 26 U/L (ref 5–45)
BACTERIA UR QL AUTO: ABNORMAL /HPF
BASOPHILS # BLD AUTO: 0.04 THOUSANDS/ÂΜL (ref 0–0.1)
BASOPHILS NFR BLD AUTO: 0 % (ref 0–1)
BILIRUB SERPL-MCNC: 0.58 MG/DL (ref 0.2–1)
BILIRUB UR QL STRIP: NEGATIVE
BUN SERPL-MCNC: 17 MG/DL (ref 5–25)
CALCIUM SERPL-MCNC: 9.8 MG/DL (ref 8.3–10.1)
CHLORIDE SERPL-SCNC: 107 MMOL/L (ref 96–108)
CLARITY UR: CLEAR
CO2 SERPL-SCNC: 22 MMOL/L (ref 21–32)
COLOR UR: ABNORMAL
CREAT SERPL-MCNC: 1.14 MG/DL (ref 0.6–1.3)
EOSINOPHIL # BLD AUTO: 0.01 THOUSAND/ÂΜL (ref 0–0.61)
EOSINOPHIL NFR BLD AUTO: 0 % (ref 0–6)
ERYTHROCYTE [DISTWIDTH] IN BLOOD BY AUTOMATED COUNT: 14.1 % (ref 11.6–15.1)
FLUAV RNA RESP QL NAA+PROBE: NEGATIVE
FLUBV RNA RESP QL NAA+PROBE: NEGATIVE
GFR SERPL CREATININE-BSD FRML MDRD: 55 ML/MIN/1.73SQ M
GLUCOSE SERPL-MCNC: 154 MG/DL (ref 65–140)
GLUCOSE UR STRIP-MCNC: NEGATIVE MG/DL
HCT VFR BLD AUTO: 46.9 % (ref 34.8–46.1)
HGB BLD-MCNC: 15 G/DL (ref 11.5–15.4)
HGB UR QL STRIP.AUTO: ABNORMAL
IMM GRANULOCYTES # BLD AUTO: 0.08 THOUSAND/UL (ref 0–0.2)
IMM GRANULOCYTES NFR BLD AUTO: 1 % (ref 0–2)
KETONES UR STRIP-MCNC: NEGATIVE MG/DL
LEUKOCYTE ESTERASE UR QL STRIP: ABNORMAL
LYMPHOCYTES # BLD AUTO: 0.78 THOUSANDS/ÂΜL (ref 0.6–4.47)
LYMPHOCYTES NFR BLD AUTO: 5 % (ref 14–44)
MCH RBC QN AUTO: 28.5 PG (ref 26.8–34.3)
MCHC RBC AUTO-ENTMCNC: 32 G/DL (ref 31.4–37.4)
MCV RBC AUTO: 89 FL (ref 82–98)
MONOCYTES # BLD AUTO: 0.55 THOUSAND/ÂΜL (ref 0.17–1.22)
MONOCYTES NFR BLD AUTO: 4 % (ref 4–12)
MUCOUS THREADS UR QL AUTO: ABNORMAL
NEUTROPHILS # BLD AUTO: 14.27 THOUSANDS/ÂΜL (ref 1.85–7.62)
NEUTS SEG NFR BLD AUTO: 90 % (ref 43–75)
NITRITE UR QL STRIP: NEGATIVE
NON-SQ EPI CELLS URNS QL MICRO: ABNORMAL /HPF
NRBC BLD AUTO-RTO: 0 /100 WBCS
PH UR STRIP.AUTO: 7 [PH]
PLATELET # BLD AUTO: 251 THOUSANDS/UL (ref 149–390)
PMV BLD AUTO: 11.8 FL (ref 8.9–12.7)
POTASSIUM SERPL-SCNC: 3.9 MMOL/L (ref 3.5–5.3)
PROT SERPL-MCNC: 8.3 G/DL (ref 6.4–8.4)
PROT UR STRIP-MCNC: NEGATIVE MG/DL
RBC # BLD AUTO: 5.26 MILLION/UL (ref 3.81–5.12)
RBC #/AREA URNS AUTO: ABNORMAL /HPF
RSV RNA RESP QL NAA+PROBE: NEGATIVE
SARS-COV-2 RNA RESP QL NAA+PROBE: NEGATIVE
SODIUM SERPL-SCNC: 142 MMOL/L (ref 135–147)
SP GR UR STRIP.AUTO: 1.01 (ref 1–1.03)
UROBILINOGEN UR STRIP-ACNC: <2 MG/DL
WBC # BLD AUTO: 15.73 THOUSAND/UL (ref 4.31–10.16)
WBC #/AREA URNS AUTO: ABNORMAL /HPF

## 2023-02-12 RX ORDER — HYDROMORPHONE HCL/PF 1 MG/ML
1 SYRINGE (ML) INJECTION ONCE
Status: COMPLETED | OUTPATIENT
Start: 2023-02-12 | End: 2023-02-12

## 2023-02-12 RX ORDER — MORPHINE SULFATE 4 MG/ML
4 INJECTION, SOLUTION INTRAMUSCULAR; INTRAVENOUS ONCE
Status: COMPLETED | OUTPATIENT
Start: 2023-02-12 | End: 2023-02-12

## 2023-02-12 RX ORDER — ONDANSETRON 2 MG/ML
4 INJECTION INTRAMUSCULAR; INTRAVENOUS ONCE
Status: COMPLETED | OUTPATIENT
Start: 2023-02-12 | End: 2023-02-12

## 2023-02-12 RX ADMIN — MORPHINE SULFATE 4 MG: 4 INJECTION INTRAVENOUS at 02:01

## 2023-02-12 RX ADMIN — ONDANSETRON 4 MG: 2 INJECTION INTRAMUSCULAR; INTRAVENOUS at 02:01

## 2023-02-12 RX ADMIN — HYDROMORPHONE HYDROCHLORIDE 1 MG: 1 INJECTION, SOLUTION INTRAMUSCULAR; INTRAVENOUS; SUBCUTANEOUS at 02:58

## 2023-02-12 RX ADMIN — HYDROMORPHONE HYDROCHLORIDE 1 MG: 1 INJECTION, SOLUTION INTRAMUSCULAR; INTRAVENOUS; SUBCUTANEOUS at 06:04

## 2023-02-12 RX ADMIN — SODIUM CHLORIDE 1000 ML: 0.9 INJECTION, SOLUTION INTRAVENOUS at 02:00

## 2023-02-12 NOTE — ED PROVIDER NOTES
History  Chief Complaint   Patient presents with   • Abdominal Pain     Pt arrives ab ambulatory with a c/o "a kidney stone"  Pt states that she was seen here earlier today for the same  Patient is a 58-year-old female with a past medical history significant for pancreatic cyst presenting to the emergency department for evaluation of continued right flank pain and right-sided abdominal pain  She was diagnosed with a right-sided ureteral stone yesterday  She was discharged home with oxycodone, Flomax  States that she has been taking her medications at home but is still experiencing severe pain  Pain is unbearable  Radiates from the right flank to the right groin  She is having associated nausea and vomiting  No fevers, chills, chest pain, difficulty breathing  No other complaints at this time  Prior to Admission Medications   Prescriptions Last Dose Informant Patient Reported? Taking?    Biotin 1000 MCG tablet   Yes No   Sig: Take 1,000 mcg by mouth   Multiple Vitamin (MULTIVITAMIN) tablet   No No   Sig: Take 1 tablet by mouth 2 (two) times a day   Semaglutide-Weight Management (WEGOVY) 0 25 MG/0 5ML   No No   Sig: Inject 0 5 mL (0 25 mg total) under the skin once a week   Patient not taking: Reported on 2/13/2023   acetaminophen (TYLENOL) 650 mg CR tablet   No No   Sig: Take 1 tablet (650 mg total) by mouth every 8 (eight) hours as needed for mild pain   butalbital-acetaminophen-caffeine (FIORICET,ESGIC) -40 mg per tablet   Yes No   Sig: take 1-2 tablets by mouth every 4 to 6 hours if needed maximum daily dose of 6   calcium citrate-vitamin D (CITRACAL+D) 315-200 MG-UNIT per tablet   No No   Sig: Take 2 tablets by mouth 2 (two) times a day   ergocalciferol (VITAMIN D2) 50,000 units   No No   Sig: Take 1 capsule (50,000 Units total) by mouth 2 (two) times a week with meals   ferrous sulfate 325 (65 Fe) mg tablet   Yes No   Sig: TAKE 1 TABLET BY MOUTH TWICE DAILY   Patient not taking: Reported on 2/13/2023   metFORMIN (GLUCOPHAGE) 500 mg tablet   No No   Sig: take 1 tablet by mouth EVERY EVENING WITH DINNER   ondansetron (Zofran ODT) 4 mg disintegrating tablet   No No   Sig: Take 1 tablet (4 mg total) by mouth every 8 (eight) hours as needed for nausea or vomiting   vitamin B-12 (CYANOCOBALAMIN) 500 MCG TABS   No No   Sig: Take 2 tablets (1,000 mcg total) by mouth daily for 120 days      Facility-Administered Medications: None       Past Medical History:   Diagnosis Date   • Arthritis    • Bell palsy    • Gastric ulcer    • GERD (gastroesophageal reflux disease)    • History of transfusion    • Hyperlipidemia 3/31/2022   • Melena    • Nausea and vomiting 2/13/2023   • Obesity (BMI 30-39  9)    • Pancreatic cyst    • Right facial numbness    • Upper GI bleed        Past Surgical History:   Procedure Laterality Date   • ABDOMINOPLASTY     • BARIATRIC SURGERY     • BOWEL RESECTION LAPAROSCOPIC N/A 2/11/2019    Procedure: LAPAROSCOPIC LYSIS OF ADHESIONS; RESECTION SMALL BOWEL; DECOMPRESSION OF GASTRIC REMNANT; EGD;  Surgeon: Gabriella Holt MD;  [facility];  Service: General   • CHOLECYSTECTOMY     • CT GUIDED PERC DRAINAGE CATHETER PLACEMENT  2/25/2019   • ESOPHAGOGASTRODUODENOSCOPY N/A 3/6/2019    Procedure: INTRAOPERATIVE EGD;  Surgeon: Gabriella Holt MD;  [facility];  Service: Bariatrics   • FL RETROGRADE PYELOGRAM  12/22/2022   • FL RETROGRADE PYELOGRAM  2/14/2023   • HERNIA REPAIR     • HYSTERECTOMY     • KNEE CARTILAGE SURGERY     • PARTIAL GASTRECTOMY N/A 3/6/2019    Procedure: RESECTION GASTRIC PARTIAL/GASTRECTOMY PARTIAL LAPAROSCOPIC;  Surgeon: Gabriella Holt MD;  [facility];  Service: Bariatrics   • WV COLECTOMY PARTIAL W/ANASTOMOSIS N/A 3/6/2019    Procedure: OPEN TRANSVERSE COLON RESECTION;  Surgeon: Gabriella Holt MD;  [facility];  Service: Bariatrics   • WV COLONOSCOPY FLX DX W/COLLJ SPEC WHEN PFRMD N/A 3/28/2018    Procedure: COLONOSCOPY;  Surgeon: Taisha Sorenson MD;  [facility]; Service: Gastroenterology   • NM COLONOSCOPY FLX DX W/[facility] WHEN PFRMD N/A 1/31/2019    Procedure: COLONOSCOPY;  Surgeon: Taisha Sorenson MD;  [facility]; Service: Gastroenterology   • NM CYSTO/URETERO W/LITHOTRIPSY &INDWELL STENT INSRT Left 12/22/2022    Procedure: CYSTOSCOPY URETEROSCOPY, RETROGRADE PYELOGRAM AND INSERTION STENT URETERAL;  Surgeon: Zandra Ocasio MD;  [facility];  Service: Urology   • NM CYSTO/URETERO W/LITHOTRIPSY &INDWELL STENT INSRT Right 2/14/2023    Procedure: CYSTOSCOPY URETEROSCOPY, RETROGRADE PYELOGRAM AND INSERTION STENT URETERAL;  Surgeon: Rony May MD;  [facility];  Service: Urology   • NM ESOPHAGOGASTRODUODENOSCOPY TRANSORAL DIAGNOSTIC N/A 3/22/2018    Procedure: ESOPHAGOGASTRODUODENOSCOPY (EGD); Surgeon: Taisha Sorenson MD;  [facility]; Service: Gastroenterology   • NM ESOPHAGOGASTRODUODENOSCOPY TRANSORAL DIAGNOSTIC N/A 1/31/2019    Procedure: ESOPHAGOGASTRODUODENOSCOPY (EGD); Surgeon: Taisha Sorenson MD;  [facility]; Service: Gastroenterology   • NM LAPS ABD PRTM&OMENTUM DX W/WO SPEC BR/WA SPX N/A 3/6/2019    Procedure: LAPAROSCOPY DIAGNOSTIC;  Surgeon: Edin Berman MD;  [facility];  Service: Bariatrics   • REDUCTION MAMMAPLASTY Bilateral    • REPLACEMENT UNICONDYLAR JOINT KNEE     • SHOULDER ARTHROSCOPY DISTAL CLAVICLE EXCISION AND OPEN ROTATOR CUFF REPAIR         Family History   Problem Relation Age of Onset   • Heart attack Mother    • Diabetes Mother    • Heart attack Father    • Stroke Father    • Hypertension Brother    • Leukemia Paternal Aunt      I have reviewed and agree with the history as documented      E-Cigarette/Vaping   • E-Cigarette Use Never User      E-Cigarette/Vaping Substances   • Nicotine No    • THC No    • CBD No    • Flavoring No    • Other No    • Unknown No      Social History     Tobacco Use   • Smoking status: Never   • Smokeless tobacco: Never   Vaping Use   • Vaping Use: Never used   Substance Use Topics   • Alcohol use: Not Currently   • Drug use: No       Review of Systems   Constitutional: Negative for chills and fever  HENT: Negative for congestion, facial swelling, nosebleeds, sore throat and voice change  Eyes: Negative for pain and redness  Respiratory: Negative for cough, choking, chest tightness, shortness of breath and stridor  Cardiovascular: Negative for chest pain and palpitations  Gastrointestinal: Positive for abdominal pain, nausea and vomiting  Negative for diarrhea  Genitourinary: Positive for flank pain  Musculoskeletal: Negative for arthralgias, back pain, myalgias, neck pain and neck stiffness  Skin: Negative for color change and rash  Neurological: Negative for dizziness, syncope, facial asymmetry, weakness, light-headedness, numbness and headaches  Psychiatric/Behavioral: Negative for confusion and suicidal ideas  All other systems reviewed and are negative  Physical Exam  Physical Exam  Vitals reviewed  Constitutional:       General: She is not in acute distress  Appearance: Normal appearance  She is obese  She is not ill-appearing, toxic-appearing or diaphoretic  Comments: Patient appears uncomfortable secondary to pain   HENT:      Head: Normocephalic and atraumatic  Right Ear: External ear normal       Left Ear: External ear normal       Nose: Nose normal  No congestion or rhinorrhea  Mouth/Throat:      Mouth: Mucous membranes are moist       Pharynx: Oropharynx is clear  No oropharyngeal exudate or posterior oropharyngeal erythema  Eyes:      General: No scleral icterus  Right eye: No discharge  Left eye: No discharge  Extraocular Movements: Extraocular movements intact  Conjunctiva/sclera: Conjunctivae normal    Cardiovascular:      Rate and Rhythm: Normal rate and regular rhythm  Pulses: Normal pulses  Heart sounds: Normal heart sounds  No murmur heard  No friction rub  No gallop  Pulmonary:      Effort: Pulmonary effort is normal  No respiratory distress  Breath sounds: Normal breath sounds  No stridor  No wheezing, rhonchi or rales  Abdominal:      General: Abdomen is flat  Palpations: Abdomen is soft  Tenderness: There is abdominal tenderness in the right upper quadrant and right lower quadrant  There is right CVA tenderness and guarding  There is no left CVA tenderness or rebound  Musculoskeletal:      Cervical back: Normal range of motion and neck supple  Right lower leg: No edema  Left lower leg: No edema  Skin:     General: Skin is warm and dry  Capillary Refill: Capillary refill takes less than 2 seconds  Neurological:      General: No focal deficit present  Mental Status: She is alert and oriented to person, place, and time     Psychiatric:         Mood and Affect: Mood normal          Behavior: Behavior normal          Vital Signs  ED Triage Vitals   Temperature Pulse Respirations Blood Pressure SpO2   02/12/23 0024 02/12/23 0024 02/12/23 0024 02/12/23 0024 02/12/23 0024   97 7 °F (36 5 °C) 76 21 (!) 171/78 100 %      Temp Source Heart Rate Source Patient Position - Orthostatic VS BP Location FiO2 (%)   02/12/23 0024 02/12/23 0024 02/12/23 0024 02/12/23 0024 --   Temporal Monitor Sitting Left arm       Pain Score       02/12/23 0200       10 - Worst Possible Pain           Vitals:    02/12/23 0024 02/12/23 0212 02/12/23 0408 02/12/23 0603   BP: (!) 171/78 159/85 127/60 138/69   Pulse: 76 62 63 71   Patient Position - Orthostatic VS: Sitting            Visual Acuity      ED Medications  Medications   sodium chloride 0 9 % bolus 1,000 mL (0 mL Intravenous Stopped 2/12/23 0300)   morphine injection 4 mg (4 mg Intravenous Given 2/12/23 0201)   ondansetron (ZOFRAN) injection 4 mg (4 mg Intravenous Given 2/12/23 0201)   HYDROmorphone (DILAUDID) injection 1 mg (1 mg Intravenous Given 2/12/23 0258)   HYDROmorphone (DILAUDID) injection 1 mg (1 mg Intravenous Given 2/12/23 0604)       Diagnostic Studies  Results Reviewed     Procedure Component Value Units Date/Time    Comprehensive metabolic panel [[id number]]  (Abnormal) Collected: 02/12/23 0159    Lab Status: Final result Specimen: Blood from Hand, Left Updated: 02/12/23 0814     Sodium 142 mmol/L      Potassium 3 9 mmol/L      Chloride 107 mmol/L      CO2 22 mmol/L      ANION GAP 13 mmol/L      BUN 17 mg/dL      Creatinine 1 14 mg/dL      Glucose 154 mg/dL      Calcium 9 8 mg/dL      AST 26 U/L      ALT 24 U/L      Alkaline Phosphatase 76 U/L      Total Protein 8 3 g/dL      Albumin 4 2 g/dL      Total Bilirubin 0 58 mg/dL      eGFR 55 ml/min/1 73sq m     Narrative:      [place] guidelines for Chronic Kidney Disease (CKD):   •  Stage 1 with normal or high GFR (GFR > 90 mL/min/1 73 square meters)  •  Stage 2 Mild CKD (GFR = 60-89 mL/min/1 73 square meters)  •  Stage 3A Moderate CKD (GFR = 45-59 mL/min/1 73 square meters)  •  Stage 3B Moderate CKD (GFR = 30-44 mL/min/1 73 square meters)  •  Stage 4 Severe CKD (GFR = 15-29 mL/min/1 73 square meters)  •  Stage 5 End Stage CKD (GFR <15 mL/min/1 73 square meters)  Note: GFR calculation is accurate only with a steady state creatinine    Urine Microscopic [[id number]]  (Abnormal) Collected: 02/12/23 0433    Lab Status: Final result Specimen: Urine, Clean Catch Updated: 02/12/23 0458     RBC, UA Innumerable /hpf      WBC, UA 2-4 /hpf      Epithelial Cells Occasional /hpf      Bacteria, UA Occasional /hpf      MUCUS THREADS Occasional    UA w Reflex to Microscopic w Reflex to Culture [[id number]]  (Abnormal) Collected: 02/12/23 0433    Lab Status: Final result Specimen: Urine, Clean Catch Updated: 02/12/23 0453     Color, UA Light Yellow     Clarity, UA Clear     Specific Gravity, UA 1 010     pH, UA 7 0     Leukocytes, UA Trace Nitrite, UA Negative     Protein, UA Negative mg/dl      Glucose, UA Negative mg/dl      Ketones, UA Negative mg/dl      Urobilinogen, UA <2 0 mg/dl      Bilirubin, UA Negative     Occult Blood, UA Large    FLU/RSV/COVID - if FLU/RSV clinically relevant [[id number]]  (Normal) Collected: 02/12/23 0202    Lab Status: Final result Specimen: Nares from Nose Updated: 02/12/23 0246     SARS-CoV-2 Negative     INFLUENZA A PCR Negative     INFLUENZA B PCR Negative     RSV PCR Negative    Narrative:      FOR PEDIATRIC PATIENTS - copy/paste COVID Guidelines URL to browser: [email or web address]  [email or web address]    SARS-CoV-2 assay is a Nucleic Acid Amplification assay intended for the  qualitative detection of nucleic acid from SARS-CoV-2 in nasopharyngeal  swabs  Results are for the presumptive identification of SARS-CoV-2 RNA  Positive results are indicative of infection with SARS-CoV-2, the virus  causing COVID-19, but do not rule out bacterial infection or co-infection  with other viruses  Laboratories within the [place] and its  territories are required to report all positive results to the appropriate  public health authorities  Negative results do not preclude SARS-CoV-2  infection and should not be used as the sole basis for treatment or other  patient management decisions  Negative results must be combined with  clinical observations, patient history, and epidemiological information  This test has not been FDA cleared or approved  This test has been authorized by FDA under an Emergency Use Authorization  (EUA)  This test is only authorized for the duration of time the  declaration that circumstances exist justifying the authorization of the  emergency use of an in vitro diagnostic tests for detection of SARS-CoV-2  virus and/or diagnosis of COVID-19 infection under section 564(b)(1) of  the Act, 21 U  S C  365HCN-1(M)(4), unless the authorization is terminated  or revoked sooner  The test has been validated but independent review by FDA  and CLIA is pending  Test performed using [employer] GeneXpert: This RT-PCR assay targets N2,  a region unique to SARS-CoV-2  A conserved region in the E-gene was chosen  for pan-Sarbecovirus detection which includes SARS-CoV-2  According to CMS-2020-01-R, this platform meets the definition of high-throughput technology  CBC and differential [[id number]]  (Abnormal) Collected: 02/12/23 0159    Lab Status: Final result Specimen: Blood from Hand, Left Updated: 02/12/23 0216     WBC 15 73 Thousand/uL      RBC 5 26 Million/uL      Hemoglobin 15 0 g/dL      Hematocrit 46 9 %      MCV 89 fL      MCH 28 5 pg      MCHC 32 0 g/dL      RDW 14 1 %      MPV 11 8 fL      Platelets 734 Thousands/uL      nRBC 0 /100 WBCs      Neutrophils Relative 90 %      Immat GRANS % 1 %      Lymphocytes Relative 5 %      Monocytes Relative 4 %      Eosinophils Relative 0 %      Basophils Relative 0 %      Neutrophils Absolute 14 27 Thousands/µL      Immature Grans Absolute 0 08 Thousand/uL      Lymphocytes Absolute 0 78 Thousands/µL      Monocytes Absolute 0 55 Thousand/µL      Eosinophils Absolute 0 01 Thousand/µL      Basophils Absolute 0 04 Thousands/µL     Narrative: This is an appended report  These results have been appended to a previously verified report  CT abdomen pelvis wo contrast   Final Result by Misha Mota DO (02/12 6568)      Mild right hydronephrosis secondary to 0 5 cm obstructing stone, which has migrated to the ureterovesicular junction  2 8 x 1 5 cm cystic lesion of the pancreatic head  Continued MRI surveillance recommended  The study was marked in [facility] for immediate notification        Workstation performed: [id number]                    Procedures  Procedures         ED Course                                             Medical Decision Making  Patient presenting for evaluation of right-sided flank pain radiating into the right side of her abdomen  She has a known diagnosis of ureteral stone  She was tried on oral pain, however pain is too severe to be controlled by home oxycodone  Upon arrival she appears uncomfortable secondary to pain  She is not in any acute distress  She is initially hypertensive which resolved without intervention  Patient was given morphine and Zofran initially  Pain uncontrolled by morphine so she was transition to Dilaudid with moderate improvement of her pain  Patient with mild leukocytosis  Urinalysis without concern for urinary tract infection  CT of the abdomen and pelvis revealed a 5 mm obstructing stone at the right UVJ  Due to significant pain and inability to tolerate pain at home, I discussed case with urology who recommended transfer to [facility] for further evaluation and management  Patient initially agreeable to transfer, however felt improved after second dose of Dilaudid and wanted to try to go home  She was discharged home in stable condition with strict return precautions  Right ureteral stone: acute illness or injury  Amount and/or Complexity of Data Reviewed  Labs: ordered  Radiology: ordered  Risk  Prescription drug management  Disposition  Final diagnoses:   Right ureteral stone     Time reflects when diagnosis was documented in both MDM as applicable and the Disposition within this note     Time User Action Codes Description Comment    2/12/2023  4:21 AM Handy Persaud Add [N20 1] Right ureteral stone       ED Disposition     ED Disposition   Discharge    Condition   Stable    Date/Time   Sun Feb 12, 2023  5:53 AM    Comment   Zari Hogan discharge to home/self care                 Follow-up Information     Follow up With Specialties Details Why Contact Info Additional [address] [facility] Emergency Department Emergency Medicine Go to  If symptoms worsen [address] [place] [address] [address] [facility] Emergency Department, [address] [facility], [place], [place], [address] For Urology [facility] Urology Schedule an appointment as soon as possible for a visit  for follow up [address] [phone] [address]  [facility] For Urology [facility], [address] [place] [facility], [address], [facility], [place], [address]          Discharge Medication List as of 2/12/2023  5:53 AM      CONTINUE these medications which have NOT CHANGED    Details   acetaminophen (TYLENOL) 650 mg CR tablet Take 1 tablet (650 mg total) by mouth every 8 (eight) hours as needed for mild pain, Starting Sat 2/11/2023, Normal      Biotin 1000 MCG tablet Take 1,000 mcg by mouth, Historical Med      butalbital-acetaminophen-caffeine (FIORICET,ESGIC) -40 mg per tablet take 1-2 tablets by mouth every 4 to 6 hours if needed maximum daily dose of 6, Historical Med      calcium citrate-vitamin D (CITRACAL+D) 315-200 MG-UNIT per tablet Take 2 tablets by mouth 2 (two) times a day, Starting Mon 3/4/2019, Normal      ergocalciferol (VITAMIN D2) 50,000 units Take 1 capsule (50,000 Units total) by mouth 2 (two) times a week with meals, Starting Thu 4/7/2022, Normal      ferrous sulfate 325 (65 Fe) mg tablet TAKE 1 TABLET BY MOUTH TWICE DAILY, Historical Med      metFORMIN (GLUCOPHAGE) 500 mg tablet take 1 tablet by mouth EVERY EVENING WITH DINNER, Normal      Multiple Vitamin (MULTIVITAMIN) tablet Take 1 tablet by mouth 2 (two) times a day, Starting Mon 3/4/2019, Normal      ondansetron (Zofran ODT) 4 mg disintegrating tablet Take 1 tablet (4 mg total) by mouth every 8 (eight) hours as needed for nausea or vomiting, Starting Sat 2/11/2023, Normal      Semaglutide-Weight Management (WEGOVY) 0 25 MG/0 5ML Inject 0 5 mL (0 25 mg total) under the skin once a week, Starting Tue 1/24/2023, Normal      vitamin B-12 (CYANOCOBALAMIN) 500 MCG TABS Take 2 tablets (1,000 mcg total) by mouth daily for 120 days, Starting Mon 3/4/2019, Until Tue 1/24/2023, Normal      oxyCODONE-acetaminophen (Percocet) 5-325 mg per tablet Take 1 tablet by mouth every 8 (eight) hours as needed for moderate pain for up to 10 days Max Daily Amount: 3 tablets, Starting Sat 2/11/2023, Until Tue 2/21/2023 at 2359, Normal      !! tamsulosin (FLOMAX) 0 4 mg Take 1 capsule (0 4 mg total) by mouth daily with dinner Until kidney stone passes, Starting Sat 12/28/2019, Print      !! tamsulosin (FLOMAX) 0 4 mg Take 1 capsule (0 4 mg total) by mouth daily with dinner, Starting Sun 3/21/2021, Normal      !! tamsulosin (FLOMAX) 0 4 mg Take 1 capsule (0 4 mg total) by mouth daily with dinner, Starting Sat 2/11/2023, Normal      topiramate (TOPAMAX) 50 MG tablet Take 1 tablet (50 mg total) by mouth 2 (two) times a day take 1 tablet by mouth IN THE MORNING and 2 tablets IN THE EVENING, Starting Tue 1/24/2023, Normal       !! - Potential duplicate medications found  Please discuss with provider  No discharge procedures on file      PDMP Review       Value Time User    PDMP Reviewed  Yes 2/13/2023 11:50 AM Jos Hardwick MD          ED Provider  Electronically Signed by           Marcio Yin PA-C  02/17/23 6259

## 2023-02-12 NOTE — ASSESSMENT & PLAN NOTE
Patient presenting to the ED for continued right flank pain  She was seen by the ED yesterday for similar complaint and diagnosed with right ureteral stone and mild hydronephrosis  She was discharged with pain medication and Flomax with urology follow-up  However, secondary to intractable pain at home patient returns to the ED today  Denies any associated fever/chills, chest pain, shortness of breath    · CT A/P (2/11/23): Proximal right ureteral stone resulting in mild proximal hydronephrosis  · UA negative for infection  · Start on continuous IVF  · Pain control, anti-emetics  · ED spoke to Urology, plan to transfer to [address] [place] [address]

## 2023-02-12 NOTE — ASSESSMENT & PLAN NOTE
· CT A/P: Similar, noncalcified 1 6 x 2 5 cm depth by width pancreatic head cystic mass  No downstream parenchymal atrophy or ductal dilatation    · Recommend outpatient follow up with MRI for surveillance

## 2023-02-12 NOTE — ASSESSMENT & PLAN NOTE
Lab Results   Component Value Date    HGBA1C 5 8 (H) 04/07/2022     · Holding oral anti-diabetic medications  · Start on SSI with accu checks  · Hypoglycemia protocol  · Diabetic diet

## 2023-02-13 ENCOUNTER — HOSPITAL ENCOUNTER (INPATIENT)
Facility: HOSPITAL | Age: 53
LOS: 1 days | Discharge: HOME | End: 2023-02-15
Attending: EMERGENCY MEDICINE | Admitting: INTERNAL MEDICINE

## 2023-02-13 DIAGNOSIS — R52 INTRACTABLE PAIN: Primary | ICD-10-CM

## 2023-02-13 DIAGNOSIS — R11.2 NAUSEA AND VOMITING: ICD-10-CM

## 2023-02-13 DIAGNOSIS — N20.1 RIGHT URETERAL STONE: ICD-10-CM

## 2023-02-13 PROBLEM — D72.829 LEUKOCYTOSIS: Status: ACTIVE | Noted: 2023-02-13

## 2023-02-13 PROBLEM — N13.2 HYDRONEPHROSIS WITH URINARY OBSTRUCTION DUE TO URETERAL CALCULUS: Status: ACTIVE | Noted: 2023-02-13

## 2023-02-13 LAB
ALBUMIN SERPL BCP-MCNC: 4 G/DL (ref 3.5–5)
ALP SERPL-CCNC: 74 U/L (ref 46–116)
ALT SERPL W P-5'-P-CCNC: 17 U/L (ref 12–78)
ANION GAP SERPL CALCULATED.3IONS-SCNC: 13 MMOL/L (ref 4–13)
AST SERPL W P-5'-P-CCNC: 22 U/L (ref 5–45)
BACTERIA UR QL AUTO: ABNORMAL /HPF
BASOPHILS # BLD AUTO: 0.04 THOUSANDS/ÂΜL (ref 0–0.1)
BASOPHILS NFR BLD AUTO: 0 % (ref 0–1)
BILIRUB DIRECT SERPL-MCNC: 0.11 MG/DL (ref 0–0.2)
BILIRUB SERPL-MCNC: 0.8 MG/DL (ref 0.2–1)
BILIRUB UR QL STRIP: NEGATIVE
BUN SERPL-MCNC: 16 MG/DL (ref 5–25)
CALCIUM SERPL-MCNC: 10 MG/DL (ref 8.3–10.1)
CHLORIDE SERPL-SCNC: 104 MMOL/L (ref 96–108)
CLARITY UR: ABNORMAL
CO2 SERPL-SCNC: 23 MMOL/L (ref 21–32)
COLOR UR: ABNORMAL
CREAT SERPL-MCNC: 0.95 MG/DL (ref 0.6–1.3)
EOSINOPHIL # BLD AUTO: 0.11 THOUSAND/ÂΜL (ref 0–0.61)
EOSINOPHIL NFR BLD AUTO: 1 % (ref 0–6)
ERYTHROCYTE [DISTWIDTH] IN BLOOD BY AUTOMATED COUNT: 14.2 % (ref 11.6–15.1)
FLUAV RNA RESP QL NAA+PROBE: NEGATIVE
FLUBV RNA RESP QL NAA+PROBE: NEGATIVE
GFR SERPL CREATININE-BSD FRML MDRD: 68 ML/MIN/1.73SQ M
GLUCOSE SERPL-MCNC: 100 MG/DL (ref 65–140)
GLUCOSE SERPL-MCNC: 82 MG/DL (ref 65–140)
GLUCOSE SERPL-MCNC: 88 MG/DL (ref 65–140)
GLUCOSE SERPL-MCNC: 91 MG/DL (ref 65–140)
GLUCOSE UR STRIP-MCNC: NEGATIVE MG/DL
HCT VFR BLD AUTO: 45.5 % (ref 34.8–46.1)
HGB BLD-MCNC: 14.8 G/DL (ref 11.5–15.4)
HGB UR QL STRIP.AUTO: ABNORMAL
IMM GRANULOCYTES # BLD AUTO: 0.04 THOUSAND/UL (ref 0–0.2)
IMM GRANULOCYTES NFR BLD AUTO: 0 % (ref 0–2)
KETONES UR STRIP-MCNC: ABNORMAL MG/DL
LACTATE SERPL-SCNC: 1.9 MMOL/L (ref 0.5–2)
LEUKOCYTE ESTERASE UR QL STRIP: NEGATIVE
LYMPHOCYTES # BLD AUTO: 1.58 THOUSANDS/ÂΜL (ref 0.6–4.47)
LYMPHOCYTES NFR BLD AUTO: 12 % (ref 14–44)
MCH RBC QN AUTO: 29.2 PG (ref 26.8–34.3)
MCHC RBC AUTO-ENTMCNC: 32.5 G/DL (ref 31.4–37.4)
MCV RBC AUTO: 90 FL (ref 82–98)
MONOCYTES # BLD AUTO: 0.76 THOUSAND/ÂΜL (ref 0.17–1.22)
MONOCYTES NFR BLD AUTO: 6 % (ref 4–12)
NEUTROPHILS # BLD AUTO: 10.27 THOUSANDS/ÂΜL (ref 1.85–7.62)
NEUTS SEG NFR BLD AUTO: 81 % (ref 43–75)
NITRITE UR QL STRIP: NEGATIVE
NON-SQ EPI CELLS URNS QL MICRO: ABNORMAL /HPF
NRBC BLD AUTO-RTO: 0 /100 WBCS
PH UR STRIP.AUTO: 7 [PH]
PLATELET # BLD AUTO: 268 THOUSANDS/UL (ref 149–390)
PMV BLD AUTO: 11.3 FL (ref 8.9–12.7)
POTASSIUM SERPL-SCNC: 3.6 MMOL/L (ref 3.5–5.3)
PROT SERPL-MCNC: 7.8 G/DL (ref 6.4–8.4)
PROT UR STRIP-MCNC: NEGATIVE MG/DL
RBC # BLD AUTO: 5.07 MILLION/UL (ref 3.81–5.12)
RBC #/AREA URNS AUTO: ABNORMAL /HPF
RSV RNA RESP QL NAA+PROBE: NEGATIVE
SARS-COV-2 RNA RESP QL NAA+PROBE: NEGATIVE
SODIUM SERPL-SCNC: 140 MMOL/L (ref 135–147)
SP GR UR STRIP.AUTO: 1.02 (ref 1–1.03)
UROBILINOGEN UR STRIP-ACNC: <2 MG/DL
WBC # BLD AUTO: 12.8 THOUSAND/UL (ref 4.31–10.16)
WBC #/AREA URNS AUTO: ABNORMAL /HPF

## 2023-02-13 RX ORDER — TOPIRAMATE 25 MG/1
25 TABLET ORAL
Status: DISCONTINUED | OUTPATIENT
Start: 2023-02-13 | End: 2023-02-15 | Stop reason: HOSPADM

## 2023-02-13 RX ORDER — ONDANSETRON 2 MG/ML
4 INJECTION INTRAMUSCULAR; INTRAVENOUS EVERY 6 HOURS PRN
Status: DISCONTINUED | OUTPATIENT
Start: 2023-02-13 | End: 2023-02-15 | Stop reason: HOSPADM

## 2023-02-13 RX ORDER — ACETAMINOPHEN 325 MG/1
650 TABLET ORAL EVERY 6 HOURS PRN
Status: DISCONTINUED | OUTPATIENT
Start: 2023-02-13 | End: 2023-02-15 | Stop reason: HOSPADM

## 2023-02-13 RX ORDER — HYDROMORPHONE HCL/PF 1 MG/ML
0.5 SYRINGE (ML) INJECTION EVERY 6 HOURS PRN
Status: DISCONTINUED | OUTPATIENT
Start: 2023-02-13 | End: 2023-02-15 | Stop reason: HOSPADM

## 2023-02-13 RX ORDER — PROMETHAZINE HYDROCHLORIDE 25 MG/ML
25 INJECTION, SOLUTION INTRAMUSCULAR; INTRAVENOUS ONCE
Status: COMPLETED | OUTPATIENT
Start: 2023-02-13 | End: 2023-02-13

## 2023-02-13 RX ORDER — KETOROLAC TROMETHAMINE 30 MG/ML
15 INJECTION, SOLUTION INTRAMUSCULAR; INTRAVENOUS ONCE
Status: COMPLETED | OUTPATIENT
Start: 2023-02-13 | End: 2023-02-13

## 2023-02-13 RX ORDER — INSULIN LISPRO 100 [IU]/ML
1-5 INJECTION, SOLUTION INTRAVENOUS; SUBCUTANEOUS
Status: DISCONTINUED | OUTPATIENT
Start: 2023-02-13 | End: 2023-02-15 | Stop reason: HOSPADM

## 2023-02-13 RX ORDER — TAMSULOSIN HYDROCHLORIDE 0.4 MG/1
0.4 CAPSULE ORAL
Status: DISCONTINUED | OUTPATIENT
Start: 2023-02-13 | End: 2023-02-15 | Stop reason: HOSPADM

## 2023-02-13 RX ORDER — SODIUM CHLORIDE, SODIUM LACTATE, POTASSIUM CHLORIDE, CALCIUM CHLORIDE 600; 310; 30; 20 MG/100ML; MG/100ML; MG/100ML; MG/100ML
250 INJECTION, SOLUTION INTRAVENOUS CONTINUOUS
Status: DISCONTINUED | OUTPATIENT
Start: 2023-02-13 | End: 2023-02-15 | Stop reason: HOSPADM

## 2023-02-13 RX ORDER — HYDROMORPHONE HCL/PF 1 MG/ML
0.5 SYRINGE (ML) INJECTION ONCE
Status: COMPLETED | OUTPATIENT
Start: 2023-02-13 | End: 2023-02-13

## 2023-02-13 RX ORDER — TOPIRAMATE 25 MG/1
50 TABLET ORAL DAILY
Status: DISCONTINUED | OUTPATIENT
Start: 2023-02-13 | End: 2023-02-13

## 2023-02-13 RX ORDER — OXYCODONE HYDROCHLORIDE AND ACETAMINOPHEN 5; 325 MG/1; MG/1
1 TABLET ORAL EVERY 4 HOURS PRN
Status: DISCONTINUED | OUTPATIENT
Start: 2023-02-13 | End: 2023-02-15 | Stop reason: HOSPADM

## 2023-02-13 RX ORDER — ONDANSETRON 2 MG/ML
4 INJECTION INTRAMUSCULAR; INTRAVENOUS ONCE
Status: COMPLETED | OUTPATIENT
Start: 2023-02-13 | End: 2023-02-13

## 2023-02-13 RX ORDER — OXYCODONE HYDROCHLORIDE 10 MG/1
10 TABLET ORAL EVERY 4 HOURS PRN
Status: DISCONTINUED | OUTPATIENT
Start: 2023-02-13 | End: 2023-02-15 | Stop reason: HOSPADM

## 2023-02-13 RX ORDER — HEPARIN SODIUM 5000 [USP'U]/ML
5000 INJECTION, SOLUTION INTRAVENOUS; SUBCUTANEOUS EVERY 8 HOURS SCHEDULED
Status: DISCONTINUED | OUTPATIENT
Start: 2023-02-13 | End: 2023-02-15 | Stop reason: HOSPADM

## 2023-02-13 RX ADMIN — SODIUM CHLORIDE, SODIUM LACTATE, POTASSIUM CHLORIDE, AND CALCIUM CHLORIDE 200 ML/HR: .6; .31; .03; .02 INJECTION, SOLUTION INTRAVENOUS at 17:22

## 2023-02-13 RX ADMIN — HYDROMORPHONE HYDROCHLORIDE 0.5 MG: 1 INJECTION, SOLUTION INTRAMUSCULAR; INTRAVENOUS; SUBCUTANEOUS at 15:34

## 2023-02-13 RX ADMIN — TAMSULOSIN HYDROCHLORIDE 0.4 MG: 0.4 CAPSULE ORAL at 17:22

## 2023-02-13 RX ADMIN — OXYCODONE HYDROCHLORIDE AND ACETAMINOPHEN 1 TABLET: 5; 325 TABLET ORAL at 17:22

## 2023-02-13 RX ADMIN — HEPARIN SODIUM 5000 UNITS: 5000 INJECTION INTRAVENOUS; SUBCUTANEOUS at 21:36

## 2023-02-13 RX ADMIN — SODIUM CHLORIDE, SODIUM LACTATE, POTASSIUM CHLORIDE, AND CALCIUM CHLORIDE 200 ML/HR: .6; .31; .03; .02 INJECTION, SOLUTION INTRAVENOUS at 12:07

## 2023-02-13 RX ADMIN — ONDANSETRON 4 MG: 2 INJECTION INTRAMUSCULAR; INTRAVENOUS at 09:39

## 2023-02-13 RX ADMIN — TOPIRAMATE 25 MG: 25 TABLET, FILM COATED ORAL at 22:29

## 2023-02-13 RX ADMIN — HEPARIN SODIUM 5000 UNITS: 5000 INJECTION INTRAVENOUS; SUBCUTANEOUS at 13:31

## 2023-02-13 RX ADMIN — SODIUM CHLORIDE 1000 ML: 0.9 INJECTION, SOLUTION INTRAVENOUS at 09:40

## 2023-02-13 RX ADMIN — HYDROMORPHONE HYDROCHLORIDE 0.5 MG: 1 INJECTION, SOLUTION INTRAMUSCULAR; INTRAVENOUS; SUBCUTANEOUS at 21:36

## 2023-02-13 RX ADMIN — PROMETHAZINE HYDROCHLORIDE 25 MG: 25 INJECTION INTRAMUSCULAR; INTRAVENOUS at 10:49

## 2023-02-13 RX ADMIN — KETOROLAC TROMETHAMINE 15 MG: 30 INJECTION, SOLUTION INTRAMUSCULAR at 10:36

## 2023-02-13 RX ADMIN — OXYCODONE HYDROCHLORIDE 10 MG: 10 TABLET ORAL at 20:03

## 2023-02-13 RX ADMIN — HYDROMORPHONE HYDROCHLORIDE 0.5 MG: 1 INJECTION, SOLUTION INTRAMUSCULAR; INTRAVENOUS; SUBCUTANEOUS at 09:40

## 2023-02-13 RX ADMIN — SODIUM CHLORIDE, SODIUM LACTATE, POTASSIUM CHLORIDE, AND CALCIUM CHLORIDE 200 ML/HR: .6; .31; .03; .02 INJECTION, SOLUTION INTRAVENOUS at 22:29

## 2023-02-13 RX ADMIN — HYDROMORPHONE HYDROCHLORIDE 0.5 MG: 1 INJECTION, SOLUTION INTRAMUSCULAR; INTRAVENOUS; SUBCUTANEOUS at 10:49

## 2023-02-13 RX ADMIN — ONDANSETRON 4 MG: 2 INJECTION INTRAMUSCULAR; INTRAVENOUS at 21:36

## 2023-02-13 NOTE — ASSESSMENT & PLAN NOTE
Patient presenting with 3-day history of worsening colicky abdominal pain  CT A/P revealed a 0 5 cm right ureteral stone, which has migrated to the ureterovesical junction  Touched base with urology  Plan for conservative management at this time however if not successfully passed by tomorrow, will plan for intervention  Moderate- high likelihood of spontaneous passage of ureteral calculus  · Continue IV hydration-IV LR at 200 cc/h  · Continue tamsulosin  · Urology on board  Appreciate recommendation

## 2023-02-13 NOTE — ASSESSMENT & PLAN NOTE
CT abdomen and pelvis revealed  2 5 x 1 8 cm cystic lesion in the pancreatic head which is unchanged    We will recommend outpatient follow-up with GI/PCP

## 2023-02-13 NOTE — ASSESSMENT & PLAN NOTE
WBC count of 12 8  Improved from prior of 15 73 on 2/12  UA with no evidence of UTI  Likely reactive in setting of renal colic  We will monitor off antibiotics at this time  Follow-up urine culture  Consider obtaining blood culture prior to any initiation of antibiotics

## 2023-02-13 NOTE — ASSESSMENT & PLAN NOTE
Patient presenting with 3-day history of worsening colicky abdominal pain  CT A/P revealed a 0 5 cm right ureteral stone, which has migrated to the ureterovesical junction  Touched base with urology  Plan for conservative management at this time however if not successfully passed by tomorrow, will plan for intervention  Moderate- high likelihood of spontaneous passage of ureteral calculus    She was initially managed with conservative therapy however passage of stone was unsuccessful   She subsequently had cystoscopy with right pyelogram with stone removal   At this time, she is medically clear for discharge  Recommend outpatient follow-up with urology and her PCP

## 2023-02-13 NOTE — ASSESSMENT & PLAN NOTE
History of class II obesity with BMI of 35  Follows with weight management outpatient  We will hold semaglutide for now    Continue outpatient Topamax (currently being weaned off -now on 1 pill daily)  Correction healthy lifestyle and low-caloric diet modification

## 2023-02-13 NOTE — H&P
[address] [facility] 1970, 47 y o  female MRN: [id number]  Unit/Bed#: K8T5 Encounter: [id number]  Primary Care Provider: Naomy Larson   Date and time admitted to hospital: 2/13/2023  9:06 AM    * Right hydronephrosis with urinary obstruction due to ureteral calculus  Assessment & Plan  Patient presenting with 3-day history of worsening colicky abdominal pain  CT A/P revealed a 0 5 cm right ureteral stone, which has migrated to the ureterovesical junction  Touched base with urology  Plan for conservative management at this time however if not successfully passed by tomorrow, will plan for intervention  Moderate- high likelihood of spontaneous passage of ureteral calculus  · Continue IV hydration-IV LR at 200 cc/h  · Continue tamsulosin  · Urology on board  Appreciate recommendation  Leukocytosis  Assessment & Plan  WBC count of 12 8  Improved from prior of 15 73 on 2/12  UA with no evidence of UTI  Likely reactive in setting of renal colic  We will monitor off antibiotics at this time  Follow-up urine culture  Consider obtaining blood culture prior to any initiation of antibiotics  Nausea and vomiting  Assessment & Plan  Seen vomiting in setting of renal colic  Currently symptoms free following administration of Phenergan  CT abdomen and pelvis with no evidence of GI dysfunction -status post gastric bypass  Will provide as needed antiemetic  Diabetes [facility])  Assessment & Plan  Lab Results   Component Value Date    HGBA1C 5 8 (H) 04/07/2022       No results for input(s): POCGLU in the last 72 hours  Blood Sugar Average: Last 72 hrs: On metformin 500 mg twice daily and semaglutide at home  Check blood glucose for meals and bedtime  Initiate sliding scale coverage with hypoglycemia  Hold metformin and semaglutide at this time      Class 2 obesity in adult  Assessment & Plan  History of class II obesity with BMI of 35  Follows with weight management outpatient  We will hold semaglutide for now  Continue outpatient Topamax (currently being weaned off -now on 1 pill daily)  Correction healthy lifestyle and low-caloric diet modification    Cyst of pancreas  Assessment & Plan  CT abdomen and pelvis revealed  2 5 x 1 8 cm cystic lesion in the pancreatic head which is unchanged  We will recommend outpatient follow-up with GI/PCP    VTE Prophylaxis: Heparin  / sequential compression device   Code Status: Full code  POLST: There is no POLST form on file for this patient (pre-hospital)  Discussion with family: Discussed with significant other at bedside    Anticipated Length of Stay:  Patient will be admitted on an Observation basis with an anticipated length of stay of  < 2 midnights  Justification for Hospital Stay: Right hydronephrosis with ureteral obstruction    Total Time for Visit, including Counseling / Coordination of Care: 45 minutes  Greater than 50% of this total time spent on direct patient counseling and coordination of care  Chief Complaint: Abdominal pain    History of Present Illness:    Maximus Kaminski is a 47 y o  female with past medical history significant for DM2, Class 2 obesity status post gastric bypass who presents with current abdominal pain for the last 3 days  She has presented to the ER in the last 3 days for abdominal pain  At that time, CT abdomen and pelvis obtained showed a proximal right ureteral calculus measuring 0 5 cm  There was initial attempts at conservative management to aid with calculus expulsion, however not successful  She further presented today due to worsening abdominal pain  Noted to be hemodynamically stable on presentation  CBC with some leukocytosis of 12 K  UA with trace ketones, trace occult blood  Aristeo CT abdomen and pelvis did show mild right hydronephrosis secondary to 0 5 cm obstructing stone which has migrated to the ureterovesicular junction    She was evaluated by urology in the ER and plan for conservative management potential for urologic surgical intervention if no improvement in 24 hours  Review of Systems:    Review of Systems   Constitutional: Positive for activity change and appetite change  Respiratory: Negative for cough, chest tightness and stridor  Cardiovascular: Negative for chest pain and palpitations  Gastrointestinal: Positive for nausea  Negative for abdominal distention, blood in stool, diarrhea and vomiting  Genitourinary: Positive for flank pain  Negative for difficulty urinating and dysuria  Musculoskeletal: Negative for arthralgias, gait problem and myalgias  Neurological: Negative for dizziness, seizures and numbness  Past Medical and Surgical History:     Past Medical History:   Diagnosis Date   • Arthritis    • Bell palsy    • Gastric ulcer    • GERD (gastroesophageal reflux disease)    • History of transfusion    • Melena    • Obesity (BMI 30-39  9)    • Pancreatic cyst    • Right facial numbness    • Upper GI bleed        Past Surgical History:   Procedure Laterality Date   • ABDOMINOPLASTY     • BARIATRIC SURGERY     • BOWEL RESECTION LAPAROSCOPIC N/A 2/11/2019    Procedure: LAPAROSCOPIC LYSIS OF ADHESIONS; RESECTION SMALL BOWEL; DECOMPRESSION OF GASTRIC REMNANT; EGD;  Surgeon: Peter Rae MD;  [facility];  Service: General   • CHOLECYSTECTOMY     • CT GUIDED PERC DRAINAGE CATHETER PLACEMENT  2/25/2019   • ESOPHAGOGASTRODUODENOSCOPY N/A 3/6/2019    Procedure: INTRAOPERATIVE EGD;  Surgeon: Peter Rae MD;  [facility];  Service: Bariatrics   • FL RETROGRADE PYELOGRAM  12/22/2022   • HERNIA REPAIR     • HYSTERECTOMY     • KNEE CARTILAGE SURGERY     • PARTIAL GASTRECTOMY N/A 3/6/2019    Procedure: RESECTION GASTRIC PARTIAL/GASTRECTOMY PARTIAL LAPAROSCOPIC;  Surgeon: Peter Rae MD;  [facility];  Service: Bariatrics   • KY COLECTOMY PARTIAL W/ANASTOMOSIS N/A 3/6/2019    Procedure: OPEN TRANSVERSE COLON RESECTION;  Surgeon: Kendell Phoenix, MD;  [facility];  Service: Bariatrics   • NV COLONOSCOPY FLX DX W/COLLJ SPEC WHEN PFRMD N/A 3/28/2018    Procedure: COLONOSCOPY;  Surgeon: Claudia Pierce MD;  [facility]; Service: Gastroenterology   • NV COLONOSCOPY FLX DX W/COLLJ [facility] WHEN PFRMD N/A 1/31/2019    Procedure: COLONOSCOPY;  Surgeon: Claudia Pierce MD;  [facility]; Service: Gastroenterology   • NV CYSTO/URETERO W/LITHOTRIPSY &INDWELL STENT INSRT Left 12/22/2022    Procedure: CYSTOSCOPY URETEROSCOPY, RETROGRADE PYELOGRAM AND INSERTION STENT URETERAL;  Surgeon: Mercedes Roche MD;  [facility];  Service: Urology   • NV ESOPHAGOGASTRODUODENOSCOPY TRANSORAL DIAGNOSTIC N/A 3/22/2018    Procedure: ESOPHAGOGASTRODUODENOSCOPY (EGD); Surgeon: Claudia Pierce MD;  [facility]; Service: Gastroenterology   • NV ESOPHAGOGASTRODUODENOSCOPY TRANSORAL DIAGNOSTIC N/A 1/31/2019    Procedure: ESOPHAGOGASTRODUODENOSCOPY (EGD); Surgeon: Claudia Pierce MD;  [facility]; Service: Gastroenterology   • NV LAPS ABD PRTM&OMENTUM DX W/WO SPEC BR/WA SPX N/A 3/6/2019    Procedure: LAPAROSCOPY DIAGNOSTIC;  Surgeon: Kendell Phoenix, MD;  [facility];  Service: Bariatrics   • REDUCTION MAMMAPLASTY Bilateral    • REPLACEMENT UNICONDYLAR JOINT KNEE     • SHOULDER ARTHROSCOPY DISTAL CLAVICLE EXCISION AND OPEN ROTATOR CUFF REPAIR         Meds/Allergies:    Prior to Admission medications    Medication Sig Start Date End Date Taking?  Authorizing Provider   acetaminophen (TYLENOL) 650 mg CR tablet Take 1 tablet (650 mg total) by mouth every 8 (eight) hours as needed for mild pain 2/11/23   Carlitos Gabriel PA-C   Biotin 1000 MCG tablet Take 1,000 mcg by mouth    Historical Provider, MD   butalbital-acetaminophen-caffeine (FIORICET,ESGIC) -40 mg per tablet take 1-2 tablets by mouth every 4 to 6 hours if needed maximum daily dose of 6 3/12/18   Historical Provider, MD   calcium citrate-vitamin D (CITRACAL+D) 315-200 MG-UNIT per tablet Take 2 tablets by mouth 2 (two) times a day 3/4/19   Edin Berman MD   ergocalciferol (VITAMIN D2) 50,000 units Take 1 capsule (50,000 Units total) by mouth 2 (two) times a week with meals 4/7/22   Ganga Vallejo PA-C   ferrous sulfate 325 (65 Fe) mg tablet TAKE 1 TABLET BY MOUTH TWICE DAILY 5/24/18   Historical Provider, MD   metFORMIN (GLUCOPHAGE) 500 mg tablet take 1 tablet by mouth EVERY EVENING WITH DINNER 12/15/22   Deena Bird MD   Multiple Vitamin (MULTIVITAMIN) tablet Take 1 tablet by mouth 2 (two) times a day 3/4/19   Edin Berman MD   ondansetron (Zofran ODT) 4 mg disintegrating tablet Take 1 tablet (4 mg total) by mouth every 8 (eight) hours as needed for nausea or vomiting 2/11/23   Nikki Paulino PA-C   oxyCODONE-acetaminophen (Percocet) 5-325 mg per tablet Take 1 tablet by mouth every 8 (eight) hours as needed for moderate pain for up to 10 days Max Daily Amount: 3 tablets 2/11/23 2/21/23  Nikki Paulino PA-C   Semaglutide-Weight Management (WEGOVY) 0 25 MG/0 5ML Inject 0 5 mL (0 25 mg total) under the skin once a week 1/24/23   Deena Bird MD   tamsulosin (FLOMAX) 0 4 mg Take 1 capsule (0 4 mg total) by mouth daily with dinner Until kidney stone passes  Patient not taking: Reported on 1/24/2023 12/28/19   Janie Chase DO   tamsulosin (FLOMAX) 0 4 mg Take 1 capsule (0 4 mg total) by mouth daily with dinner  Patient not taking: Reported on 1/24/2023 3/21/21   King Sun DO   tamsulosin [facility]) 0 4 mg Take 1 capsule (0 4 mg total) by mouth daily with dinner 2/11/23   Nikki Paulino PA-C   topiramate (TOPAMAX) 50 MG tablet Take 1 tablet (50 mg total) by mouth 2 (two) times a day take 1 tablet by mouth IN THE MORNING and 2 tablets IN THE EVENING 1/24/23   Deena Bird MD   vitamin B-12 (CYANOCOBALAMIN) 500 MCG TABS Take 2 tablets (1,000 mcg total) by mouth daily for 120 days 3/4/19 1/24/23  Peter Rae MD     I have reviewed home medications with patient personally  Allergies: Allergies   Allergen Reactions   • Other Headache     MSG   • Ibuprofen Other (See Comments)     USE CAUTION DUE TO BARIATRIC SX   • Paxlovid [Nirmatrelvir-Ritonavir] Rash     11/2/2022   • Penicillins Rash       Social History:     Marital Status: /Civil Union   Occupation:   Patient Pre-hospital Living Situation: Lives at home  Patient Pre-hospital Level of Mobility:  Ambulatory  Patient Pre-hospital Diet Restrictions: None  Substance Use History:   Social History     Substance and Sexual Activity   Alcohol Use Not Currently     Social History     Tobacco Use   Smoking Status Never   Smokeless Tobacco Never     Social History     Substance and Sexual Activity   Drug Use No       Family History:    non-contributory    Physical Exam:     Vitals:   Blood Pressure: 157/72 (02/13/23 0834)  Pulse: 72 (02/13/23 0834)  Temperature: 97 6 °F (36 4 °C) (02/13/23 0834)  Respirations: 18 (02/13/23 0834)  SpO2: 100 % (02/13/23 0834)    Physical Exam  Vitals and nursing note reviewed  Constitutional:       General: She is not in acute distress  Appearance: She is well-developed  She is not toxic-appearing  HENT:      Head: Normocephalic and atraumatic  Nose: Nose normal       Mouth/Throat:      Mouth: Mucous membranes are dry  Eyes:      Pupils: Pupils are equal, round, and reactive to light  Neck:      Vascular: No JVD  Cardiovascular:      Rate and Rhythm: Normal rate and regular rhythm  Pulses: Normal pulses  Heart sounds: No murmur heard  Pulmonary:      Effort: Pulmonary effort is normal  No respiratory distress  Breath sounds: Normal breath sounds  No wheezing  Abdominal:      General: Bowel sounds are normal  There is no distension  Palpations: Abdomen is soft  Tenderness: There is no abdominal tenderness     Musculoskeletal: General: Normal range of motion  Cervical back: Neck supple  Right lower leg: No edema  Left lower leg: No edema  Skin:     General: Skin is warm and dry  Capillary Refill: Capillary refill takes less than 2 seconds  Coloration: Skin is not jaundiced or pale  Neurological:      Mental Status: She is alert and oriented to person, place, and time  Mental status is at baseline  Psychiatric:         Mood and Affect: Mood normal          Behavior: Behavior normal          Additional Data:     Lab Results: I have personally reviewed pertinent reports  Results from last 7 days   Lab Units 02/13/23  0939   WBC Thousand/uL 12 80*   HEMOGLOBIN g/dL 14 8   HEMATOCRIT % 45 5   PLATELETS Thousands/uL 268   NEUTROS PCT % 81*   LYMPHS PCT % 12*   MONOS PCT % 6   EOS PCT % 1     Results from last 7 days   Lab Units 02/13/23  0939   SODIUM mmol/L 140   POTASSIUM mmol/L 3 6   CHLORIDE mmol/L 104   CO2 mmol/L 23   BUN mg/dL 16   CREATININE mg/dL 0 95   ANION GAP mmol/L 13   CALCIUM mg/dL 10 0   ALBUMIN g/dL 4 0   TOTAL BILIRUBIN mg/dL 0 80   ALK PHOS U/L 74   ALT U/L 17   AST U/L 22   GLUCOSE RANDOM mg/dL 100                 Results from last 7 days   Lab Units 02/13/23  0939   LACTIC ACID mmol/L 1 9       Imaging: I have personally reviewed pertinent reports  No orders to display       EKG, Pathology, and Other Studies Reviewed on Admission:   · EKG:     Allscripts / Epic Records Reviewed: Yes     ** Please Note: This note has been constructed using a voice recognition system   **

## 2023-02-13 NOTE — CONSULTS
Physical Therapy      Patient Name:  Vivien Burton   MRN:  [id number]    PT eval attempted- per nurse Jethro pt not feeling well and pending Lumbar puncture procedure. Will re attempt later today.   Progress Note - Urology  Cathy Aceves 1970, 47 y o  female MRN: [id number]    Unit/Bed#: -01 Encounter: [id number]        Assessment & Plan:  1  Right ureteral stone  2  Mild right hydronephrosis  - CT scan from 2/11/2023 showing right ureteral stone in proximal right ureter   - CT scan from 2/12/2023 showing migration of right ureteral stone to level of UVJ junction, mild hydronephrosis  - Patient represented to the ED due to severe right-sided abdominal pain, worsening nausea and vomiting   - Patient denies any urinary symptoms  - Vital signs stable, afebrile  - Mild leukocytosis 12  - Creatinine 0 95, at baseline  - UA negative leukocytes, negative nitrites  Occasional bacteria, 1-2 WBCs  - Patient admitted for medical expulsive therapy  Continue IV fluids, Flomax, straining urine, pain control   - Will reevaluate tomorrow for operative intervention  Subjective:   HPI: Cathy Aceves is a 47 y o  female with past medical history significant for DM2, obesity status post gastric bypass who presents with recurrent abdominal pain for the last 3 days  Patient has represented to the ED twice in last week due to right ureteral stone  Right ureteral stone 2/11/2023 seen at proximal right ureter  Patient was discharged home with medical expulsive therapy  Patient represented to the ED on 2/12/2023, repeat CT scan showing stone had moved to right UVJ junction  Patient was discharged with medical expulsive therapy  Patient represented to the ED today due to worsening abdominal pain  Repeat imaging was not done  Mild leukocytosis of 12 8, creatinine at baseline, UA not indicative of infection  Patient admitted for medical expulsive therapy overnight  Patient n p o  at midnight for possible procedure tomorrow  She denies dysuria, frequency, urgency, hematuria, shortness of breath, chest pain, fevers, chills  Review of Systems   Constitutional: Negative for chills and fever     HENT: Negative for ear pain and sore throat  Eyes: Negative for pain and visual disturbance  Respiratory: Negative for cough and shortness of breath  Cardiovascular: Negative for chest pain and palpitations  Gastrointestinal: Positive for abdominal pain, nausea and vomiting  Genitourinary: Positive for flank pain  Negative for dysuria and hematuria  Musculoskeletal: Negative for arthralgias and back pain  Skin: Negative for color change and rash  Neurological: Negative for seizures and syncope  All other systems reviewed and are negative  Objective:  Vitals: Blood pressure 136/89, pulse 67, temperature 98 3 °F (36 8 °C), resp  rate 16, height 5' 9" (1 753 m), SpO2 97 %, not currently breastfeeding  ,Body mass index is 34 18 kg/m²  Physical Exam  Vitals reviewed  Constitutional:       General: She is not in acute distress  Appearance: Normal appearance  She is normal weight  She is ill-appearing  She is not toxic-appearing or diaphoretic  HENT:      Head: Normocephalic and atraumatic  Right Ear: External ear normal       Left Ear: External ear normal       Nose: Nose normal       Mouth/Throat:      Mouth: Mucous membranes are moist    Eyes:      General: No scleral icterus  Conjunctiva/sclera: Conjunctivae normal    Cardiovascular:      Rate and Rhythm: Normal rate  Pulses: Normal pulses  Pulmonary:      Effort: Pulmonary effort is normal    Abdominal:      General: Abdomen is flat  There is no distension  Palpations: Abdomen is soft  Tenderness: There is no abdominal tenderness  There is no right CVA tenderness, left CVA tenderness or guarding  Musculoskeletal:         General: Normal range of motion  Cervical back: Normal range of motion  Skin:     General: Skin is warm  Findings: No rash  Neurological:      General: No focal deficit present  Mental Status: She is alert and oriented to person, place, and time  Mental status is at baseline  Psychiatric:         Mood and Affect: Mood normal          Behavior: Behavior normal          Thought Content: Thought content normal          Judgment: Judgment normal          Imaging:  CT ABDOMEN AND PELVIS WITHOUT IV CONTRAST     INDICATION:   ureteral stone      COMPARISON:  Multiple priors most recently 2/11/2023     TECHNIQUE:  CT examination of the abdomen and pelvis was performed without intravenous contrast  Axial, sagittal, and coronal 2D reformatted images were created from the source data and submitted for interpretation       Radiation dose length product (DLP) for this visit:  9850 1142 mGy-cm   This examination, like all CT scans performed in the [facility], was performed utilizing techniques to minimize radiation dose exposure, including the use of iterative   reconstruction and automated exposure control       Enteric contrast was administered       FINDINGS:     ABDOMEN     LOWER CHEST:  No clinically significant abnormality identified in the visualized lower chest      LIVER/BILIARY TREE:  Unremarkable      GALLBLADDER:  Gallbladder is surgically absent      SPLEEN:  Unremarkable      PANCREAS:  2 5 x 1 8 cm cystic lesion in the pancreatic head unchanged      ADRENAL GLANDS:  Unremarkable      KIDNEYS/URETERS:  Mild right hydronephrosis and  Previously seen proximal right ureteral stone is now located at the right ureterovesicular junction  Stone measures approximately 0 5 cm  Right renal AML  Punctate nonobstructing right lower pole   calculus      STOMACH AND BOWEL:  Status post gastric bypass  No evidence of bowel obstruction      APPENDIX:  A normal appendix was visualized      ABDOMINOPELVIC CAVITY:  No ascites  No pneumoperitoneum    No lymphadenopathy      VESSELS:  Unremarkable for patient's age      PELVIS     REPRODUCTIVE ORGANS:  Surgical changes of prior hysterectomy      URINARY BLADDER:  Unremarkable      ABDOMINAL WALL/INGUINAL REGIONS: Unremarkable      OSSEOUS STRUCTURES:  No acute fracture or destructive osseous lesion  Spinal degenerative changes are noted         IMPRESSION:     Mild right hydronephrosis secondary to 0 5 cm obstructing stone, which has migrated to the ureterovesicular junction      2 8 x 1 5 cm cystic lesion of the pancreatic head  Continued MRI surveillance recommended         The study was marked in EPIC for immediate notification      Workstation performed: [id number]       Labs:  Recent Labs     02/11/23  0634 02/12/23  0159 02/13/23  0939   WBC 7 11 15 73* 12 80*     Recent Labs     02/11/23  0634 02/12/23  0159 02/13/23  0939   HGB 14 6 15 0 14 8     Recent Labs     02/11/23  0634 02/12/23  0159 02/13/23  0939   HCT 44 2 46 9* 45 5     Recent Labs     02/11/23  0634 02/12/23  0159 02/13/23  0939   CREATININE 0 76 1 14 0 95       History:  Past Medical History:   Diagnosis Date   • Arthritis    • Bell palsy    • Gastric ulcer    • GERD (gastroesophageal reflux disease)    • History of transfusion    • Melena    • Obesity (BMI 30-39  9)    • Pancreatic cyst    • Right facial numbness    • Upper GI bleed      Social History     Socioeconomic History   • Marital status: /Civil Union     Spouse name: None   • Number of children: None   • Years of education: None   • Highest education level: None   Occupational History   • Occupation: Personal Caregiver   Tobacco Use   • Smoking status: Never   • Smokeless tobacco: Never   Vaping Use   • Vaping Use: Never used   Substance and Sexual Activity   • Alcohol use: Not Currently   • Drug use: No   • Sexual activity: Yes     Partners: Male   Other Topics Concern   • None   Social History Narrative   • None     Social Determinants of Health     Financial Resource Strain: Not on file   Food Insecurity: Not on file   Transportation Needs: Not on file   Physical Activity: Not on file   Stress: Not on file   Social Connections: Not on file   Intimate Partner Violence: Not on file Housing Stability: Not on file     Past Surgical History:   Procedure Laterality Date   • ABDOMINOPLASTY     • BARIATRIC SURGERY     • BOWEL RESECTION LAPAROSCOPIC N/A 2/11/2019    Procedure: LAPAROSCOPIC LYSIS OF ADHESIONS; RESECTION SMALL BOWEL; DECOMPRESSION OF GASTRIC REMNANT; EGD;  Surgeon: Jonnathan Simmons MD;  [facility];  Service: General   • CHOLECYSTECTOMY     • CT GUIDED PERC DRAINAGE CATHETER PLACEMENT  2/25/2019   • ESOPHAGOGASTRODUODENOSCOPY N/A 3/6/2019    Procedure: INTRAOPERATIVE EGD;  Surgeon: Jonnathan Simmons MD;  [facility];  Service: Bariatrics   • FL RETROGRADE PYELOGRAM  12/22/2022   • HERNIA REPAIR     • HYSTERECTOMY     • KNEE CARTILAGE SURGERY     • PARTIAL GASTRECTOMY N/A 3/6/2019    Procedure: RESECTION GASTRIC PARTIAL/GASTRECTOMY PARTIAL LAPAROSCOPIC;  Surgeon: Jonnathan Simmons MD;  [facility];  Service: Bariatrics   • NC COLECTOMY PARTIAL W/ANASTOMOSIS N/A 3/6/2019    Procedure: OPEN TRANSVERSE COLON RESECTION;  Surgeon: Jonnathan Simmons MD;  [facility];  Service: Bariatrics   • NC COLONOSCOPY FLX DX W/COLLJ SPEC WHEN PFRMD N/A 3/28/2018    Procedure: COLONOSCOPY;  Surgeon: Tori Santos MD;  [facility]; Service: Gastroenterology   • NC COLONOSCOPY FLX DX W/COLLJ [facility] WHEN PFRMD N/A 1/31/2019    Procedure: COLONOSCOPY;  Surgeon: Tori Santos MD;  [facility]; Service: Gastroenterology   • NC CYSTO/URETERO W/LITHOTRIPSY &INDWELL STENT INSRT Left 12/22/2022    Procedure: CYSTOSCOPY URETEROSCOPY, RETROGRADE PYELOGRAM AND INSERTION STENT URETERAL;  Surgeon: Guanaco Lynch MD;  [facility];  Service: Urology   • NC ESOPHAGOGASTRODUODENOSCOPY TRANSORAL DIAGNOSTIC N/A 3/22/2018    Procedure: ESOPHAGOGASTRODUODENOSCOPY (EGD); Surgeon: Tori Santos MD;  [facility];   Service: Gastroenterology   • NC ESOPHAGOGASTRODUODENOSCOPY TRANSORAL DIAGNOSTIC N/A 1/31/2019    Procedure: ESOPHAGOGASTRODUODENOSCOPY (EGD); Surgeon: Walker Swartz MD;  [facility];   Service: Gastroenterology   • ND LAPS ABD PRTM&OMENTUM DX W/WO SPEC BR/WA SPX N/A 3/6/2019    Procedure: LAPAROSCOPY DIAGNOSTIC;  Surgeon: Sandhya Grijalva MD;  [facility];  Service: Bariatrics   • REDUCTION MAMMAPLASTY Bilateral    • REPLACEMENT UNICONDYLAR JOINT KNEE     • SHOULDER ARTHROSCOPY DISTAL CLAVICLE EXCISION AND OPEN ROTATOR CUFF REPAIR       Family History   Problem Relation Age of Onset   • Heart attack Mother    • Diabetes Mother    • Heart attack Father    • Stroke Father    • Hypertension Brother    • Leukemia Paternal Aunt        Gracie Basurto [place]  Date: 2/13/2023 Time: 5:28 PM

## 2023-02-13 NOTE — ED PROVIDER NOTES
History  Chief Complaint   Patient presents with   • Flank Pain     Possible kidney stone on the right side, pt was here several times in the past few days  Patient is a 48-year-old female with past medical history of GERD, peptic ulcer disease, arthritis, presents to the emergency department for continued pain, nausea and vomiting related to a known right ureteral stone  Patient had presented on 2/11 for right flank pain, nausea and vomiting and was diagnosed on CT scan with a 5 mm proximal right ureteral stone  She was ultimately discharged home however returned again on 2/12 early in the morning due to persistent symptoms  She had repeat CT scan which showed migration of the 5 mm stone into the right UVJ  She was offered [facility] for stent placement but at that time her symptoms were controlled and she refused transfer  She presents today for worsening pain in her right low back radiating into her right lower abdomen  She continues to have nausea and vomiting, most recently this morning  She reports dysuria but is still able to urinate and denies any gross hematuria  She denies fevers or chills  Rest of review of systems is negative  According to records, she has been prescribed Percocet, Zofran, Flomax  History provided by:  Patient   used: No        Prior to Admission Medications   Prescriptions Last Dose Informant Patient Reported? Taking?    Biotin 1000 MCG tablet   Yes No   Sig: Take 1,000 mcg by mouth   Multiple Vitamin (MULTIVITAMIN) tablet   No No   Sig: Take 1 tablet by mouth 2 (two) times a day   Semaglutide-Weight Management (WEGOVY) 0 25 MG/0 5ML   No No   Sig: Inject 0 5 mL (0 25 mg total) under the skin once a week   acetaminophen (TYLENOL) 650 mg CR tablet   No No   Sig: Take 1 tablet (650 mg total) by mouth every 8 (eight) hours as needed for mild pain   butalbital-acetaminophen-caffeine (FIORICET,ESGIC) -40 mg per tablet   Yes No   Sig: take 1-2 tablets by mouth every 4 to 6 hours if needed maximum daily dose of 6   calcium citrate-vitamin D (CITRACAL+D) 315-200 MG-UNIT per tablet   No No   Sig: Take 2 tablets by mouth 2 (two) times a day   ergocalciferol (VITAMIN D2) 50,000 units   No No   Sig: Take 1 capsule (50,000 Units total) by mouth 2 (two) times a week with meals   ferrous sulfate 325 (65 Fe) mg tablet   Yes No   Sig: TAKE 1 TABLET BY MOUTH TWICE DAILY   metFORMIN (GLUCOPHAGE) 500 mg tablet   No No   Sig: take 1 tablet by mouth EVERY EVENING WITH DINNER   ondansetron (Zofran ODT) 4 mg disintegrating tablet   No No   Sig: Take 1 tablet (4 mg total) by mouth every 8 (eight) hours as needed for nausea or vomiting   oxyCODONE-acetaminophen (Percocet) 5-325 mg per tablet   No No   Sig: Take 1 tablet by mouth every 8 (eight) hours as needed for moderate pain for up to 10 days Max Daily Amount: 3 tablets   tamsulosin (FLOMAX) 0 4 mg   No No   Sig: Take 1 capsule (0 4 mg total) by mouth daily with dinner Until kidney stone passes   Patient not taking: Reported on 1/24/2023   tamsulosin (FLOMAX) 0 4 mg   No No   Sig: Take 1 capsule (0 4 mg total) by mouth daily with dinner   Patient not taking: Reported on 1/24/2023   tamsulosin (FLOMAX) 0 4 mg   No No   Sig: Take 1 capsule (0 4 mg total) by mouth daily with dinner   topiramate (TOPAMAX) 50 MG tablet   No No   Sig: Take 1 tablet (50 mg total) by mouth 2 (two) times a day take 1 tablet by mouth IN THE MORNING and 2 tablets IN THE EVENING   vitamin B-12 (CYANOCOBALAMIN) 500 MCG TABS   No No   Sig: Take 2 tablets (1,000 mcg total) by mouth daily for 120 days      Facility-Administered Medications: None       Past Medical History:   Diagnosis Date   • Arthritis    • Bell palsy    • Gastric ulcer    • GERD (gastroesophageal reflux disease)    • History of transfusion    • Melena    • Obesity (BMI 30-39  9)    • Pancreatic cyst    • Right facial numbness    • Upper GI bleed        Past Surgical History:   Procedure Laterality Date   • ABDOMINOPLASTY     • BARIATRIC SURGERY     • BOWEL RESECTION LAPAROSCOPIC N/A 2/11/2019    Procedure: LAPAROSCOPIC LYSIS OF ADHESIONS; RESECTION SMALL BOWEL; DECOMPRESSION OF GASTRIC REMNANT; EGD;  Surgeon: Tanisha Lopez MD;  [facility];  Service: General   • CHOLECYSTECTOMY     • CT GUIDED PERC DRAINAGE CATHETER PLACEMENT  2/25/2019   • ESOPHAGOGASTRODUODENOSCOPY N/A 3/6/2019    Procedure: INTRAOPERATIVE EGD;  Surgeon: Tanisha Lopez MD;  [facility];  Service: Bariatrics   • FL RETROGRADE PYELOGRAM  12/22/2022   • HERNIA REPAIR     • HYSTERECTOMY     • KNEE CARTILAGE SURGERY     • PARTIAL GASTRECTOMY N/A 3/6/2019    Procedure: RESECTION GASTRIC PARTIAL/GASTRECTOMY PARTIAL LAPAROSCOPIC;  Surgeon: Tanisha Lopez MD;  [facility];  Service: Bariatrics   • TN COLECTOMY PARTIAL W/ANASTOMOSIS N/A 3/6/2019    Procedure: OPEN TRANSVERSE COLON RESECTION;  Surgeon: Tanisha Lopez MD;  [facility];  Service: Bariatrics   • TN COLONOSCOPY FLX DX W/COLLJ SPEC WHEN PFRMD N/A 3/28/2018    Procedure: COLONOSCOPY;  Surgeon: Chico Blandon MD;  [facility]; Service: Gastroenterology   • TN COLONOSCOPY FLX DX W/COLLJ [facility] WHEN PFRMD N/A 1/31/2019    Procedure: COLONOSCOPY;  Surgeon: Chico Blandon MD;  [facility]; Service: Gastroenterology   • TN CYSTO/URETERO W/LITHOTRIPSY &INDWELL STENT INSRT Left 12/22/2022    Procedure: CYSTOSCOPY URETEROSCOPY, RETROGRADE PYELOGRAM AND INSERTION STENT URETERAL;  Surgeon: Alice Roth MD;  [facility];  Service: Urology   • TN ESOPHAGOGASTRODUODENOSCOPY TRANSORAL DIAGNOSTIC N/A 3/22/2018    Procedure: ESOPHAGOGASTRODUODENOSCOPY (EGD); Surgeon: Chico Blandon MD;  [facility]; Service: Gastroenterology   • TN ESOPHAGOGASTRODUODENOSCOPY TRANSORAL DIAGNOSTIC N/A 1/31/2019    Procedure: ESOPHAGOGASTRODUODENOSCOPY (EGD);   Surgeon: Chico Blandon MD; [facility]; Service: Gastroenterology   • MT LAPS ABD PRTM&OMENTUM DX W/WO SPEC BR/WA SPX N/A 3/6/2019    Procedure: LAPAROSCOPY DIAGNOSTIC;  Surgeon: Sandor Asencio MD;  [facility];  Service: Bariatrics   • REDUCTION MAMMAPLASTY Bilateral    • REPLACEMENT UNICONDYLAR JOINT KNEE     • SHOULDER ARTHROSCOPY DISTAL CLAVICLE EXCISION AND OPEN ROTATOR CUFF REPAIR         Family History   Problem Relation Age of Onset   • Heart attack Mother    • Diabetes Mother    • Heart attack Father    • Stroke Father    • Hypertension Brother    • Leukemia Paternal Aunt      I have reviewed and agree with the history as documented  E-Cigarette/Vaping   • E-Cigarette Use Never User      E-Cigarette/Vaping Substances   • Nicotine No    • THC No    • CBD No    • Flavoring No    • Other No    • Unknown No      Social History     Tobacco Use   • Smoking status: Never   • Smokeless tobacco: Never   Vaping Use   • Vaping Use: Never used   Substance Use Topics   • Alcohol use: Not Currently   • Drug use: No       Review of Systems   Constitutional: Negative for chills and fever  HENT: Negative for congestion, ear pain, rhinorrhea and sore throat  Respiratory: Negative for cough, chest tightness, shortness of breath and wheezing  Cardiovascular: Negative for chest pain and palpitations  Gastrointestinal: Positive for abdominal pain, nausea and vomiting  Negative for abdominal distention, blood in stool, constipation and diarrhea  Genitourinary: Positive for dysuria and flank pain  Negative for frequency and hematuria  Musculoskeletal: Negative for back pain and neck pain  Skin: Negative for color change, pallor, rash and wound  Allergic/Immunologic: Negative for immunocompromised state  Neurological: Negative for dizziness, syncope, weakness, light-headedness, numbness and headaches  Hematological: Negative for adenopathy     Psychiatric/Behavioral: Negative for confusion and decreased concentration  All other systems reviewed and are negative  Physical Exam  Physical Exam  Vitals and nursing note reviewed  Constitutional:       General: She is not in acute distress  Appearance: Normal appearance  She is well-developed  She is not ill-appearing, toxic-appearing or diaphoretic  Comments: Patient appears very uncomfortable due to pain  HENT:      Head: Normocephalic and atraumatic  Right Ear: External ear normal       Left Ear: External ear normal       Mouth/Throat:      Comments: Orpharyngeal exam deferred at this time due to risk of exposure to respiratory illness/viruses during peak season  Patient has no oropharyngeal complaints  Eyes:      Extraocular Movements: Extraocular movements intact  Conjunctiva/sclera: Conjunctivae normal    Neck:      Vascular: No JVD  Cardiovascular:      Rate and Rhythm: Normal rate and regular rhythm  Pulses: Normal pulses  Heart sounds: Normal heart sounds  No murmur heard  No friction rub  No gallop  Pulmonary:      Effort: Pulmonary effort is normal  No respiratory distress  Breath sounds: Normal breath sounds  No wheezing, rhonchi or rales  Abdominal:      General: There is no distension  Palpations: Abdomen is soft  Tenderness: There is abdominal tenderness  There is right CVA tenderness  There is no left CVA tenderness, guarding or rebound  Comments: +Right CVA and RLQ abdominal tenderness  Musculoskeletal:         General: No swelling or tenderness  Normal range of motion  Cervical back: Normal range of motion and neck supple  No rigidity  Skin:     General: Skin is warm and dry  Coloration: Skin is not pale  Findings: No erythema or rash  Neurological:      General: No focal deficit present  Mental Status: She is alert and oriented to person, place, and time  Sensory: No sensory deficit  Motor: No weakness     Psychiatric:         Mood and Affect: Mood normal          Behavior: Behavior normal          Vital Signs  ED Triage Vitals   Temperature Pulse Respirations Blood Pressure SpO2   02/13/23 0834 02/13/23 0834 02/13/23 0834 02/13/23 0834 02/13/23 0834   97 6 °F (36 4 °C) 72 18 157/72 100 %      Temp src Heart Rate Source Patient Position - Orthostatic VS BP Location FiO2 (%)   -- 02/13/23 1251 02/13/23 1251 02/13/23 1251 --    Monitor Sitting Right arm       Pain Score       02/13/23 0940       10 - Worst Possible Pain         Vitals:    02/13/23 0834 02/13/23 1251   BP: 157/72 135/69   BP Location:  Right arm   Pulse: 72 59   Resp: 18 18   Temp: 97 6 °F (36 4 °C)    SpO2: 100% 100%       Visual Acuity      ED Medications  Medications   topiramate (TOPAMAX) tablet 50 mg (50 mg Oral Not Given 2/13/23 1205)   heparin (porcine) subcutaneous injection 5,000 Units (5,000 Units Subcutaneous Given 2/13/23 1331)   ondansetron (ZOFRAN) injection 4 mg (has no administration in time range)   acetaminophen (TYLENOL) tablet 650 mg (has no administration in time range)   lactated ringers infusion (200 mL/hr Intravenous New Bag 2/13/23 1207)   tamsulosin (FLOMAX) capsule 0 4 mg (has no administration in time range)   insulin lispro (HumaLOG) 100 units/mL subcutaneous injection 1-5 Units (1 Units Subcutaneous Not Given 2/13/23 1258)   insulin lispro (HumaLOG) 100 units/mL subcutaneous injection 1-5 Units (has no administration in time range)   HYDROmorphone (DILAUDID) injection 0 5 mg (has no administration in time range)   oxyCODONE (ROXICODONE) immediate release tablet 10 mg (has no administration in time range)   oxyCODONE-acetaminophen (PERCOCET) 5-325 mg per tablet 1 tablet (has no administration in time range)   sodium chloride 0 9 % bolus 1,000 mL (0 mL Intravenous Stopped 2/13/23 1154)   ondansetron (ZOFRAN) injection 4 mg (4 mg Intravenous Given 2/13/23 0939)   HYDROmorphone (DILAUDID) injection 0 5 mg (0 5 mg Intravenous Given 2/13/23 8621)   ketorolac (TORADOL) injection 15 mg (15 mg Intravenous Given 2/13/23 1036)   HYDROmorphone (DILAUDID) injection 0 5 mg (0 5 mg Intravenous Given 2/13/23 1049)   promethazine (PHENERGAN) injection 25 mg (25 mg Intravenous Given 2/13/23 1049)       Diagnostic Studies  Results Reviewed     Procedure Component Value Units Date/Time    Fingerstick Glucose (POCT) [[id number]]  (Normal) Collected: 02/13/23 1257    Lab Status: Final result Updated: 02/13/23 1258     POC Glucose 88 mg/dl     Urine Microscopic [[id number]]  (Abnormal) Collected: 02/13/23 1031    Lab Status: Final result Specimen: Urine, Other Updated: 02/13/23 1102     RBC, UA 4-10 /hpf      WBC, UA 1-2 /hpf      Epithelial Cells Occasional /hpf      Bacteria, UA Occasional /hpf     UA w Reflex to Microscopic w Reflex to Culture [[id number]]  (Abnormal) Collected: 02/13/23 1031    Lab Status: Final result Specimen: Urine, Other Updated: 02/13/23 1041     Color, UA Light Yellow     Clarity, UA Turbid     Specific [place], UA 1 020     pH, UA 7 0     Leukocytes, UA Negative     Nitrite, UA Negative     Protein, UA Negative mg/dl      Glucose, UA Negative mg/dl      Ketones, UA Trace mg/dl      Urobilinogen, UA <2 0 mg/dl      Bilirubin, UA Negative     Occult Blood, UA Trace    Lactic acid [[id number]]  (Normal) Collected: 02/13/23 0939    Lab Status: Final result Specimen: Blood from Arm, Right Updated: 02/13/23 1037     LACTIC ACID 1 9 mmol/L     Narrative:      Result may be elevated if tourniquet was used during collection      Basic metabolic panel [[id number]] Collected: 02/13/23 0939    Lab Status: Final result Specimen: Blood from Arm, Right Updated: 02/13/23 1019     Sodium 140 mmol/L      Potassium 3 6 mmol/L      Chloride 104 mmol/L      CO2 23 mmol/L      ANION GAP 13 mmol/L      BUN 16 mg/dL      Creatinine 0 95 mg/dL      Glucose 100 mg/dL      Calcium 10 0 mg/dL      eGFR 68 ml/min/1 73sq m     Narrative:      [place] guidelines for Chronic Kidney Disease (CKD):   •  Stage 1 with normal or high GFR (GFR > 90 mL/min/1 73 square meters)  •  Stage 2 Mild CKD (GFR = 60-89 mL/min/1 73 square meters)  •  Stage 3A Moderate CKD (GFR = 45-59 mL/min/1 73 square meters)  •  Stage 3B Moderate CKD (GFR = 30-44 mL/min/1 73 square meters)  •  Stage 4 Severe CKD (GFR = 15-29 mL/min/1 73 square meters)  •  Stage 5 End Stage CKD (GFR <15 mL/min/1 73 square meters)  Note: GFR calculation is accurate only with a steady state creatinine    Hepatic function panel [[id number]]  (Normal) Collected: 02/13/23 0939    Lab Status: Final result Specimen: Blood from Arm, Right Updated: 02/13/23 1019     Total Bilirubin 0 80 mg/dL      Bilirubin, Direct 0 11 mg/dL      Alkaline Phosphatase 74 U/L      AST 22 U/L      ALT 17 U/L      Total Protein 7 8 g/dL      Albumin 4 0 g/dL     CBC and differential [[id number]]  (Abnormal) Collected: 02/13/23 0939    Lab Status: Final result Specimen: Blood from Arm, Right Updated: 02/13/23 0945     WBC 12 80 Thousand/uL      RBC 5 07 Million/uL      Hemoglobin 14 8 g/dL      Hematocrit 45 5 %      MCV 90 fL      MCH 29 2 pg      MCHC 32 5 g/dL      RDW 14 2 %      MPV 11 3 fL      Platelets 355 Thousands/uL      nRBC 0 /100 WBCs      Neutrophils Relative 81 %      Immat GRANS % 0 %      Lymphocytes Relative 12 %      Monocytes Relative 6 %      Eosinophils Relative 1 %      Basophils Relative 0 %      Neutrophils Absolute 10 27 Thousands/µL      Immature Grans Absolute 0 04 Thousand/uL      Lymphocytes Absolute 1 58 Thousands/µL      Monocytes Absolute 0 76 Thousand/µL      Eosinophils Absolute 0 11 Thousand/µL      Basophils Absolute 0 04 Thousands/µL                  No orders to display              Procedures  Procedures         ED Course  ED Course as of 02/13/23 1503   Mon Feb 13, 2023   0150 [place] texted on-call urology AP, Stephanie Stevenson, to see if they require repeat CT as she has already had 2 CT scans within the past 72 hours   Christophe Song is reviewing chart and will get back to me    0955 WBC(!): 12 80  Slightly decreased from 15 yesterday   1045 Urology AP reported that patient can be admitted to [facility] here for stent later today and to keep her NPO  I updated patient and she is agreeable  She still is having pain and nausea so will give additional 0 5mg dilaudid and phenergan for nausea  SBIRT 20yo+    Flowsheet Row Most Recent Value   SBIRT (25 yo +)    In order to provide better care to our patients, we are screening all of our patients for alcohol and drug use  Would it be okay to ask you these screening questions? Yes Filed at: 02/13/2023 0662   Initial Alcohol Screen: US AUDIT-C     1  How often do you have a drink containing alcohol? 0 Filed at: 02/13/2023 0917   2  How many drinks containing alcohol do you have on a typical day you are drinking? 0 Filed at: 02/13/2023 0917   3a  Male UNDER 65: How often do you have five or more drinks on one occasion? 0 Filed at: 02/13/2023 0917   3b  FEMALE Any Age, or MALE 65+: How often do you have 4 or more drinks on one occassion? 0 Filed at: 02/13/2023 0917   Audit-C Score 0 Filed at: 02/13/2023 1042   TRESSA: How many times in the past year have you    Used an illegal drug or used a prescription medication for non-medical reasons? Never Filed at: 02/13/2023 1915                    Medical Decision Making  58-year-old female presents to the ED for the third time in 72 hours for uncontrolled pain, nausea and vomiting related to a right ureteral stone  Patient will likely require urology consultation, possible admission for stent placement based on intractable symptoms  Will recheck UA with reflex to culture, labs including renal function  Will consult with urology prior to ordering repeat CT scan given that she has had 2 CT scans already within the past 72 hours  Will try to avoid repeat radiation  Amount and/or Complexity of Data Reviewed  Labs: ordered  Decision-making details documented in ED Course  Discussion of management or test interpretation with external provider(s): Spoke to urology AP, Jero Red  Please see ED course notes for details  Risk  Prescription drug management  Disposition  Final diagnoses:   Intractable pain   Right ureteral stone   Nausea and vomiting     Time reflects when diagnosis was documented in both MDM as applicable and the Disposition within this note     Time User Action Codes Description Comment    2/13/2023 10:44 AM Randye Hang E Add [R52] Intractable pain     2/13/2023 10:44 AM Randye Hang E Add [N20 1] Right ureteral stone     2/13/2023 10:44 AM Randye Hang E Add [R11 2] Nausea and vomiting       ED Disposition     ED Disposition   Admit    Condition   Stable    Date/Time   Mon Feb 13, 2023 10:45 AM    Comment   Case was discussed with EMILI and the patient's admission status was agreed to be Admission Status: observation status to the service of Dr Kd Aparicio   Follow-up Information    None         Patient's Medications   Discharge Prescriptions    No medications on file       No discharge procedures on file      PDMP Review       Value Time User    PDMP Reviewed  Yes 2/13/2023 11:50 AM Sue Harris MD          ED Provider  Electronically Signed by           Aung Rodrigues DO  02/13/23 4237

## 2023-02-13 NOTE — ASSESSMENT & PLAN NOTE
Lab Results   Component Value Date    HGBA1C 5 8 (H) 04/07/2022       No results for input(s): POCGLU in the last 72 hours  Blood Sugar Average: Last 72 hrs: On metformin 500 mg twice daily and semaglutide at home  Check blood glucose for meals and bedtime  Initiate sliding scale coverage with hypoglycemia  Hold metformin and semaglutide at this time

## 2023-02-13 NOTE — ASSESSMENT & PLAN NOTE
Seen vomiting in setting of renal colic  Currently symptoms free following administration of Phenergan  CT abdomen and pelvis with no evidence of GI dysfunction -status post gastric bypass  Will provide as needed antiemetic

## 2023-02-14 ENCOUNTER — ANESTHESIA (OUTPATIENT)
Dept: PERIOP | Facility: HOSPITAL | Age: 53
End: 2023-02-14

## 2023-02-14 ENCOUNTER — ANESTHESIA EVENT (OUTPATIENT)
Dept: PERIOP | Facility: HOSPITAL | Age: 53
End: 2023-02-14

## 2023-02-14 ENCOUNTER — TELEPHONE (OUTPATIENT)
Dept: OTHER | Facility: HOSPITAL | Age: 53
End: 2023-02-14

## 2023-02-14 ENCOUNTER — APPOINTMENT (OUTPATIENT)
Dept: RADIOLOGY | Facility: HOSPITAL | Age: 53
End: 2023-02-14

## 2023-02-14 VITALS
RESPIRATION RATE: 13 BRPM | DIASTOLIC BLOOD PRESSURE: 63 MMHG | SYSTOLIC BLOOD PRESSURE: 116 MMHG | OXYGEN SATURATION: 94 % | TEMPERATURE: 98.1 F | HEIGHT: 69 IN | HEART RATE: 56 BPM | BODY MASS INDEX: 34.18 KG/M2

## 2023-02-14 DIAGNOSIS — N20.1 RIGHT URETERAL STONE: Primary | ICD-10-CM

## 2023-02-14 PROBLEM — D72.829 LEUKOCYTOSIS: Status: RESOLVED | Noted: 2023-02-13 | Resolved: 2023-02-14

## 2023-02-14 LAB
ANION GAP SERPL CALCULATED.3IONS-SCNC: 8 MMOL/L (ref 4–13)
BASOPHILS # BLD AUTO: 0.03 THOUSANDS/ÂΜL (ref 0–0.1)
BASOPHILS NFR BLD AUTO: 0 % (ref 0–1)
BUN SERPL-MCNC: 13 MG/DL (ref 5–25)
CALCIUM SERPL-MCNC: 8.4 MG/DL (ref 8.3–10.1)
CHLORIDE SERPL-SCNC: 105 MMOL/L (ref 96–108)
CO2 SERPL-SCNC: 26 MMOL/L (ref 21–32)
CREAT SERPL-MCNC: 1.16 MG/DL (ref 0.6–1.3)
EOSINOPHIL # BLD AUTO: 0.09 THOUSAND/ÂΜL (ref 0–0.61)
EOSINOPHIL NFR BLD AUTO: 1 % (ref 0–6)
ERYTHROCYTE [DISTWIDTH] IN BLOOD BY AUTOMATED COUNT: 14 % (ref 11.6–15.1)
GFR SERPL CREATININE-BSD FRML MDRD: 53 ML/MIN/1.73SQ M
GLUCOSE SERPL-MCNC: 101 MG/DL (ref 65–140)
GLUCOSE SERPL-MCNC: 148 MG/DL (ref 65–140)
GLUCOSE SERPL-MCNC: 207 MG/DL (ref 65–140)
GLUCOSE SERPL-MCNC: 87 MG/DL (ref 65–140)
GLUCOSE SERPL-MCNC: 88 MG/DL (ref 65–140)
HCT VFR BLD AUTO: 36.9 % (ref 34.8–46.1)
HGB BLD-MCNC: 12.1 G/DL (ref 11.5–15.4)
IMM GRANULOCYTES # BLD AUTO: 0.02 THOUSAND/UL (ref 0–0.2)
IMM GRANULOCYTES NFR BLD AUTO: 0 % (ref 0–2)
LYMPHOCYTES # BLD AUTO: 1.43 THOUSANDS/ÂΜL (ref 0.6–4.47)
LYMPHOCYTES NFR BLD AUTO: 15 % (ref 14–44)
MCH RBC QN AUTO: 29 PG (ref 26.8–34.3)
MCHC RBC AUTO-ENTMCNC: 32.8 G/DL (ref 31.4–37.4)
MCV RBC AUTO: 89 FL (ref 82–98)
MONOCYTES # BLD AUTO: 0.97 THOUSAND/ÂΜL (ref 0.17–1.22)
MONOCYTES NFR BLD AUTO: 10 % (ref 4–12)
NEUTROPHILS # BLD AUTO: 6.92 THOUSANDS/ÂΜL (ref 1.85–7.62)
NEUTS SEG NFR BLD AUTO: 74 % (ref 43–75)
NRBC BLD AUTO-RTO: 0 /100 WBCS
PLATELET # BLD AUTO: 218 THOUSANDS/UL (ref 149–390)
PMV BLD AUTO: 11.4 FL (ref 8.9–12.7)
POTASSIUM SERPL-SCNC: 3.7 MMOL/L (ref 3.5–5.3)
RBC # BLD AUTO: 4.17 MILLION/UL (ref 3.81–5.12)
SODIUM SERPL-SCNC: 139 MMOL/L (ref 135–147)
WBC # BLD AUTO: 9.46 THOUSAND/UL (ref 4.31–10.16)

## 2023-02-14 PROCEDURE — 0T768DZ DILATION OF RIGHT URETER WITH INTRALUMINAL DEVICE, VIA NATURAL OR ARTIFICIAL OPENING ENDOSCOPIC: ICD-10-PCS | Performed by: UROLOGY

## 2023-02-14 PROCEDURE — 0TC68ZZ EXTIRPATION OF MATTER FROM RIGHT URETER, VIA NATURAL OR ARTIFICIAL OPENING ENDOSCOPIC: ICD-10-PCS | Performed by: UROLOGY

## 2023-02-14 PROCEDURE — BT1D1ZZ FLUOROSCOPY OF RIGHT KIDNEY, URETER AND BLADDER USING LOW OSMOLAR CONTRAST: ICD-10-PCS | Performed by: UROLOGY

## 2023-02-14 DEVICE — INLAY OPTIMA URETERAL STENT W/O GUIDEWIRE
Type: IMPLANTABLE DEVICE | Site: BLADDER | Status: FUNCTIONAL
Brand: BARD® INLAY OPTIMA® URETERAL STENT

## 2023-02-14 RX ORDER — DEXAMETHASONE SODIUM PHOSPHATE 10 MG/ML
INJECTION, SOLUTION INTRAMUSCULAR; INTRAVENOUS AS NEEDED
Status: DISCONTINUED | OUTPATIENT
Start: 2023-02-14 | End: 2023-02-14

## 2023-02-14 RX ORDER — MAGNESIUM HYDROXIDE 1200 MG/15ML
LIQUID ORAL AS NEEDED
Status: DISCONTINUED | OUTPATIENT
Start: 2023-02-14 | End: 2023-02-14 | Stop reason: HOSPADM

## 2023-02-14 RX ORDER — SUCCINYLCHOLINE/SOD CL,ISO/PF 100 MG/5ML
SYRINGE (ML) INTRAVENOUS AS NEEDED
Status: DISCONTINUED | OUTPATIENT
Start: 2023-02-14 | End: 2023-02-14

## 2023-02-14 RX ORDER — ONDANSETRON 2 MG/ML
INJECTION INTRAMUSCULAR; INTRAVENOUS AS NEEDED
Status: DISCONTINUED | OUTPATIENT
Start: 2023-02-14 | End: 2023-02-14

## 2023-02-14 RX ORDER — FENTANYL CITRATE 50 UG/ML
INJECTION, SOLUTION INTRAMUSCULAR; INTRAVENOUS AS NEEDED
Status: DISCONTINUED | OUTPATIENT
Start: 2023-02-14 | End: 2023-02-14

## 2023-02-14 RX ORDER — FENTANYL CITRATE/PF 50 MCG/ML
25 SYRINGE (ML) INJECTION
Status: DISCONTINUED | OUTPATIENT
Start: 2023-02-14 | End: 2023-02-14 | Stop reason: HOSPADM

## 2023-02-14 RX ORDER — LIDOCAINE HYDROCHLORIDE 20 MG/ML
INJECTION, SOLUTION EPIDURAL; INFILTRATION; INTRACAUDAL; PERINEURAL AS NEEDED
Status: DISCONTINUED | OUTPATIENT
Start: 2023-02-14 | End: 2023-02-14

## 2023-02-14 RX ORDER — ROCURONIUM BROMIDE 10 MG/ML
INJECTION, SOLUTION INTRAVENOUS AS NEEDED
Status: DISCONTINUED | OUTPATIENT
Start: 2023-02-14 | End: 2023-02-14

## 2023-02-14 RX ORDER — MIDAZOLAM HYDROCHLORIDE 2 MG/2ML
INJECTION, SOLUTION INTRAMUSCULAR; INTRAVENOUS AS NEEDED
Status: DISCONTINUED | OUTPATIENT
Start: 2023-02-14 | End: 2023-02-14

## 2023-02-14 RX ORDER — CEFAZOLIN SODIUM 2 G/50ML
2000 SOLUTION INTRAVENOUS ONCE
Status: COMPLETED | OUTPATIENT
Start: 2023-02-14 | End: 2023-02-15

## 2023-02-14 RX ORDER — PROPOFOL 10 MG/ML
INJECTION, EMULSION INTRAVENOUS AS NEEDED
Status: DISCONTINUED | OUTPATIENT
Start: 2023-02-14 | End: 2023-02-14

## 2023-02-14 RX ADMIN — OXYCODONE HYDROCHLORIDE 10 MG: 10 TABLET ORAL at 09:31

## 2023-02-14 RX ADMIN — SODIUM CHLORIDE, SODIUM LACTATE, POTASSIUM CHLORIDE, AND CALCIUM CHLORIDE 250 ML/HR: .6; .31; .03; .02 INJECTION, SOLUTION INTRAVENOUS at 19:18

## 2023-02-14 RX ADMIN — SODIUM CHLORIDE, SODIUM LACTATE, POTASSIUM CHLORIDE, AND CALCIUM CHLORIDE 250 ML/HR: .6; .31; .03; .02 INJECTION, SOLUTION INTRAVENOUS at 15:38

## 2023-02-14 RX ADMIN — SODIUM CHLORIDE, SODIUM LACTATE, POTASSIUM CHLORIDE, AND CALCIUM CHLORIDE 200 ML/HR: .6; .31; .03; .02 INJECTION, SOLUTION INTRAVENOUS at 03:07

## 2023-02-14 RX ADMIN — HYDROMORPHONE HYDROCHLORIDE 0.5 MG: 1 INJECTION, SOLUTION INTRAMUSCULAR; INTRAVENOUS; SUBCUTANEOUS at 11:25

## 2023-02-14 RX ADMIN — FENTANYL CITRATE 100 MCG: 50 INJECTION, SOLUTION INTRAMUSCULAR; INTRAVENOUS at 14:10

## 2023-02-14 RX ADMIN — HEPARIN SODIUM 5000 UNITS: 5000 INJECTION INTRAVENOUS; SUBCUTANEOUS at 16:21

## 2023-02-14 RX ADMIN — LIDOCAINE HYDROCHLORIDE 100 MG: 20 INJECTION, SOLUTION EPIDURAL; INFILTRATION; INTRACAUDAL; PERINEURAL at 14:10

## 2023-02-14 RX ADMIN — HYDROMORPHONE HYDROCHLORIDE 0.5 MG: 1 INJECTION, SOLUTION INTRAMUSCULAR; INTRAVENOUS; SUBCUTANEOUS at 06:46

## 2023-02-14 RX ADMIN — ONDANSETRON 4 MG: 2 INJECTION INTRAMUSCULAR; INTRAVENOUS at 05:24

## 2023-02-14 RX ADMIN — ONDANSETRON 4 MG: 2 INJECTION INTRAMUSCULAR; INTRAVENOUS at 14:40

## 2023-02-14 RX ADMIN — ACETAMINOPHEN 650 MG: 325 TABLET, FILM COATED ORAL at 07:41

## 2023-02-14 RX ADMIN — ONDANSETRON 4 MG: 2 INJECTION INTRAMUSCULAR; INTRAVENOUS at 13:58

## 2023-02-14 RX ADMIN — OXYCODONE HYDROCHLORIDE 10 MG: 10 TABLET ORAL at 01:16

## 2023-02-14 RX ADMIN — HEPARIN SODIUM 5000 UNITS: 5000 INJECTION INTRAVENOUS; SUBCUTANEOUS at 05:33

## 2023-02-14 RX ADMIN — HEPARIN SODIUM 5000 UNITS: 5000 INJECTION INTRAVENOUS; SUBCUTANEOUS at 21:11

## 2023-02-14 RX ADMIN — OXYCODONE HYDROCHLORIDE AND ACETAMINOPHEN 1 TABLET: 5; 325 TABLET ORAL at 21:10

## 2023-02-14 RX ADMIN — OXYCODONE HYDROCHLORIDE 10 MG: 10 TABLET ORAL at 05:20

## 2023-02-14 RX ADMIN — Medication 100 MG: at 14:10

## 2023-02-14 RX ADMIN — FENTANYL CITRATE 50 MCG: 50 INJECTION, SOLUTION INTRAMUSCULAR; INTRAVENOUS at 14:27

## 2023-02-14 RX ADMIN — CEFAZOLIN SODIUM 2000 MG: 2 SOLUTION INTRAVENOUS at 13:58

## 2023-02-14 RX ADMIN — ROCURONIUM BROMIDE 15 MG: 10 INJECTION, SOLUTION INTRAVENOUS at 14:27

## 2023-02-14 RX ADMIN — TAMSULOSIN HYDROCHLORIDE 0.4 MG: 0.4 CAPSULE ORAL at 16:21

## 2023-02-14 RX ADMIN — ACETAMINOPHEN 650 MG: 325 TABLET, FILM COATED ORAL at 01:16

## 2023-02-14 RX ADMIN — TOPIRAMATE 25 MG: 25 TABLET, FILM COATED ORAL at 21:35

## 2023-02-14 RX ADMIN — ROCURONIUM BROMIDE 5 MG: 10 INJECTION, SOLUTION INTRAVENOUS at 14:10

## 2023-02-14 RX ADMIN — OXYCODONE HYDROCHLORIDE 10 MG: 10 TABLET ORAL at 13:08

## 2023-02-14 RX ADMIN — SUGAMMADEX 200 MG: 100 INJECTION, SOLUTION INTRAVENOUS at 14:43

## 2023-02-14 RX ADMIN — PROPOFOL 200 MG: 10 INJECTION, EMULSION INTRAVENOUS at 14:10

## 2023-02-14 RX ADMIN — SODIUM CHLORIDE, SODIUM LACTATE, POTASSIUM CHLORIDE, AND CALCIUM CHLORIDE 250 ML/HR: .6; .31; .03; .02 INJECTION, SOLUTION INTRAVENOUS at 12:28

## 2023-02-14 RX ADMIN — DEXAMETHASONE SODIUM PHOSPHATE 5 MG: 10 INJECTION, SOLUTION INTRAMUSCULAR; INTRAVENOUS at 14:21

## 2023-02-14 RX ADMIN — SODIUM CHLORIDE, SODIUM LACTATE, POTASSIUM CHLORIDE, AND CALCIUM CHLORIDE 200 ML/HR: .6; .31; .03; .02 INJECTION, SOLUTION INTRAVENOUS at 07:50

## 2023-02-14 RX ADMIN — MIDAZOLAM HYDROCHLORIDE 2 MG: 1 INJECTION, SOLUTION INTRAMUSCULAR; INTRAVENOUS at 14:02

## 2023-02-14 NOTE — PROGRESS NOTES
Progress Note - Urology  Teddy Maldonado 1970, 47 y o  female MRN: [id number]    Unit/Bed#: -01 Encounter: [id number]        Assessment & Plan:  1  Right ureteral stone  2  Mild right hydronephrosis  - CT scan from 2/12/2023 showing migration of right ureteral stone to level of UVJ junction, mild hydronephrosis  - No leukocytosis  - Cr 1 16, about baseline  - UA negative  Occasional bacteria, 1-2 WBCs  - Patient with no urinary symptoms  - Patient reporting recurrent right-sided flank pain that radiates into her groin   -Patient to OR today for cystoscopy, ureteroscopy, holmium laser lithotripsy insertion of right ureteral stent  Discussed with patient risk versus benefits, and patient wishes to proceed  ,  Discussed with patient if urine appears cloudy, or any anatomic abnormalities, a stent would only be placed and definitive ureteroscopy at an interval   Patient understands  Consent signed  Subjective:   HPI: Pt seen at bedside this morning  She reports recurrent right sided pain  Review of Systems   Constitutional: Negative for chills and fever  Respiratory: Negative for cough and shortness of breath  Cardiovascular: Negative for chest pain and palpitations  Gastrointestinal: Positive for abdominal pain and nausea  Negative for vomiting  Genitourinary: Negative for dysuria and hematuria  Musculoskeletal: Negative for arthralgias and back pain  Skin: Negative for color change and rash  Neurological: Negative for seizures and syncope  All other systems reviewed and are negative  Objective:  Vitals: Blood pressure 141/85, pulse 75, temperature 98 8 °F (37 1 °C), resp  rate 18, height 5' 9" (1 753 m), SpO2 91 %, not currently breastfeeding  ,Body mass index is 34 18 kg/m²  Physical Exam  Vitals reviewed  Constitutional:       General: She is not in acute distress  Appearance: Normal appearance  She is normal weight  She is ill-appearing   She is not toxic-appearing or diaphoretic  HENT:      Head: Normocephalic and atraumatic  Right Ear: External ear normal       Left Ear: External ear normal       Nose: Nose normal       Mouth/Throat:      Mouth: Mucous membranes are moist    Eyes:      General: No scleral icterus  Conjunctiva/sclera: Conjunctivae normal    Cardiovascular:      Rate and Rhythm: Normal rate and regular rhythm  Pulses: Normal pulses  Heart sounds: Normal heart sounds  No murmur heard  No friction rub  No gallop  Pulmonary:      Effort: Pulmonary effort is normal  No respiratory distress  Breath sounds: Normal breath sounds  No stridor  No wheezing, rhonchi or rales  Abdominal:      General: Abdomen is flat  There is no distension  Palpations: Abdomen is soft  Tenderness: There is no abdominal tenderness  There is no right CVA tenderness, left CVA tenderness or guarding  Musculoskeletal:         General: Normal range of motion  Cervical back: Normal range of motion  Skin:     General: Skin is warm  Findings: No rash  Neurological:      General: No focal deficit present  Mental Status: She is alert and oriented to person, place, and time  Mental status is at baseline  Psychiatric:         Mood and Affect: Mood normal          Behavior: Behavior normal          Thought Content:  Thought content normal          Judgment: Judgment normal          Labs:  Recent Labs     02/12/23  0159 02/13/23  0939 02/14/23  0436   WBC 15 73* 12 80* 9 46     Recent Labs     02/12/23  0159 02/13/23  0939 02/14/23  0436   HGB 15 0 14 8 12 1     Recent Labs     02/12/23  0159 02/13/23  0939 02/14/23  0436   HCT 46 9* 45 5 36 9     Recent Labs     02/12/23  0159 02/13/23  0939 02/14/23  0436   CREATININE 1 14 0 95 1 16         History:  Past Medical History:   Diagnosis Date   • Arthritis    • Bell palsy    • Gastric ulcer    • GERD (gastroesophageal reflux disease)    • History of transfusion    • Melena    • Obesity (BMI 30-39  9)    • Pancreatic cyst    • Right facial numbness    • Upper GI bleed      Social History     Socioeconomic History   • Marital status: /Civil Union     Spouse name: None   • Number of children: None   • Years of education: None   • Highest education level: None   Occupational History   • Occupation: Personal Caregiver   Tobacco Use   • Smoking status: Never   • Smokeless tobacco: Never   Vaping Use   • Vaping Use: Never used   Substance and Sexual Activity   • Alcohol use: Not Currently   • Drug use: No   • Sexual activity: Yes     Partners: Male   Other Topics Concern   • None   Social History Narrative   • None     Social Determinants of Health     Financial Resource Strain: Not on file   Food Insecurity: Not on file   Transportation Needs: Not on file   Physical Activity: Not on file   Stress: Not on file   Social Connections: Not on file   Intimate Partner Violence: Not on file   Housing Stability: Not on file     Past Surgical History:   Procedure Laterality Date   • ABDOMINOPLASTY     • BARIATRIC SURGERY     • BOWEL RESECTION LAPAROSCOPIC N/A 2/11/2019    Procedure: LAPAROSCOPIC LYSIS OF ADHESIONS; RESECTION SMALL BOWEL; DECOMPRESSION OF GASTRIC REMNANT; EGD;  Surgeon: Sandor Asencio MD;  [facility];  Service: General   • CHOLECYSTECTOMY     • CT GUIDED PERC DRAINAGE CATHETER PLACEMENT  2/25/2019   • ESOPHAGOGASTRODUODENOSCOPY N/A 3/6/2019    Procedure: INTRAOPERATIVE EGD;  Surgeon: Sandor Asencio MD;  [facility];  Service: Bariatrics   • FL RETROGRADE PYELOGRAM  12/22/2022   • HERNIA REPAIR     • HYSTERECTOMY     • KNEE CARTILAGE SURGERY     • PARTIAL GASTRECTOMY N/A 3/6/2019    Procedure: RESECTION GASTRIC PARTIAL/GASTRECTOMY PARTIAL LAPAROSCOPIC;  Surgeon: Sandor Asencio MD;  [facility];  Service: Bariatrics   • NY COLECTOMY PARTIAL W/ANASTOMOSIS N/A 3/6/2019    Procedure: OPEN TRANSVERSE COLON RESECTION;  Surgeon: Sandor Asencio MD;  [facility];  Service: Bariatrics   • DC COLONOSCOPY FLX DX W/COLLJ SPEC WHEN PFRMD N/A 3/28/2018    Procedure: COLONOSCOPY;  Surgeon: Rony Ferraro MD;  Location[facility]; Service: Gastroenterology   • DC COLONOSCOPY FLX DX W/COLLJ [facility] WHEN PFRMD N/A 1/31/2019    Procedure: COLONOSCOPY;  Surgeon: Rony Ferraro MD;  [facility]; Service: Gastroenterology   • DC CYSTO/URETERO W/LITHOTRIPSY &INDWELL STENT INSRT Left 12/22/2022    Procedure: CYSTOSCOPY URETEROSCOPY, RETROGRADE PYELOGRAM AND INSERTION STENT URETERAL;  Surgeon: Arline Clifton MD;  [facility];  Service: Urology   • DC ESOPHAGOGASTRODUODENOSCOPY TRANSORAL DIAGNOSTIC N/A 3/22/2018    Procedure: ESOPHAGOGASTRODUODENOSCOPY (EGD); Surgeon: Rony Ferraro MD;  [facility]; Service: Gastroenterology   • DC ESOPHAGOGASTRODUODENOSCOPY TRANSORAL DIAGNOSTIC N/A 1/31/2019    Procedure: ESOPHAGOGASTRODUODENOSCOPY (EGD); Surgeon: Rony Ferraro MD;  [facility];   Service: Gastroenterology   • DC LAPS ABD PRTM&OMENTUM DX W/WO SPEC BR/WA SPX N/A 3/6/2019    Procedure: LAPAROSCOPY DIAGNOSTIC;  Surgeon: Kanchan Espinoza MD;  [facility];  Service: Bariatrics   • REDUCTION MAMMAPLASTY Bilateral    • REPLACEMENT UNICONDYLAR JOINT KNEE     • SHOULDER ARTHROSCOPY DISTAL CLAVICLE EXCISION AND OPEN ROTATOR CUFF REPAIR       Family History   Problem Relation Age of Onset   • Heart attack Mother    • Diabetes Mother    • Heart attack Father    • Stroke Father    • Hypertension Brother    • Leukemia Paternal Aunt        Khalif [place], [place]  Date: 2/14/2023 Time: 8:58 AM

## 2023-02-14 NOTE — PLAN OF CARE
Problem: PAIN - ADULT  Goal: Verbalizes/displays adequate comfort level or baseline comfort level  Description: Interventions:  - Encourage patient to monitor pain and request assistance  - Assess pain using appropriate pain scale  - Administer analgesics based on type and severity of pain and evaluate response  - Implement non-pharmacological measures as appropriate and evaluate response  - Consider cultural and social influences on pain and pain management  - Notify physician/advanced practitioner if interventions unsuccessful or patient reports new pain  Outcome: Progressing     Problem: INFECTION - ADULT  Goal: Absence or prevention of progression during hospitalization  Description: INTERVENTIONS:  - Assess and monitor for signs and symptoms of infection  - Monitor lab/diagnostic results  - Monitor all insertion sites, i e  indwelling lines, tubes, and drains  - Monitor endotracheal if appropriate and nasal secretions for changes in amount and color  - Drumright appropriate cooling/warming therapies per order  - Administer medications as ordered  - Instruct and encourage patient and family to use good hand hygiene technique  - Identify and instruct in appropriate isolation precautions for identified infection/condition  Outcome: Progressing     Problem: SAFETY ADULT  Goal: Patient will remain free of falls  Description: INTERVENTIONS:  - Educate patient/family on patient safety including physical limitations  - Instruct patient to call for assistance with activity   - Consult OT/PT to assist with strengthening/mobility   - Keep Call bell within reach  - Keep bed low and locked with side rails adjusted as appropriate  - Keep care items and personal belongings within reach  - Initiate and maintain comfort rounds  - Make Fall Risk Sign visible to staff  - Offer Toileting every  2  Hours, in advance of need  - Initiate/Maintain  DAILY alarm  - Obtain necessary fall risk management equipment:  DAILY  - Apply yellow socks and bracelet for high fall risk patients  - Consider moving patient to room near nurses station  Outcome: Progressing

## 2023-02-14 NOTE — TREATMENT PLAN
Asutin Garcia 50-year-old female presenting with right flank pain  CT confirmed right obstructing ureteral calculus  Postoperative day 1 cystoscopy, right retrograde pyelography, right ureteral stone extraction and insertion of right ureteral stent  Plan:  Discharge at the discretion of the internal medicine service  Stent on string x3 days  Patient may self removed by gently pulling on string after 72 hours  No further  intervention indicated during this hospital stay

## 2023-02-14 NOTE — OP NOTE
OPERATIVE REPORT  PATIENT NAME: Victorina Nunez    :  1970  MRN: [id number]  Pt Location: [facility]    SURGERY DATE: 2023    Surgeon(s) and Role:     * Tony Malone MD - Primary    Preop Diagnosis:  Right ureteral stone [N20 1]    Post-Op Diagnosis Codes:     * Right ureteral stone [N20 1]    Procedure(s):  Right - CYSTOSCOPY URETEROSCOPY  STONE EXTRACTION, RETROGRADE PYELOGRAM AND INSERTION STENT URETERAL    Specimen(s):  ID Type Source Tests Collected by Time Destination   A : right ureteral calculus  Calculus Ureter, Right STONE ANALYSIS Tony Malone MD 2023 1431        Estimated Blood Loss:   Minimal    Drains:  Ureteral Internal Stent Right ureter 6 Fr  (Active)   Number of days: 0       Anesthesia Type:   General    Operative Indications:  Right ureteral stone [N20 1]      Operative Findings:  STONE RETRIEVED    Complications:   None    Procedure and Technique:  The patient was identified, brought to the operating room, and placed on the table in supine position  After induction of general anesthesia, the patient was placed in dorsal lithotomy position and prepped and draped in the usual sterile fashion  A complete formal timeout was performed  The 25 [place]ian rigid cystoscope was placed per urethra and cystoscopy was performed  There was no bladder abnormality identified  The Right ureteral orifice was identified and cannulated with a Solo wire  A semirigid ureteroscope was then placed alongside the wire into the ureter, and the stone was identified  A 4 wire stone basket was placed and the stone fragments were retrieved  At this point, a retrograde pyelogram was performed delineating the upper urinary tract anatomy  No further filling defect identified  The ureteral length was measured  The safety wire was backloaded into the cystoscope and a 26 cm x 6 [place]ian double-J stent was placed with string       The patient tolerated the procedure well and was transferred to the recovery room awake alert and in stable condition  Plan: discharge when stable  stent/string for 3 days if tolerated       I was present for the entire procedure    Patient Disposition:  PACU         SIGNATURE: Kacy Pierce MD  DATE: February 14, 2023  TIME: 2:41 PM

## 2023-02-14 NOTE — UTILIZATION REVIEW
Initial Clinical Review      OBS order 2/13 1049 converted to IP on 2/14 1631 for continued treatment of hydronephrosis     Admission: Date/Time/Statement:   Admission Orders (From admission, onward)     Ordered        02/14/23 1631  Inpatient Admission  Once            02/13/23 1049  Place in Observation  Once                       Inpatient Admission     Standing Status:   Standing     Number of Occurrences:   1     Order Specific Question:   Level of Care     Answer:   Med Surg [16]     Order Specific Question:   Estimated length of stay     Answer:   More than 2 Midnights     Order Specific Question:   Certification     Answer:   I certify that inpatient services are medically necessary for this patient for a duration of greater than two midnights  See H&P and MD Progress Notes for additional information about the patient's course of treatment  ED Arrival Information     Expected   -    Arrival   2/13/2023 08:28    Acuity   Emergent            Means of arrival   Walk-In    Escorted by   Family Member    Service   Hospitalist    Admission type   Emergency            Arrival complaint   back  pain - kidney stones           Chief Complaint   Patient presents with   • Flank Pain     Possible kidney stone on the right side, pt was here several times in the past few days  Initial Presentation: 47 y o  female to ED from home w/ abd pain x3 days   Seen in ED last 3 days for abd p ain   CT revealed proximal right ureteral calculus measuring 0 5 cm  initiial attempts at conservative mngt however unsuccessful   Leukocytosis 12k , ua tr ketones and bld   Admitted OBS status for R hydronephrosis cont IVF , cont tamsulosin , urology following   F/u urine cx , monitor off abx   antiemetics prn   DM SSI and monitor   2/13 Urology Consult   Right ureteral stone,  Mild right hydronephrosis  Continue IV fluids, Flomax, straining urine, pain control  reevaluate tomorrow for operative intervention      2/14 Urology Note   today for cystoscopy, ureteroscopy, holmium laser lithotripsy insertion of right ureteral stent  Cont w / R sided pain   2/14 IM note   Cont w/ abd pain R flank pain   Has not passed renal calculi   Plan for OR today   cotn IVF , pain control   Remains NPO   F/u urine cultures   Wbc improved to 9 46 from 12 80    2/14 OP Note   Right - CYSTOSCOPY URETEROSCOPY   STONE EXTRACTION, RETROGRADE PYELOGRAM AND INSERTION STENT URETERAL   Operative Findings:  STONE RETRIEVED       ED Triage Vitals   Temperature Pulse Respirations Blood Pressure SpO2   02/13/23 0834 02/13/23 0834 02/13/23 0834 02/13/23 0834 02/13/23 0834   97 6 °F (36 4 °C) 72 18 157/72 100 %      Temp Source Heart Rate Source Patient Position - Orthostatic VS BP Location FiO2 (%)   02/13/23 1533 02/13/23 1251 02/13/23 1251 02/13/23 1251 --   Oral Monitor Sitting Right arm       Pain Score       02/13/23 0940       10 - Worst Possible Pain          Wt Readings from Last 1 Encounters:   02/12/23 105 kg (231 lb 7 7 oz)     Additional Vital Signs:   02/14/23 21:56:22 98 1 °F (36 7 °C) 56 -- 116/63 81 94 % -- -- -- --   02/14/23 15:40:19 -- 89 -- 148/83 105 90 % -- -- -- --   02/14/23 15:31:42 98 °F (36 7 °C) 83 -- 140/77 98 92 % -- -- -- --   02/14/23 1515 98 9 °F (37 2 °C) 82 13 148/78 107 97 % -- None (Room air) WDL --   02/14/23 1513 -- -- -- -- -- 97 % -- -- -- --   02/14/23 1500 -- 88 15 144/81 104 95 % 2 L/min Nasal cannula WDL --   02/14/23 1453 98 7 °F (37 1 °C) 109 Abnormal  17 147/79 107 95 % 6 L/min Simple mask -- --   02/14/23 1316 98 7 °F (37 1 °C) 74 18 134/68 -- 99 % -- None (Room air) -- --   02/14/23 0931 98 8 °F (37 1 °C) -- -- -- -- -- -- None (Room          02/14/23 07:13:32 98 8 °F (37 1 °C) 75 -- 141/85 104 91 % -- --   02/13/23 21:51:59 99 1 °F (37 3 °C) 74 18 138/86 103 97 % -- --   02/13/23 17:11:41 98 3 °F (36 8 °C) 67 16 136/89 105 97 % None (Room air) Lying   02/13/23 1533 98 4 °F (36 9 °C) 64 18 169/79 -- 100 % -- -- 02/13/23 1251 -- 59 18 135/69 -- 100 % None (Room air) Sitting       Pertinent Labs/Diagnostic Test Results:   2/12 CT abd Mild right hydronephrosis secondary to 0 5 cm obstructing stone, which has migrated to the ureterovesicular junction      2 8 x 1 5 cm cystic lesion of the pancreatic head    Continued MRI surveillance recommended  FL retrograde pyelogram    (Results Pending)     Results from last 7 days   Lab Units 02/13/23  1602 02/12/23  0202   SARS-COV-2  Negative Negative     Results from last 7 days   Lab Units 02/14/23  0436 02/13/23  0939 02/12/23  0159 02/11/23  0634   WBC Thousand/uL 9 46 12 80* 15 73* 7 11   HEMOGLOBIN g/dL 12 1 14 8 15 0 14 6   HEMATOCRIT % 36 9 45 5 46 9* 44 2   PLATELETS Thousands/uL 218 268 251 301   NEUTROS ABS Thousands/µL 6 92 10 27* 14 27* 4 99     Results from last 7 days   Lab Units 02/14/23  0436 02/13/23  0939 02/12/23  0159 02/11/23  0634   SODIUM mmol/L 139 140 142 143   POTASSIUM mmol/L 3 7 3 6 3 9 3 2*   CHLORIDE mmol/L 105 104 107 106   CO2 mmol/L 26 23 22 26   ANION GAP mmol/L 8 13 13 11   BUN mg/dL 13 16 17 17   CREATININE mg/dL 1 16 0 95 1 14 0 76   EGFR ml/min/1 73sq m 53 68 55 89   CALCIUM mg/dL 8 4 10 0 9 8 9 4     Results from last 7 days   Lab Units 02/13/23  0939 02/12/23  0159 02/11/23  0634   AST U/L 22 26 15   ALT U/L 17 24 13   ALK PHOS U/L 74 76 72   TOTAL PROTEIN g/dL 7 8 8 3 7 4   ALBUMIN g/dL 4 0 4 2 3 9   TOTAL BILIRUBIN mg/dL 0 80 0 58 0 59   BILIRUBIN DIRECT mg/dL 0 11  --   --      Results from last 7 days   Lab Units 02/15/23  0740 02/14/23  2049 02/14/23  1810 02/14/23  1203 02/14/23  0711 02/13/23  2043 02/13/23  1545 02/13/23  1257   POC GLUCOSE mg/dl 118 207* 148* 87 88 91 82 88     Results from last 7 days   Lab Units 02/14/23  0436 02/13/23  0939 02/12/23  0159 02/11/23  0634   GLUCOSE RANDOM mg/dL 101 100 154* 104       Results from last 7 days   Lab Units 02/13/23  0939   LACTIC ACID mmol/L 1 9     Results from last 7 days   Lab Units 02/13/23  1031 02/12/23  0433 02/11/23  0929   CLARITY UA  Turbid Clear Clear   COLOR UA  Light Yellow Light Yellow Light Yellow   SPEC GRAV UA  1 020 1 010 1 018   PH UA  7 0 7 0 6 5   GLUCOSE UA mg/dl Negative Negative Negative   KETONES UA mg/dl Trace* Negative Negative   BLOOD UA  Trace* Large* Large*   PROTEIN UA mg/dl Negative Negative Negative   NITRITE UA  Negative Negative Negative   BILIRUBIN UA  Negative Negative Negative   UROBILINOGEN UA (BE) mg/dl <2 0 <2 0 <2 0   LEUKOCYTES UA  Negative Trace* Negative   WBC UA /hpf 1-2 2-4* 1-2   RBC UA /hpf 4-10* Innumerable* Innumerable*   BACTERIA UA /hpf Occasional Occasional None Seen   EPITHELIAL CELLS WET PREP /hpf Occasional Occasional Occasional   MUCUS THREADS   --  Occasional* Occasional*     Results from last 7 days   Lab Units 02/13/23  1602 02/12/23  0202   INFLUENZA A PCR  Negative Negative   INFLUENZA B PCR  Negative Negative   RSV PCR  Negative Negative     ED Treatment:   Medication Administration from 02/13/2023 0828 to 02/13/2023 1653       Date/Time Order Dose Route Action     02/13/2023 0940 EST sodium chloride 0 9 % bolus 1,000 mL 1,000 mL Intravenous New Bag     02/13/2023 0939 EST ondansetron (ZOFRAN) injection 4 mg 4 mg Intravenous Given     02/13/2023 0940 EST HYDROmorphone (DILAUDID) injection 0 5 mg 0 5 mg Intravenous Given     02/13/2023 1036 EST ketorolac (TORADOL) injection 15 mg 15 mg Intravenous Given     02/13/2023 1049 EST HYDROmorphone (DILAUDID) injection 0 5 mg 0 5 mg Intravenous Given     02/13/2023 1049 EST promethazine (PHENERGAN) injection 25 mg 25 mg Intravenous Given     02/13/2023 1331 EST heparin (porcine) subcutaneous injection 5,000 Units 5,000 Units Subcutaneous Given     02/13/2023 1207 EST lactated ringers infusion 200 mL/hr Intravenous New Bag     02/13/2023 1534 EST HYDROmorphone (DILAUDID) injection 0 5 mg 0 5 mg Intravenous Given        Past Medical History:   Diagnosis Date   • Arthritis    • Bell palsy    • Gastric ulcer    • GERD (gastroesophageal reflux disease)    • History of transfusion    • Hyperlipidemia 3/31/2022   • Melena    • Obesity (BMI 30-39  9)    • Pancreatic cyst    • Right facial numbness    • Upper GI bleed      Present on Admission:  • Diabetes ([facility] [address] )  • Class 2 obesity in adult  • Hyperlipidemia  • Cyst of pancreas      Admitting Diagnosis: Nausea and vomiting [R11 2]  Flank pain [R10 9]  Right ureteral stone [N20 1]  Intractable pain [R52]  Age/Sex: 47 y o  female  Admission Orders:  Scheduled Medications:  heparin (porcine), 5,000 Units, Subcutaneous, Q8H [address]  insulin lispro, 1-5 Units, Subcutaneous, TID AC  insulin lispro, 1-5 Units, Subcutaneous, HS  tamsulosin, 0 4 mg, Oral, Daily With Dinner  topiramate, 25 mg, Oral, HS      Continuous IV Infusions:  lactated ringers, 200 mL/hr, Intravenous, Continuous      PRN Meds:  acetaminophen, 650 mg, Oral, Q6H PRN  HYDROmorphone, 0 5 mg, Intravenous, Q6H PRN  2/13  x2  2/14 x3  ondansetron, 4 mg, Intravenous, Q6H PRN  2/13 x1  2/14  x3  oxyCODONE, 10 mg, Oral, Q4H PRN  oxyCODONE-acetaminophen, 1 tablet, Oral, Q4H PRN    Fingerstick ac and hs   NPO   Strain all urine       IP CONSULT TO UROLOGY    Network Utilization Review Department  ATTENTION: Please call with any questions or concerns to [phone] and carefully listen to the prompts so that you are directed to the right person  All voicemails are confidential   Daren Cummins all requests for admission clinical reviews, approved or denied determinations and any other requests to dedicated fax number below belonging to the campus where the patient is receiving treatment   List of dedicated fax numbers for the Facilities:  [address] [place] [address] DENIALS (Administrative/Medical Necessity) [phone]   [address] (Maternity/NICU/Pediatrics) [phone]   100 [facility] [address] - [place] [phone] [address] [place] [address] [facility] Center[facility] [address] [facility] [place] [address] 815 [facility] [phone]

## 2023-02-14 NOTE — LETTER
[address], [facility]                This letter is being sent to you as a reminder to schedule a follow up appointment after your recent     surgery that was performed on February 14, 2023  A follow up appointment is recommended in 4 months,    which would be in June  To schedule the follow up appointment, please contact the office at: [phone]  Once you have the follow up appointment scheduled, you are to have a X-Ray and an Ultrasound    performed in about 1-2 weeks before this appointment, so it can be reviewed when you do come in for your    appointment  The X-Ray is a walk-in and you do not need an appointment  You can go to any [facility]    that has a radiology department to have the X-Ray done  The US is a scheduled appointment and central scheduling needs to be contacted to have it scheduled  The      phone number for that is [phone]  No scripts are necessary for the scans  They will be in your chart            Feel free to contact the office with any questions or concerns at:  [phone]        Thank you,    [facility]

## 2023-02-14 NOTE — CASE MANAGEMENT
Case Management Assessment & Discharge Planning Note    Patient name Coy Rodarte  Location /-15 MRN [id number]  : 1970 Date 2023       Current Admission Date: 2023  Current Admission Diagnosis:Right hydronephrosis with urinary obstruction due to ureteral calculus   Patient Active Problem List    Diagnosis Date Noted   • Right hydronephrosis with urinary obstruction due to ureteral calculus 2023   • Nausea and vomiting 2023   • Ureteral obstruction 2022   • Diabetes ([address] ) 2022   • Obesity 2022   • Hypertriglyceridemia 2022   • Hyperlipidemia    • Metabolic syndrome    • Osteopenia of lumbar spine 2021   • Iron deficiency anemia, unspecified 2021   • Prediabetes 10/21/2020   • History of bariatric surgery 2020   • Vitamin D deficiency 2019   • Class 2 obesity in adult 2019   • Essential hypertension 2019   • Cyst of pancreas 2019   • History of gastric ulcer 2019   • Pancreatic cyst 2019   • Abnormal CT scan, colon 2019   • Abnormal CT of the abdomen 2019      LOS (days): 0  Geometric Mean LOS (GMLOS) (days):   Days to GMLOS:     OBJECTIVE:              Current admission status: Observation       Preferred Pharmacy:   [address] [place] [address] [facility] [address]  Phone: [phone] Fax: [address] [place] [address] [address]  [address]  [facility] [address]  Phone: [phone] Fax: [phone]    Primary Care Provider: Talha Hollins    Primary Insurance: [employer]  Secondary Insurance:     ASSESSMENT:  [address] Proxies    There are no active Health Care Proxies on file         Advance Directives  Does patient have a Health Care POA?: No  Was patient offered paperwork?: Yes (Offered and declined)  Does patient currently have a Health Care decision maker?: No  Does patient have Advance Directives?: No  Was patient offered paperwork?: Yes (Offered and declined)  Primary Contact: Farnaz Novak [phone]         Readmission Root Cause  30 Day Readmission: No    Patient Information  Admitted from[de-identified] Home  Mental Status: Alert  During Assessment patient was accompanied by: Not accompanied during assessment  Assessment information provided by[de-identified] Patient  Primary Caregiver: Self  Support Systems: Spouse/significant other, Children, [facility] of Residence: [place] [address] do you live in?: [address] entry access options   Select all that apply : Stairs  Number of steps to enter home : 5  Do the steps have railings?: Yes  Type of Current Residence: [address] home  Upon entering residence, is there a bedroom on the main floor (no further steps)?: Yes  Upon entering residence, is there a bathroom on the main floor (no further steps)?: Yes  In the last 12 months, was there a time when you were not able to pay the mortgage or rent on time?: No  In the last 12 months, how many places have you lived?: 1  In the last 12 months, was there a time when you did not have a steady place to sleep or slept in a shelter (including now)?: No  Homeless/housing insecurity resource given?: No  Living Arrangements: Lives w/ Spouse/significant other  Is patient a ?: No    Activities of Daily Living Prior to Admission  Functional Status: Independent  Completes ADLs independently?: Yes  Ambulates independently?: Yes  Does patient use assisted devices?: No  Does patient currently own DME?: No  Does patient have a history of Outpatient Therapy (PT/OT)?: No  Does the patient have a history of Short-Term Rehab?: No  Does patient have a history of HHC?: No  Does patient currently have [address]?: No         Patient Information Continued  Income Source: Employed  Does patient have prescription coverage?: Yes  Within the past 12 months, you worried that your food would run out before you got the money to buy more : Never true  Within the past 12 months, the food you bought just didn't last and you didn't have money to get more : Never true  Food insecurity resource given?: No  Does patient receive dialysis treatments?: No  Does patient have a history of substance abuse?: No  Does patient have a history of Mental Health Diagnosis?: No         Means of Transportation  Means of Transport to Appts[de-identified] Drives Self  In the past 12 months, has lack of transportation kept you from medical appointments or from getting medications?: No  In the past 12 months, has lack of transportation kept you from meetings, work, or from getting things needed for daily living?: No  Was application for public transport provided?: No        DISCHARGE DETAILS:    Discharge planning discussed with[de-identified] Pt at bedside  [place] of Choice: Yes  Comments - Freedom of Choice: CM  CM contacted family/caregiver?: No- see comments  Were Treatment Team discharge recommendations reviewed with patient/caregiver?: Yes  Did patient/caregiver verbalize understanding of patient care needs?: Yes  Were patient/caregiver advised of the risks associated with not following Treatment Team discharge recommendations?: Yes    Contacts  Reason/Outcome: Continuity of Care, Discharge [address]         Is the patient interested in [place] [address] at discharge?: No    DME Referral Provided  Referral made for DME?: No    Other Referral/Resources/Interventions Provided:  Interventions: None Indicated  Referral Comments: No referral mades    Would you like to participate in our [address] service program?  : No - Declined    Treatment Team Recommendation: Home  Discharge Destination Plan[de-identified] Home  Transport at Discharge : Family

## 2023-02-14 NOTE — ANESTHESIA POSTPROCEDURE EVALUATION
Post-Op Assessment Note    CV Status:  Stable  Pain Score: 0    Pain management: adequate     Mental Status:  Alert and awake   Hydration Status:  Euvolemic   PONV Controlled:  Controlled   Airway Patency:  Patent   Two or more mitigation strategies used for obstructive sleep apnea   Post Op Vitals Reviewed: Yes      Staff: CRNA         No notable events documented      BP   127/62   Temp 37   Pulse 100   Resp 20   SpO2 100

## 2023-02-14 NOTE — TELEPHONE ENCOUNTER
Rosi Roth is a 50-year-old female with right obstructing ureteral calculus taken to the OR by Dr Linda Sanchez while on-call for ureteroscopy and stone extraction  Start on string in place for 3 days  Requesting nurse call in approximately 3 days to make sure patient remove stent  Secondly, AP visit in approximately 4 months for follow-up with ultrasound and KUB  Please contact patient thank you

## 2023-02-14 NOTE — PROGRESS NOTES
INTERNAL MEDICINE RESIDENCY PROGRESS NOTE     Name: Boo Hess   Age & Sex: 47 y o  female   MRN: [id number]  Unit/Bed#: -01   Encounter: [id number]  Team: Blue team    PATIENT INFORMATION     Name: Boo Hess   Age & Sex: 47 y o  female   MRN: [id number] Stay Days: 0    ASSESSMENT/PLAN     Principal Problem:    Right hydronephrosis with urinary obstruction due to ureteral calculus  Active Problems:    Cyst of pancreas    Class 2 obesity in adult    Hyperlipidemia    Diabetes (HCC)    Nausea and vomiting      * Right hydronephrosis with urinary obstruction due to ureteral calculus  Assessment & Plan  Patient presenting with 3-day history of worsening colicky abdominal pain  CT A/P revealed a 0 5 cm right ureteral stone, which has migrated to the ureterovesical junction  Touched base with urology  Plan for conservative management at this time however if not successfully passed by tomorrow, will plan for intervention  Moderate- high likelihood of spontaneous passage of ureteral calculus  · Continue IV hydration-IV LR at 250 cc/h  · Continue tamsulosin  · Urology on board  Appreciate recommendation  · N p o  after midnight  2/14 -appears to be in pain  Yet to progress renal calculi  Abdominal pain localized to the right flank  She is currently NPO  Touch base with urology plan for intervention today  Continue IVF, tamsulosin and analgesics    Nausea and vomiting  Assessment & Plan  Seen vomiting in setting of renal colic  Currently symptoms free following administration of Phenergan  CT abdomen and pelvis with no evidence of GI dysfunction -status post gastric bypass  Will provide as needed antiemetic  Diabetes [facility])  Assessment & Plan  Lab Results   Component Value Date    HGBA1C 5 8 (H) 04/07/2022       No results for input(s): POCGLU in the last 72 hours  Blood Sugar Average: Last 72 hrs: On metformin 500 mg twice daily and semaglutide at home    Check blood glucose for meals and bedtime  Initiate sliding scale coverage with hypoglycemia  Hold metformin and semaglutide at this time  Class 2 obesity in adult  Assessment & Plan  History of class II obesity with BMI of 35  Follows with weight management outpatient  We will hold semaglutide for now  Continue outpatient Topamax (currently being weaned off -now on 1 pill daily)  Correction healthy lifestyle and low-caloric diet modification    Cyst of pancreas  Assessment & Plan  CT abdomen and pelvis revealed  2 5 x 1 8 cm cystic lesion in the pancreatic head which is unchanged  We will recommend outpatient follow-up with GI/PCP    Leukocytosis-resolved as of 2023  Assessment & Plan  WBC count of 12 8  Improved from prior of 15 73 on   UA with no evidence of UTI  Likely reactive in setting of renal colic  We will monitor off antibiotics at this time  Follow-up urine culture  Consider obtaining blood culture prior to any initiation of antibiotics  Disposition: Plan for urology re-evaluation for possibility of cystoscopy        SUBJECTIVE     Patient seen and examined  No acute events overnight  Patient endorses minimal improvement in her abdominal pain, as well as a headache  Patient denies any dysuria, fever, chills, nausea, vomiting, shortness of breath, chest pain, or weakness  OBJECTIVE     Vitals:    23 1711 23 2151 23 0713 23 0931   BP: 136/89 138/86 141/85    BP Location: Right arm      Pulse: 67 74 75    Resp: 16 18     Temp: 98 3 °F (36 8 °C) 99 1 °F (37 3 °C) 98 8 °F (37 1 °C) 98 8 °F (37 1 °C)   TempSrc: Oral   Oral   SpO2: 97% 97% 91%    Height:          Temperature:   Temp (24hrs), Av 7 °F (37 1 °C), Min:98 3 °F (36 8 °C), Max:99 1 °F (37 3 °C)    Temperature: 98 8 °F (37 1 °C)  Intake & Output:  I/O        07 07 0700 02/14 0701  02/15 07    P  O   50     I V   1926 7     Total Intake  1976 7     Urine  400     Total Output 400     Net  +1576 7                Weights:   IBW (Ideal Body Weight): 66 2 kg    Body mass index is 34 18 kg/m²  Weight (last 2 days)     None        Physical Exam  Constitutional:       Appearance: Normal appearance  HENT:      Head: Normocephalic and atraumatic  Cardiovascular:      Rate and Rhythm: Normal rate and regular rhythm  Pulses: Normal pulses  Heart sounds: Normal heart sounds  Pulmonary:      Effort: Pulmonary effort is normal       Breath sounds: Normal breath sounds  Abdominal:      General: Abdomen is flat  Bowel sounds are normal       Palpations: Abdomen is soft  Tenderness: There is abdominal tenderness in the right upper quadrant and right lower quadrant  There is no guarding or rebound  Skin:     General: Skin is warm and dry  Neurological:      Mental Status: She is alert  LABORATORY DATA     Labs: I have personally reviewed pertinent reports  Results from last 7 days   Lab Units 02/14/23  0436 02/13/23  0939 02/12/23  0159   WBC Thousand/uL 9 46 12 80* 15 73*   HEMOGLOBIN g/dL 12 1 14 8 15 0   HEMATOCRIT % 36 9 45 5 46 9*   PLATELETS Thousands/uL 218 268 251   NEUTROS PCT % 74 81* 90*   MONOS PCT % 10 6 4      Results from last 7 days   Lab Units 02/14/23  0436 02/13/23  0939 02/12/23  0159 02/11/23  0634   POTASSIUM mmol/L 3 7 3 6 3 9 3 2*   CHLORIDE mmol/L 105 104 107 106   CO2 mmol/L 26 23 22 26   BUN mg/dL 13 16 17 17   CREATININE mg/dL 1 16 0 95 1 14 0 76   CALCIUM mg/dL 8 4 10 0 9 8 9 4   ALK PHOS U/L  --  74 76 72   ALT U/L  --  17 24 13   AST U/L  --  22 26 15                  Results from last 7 days   Lab Units 02/13/23  0939   LACTIC ACID mmol/L 1 9           IMAGING & DIAGNOSTIC TESTING     Radiology Results: I have personally reviewed pertinent reports  No results found  Other Diagnostic Testing: I have personally reviewed pertinent reports      ACTIVE MEDICATIONS     Current Facility-Administered Medications   Medication Dose Route Frequency • acetaminophen (TYLENOL) tablet 650 mg  650 mg Oral Q6H PRN   • heparin (porcine) subcutaneous injection 5,000 Units  5,000 Units Subcutaneous Q8H [facility]   • HYDROmorphone (DILAUDID) injection 0 5 mg  0 5 mg Intravenous Q6H PRN   • insulin lispro (HumaLOG) 100 units/mL subcutaneous injection 1-5 Units  1-5 Units Subcutaneous TID AC   • insulin lispro (HumaLOG) 100 units/mL subcutaneous injection 1-5 Units  1-5 Units Subcutaneous HS   • lactated ringers infusion  250 mL/hr Intravenous Continuous   • ondansetron (ZOFRAN) injection 4 mg  4 mg Intravenous Q6H PRN   • oxyCODONE (ROXICODONE) immediate release tablet 10 mg  10 mg Oral Q4H PRN   • oxyCODONE-acetaminophen (PERCOCET) 5-325 mg per tablet 1 tablet  1 tablet Oral Q4H PRN   • tamsulosin (FLOMAX) capsule 0 4 mg  0 4 mg Oral Daily With Dinner   • topiramate (TOPAMAX) tablet 25 mg  25 mg Oral HS       VTE Pharmacologic Prophylaxis: Heparin  VTE Mechanical Prophylaxis: sequential compression device    Portions of the record may have been created with voice recognition software  Occasional wrong word or "sound a like" substitutions may have occurred due to the inherent limitations of voice recognition software    Read the chart carefully and recognize, using context, where substitutions have occurred   ==  MD Wellington [address]  Internal Medicine Residency PGY-3

## 2023-02-14 NOTE — ANESTHESIA PREPROCEDURE EVALUATION
Procedure:  CYSTOSCOPY URETEROSCOPY WITH LITHOTRIPSY HOLMIUM LASER, RETROGRADE PYELOGRAM AND INSERTION STENT URETERAL (Right: Bladder)    Relevant Problems   CARDIO   (+) Essential hypertension   (+) Hyperlipidemia   (+) Hypertriglyceridemia      GI/HEPATIC   (+) Cyst of pancreas   (+) History of bariatric surgery   (+) Pancreatic cyst      /RENAL   (+) Right hydronephrosis with urinary obstruction due to ureteral calculus      HEMATOLOGY   (+) Iron deficiency anemia, unspecified      NEURO/PSYCH   (+) History of gastric ulcer        Physical Exam    Airway    Mallampati score: II  TM Distance: >3 FB  Neck ROM: full     Dental       Cardiovascular  Cardiovascular exam normal    Pulmonary  Pulmonary exam normal     Other Findings        Anesthesia Plan  ASA Score- 2 Emergent    Anesthesia Type- general with ASA Monitors  Additional Monitors:   Airway Plan: ETT  Comment: Nausea requesting Zofran Preop   Plan Factors-Exercise tolerance (METS): >4 METS  Chart reviewed  EKG reviewed  Imaging results reviewed  Existing labs reviewed  Patient summary reviewed  Patient is not a current smoker  Induction- intravenous  Postoperative Plan-     Informed Consent- Anesthetic plan and risks discussed with patient  I personally reviewed this patient with the CRNA  Discussed and agreed on the Anesthesia Plan with the CINDI Chong

## 2023-02-15 PROBLEM — R11.2 NAUSEA AND VOMITING: Status: RESOLVED | Noted: 2023-02-13 | Resolved: 2023-02-15

## 2023-02-15 LAB — GLUCOSE SERPL-MCNC: 118 MG/DL (ref 65–140)

## 2023-02-15 RX ADMIN — HEPARIN SODIUM 5000 UNITS: 5000 INJECTION INTRAVENOUS; SUBCUTANEOUS at 05:04

## 2023-02-15 RX ADMIN — OXYCODONE HYDROCHLORIDE 10 MG: 10 TABLET ORAL at 03:59

## 2023-02-15 NOTE — DISCHARGE INSTR - AVS FIRST PAGE
Patient is advised to follow up with her PCP and Urologist regarding this hospitalization  Continue other home medications    Discontinued meds  - Percocet  - Flomax    Pending lab result  Stone Analysis    Plan  Stent on string x3 days  Patient may self removed by gently pulling on string after 72 hours      Follow up with result of stone analysis with PCP and Urologist

## 2023-02-15 NOTE — DISCHARGE SUMMARY
[address] [facility]  Discharge- Cathy Aceves 1970, 47 y o  female MRN: [id number]  Unit/Bed#: -01 Encounter: [id number]  Primary Care Provider: Shelby Cool   Date and time admitted to hospital: 2/13/2023  9:06 AM    * Right hydronephrosis with urinary obstruction due to ureteral calculus  Assessment & Plan  Patient presenting with 3-day history of worsening colicky abdominal pain  CT A/P revealed a 0 5 cm right ureteral stone, which has migrated to the ureterovesical junction  Touched base with urology  Plan for conservative management at this time however if not successfully passed by tomorrow, will plan for intervention  Moderate- high likelihood of spontaneous passage of ureteral calculus    She was initially managed with conservative therapy however passage of stone was unsuccessful   She subsequently had cystoscopy with right pyelogram with stone removal   At this time, she is medically clear for discharge  Recommend outpatient follow-up with urology and her PCP  Diabetes [facility])  Assessment & Plan  Lab Results   Component Value Date    HGBA1C 5 8 (H) 04/07/2022       No results for input(s): POCGLU in the last 72 hours  Blood Sugar Average: Last 72 hrs: On metformin 500 mg twice daily and semaglutide at home  Check blood glucose for meals and bedtime  Initiate sliding scale coverage with hypoglycemia  Hold metformin and semaglutide at this time  Class 2 obesity in adult  Assessment & Plan  History of class II obesity with BMI of 35  Follows with weight management outpatient  We will hold semaglutide for now  Continue outpatient Topamax (currently being weaned off -now on 1 pill daily)  Correction healthy lifestyle and low-caloric diet modification    Cyst of pancreas  Assessment & Plan  CT abdomen and pelvis revealed  2 5 x 1 8 cm cystic lesion in the pancreatic head which is unchanged    We will recommend outpatient follow-up with GI/PCP    Leukocytosis-resolved as of 2/14/2023  Assessment & Plan  WBC count of 12 8  Improved from prior of 15 73 on 2/12  UA with no evidence of UTI  Likely reactive in setting of renal colic  We will monitor off antibiotics at this time  Follow-up urine culture  Consider obtaining blood culture prior to any initiation of antibiotics  Nausea and vomiting-resolved as of 2/15/2023  Assessment & Plan  Seen vomiting in setting of renal colic  Currently symptoms free following administration of Phenergan  CT abdomen and pelvis with no evidence of GI dysfunction -status post gastric bypass  Will provide as needed antiemetic  Discharging Resident Physician: Cherylene Music, MD  Attending: Becca Garcia MD  PCP: Susi Silva  Admission Date: 2/13/2023  Discharge Date: 02/15/23    Disposition:     Home    Reason for Admission: Right ureterovesical stone and right hydronephrosis    Consultations During Hospital Stay:  · Urology    Procedures Performed:     · cystoscopy/right pyelogram/removal ureteral stone    Significant Findings / Test Results:     FL retrograde pyelogram   Final Result by Balwinder Carias MD (02/15 3498)      Fluoroscopic guidance provided for right retrograde pyelogram  Please see procedure report for further details  Workstation performed: [id number]         ·     Incidental Findings:   · None     Test Results Pending at Discharge (will require follow up): · Stone Analysis     Outpatient Tests Requested:  · None    Complications:  none    Hospital Course:     Elina Song is a 47 y o  female patient who originally presented to the hospital on 2/13/2023 due to Mild right hydronephrosis secondary to 0 5 cm obstructing stone in the ureterovesicular junction  Initial attempted conservative management was unsuccessful  She subsequently had a cystoscopy with stone removal   She is advised to Stent on string x3 days   Patient may self removed by gently pulling on string after 72 hours  At the time of discharge, patient does appear hemodynamically and clinically stable  She offers no complaints at this time  No new medications added  Advised to follow-up with PCP regarding analysis of extracted stone  Condition at Discharge: stable     Discharge Day Visit / Exam:     Subjective: Candelaria Denson is seen and examined at bedside  She offers no complaints at this time  She is seen at bedside with her spouse  Reports no abdominal pain,  fever, chills nausea and vomiting    Vitals: Blood Pressure: 116/63 (02/14/23 2156)  Pulse: 56 (02/14/23 2156)  Temperature: 98 1 °F (36 7 °C) (02/14/23 2156)  Temp Source: Temporal (02/14/23 1316)  Respirations: 13 (02/14/23 1515)  Height: 5' 9" (175 3 cm) (02/13/23 1700)  SpO2: 94 % (02/14/23 2156)  Exam:   Physical Exam  Vitals reviewed  Constitutional:       General: She is not in acute distress  Appearance: Normal appearance  She is not toxic-appearing  HENT:      Head: Normocephalic and atraumatic  Mouth/Throat:      Mouth: Mucous membranes are moist    Eyes:      Pupils: Pupils are equal, round, and reactive to light  Cardiovascular:      Rate and Rhythm: Normal rate and regular rhythm  Pulses: Normal pulses  Heart sounds: Normal heart sounds  Pulmonary:      Effort: Pulmonary effort is normal  No respiratory distress  Breath sounds: No stridor  Abdominal:      General: Bowel sounds are normal  There is no distension  Palpations: Abdomen is soft  There is no mass  Tenderness: There is no abdominal tenderness  Musculoskeletal:         General: No swelling or tenderness  Normal range of motion  Cervical back: Normal range of motion and neck supple  Skin:     General: Skin is warm  Coloration: Skin is not jaundiced or pale  Neurological:      Mental Status: She is alert and oriented to person, place, and time     Psychiatric:         Mood and Affect: Mood normal          Behavior: Behavior normal        Discussion with Family:  at bedside    Discharge instructions/Information to patient and family:   See after visit summary for information provided to patient and family  Provisions for Follow-Up Care:  See after visit summary for information related to follow-up care and any pertinent home health orders  Planned Readmission: No     Discharge Medications:  See after visit summary for reconciled discharge medications provided to patient and family        ** Please Note: This note has been constructed using a voice recognition system **

## 2023-02-16 NOTE — TELEPHONE ENCOUNTER
Post Op Note    Stone Rg is a 47 y o  female s/p CYSTOSCOPY URETEROSCOPY, RETROGRADE PYELOGRAM AND INSERTION STENT URETERAL (Right: Bladder) performed 2/14/2023  Stone Rg had surgery by Dr Merary Noble and is seen at the [facility] office  How would you rate your pain on a scale from 1 to 10, 10 being the worst pain ever? 0     Have your bowel movements been regular? No  Patient states she drank coffee today and that usually assists in her having a bowel movement  She states if she does not have a bowel movement by tonight, she will be take a stool softener  Do you have any difficulty urinating? No     Do you have any other questions or concerns that I can address at this time? Patient is doing well overall  She states she has no pain at this time  Advised of Dr Papito Salvador recommendation of removing stent in 3 days, which would be for tomorrow (2/17)  Patient states she is familiar and will be able to remove it herself  Advised of stent removal and to take OTC pain medications if patient has pain and to hydrate well with water  She is understanding  Advised she can remove the stent in the morning and will contact her in the afternoon to see how she is doing  Advised if she has any trouble, to contact the office  She is agreeable  Advised of scheduling a follow up appointment in 4 months  Patient did not want to schedule that at this time due to her schedule and possibly rescheduling her other appointment  Patient states she will call to schedule appointment

## 2023-02-17 NOTE — TELEPHONE ENCOUNTER
Spoke to patient and she was able to remove the stent with no issues  Patient would like a letter mailed to schedule the follow up appointment, as patient is unaware of her work schedule at this time  Address was confirmed  Advised to contact the office with any questions or concerns

## 2023-02-18 LAB
CALCIUM OXALATE DIHYDRATE MFR STONE IR: 60 %
COLOR STONE: NORMAL
COM MFR STONE: 40 %
COMMENT-STONE3: NORMAL
COMPOSITION: NORMAL
LABORATORY COMMENT REPORT: NORMAL
PHOTO: NORMAL
SIZE STONE: NORMAL MM
SPEC SOURCE SUBJ: NORMAL
STONE ANALYSIS-IMP: NORMAL
STONE ANALYSIS-IMP: NORMAL
WT STONE: 28 MG

## 2023-03-13 ENCOUNTER — TELEPHONE (OUTPATIENT)
Dept: OTHER | Facility: OTHER | Age: 53
End: 2023-03-13

## 2023-03-13 ENCOUNTER — TELEPHONE (OUTPATIENT)
Dept: CARDIAC SURGERY | Facility: CLINIC | Age: 53
End: 2023-03-13

## 2023-03-13 NOTE — TELEPHONE ENCOUNTER
Patient called today to cancel her appointment for 3/28/2023 because her insurance has not kicked in yet  It will in another month  Can you please call her back with an appointment on 4/27/2023 in the morning?

## 2023-03-13 NOTE — TELEPHONE ENCOUNTER
Spoke to patient and asked if she would like me to assist in making the MRI appointment/f/u with Dr Jessica Hull  Pt stated that she does not have insurance and is in process of getting insurance - when that is set up she wants to come in and I asked how the patient is doing she said she was week after the surgery but she is doing better now and she will return, she was given hopeline number and central scheduling if she wants to set up the appointments, however I said I would set a reminder in 3-4 weeks to reach out and assist patient in appointments if needed

## 2023-03-19 DIAGNOSIS — R73.03 PREDIABETES: ICD-10-CM

## 2023-05-08 ENCOUNTER — TELEPHONE (OUTPATIENT)
Dept: BARIATRICS | Facility: CLINIC | Age: 53
End: 2023-05-08

## 2023-05-08 ENCOUNTER — TELEPHONE (OUTPATIENT)
Dept: HEMATOLOGY ONCOLOGY | Facility: CLINIC | Age: 53
End: 2023-05-08

## 2023-05-08 NOTE — TELEPHONE ENCOUNTER
Patient's insurance wont cover her Medical visit until August (appt scheduled on aug ) Patient would like a refill on WEGOVY, please review and advice,   thanks

## 2023-05-08 NOTE — TELEPHONE ENCOUNTER
LM for patient that she is due for her MRI and f/u appointment with Dr Selwyn Villela  Left phone number for [facility] for assistance in scheduling

## 2023-05-08 NOTE — TELEPHONE ENCOUNTER
I cannot refill Wegovy at this point she has not been seen in 5 months she can have a 15 min appointment scheduled to discuss this week   Please let her know  She has been in ER in the meantime and did not communicate with me changes in her health status please let patient know needs appointment  thanks

## 2023-05-10 ENCOUNTER — OFFICE VISIT (OUTPATIENT)
Dept: BARIATRICS | Facility: CLINIC | Age: 53
End: 2023-05-10

## 2023-05-10 VITALS
BODY MASS INDEX: 34.51 KG/M2 | RESPIRATION RATE: 18 BRPM | HEIGHT: 69 IN | OXYGEN SATURATION: 97 % | WEIGHT: 233 LBS | SYSTOLIC BLOOD PRESSURE: 142 MMHG | HEART RATE: 70 BPM | DIASTOLIC BLOOD PRESSURE: 90 MMHG

## 2023-05-10 DIAGNOSIS — R63.8 ABNORMAL CRAVING: ICD-10-CM

## 2023-05-10 DIAGNOSIS — R73.03 PREDIABETES: ICD-10-CM

## 2023-05-10 DIAGNOSIS — E66.09 CLASS 1 OBESITY DUE TO EXCESS CALORIES WITH SERIOUS COMORBIDITY AND BODY MASS INDEX (BMI) OF 34.0 TO 34.9 IN ADULT: Primary | ICD-10-CM

## 2023-05-10 DIAGNOSIS — E78.1 HYPERTRIGLYCERIDEMIA: ICD-10-CM

## 2023-05-10 PROBLEM — E66.811 CLASS 1 OBESITY DUE TO EXCESS CALORIES WITH SERIOUS COMORBIDITY AND BODY MASS INDEX (BMI) OF 34.0 TO 34.9 IN ADULT: Status: ACTIVE | Noted: 2023-05-10

## 2023-05-10 PROBLEM — E11.9 DIABETES (HCC): Status: RESOLVED | Noted: 2022-12-22 | Resolved: 2023-05-10

## 2023-05-10 RX ORDER — NALTREXONE HYDROCHLORIDE 50 MG/1
TABLET, FILM COATED ORAL
Qty: 30 TABLET | Refills: 1 | Status: SHIPPED | OUTPATIENT
Start: 2023-05-10

## 2023-05-10 RX ORDER — BUPROPION HYDROCHLORIDE 150 MG/1
TABLET, EXTENDED RELEASE ORAL
Qty: 60 TABLET | Refills: 2 | Status: SHIPPED | OUTPATIENT
Start: 2023-05-10

## 2023-05-10 NOTE — PROGRESS NOTES
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 compared to prior CT  Continued follow-up as per recommendations on prior CT  Calories 1200kcal menu given  Encourage mindful eating, portion control, motivational interview performed to help patient reach goals   Start Contrave- handout with side effects given no Hx of seizures    2  Prediabetes  continue Metformin 500mg daily, tolerates so far  Cont vit B12 and iron supplementation    3  Hypertriglyceridemia  Will correct with weight loss     Component Ref Range & Units 4/7/22  8:03 AM 9/1/20  3:09 PM 5/14/19 10:37 AM   Hemoglobin A1C Normal 3 8-5 6%; PreDiabetic 5 7-6 4%; Diabetic >=6 5%; Glycemic control for adults with diabetes <7 0% % 5 8 High   5 9 High  R, CM  5 7 R   Follow up in approximately 3mo  Monthly weight check with nurses      Subjective:   Chief Complaint   Patient presents with   • Follow-up     Medication Refill     Patient here to discuss weight associated problems and nutrition goals  HPI: Margi Palma  is a 47 y o  female with excess weight/obesity here to pursue weight management    Patient is pursuing Conservative Program      50/F s/p gastric band with Dr Mike Morales, then s/p LRYGB in 2019 s/p diagnostic laparoscopy/afferent limb small bowel resection with 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) which was complicated by a gastric remnant blowout s/p diagnostic laparoscopy converted to open, remnant gastrectomy, left hemicolectomy     Prior to surgery: 350#    SUNG: 230#   Weight regain 50 lbs dt bad habits    Wt Readings from Last 10 Encounters:   05/10/23 106 kg (233 lb)   02/12/23 105 kg (231 lb 7 7 oz)   01/24/23 105 kg (231 "lb 8 oz)   12/27/22 113 kg (250 lb)   11/17/22 113 kg (250 lb)   10/06/22 117 kg (258 lb 9 6 oz)   09/15/22 118 kg (259 lb 9 6 oz)   06/23/22 123 kg (271 lb 8 oz)   04/14/22 127 kg (280 lb)   03/31/22 128 kg (282 lb)       OV :259 lbs 9-13-22 started Metformin and Topamax was increased  Last OV:231lbs  Current weight : 233lbs   Change in weight:-19lbs lost 8% of bodyweight only by changing diet and continuing Topamax  ,Topamax stopped due to kidney stone12/2023   Nutrition changed to focus on protein     Food logging: none  Increased appetite/cravings:better controlled  Exercise: knees are bad and back hurts , bougth a home gym device  Hydration: 2 5 bottles   Alcohol: none  Sleep: ok        The following portions of the patient's history were reviewed and updated as appropriate: allergies, current medications, past family history, past medical history, past social history, past surgical history, and problem list       Review of Systems   Constitutional: Negative for activity change  HENT: Negative for trouble swallowing  Respiratory: Negative for shortness of breath  Cardiovascular: Negative for chest pain, edema  Gastrointestinal: Negative for abdominal pain, nausea and vomiting, acid reflux, constipation/diarrhea   Psychiatric/Behavioral: + for behavioral problems ,better controlled anxiety or depression    Objective:  /90 (BP Location: Left arm, Patient Position: Sitting, Cuff Size: Adult)   Pulse 70   Resp 18   Ht 5' 9\" (1 753 m)   Wt 106 kg (233 lb)   LMP  (LMP Unknown)   SpO2 97%   BMI 34 41 kg/m²   Constitutional: Well-developed, well-nourished and Obese Body mass index is 34 41 kg/m²  Alonzo Mack HEENT: No conjunctival injection  No thyroid masses  Pulmonary: No increased work of breathing or signs of respiratory distress  Clear respiratory sounds  CV: Well-perfused, Regular rate and rhythm, no murmurs, no edema  GI: increased abdominal girth  Non-distended    Neuro: Oriented to person, place and " time  Normal Speech  Normal gait  Psych: Normal affect and mood  No delusion or hallucinations, normal thought process    Labs and Imaging  Recent labs and imaging have been personally reviewed    Lab Results   Component Value Date    WBC 9 46 02/14/2023    HGB 12 1 02/14/2023    HCT 36 9 02/14/2023    MCV 89 02/14/2023     02/14/2023     Lab Results   Component Value Date    SODIUM 139 02/14/2023    K 3 7 02/14/2023     02/14/2023    CO2 26 02/14/2023    AGAP 8 02/14/2023    BUN 13 02/14/2023    CREATININE 1 16 02/14/2023    GLUC 101 02/14/2023    GLUF 99 12/23/2022    CALCIUM 8 4 02/14/2023    AST 22 02/13/2023    ALT 17 02/13/2023    ALKPHOS 74 02/13/2023    TP 7 8 02/13/2023    TBILI 0 80 02/13/2023    EGFR 53 02/14/2023     Lab Results   Component Value Date    HGBA1C 5 8 (H) 04/07/2022     Lab Results   Component Value Date    QYA1ESPALMST 0 269 (L) 06/15/2021     Lab Results   Component Value Date    CHOLESTEROL 213 (H) 04/07/2022     Lab Results   Component Value Date    HDL 55 04/07/2022     Lab Results   Component Value Date    TRIG 91 04/07/2022     Lab Results   Component Value Date    LDLCALC 140 (H) 04/07/2022

## 2023-06-28 DIAGNOSIS — R73.03 PREDIABETES: ICD-10-CM

## 2023-07-12 ENCOUNTER — OFFICE VISIT (OUTPATIENT)
Dept: BARIATRICS | Facility: CLINIC | Age: 53
End: 2023-07-12
Payer: COMMERCIAL

## 2023-07-12 VITALS
RESPIRATION RATE: 18 BRPM | HEART RATE: 84 BPM | WEIGHT: 237.2 LBS | SYSTOLIC BLOOD PRESSURE: 138 MMHG | BODY MASS INDEX: 35.13 KG/M2 | OXYGEN SATURATION: 97 % | HEIGHT: 69 IN | DIASTOLIC BLOOD PRESSURE: 88 MMHG

## 2023-07-12 DIAGNOSIS — E66.9 CLASS 2 OBESITY IN ADULT: ICD-10-CM

## 2023-07-12 DIAGNOSIS — I10 PRIMARY HYPERTENSION: ICD-10-CM

## 2023-07-12 DIAGNOSIS — E78.1 HYPERTRIGLYCERIDEMIA: ICD-10-CM

## 2023-07-12 DIAGNOSIS — Z98.84 HISTORY OF BARIATRIC SURGERY: ICD-10-CM

## 2023-07-12 DIAGNOSIS — E66.09 CLASS 2 OBESITY DUE TO EXCESS CALORIES IN ADULT: Primary | ICD-10-CM

## 2023-07-12 DIAGNOSIS — R73.03 PREDIABETES: ICD-10-CM

## 2023-07-12 PROCEDURE — 99214 OFFICE O/P EST MOD 30 MIN: CPT | Performed by: FAMILY MEDICINE

## 2023-07-12 NOTE — PROGRESS NOTES
Assessment/Plan:  Ricky Hilliard was seen today for follow-up. 1. Class 2 obesity due to excess calories in adult  HEMOGLOBIN A1C W/ EAG ESTIMATION    Comprehensive metabolic panel    TSH w/Reflex    Lipid Panel with Direct LDL reflex      2. History of bariatric surgery        3. Hypertriglyceridemia  Lipid Panel with Direct LDL reflex      4. Class 2 obesity in adult        5. Prediabetes  HEMOGLOBIN A1C W/ EAG ESTIMATION    Comprehensive metabolic panel      6. Primary hypertension  TSH w/Reflex    Lipid Panel with Direct LDL reflex          1. Class 2 obesity in adult  Lost 9% with nutrition control and using Topamax but had to D/C Topamax due to kidney stones  -Ok to use GLP-1 for short course   Wegovy Saxenda and Tirzepatide not covered by insurance  Will inquire August 1st after she is changing her insurance, will start then ok to write script Aug 1st    CT scan IMPRESSION:9/2022     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 Continued follow-up as per recommendations on prior CT. Encourage mindful eating, portion control, motivational interview performed to help patient reach goals    tried Wellbutrin /Natrexone- handout with side effects given no Hx of seizures  Did not tolerate Naltrexone- had headaches and spikes in BP to 972 diastolic  Stop Wellbutrin due to no need and not helpful in controlling appeitte  2. Prediabetes  continue Metformin 500mg daily, tolerates so far  Cont vit B12 and iron supplementation    3. Hypertriglyceridemia   check lipids    Component Ref Range & Units 4/7/22  8:03 AM 9/1/20  3:09 PM 5/14/19 10:37 AM   Hemoglobin A1C Normal 3.8-5.6%; PreDiabetic 5.7-6.4%;  Diabetic >=6.5%; Glycemic control for adults with diabetes <7.0% % 5.8 High   5.9 High  R, CM  5.7 R   Follow up in approximately 3mo  Monthly weight check with nurses      Subjective:   Chief Complaint   Patient presents with   • Follow-up     2 month     Patient here to discuss weight associated problems and nutrition goals  HPI: Lizbet Stein  is a 46 y.o. female with excess weight/obesity here to pursue weight management. Patient is pursuing Conservative Program.     50/F s/p gastric band with Dr. 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) which was complicated by a gastric remnant blowout s/p diagnostic laparoscopy converted to open, remnant gastrectomy, left hemicolectomy     Prior to surgery: 350#    SUNG: 230#   Weight regain 50 lbs dt bad habits    Wt Readings from Last 10 Encounters:   07/12/23 108 kg (237 lb 3.2 oz)   05/10/23 106 kg (233 lb)   02/12/23 105 kg (231 lb 7.7 oz)   01/24/23 105 kg (231 lb 8 oz)   12/27/22 113 kg (250 lb)   11/17/22 113 kg (250 lb)   10/06/22 117 kg (258 lb 9.6 oz)   09/15/22 118 kg (259 lb 9.6 oz)   06/23/22 123 kg (271 lb 8 oz)   04/14/22 127 kg (280 lb)       OV :259 lbs 9-13-22 started Metformin and Topamax was increased  Last OV:231lbs  Current weight : 233lbs   Change in weight:-19lbs lost 8% of bodyweight only by changing diet and continuing Topamax  ,Topamax stopped due to kidney stone12/2023   Nutrition changed to focus on protein     Food logging: none  Increased appetite/cravings:better controlled but restarted snacking  Exercise: knees are bad and back hurts , bougth a home gym device  Hydration: 2.5 bottles   Alcohol: none  Sleep: ok        The following portions of the patient's history were reviewed and updated as appropriate: allergies, current medications, past family history, past medical history, past social history, past surgical history, and problem list.      Review of Systems   Constitutional: Negative for activity change. HENT: Negative for trouble swallowing. Respiratory: Negative for shortness of breath.     Cardiovascular: Negative for chest pain, edema  Gastrointestinal: Negative for abdominal pain, nausea and vomiting, acid reflux, constipation/diarrhea   Psychiatric/Behavioral: + for behavioral problems ,better controlled anxiety or depression    Objective:  /88 (BP Location: Left arm, Patient Position: Sitting, Cuff Size: Adult)   Pulse 84   Resp 18   Ht 5' 9" (1.753 m)   Wt 108 kg (237 lb 3.2 oz)   LMP  (LMP Unknown)   SpO2 97%   BMI 35.03 kg/m²   Constitutional: Well-developed, well-nourished and Obese Body mass index is 35.03 kg/m². Juancarlos Pandy HEENT: No conjunctival injection. No thyroid masses  Pulmonary: No increased work of breathing or signs of respiratory distress. Clear respiratory sounds  CV: Well-perfused, Regular rate and rhythm, no murmurs, no edema  GI: increased abdominal girth. Non-distended. Neuro: Oriented to person, place and time. Normal Speech. Normal gait. Psych: Normal affect and mood. No delusion or hallucinations, normal thought process    Labs and Imaging  Recent labs and imaging have been personally reviewed.   Lab Results   Component Value Date    WBC 9.46 02/14/2023    HGB 12.1 02/14/2023    HCT 36.9 02/14/2023    MCV 89 02/14/2023     02/14/2023     Lab Results   Component Value Date    SODIUM 139 02/14/2023    K 3.7 02/14/2023     02/14/2023    CO2 26 02/14/2023    AGAP 8 02/14/2023    BUN 13 02/14/2023    CREATININE 1.16 02/14/2023    GLUC 101 02/14/2023    GLUF 99 12/23/2022    CALCIUM 8.4 02/14/2023    AST 22 02/13/2023    ALT 17 02/13/2023    ALKPHOS 74 02/13/2023    TP 7.8 02/13/2023    TBILI 0.80 02/13/2023    EGFR 53 02/14/2023     Lab Results   Component Value Date    HGBA1C 5.8 (H) 04/07/2022     Lab Results   Component Value Date    RVC1OQLYIIPP 0.269 (L) 06/15/2021     Lab Results   Component Value Date    CHOLESTEROL 213 (H) 04/07/2022     Lab Results   Component Value Date    HDL 55 04/07/2022     Lab Results   Component Value Date    TRIG 91 04/07/2022     Lab Results   Component Value Date    LDLCALC 140 (H) 04/07/2022

## 2023-07-13 ENCOUNTER — APPOINTMENT (OUTPATIENT)
Dept: LAB | Facility: HOSPITAL | Age: 53
End: 2023-07-13
Payer: COMMERCIAL

## 2023-07-13 DIAGNOSIS — R73.03 PREDIABETES: ICD-10-CM

## 2023-07-13 DIAGNOSIS — E78.1 HYPERTRIGLYCERIDEMIA: ICD-10-CM

## 2023-07-13 DIAGNOSIS — K86.2 PANCREATIC CYST: ICD-10-CM

## 2023-07-13 DIAGNOSIS — I10 PRIMARY HYPERTENSION: ICD-10-CM

## 2023-07-13 DIAGNOSIS — E66.09 CLASS 2 OBESITY DUE TO EXCESS CALORIES IN ADULT: ICD-10-CM

## 2023-07-13 LAB
ALBUMIN SERPL BCP-MCNC: 4.5 G/DL (ref 3.5–5)
ALP SERPL-CCNC: 79 U/L (ref 34–104)
ALT SERPL W P-5'-P-CCNC: 16 U/L (ref 7–52)
ANION GAP SERPL CALCULATED.3IONS-SCNC: 6 MMOL/L
AST SERPL W P-5'-P-CCNC: 14 U/L (ref 13–39)
BILIRUB SERPL-MCNC: 0.53 MG/DL (ref 0.2–1)
BUN SERPL-MCNC: 21 MG/DL (ref 5–25)
CALCIUM SERPL-MCNC: 10 MG/DL (ref 8.4–10.2)
CHLORIDE SERPL-SCNC: 103 MMOL/L (ref 96–108)
CHOLEST SERPL-MCNC: 206 MG/DL
CO2 SERPL-SCNC: 29 MMOL/L (ref 21–32)
CREAT SERPL-MCNC: 0.81 MG/DL (ref 0.6–1.3)
EST. AVERAGE GLUCOSE BLD GHB EST-MCNC: 108 MG/DL
GFR SERPL CREATININE-BSD FRML MDRD: 82 ML/MIN/1.73SQ M
GLUCOSE P FAST SERPL-MCNC: 102 MG/DL (ref 65–99)
HBA1C MFR BLD: 5.4 %
HDLC SERPL-MCNC: 51 MG/DL
LDLC SERPL CALC-MCNC: 135 MG/DL (ref 0–100)
POTASSIUM SERPL-SCNC: 4.2 MMOL/L (ref 3.5–5.3)
PROT SERPL-MCNC: 7.8 G/DL (ref 6.4–8.4)
SODIUM SERPL-SCNC: 138 MMOL/L (ref 135–147)
TRIGL SERPL-MCNC: 99 MG/DL
TSH SERPL DL<=0.05 MIU/L-ACNC: 1.28 UIU/ML (ref 0.45–4.5)

## 2023-07-13 PROCEDURE — 36415 COLL VENOUS BLD VENIPUNCTURE: CPT

## 2023-07-13 PROCEDURE — 80061 LIPID PANEL: CPT

## 2023-07-13 PROCEDURE — 83036 HEMOGLOBIN GLYCOSYLATED A1C: CPT

## 2023-07-13 PROCEDURE — 84443 ASSAY THYROID STIM HORMONE: CPT

## 2023-07-13 PROCEDURE — 80053 COMPREHEN METABOLIC PANEL: CPT

## 2023-08-01 ENCOUNTER — CLINICAL SUPPORT (OUTPATIENT)
Dept: BARIATRICS | Facility: CLINIC | Age: 53
End: 2023-08-01

## 2023-08-01 VITALS
DIASTOLIC BLOOD PRESSURE: 82 MMHG | WEIGHT: 236 LBS | HEIGHT: 67 IN | HEART RATE: 69 BPM | SYSTOLIC BLOOD PRESSURE: 138 MMHG | BODY MASS INDEX: 37.04 KG/M2 | RESPIRATION RATE: 16 BRPM

## 2023-08-01 DIAGNOSIS — R73.03 PREDIABETES: ICD-10-CM

## 2023-08-01 DIAGNOSIS — E66.9 OBESITY, CLASS I, BMI 30-34.9: Primary | ICD-10-CM

## 2023-08-01 PROCEDURE — RECHECK

## 2023-08-01 NOTE — Clinical Note
Patient here for Metformin weight check. Patient denies any side effects. Patient interested in lab results and needs new prescription for Metformin.

## 2023-08-01 NOTE — Clinical Note
Wt Readings from Last 3 Encounters: 08/01/23 : 107 kg (236 lb) 07/12/23 : 108 kg (237 lb 3.2 oz) 05/10/23 : 106 kg (233 lb)

## 2023-08-03 ENCOUNTER — TELEPHONE (OUTPATIENT)
Dept: BARIATRICS | Facility: CLINIC | Age: 53
End: 2023-08-03

## 2023-08-03 DIAGNOSIS — E66.01 SEVERE OBESITY WITH BODY MASS INDEX (BMI) OF 36.0 TO 36.9 WITH SERIOUS COMORBIDITY (HCC): Primary | ICD-10-CM

## 2023-08-03 NOTE — TELEPHONE ENCOUNTER
Good Day Dr. Leonor Fuentes,    No request for PA has been received as of today for this patient. We will investigate further. Thank you.

## 2023-08-03 NOTE — TELEPHONE ENCOUNTER
Good Day Dr. Perez Mealing,    Patient is inquiring about starting Saxenda. Reception forwarded your note regarding labs and Metformin. Thank you.

## 2023-08-03 NOTE — TELEPHONE ENCOUNTER
Received call from Pt re: labs. Shared with Pt MyChart message sent to her by provider with results. Pt requested call from provider re: weight loss injections. Assured Pt request will be sent to provider. Pt verbalized understanding and was agreeable to plan.

## 2023-08-04 NOTE — TELEPHONE ENCOUNTER
I don't see that you prescribed Saxenda for the patient. The only thing I see is the Metformin which has been filled under her insurance and is waiting for her to  at pharmacy. Did you want to prescribe Saxenda? Please advise.

## 2023-08-07 NOTE — TELEPHONE ENCOUNTER
Pharmacy was suppose to change the info for patient insurance I will resend because they did not use her current insurance and med was denied I will resend it again

## 2023-10-24 ENCOUNTER — OFFICE VISIT (OUTPATIENT)
Dept: BARIATRICS | Facility: CLINIC | Age: 53
End: 2023-10-24
Payer: COMMERCIAL

## 2023-10-24 VITALS
SYSTOLIC BLOOD PRESSURE: 120 MMHG | RESPIRATION RATE: 14 BRPM | BODY MASS INDEX: 38.3 KG/M2 | HEART RATE: 76 BPM | WEIGHT: 244 LBS | DIASTOLIC BLOOD PRESSURE: 78 MMHG | HEIGHT: 67 IN

## 2023-10-24 DIAGNOSIS — R73.03 PREDIABETES: ICD-10-CM

## 2023-10-24 DIAGNOSIS — Z98.84 HISTORY OF BARIATRIC SURGERY: ICD-10-CM

## 2023-10-24 DIAGNOSIS — E88.810 METABOLIC SYNDROME: ICD-10-CM

## 2023-10-24 DIAGNOSIS — E78.1 HYPERTRIGLYCERIDEMIA: ICD-10-CM

## 2023-10-24 DIAGNOSIS — E66.09 CLASS 2 OBESITY DUE TO EXCESS CALORIES WITHOUT SERIOUS COMORBIDITY WITH BODY MASS INDEX (BMI) OF 38.0 TO 38.9 IN ADULT: Primary | ICD-10-CM

## 2023-10-24 PROBLEM — E66.9 OBESITY: Status: RESOLVED | Noted: 2022-12-22 | Resolved: 2023-10-24

## 2023-10-24 PROCEDURE — 99213 OFFICE O/P EST LOW 20 MIN: CPT | Performed by: FAMILY MEDICINE

## 2023-10-24 NOTE — PROGRESS NOTES
Assessment/Plan:  Joshua Estes was seen today for follow-up. 1. Class 2 obesity due to excess calories without serious comorbidity with body mass index (BMI) of 38.0 to 38.9 in adult        2. Metabolic syndrome        3. Hypertriglyceridemia        4. History of bariatric surgery        5. Prediabetes            1. Class 2 obesity in adult  Lost 8% with nutrition control and using Topamax but had to D/C Topamax due to kidney stones, regained some of the weight back  -Ok to use GLP-1 for short course   Could not find Manoj/ Wegovy  CT scan IMPRESSION:9/2022     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 Continued follow-up as per recommendations on prior CT. Encourage mindful eating, portion control, motivational interview performed to help patient reach goals    tried Wellbutrin /Natrexone- handout with side effects given no Hx of seizures  Did not tolerate Naltrexone- had headaches and spikes in BP to 880 diastolic  Stop Wellbutrin due to no need and not helpful in controlling appetite  Plan is to try counseling- given counselor website to try  Continue restructure meal planning- fell off track, start morning with protein shake- handout given  Return next year when [facility]SANDER MAJOR would be available  2. Prediabetes  continue Metformin 500mg daily, tolerates so far  Controlled HbA1c  Cont vit B12 and iron supplementation    3. Hypertriglyceridemia  Lipids much improved with diet and Metformin use    Component Ref Range & Units 4/7/22  8:03 AM 9/1/20  3:09 PM 5/14/19 10:37 AM   Hemoglobin A1C Normal 3.8-5.6%; PreDiabetic 5.7-6.4%; Diabetic >=6.5%; Glycemic control for adults with diabetes <7.0% % 5.8 High   5.9 High  R, CM  5.7 R   Follow up in approximately 3mo        Subjective:   Chief Complaint   Patient presents with    Follow-up     Patient presents for f/u.      Patient here to discuss weight associated problems and nutrition goals  HPI: Clifford Acosta  is a 46 y.o. female with excess weight/obesity here to pursue weight management.   Patient is pursuing Conservative Program.     50/F s/p gastric band with Dr. Berry Solano, then s/p LRYGB in 2019 s/p diagnostic laparoscopy/afferent limb small bowel resection with 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) which was complicated by a gastric remnant blowout s/p diagnostic laparoscopy converted to open, remnant gastrectomy, left hemicolectomy     Prior to surgery: 350#    SUNG: 230#   Weight regain 50 lbs dt bad habits    Wt Readings from Last 10 Encounters:   10/24/23 111 kg (244 lb)   08/01/23 107 kg (236 lb)   07/12/23 108 kg (237 lb 3.2 oz)   05/10/23 106 kg (233 lb)   02/12/23 105 kg (231 lb 7.7 oz)   01/24/23 105 kg (231 lb 8 oz)   12/27/22 113 kg (250 lb)   11/17/22 113 kg (250 lb)   10/06/22 117 kg (258 lb 9.6 oz)   09/15/22 118 kg (259 lb 9.6 oz)       OV :259 lbs 9-13-22 started Metformin and Topamax was increased  Last OV:233lbs  Current weight : 244lbs     Hx of Change in weight:-19lbs lost 8% of bodyweight only by changing diet and continuing Topamax  ,Topamax stopped due to kidney stone12/2023 , other choices Wellbutrin/Naltrexone could not tolerate   Never found Radha Akhtar /Ashley due to shortage    She went in vacation and had a good time, lost weight during her holiday but regained back and some more  Food logging: none  Increased appetite/cravings:hungry all the time  Morning: tea   Lunch: cracker tuna or cottage or salad with corn and beans   Dinner: chicken asparagus and rice  Exercise: knees are bad and back hurts , bougth a home gym device  Hydration: 2.5 bottles   Alcohol: none  Sleep: ok  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      Review of Systems   Constitutional: Negative for activity change. HENT: Negative for trouble swallowing. Gastrointestinal: Negative for abdominal pain, nausea and vomiting, acid reflux, constipation/diarrhea   Psychiatric/Behavioral: + for behavioral problems ,better controlled anxiety or depression    Objective:  /78 (BP Location: Right arm, Patient Position: Sitting, Cuff Size: Large)   Pulse 76   Resp 14   Ht 5' 7" (1.702 m)   Wt 111 kg (244 lb)   LMP  (LMP Unknown)   BMI 38.22 kg/m²   Constitutional: Well-developed, well-nourished and Obese Body mass index is 38.22 kg/m². Precilla Cornfield HEENT: No conjunctival injection. No thyroid masses  Pulmonary: No increased work of breathing or signs of respiratory distress. Clear respiratory sounds  CV: Well-perfused, Regular rate and rhythm, no murmurs, no edema  GI: increased abdominal girth. Non-distended. Neuro: Oriented to person, place and time. Normal Speech. Normal gait. Psych: Anxious affect and mood. No delusion or hallucinations, normal thought process    Labs and Imaging  Recent labs and imaging have been personally reviewed.   Lab Results   Component Value Date    WBC 9.46 02/14/2023    HGB 12.1 02/14/2023    HCT 36.9 02/14/2023    MCV 89 02/14/2023     02/14/2023     Lab Results   Component Value Date    SODIUM 138 07/13/2023    K 4.2 07/13/2023     07/13/2023    CO2 29 07/13/2023    AGAP 6 07/13/2023    BUN 21 07/13/2023    CREATININE 0.81 07/13/2023    GLUC 101 02/14/2023    GLUF 102 (H) 07/13/2023    CALCIUM 10.0 07/13/2023    AST 14 07/13/2023    ALT 16 07/13/2023    ALKPHOS 79 07/13/2023    TP 7.8 07/13/2023    TBILI 0.53 07/13/2023    EGFR 82 07/13/2023     Lab Results   Component Value Date    HGBA1C 5.4 07/13/2023     Lab Results   Component Value Date    ACZ8TNRFEGTR 1.281 07/13/2023     Lab Results   Component Value Date    CHOLESTEROL 206 (H) 07/13/2023     Lab Results   Component Value Date    HDL 51 07/13/2023     Lab Results   Component Value Date    TRIG 99 07/13/2023     Lab Results   Component Value Date    LDLCALC 135 (H) 07/13/2023

## 2023-11-10 DIAGNOSIS — R73.03 PREDIABETES: ICD-10-CM

## 2023-12-21 ENCOUNTER — TELEPHONE (OUTPATIENT)
Dept: BARIATRICS | Facility: CLINIC | Age: 53
End: 2023-12-21

## 2024-01-02 ENCOUNTER — TELEPHONE (OUTPATIENT)
Dept: BARIATRICS | Facility: CLINIC | Age: 54
End: 2024-01-02

## 2024-01-02 DIAGNOSIS — E66.01 SEVERE OBESITY WITH BODY MASS INDEX (BMI) OF 36.0 TO 36.9 WITH SERIOUS COMORBIDITY: ICD-10-CM

## 2024-01-02 DIAGNOSIS — E66.9 CLASS 2 OBESITY IN ADULT: Primary | ICD-10-CM

## 2024-01-02 NOTE — TELEPHONE ENCOUNTER
Pt called in to give new insurance information and is requesting script for injection to sent to pharmacy.

## 2024-01-02 NOTE — TELEPHONE ENCOUNTER
Good Day Dr. Figueredo,    Patient has new insurance. Requesting, injection, Saxenda (?)  be sent to pharmacy.    Thank you.

## 2024-01-03 RX ORDER — PEN NEEDLE, DIABETIC 30 GX3/16"
NEEDLE, DISPOSABLE MISCELLANEOUS DAILY
Qty: 100 EACH | Refills: 0 | Status: SHIPPED | OUTPATIENT
Start: 2024-01-03

## 2024-01-04 ENCOUNTER — TELEPHONE (OUTPATIENT)
Dept: BARIATRICS | Facility: CLINIC | Age: 54
End: 2024-01-04

## 2024-01-11 ENCOUNTER — TELEPHONE (OUTPATIENT)
Dept: BARIATRICS | Facility: CLINIC | Age: 54
End: 2024-01-11

## 2024-01-11 NOTE — TELEPHONE ENCOUNTER
Good Day Dr. Figueredo,    Patient calls to inform office that her pharmacy states that Saxenda and Mounjaro will be on back order for next 4 months.  She is requesting that you consider perhaps prescribing Ozempic. She states she is not diabetic, but pre-diabetic. I explained usually drug is only approved by insurance if diagnosis is diabetes. She'd like our office to try. Please advise staff your thoughts.    Thank you.

## 2024-01-18 ENCOUNTER — OFFICE VISIT (OUTPATIENT)
Dept: BARIATRICS | Facility: CLINIC | Age: 54
End: 2024-01-18
Payer: COMMERCIAL

## 2024-01-18 ENCOUNTER — APPOINTMENT (OUTPATIENT)
Dept: LAB | Facility: HOSPITAL | Age: 54
End: 2024-01-18
Payer: COMMERCIAL

## 2024-01-18 VITALS
WEIGHT: 253.6 LBS | DIASTOLIC BLOOD PRESSURE: 98 MMHG | BODY MASS INDEX: 39.8 KG/M2 | HEIGHT: 67 IN | RESPIRATION RATE: 18 BRPM | HEART RATE: 62 BPM | OXYGEN SATURATION: 99 % | SYSTOLIC BLOOD PRESSURE: 146 MMHG

## 2024-01-18 DIAGNOSIS — E66.9 CLASS 2 OBESITY IN ADULT: ICD-10-CM

## 2024-01-18 DIAGNOSIS — R73.03 PREDIABETES: Primary | ICD-10-CM

## 2024-01-18 DIAGNOSIS — R73.03 PREDIABETES: ICD-10-CM

## 2024-01-18 LAB
EST. AVERAGE GLUCOSE BLD GHB EST-MCNC: 117 MG/DL
HBA1C MFR BLD: 5.7 %
TSH SERPL DL<=0.05 MIU/L-ACNC: 0.52 UIU/ML (ref 0.45–4.5)

## 2024-01-18 PROCEDURE — 84443 ASSAY THYROID STIM HORMONE: CPT

## 2024-01-18 PROCEDURE — 83036 HEMOGLOBIN GLYCOSYLATED A1C: CPT

## 2024-01-18 PROCEDURE — 36415 COLL VENOUS BLD VENIPUNCTURE: CPT

## 2024-01-18 PROCEDURE — 99214 OFFICE O/P EST MOD 30 MIN: CPT | Performed by: FAMILY MEDICINE

## 2024-01-18 NOTE — PROGRESS NOTES
Assessment/Plan:  Lucille was seen today for follow-up.  1. Prediabetes  semaglutide, 0.25 or 0.5 mg/dose, (Ozempic, 0.25 or 0.5 MG/DOSE,) 2 mg/3 mL injection pen    Hemoglobin A1C    TSH w/Reflex      2. Class 2 obesity in adult              1. Class 2 obesity in adult  Patient frustrated why I am not prescribing Ozempic  Asks for Ozempic , even though I explained not covered for prediabetics Rx printed out so she can ask at the pharmacy  Advised new medicine called Keshawn but insurance does not cover   She plans to get another insurance that can cover her medication  Thinks does not need behavioral therapy although strongly encouraged  Advised may try  protein shake diet with our dietician but she declines  Lost 8% with nutrition control and using Topamax but had to D/C Topamax due to kidney stones, regained some of the weight back     Could not find Manoj/ [facility]  CT scan IMPRESSION:9/2022     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  Continued follow-up as per recommendations on prior CT.       tried Wellbutrin /Natrexone- handout with side effects given no Hx of seizures  Did not tolerate Naltrexone- had headaches and spikes in BP to 113 diastolic   Stopped Wellbutrin due to no need and not helpful in controlling appetite      Continue restructure meal planning- fell off track,feels out of control     Encourage mindful eating, portion control, motivational interview performed to help patient reach goals   2. Prediabetes  continue Metformin 500mg daily, tolerates so far  Recheck status HbA1C  Controlled HbA1c  Cont vit B12 and iron supplementation  Component  Ref Range & Units 7/13/23  9:40 AM 4/7/22  8:03 AM 9/1/20  3:09 PM 5/14/19 10:37 AM   Hemoglobin A1C  Normal 3.8-5.6%; PreDiabetic 5.7-6.4%; Diabetic >=6.5%; Glycemic control for adults with diabetes <7.0% % 5.4 5.8 High  5.9 High  R, CM 5.7 R     Follow up if needing medications , she already has been throuhg nutrition  programs in our clinic        Subjective:   Chief Complaint   Patient presents with    Follow-up     Patient here to discuss weight associated problems and nutrition goals  HPI: Lucille Ibrahim  is a 53 y.o. female with excess weight/obesity here to pursue weight management.  Patient is pursuing [facility] Program.     50/F s/p gastric band with Dr. 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) which was complicated by a gastric remnant blowout s/p diagnostic laparoscopy converted to open, remnant gastrectomy, left hemicolectomy     Prior to surgery: 350#    SUNG: 230#   Weight regain 50 lbs dt bad habits    Wt Readings from Last 10 Encounters:   01/18/24 115 kg (253 lb 9.6 oz)   10/24/23 111 kg (244 lb)   08/01/23 107 kg (236 lb)   07/12/23 108 kg (237 lb 3.2 oz)   05/10/23 106 kg (233 lb)   02/12/23 105 kg (231 lb 7.7 oz)   01/24/23 105 kg (231 lb 8 oz)   12/27/22 113 kg (250 lb)   11/17/22 113 kg (250 lb)   10/06/22 117 kg (258 lb 9.6 oz)       OV :259 lbs 9-13-22 started Metformin and Topamax was increased  Last OV:233lbs  Current weight : 244lbs   Current weight 253lbs  Hx of Change in weight:-19lbs lost 8% of bodyweight only by changing diet and continuing Topamax  ,Topamax stopped due to kidney stone12/2023 , other choices Wellbutrin/Naltrexone could not tolerate   Never found Saxenda /Wegovy due to shortage    Food logging: none  Increased appetite/cravings:hungry all the time  Morning: tea   Lunch: cracker tuna or cottage or salad with corn and beans   Dinner: chicken asparagus and rice  Exercise: knees are bad and back hurts , bougth a home gym device  Hydration: 2.5 bottles   Alcohol: none  Sleep: ok  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      Review of Systems   Constitutional: Negative for activity change.   HENT: Negative for trouble swallowing.    Gastrointestinal: Negative for abdominal pain, nausea and vomiting, acid reflux, constipation/diarrhea   Psychiatric/Behavioral: + for behavioral problems ,better controlled anxiety or depression    Objective:  /98 (BP Location: Left arm, Patient Position: Sitting, Cuff Size: Adult)   Pulse 62   Resp 18   Ht 5' 7\" (1.702 m)   Wt 115 kg (253 lb 9.6 oz)   LMP  (LMP Unknown)   SpO2 99%   BMI 39.72 kg/m²   Constitutional: Well-developed, well-nourished and Obese Body mass index is 39.72 kg/m²..    HEENT: No conjunctival pallor or jaundice  Pulmonary: No increased work of breathing or signs of respiratory distress.  CV: Well-perfused, Normal rate    Vascular: no peripheral edema  GI: Abdomen obese, Non-distended  MSK: no sarcopenia noted   Neuro: Oriented to person, place and time. Normal Speech  Psych: Anxious affect and mood. No delusion or hallucinations, normal thought process    Labs and Imaging  Recent labs and imaging have been personally reviewed.  Lab Results   Component Value Date    WBC 9.46 02/14/2023    HGB 12.1 02/14/2023    HCT 36.9 02/14/2023    MCV 89 02/14/2023     02/14/2023     Lab Results   Component Value Date    SODIUM 138 07/13/2023    K 4.2 07/13/2023     07/13/2023    CO2 29 07/13/2023    AGAP 6 07/13/2023    BUN 21 07/13/2023    CREATININE 0.81 07/13/2023    GLUC 101 02/14/2023    GLUF 102 (H) 07/13/2023    CALCIUM 10.0 07/13/2023    AST 14 07/13/2023    ALT 16 07/13/2023    ALKPHOS 79 07/13/2023    TP 7.8 07/13/2023    TBILI 0.53 07/13/2023    EGFR 82 07/13/2023     Lab Results   Component Value Date    HGBA1C 5.4 07/13/2023     Lab Results   Component Value Date    STD1IGIPFQVA 1.281 07/13/2023     Lab Results   Component Value " Date    CHOLESTEROL 206 (H) 07/13/2023     Lab Results   Component Value Date    HDL 51 07/13/2023     Lab Results   Component Value Date    TRIG 99 07/13/2023     Lab Results   Component Value Date    LDLCALC 135 (H) 07/13/2023

## 2024-02-01 ENCOUNTER — TELEPHONE (OUTPATIENT)
Age: 54
End: 2024-02-01

## 2024-02-01 NOTE — TELEPHONE ENCOUNTER
Radha PAUL, from [employer] insurance call in regarding pt medication saxenda. Rep stated that the medication is not listed on their panel and pt needs pre-auth/office notes from the provider stating why this medication is needed for the pt. Please call [employer] at [phone].

## 2024-02-02 ENCOUNTER — TELEPHONE (OUTPATIENT)
Dept: BARIATRICS | Facility: CLINIC | Age: 54
End: 2024-02-02

## 2024-02-02 NOTE — TELEPHONE ENCOUNTER
Patient's insurance is still [employer] but new ID # . Will update on next visit. New PA for Manoj started today.

## 2024-02-07 NOTE — TELEPHONE ENCOUNTER
Patient requesting PA be started for her Ozempic. She asks that her lab results be included showing her A1c levels.  Thank you   (Ozempic, 0.25 or 0.5 MG/DOSE,) 2 mg/3 mL injection pen [[id number]]      Order Details    Dispense Quantity: 2 mL Refills: 0        Sig: INJECT Ozempic Weekly SUBCUTANEOUSLY. -WEEK 1: 0.25 mg weekly for 4 weeks than increase to 0.5 mg once a week -       Start Date: 01/18/24 End Date: --  Written Date: 01/18/24 Expiration Date: 01/17/25   Associated Diagnoses: Prediabetes [R73.03]

## 2024-02-07 NOTE — TELEPHONE ENCOUNTER
Patient calling stating her insurance told her they no longer make saxenda but was told ozempic is covered by her insurance. She called asking to speak with clinical staff regarding this.

## 2024-02-15 ENCOUNTER — TELEPHONE (OUTPATIENT)
Dept: BARIATRICS | Facility: CLINIC | Age: 54
End: 2024-02-15

## 2024-02-15 NOTE — TELEPHONE ENCOUNTER
Returned patient call.  Left message on voice mail.   Informed [employer] denied Ozemic. Faxed appeal on 2/9 after hours. Appeal faxed on 02/12/24. No information at this time whether denied or approved by insurance

## 2024-02-15 NOTE — TELEPHONE ENCOUNTER
Pt calling office to speak with Matthew TURNER.  Please call her to discuss the medications situation

## 2024-02-15 NOTE — TELEPHONE ENCOUNTER
Patient left detailed message on her voice mail regarding medication. Awaiting insurance appeal approval.

## 2024-02-21 NOTE — TELEPHONE ENCOUNTER
Good Day,    Number provided for [employer] is out of service. Original appeal was sent to [phone] on 02/12/2024. Denial sent to office on 02/15/2024.     Thank you

## 2024-02-21 NOTE — TELEPHONE ENCOUNTER
Malick called in from [facility] to inform office that an appeal can be sent on the patients behalf. Advised caller an appeal had been sent but caller states they have not gotten one. Caller states to send the Appeal to FAX # [phone] to the [employer]. Please call [employer] directly at [phone] if there are any questions or concerns.

## 2024-02-23 NOTE — TELEPHONE ENCOUNTER
Gelacio from [employer].  States that Ozempic was denied.  Ref # [id number].    CB if any questions is [phone].

## 2024-02-26 ENCOUNTER — TELEPHONE (OUTPATIENT)
Dept: BARIATRICS | Facility: CLINIC | Age: 54
End: 2024-02-26

## 2024-03-01 ENCOUNTER — TELEPHONE (OUTPATIENT)
Age: 54
End: 2024-03-01

## 2024-03-01 NOTE — TELEPHONE ENCOUNTER
Aidan PELAEZ, a representative from [facility] called in to let us know that the patients rx for Ozempic has been denied.      He also stated that we should resubmit this order if the patient has Type 2 diabetes.    He also said we could try to do a peer to peer review to get this rx authorized, the number for peer to peer review is [phone].

## 2024-03-04 ENCOUNTER — TELEPHONE (OUTPATIENT)
Dept: BARIATRICS | Facility: CLINIC | Age: 54
End: 2024-03-04

## 2024-03-04 NOTE — TELEPHONE ENCOUNTER
Terri SMITH,  Patient Engagement Partner  03/01/24  Aidan PELAEZ, a representative from [facility] called in to let us know that the patients rx for Ozempic has been denied.      He also stated that we should resubmit this order if the patient has Type 2 diabetes.     03/04/24  Naomi FORD MA to [facility]    Diagnosis associated with script is prediabetes. I can only use the code associated with the script to submit PA

## 2024-03-05 ENCOUNTER — APPOINTMENT (OUTPATIENT)
Dept: LAB | Facility: HOSPITAL | Age: 54
End: 2024-03-05
Payer: COMMERCIAL

## 2024-03-05 DIAGNOSIS — I10 PRIMARY HYPERTENSION: ICD-10-CM

## 2024-03-05 DIAGNOSIS — Z11.59 NEED FOR HEPATITIS C SCREENING TEST: ICD-10-CM

## 2024-03-05 LAB
CHOLEST SERPL-MCNC: 156 MG/DL
HCV AB SER QL: NORMAL
HDLC SERPL-MCNC: 42 MG/DL
LDLC SERPL CALC-MCNC: 94 MG/DL (ref 0–100)
NONHDLC SERPL-MCNC: 114 MG/DL
TRIGL SERPL-MCNC: 100 MG/DL

## 2024-03-05 PROCEDURE — 36415 COLL VENOUS BLD VENIPUNCTURE: CPT

## 2024-03-05 PROCEDURE — 86803 HEPATITIS C AB TEST: CPT

## 2024-03-05 PROCEDURE — 80061 LIPID PANEL: CPT

## 2024-05-07 NOTE — PLAN OF CARE
DM controlled, continue metformin and jardiance  Foot exam today - no deficits. Fungal nails - gets them trimmed every 8 weeks with podiatry  He went to [facility] in Jan, will request records    Problem: Potential for Falls  Goal: Patient will remain free of falls  Description  INTERVENTIONS:  - Assess patient frequently for physical needs  -  Identify cognitive and physical deficits and behaviors that affect risk of falls    -  [place] fall precautions as indicated by assessment   - Educate patient/family on patient safety including physical limitations  - Instruct patient to call for assistance with activity based on assessment  - Modify environment to reduce risk of injury  - Consider OT/PT consult to assist with strengthening/mobility  Outcome: Progressing     Problem: PAIN - ADULT  Goal: Verbalizes/displays adequate comfort level or baseline comfort level  Description  Interventions:  - Encourage patient to monitor pain and request assistance  - Assess pain using appropriate pain scale  - Administer analgesics based on type and severity of pain and evaluate response  - Implement non-pharmacological measures as appropriate and evaluate response  - Consider cultural and social influences on pain and pain management  - Notify physician/advanced practitioner if interventions unsuccessful or patient reports new pain  Outcome: Progressing     Problem: INFECTION - ADULT  Goal: Absence or prevention of progression during hospitalization  Description  INTERVENTIONS:  - Assess and monitor for signs and symptoms of infection  - Monitor lab/diagnostic results  - Monitor all insertion sites, i e  indwelling lines, tubes, and drains  - Monitor endotracheal (as able) and nasal secretions for changes in amount and color  - [place] appropriate cooling/warming therapies per order  - Administer medications as ordered  - Instruct and encourage patient and family to use good hand hygiene technique  - Identify and instruct in appropriate isolation precautions for identified infection/condition  Outcome: Progressing  Goal: Absence of fever/infection during neutropenic period  Description  INTERVENTIONS:  - Monitor WBC  - Implement neutropenic guidelines  Outcome: Progressing     Problem: SAFETY ADULT  Goal: Maintain or return to baseline ADL function  Description  INTERVENTIONS:  -  Assess patient's ability to carry out ADLs; assess patient's baseline for ADL function and identify physical deficits which impact ability to perform ADLs (bathing, care of mouth/teeth, toileting, grooming, dressing, etc )  - Assess/evaluate cause of self-care deficits   - Assess range of motion  - Assess patient's mobility; develop plan if impaired  - Assess patient's need for assistive devices and provide as appropriate  - Encourage maximum independence but intervene and supervise when necessary  ¯ Involve family in performance of ADLs  ¯ Assess for home care needs following discharge   ¯ Request OT consult to assist with ADL evaluation and planning for discharge  ¯ Provide patient education as appropriate  Outcome: Progressing  Goal: Maintain or return mobility status to optimal level  Description  INTERVENTIONS:  - Assess patient's baseline mobility status (ambulation, transfers, stairs, etc )    - Identify cognitive and physical deficits and behaviors that affect mobility  - Identify mobility aids required to assist with transfers and/or ambulation (gait belt, sit-to-stand, lift, walker, cane, etc )  - [place] fall precautions as indicated by assessment  - Record patient progress and toleration of activity level on Mobility SBAR; progress patient to next Phase/Stage  - Instruct patient to call for assistance with activity based on assessment  - Request Rehabilitation consult to assist with strengthening/weightbearing, etc   Outcome: Progressing     Problem: DISCHARGE PLANNING  Goal: Discharge to home or other facility with appropriate resources  Description  INTERVENTIONS:  - Identify barriers to discharge w/patient and caregiver  - Arrange for needed discharge resources and transportation as appropriate  - Identify discharge learning needs (meds, wound care, etc )  - Arrange for interpretive services to assist at discharge as needed  - Refer to Case Management Department for coordinating discharge planning if the patient needs post-hospital services based on physician/advanced practitioner order or complex needs related to functional status, cognitive ability, or social support system  Outcome: Progressing     Problem: Knowledge Deficit  Goal: Patient/family/caregiver demonstrates understanding of disease process, treatment plan, medications, and discharge instructions  Description  Complete learning assessment and assess knowledge base  Interventions:  - Provide teaching at level of understanding  - Provide teaching via preferred learning methods  Outcome: Progressing     Problem: Nutrition/Hydration-ADULT  Goal: Nutrient/Hydration intake appropriate for improving, restoring or maintaining nutritional needs  Description  Monitor and assess patient's nutrition/hydration status for malnutrition (ex- brittle hair, bruises, dry skin, pale skin and conjunctiva, muscle wasting, smooth red tongue, and disorientation)  Collaborate with interdisciplinary team and initiate plan and interventions as ordered  Monitor patient's weight and dietary intake as ordered or per policy  Utilize nutrition screening tool and intervene per policy  Determine patient's food preferences and provide high-protein, foods as appropriate       INTERVENTIONS:  - Monitor oral intake, urinary output, labs, and treatment plans  - Assess nutrition and hydration status and recommend course of action  - Evaluate amount of meals eaten  - Assist patient with eating if necessary   - Allow adequate time for meals  - Recommend/ encourage appropriate diets, oral nutritional supplements, and vitamin/mineral supplements  - Order, calculate, and assess calorie counts as needed  - Recommend, monitor, and adjust tube feedings and TPN/PPN based on assessed needs  - Assess need for intravenous fluids  - Provide nutrition/hydration education as appropriate  - Include patient/family/caregiver in decisions related to nutrition   Outcome: Progressing

## 2024-07-30 NOTE — PLAN OF CARE
ASQ-3 Screen      Results: Some Concerns/Monitor     Communication: Reassuring Score: 60   Gross Motor: Concerning Score: 30   Fine Motor: Reassuring Score: 55   Problem Solving: Reassuring Score: 55   Personal-Social: Reassuring Score: 55            Problem: PAIN - ADULT  Goal: Verbalizes/displays adequate comfort level or baseline comfort level  Description  Interventions:  - Encourage patient to monitor pain and request assistance  - Assess pain using appropriate pain scale  - Administer analgesics based on type and severity of pain and evaluate response  - Implement non-pharmacological measures as appropriate and evaluate response  - Consider cultural and social influences on pain and pain management  - Notify physician/advanced practitioner if interventions unsuccessful or patient reports new pain  Outcome: Progressing     Problem: SAFETY ADULT  Goal: Patient will remain free of falls  Description  INTERVENTIONS:  - Assess patient frequently for physical needs  -  Identify cognitive and physical deficits and behaviors that affect risk of falls    -  [place] fall precautions as indicated by assessment   - Educate patient/family on patient safety including physical limitations  - Instruct patient to call for assistance with activity based on assessment  - Modify environment to reduce risk of injury  - Consider OT/PT consult to assist with strengthening/mobility  Outcome: Progressing  Goal: Maintain or return to baseline ADL function  Description  INTERVENTIONS:  -  Assess patient's ability to carry out ADLs; assess patient's baseline for ADL function and identify physical deficits which impact ability to perform ADLs (bathing, care of mouth/teeth, toileting, grooming, dressing, etc )  - Assess/evaluate cause of self-care deficits   - Assess range of motion  - Assess patient's mobility; develop plan if impaired  - Assess patient's need for assistive devices and provide as appropriate  - Encourage maximum independence but intervene and supervise when necessary  ¯ Involve family in performance of ADLs  ¯ Assess for home care needs following discharge   ¯ Request OT consult to assist with ADL evaluation and planning for discharge  ¯ Provide patient education as appropriate  Outcome: Progressing  Goal: Maintain or return mobility status to optimal level  Description  INTERVENTIONS:  - Assess patient's baseline mobility status (ambulation, transfers, stairs, etc )    - Identify cognitive and physical deficits and behaviors that affect mobility  - Identify mobility aids required to assist with transfers and/or ambulation (gait belt, sit-to-stand, lift, walker, cane, etc )  - [place] fall precautions as indicated by assessment  - Record patient progress and toleration of activity level on Mobility SBAR; progress patient to next Phase/Stage  - Instruct patient to call for assistance with activity based on assessment  - Request Rehabilitation consult to assist with strengthening/weightbearing, etc   Outcome: Progressing     Problem: DISCHARGE PLANNING  Goal: Discharge to home or other facility with appropriate resources  Description  INTERVENTIONS:  - Identify barriers to discharge w/patient and caregiver  - Arrange for needed discharge resources and transportation as appropriate  - Identify discharge learning needs (meds, wound care, etc )  - Arrange for interpretive services to assist at discharge as needed  - Refer to Case Management Department for coordinating discharge planning if the patient needs post-hospital services based on physician/advanced practitioner order or complex needs related to functional status, cognitive ability, or social support system  Outcome: Progressing     Problem: GASTROINTESTINAL - ADULT  Goal: Minimal or absence of nausea and/or vomiting  Description  INTERVENTIONS:  - Administer IV fluids as ordered to ensure adequate hydration  - Maintain NPO status until nausea and vomiting are resolved  - Nasogastric tube as ordered  - Administer ordered antiemetic medications as needed  - Provide nonpharmacologic comfort measures as appropriate  - Advance diet as tolerated, if ordered  - Nutrition services referral to assist patient with adequate nutrition and appropriate food choices  Outcome: Progressing  Goal: Maintains or returns to baseline bowel function  Description  INTERVENTIONS:  - Assess bowel function  - Encourage oral fluids to ensure adequate hydration  - Administer IV fluids as ordered to ensure adequate hydration  - Administer ordered medications as needed  - Encourage mobilization and activity  - Nutrition services referral to assist patient with appropriate food choices  Outcome: Progressing  Goal: Maintains adequate nutritional intake  Description  INTERVENTIONS:  - Monitor percentage of each meal consumed  - Identify factors contributing to decreased intake, treat as appropriate  - Assist with meals as needed  - Monitor I&O, WT and lab values  - Obtain nutrition services referral as needed  Outcome: Progressing  Goal: Establish and maintain optimal ostomy function  Description  INTERVENTIONS:  - Assess bowel function  - Encourage oral fluids to ensure adequate hydration  - Administer IV fluids as ordered to ensure adequate hydration  - Administer ordered medications as needed  - Encourage mobilization and activity  - Nutrition services referral to assist patient with appropriate food choices  - Assess stoma site  Outcome: Progressing

## 2025-03-12 ENCOUNTER — HOSPITAL ENCOUNTER (EMERGENCY)
Facility: HOSPITAL | Age: 55
Discharge: HOME | End: 2025-03-13

## 2025-03-12 DIAGNOSIS — R10.9 FLANK PAIN: Primary | ICD-10-CM

## 2025-03-12 DIAGNOSIS — N39.0 UTI (URINARY TRACT INFECTION): ICD-10-CM

## 2025-03-12 LAB
BACTERIA UR QL AUTO: ABNORMAL /HPF
BILIRUB UR QL STRIP: NEGATIVE
CLARITY UR: CLEAR
COLOR UR: YELLOW
GLUCOSE UR STRIP-MCNC: NEGATIVE MG/DL
HGB UR QL STRIP.AUTO: ABNORMAL
KETONES UR STRIP-MCNC: NEGATIVE MG/DL
LEUKOCYTE ESTERASE UR QL STRIP: ABNORMAL
MUCOUS THREADS UR QL AUTO: ABNORMAL
NITRITE UR QL STRIP: NEGATIVE
NON-SQ EPI CELLS URNS QL MICRO: ABNORMAL /HPF
PH UR STRIP.AUTO: 6 [PH]
PROT UR STRIP-MCNC: NEGATIVE MG/DL
RBC #/AREA URNS AUTO: ABNORMAL /HPF
SP GR UR STRIP.AUTO: 1.02 (ref 1–1.03)
UROBILINOGEN UR QL STRIP.AUTO: 0.2 E.U./DL
WBC #/AREA URNS AUTO: ABNORMAL /HPF

## 2025-03-12 PROCEDURE — 87077 CULTURE AEROBIC IDENTIFY: CPT

## 2025-03-12 PROCEDURE — 99284 EMERGENCY DEPT VISIT MOD MDM: CPT

## 2025-03-12 PROCEDURE — 87186 SC STD MICRODIL/AGAR DIL: CPT

## 2025-03-12 PROCEDURE — 87086 URINE CULTURE/COLONY COUNT: CPT

## 2025-03-12 PROCEDURE — 81001 URINALYSIS AUTO W/SCOPE: CPT

## 2025-03-12 RX ORDER — ONDANSETRON 2 MG/ML
4 INJECTION INTRAMUSCULAR; INTRAVENOUS ONCE
Status: COMPLETED | OUTPATIENT
Start: 2025-03-13 | End: 2025-03-13

## 2025-03-13 ENCOUNTER — APPOINTMENT (EMERGENCY)
Dept: CT IMAGING | Facility: HOSPITAL | Age: 55
End: 2025-03-13

## 2025-03-13 VITALS
HEART RATE: 61 BPM | RESPIRATION RATE: 16 BRPM | SYSTOLIC BLOOD PRESSURE: 132 MMHG | WEIGHT: 230 LBS | BODY MASS INDEX: 36.02 KG/M2 | OXYGEN SATURATION: 95 % | TEMPERATURE: 98 F | DIASTOLIC BLOOD PRESSURE: 71 MMHG

## 2025-03-13 LAB
ALBUMIN SERPL BCG-MCNC: 4.3 G/DL (ref 3.5–5)
ALP SERPL-CCNC: 83 U/L (ref 34–104)
ALT SERPL W P-5'-P-CCNC: 18 U/L (ref 7–52)
ANION GAP SERPL CALCULATED.3IONS-SCNC: 7 MMOL/L (ref 4–13)
AST SERPL W P-5'-P-CCNC: 18 U/L (ref 13–39)
BASOPHILS # BLD AUTO: 0.05 THOUSANDS/ÂΜL (ref 0–0.1)
BASOPHILS NFR BLD AUTO: 1 % (ref 0–1)
BILIRUB SERPL-MCNC: 0.61 MG/DL (ref 0.2–1)
BUN SERPL-MCNC: 15 MG/DL (ref 5–25)
CALCIUM SERPL-MCNC: 9.1 MG/DL (ref 8.4–10.2)
CHLORIDE SERPL-SCNC: 103 MMOL/L (ref 96–108)
CO2 SERPL-SCNC: 29 MMOL/L (ref 21–32)
CREAT SERPL-MCNC: 0.66 MG/DL (ref 0.6–1.3)
EOSINOPHIL # BLD AUTO: 0.26 THOUSAND/ÂΜL (ref 0–0.61)
EOSINOPHIL NFR BLD AUTO: 3 % (ref 0–6)
ERYTHROCYTE [DISTWIDTH] IN BLOOD BY AUTOMATED COUNT: 13.7 % (ref 11.6–15.1)
GFR SERPL CREATININE-BSD FRML MDRD: 99 ML/MIN/1.73SQ M
GLUCOSE SERPL-MCNC: 87 MG/DL (ref 65–140)
HCT VFR BLD AUTO: 43.1 % (ref 34.8–46.1)
HGB BLD-MCNC: 13.9 G/DL (ref 11.5–15.4)
IMM GRANULOCYTES # BLD AUTO: 0.01 THOUSAND/UL (ref 0–0.2)
IMM GRANULOCYTES NFR BLD AUTO: 0 % (ref 0–2)
LIPASE SERPL-CCNC: 28 U/L (ref 11–82)
LYMPHOCYTES # BLD AUTO: 2.04 THOUSANDS/ÂΜL (ref 0.6–4.47)
LYMPHOCYTES NFR BLD AUTO: 24 % (ref 14–44)
MCH RBC QN AUTO: 28 PG (ref 26.8–34.3)
MCHC RBC AUTO-ENTMCNC: 32.3 G/DL (ref 31.4–37.4)
MCV RBC AUTO: 87 FL (ref 82–98)
MONOCYTES # BLD AUTO: 0.57 THOUSAND/ÂΜL (ref 0.17–1.22)
MONOCYTES NFR BLD AUTO: 7 % (ref 4–12)
NEUTROPHILS # BLD AUTO: 5.52 THOUSANDS/ÂΜL (ref 1.85–7.62)
NEUTS SEG NFR BLD AUTO: 65 % (ref 43–75)
NRBC BLD AUTO-RTO: 0 /100 WBCS
PLATELET # BLD AUTO: 282 THOUSANDS/UL (ref 149–390)
PMV BLD AUTO: 11.3 FL (ref 8.9–12.7)
POTASSIUM SERPL-SCNC: 3.5 MMOL/L (ref 3.5–5.3)
PROT SERPL-MCNC: 6.8 G/DL (ref 6.4–8.4)
RBC # BLD AUTO: 4.96 MILLION/UL (ref 3.81–5.12)
SODIUM SERPL-SCNC: 139 MMOL/L (ref 135–147)
WBC # BLD AUTO: 8.45 THOUSAND/UL (ref 4.31–10.16)

## 2025-03-13 PROCEDURE — 36415 COLL VENOUS BLD VENIPUNCTURE: CPT

## 2025-03-13 PROCEDURE — 96361 HYDRATE IV INFUSION ADD-ON: CPT

## 2025-03-13 PROCEDURE — 96376 TX/PRO/DX INJ SAME DRUG ADON: CPT

## 2025-03-13 PROCEDURE — 99285 EMERGENCY DEPT VISIT HI MDM: CPT

## 2025-03-13 PROCEDURE — 96375 TX/PRO/DX INJ NEW DRUG ADDON: CPT

## 2025-03-13 PROCEDURE — 96374 THER/PROPH/DIAG INJ IV PUSH: CPT

## 2025-03-13 PROCEDURE — 83690 ASSAY OF LIPASE: CPT

## 2025-03-13 PROCEDURE — 74177 CT ABD & PELVIS W/CONTRAST: CPT

## 2025-03-13 PROCEDURE — 80053 COMPREHEN METABOLIC PANEL: CPT

## 2025-03-13 PROCEDURE — 85025 COMPLETE CBC W/AUTO DIFF WBC: CPT

## 2025-03-13 RX ORDER — MORPHINE SULFATE 4 MG/ML
4 INJECTION, SOLUTION INTRAMUSCULAR; INTRAVENOUS ONCE
Status: COMPLETED | OUTPATIENT
Start: 2025-03-13 | End: 2025-03-13

## 2025-03-13 RX ADMIN — MORPHINE SULFATE 4 MG: 4 INJECTION INTRAVENOUS at 04:32

## 2025-03-13 RX ADMIN — MORPHINE SULFATE 4 MG: 4 INJECTION INTRAVENOUS at 00:47

## 2025-03-13 RX ADMIN — SODIUM CHLORIDE 1000 ML: 0.9 INJECTION, SOLUTION INTRAVENOUS at 00:27

## 2025-03-13 RX ADMIN — IOHEXOL 100 ML: 350 INJECTION, SOLUTION INTRAVENOUS at 03:51

## 2025-03-13 RX ADMIN — ONDANSETRON 4 MG: 2 INJECTION INTRAMUSCULAR; INTRAVENOUS at 00:26

## 2025-03-13 RX ADMIN — IOHEXOL 50 ML: 240 INJECTION, SOLUTION INTRATHECAL; INTRAVASCULAR; INTRAVENOUS; ORAL at 03:51

## 2025-03-13 NOTE — ED PROVIDER NOTES
Time reflects when diagnosis was documented in both MDM as applicable and the Disposition within this note       Time User Action Codes Description Comment    3/13/2025  5:26 AM QuocSarahNacho Add [R10.9] Flank pain           ED Disposition       ED Disposition   Discharge    Condition   Stable    Date/Time   u Mar 13, 2025  5:26 AM    Comment   Lucille Michaelert discharge to home/self care.                   Assessment & Plan       Medical Decision Making  This patient presents with back/flank pain most consistent with musculoskeletal pain. Differential diagnoses includes lumbago versus kidney stone versus musculoskeletal spasm / strain versus sciatica. Less likely sciatica as straight leg raise test was negative. No back pain red flags on history or physical. Presentation not consistent with malignancy (lack of history of malignancy, lack of B symptoms), fracture (no trauma, no bony tenderness to palpation), cauda equina (no bowel or urinary incontinence/retention, no saddle anesthesia, no distal weakness), AAA, viscus perforation, osteomyelitis or epidural abscess (no IVDU, vertebral tenderness), renal colic, pyelonephritis (afebrile, no CVAT, no urinary symptoms).  CT negative for any acute intra-abdominal pathologies like kidney stone.  UA without infection.  Will send urine culture.  Patient will closely follow-up with PCP. Prior to discharge, discharge instructions were discussed with patient at bedside. Patient was provided both verbal and written instructions. Patient is understanding of the discharge instructions and is agreeable to plan of care. Return precautions were discussed with patient bedside, patient verbalized understanding of signs and symptoms that would necessitate return to the ED. All questions were answered. Patient was comfortable with the plan of care and discharged to home.       Problems Addressed:  Flank pain: acute illness or injury    Amount and/or Complexity of Data Reviewed  Labs:  ordered.  Radiology: ordered. Decision-making details documented in ED Course.    Risk  Prescription drug management.        ED Course as of 03/13/25 0828   Thu Mar 13, 2025   0506 CT abdomen pelvis with contrast     No acute findings in the abdomen or pelvis         Medications   sodium chloride 0.9 % bolus 1,000 mL (0 mL Intravenous Stopped 3/13/25 0300)   ondansetron (ZOFRAN) injection 4 mg (4 mg Intravenous Given 3/13/25 0026)   morphine injection 4 mg (4 mg Intravenous Given 3/13/25 0047)   iohexol (OMNIPAQUE) 350 MG/ML injection (MULTI-DOSE) 100 mL (100 mL Intravenous Given 3/13/25 0351)   iohexol (OMNIPAQUE) 240 MG/ML solution 50 mL (50 mL Oral Given 3/13/25 0351)   morphine injection 4 mg (4 mg Intravenous Given 3/13/25 0432)       ED Risk Strat Scores                                                History of Present Illness       Chief Complaint   Patient presents with    Flank Pain     R sided flank pain since 0400 today. Hx kidney stones. + nausea.        Past Medical History:   Diagnosis Date    Arthritis     Bell palsy     Diabetes (HCC) 12/22/2022    Gastric ulcer     GERD (gastroesophageal reflux disease)     History of transfusion     Hyperlipidemia 3/31/2022    Melena     Nausea and vomiting 2/13/2023    Obesity (BMI 30-39.9)     Pancreatic cyst     Right facial numbness     Upper GI bleed       Past Surgical History:   Procedure Laterality Date    ABDOMINOPLASTY      BARIATRIC SURGERY      BOWEL RESECTION LAPAROSCOPIC N/A 2/11/2019    Procedure: LAPAROSCOPIC LYSIS OF ADHESIONS; RESECTION SMALL BOWEL; DECOMPRESSION OF GASTRIC REMNANT; EGD;  Surgeon: Hetal Womack MD;  [facility];  Service: General    CHOLECYSTECTOMY      CT GUIDED PERC DRAINAGE CATHETER PLACEMENT  2/25/2019    ESOPHAGOGASTRODUODENOSCOPY N/A 3/6/2019    Procedure: INTRAOPERATIVE EGD;  Surgeon: Hetal Womack MD;  [facility];  Service: Bariatrics    FL RETROGRADE PYELOGRAM  12/22/2022    FL RETROGRADE  PYELOGRAM  2/14/2023    HERNIA REPAIR      HYSTERECTOMY      KNEE CARTILAGE SURGERY      PARTIAL GASTRECTOMY N/A 3/6/2019    Procedure: RESECTION GASTRIC PARTIAL/GASTRECTOMY PARTIAL LAPAROSCOPIC;  Surgeon: Hetal Womack MD;  [facility];  Service: Bariatrics    MO COLECTOMY PARTIAL W/ANASTOMOSIS N/A 3/6/2019    Procedure: OPEN TRANSVERSE COLON RESECTION;  Surgeon: Hetal Womack MD;  [facility];  Service: Bariatrics    MO COLONOSCOPY FLX DX W/COLLJ SPEC WHEN PFRMD N/A 3/28/2018    Procedure: COLONOSCOPY;  Surgeon: Refugio Lange MD;  [facility];  Service: Gastroenterology    MO COLONOSCOPY FLX DX W/COLLJ SPEC WHEN PFRMD N/A 1/31/2019    Procedure: COLONOSCOPY;  Surgeon: Refugio Lange MD;  [facility];  Service: Gastroenterology    MO CYSTO/URETERO W/LITHOTRIPSY &INDWELL STENT INSRT Left 12/22/2022    Procedure: CYSTOSCOPY URETEROSCOPY, RETROGRADE PYELOGRAM AND INSERTION STENT URETERAL;  Surgeon: Lalo Bui MD;  [facility];  Service: Urology    MO CYSTO/URETERO W/LITHOTRIPSY &INDWELL STENT INSRT Right 2/14/2023    Procedure: CYSTOSCOPY URETEROSCOPY, RETROGRADE PYELOGRAM AND INSERTION STENT URETERAL;  Surgeon: Lalo Bui MD;  [facility];  Service: Urology    MO ESOPHAGOGASTRODUODENOSCOPY TRANSORAL DIAGNOSTIC N/A 3/22/2018    Procedure: ESOPHAGOGASTRODUODENOSCOPY (EGD);  Surgeon: Refugio Lange MD;  [facility];  Service: Gastroenterology    MO ESOPHAGOGASTRODUODENOSCOPY TRANSORAL DIAGNOSTIC N/A 1/31/2019    Procedure: ESOPHAGOGASTRODUODENOSCOPY (EGD);  Surgeon: Refugio aLnge MD;  [facility];  Service: Gastroenterology    MO LAPS ABD PRTM&OMENTUM DX W/WO SPEC BR/WA SPX N/A 3/6/2019    Procedure: LAPAROSCOPY DIAGNOSTIC;  Surgeon: Hetal Womack MD;  [facility];  Service: Bariatrics    REDUCTION MAMMAPLASTY Bilateral     REPLACEMENT UNICONDYLAR JOINT KNEE      SHOULDER ARTHROSCOPY DISTAL  CLAVICLE EXCISION AND OPEN ROTATOR CUFF REPAIR        Family History   Problem Relation Age of Onset    Heart attack Mother     Diabetes Mother     Heart attack Father     Stroke Father     Hypertension Brother     Leukemia Paternal Aunt       Social History     Tobacco Use    Smoking status: Never    Smokeless tobacco: Never   Vaping Use    Vaping status: Never Used   Substance Use Topics    Alcohol use: Not Currently    Drug use: No      E-Cigarette/Vaping    E-Cigarette Use Never User       E-Cigarette/Vaping Substances    Nicotine No     THC No     CBD No     Flavoring No     Other No     Unknown No       I have reviewed and agree with the history as documented.     The patient is a 54 y.o. female with a history of arthritis, Bell palsy, diabetes, gastric ulcer, GERD, history of revision, hyperlipidemia, melena, pancreatic cyst, back pain who presents to [facility] with a chief complaint of right sided flank and abdominal pain. Symptoms began this afternoon and have been worsening since onset. Her pain is currently rated as a 7/10 in severity and described as sharp with some radiation around to the front of the abdomen. Associated symptoms include nausea. Symptoms are aggravated with none noted and alleviating factors include none noted. The patient denies fever, chills, chest pain, shortness of breath, cough, sputum, hemoptysis, hematemesis, hematochezia, melena, dysuria, frequency, urgency, hesitancy.  No other reported symptoms at this time.  Patient affirms allergies to ibuprofen, Paxlovid, penicillins            History provided by:  Patient   used: No    Flank Pain  Associated symptoms: no chest pain, no chills, no cough, no dysuria, no fever, no hematuria, no shortness of breath, no sore throat and no vomiting        Review of Systems   Constitutional:  Negative for chills and fever.   HENT:  Negative for ear pain and sore throat.    Eyes:  Negative for pain and visual  disturbance.   Respiratory:  Negative for cough and shortness of breath.    Cardiovascular:  Negative for chest pain and palpitations.   Gastrointestinal:  Positive for abdominal pain. Negative for vomiting.   Genitourinary:  Positive for flank pain. Negative for dysuria and hematuria.   Musculoskeletal:  Negative for arthralgias and back pain.   Skin:  Negative for color change and rash.   Neurological:  Negative for dizziness, seizures, syncope, facial asymmetry, light-headedness and headaches.   All other systems reviewed and are negative.          Objective       ED Triage Vitals   Temperature Pulse Blood Pressure Respirations SpO2 Patient Position - Orthostatic VS   03/12/25 2116 03/12/25 2116 03/12/25 2116 03/12/25 2116 03/12/25 2116 --   98 °F (36.7 °C) 66 167/84 18 100 %       Temp src Heart Rate Source BP Location FiO2 (%) Pain Score    -- -- -- -- 03/13/25 0047        8      Vitals      Date and Time Temp Pulse SpO2 Resp BP Pain Score FACES Pain Rating User   03/13/25 0530 -- 61 95 % 16 132/71 -- -- DOUG   03/13/25 0405 -- 60 98 % 16 133/74 -- -- DOUG   03/13/25 0047 -- -- -- -- -- 8 -- KT   03/12/25 2116 98 °F (36.7 °C) 66 100 % 18 167/84 -- -- AM            Physical Exam  Vitals reviewed.   Constitutional:       General: She is not in acute distress.     Appearance: Normal appearance. She is normal weight. She is not ill-appearing or toxic-appearing.   HENT:      Head: Normocephalic.      Nose: Nose normal.      Mouth/Throat:      Mouth: Mucous membranes are moist.   Eyes:      General: No scleral icterus.     Conjunctiva/sclera: Conjunctivae normal.   Cardiovascular:      Rate and Rhythm: Normal rate.      Pulses: Normal pulses.   Pulmonary:      Effort: Pulmonary effort is normal. No respiratory distress.      Breath sounds: No stridor. No wheezing, rhonchi or rales.   Abdominal:      General: Abdomen is flat. Bowel sounds are normal.      Palpations: Abdomen is soft.      Tenderness: There is abdominal  tenderness. There is right CVA tenderness. There is no rebound.      Hernia: No hernia is present.   Musculoskeletal:      Cervical back: Normal range of motion and neck supple.      Right lower leg: No edema.      Left lower leg: No edema.   Skin:     General: Skin is warm and dry.      Capillary Refill: Capillary refill takes less than 2 seconds.      Coloration: Skin is not jaundiced or pale.      Findings: No bruising, erythema or lesion.   Neurological:      Mental Status: She is alert and oriented to person, place, and time. Mental status is at baseline.         Results Reviewed       Procedure Component Value Units Date/Time    Comprehensive metabolic panel [[id number]] Collected: 03/13/25 0030    Lab Status: Final result Specimen: Blood from Arm, Right Updated: 03/13/25 0210     Sodium 139 mmol/L      Potassium 3.5 mmol/L      Chloride 103 mmol/L      CO2 29 mmol/L      ANION GAP 7 mmol/L      BUN 15 mg/dL      Creatinine 0.66 mg/dL      Glucose 87 mg/dL      Calcium 9.1 mg/dL      AST 18 U/L      ALT 18 U/L      Alkaline Phosphatase 83 U/L      Total Protein 6.8 g/dL      Albumin 4.3 g/dL      Total Bilirubin 0.61 mg/dL      eGFR 99 ml/min/1.73sq m     Narrative:      National Kid[employer] Foundation guidelines for Chronic Kidney Disease (CKD):     Stage 1 with normal or high GFR (GFR > 90 mL/min/1.73 square meters)    Stage 2 Mild CKD (GFR = 60-89 mL/min/1.73 square meters)    Stage 3A Moderate CKD (GFR = 45-59 mL/min/1.73 square meters)    Stage 3B Moderate CKD (GFR = 30-44 mL/min/1.73 square meters)    Stage 4 Severe CKD (GFR = 15-29 mL/min/1.73 square meters)    Stage 5 End Stage CKD (GFR <15 mL/min/1.73 square meters)  Note: GFR calculation is accurate only with a steady state creatinine    Lipase [[id number]]  (Normal) Collected: 03/13/25 0030    Lab Status: Final result Specimen: Blood from Arm, Right Updated: 03/13/25 0210     Lipase 28 u/L     CBC and differential [[id number]] Collected: 03/13/25  0030    Lab Status: Final result Specimen: Blood from Arm, Right Updated: 03/13/25 0151     WBC 8.45 Thousand/uL      RBC 4.96 Million/uL      Hemoglobin 13.9 g/dL      Hematocrit 43.1 %      MCV 87 fL      MCH 28.0 pg      MCHC 32.3 g/dL      RDW 13.7 %      MPV 11.3 fL      Platelets 282 Thousands/uL      nRBC 0 /100 WBCs      Segmented % 65 %      Immature Grans % 0 %      Lymphocytes % 24 %      Monocytes % 7 %      Eosinophils Relative 3 %      Basophils Relative 1 %      Absolute Neutrophils 5.52 Thousands/µL      Absolute Immature Grans 0.01 Thousand/uL      Absolute Lymphocytes 2.04 Thousands/µL      Absolute Monocytes 0.57 Thousand/µL      Eosinophils Absolute 0.26 Thousand/µL      Basophils Absolute 0.05 Thousands/µL     Urine Microscopic [[id number]]  (Abnormal) Collected: 03/12/25 2141    Lab Status: Final result Specimen: Urine, Clean Catch Updated: 03/12/25 2212     RBC, UA 0-1 /hpf      WBC, UA 10-20 /hpf      Epithelial Cells Occasional /hpf      Bacteria, UA Occasional /hpf      MUCUS THREADS Occasional    Urine culture [[id number]] Collected: 03/12/25 2141    Lab Status: In process Specimen: Urine, Clean Catch Updated: 03/12/25 2212    UA w Reflex to Microscopic w Reflex to Culture [[id number]]  (Abnormal) Collected: 03/12/25 2141    Lab Status: Final result Specimen: Urine, Clean Catch Updated: 03/12/25 2205     Color, UA Yellow     Clarity, UA Clear     Specific Gravity, UA 1.025     pH, UA 6.0     Leukocytes, UA Moderate     Nitrite, UA Negative     Protein, UA Negative mg/dl      Glucose, UA Negative mg/dl      Ketones, UA Negative mg/dl      Urobilinogen, UA 0.2 E.U./dl      Bilirubin, UA Negative     Occult Blood, UA Trace-Intact            CT abdomen pelvis with contrast   Final Interpretation by Jonny Argueta DO (03/13 0508)      No acute findings in the abdomen or pelvis.         Workstation performed: [id number]             Procedures    ED Medication and Procedure Management   Prior to  Admission Medications   Prescriptions Last Dose Informant Patient Reported? Taking?   Biotin 1000 MCG tablet  Self Yes No   Sig: Take 1,000 mcg by mouth   Insulin Pen Needle (Pen Needles) 32G X 4 MM MISC   No No   Sig: Use in the morning   Patient not taking: Reported on 1/18/2024   Multiple Vitamin (MULTIVITAMIN) tablet  Self No No   Sig: Take 1 tablet by mouth 2 (two) times a day   acetaminophen (TYLENOL) 650 mg CR tablet   No No   Sig: Take 1 tablet (650 mg total) by mouth every 8 (eight) hours as needed for mild pain   butalbital-acetaminophen-caffeine (FIORICET,ESGIC) -40 mg per tablet  Self Yes No   Patient not taking: Reported on 1/18/2024   calcium citrate-vitamin D (CITRACAL+D) 315-200 MG-UNIT per tablet  Self No No   Sig: Take 2 tablets by mouth 2 (two) times a day   ergocalciferol (VITAMIN D2) 50,000 units  Self No No   Sig: Take 1 capsule (50,000 Units total) by mouth 2 (two) times a week with meals   ferrous sulfate 325 (65 Fe) mg tablet  Self Yes No   liraglutide (SAXENDA) injection   No No   Sig: INJECT SAXENDA DAILY SUBCUTANEOUSLY. -WEEK 1: 0.6mg daily -if tolerated WEEK 2: 1.2mg daily -if tolerated WEEK 3: 1.8mg daily -if tolerated WEEK 4: 2.4mg daily -if tolerated WEEK 5: 3mg daily -IF NAUSEA/VOMITING DEVELOP STOP MEDICATION FOR A FEW DAYS AND DECREASE TO PREVIOUSLY TOLERATED DOSE. STAY HYDRATED. -IF YOU DEVELOP SEVERE ABDOMINAL PAIN WHICH MAY RADIATE TO THE BACK, SOMETIMES ASSOCIATED WITH FEVER, AND VOMITING, STOP MEDICATION AND SEEK URGENT CARE AS THIS MAY BE A SIGN OF PANCREATITIS.   Patient not taking: Reported on 1/18/2024   metFORMIN (GLUCOPHAGE) 500 mg tablet   No No   Sig: TAKE 1 TABLET BY MOUTH DAILY WITH DINNER   ondansetron (Zofran ODT) 4 mg disintegrating tablet   No No   Sig: Take 1 tablet (4 mg total) by mouth every 8 (eight) hours as needed for nausea or vomiting   Patient not taking: Reported on 1/18/2024   semaglutide, 0.25 or 0.5 mg/dose, (Ozempic, 0.25 or 0.5 MG/DOSE,) 2  mg/3 mL injection pen   No No   Sig: INJECT Ozempic Weekly SUBCUTANEOUSLY. -WEEK 1: 0.25 mg weekly for 4 weeks than increase to 0.5 mg once a week -IF NAUSEA/VOMITING DEVELOP STOP MEDICATION FOR A FEW DAYS AND DECREASE TO PREVIOUSLY TOLERATED DOSE. STAY HYDRATED. -IF YOU DEVELOP SEVERE ABDOMINAL PAIN WHICH MAY RADIATE TO THE BACK, SOMETIMES ASSOCIATED WITH FEVER, AND VOMITING, STOP MEDICATION AND SEEK URGENT CARE AS THIS MAY BE A SIGN OF PANCREATITIS.   vitamin B-12 (CYANOCOBALAMIN) 500 MCG TABS  Self No No   Sig: Take 2 tablets (1,000 mcg total) by mouth daily for 120 days      Facility-Administered Medications: None     Discharge Medication List as of 3/13/2025  5:26 AM        CONTINUE these medications which have NOT CHANGED    Details   acetaminophen (TYLENOL) 650 mg CR tablet Take 1 tablet (650 mg total) by mouth every 8 (eight) hours as needed for mild pain, Starting Sat 2/11/2023, Normal      Biotin 1000 MCG tablet Take 1,000 mcg by mouth, Historical Med      butalbital-acetaminophen-caffeine (FIORICET,ESGIC) -40 mg per tablet Historical Med      calcium citrate-vitamin D (CITRACAL+D) 315-200 MG-UNIT per tablet Take 2 tablets by mouth 2 (two) times a day, Starting Mon 3/4/2019, Normal      ergocalciferol (VITAMIN D2) 50,000 units Take 1 capsule (50,000 Units total) by mouth 2 (two) times a week with meals, Starting Thu 4/7/2022, Normal      ferrous sulfate 325 (65 Fe) mg tablet Historical Med      Insulin Pen Needle (Pen Needles) 32G X 4 MM MISC Use in the morning, Starting Wed 1/3/2024, Normal      liraglutide (SAXENDA) injection INJECT SAXENDA DAILY SUBCUTANEOUSLY. -WEEK 1: 0.6mg daily -if tolerated WEEK 2: 1.2mg daily -if tolerated WEEK 3: 1.8mg daily -if tolerated WEEK 4: 2.4mg daily -if tolerated WEEK 5: 3mg daily -IF NAUSEA/VOMITING DEVELOP STOP MEDICATION FOR A FEW DAYS AND  DECREASE TO PREVIOUSLY TOLERATED DOSE. STAY HYDRATED. -IF YOU DEVELOP SEVERE ABDOMINAL PAIN WHICH MAY RADIATE TO THE BACK,  SOMETIMES ASSOCIATED WITH FEVER, AND VOMITING, STOP MEDICATION AND SEEK URGENT CARE AS THIS MAY BE A SIGN OF PANCREATITIS., Normal      metFORMIN (GLUCOPHAGE) 500 mg tablet TAKE 1 TABLET BY MOUTH DAILY WITH DINNER, Starting Fri 11/10/2023, Normal      Multiple Vitamin (MULTIVITAMIN) tablet Take 1 tablet by mouth 2 (two) times a day, Starting Mon 3/4/2019, Normal      ondansetron (Zofran ODT) 4 mg disintegrating tablet Take 1 tablet (4 mg total) by mouth every 8 (eight) hours as needed for nausea or vomiting, Starting Sat 2/11/2023, Normal      semaglutide, 0.25 or 0.5 mg/dose, (Ozempic, 0.25 or 0.5 MG/DOSE,) 2 mg/3 mL injection pen INJECT Ozempic Weekly SUBCUTANEOUSLY. -WEEK 1: 0.25 mg weekly for 4 weeks than increase to 0.5 mg once a week -IF NAUSEA/VOMITING DEVELOP STOP MEDICATION FOR A FEW DAYS AND DECREASE TO PREVIOUSLY TOLERATED DOSE. STAY HYDRATED. -IF YOU DEVELOP SEVERE ABDO TEODORO PAIN WHICH MAY RADIATE TO THE BACK, SOMETIMES ASSOCIATED WITH FEVER, AND VOMITING, STOP MEDICATION AND SEEK URGENT CARE AS THIS MAY BE A SIGN OF PANCREATITIS., Print      vitamin B-12 (CYANOCOBALAMIN) 500 MCG TABS Take 2 tablets (1,000 mcg total) by mouth daily for 120 days, Starting Mon 3/4/2019, Until Thu 1/18/2024, Normal           No discharge procedures on file.  ED SEPSIS DOCUMENTATION   Time reflects when diagnosis was documented in both MDM as applicable and the Disposition within this note       Time User Action Codes Description Comment    3/13/2025  5:26 AM Nacho Kumari Add [R10.9] Flank pain                  Nacho Kumari PA-C  03/13/25 0828

## 2025-03-13 NOTE — DISCHARGE INSTRUCTIONS
Follow-up with PCP.  Rest ice and or heat.  Tylenol and Motrin as needed.  If any symptoms worsen or new symptoms appear return to the ER.

## 2025-03-14 ENCOUNTER — TELEPHONE (OUTPATIENT)
Age: 55
End: 2025-03-14

## 2025-03-14 NOTE — TELEPHONE ENCOUNTER
Patient called with questions about the topiramate, when it was started and stopped and if that was the medication that caused kidney stones. She noted that this was a few years ago. She also requested that I look through the office notes for any mention about the medication connected to kidney stones. I provided various medication names and dates of prescriptions that were ordered by Ms. Veda Figueredo. Patient had no further question.

## 2025-03-15 LAB
BACTERIA UR CULT: ABNORMAL
BACTERIA UR CULT: ABNORMAL

## 2025-03-15 RX ORDER — CIPROFLOXACIN 500 MG/1
500 TABLET, FILM COATED ORAL 2 TIMES DAILY
Qty: 14 TABLET | Refills: 0 | Status: SHIPPED | OUTPATIENT
Start: 2025-03-15 | End: 2025-03-22

## 2025-03-15 RX ORDER — LEVOFLOXACIN 750 MG/1
750 TABLET, FILM COATED ORAL EVERY 24 HOURS
Qty: 5 TABLET | Refills: 0 | Status: SHIPPED | OUTPATIENT
Start: 2025-03-15 | End: 2025-03-15 | Stop reason: ALTCHOICE

## 2025-04-19 ENCOUNTER — HOSPITAL ENCOUNTER (EMERGENCY)
Facility: HOSPITAL | Age: 55
Discharge: HOME | End: 2025-04-19
Attending: STUDENT IN AN ORGANIZED HEALTH CARE EDUCATION/TRAINING PROGRAM
Payer: COMMERCIAL

## 2025-04-19 ENCOUNTER — APPOINTMENT (EMERGENCY)
Dept: CT IMAGING | Facility: HOSPITAL | Age: 55
End: 2025-04-19
Payer: COMMERCIAL

## 2025-04-19 VITALS
DIASTOLIC BLOOD PRESSURE: 75 MMHG | HEIGHT: 67 IN | WEIGHT: 231.26 LBS | OXYGEN SATURATION: 96 % | SYSTOLIC BLOOD PRESSURE: 142 MMHG | TEMPERATURE: 97.7 F | RESPIRATION RATE: 18 BRPM | BODY MASS INDEX: 36.3 KG/M2 | HEART RATE: 71 BPM

## 2025-04-19 DIAGNOSIS — R10.9 ABDOMINAL PAIN: Primary | ICD-10-CM

## 2025-04-19 DIAGNOSIS — R19.7 DIARRHEA: ICD-10-CM

## 2025-04-19 LAB
ALBUMIN SERPL BCG-MCNC: 4.3 G/DL (ref 3.5–5)
ALP SERPL-CCNC: 84 U/L (ref 34–104)
ALT SERPL W P-5'-P-CCNC: 17 U/L (ref 7–52)
AMORPH URATE CRY URNS QL MICRO: NORMAL
ANION GAP SERPL CALCULATED.3IONS-SCNC: 7 MMOL/L (ref 4–13)
AST SERPL W P-5'-P-CCNC: 16 U/L (ref 13–39)
BACTERIA UR QL AUTO: NORMAL /HPF
BASOPHILS # BLD AUTO: 0.02 THOUSANDS/ÂΜL (ref 0–0.1)
BASOPHILS NFR BLD AUTO: 0 % (ref 0–1)
BILIRUB SERPL-MCNC: 0.44 MG/DL (ref 0.2–1)
BILIRUB UR QL STRIP: NEGATIVE
BUN SERPL-MCNC: 20 MG/DL (ref 5–25)
CALCIUM SERPL-MCNC: 9.4 MG/DL (ref 8.4–10.2)
CHLORIDE SERPL-SCNC: 109 MMOL/L (ref 96–108)
CLARITY UR: ABNORMAL
CO2 SERPL-SCNC: 23 MMOL/L (ref 21–32)
COLOR UR: YELLOW
CREAT SERPL-MCNC: 0.87 MG/DL (ref 0.6–1.3)
EOSINOPHIL # BLD AUTO: 0.1 THOUSAND/ÂΜL (ref 0–0.61)
EOSINOPHIL NFR BLD AUTO: 2 % (ref 0–6)
ERYTHROCYTE [DISTWIDTH] IN BLOOD BY AUTOMATED COUNT: 13.8 % (ref 11.6–15.1)
GFR SERPL CREATININE-BSD FRML MDRD: 75 ML/MIN/1.73SQ M
GLUCOSE SERPL-MCNC: 97 MG/DL (ref 65–140)
GLUCOSE UR STRIP-MCNC: NEGATIVE MG/DL
HCT VFR BLD AUTO: 42.7 % (ref 34.8–46.1)
HGB BLD-MCNC: 13.9 G/DL (ref 11.5–15.4)
HGB UR QL STRIP.AUTO: NEGATIVE
IMM GRANULOCYTES # BLD AUTO: 0.01 THOUSAND/UL (ref 0–0.2)
IMM GRANULOCYTES NFR BLD AUTO: 0 % (ref 0–2)
KETONES UR STRIP-MCNC: NEGATIVE MG/DL
LEUKOCYTE ESTERASE UR QL STRIP: NEGATIVE
LIPASE SERPL-CCNC: 26 U/L (ref 11–82)
LYMPHOCYTES # BLD AUTO: 1.43 THOUSANDS/ÂΜL (ref 0.6–4.47)
LYMPHOCYTES NFR BLD AUTO: 22 % (ref 14–44)
MCH RBC QN AUTO: 28.1 PG (ref 26.8–34.3)
MCHC RBC AUTO-ENTMCNC: 32.6 G/DL (ref 31.4–37.4)
MCV RBC AUTO: 86 FL (ref 82–98)
MONOCYTES # BLD AUTO: 0.56 THOUSAND/ÂΜL (ref 0.17–1.22)
MONOCYTES NFR BLD AUTO: 9 % (ref 4–12)
NEUTROPHILS # BLD AUTO: 4.29 THOUSANDS/ÂΜL (ref 1.85–7.62)
NEUTS SEG NFR BLD AUTO: 67 % (ref 43–75)
NITRITE UR QL STRIP: NEGATIVE
NON-SQ EPI CELLS URNS QL MICRO: NORMAL /HPF
NRBC BLD AUTO-RTO: 0 /100 WBCS
PH UR STRIP.AUTO: 5.5 [PH]
PLATELET # BLD AUTO: 240 THOUSANDS/UL (ref 149–390)
PMV BLD AUTO: 10.8 FL (ref 8.9–12.7)
POTASSIUM SERPL-SCNC: 4 MMOL/L (ref 3.5–5.3)
PROT SERPL-MCNC: 7 G/DL (ref 6.4–8.4)
PROT UR STRIP-MCNC: ABNORMAL MG/DL
RBC # BLD AUTO: 4.95 MILLION/UL (ref 3.81–5.12)
RBC #/AREA URNS AUTO: NORMAL /HPF
SODIUM SERPL-SCNC: 139 MMOL/L (ref 135–147)
SP GR UR STRIP.AUTO: 1.03 (ref 1–1.03)
UROBILINOGEN UR STRIP-ACNC: <2 MG/DL
WBC # BLD AUTO: 6.41 THOUSAND/UL (ref 4.31–10.16)
WBC #/AREA URNS AUTO: NORMAL /HPF

## 2025-04-19 PROCEDURE — 96374 THER/PROPH/DIAG INJ IV PUSH: CPT

## 2025-04-19 PROCEDURE — 99284 EMERGENCY DEPT VISIT MOD MDM: CPT

## 2025-04-19 PROCEDURE — 96361 HYDRATE IV INFUSION ADD-ON: CPT

## 2025-04-19 PROCEDURE — 74177 CT ABD & PELVIS W/CONTRAST: CPT

## 2025-04-19 PROCEDURE — 81001 URINALYSIS AUTO W/SCOPE: CPT | Performed by: STUDENT IN AN ORGANIZED HEALTH CARE EDUCATION/TRAINING PROGRAM

## 2025-04-19 PROCEDURE — 85025 COMPLETE CBC W/AUTO DIFF WBC: CPT | Performed by: STUDENT IN AN ORGANIZED HEALTH CARE EDUCATION/TRAINING PROGRAM

## 2025-04-19 PROCEDURE — 96375 TX/PRO/DX INJ NEW DRUG ADDON: CPT

## 2025-04-19 PROCEDURE — 80053 COMPREHEN METABOLIC PANEL: CPT | Performed by: STUDENT IN AN ORGANIZED HEALTH CARE EDUCATION/TRAINING PROGRAM

## 2025-04-19 PROCEDURE — 99285 EMERGENCY DEPT VISIT HI MDM: CPT | Performed by: STUDENT IN AN ORGANIZED HEALTH CARE EDUCATION/TRAINING PROGRAM

## 2025-04-19 PROCEDURE — 83690 ASSAY OF LIPASE: CPT | Performed by: STUDENT IN AN ORGANIZED HEALTH CARE EDUCATION/TRAINING PROGRAM

## 2025-04-19 PROCEDURE — 36415 COLL VENOUS BLD VENIPUNCTURE: CPT | Performed by: STUDENT IN AN ORGANIZED HEALTH CARE EDUCATION/TRAINING PROGRAM

## 2025-04-19 RX ORDER — ONDANSETRON 2 MG/ML
4 INJECTION INTRAMUSCULAR; INTRAVENOUS ONCE
Status: COMPLETED | OUTPATIENT
Start: 2025-04-19 | End: 2025-04-19

## 2025-04-19 RX ORDER — ONDANSETRON 4 MG/1
4 TABLET, ORALLY DISINTEGRATING ORAL EVERY 6 HOURS PRN
Qty: 28 TABLET | Refills: 0 | Status: SHIPPED | OUTPATIENT
Start: 2025-04-19 | End: 2025-04-26

## 2025-04-19 RX ORDER — KETOROLAC TROMETHAMINE 30 MG/ML
30 INJECTION, SOLUTION INTRAMUSCULAR; INTRAVENOUS ONCE
Status: CANCELLED | OUTPATIENT
Start: 2025-04-19 | End: 2025-04-19

## 2025-04-19 RX ORDER — MORPHINE SULFATE 4 MG/ML
4 INJECTION, SOLUTION INTRAMUSCULAR; INTRAVENOUS ONCE
Status: COMPLETED | OUTPATIENT
Start: 2025-04-19 | End: 2025-04-19

## 2025-04-19 RX ADMIN — IOHEXOL 100 ML: 350 INJECTION, SOLUTION INTRAVENOUS at 17:02

## 2025-04-19 RX ADMIN — MORPHINE SULFATE 4 MG: 4 INJECTION INTRAVENOUS at 14:52

## 2025-04-19 RX ADMIN — IOHEXOL 50 ML: 240 INJECTION, SOLUTION INTRATHECAL; INTRAVASCULAR; INTRAVENOUS; ORAL at 17:02

## 2025-04-19 RX ADMIN — SODIUM CHLORIDE 1000 ML: 0.9 INJECTION, SOLUTION INTRAVENOUS at 14:52

## 2025-04-19 RX ADMIN — ONDANSETRON 4 MG: 2 INJECTION INTRAMUSCULAR; INTRAVENOUS at 14:53

## 2025-04-19 NOTE — ED PROVIDER NOTES
Time reflects when diagnosis was documented in both MDM as applicable and the Disposition within this note       Time User Action Codes Description Comment    4/19/2025  7:00 PM Yasemin Lester Add [R10.9] Abdominal pain     4/19/2025  7:00 PM Yasemin Lester Add [R19.7] Diarrhea           ED Disposition       ED Disposition   Discharge    Condition   Stable    Date/Time   Sat Apr 19, 2025  6:59 PM    Comment   Lucille Ibrahim discharge to home/self care.                   Assessment & Plan       Medical Decision Making  Differential diverticulitis, colitis, lower GI bleed.    Patient is a well-appearing 54-year-old female present emerged department no acute respiratory distress and vital signs unremarkable.  Labs and imaging obtained.  Lab work is nonacute.  Discussed overall symptomatic management and follow-up precautions.  Disposition discharge    Amount and/or Complexity of Data Reviewed  Labs: ordered.  Radiology: ordered.    Risk  Prescription drug management.             Medications   ondansetron (ZOFRAN) injection 4 mg (4 mg Intravenous Given 4/19/25 1453)   sodium chloride 0.9 % bolus 1,000 mL (0 mL Intravenous Stopped 4/19/25 1652)   morphine injection 4 mg (4 mg Intravenous Given 4/19/25 1452)   iohexol (OMNIPAQUE) 350 MG/ML injection (MULTI-DOSE) 100 mL (100 mL Intravenous Given 4/19/25 1702)   iohexol (OMNIPAQUE) 240 MG/ML solution 50 mL (50 mL Oral Given 4/19/25 1702)       ED Risk Strat Scores                    No data recorded        SBIRT 22yo+      Flowsheet Row Most Recent Value   Initial Alcohol Screen: US AUDIT-C     1. How often do you have a drink containing alcohol? 0 Filed at: 04/19/2025 1451   2. How many drinks containing alcohol do you have on a typical day you are drinking?  0 Filed at: 04/19/2025 1451   3b. FEMALE Any Age, or MALE 65+: How often do you have 4 or more drinks on one occassion? 0 Filed at: 04/19/2025 1451   Audit-C Score 0 Filed at: 04/19/2025 1451   TRESSA: How many times  in the past year have you...    Used an illegal drug or used a prescription medication for non-medical reasons? Never Filed at: 04/19/2025 3679                            History of Present Illness       Chief Complaint   Patient presents with    Diarrhea     Pt c/o dark diarrhea after getting an NAD IV drip (for anti inflammation) on Tuesday, has lost 8 lbs since, headache, cramps in legs, and b/l flank pain       Past Medical History:   Diagnosis Date    Arthritis     Bell palsy     Diabetes (HCC) 12/22/2022    Gastric ulcer     GERD (gastroesophageal reflux disease)     History of transfusion     Hyperlipidemia 3/31/2022    Melena     Nausea and vomiting 2/13/2023    Obesity (BMI 30-39.9)     Pancreatic cyst     Right facial numbness     Upper GI bleed       Past Surgical History:   Procedure Laterality Date    ABDOMINOPLASTY      BARIATRIC SURGERY      BOWEL RESECTION LAPAROSCOPIC N/A 2/11/2019    Procedure: LAPAROSCOPIC LYSIS OF ADHESIONS; RESECTION SMALL BOWEL; DECOMPRESSION OF GASTRIC REMNANT; EGD;  Surgeon: Hetal Womack MD;  [facility];  Service: General    CHOLECYSTECTOMY      CT GUIDED PERC DRAINAGE CATHETER PLACEMENT  2/25/2019    ESOPHAGOGASTRODUODENOSCOPY N/A 3/6/2019    Procedure: INTRAOPERATIVE EGD;  Surgeon: Hetal Womack MD;  [facility];  Service: Bariatrics    FL RETROGRADE PYELOGRAM  12/22/2022    FL RETROGRADE PYELOGRAM  2/14/2023    HERNIA REPAIR      HYSTERECTOMY      KNEE CARTILAGE SURGERY      PARTIAL GASTRECTOMY N/A 3/6/2019    Procedure: RESECTION GASTRIC PARTIAL/GASTRECTOMY PARTIAL LAPAROSCOPIC;  Surgeon: Hetal Womack MD;  [facility];  Service: Bariatrics    MA COLECTOMY PARTIAL W/ANASTOMOSIS N/A 3/6/2019    Procedure: OPEN TRANSVERSE COLON RESECTION;  Surgeon: Hetal Womack MD;  [facility];  Service: Bariatrics    MA COLONOSCOPY FLX DX W/COLLJ SPEC WHEN PFRMD N/A 3/28/2018    Procedure: COLONOSCOPY;  Surgeon: Refugio Lange  MD;  [facility]: [facility];  Service: Gastroenterology    WY COLONOSCOPY FLX DX W/COLLJ SPEC WHEN PFRMD N/A 1/31/2019    Procedure: COLONOSCOPY;  Surgeon: Refugio Lange MD;  [facility];  Service: Gastroenterology    WY CYSTO/URETERO W/LITHOTRIPSY &INDWELL STENT INSRT Left 12/22/2022    Procedure: CYSTOSCOPY URETEROSCOPY, RETROGRADE PYELOGRAM AND INSERTION STENT URETERAL;  Surgeon: Lalo Bui MD;  [facility];  Service: Urology    WY CYSTO/URETERO W/LITHOTRIPSY &INDWELL STENT INSRT Right 2/14/2023    Procedure: CYSTOSCOPY URETEROSCOPY, RETROGRADE PYELOGRAM AND INSERTION STENT URETERAL;  Surgeon: Lalo Bui MD;  [facility];  Service: Urology    WY ESOPHAGOGASTRODUODENOSCOPY TRANSORAL DIAGNOSTIC N/A 3/22/2018    Procedure: ESOPHAGOGASTRODUODENOSCOPY (EGD);  Surgeon: Refugio Lange MD;  [facility];  Service: Gastroenterology    WY ESOPHAGOGASTRODUODENOSCOPY TRANSORAL DIAGNOSTIC N/A 1/31/2019    Procedure: ESOPHAGOGASTRODUODENOSCOPY (EGD);  Surgeon: Refugio Lange MD;  [facility];  Service: Gastroenterology    WY LAPS ABD PRTM&OMENTUM DX W/WO SPEC BR/WA SPX N/A 3/6/2019    Procedure: LAPAROSCOPY DIAGNOSTIC;  Surgeon: Hetal Womack MD;  [facility];  Service: Bariatrics    REDUCTION MAMMAPLASTY Bilateral     REPLACEMENT UNICONDYLAR JOINT KNEE      SHOULDER ARTHROSCOPY DISTAL CLAVICLE EXCISION AND OPEN ROTATOR CUFF REPAIR        Family History   Problem Relation Age of Onset    Heart attack Mother     Diabetes Mother     Heart attack Father     Stroke Father     Hypertension Brother     Leukemia Paternal Aunt       Social History     Tobacco Use    Smoking status: Never    Smokeless tobacco: Never   Vaping Use    Vaping status: Never Used   Substance Use Topics    Alcohol use: Not Currently    Drug use: No      E-Cigarette/Vaping    E-Cigarette Use Never User       E-Cigarette/Vaping Substances    Nicotine No     THC No     CBD No      Flavoring No     Other No     Unknown No       I have reviewed and agree with the history as documented.     HPI    Patient is a 54-year-old female present for department for diarrhea.  Symptoms have been going on since Tuesday.  Reports dark-colored stools.  She is concerned there might be blood in her stool.  No recent antibiotic use.  Pain is diffuse throughout the abdomen.  Denies any recent acid reflux or frequent alcohol/Motrin consumption.  Patient has had multiple abdominal surgeries.  Does report going to [facility] and having in NAD infusion.  She is concerned there might be a side effect of this infusion.  No noted fevers or chills.  Presents today due to persistence of symptoms.    Review of Systems   Constitutional:  Negative for chills and fever.   HENT:  Negative for ear pain and sore throat.    Eyes:  Negative for pain and visual disturbance.   Respiratory:  Negative for cough and shortness of breath.    Cardiovascular:  Negative for chest pain and palpitations.   Gastrointestinal:  Positive for abdominal pain and diarrhea. Negative for vomiting.   Genitourinary:  Negative for dysuria and hematuria.   Musculoskeletal:  Negative for arthralgias and back pain.   Skin:  Negative for color change and rash.   Neurological:  Negative for seizures and syncope.   All other systems reviewed and are negative.          Objective       ED Triage Vitals   Temperature Pulse Blood Pressure Respirations SpO2 Patient Position - Orthostatic VS   04/19/25 1407 04/19/25 1407 04/19/25 1407 04/19/25 1407 04/19/25 1407 04/19/25 1407   97.7 °F (36.5 °C) 78 146/79 18 98 % Sitting      Temp Source Heart Rate Source BP Location FiO2 (%) Pain Score    04/19/25 1407 04/19/25 1407 04/19/25 1407 -- 04/19/25 1452    Temporal Monitor Left arm  7      Vitals      Date and Time Temp Pulse SpO2 Resp BP Pain Score FACES Pain Rating User   04/19/25 1717 -- 71 96 % 18 142/75 -- -- AT   04/19/25 1549 -- -- -- -- -- 6 -- ASM   04/19/25 1452 --  -- -- -- -- 7 -- CA   04/19/25 1407 97.7 °F (36.5 °C) 78 98 % 18 146/79 -- -- LA            Physical Exam  Vitals and nursing note reviewed.   Constitutional:       General: She is not in acute distress.     Appearance: She is well-developed.   HENT:      Head: Normocephalic and atraumatic.   Eyes:      Conjunctiva/sclera: Conjunctivae normal.   Cardiovascular:      Rate and Rhythm: Normal rate and regular rhythm.      Heart sounds: No murmur heard.  Pulmonary:      Effort: Pulmonary effort is normal. No respiratory distress.      Breath sounds: Normal breath sounds.   Abdominal:      Palpations: Abdomen is soft.      Tenderness: There is abdominal tenderness.   Musculoskeletal:         General: No swelling.      Cervical back: Neck supple.   Skin:     General: Skin is warm and dry.      Capillary Refill: Capillary refill takes less than 2 seconds.   Neurological:      General: No focal deficit present.      Mental Status: She is alert and oriented to person, place, and time.   Psychiatric:         Mood and Affect: Mood normal.         Results Reviewed       Procedure Component Value Units Date/Time    Urine Microscopic [[id number]] Collected: 04/19/25 1715    Lab Status: Final result Specimen: Urine, Clean Catch Updated: 04/19/25 1754     RBC, UA None Seen /hpf      WBC, UA None Seen /hpf      Epithelial Cells None Seen /hpf      Bacteria, UA None Seen /hpf      Amorphous Crystals, UA Innumerable    UA w Reflex to Microscopic w Reflex to Culture [[id number]]  (Abnormal) Collected: 04/19/25 1715    Lab Status: Final result Specimen: Urine, Clean Catch Updated: 04/19/25 1747     Color, UA Yellow     Clarity, UA Extra Turbid     Specific Gravity, UA 1.032     pH, UA 5.5     Leukocytes, UA Negative     Nitrite, UA Negative     Protein, UA Trace mg/dl      Glucose, UA Negative mg/dl      Ketones, UA Negative mg/dl      Urobilinogen, UA <2.0 mg/dl      Bilirubin, UA Negative     Occult Blood, UA Negative    Comprehensive  metabolic panel [[id number]]  (Abnormal) Collected: 04/19/25 1453    Lab Status: Final result Specimen: Blood from Arm, Right Updated: 04/19/25 1519     Sodium 139 mmol/L      Potassium 4.0 mmol/L      Chloride 109 mmol/L      CO2 23 mmol/L      ANION GAP 7 mmol/L      BUN 20 mg/dL      Creatinine 0.87 mg/dL      Glucose 97 mg/dL      Calcium 9.4 mg/dL      AST 16 U/L      ALT 17 U/L      Alkaline Phosphatase 84 U/L      Total Protein 7.0 g/dL      Albumin 4.3 g/dL      Total Bilirubin 0.44 mg/dL      eGFR 75 ml/min/1.73sq m     Narrative:      [employer] guidelines for Chronic Kidney Disease (CKD):     Stage 1 with normal or high GFR (GFR > 90 mL/min/1.73 square meters)    Stage 2 Mild CKD (GFR = 60-89 mL/min/1.73 square meters)    Stage 3A Moderate CKD (GFR = 45-59 mL/min/1.73 square meters)    Stage 3B Moderate CKD (GFR = 30-44 mL/min/1.73 square meters)    Stage 4 Severe CKD (GFR = 15-29 mL/min/1.73 square meters)    Stage 5 End Stage CKD (GFR <15 mL/min/1.73 square meters)  Note: GFR calculation is accurate only with a steady state creatinine    Lipase [[id number]]  (Normal) Collected: 04/19/25 1453    Lab Status: Final result Specimen: Blood from Arm, Right Updated: 04/19/25 1519     Lipase 26 u/L     CBC and differential [[id number]] Collected: 04/19/25 1453    Lab Status: Final result Specimen: Blood from Arm, Right Updated: 04/19/25 1501     WBC 6.41 Thousand/uL      RBC 4.95 Million/uL      Hemoglobin 13.9 g/dL      Hematocrit 42.7 %      MCV 86 fL      MCH 28.1 pg      MCHC 32.6 g/dL      RDW 13.8 %      MPV 10.8 fL      Platelets 240 Thousands/uL      nRBC 0 /100 WBCs      Segmented % 67 %      Immature Grans % 0 %      Lymphocytes % 22 %      Monocytes % 9 %      Eosinophils Relative 2 %      Basophils Relative 0 %      Absolute Neutrophils 4.29 Thousands/µL      Absolute Immature Grans 0.01 Thousand/uL      Absolute Lymphocytes 1.43 Thousands/µL      Absolute Monocytes 0.56  Thousand/µL      Eosinophils Absolute 0.10 Thousand/µL      Basophils Absolute 0.02 Thousands/µL     iFOBT (FIT) [[id number]]     Lab Status: No result Specimen: Stool             CT abdomen pelvis with contrast   Final Interpretation by Santos Silverman MD (04/19 1850)      No acute intra-abdominal finding         Workstation performed: [id number]             Procedures    ED Medication and Procedure Management   Prior to Admission Medications   Prescriptions Last Dose Informant Patient Reported? Taking?   Biotin 1000 MCG tablet  Self Yes No   Sig: Take 1,000 mcg by mouth   Insulin Pen Needle (Pen Needles) 32G X 4 MM MISC   No No   Sig: Use in the morning   Patient not taking: Reported on 1/18/2024   Multiple Vitamin (MULTIVITAMIN) tablet  Self No No   Sig: Take 1 tablet by mouth 2 (two) times a day   acetaminophen (TYLENOL) 650 mg CR tablet   No No   Sig: Take 1 tablet (650 mg total) by mouth every 8 (eight) hours as needed for mild pain   butalbital-acetaminophen-caffeine (FIORICET,ESGIC) -40 mg per tablet  Self Yes No   Patient not taking: Reported on 1/18/2024   calcium citrate-vitamin D (CITRACAL+D) 315-200 MG-UNIT per tablet  Self No No   Sig: Take 2 tablets by mouth 2 (two) times a day   ergocalciferol (VITAMIN D2) 50,000 units  Self No No   Sig: Take 1 capsule (50,000 Units total) by mouth 2 (two) times a week with meals   ferrous sulfate 325 (65 Fe) mg tablet  Self Yes No   liraglutide (SAXENDA) injection   No No   Sig: INJECT SAXENDA DAILY SUBCUTANEOUSLY. -WEEK 1: 0.6mg daily -if tolerated WEEK 2: 1.2mg daily -if tolerated WEEK 3: 1.8mg daily -if tolerated WEEK 4: 2.4mg daily -if tolerated WEEK 5: 3mg daily -IF NAUSEA/VOMITING DEVELOP STOP MEDICATION FOR A FEW DAYS AND DECREASE TO PREVIOUSLY TOLERATED DOSE. STAY HYDRATED. -IF YOU DEVELOP SEVERE ABDOMINAL PAIN WHICH MAY RADIATE TO THE BACK, SOMETIMES ASSOCIATED WITH FEVER, AND VOMITING, STOP MEDICATION AND SEEK URGENT CARE AS THIS MAY BE A SIGN  OF PANCREATITIS.   Patient not taking: Reported on 1/18/2024   metFORMIN (GLUCOPHAGE) 500 mg tablet   No No   Sig: TAKE 1 TABLET BY MOUTH DAILY WITH DINNER   ondansetron (Zofran ODT) 4 mg disintegrating tablet   No No   Sig: Take 1 tablet (4 mg total) by mouth every 8 (eight) hours as needed for nausea or vomiting   Patient not taking: Reported on 1/18/2024   semaglutide, 0.25 or 0.5 mg/dose, (Ozempic, 0.25 or 0.5 MG/DOSE,) 2 mg/3 mL injection pen   No No   Sig: INJECT Ozempic Weekly SUBCUTANEOUSLY. -WEEK 1: 0.25 mg weekly for 4 weeks than increase to 0.5 mg once a week -IF NAUSEA/VOMITING DEVELOP STOP MEDICATION FOR A FEW DAYS AND DECREASE TO PREVIOUSLY TOLERATED DOSE. STAY HYDRATED. -IF YOU DEVELOP SEVERE ABDOMINAL PAIN WHICH MAY RADIATE TO THE BACK, SOMETIMES ASSOCIATED WITH FEVER, AND VOMITING, STOP MEDICATION AND SEEK URGENT CARE AS THIS MAY BE A SIGN OF PANCREATITIS.   vitamin B-12 (CYANOCOBALAMIN) 500 MCG TABS  Self No No   Sig: Take 2 tablets (1,000 mcg total) by mouth daily for 120 days      Facility-Administered Medications: None     Discharge Medication List as of 4/19/2025  7:00 PM        CONTINUE these medications which have NOT CHANGED    Details   acetaminophen (TYLENOL) 650 mg CR tablet Take 1 tablet (650 mg total) by mouth every 8 (eight) hours as needed for mild pain, Starting Sat 2/11/2023, Normal      Biotin 1000 MCG tablet Take 1,000 mcg by mouth, Historical Med      butalbital-acetaminophen-caffeine (FIORICET,ESGIC) -40 mg per tablet Historical Med      calcium citrate-vitamin D (CITRACAL+D) 315-200 MG-UNIT per tablet Take 2 tablets by mouth 2 (two) times a day, Starting Mon 3/4/2019, Normal      ergocalciferol (VITAMIN D2) 50,000 units Take 1 capsule (50,000 Units total) by mouth 2 (two) times a week with meals, Starting Thu 4/7/2022, Normal      ferrous sulfate 325 (65 Fe) mg tablet Historical Med      Insulin Pen Needle (Pen Needles) 32G X 4 MM MISC Use in the morning, Starting Wed  1/3/2024, Normal      liraglutide (SAXENDA) injection INJECT SAXENDA DAILY SUBCUTANEOUSLY. -WEEK 1: 0.6mg daily -if tolerated WEEK 2: 1.2mg daily -if tolerated WEEK 3: 1.8mg daily -if tolerated WEEK 4: 2.4mg daily -if tolerated WEEK 5: 3mg daily -IF NAUSEA/VOMITING DEVELOP STOP MEDICATION FOR A FEW DAYS AND  DECREASE TO PREVIOUSLY TOLERATED DOSE. STAY HYDRATED. -IF YOU DEVELOP SEVERE ABDOMINAL PAIN WHICH MAY RADIATE TO THE BACK, SOMETIMES ASSOCIATED WITH FEVER, AND VOMITING, STOP MEDICATION AND SEEK URGENT CARE AS THIS MAY BE A SIGN OF PANCREATITIS., Normal      metFORMIN (GLUCOPHAGE) 500 mg tablet TAKE 1 TABLET BY MOUTH DAILY WITH DINNER, Starting Fri 11/10/2023, Normal      Multiple Vitamin (MULTIVITAMIN) tablet Take 1 tablet by mouth 2 (two) times a day, Starting Mon 3/4/2019, Normal      ondansetron (Zofran ODT) 4 mg disintegrating tablet Take 1 tablet (4 mg total) by mouth every 8 (eight) hours as needed for nausea or vomiting, Starting Sat 2/11/2023, Normal      semaglutide, 0.25 or 0.5 mg/dose, (Ozempic, 0.25 or 0.5 MG/DOSE,) 2 mg/3 mL injection pen INJECT Ozempic Weekly SUBCUTANEOUSLY. -WEEK 1: 0.25 mg weekly for 4 weeks than increase to 0.5 mg once a week -IF NAUSEA/VOMITING DEVELOP STOP MEDICATION FOR A FEW DAYS AND DECREASE TO PREVIOUSLY TOLERATED DOSE. STAY HYDRATED. -IF YOU DEVELOP SEVERE ABDO TEODORO PAIN WHICH MAY RADIATE TO THE BACK, SOMETIMES ASSOCIATED WITH FEVER, AND VOMITING, STOP MEDICATION AND SEEK URGENT CARE AS THIS MAY BE A SIGN OF PANCREATITIS., Print      vitamin B-12 (CYANOCOBALAMIN) 500 MCG TABS Take 2 tablets (1,000 mcg total) by mouth daily for 120 days, Starting Mon 3/4/2019, Until Thu 1/18/2024, Normal           No discharge procedures on file.  ED SEPSIS DOCUMENTATION   Time reflects when diagnosis was documented in both MDM as applicable and the Disposition within this note       Time User Action Codes Description Comment    4/19/2025  7:00 PM Yasemin Lester Add [R10.9] Abdominal  pain     4/19/2025  7:00 PM Yasemin Lester Add [R19.7] Diarrhea                  Yasemin Lester DO  04/25/25 1015

## (undated) DEVICE — ENDOPOUCH RETRIEVER SPECIMEN RETRIEVAL BAGS: Brand: ENDOPOUCH RETRIEVER

## (undated) DEVICE — BASKET STONE RTRVL PLTN CL 3FR 115CM

## (undated) DEVICE — TRUE CONTENT TO BE POPULATED AS PART OF REBRANDING: Brand: ARGYLE

## (undated) DEVICE — POWER SHELL SIGNIA

## (undated) DEVICE — ENDOPATH 5MM CURVED SCISSORS WITH MONOPOLAR CAUTERY: Brand: ENDOPATH

## (undated) DEVICE — TRAVELKIT CONTAINS FIRST STEP KIT (200ML EP-4 KIT) AND SOILED SCOPE BAG - 1 KIT: Brand: TRAVELKIT CONTAINS FIRST STEP KIT AND SOILED SCOPE BAG

## (undated) DEVICE — SCD SEQUENTIAL COMPRESSION COMFORT SLEEVE MEDIUM KNEE LENGTH: Brand: KENDALL SCD

## (undated) DEVICE — SYRINGE 30ML LL

## (undated) DEVICE — GAUZE SPONGES,16 PLY: Brand: CURITY

## (undated) DEVICE — ENDOPATH PNEUMONEEDLE INSUFFLATION NEEDLES WITH LUER LOCK CONNECTORS 120MM: Brand: ENDOPATH

## (undated) DEVICE — STAPLER GIA HANDLE STAND 4MM

## (undated) DEVICE — SPECIMEN CONTAINER STERILE PEEL PACK

## (undated) DEVICE — CHLORAPREP HI-LITE 26ML ORANGE

## (undated) DEVICE — VIOLET BRAIDED (POLYGLACTIN 910), SYNTHETIC ABSORBABLE SUTURE: Brand: COATED VICRYL

## (undated) DEVICE — [HIGH FLOW INSUFFLATOR,  DO NOT USE IF PACKAGE IS DAMAGED,  KEEP DRY,  KEEP AWAY FROM SUNLIGHT,  PROTECT FROM HEAT AND RADIOACTIVE SOURCES.]: Brand: PNEUMOSURE

## (undated) DEVICE — VISUALIZATION SYSTEM: Brand: CLEARIFY

## (undated) DEVICE — GLOVE SRG BIOGEL 7

## (undated) DEVICE — 3M™ TEGADERM™ TRANSPARENT FILM DRESSING FRAME STYLE, 1626W, 4 IN X 4-3/4 IN (10 CM X 12 CM), 50/CT 4CT/CASE: Brand: 3M™ TEGADERM™

## (undated) DEVICE — ELECTRODE LAP SPATULA STR E-Z CLEAN 33CM -0018

## (undated) DEVICE — INTENDED FOR TISSUE SEPARATION, AND OTHER PROCEDURES THAT REQUIRE A SHARP SURGICAL BLADE TO PUNCTURE OR CUT.: Brand: BARD-PARKER SAFETY BLADES SIZE 11, STERILE

## (undated) DEVICE — COVIDIEN ENDO GIA PURPLE (MED) RELOAD 45MM

## (undated) DEVICE — TUBING SMOKE EVAC W/FILTRATION DEVICE PLUMEPORT ACTIV

## (undated) DEVICE — GUIDEWIRE STRGHT TIP 0.035 IN  SOLO PLUS

## (undated) DEVICE — NEEDLE SPINAL 20G X 3.5 LF

## (undated) DEVICE — SUT MONOCRYL 4-0 PS-2 27 IN Y426H

## (undated) DEVICE — SURGICAL GOWN, XL SMARTSLEEVE: Brand: CONVERTORS

## (undated) DEVICE — URETERAL CATHETER ADAPTOR TIP

## (undated) DEVICE — UROCATCH BAG

## (undated) DEVICE — PROXIMATE PLUS MD MULTI-DIRECTIONAL RELEASE SKIN STAPLERS CONTAINS 35 STAINLESS STEEL STAPLES APPROXIMATE CLOSED DIMENSIONS: 6.9MM X 3.9MM WIDE: Brand: PROXIMATE

## (undated) DEVICE — 5 MM CURVED DISSECTORS WITH MONOPOLAR CAUTERY: Brand: ENDOPATH

## (undated) DEVICE — PACK TUR

## (undated) DEVICE — CATH URETERAL 5FR X 70 CM FLEX TIP POLYUR BARD

## (undated) DEVICE — ENDOPATH XCEL BLADELESS TROCARS WITH STABILITY SLEEVES: Brand: ENDOPATH XCEL

## (undated) DEVICE — JP CHAN DRN SIL HUBLESS 19FR W/TRO: Brand: CARDINAL HEALTH

## (undated) DEVICE — ADHESIVE SKIN CLSR DERMABOND NX

## (undated) DEVICE — HARMONIC 1100 SHEARS, 36CM SHAFT LENGTH: Brand: HARMONIC

## (undated) DEVICE — HARMONIC ACE +7 LAPAROSCOPIC SHEARS ADVANCED HEMOSTASIS 5MM DIAMETER 36CM SHAFT LENGTH  FOR USE WITH GRAY HAND PIECE ONLY: Brand: HARMONIC ACE

## (undated) DEVICE — GLOVE SRG BIOGEL ORTHOPEDIC 7.5

## (undated) DEVICE — LIGHT HANDLE COVER SLEEVE DISP BLUE STELLAR

## (undated) DEVICE — TELFA ADHESIVE ISLAND DRESSING: Brand: TELFA

## (undated) DEVICE — TISSUE RETRIEVAL SYSTEM: Brand: INZII RETRIEVAL SYSTEM

## (undated) DEVICE — DERMABOND PRINEO

## (undated) DEVICE — DRAPE EQUIPMENT RF WAND

## (undated) DEVICE — WEBRIL 6 IN UNSTERILE

## (undated) DEVICE — SPONGE LAP 18 X 18 IN STRL RFD

## (undated) DEVICE — ENDOPATH XCEL UNIVERSAL TROCAR STABLILITY SLEEVES: Brand: ENDOPATH XCEL

## (undated) DEVICE — SPONGE 4 X 4 XRAY 16 PLY STRL LF RFD

## (undated) DEVICE — GLOVE INDICATOR PI UNDERGLOVE SZ 7 BLUE

## (undated) DEVICE — IRRIG ENDO FLO TUBING

## (undated) DEVICE — Device

## (undated) DEVICE — KERLIX BANDAGE ROLL: Brand: KERLIX

## (undated) DEVICE — STAPLE ENDO TRI-STAPLE 2.0 BLCK RELOAD XTRA THICK

## (undated) DEVICE — TELFA NON-ADHERENT ABSORBENT DRESSING: Brand: TELFA

## (undated) DEVICE — SYRINGE 20ML LL

## (undated) DEVICE — ABDOMINAL PAD: Brand: DERMACEA

## (undated) DEVICE — PMI DISPOSABLE PUNCTURE CLOSURE DEVICE / SUTURE GRASPER: Brand: PMI

## (undated) DEVICE — COVIDIEN GIA BLACK (XTRA THICK) RELOAD

## (undated) DEVICE — ASTOUND STANDARD SURGICAL GOWN, XL: Brand: CONVERTORS

## (undated) DEVICE — TIBURON LAPAROSCOPIC ABDOMINAL DRAPE: Brand: CONVERTORS

## (undated) DEVICE — SCD SEQUENTIAL COMPRESSION COMFORT SLEEVE LARGE KNEE LENGTH: Brand: KENDALL SCD

## (undated) DEVICE — GLOVE SRG BIOGEL ECLIPSE 7.5

## (undated) DEVICE — COVIDIEN ENDO GIA PURPLE (MED) RELOAD 60MM

## (undated) DEVICE — PROXIMATE RELOADABLE LINEAR CUTTER WITH SAFETY LOCK-OUT, 75MM: Brand: PROXIMATE

## (undated) DEVICE — ALLENTOWN LAP CHOLE APP PACK: Brand: CARDINAL HEALTH

## (undated) DEVICE — REM POLYHESIVE ADULT PATIENT RETURN ELECTRODE: Brand: VALLEYLAB

## (undated) DEVICE — PROXIMATE LINEAR CUTTER RELOAD, BLUE, 75MM: Brand: PROXIMATE

## (undated) DEVICE — SPONGE DRAIN 4 X 4

## (undated) DEVICE — 3000CC GUARDIAN II: Brand: GUARDIAN